# Patient Record
Sex: MALE | Employment: FULL TIME | URBAN - METROPOLITAN AREA
[De-identification: names, ages, dates, MRNs, and addresses within clinical notes are randomized per-mention and may not be internally consistent; named-entity substitution may affect disease eponyms.]

---

## 2020-01-29 ENCOUNTER — OFFICE VISIT (OUTPATIENT)
Dept: FAMILY MEDICINE CLINIC | Facility: CLINIC | Age: 40
End: 2020-01-29

## 2020-01-29 VITALS
BODY MASS INDEX: 35.44 KG/M2 | HEART RATE: 80 BPM | DIASTOLIC BLOOD PRESSURE: 90 MMHG | TEMPERATURE: 97.6 F | WEIGHT: 225.8 LBS | SYSTOLIC BLOOD PRESSURE: 138 MMHG | RESPIRATION RATE: 16 BRPM | HEIGHT: 67 IN

## 2020-01-29 DIAGNOSIS — R10.30 INGUINAL PAIN, UNSPECIFIED LATERALITY: ICD-10-CM

## 2020-01-29 DIAGNOSIS — M54.50 BILATERAL LOW BACK PAIN WITHOUT SCIATICA, UNSPECIFIED CHRONICITY: Primary | ICD-10-CM

## 2020-01-29 PROCEDURE — 3008F BODY MASS INDEX DOCD: CPT | Performed by: FAMILY MEDICINE

## 2020-01-29 PROCEDURE — 99203 OFFICE O/P NEW LOW 30 MIN: CPT | Performed by: FAMILY MEDICINE

## 2020-01-29 NOTE — PATIENT INSTRUCTIONS
Groin Pain   WHAT YOU NEED TO KNOW:   Groin pain may be felt only in your groin, or it may spread to your buttocks, thigh, or knee  An injury to your hip joint, pelvic area, lower back, or thighs can cause groin pain  DISCHARGE INSTRUCTIONS:   Medicines: You may need any of the following:  · NSAIDs , such as ibuprofen, help decrease swelling, pain, and fever  This medicine is available with or without a doctor's order  NSAIDs can cause stomach bleeding or kidney problems in certain people  If you take blood thinner medicine, always ask if NSAIDs are safe for you  Always read the medicine label and follow directions  Do not give these medicines to children under 10months of age without direction from your child's healthcare provider  · Acetaminophen  decreases pain  It is available without a doctor's order  Ask how much to take and how often to take it  Follow directions  Acetaminophen can cause liver damage if not taken correctly  · Take your medicine as directed  Contact your healthcare provider if you think your medicine is not helping or if you have side effects  Tell him of her if you are allergic to any medicine  Keep a list of the medicines, vitamins, and herbs you take  Include the amounts, and when and why you take them  Bring the list or the pill bottles to follow-up visits  Carry your medicine list with you in case of an emergency  Follow up with your healthcare provider as directed: You may need to return for more tests  Write down your questions so you remember to ask them during your visits  Self-care:   · Rest  as much as possible  Avoid activities that cause or increase your pain  · Apply ice  on your groin for 15 to 20 minutes every hour or as directed  Use an ice pack, or put crushed ice in a plastic bag  Cover it with a towel  Ice helps prevent tissue damage and decreases swelling and pain       · Apply heat  on your groin for 20 to 30 minutes every 2 hours for as many days as directed  Heat helps decrease pain and muscle spasms  Contact your healthcare provider if:   · You have a fever  · You have questions or concerns about your condition or care  Return to the emergency department if:   · You have severe pain even after you take medicine  · You have pain or burning when you urinate  · You have pain on your side that spreads to your groin  © 2017 [employer] Information is for End User's use only and may not be sold, redistributed or otherwise used for commercial purposes  All illustrations and images included in CareNotes® are the copyrighted property of A D A M , Inc  or Ch[employer]an Blanc  The above information is an  only  It is not intended as medical advice for individual conditions or treatments  Talk to your doctor, nurse or pharmacist before following any medical regimen to see if it is safe and effective for you  Lower Back Exercises   AMBULATORY CARE:   Lower back exercises  help heal and strengthen your back muscles to prevent another injury  Ask your healthcare provider if you need to see a physical therapist for more advanced exercises  Seek care immediately if:   · You have severe pain that prevents you from moving  Contact your healthcare provider if:   · Your pain becomes worse  · You have new pain  · You have questions or concerns about your condition or care  Do lower back exercises safely:   · Do the exercises on a mat or firm surface  (not on a bed) to support your spine and prevent low back pain  · Move slowly and smoothly  Avoid fast or jerky motions  · Breathe normally  Do not hold your breath  · Stop if you feel pain  It is normal to feel some discomfort at first  Regular exercise will help decrease your discomfort over time  Lower back exercises: Your healthcare provider may recommend that you do back exercises 10 to 30 minutes each day   He may also recommend that you do exercises 1 to 3 times each day  Ask your healthcare provider which exercises are best for you and how often to do them  · Ankle pumps:  Lie on your back  Move your foot up (with your toes pointing toward your head)  Then, move your foot down (with your toes pointing away from you)  Repeat this exercise 10 times on each side  · Heel slides:  Lie on your back  Slowly bend one leg and then straighten it  Next, bend the other leg and then straighten it  Repeat 10 times on each side  · Pelvic tilt:  Lie on your back with your knees bent and feet flat on the floor  Place your arms in a relaxed position beside your body  Tighten the muscles of your abdomen and flatten your back against the floor  Hold for 5 seconds  Repeat 5 times  · Back stretch:  Lie on your back with your hands behind your head  Bend your knees and turn the lower half of your body to one side  Hold this position for 10 seconds  Repeat 3 times on each side  · Straight leg raises:  Lie on your back with one leg straight  Bend the other knee  Tighten your abdomen and then slowly lift the straight leg up about 6 to 12 inches off the floor  Hold for 1 to 5 seconds  Lower your leg slowly  Repeat 10 times on each leg  · Knee-to-chest:  Lie on your back with your knees bent and feet flat on the floor  Pull one of your knees toward your chest and hold it there for 5 seconds  Return your leg to the starting position  Lift the other knee toward your chest and hold for 5 seconds  Do this 5 times on each side  · Cat and camel:  Place your hands and knees on the floor  Arch your back upward toward the ceiling and lower your head  Round out your spine as much as you can  Hold for 5 seconds  Lift your head upward and push your chest downward toward the floor  Hold for 5 seconds  Do 3 sets or as directed  · Wall squats:  Stand with your back against a wall  Tighten the muscles of your abdomen   Slowly lower your body until your knees are bent at a 45 degree angle  Hold this position for 5 seconds  Slowly move back up to a standing position  Repeat 10 times  · Curl up:  Lie on your back with your knees bent and feet flat on the floor  Place your hands, palms down, underneath the curve in your lower back  Next, with your elbows on the floor, lift your shoulders and chest 2 to 3 inches  Keep your head in line with your shoulders  Hold this position for 5 seconds  When you can do this exercise without pain for 10 to 15 seconds, you may add a rotation  While your shoulders and chest are lifted off the ground, turn slightly to the left and hold  Repeat on the other side  · Bird dog:  Place your hands and knees on the floor  Keep your wrists directly below your shoulders and your knees directly below your hips  Pull your belly button in toward your spine  Do not flatten or arch your back  Tighten your abdominal muscles  Raise one arm straight out so that it is aligned with your head  Next, raise the leg opposite your arm  Hold this position for 15 seconds  Lower your arm and leg slowly and change sides  Do 5 sets  © 2017 [employer] [employer] [facility] [employer] Information is for End User's use only and may not be sold, redistributed or otherwise used for commercial purposes  All illustrations and images included in CareNotes® are the copyrighted property of A D A M , Inc  or Chintan Blanc  The above information is an  only  It is not intended as medical advice for individual conditions or treatments  Talk to your doctor, nurse or pharmacist before following any medical regimen to see if it is safe and effective for you

## 2020-01-29 NOTE — ASSESSMENT & PLAN NOTE
Over one month of bilateral lower back pain that waxes and wanes, dull sensation with periodic groin pain worse on the left versus right  Triggered after sitting for long periods  Resolves with exercise  No numbness, tingling, bowel or bladder incontinence  Reports no testicular lumps or swelling  Denies urinary symptoms   Reports weight gain of over 35 pounds since May due to changing from vegetarian to regular diet  Notes increase in abdominal circumference  No neuro deficits, hypertonicity of lumbar and lower thoracic paraspinal muscles  Likely MSK in etiology   Advised patient to take NSAIDs every 8 hours for 1 week and to perform daily stretches and apply heating pad daily  Patient refusing OMT at this time  Follow up in 2 weeks

## 2020-01-29 NOTE — PROGRESS NOTES
Assessment/Plan:           Problem List Items Addressed This Visit        Other    Bilateral low back pain without sciatica - Primary     Over one month of bilateral lower back pain that waxes and wanes, dull sensation with periodic groin pain worse on the left versus right  Triggered after sitting for long periods  Resolves with exercise  No numbness, tingling, bowel or bladder incontinence  Reports no testicular lumps or swelling  Denies urinary symptoms   Reports weight gain of over 35 pounds since May due to changing from vegetarian to regular diet  Notes increase in abdominal circumference  No neuro deficits, hypertonicity of lumbar and lower thoracic paraspinal muscles  Likely MSK in etiology   Advised patient to take NSAIDs every 8 hours for 1 week and to perform daily stretches and apply heating pad daily  Patient refusing OMT at this time  Follow up in 2 weeks           Other Visit Diagnoses     Inguinal pain, unspecified laterality                Subjective:      Patient ID: Virgin Payer is a 46 y o  male  Patient is a 46year old male with no significant medical history is presenting to establish care and to discuss lower back pain with associated bilateral groin pain  States that around [place] he has noticed gradual lower back pain  Describes the pain as a constant dull pain  Denies any recent injury/trauma/falls  He states that the pain waxed and waned, improves after running or exercising  He states that after a few weeks he noted radiation of the pain to his groin bilaterally, Right worse than left  Worse after sitting for long periods but no pain when active such as running or at work  He denies urinary symptoms such as dysuria, hematuria, discharge, incontinence  Also denies lower extremity numbness, weakness, tingling, incontinence  He currently has no pain in his lower back or groin  He is concerned due to family history of testicular cancer; Uncle diagnosed at age of 27   He states he does regular checks and has not noticed any testicular lumps  He does not want a testicular exam today  He also states that within the last year he has gained over 40lbs as he transitioned from [address] back to eating meat  Patient also states he has not been following a healthy diet but exercises every other day at the gym or running  He has tried Tylenol and Advil sporadically but has not tried any long term medication for his back pain  The following portions of the patient's history were reviewed and updated as appropriate:   He  has no past medical history on file  He   Patient Active Problem List    Diagnosis Date Noted    Bilateral low back pain without sciatica 01/29/2020     He  has no past surgical history on file  His family history is not on file  He  has no tobacco, alcohol, and drug history on file  Current Outpatient Medications   Medication Sig Dispense Refill    MELATONIN PO Take by mouth      Protein POWD Take by mouth       No current facility-administered medications for this visit  Current Outpatient Medications on File Prior to Visit   Medication Sig    MELATONIN PO Take by mouth    Protein POWD Take by mouth     No current facility-administered medications on file prior to visit  He has No Known Allergies       Review of Systems   Constitutional: Negative for activity change, appetite change, chills, diaphoresis, fatigue and fever  HENT: Negative for congestion and rhinorrhea  Eyes: Negative for visual disturbance  Respiratory: Negative for cough and shortness of breath  Cardiovascular: Negative for chest pain and palpitations  Gastrointestinal: Negative for abdominal pain, blood in stool, constipation, diarrhea, nausea, rectal pain and vomiting  Genitourinary: Negative for difficulty urinating, discharge, dysuria, flank pain, frequency, genital sores, hematuria, penile pain, penile swelling, scrotal swelling, testicular pain and urgency  Musculoskeletal: Positive for back pain (lower back pain)  Negative for gait problem and myalgias  Skin: Negative for rash and wound  Allergic/Immunologic: Negative for environmental allergies and food allergies  Neurological: Negative for dizziness, weakness, light-headedness and headaches  Hematological: Negative for adenopathy  Objective:      /90   Pulse 80   Temp 97 6 °F (36 4 °C)   Resp 16   Ht 5' 6 8" (1 697 m)   Wt 102 kg (225 lb 12 8 oz)   BMI 35 58 kg/m²          Physical Exam   Constitutional: He appears well-developed and well-nourished  No distress  HENT:   Head: Normocephalic and atraumatic  Mouth/Throat: Oropharynx is clear and moist    Eyes: Pupils are equal, round, and reactive to light  Neck: Neck supple  Cardiovascular: Normal rate and regular rhythm  Pulmonary/Chest: Effort normal and breath sounds normal  He has no wheezes  Abdominal: Soft  Bowel sounds are normal  He exhibits no distension  There is no tenderness  There is no guarding  Genitourinary:   Genitourinary Comments: Patient refused exam today   Musculoskeletal: He exhibits no edema  Lumbar back: He exhibits tenderness (mild tenderness to palpation)  He exhibits no swelling and no laceration  Hypertonicity of paraspinal muscles along lower thoracic and lumbar; negative Straight leg test   Lymphadenopathy:     He has no cervical adenopathy  Neurological: He is alert  He has normal strength  No sensory deficit  Lower extremity strength: 5/5 bilaterally  No deficits in LE sensory    Skin: Skin is warm and dry  He is not diaphoretic  No erythema  Psychiatric: He has a normal mood and affect  His behavior is normal    Vitals reviewed              Edyth Lesch, DO PGY-1  [place]

## 2022-11-10 ENCOUNTER — OFFICE VISIT (OUTPATIENT)
Dept: FAMILY MEDICINE CLINIC | Facility: CLINIC | Age: 42
End: 2022-11-10

## 2022-11-10 VITALS
HEIGHT: 66 IN | HEART RATE: 63 BPM | OXYGEN SATURATION: 99 % | SYSTOLIC BLOOD PRESSURE: 149 MMHG | TEMPERATURE: 97.7 F | WEIGHT: 166 LBS | BODY MASS INDEX: 26.68 KG/M2 | DIASTOLIC BLOOD PRESSURE: 84 MMHG | RESPIRATION RATE: 20 BRPM

## 2022-11-10 DIAGNOSIS — B34.9 VIRAL INFECTION, UNSPECIFIED: Primary | ICD-10-CM

## 2022-11-10 NOTE — LETTER
November 10, 2022     Patient: Blanch Cabot  YOB: 1980  Date of Visit: 11/10/2022      To Whom it May Concern:    Sarina Chun is under my professional care  Deny Olivo was seen in my office on 11/10/2022  Deny Olivo is recovering from respiratory illness  Deny Olivo may return to work on 11/14/2022  If you have any questions or concerns, please don't hesitate to call           Sincerely,          Dora Hamm MD        CC: No Recipients

## 2022-11-10 NOTE — PROGRESS NOTES
Name: Essence Calvin      : 1980      MRN: [id number]  Encounter Provider: Payton Ferrari MD  Encounter Date: 11/10/2022   Encounter department: [address] [place] [address]  Viral infection, unspecified  Assessment & Plan:  Improving but requesting work note today  Still has residual sx of congestion and mild cough, home covid test negative  Counseled on adequate hydration and nutrition as well as rest  Recommend hand hygiene and masking guidelines  Work note drafted and printed  Subjective      Miller Moore is a 37year old male presenting today to request letter permitting him to return to work  He has been away from work since the beginning of this week due to cold sx which are now resolving  He tested himself at home for covid "several times" but it came back negative  He reports no other acute concerns today  Review of Systems   Constitutional: Negative for chills and fever  HENT: Positive for congestion  Negative for ear pain and sore throat  Eyes: Negative for visual disturbance  Respiratory: Positive for cough  Negative for shortness of breath  Cardiovascular: Negative for chest pain and palpitations  Gastrointestinal: Negative for abdominal pain, diarrhea, nausea and vomiting  Neurological: Negative for dizziness and headaches  All other systems reviewed and are negative  Current Outpatient Medications on File Prior to Visit   Medication Sig   • MELATONIN PO Take by mouth   • Protein POWD Take by mouth       Objective     /84 (BP Location: Left arm, Patient Position: Sitting, Cuff Size: Standard)   Pulse 63   Temp 97 7 °F (36 5 °C) (Temporal)   Resp 20   Ht 5' 6" (1 676 m)   Wt 75 3 kg (166 lb)   SpO2 99%   BMI 26 79 kg/m²     Physical Exam  Constitutional:       General: He is not in acute distress  Appearance: He is normal weight  He is not ill-appearing or diaphoretic     HENT:      Right Ear: External ear normal       Left Ear: External ear normal       Nose: No congestion or rhinorrhea  Mouth/Throat:      Mouth: Mucous membranes are moist    Eyes:      Conjunctiva/sclera: Conjunctivae normal    Cardiovascular:      Rate and Rhythm: Normal rate and regular rhythm  Pulmonary:      Effort: Pulmonary effort is normal  No respiratory distress  Breath sounds: Normal breath sounds  Neurological:      Mental Status: He is alert         Shefali aWllace MD

## 2023-02-10 ENCOUNTER — OFFICE VISIT (OUTPATIENT)
Dept: FAMILY MEDICINE CLINIC | Facility: CLINIC | Age: 43
End: 2023-02-10

## 2023-02-10 VITALS
OXYGEN SATURATION: 98 % | RESPIRATION RATE: 16 BRPM | BODY MASS INDEX: 24.35 KG/M2 | HEART RATE: 98 BPM | SYSTOLIC BLOOD PRESSURE: 124 MMHG | WEIGHT: 164.4 LBS | DIASTOLIC BLOOD PRESSURE: 79 MMHG | HEIGHT: 69 IN | TEMPERATURE: 98.2 F

## 2023-02-10 DIAGNOSIS — U07.1 POSITIVE SELF-ADMINISTERED ANTIGEN TEST FOR COVID-19: Primary | ICD-10-CM

## 2023-02-10 NOTE — ASSESSMENT & PLAN NOTE
- Sx onset on 02/03 with fever, myalgia, fatigue, loss of appetite   - Denied sick contact, self tested and found positive for Covid on home test  - Vaccinated with Covid x1 (Jason Garcia)  - Sx progressively improved this week, majority of sx resolved beside mild chest congestion   - Used Ibuprofen for sx relief  - No significant co-morbidities   - Recommend continue with supportive care and continue masking till at least Monday 2/13  - Recommend booster after this episode of illness

## 2023-02-10 NOTE — PROGRESS NOTES
Name: Tashi Overton      : 1980      MRN: [id number]  Encounter Provider: Alejandrina Andrew DO  Encounter Date: 2/10/2023   Encounter department: [facility]     1  Positive self-administered antigen test for COVID-19  Assessment & Plan:  - Sx onset on  with fever, myalgia, fatigue, loss of appetite   - Denied sick contact, self tested and found positive for Covid on home test  - Vaccinated with Covid x1 (JJ)  - Sx progressively improved this week, majority of sx resolved beside mild chest congestion   - Used Ibuprofen for sx relief  - No significant co-morbidities   - Recommend continue with supportive care and continue masking till at least   - Recommend booster after this episode of illness              Subjective      HPI   40years old male with no medical history, presented with positive home test for COVID including symptoms  Patient informed that he started to have symptoms of COVID including myalgia, insomnia, loss of appetite, and fever over the weekend, he endorses onset started on February 3  Patient to start supportive treatment with ibuprofen  His symptom progressively getting better over the week  He is currently only endorses chest congestion, other symptoms resolved  Denies dyspnea, recurrent fever  Patient had no sick contact  Vaccinated with J&J  Review of Systems   Constitutional: Negative for chills and fever  HENT: Positive for congestion  Negative for ear pain and sore throat  Eyes: Negative for pain and visual disturbance  Respiratory: Negative for cough and shortness of breath  Cardiovascular: Negative for chest pain and palpitations  Gastrointestinal: Negative for abdominal pain and vomiting  Genitourinary: Negative for dysuria and hematuria  Musculoskeletal: Negative for arthralgias and back pain  Skin: Negative for color change and rash     Neurological: Negative for seizures and syncope  All other systems reviewed and are negative  Current Outpatient Medications on File Prior to Visit   Medication Sig   • MELATONIN PO Take by mouth   • Protein POWD Take by mouth       Objective     /79   Pulse 98   Temp 98 2 °F (36 8 °C)   Resp 16   Ht 5' 9" (1 753 m)   Wt 74 6 kg (164 lb 6 4 oz)   SpO2 98%   BMI 24 28 kg/m²     Physical Exam  Constitutional:       General: He is not in acute distress  Appearance: Normal appearance  He is not toxic-appearing  HENT:      Head: Normocephalic and atraumatic  Right Ear: External ear normal       Left Ear: External ear normal       Nose: Congestion present  Mouth/Throat:      Pharynx: No oropharyngeal exudate or posterior oropharyngeal erythema  Eyes:      General: No scleral icterus  Cardiovascular:      Rate and Rhythm: Normal rate and regular rhythm  Pulses: Normal pulses  Pulmonary:      Effort: No respiratory distress  Abdominal:      General: Abdomen is flat  There is no distension  Palpations: Abdomen is soft  Musculoskeletal:         General: No swelling or deformity  Cervical back: No rigidity  Skin:     General: Skin is warm  Capillary Refill: Capillary refill takes less than 2 seconds  Coloration: Skin is not jaundiced or pale  Neurological:      General: No focal deficit present  Mental Status: He is alert and oriented to person, place, and time  Cranial Nerves: No cranial nerve deficit     Psychiatric:         Mood and Affect: Mood normal          Behavior: Behavior normal        Frantz Vallejo DO

## 2023-02-10 NOTE — LETTER
February 10, 2023     Patient: Jorgito Holland  YOB: 1980  Date of Visit: 2/10/2023      To Whom it May Concern:    Viridianamarilu Bhatti is under my professional care  Cosme Pina was seen in my office on 2/10/2023  Cosme Pina is recovering from Covid with sx started on earlier on this week, recommend pt to quarantine and return to work on 2/13/2023  If you have any questions or concerns, please don't hesitate to call           Sincerely,          Frantz Vallejo DO        CC: No Recipients

## 2023-05-15 ENCOUNTER — APPOINTMENT (EMERGENCY)
Dept: RADIOLOGY | Facility: HOSPITAL | Age: 43
End: 2023-05-15

## 2023-05-15 ENCOUNTER — HOSPITAL ENCOUNTER (INPATIENT)
Facility: HOSPITAL | Age: 43
LOS: 12 days | End: 2023-05-27
Attending: EMERGENCY MEDICINE | Admitting: EMERGENCY MEDICINE

## 2023-05-15 ENCOUNTER — APPOINTMENT (EMERGENCY)
Dept: RADIOLOGY | Facility: HOSPITAL | Age: 43
End: 2023-05-15
Attending: NEUROLOGICAL SURGERY

## 2023-05-15 ENCOUNTER — ANESTHESIA EVENT (EMERGENCY)
Dept: RADIOLOGY | Facility: HOSPITAL | Age: 43
End: 2023-05-15

## 2023-05-15 ENCOUNTER — ANESTHESIA (EMERGENCY)
Dept: RADIOLOGY | Facility: HOSPITAL | Age: 43
End: 2023-05-15

## 2023-05-15 DIAGNOSIS — I63.9 STROKE (HCC): Primary | ICD-10-CM

## 2023-05-15 DIAGNOSIS — R52 MILD PAIN: ICD-10-CM

## 2023-05-15 DIAGNOSIS — I63.512 ACUTE ISCHEMIC LEFT MCA STROKE (HCC): ICD-10-CM

## 2023-05-15 DIAGNOSIS — R00.1 BRADYCARDIA: ICD-10-CM

## 2023-05-15 DIAGNOSIS — Q21.12 PFO (PATENT FORAMEN OVALE): ICD-10-CM

## 2023-05-15 DIAGNOSIS — E46 PROTEIN DEFICIENCY (HCC): ICD-10-CM

## 2023-05-15 DIAGNOSIS — Z87.891 SMOKING HX: ICD-10-CM

## 2023-05-15 LAB
ANION GAP SERPL CALCULATED.3IONS-SCNC: 1 MMOL/L (ref 4–13)
APTT PPP: 22 SECONDS (ref 23–37)
BUN SERPL-MCNC: 10 MG/DL (ref 5–25)
CALCIUM SERPL-MCNC: 9.1 MG/DL (ref 8.3–10.1)
CARDIAC TROPONIN I PNL SERPL HS: 37 NG/L
CHLORIDE SERPL-SCNC: 107 MMOL/L (ref 96–108)
CO2 SERPL-SCNC: 28 MMOL/L (ref 21–32)
CREAT SERPL-MCNC: 0.73 MG/DL (ref 0.6–1.3)
ERYTHROCYTE [DISTWIDTH] IN BLOOD BY AUTOMATED COUNT: 12.3 % (ref 11.6–15.1)
GFR SERPL CREATININE-BSD FRML MDRD: 115 ML/MIN/1.73SQ M
GLUCOSE SERPL-MCNC: 99 MG/DL (ref 65–140)
HCT VFR BLD AUTO: 41 % (ref 36.5–49.3)
HGB BLD-MCNC: 14.6 G/DL (ref 12–17)
INR PPP: 0.89 (ref 0.84–1.19)
MCH RBC QN AUTO: 34.1 PG (ref 26.8–34.3)
MCHC RBC AUTO-ENTMCNC: 35.6 G/DL (ref 31.4–37.4)
MCV RBC AUTO: 96 FL (ref 82–98)
PLATELET # BLD AUTO: 244 THOUSANDS/UL (ref 149–390)
PMV BLD AUTO: 9.3 FL (ref 8.9–12.7)
POTASSIUM SERPL-SCNC: 4 MMOL/L (ref 3.5–5.3)
PROTHROMBIN TIME: 12.3 SECONDS (ref 11.6–14.5)
RBC # BLD AUTO: 4.28 MILLION/UL (ref 3.88–5.62)
SODIUM SERPL-SCNC: 136 MMOL/L (ref 135–147)
WBC # BLD AUTO: 7.08 THOUSAND/UL (ref 4.31–10.16)

## 2023-05-15 PROCEDURE — B31RZZZ FLUOROSCOPY OF INTRACRANIAL ARTERIES: ICD-10-PCS | Performed by: NEUROLOGICAL SURGERY

## 2023-05-15 PROCEDURE — B304ZZZ PLAIN RADIOGRAPHY OF LEFT COMMON CAROTID ARTERY: ICD-10-PCS | Performed by: NEUROLOGICAL SURGERY

## 2023-05-15 PROCEDURE — 03CG3ZZ EXTIRPATION OF MATTER FROM INTRACRANIAL ARTERY, PERCUTANEOUS APPROACH: ICD-10-PCS | Performed by: NEUROLOGICAL SURGERY

## 2023-05-15 PROCEDURE — B307ZZZ PLAIN RADIOGRAPHY OF LEFT INTERNAL CAROTID ARTERY: ICD-10-PCS | Performed by: NEUROLOGICAL SURGERY

## 2023-05-15 RX ORDER — 3% SODIUM CHLORIDE 3 G/100ML
90 INJECTION, SOLUTION INTRAVENOUS CONTINUOUS
Status: DISCONTINUED | OUTPATIENT
Start: 2023-05-16 | End: 2023-05-21

## 2023-05-15 RX ORDER — SODIUM CHLORIDE, SODIUM LACTATE, POTASSIUM CHLORIDE, CALCIUM CHLORIDE 600; 310; 30; 20 MG/100ML; MG/100ML; MG/100ML; MG/100ML
75 INJECTION, SOLUTION INTRAVENOUS CONTINUOUS
Status: DISCONTINUED | OUTPATIENT
Start: 2023-05-16 | End: 2023-05-15

## 2023-05-15 RX ORDER — VERAPAMIL HYDROCHLORIDE 2.5 MG/ML
INJECTION, SOLUTION INTRAVENOUS AS NEEDED
Status: COMPLETED | OUTPATIENT
Start: 2023-05-15 | End: 2023-05-15

## 2023-05-15 RX ORDER — LIDOCAINE HYDROCHLORIDE 10 MG/ML
INJECTION, SOLUTION EPIDURAL; INFILTRATION; INTRACAUDAL; PERINEURAL
Status: DISCONTINUED | OUTPATIENT
Start: 2023-05-15 | End: 2023-05-15

## 2023-05-15 RX ORDER — EPTIFIBATIDE 2 MG/ML
INJECTION, SOLUTION INTRAVENOUS
Status: COMPLETED | OUTPATIENT
Start: 2023-05-15 | End: 2023-05-15

## 2023-05-15 RX ORDER — SODIUM CHLORIDE, SODIUM LACTATE, POTASSIUM CHLORIDE, CALCIUM CHLORIDE 600; 310; 30; 20 MG/100ML; MG/100ML; MG/100ML; MG/100ML
INJECTION, SOLUTION INTRAVENOUS CONTINUOUS PRN
Status: DISCONTINUED | OUTPATIENT
Start: 2023-05-15 | End: 2023-05-15

## 2023-05-15 RX ORDER — LABETALOL HYDROCHLORIDE 5 MG/ML
10 INJECTION, SOLUTION INTRAVENOUS EVERY 4 HOURS PRN
Status: DISCONTINUED | OUTPATIENT
Start: 2023-05-15 | End: 2023-05-27

## 2023-05-15 RX ORDER — SODIUM CHLORIDE, SODIUM GLUCONATE, SODIUM ACETATE, POTASSIUM CHLORIDE, MAGNESIUM CHLORIDE, SODIUM PHOSPHATE, DIBASIC, AND POTASSIUM PHOSPHATE .53; .5; .37; .037; .03; .012; .00082 G/100ML; G/100ML; G/100ML; G/100ML; G/100ML; G/100ML; G/100ML
1000 INJECTION, SOLUTION INTRAVENOUS ONCE
Status: COMPLETED | OUTPATIENT
Start: 2023-05-15 | End: 2023-05-15

## 2023-05-15 RX ORDER — SODIUM CHLORIDE, SODIUM GLUCONATE, SODIUM ACETATE, POTASSIUM CHLORIDE, MAGNESIUM CHLORIDE, SODIUM PHOSPHATE, DIBASIC, AND POTASSIUM PHOSPHATE .53; .5; .37; .037; .03; .012; .00082 G/100ML; G/100ML; G/100ML; G/100ML; G/100ML; G/100ML; G/100ML
INJECTION, SOLUTION INTRAVENOUS AS NEEDED
Status: DISCONTINUED | OUTPATIENT
Start: 2023-05-15 | End: 2023-05-15

## 2023-05-15 RX ORDER — SODIUM CHLORIDE 3 G/100ML
250 INJECTION, SOLUTION INTRAVENOUS ONCE
Status: COMPLETED | OUTPATIENT
Start: 2023-05-15 | End: 2023-05-16

## 2023-05-15 RX ORDER — NICOTINE 21 MG/24HR
1 PATCH, TRANSDERMAL 24 HOURS TRANSDERMAL DAILY
Status: DISCONTINUED | OUTPATIENT
Start: 2023-05-16 | End: 2023-05-27 | Stop reason: HOSPADM

## 2023-05-15 RX ORDER — EPTIFIBATIDE 0.75 MG/ML
INJECTION, SOLUTION INTRAVENOUS
Status: COMPLETED | OUTPATIENT
Start: 2023-05-15 | End: 2023-05-15

## 2023-05-15 RX ORDER — IODIXANOL 320 MG/ML
300 INJECTION, SOLUTION INTRAVASCULAR
Status: COMPLETED | OUTPATIENT
Start: 2023-05-15 | End: 2023-05-15

## 2023-05-15 RX ORDER — LIDOCAINE HYDROCHLORIDE 10 MG/ML
INJECTION, SOLUTION EPIDURAL; INFILTRATION; INTRACAUDAL; PERINEURAL AS NEEDED
Status: COMPLETED | OUTPATIENT
Start: 2023-05-15 | End: 2023-05-15

## 2023-05-15 RX ORDER — ONDANSETRON 2 MG/ML
4 INJECTION INTRAMUSCULAR; INTRAVENOUS ONCE AS NEEDED
Status: DISCONTINUED | OUTPATIENT
Start: 2023-05-15 | End: 2023-05-22

## 2023-05-15 RX ORDER — SODIUM CHLORIDE 9 MG/ML
125 INJECTION, SOLUTION INTRAVENOUS CONTINUOUS
Status: DISCONTINUED | OUTPATIENT
Start: 2023-05-16 | End: 2023-05-15

## 2023-05-15 RX ORDER — CHLORHEXIDINE GLUCONATE 0.12 MG/ML
15 RINSE ORAL EVERY 12 HOURS SCHEDULED
Status: DISCONTINUED | OUTPATIENT
Start: 2023-05-15 | End: 2023-05-27 | Stop reason: HOSPADM

## 2023-05-15 RX ORDER — ATORVASTATIN CALCIUM 40 MG/1
40 TABLET, FILM COATED ORAL EVERY EVENING
Status: DISCONTINUED | OUTPATIENT
Start: 2023-05-15 | End: 2023-05-16

## 2023-05-15 RX ORDER — FENTANYL CITRATE 50 UG/ML
INJECTION, SOLUTION INTRAMUSCULAR; INTRAVENOUS AS NEEDED
Status: DISCONTINUED | OUTPATIENT
Start: 2023-05-15 | End: 2023-05-15

## 2023-05-15 RX ADMIN — FENTANYL CITRATE 25 MCG: 50 INJECTION, SOLUTION INTRAMUSCULAR; INTRAVENOUS at 19:25

## 2023-05-15 RX ADMIN — IOHEXOL 80 ML: 350 INJECTION, SOLUTION INTRAVENOUS at 18:51

## 2023-05-15 RX ADMIN — PHENYLEPHRINE HYDROCHLORIDE 30 MCG/MIN: 10 INJECTION INTRAVENOUS at 19:28

## 2023-05-15 RX ADMIN — PHENYLEPHRINE HYDROCHLORIDE 20 MCG/MIN: 10 INJECTION INTRAVENOUS at 19:24

## 2023-05-15 RX ADMIN — FENTANYL CITRATE 25 MCG: 50 INJECTION, SOLUTION INTRAMUSCULAR; INTRAVENOUS at 19:55

## 2023-05-15 RX ADMIN — IOHEXOL 80 ML: 350 INJECTION, SOLUTION INTRAVENOUS at 19:03

## 2023-05-15 RX ADMIN — IODIXANOL 180 ML: 320 INJECTION, SOLUTION INTRAVASCULAR at 20:54

## 2023-05-15 RX ADMIN — EPTIFIBATIDE 6633 MCG: 2 INJECTION INTRAVENOUS at 19:58

## 2023-05-15 RX ADMIN — EPTIFIBATIDE 0.5 MCG/KG/MIN: 0.75 INJECTION INTRAVENOUS at 20:03

## 2023-05-15 RX ADMIN — SODIUM CHLORIDE, SODIUM GLUCONATE, SODIUM ACETATE, POTASSIUM CHLORIDE, MAGNESIUM CHLORIDE, SODIUM PHOSPHATE, DIBASIC, AND POTASSIUM PHOSPHATE 1000 ML: .53; .5; .37; .037; .03; .012; .00082 INJECTION, SOLUTION INTRAVENOUS at 19:17

## 2023-05-15 RX ADMIN — PHENYLEPHRINE HYDROCHLORIDE 20 MCG/MIN: 10 INJECTION INTRAVENOUS at 19:25

## 2023-05-15 RX ADMIN — SODIUM CHLORIDE, SODIUM LACTATE, POTASSIUM CHLORIDE, AND CALCIUM CHLORIDE: .6; .31; .03; .02 INJECTION, SOLUTION INTRAVENOUS at 19:26

## 2023-05-15 RX ADMIN — VERAPAMIL HYDROCHLORIDE 10 MG: 2.5 INJECTION INTRAVENOUS at 19:47

## 2023-05-15 RX ADMIN — VERAPAMIL HYDROCHLORIDE 10 MG: 2.5 INJECTION INTRAVENOUS at 20:22

## 2023-05-15 RX ADMIN — LIDOCAINE HYDROCHLORIDE 10 ML: 10 INJECTION, SOLUTION EPIDURAL; INFILTRATION; INTRACAUDAL; PERINEURAL at 19:32

## 2023-05-15 RX ADMIN — SODIUM CHLORIDE, SODIUM GLUCONATE, SODIUM ACETATE, POTASSIUM CHLORIDE, MAGNESIUM CHLORIDE, SODIUM PHOSPHATE, DIBASIC, AND POTASSIUM PHOSPHATE 500 ML: .53; .5; .37; .037; .03; .012; .00082 INJECTION, SOLUTION INTRAVENOUS at 19:27

## 2023-05-15 NOTE — ASSESSMENT & PLAN NOTE
· 40 y o  right handed male w/ no pertinent hx who presented as a stroke alert on 05/15/23 at 6:40 PM   LKW 8:30PM last night when he spoke with someone over the phone  Initial presenting deficits R facial droop, RUE > RLE weakness  No thinners use  · Per EMS/ED, initial BP: 149/88  Pulse 65  ECG NSR  Glucose 120  NIHSS 16 (see below)  · CTH showed L MCA stroke (ASPECT 8), CTA h/n showed partial LM1 occlusion  Not found to be a TNK candidate due to being ouf time window, large stroke seen on CTH  Given clinical/radiological findings, pt was then returned to the scanner for CTP which demonstrated that pt was a candidate for IR thrombectomy and endovascular alert was called  Work-up:  • A1c: 4 8  • Lipid panel: cholesterol 159, triglycerides 34, , LDL 18    Impression: 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 in a 41 yo active patient w/ no reported vascular risk factors to favor cardioembolic etiology  Patient s/p thrombectomy with TICI 3 revascularization  Will obtain thrombosis panel for underlying hypercoagulable states  Recommend repeat CTH in pm 5/16 and ALYSSA  Jodell Splinter with Neurosurgery for possible intervention  Cannot rule out hypercoagulable state secondary to COVID infection  Plan:   • Stroke pathway, discussed w/ Dr Gabriel Crawford  • If pt mental status declines by GCS > 2 in 1 hour, obtain STAT CTH  • Recommend repeat CTH once again in the pm of 5/16  o Reach out to Neurosurgery for possible intervention due to worsening CTH and risk of herniation  • Post thrombectomy, SBP < 160, MAP> 65     • MRI brain scan w/out contrast when able  • Goal euglycemia, normothermia  • Recommend ALYSSA rather than TTE to investigate for etiology  • Neuro checks- Every 1 hour x 4 hours, then every 2 hours x 4, then every 4 hours x 72 hours   • Neurovascular checks - every 15m x 2 hours then 30m x 6 hours then every 1 hr x 16 hours    • Monitor on telemetry  • DVT pharmacologic ppx - pending recs, SCDs ok   • Medical management as per critical care appreciated  • PT/OT/Speech/PMR consults appreciated when able  • Young patient with limited risk factors and stroke, will obtain additional labs with thrombosis panel , will check   o Homocysteine level, MTHFR mutation, as well as rheumatic disease markers (BELEM, ANCA, dsDNA, RF)     • Rest of care as per primary team

## 2023-05-15 NOTE — PROGRESS NOTES
05/15/23 1900   Clinical Encounter Type   Visited With Family   Routine Visit Introduction   Crisis Visit Trauma   Voodoo Encounters   Voodoo Needs Prayer      Pastoral Care Progress Note    5/15/2023  Patient: Nick Lundberg : 1980  Admission Date & Time: 5/15/2023 Soniya Soni  MRN: [id number] CSN: [id number]           helped sister and family navigate procedures and hospital while brother is in IR  Prayers offered and accepted  If any needs arise, please reach out to spiritual care

## 2023-05-15 NOTE — ANESTHESIA PROCEDURE NOTES
"Arterial Line Insertion  Performed by: Landon Bartlett CRNA  Authorized by: Charo Asher MD   Consent: The procedure was performed in an emergent situation  Patient understanding: patient states understanding of the procedure being performed  Patient consent: the patient's understanding of the procedure matches consent given  Procedure consent: procedure consent matches procedure scheduled  Relevant documents: relevant documents present and verified  Test results: test results available and properly labeled  Site marked: the operative site was marked  Patient identity confirmed: hospital-assigned identification number  Time out: Immediately prior to procedure a \"time out\" was called to verify the correct patient, procedure, equipment, support staff and site/side marked as required    Indications: hemodynamic monitoring  Orientation:  Left  Location: radial artery  Procedure Details:  Needle gauge: 20  Seldinger technique: Seldinger technique used  Number of attempts: 1    Post-procedure:  Post-procedure: dressing applied  Waveform: good waveform  Post-procedure CNS: normal  Patient tolerance: patient tolerated the procedure well with no immediate complications        "

## 2023-05-15 NOTE — CONSULTS
Stroke Alert- Neurology Consult     Name: Rani Cormier   Age & Sex: 40 y o  male   MRN: [id number]  Unit/Bed#: NEEL   Encounter: [id number]  Length of Stay: 0    Assessment plan:    L MCA stroke w/ LM1 partial occlusion  Assessment & Plan  · 40 y o  right handed male w/ no pertinent hx who presented as a stroke alert on 05/15/23 at 6:40 PM   LKW 8:30PM last night when he spoke with someone over the phone  Initial presenting deficits R facial droop, RUE > RLE weakness  No thinners use  · Per EMS/ED, initial BP: 149/88  Pulse 65  ECG NSR  Glucose 120  NIHSS 16 (see below)  · CTH showed L MCA stroke (ASPECT 8), CTA h/n showed partial LM1 occlusion  Not found to be a TNK candidate due to being ouf time window, large stroke seen on CTH  Given clinical/radiological findings, pt was then returned to the scanner for CTP which demonstrated that pt was a candidate for IR thrombectomy and endovascular alert was called  Impression: 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 in a 41 yo active patient w/ no reported vascular risk factors -favor cardioembolic etiology  Plan:   • Stroke pathway, discussed w/ Dr Brian Rosas  • If pt mental status declines by GCS > 2 in 1 hour, obtain STAT CTH  • Patient currently in NeuroIR for thrombectomy  • Post thrombectomy, SBP < 160, MAP> 65  • Will likely load w/ ASA [id number] repeat CTH if no hemorrhage present  Defer to neurosurgery for final recs  • Atorvastatin 40 mg   • A1c/lipid panel   • MR brain scan w/out contrast when able  • Goal euglycemia, normothermia  • TTE   • Neuro checks- Every 1 hour x 4 hours, then every 2 hours x 4, then every 4 hours x 72 hours     • Neurovascular checks- every 15m x 2 hours then 30m x 6 hours then every 1 hr x 16 hours    • Monitor on telemetry  • DVT pharmacologic ppx- pending recs, SCDs ok    • Medical management as per critical care appreciated  • PT/OT/Speech/PMR consults appreciated when able      Recommendations for outpatient neurological follow up have yet to be determined  Pending for discharge: Stroke work-up    Subjective:   Reason for Consult / Principal Problem:  stroke alert  Stroke alert called: 05/15/23 at 6:40PM   Neurology stroke alert response: Immediate  Hx and PE limited by:  aphasia  Time last known well: 830PM night prior (5/14/23)   Review of previous medical records was completed as available  HPI:   Mr Armando Stubbs is a 40 y o  right handed male w/ no pertinent hx who presented as a stroke alert on 05/15/23 at 6:40 PM   LKW 8:30PM last night  Initial presenting deficits R facial droop, RUE > RLE weakness  No thinners use  Patient reported being in normal state of health until 8:30PM yesterday evening when he spoke w/ a friend over the phone  He was supposed to be in a meeting with a friend this evening, but never showed up and did not answer his phone, prompting the friend to call EMS  Found by EMS 35 min prior to arrival to the ED  Per EMS/ED, initial BP: 149/88  Pulse 65  ECG NSR  Glucose 120  NIHSS 16 (see below)  CTH wo contrast L MCA stroke  CTA h/n showed LM1 partial occlusion  Not a TNK candidate due to: >4 5 hours from time of sx onset  CTP was obtained and an endovascular alert was activated  Given clinical/radiological findings, pt was then demonstrated to be a candidate for IR thrombectomy  Neurology consulted for workup, management of stroke  Inpatient consult to Neurology  Consult performed by: Charles Goldstein MD  Consult ordered by: Mariya Reynoso DO          Historical Information   History reviewed  No pertinent past medical history  History reviewed  No pertinent surgical history    Social History   Social History     Substance and Sexual Activity   Alcohol Use Yes    Comment: Social     Social History     Substance and Sexual Activity   Drug Use Never     E-Cigarette/Vaping     E-Cigarette/Vaping Substances     Social History     Tobacco Use Smoking Status Every Day   Smokeless Tobacco Never     Family History: History reviewed  No pertinent family history  Meds/Allergies   all current active meds have been reviewed  No Known Allergies      Review of Systems    Objective:     Patient ID: Eboni Gamez is a 40 y o  male  Vitals:   Vitals:    05/15/23 1850 05/15/23 1905   BP: 126/92 150/92   Pulse: 62 65   Resp: 18 20   SpO2: 100% 100%   Weight: 73 7 kg (162 lb 7 7 oz)       Body mass index is 23 99 kg/m²  No intake or output data in the 24 hours ending 05/15/23 1956    Temperature:   No data recorded  Invasive Devices: Invasive Devices     Peripheral Intravenous Line  Duration           Peripheral IV 05/15/23 Left Antecubital <1 day          Arterial Line  Duration           Arterial Line 05/15/23 Left Radial <1 day          Line  Duration           Arterial Sheath 5 Fr  Right Femoral <1 day              Physical Exam:   Vitals reviewed  General Examination: ill-appearing  CVS: S1, S2 noted  Regular rate, rhythm  Neurologic Exam (limited due to acuity of stroke alert and IR intervention):   Mental Status:  alert, follows basic commands consistently  Language: global aphasia  CN: mild R nasolabial fold flattening, tongue deviated to the Right  Hearing intact grossly to voice  Motor: RUE plegic, RLE able to maintain minimal anti-gravity effort  LUE, LLE -able to hold up antigravity for 10, 5seconds  Sensory: Decreased sensation on RUE, RLE as compared to Left  No significant neglect  NIHSS:    1a Level of Consciousness: 0 = Alert   1b  LOC Questions: 2 = Answers neither correctly   1c  LOC Commands: 0 = Obeys both correctly   2  Best Gaze: 0 = Normal   3  Visual: 1 = Partial hemianopia (decreased blink to threat on R)   4  Facial Palsy: 1=Minor paralysis (flattened nasolabial fold, asymmetric on smiling)- R lower face   5a  Motor Right Arm: 4=No movement   5b   Motor Left Arm: 0=No drift, limb holds 90 (or 45) degrees for full 10 seconds   6a  Motor Right Le=Some effort against gravity, limb cannot get to or maintain (if cured) 90 (or 45) degrees, drifts down to bed, but has some effort against gravity   6b  Motor Left Le=No drift, limb holds 90 (or 45) degrees for full 10 seconds   7  Limb Ataxia:  UN = Untestable (amputation, fused joint)   8  Sensory: 1=Mild to moderate sensory loss; patient feels pinprick is less sharp or is dull on the affected side; there is a loss of superficial pain with pinprick but patient is aware He is being touched   9  Best Language:  3=Mute, global aphasia; no usable speech or auditory comprehension   10  Dysarthria: 2=Severe; patient speech is so slurred as to be unintelligible in the absence of or our of proportion to any dysphagia, or is mute/anarthric   11  Extinction and Inattention (formerly Neglect): 0=No abnormality   Total Score: 16     Time NIHSS was completed: 6:50PM    Modified Guin Score:  0 (No baseline symptoms/disability)    Labs: I have personally reviewed pertinent reports  Results from last 7 days   Lab Units 05/15/23  1853   WBC Thousand/uL 7 08   HEMOGLOBIN g/dL 14 6   HEMATOCRIT % 41 0   PLATELETS Thousands/uL 244      Results from last 7 days   Lab Units 05/15/23  1853   POTASSIUM mmol/L 4 0   CHLORIDE mmol/L 107   CO2 mmol/L 28   BUN mg/dL 10   CREATININE mg/dL 0 73   CALCIUM mg/dL 9 1              Results from last 7 days   Lab Units 05/15/23  1853   INR  0 89   PTT seconds 22*             Imaging and diagnostic studies:    Radiology Results: I have personally reviewed pertinent reports  and I have personally reviewed pertinent films in PACS  CT cerebral perfusion   Final Result by Kevin Fulton MD (05/15 1920)      CT perfusion performed  Data available on PACS  Workstation performed: [id number]         CTA stroke alert (head/neck)   Final Result by Kevin Fulton MD (1915)         1   Acute occlusion of the proximal left M1 segment with partial opacification of distal M2 and M3 branches  2  No evidence of thrombus, stenosis or occlusion of the carotid or vertebral arteries  Findings were directly discussed with Belle Vasquez at  7:14 PM                              Workstation performed: [id number]         CT stroke alert brain   Final Result by Vero Bourne MD (05/15 1915)      Late acute to early subacute left MCA territory infarct  No hemorrhagic conversion  Findings were directly discussed with Belle Vasquez at  7:10 PM         Workstation performed: [id number]         IR stroke alert    (Results Pending)       Other Diagnostic Testing: I have personally reviewed pertinent reports  Active medications:  Current Facility-Administered Medications   Medication Dose Route Frequency   • lidocaine (PF) (XYLOCAINE-MPF) 1 % injection    PRN   • multi-electrolyte (ISOLYTE-S PH 7 4) bolus 1,000 mL  1,000 mL Intravenous Once   • verapamil (ISOPTIN) injection   Intra-arterial PRN     Facility-Administered Medications Ordered in Other Encounters   Medication Dose Route Frequency   • fentanyl citrate (PF) 100 MCG/2ML   Intravenous PRN   • lactated ringers infusion   Intravenous Continuous PRN   • multi-electrolyte (ISOLYTE-S PH 7 4 equivalent) IV solution   Intravenous PRN   • phenylephrine (ANAMARIA-SYNEPHRINE) 50 mg (STANDARD CONCENTRATION) in sodium chloride 0 9% 250 mL   Intravenous Continuous PRN       Prior to Admission medications    Medication Sig Start Date End Date Taking?  Authorizing Provider   MELATONIN PO Take by mouth    Historical Provider, MD   Protein POWD Take by mouth    Historical Provider, MD         Code status and advanced directives:  Code Status: No Order      VTE Pharmacologic Prophylaxis: will defer for now  VTE Mechanical Prophylaxis: sequential compression device    ==  MD [facility] Residency, PGY-II

## 2023-05-15 NOTE — ANESTHESIA PROCEDURE NOTES
Arterial Line Insertion  Performed by: Swetha Burgos MD  Authorized by: Swetha Burgos MD   Consent: Verbal consent obtained  Written consent obtained  Risks and benefits: risks, benefits and alternatives were discussed  Consent given by: patient (patient sister)  Patient understanding: patient states understanding of the procedure being performed  Patient consent: the patient's understanding of the procedure matches consent given  Procedure consent: procedure consent matches procedure scheduled  Relevant documents: relevant documents present and verified  Test results: test results available and properly labeled  Site marked: the operative site was marked  Radiology Images: Radiology Images displayed and confirmed  If images not available, report reviewed  Required items: required blood products, implants, devices, and special equipment available  Patient identity confirmed: arm band and hospital-assigned identification number  Preparation: Patient was prepped and draped in the usual sterile fashion    Indications: hemodynamic monitoring  Orientation:  Left  Location: radial arterylidocaine (PF) (XYLOCAINE-MPF) 1 % - Infiltration   1 mL - 5/15/2023 7:50:00 AM  Sedation:  Patient sedated: no    Procedure Details:  Needle gauge: 20  Seldinger technique: Seldinger technique used  Number of attempts: 1    Post-procedure:  Post-procedure: dressing applied  Waveform: good waveform  Post-procedure CNS: unchanged  Patient tolerance: Patient tolerated the procedure well with no immediate complications and patient tolerated the procedure well with no immediate complications

## 2023-05-15 NOTE — ANESTHESIA PREPROCEDURE EVALUATION
Procedure:  IR STROKE ALERT     - previously healthy, no medications at home   - frequent runner and very active   - hx of smoking tobacco    Relevant Problems   ANESTHESIA (within normal limits)      CARDIO (within normal limits)      ENDO (within normal limits)      GI/HEPATIC (within normal limits)      /RENAL (within normal limits)      GYN (within normal limits)      HEMATOLOGY (within normal limits)      MUSCULOSKELETAL   (+) Bilateral low back pain without sciatica      NEURO/PSYCH (within normal limits)      PULMONARY (within normal limits)      Lab Results   Component Value Date    WBC 7 08 05/15/2023    HGB 14 6 05/15/2023    HCT 41 0 05/15/2023    MCV 96 05/15/2023     05/15/2023     Lab Results   Component Value Date    SODIUM 136 05/15/2023    K 4 0 05/15/2023     05/15/2023    CO2 28 05/15/2023    AGAP 1 (L) 05/15/2023    BUN 10 05/15/2023    CREATININE 0 73 05/15/2023    GLUC 99 05/15/2023    CALCIUM 9 1 05/15/2023    EGFR 115 05/15/2023     No results found for: PTT  No results found for: INR, PROTIME    Physical Exam    Airway    Mallampati score: II  TM Distance: >3 FB  Neck ROM: full     Dental   No notable dental hx     Cardiovascular  Rhythm: regular, Rate: normal, Cardiovascular exam normal    Pulmonary  Pulmonary exam normal Breath sounds clear to auscultation,     Other Findings        Anesthesia Plan  ASA Score- 4 Emergent    Anesthesia Type- IV sedation with anesthesia with ASA Monitors  Additional Monitors: arterial line  Airway Plan:     Comment: GA as backup  Plan Factors-Exercise tolerance (METS): >4 METS  Chart reviewed  EKG reviewed  Imaging results reviewed  Existing labs reviewed  Patient summary reviewed  Patient is not a current smoker  Patient did not smoke on day of surgery  Obstructive sleep apnea risk education given perioperatively  Induction- intravenous      Postoperative Plan-     Informed Consent- Anesthetic plan and risks discussed with patient and sibling (sister at bedside)  I personally reviewed this patient with the CRNA  Discussed and agreed on the Anesthesia Plan with the CRNA  Zoya Silva

## 2023-05-15 NOTE — ED ATTENDING ATTESTATION
5/15/2023  I, Ailyn Ramos DO, saw and evaluated the patient  I have discussed the patient with the resident/non-physician practitioner and agree with the resident's/non-physician practitioner's findings, Plan of Care, and MDM as documented in the resident's/non-physician practitioner's note, except where noted  All available labs and Radiology studies were reviewed  I was present for key portions of any procedure(s) performed by the resident/non-physician practitioner and I was immediately available to provide assistance  At this point I agree with the current assessment done in the Emergency Department  I have conducted an independent evaluation of this patient a history and physical is as follows:    41 yo male BIBA as prehospital stroke alert  He was last heard from last night around 2030h over the phone, and was reportedly sounding normal  He had plans today with a friend, but didn't show up and didn't answer his phone when his friend called  Welfare check was called in, EMS found pt covered in feces with RUE flaccid paralysis, R sided neglect, R facial droop, disorientation, and expressive aphasia  No meds, hx, allergies  Per review of records in epic, he had a +home COVID test 2/10/23  He is vaccinated with a single JJ vaccine remotely  Pt was seen immediately upon arrival in [place] along with neurology resident  Exam as above  I reviewed CT imaging in real time - CT non contrast showing a large area of edema in the L MCA distribution  No bleed  CTA showing a LVO mid M1  Endovascular alert was called by neurology  Dr Hermila Elliott aware, will be taking pt for endovascular intervention  Not a candidate for TNKase due to being outside of the treatment window      Pt will require admission for continued treatment, therapy due to L MCA LVO      ED Course         Critical Care Time  Procedures

## 2023-05-16 ENCOUNTER — APPOINTMENT (INPATIENT)
Dept: NON INVASIVE DIAGNOSTICS | Facility: HOSPITAL | Age: 43
End: 2023-05-16

## 2023-05-16 ENCOUNTER — APPOINTMENT (INPATIENT)
Dept: NEUROLOGY | Facility: CLINIC | Age: 43
End: 2023-05-16

## 2023-05-16 ENCOUNTER — APPOINTMENT (INPATIENT)
Dept: RADIOLOGY | Facility: HOSPITAL | Age: 43
End: 2023-05-16

## 2023-05-16 PROBLEM — R56.9 SEIZURE (HCC): Status: ACTIVE | Noted: 2023-05-16

## 2023-05-16 PROBLEM — U07.1 COVID: Status: ACTIVE | Noted: 2023-05-16

## 2023-05-16 LAB
2HR DELTA HS TROPONIN: -4 NG/L
4HR DELTA HS TROPONIN: -5 NG/L
ALBUMIN SERPL BCP-MCNC: 2.9 G/DL (ref 3.5–5)
ALP SERPL-CCNC: 47 U/L (ref 46–116)
ALT SERPL W P-5'-P-CCNC: 26 U/L (ref 12–78)
ANA SER QL IA: NEGATIVE
ANION GAP SERPL CALCULATED.3IONS-SCNC: 1 MMOL/L (ref 4–13)
ANION GAP SERPL CALCULATED.3IONS-SCNC: 1 MMOL/L (ref 4–13)
ANION GAP SERPL CALCULATED.3IONS-SCNC: 2 MMOL/L (ref 4–13)
ANION GAP SERPL CALCULATED.3IONS-SCNC: 3 MMOL/L (ref 4–13)
AST SERPL W P-5'-P-CCNC: 37 U/L (ref 5–45)
B2 GLYCOPROT1 IGA SERPL IA-ACNC: 2
B2 GLYCOPROT1 IGG SERPL IA-ACNC: <0.8
B2 GLYCOPROT1 IGM SERPL IA-ACNC: <2.4
BASOPHILS # BLD AUTO: 0.04 THOUSANDS/ÂΜL (ref 0–0.1)
BASOPHILS # BLD AUTO: 0.05 THOUSANDS/ÂΜL (ref 0–0.1)
BASOPHILS NFR BLD AUTO: 1 % (ref 0–1)
BASOPHILS NFR BLD AUTO: 1 % (ref 0–1)
BILIRUB SERPL-MCNC: 0.45 MG/DL (ref 0.2–1)
BUN SERPL-MCNC: 7 MG/DL (ref 5–25)
BUN SERPL-MCNC: 7 MG/DL (ref 5–25)
BUN SERPL-MCNC: 8 MG/DL (ref 5–25)
BUN SERPL-MCNC: 8 MG/DL (ref 5–25)
CA-I BLD-SCNC: 1.12 MMOL/L (ref 1.12–1.32)
CALCIUM ALBUM COR SERPL-MCNC: 9.2 MG/DL (ref 8.3–10.1)
CALCIUM SERPL-MCNC: 8.3 MG/DL (ref 8.3–10.1)
CALCIUM SERPL-MCNC: 8.5 MG/DL (ref 8.3–10.1)
CARDIAC TROPONIN I PNL SERPL HS: 32 NG/L
CARDIAC TROPONIN I PNL SERPL HS: 33 NG/L
CARDIOLIPIN IGA SER IA-ACNC: 2.5
CARDIOLIPIN IGG SER IA-ACNC: 0.6
CARDIOLIPIN IGM SER IA-ACNC: 1.1
CHLORIDE SERPL-SCNC: 109 MMOL/L (ref 96–108)
CHLORIDE SERPL-SCNC: 112 MMOL/L (ref 96–108)
CHLORIDE SERPL-SCNC: 113 MMOL/L (ref 96–108)
CHLORIDE SERPL-SCNC: 115 MMOL/L (ref 96–108)
CHOLEST SERPL-MCNC: 159 MG/DL
CK SERPL-CCNC: 431 U/L (ref 39–308)
CO2 SERPL-SCNC: 25 MMOL/L (ref 21–32)
CO2 SERPL-SCNC: 25 MMOL/L (ref 21–32)
CO2 SERPL-SCNC: 26 MMOL/L (ref 21–32)
CO2 SERPL-SCNC: 27 MMOL/L (ref 21–32)
CREAT SERPL-MCNC: 0.52 MG/DL (ref 0.6–1.3)
CREAT SERPL-MCNC: 0.54 MG/DL (ref 0.6–1.3)
CREAT SERPL-MCNC: 0.56 MG/DL (ref 0.6–1.3)
CREAT SERPL-MCNC: 0.58 MG/DL (ref 0.6–1.3)
DEPRECATED AT III PPP: 81 % OF NORMAL (ref 92–136)
EOSINOPHIL # BLD AUTO: 0.01 THOUSAND/ÂΜL (ref 0–0.61)
EOSINOPHIL # BLD AUTO: 0.01 THOUSAND/ÂΜL (ref 0–0.61)
EOSINOPHIL NFR BLD AUTO: 0 % (ref 0–6)
EOSINOPHIL NFR BLD AUTO: 0 % (ref 0–6)
ERYTHROCYTE [DISTWIDTH] IN BLOOD BY AUTOMATED COUNT: 12.2 % (ref 11.6–15.1)
ERYTHROCYTE [DISTWIDTH] IN BLOOD BY AUTOMATED COUNT: 12.3 % (ref 11.6–15.1)
EST. AVERAGE GLUCOSE BLD GHB EST-MCNC: 91 MG/DL
FLUAV RNA RESP QL NAA+PROBE: NEGATIVE
FLUBV RNA RESP QL NAA+PROBE: NEGATIVE
GFR SERPL CREATININE-BSD FRML MDRD: 126 ML/MIN/1.73SQ M
GFR SERPL CREATININE-BSD FRML MDRD: 128 ML/MIN/1.73SQ M
GFR SERPL CREATININE-BSD FRML MDRD: 130 ML/MIN/1.73SQ M
GFR SERPL CREATININE-BSD FRML MDRD: 132 ML/MIN/1.73SQ M
GLUCOSE SERPL-MCNC: 102 MG/DL (ref 65–140)
GLUCOSE SERPL-MCNC: 105 MG/DL (ref 65–140)
GLUCOSE SERPL-MCNC: 107 MG/DL (ref 65–140)
GLUCOSE SERPL-MCNC: 110 MG/DL (ref 65–140)
HBA1C MFR BLD: 4.8 %
HCT VFR BLD AUTO: 32 % (ref 36.5–49.3)
HCT VFR BLD AUTO: 32.8 % (ref 36.5–49.3)
HDLC SERPL-MCNC: 134 MG/DL
HGB BLD-MCNC: 11.5 G/DL (ref 12–17)
HGB BLD-MCNC: 11.9 G/DL (ref 12–17)
IMM GRANULOCYTES # BLD AUTO: 0.02 THOUSAND/UL (ref 0–0.2)
IMM GRANULOCYTES # BLD AUTO: 0.02 THOUSAND/UL (ref 0–0.2)
IMM GRANULOCYTES NFR BLD AUTO: 0 % (ref 0–2)
IMM GRANULOCYTES NFR BLD AUTO: 0 % (ref 0–2)
LDLC SERPL CALC-MCNC: 18 MG/DL (ref 0–100)
LYMPHOCYTES # BLD AUTO: 0.56 THOUSANDS/ÂΜL (ref 0.6–4.47)
LYMPHOCYTES # BLD AUTO: 0.84 THOUSANDS/ÂΜL (ref 0.6–4.47)
LYMPHOCYTES NFR BLD AUTO: 12 % (ref 14–44)
LYMPHOCYTES NFR BLD AUTO: 15 % (ref 14–44)
MAGNESIUM SERPL-MCNC: 1.8 MG/DL (ref 1.6–2.6)
MAGNESIUM SERPL-MCNC: 2 MG/DL (ref 1.6–2.6)
MCH RBC QN AUTO: 34.3 PG (ref 26.8–34.3)
MCH RBC QN AUTO: 34.6 PG (ref 26.8–34.3)
MCHC RBC AUTO-ENTMCNC: 35.9 G/DL (ref 31.4–37.4)
MCHC RBC AUTO-ENTMCNC: 36.3 G/DL (ref 31.4–37.4)
MCV RBC AUTO: 95 FL (ref 82–98)
MCV RBC AUTO: 96 FL (ref 82–98)
MONOCYTES # BLD AUTO: 0.65 THOUSAND/ÂΜL (ref 0.17–1.22)
MONOCYTES # BLD AUTO: 0.7 THOUSAND/ÂΜL (ref 0.17–1.22)
MONOCYTES NFR BLD AUTO: 13 % (ref 4–12)
MONOCYTES NFR BLD AUTO: 13 % (ref 4–12)
NEUTROPHILS # BLD AUTO: 3.61 THOUSANDS/ÂΜL (ref 1.85–7.62)
NEUTROPHILS # BLD AUTO: 3.88 THOUSANDS/ÂΜL (ref 1.85–7.62)
NEUTS SEG NFR BLD AUTO: 71 % (ref 43–75)
NEUTS SEG NFR BLD AUTO: 74 % (ref 43–75)
NRBC BLD AUTO-RTO: 0 /100 WBCS
NRBC BLD AUTO-RTO: 0 /100 WBCS
OSMOLALITY UR/SERPL-RTO: 296 MMOL/KG (ref 282–298)
OSMOLALITY UR/SERPL-RTO: 297 MMOL/KG (ref 282–298)
OSMOLALITY UR/SERPL-RTO: 301 MMOL/KG (ref 282–298)
PHOSPHATE SERPL-MCNC: 3 MG/DL (ref 2.7–4.5)
PLATELET # BLD AUTO: 165 THOUSANDS/UL (ref 149–390)
PLATELET # BLD AUTO: 178 THOUSANDS/UL (ref 149–390)
PMV BLD AUTO: 9.2 FL (ref 8.9–12.7)
PMV BLD AUTO: 9.4 FL (ref 8.9–12.7)
POTASSIUM SERPL-SCNC: 3.5 MMOL/L (ref 3.5–5.3)
POTASSIUM SERPL-SCNC: 3.7 MMOL/L (ref 3.5–5.3)
POTASSIUM SERPL-SCNC: 3.7 MMOL/L (ref 3.5–5.3)
POTASSIUM SERPL-SCNC: 3.9 MMOL/L (ref 3.5–5.3)
PROT SERPL-MCNC: 5.9 G/DL (ref 6.4–8.4)
RBC # BLD AUTO: 3.32 MILLION/UL (ref 3.88–5.62)
RBC # BLD AUTO: 3.47 MILLION/UL (ref 3.88–5.62)
RSV RNA RESP QL NAA+PROBE: NEGATIVE
SARS-COV-2 RNA RESP QL NAA+PROBE: POSITIVE
SODIUM SERPL-SCNC: 137 MMOL/L (ref 135–147)
SODIUM SERPL-SCNC: 140 MMOL/L (ref 135–147)
SODIUM SERPL-SCNC: 140 MMOL/L (ref 135–147)
SODIUM SERPL-SCNC: 142 MMOL/L (ref 135–147)
TRIGL SERPL-MCNC: 34 MG/DL
WBC # BLD AUTO: 4.89 THOUSAND/UL (ref 4.31–10.16)
WBC # BLD AUTO: 5.5 THOUSAND/UL (ref 4.31–10.16)

## 2023-05-16 PROCEDURE — 02HV33Z INSERTION OF INFUSION DEVICE INTO SUPERIOR VENA CAVA, PERCUTANEOUS APPROACH: ICD-10-PCS

## 2023-05-16 RX ORDER — LORAZEPAM 2 MG/ML
INJECTION INTRAMUSCULAR
Status: COMPLETED
Start: 2023-05-16 | End: 2023-05-16

## 2023-05-16 RX ORDER — LORAZEPAM 2 MG/ML
2 INJECTION INTRAMUSCULAR ONCE
Status: COMPLETED | OUTPATIENT
Start: 2023-05-16 | End: 2023-05-16

## 2023-05-16 RX ORDER — ASPIRIN 81 MG/1
81 TABLET, CHEWABLE ORAL DAILY
Status: DISCONTINUED | OUTPATIENT
Start: 2023-05-17 | End: 2023-05-27 | Stop reason: HOSPADM

## 2023-05-16 RX ORDER — SODIUM CHLORIDE 3 G/100ML
150 INJECTION, SOLUTION INTRAVENOUS ONCE
Status: COMPLETED | OUTPATIENT
Start: 2023-05-16 | End: 2023-05-16

## 2023-05-16 RX ORDER — POTASSIUM CHLORIDE 29.8 MG/ML
40 INJECTION INTRAVENOUS ONCE
Status: COMPLETED | OUTPATIENT
Start: 2023-05-16 | End: 2023-05-17

## 2023-05-16 RX ORDER — ASPIRIN 81 MG/1
81 TABLET, CHEWABLE ORAL DAILY
Status: DISCONTINUED | OUTPATIENT
Start: 2023-05-16 | End: 2023-05-16

## 2023-05-16 RX ORDER — SODIUM CHLORIDE 3 G/100ML
250 INJECTION, SOLUTION INTRAVENOUS ONCE
Status: COMPLETED | OUTPATIENT
Start: 2023-05-16 | End: 2023-05-16

## 2023-05-16 RX ORDER — ASPIRIN 300 MG/1
300 SUPPOSITORY RECTAL ONCE
Status: COMPLETED | OUTPATIENT
Start: 2023-05-16 | End: 2023-05-16

## 2023-05-16 RX ADMIN — SODIUM CHLORIDE 30 ML/HR: 3 INJECTION, SOLUTION INTRAVENOUS at 00:17

## 2023-05-16 RX ADMIN — SODIUM CHLORIDE 250 ML: 3 INJECTION, SOLUTION INTRAVENOUS at 19:26

## 2023-05-16 RX ADMIN — LORAZEPAM 2 MG: 2 INJECTION INTRAMUSCULAR at 10:09

## 2023-05-16 RX ADMIN — SODIUM CHLORIDE 250 ML: 3 INJECTION, SOLUTION INTRAVENOUS at 00:15

## 2023-05-16 RX ADMIN — LEVETIRACETAM 2000 MG: 100 INJECTION, SOLUTION INTRAVENOUS at 10:43

## 2023-05-16 RX ADMIN — SODIUM CHLORIDE 150 ML: 3 INJECTION, SOLUTION INTRAVENOUS at 15:50

## 2023-05-16 RX ADMIN — CHLORHEXIDINE GLUCONATE 0.12% ORAL RINSE 15 ML: 1.2 LIQUID ORAL at 20:54

## 2023-05-16 RX ADMIN — LEVETIRACETAM 750 MG: 100 INJECTION, SOLUTION INTRAVENOUS at 20:54

## 2023-05-16 RX ADMIN — CHLORHEXIDINE GLUCONATE 0.12% ORAL RINSE 15 ML: 1.2 LIQUID ORAL at 00:04

## 2023-05-16 RX ADMIN — LORAZEPAM 2 MG: 2 INJECTION INTRAMUSCULAR at 08:20

## 2023-05-16 RX ADMIN — LEVETIRACETAM 2000 MG: 100 INJECTION, SOLUTION INTRAVENOUS at 09:11

## 2023-05-16 RX ADMIN — NICOTINE 1 PATCH: 14 PATCH, EXTENDED RELEASE TRANSDERMAL at 09:10

## 2023-05-16 RX ADMIN — CHLORHEXIDINE GLUCONATE 0.12% ORAL RINSE 15 ML: 1.2 LIQUID ORAL at 09:10

## 2023-05-16 RX ADMIN — SODIUM CHLORIDE 50 ML/HR: 3 INJECTION, SOLUTION INTRAVENOUS at 16:07

## 2023-05-16 RX ADMIN — ASPIRIN 300 MG: 300 SUPPOSITORY RECTAL at 09:10

## 2023-05-16 RX ADMIN — LORAZEPAM 2 MG: 2 INJECTION INTRAMUSCULAR; INTRAVENOUS at 10:09

## 2023-05-16 RX ADMIN — POTASSIUM CHLORIDE 40 MEQ: 29.8 INJECTION, SOLUTION INTRAVENOUS at 20:05

## 2023-05-16 RX ADMIN — LORAZEPAM 2 MG: 2 INJECTION INTRAMUSCULAR; INTRAVENOUS at 08:20

## 2023-05-16 NOTE — PROGRESS NOTES
[address] [facility]  Progress Note: Critical Care  Name: Minor Coffey 40 y o  male I MRN: [id number]  Unit/Bed#: ICU 09 I Date of Admission: 5/15/2023   Date of Service: 5/16/2023 I Hospital Day: 1    Assessment/Plan      Neuro:  • Diagnosis: Acute ischemic left MCA stroke, status post thrombectomy 5/15 with TICI 3 revascularization, CT head noted cerebral edema with evidence of brain compression and mass effect in left MCA territory  o -150  o MAP greater than 85  o A1c 4 8 LDL 18  Statin stopped due to being extremely low, LDL 18   o MRI pending  o PT/OT/speech therapy  o Hypercoagulable work-up, patient is noted to be COVID-positive  o Patient received J&J COVID-vaccine Will discuss with family when he had his vaccine due to its association with a hypercoagulable state  o Echocardiogram  o Neurology consulted appreciate recommendations  o Patient maintained on 3% hypertonic saline for prophylactic treatment of brain edema  o Integrilin drip, per NeuroIR  o on HFNC per neurosurg for post procedure  o ASA when able  • Diagnosis: Seizure  o Patient noted to have seizure associated with eye deviation to the right morning of 5/16, received 2 mg of Ativan  o Loaded with 2 g of Keppra followed by 750 mg twice daily  o Ativan as needed for seizures  o Spot EEG, will hold off on video EEG at this time unless patient does not return to baseline within a reasonable amount of time following his seizure  o Stat CT head  • Repeat CTH if > 2 pt drop in GCS in one hour  • Sleep/wake cycle regulation  • CAM-ICU BID  • Neuro checks  • Sedation/analgesia     CV:   • No acute issues  o Maintain -150 and MAP greater than 85 given recent thrombectomy  o Labetalol 10 mg every 4 hours as needed    Pulm:  • No acute issues  o on HFNC for thrombectomy  o Maintain SpO2 >88%  •  Continue pulmonary hygiene  Incentive spirometer q1h while awake, encourage coughing and deep breathing   Upright positioning  • Suction as needed and closely monitor secretions  Maintain HOB >30 degrees  Q4h oral care with chlorhexidine BID  • Monitor peak and plateau airway pressures  Maintain Ppeak < 45cm H2O, Pplat < 30cm H2O      GI:   • No acute issues  • Stress ulcer prophylaxis: NA  • Bowel regimen:  NA    :   • No acute issues, corbin in place 2/2 procedure, will attempt to d/c  • I/O monitoring  • Continue to follow renal function tests    F/E/N:   • Maintenance fluids: None  • Diuresis plan: None  • Replete electrolytes with as needed to maintain K >4 0, Mag >2 0, Phos >3 0  • Nutrition: Regular diet    Heme/Onc:   • Hypercoagulable state work up  • Consider transfusion for hemoglobin <7 0  • VTE prophylaxis: SCD's to BLE    Endo/Rheum:   • No acute issues  • Last hemoglobin A1c 4 8% on 5/16/2023    ID:   • Diagnosis: Covid Positive 5/15  o Unclear if current infection vs continued positive test in the setting of covid infection reported in 2/2023, however suspect new infection given hypoxia and stroke as covid would cause a hypercoagulable state  o Will consider treatment  • Continue to monitor fever and WBC curve    MSK/Skin:   • Reposition q2h, eliminate pressure points while in bed  • Close skin surveillance     Disposition: Critical care    ICU Core Measures     A: Assess, Prevent, and Manage Pain · Has pain been assessed? Yes  · Need for changes to pain regimen? No   B: Both SAT/SAT  · N/A   C: Choice of Sedation · RASS Goal: 0 Alert and Calm or N/A patient not on sedation  · Need for changes to sedation or analgesia regimen? NA   D: Delirium · CAM-ICU: Negative   E: Early Mobility  · Plan for early mobility? Yes   F: Family Engagement · Plan for family engagement today? Yes       Review of Invasive Devices: Corbin Plan: Corbin to be removed   Order has been placed and Voiding trial after improvement in ambulation   Central access plan: Medications requiring central line Hemodynamic monitoring  [place] Plan: Keep arterial line for hemodynamic monitoring    Prophylaxis:  VTE Contraindicated secondary to: recent stroke   Stress Ulcer  not ordered        Subjective   HPI/24hr events: 45-year-old right-handed male without any known prior medical history who presented as a stroke alert 5/15/2023 at 6:40 PM with a last known well of 8:30 PM 5/14 when he had spoken with someone over the phone  Per documentation of a full check was called when the patient did not show up for a scheduled meeting with a friend and did not answer his phone  Upon arrival by EMS for the welfare check the patient was found to be covered in feces and was noted to have right upper extremity flaccid paralysis as well as right-sided neglect, right facial droop, and expressive aphasia  Initial NIH on presentation 16  CT head noted a left MCA infarct, CTA head and neck showed a partial helen left M1 occlusion  Patient was not a TNK candidate given that he was outside of the window  Patient subsequently underwent a CTP which demonstrated that the patient was a candidate for thrombectomy and the patient was a endovascular alert was called, patient is currently underwent mechanical thrombectomy TICI 3 revascularization  Pathology is suspected to be a proximal embolism given findings  Patient subsequently noted to be COVID-positive  Repeat CT head morning of 5/16 continued edema and sulcal effacement on the left similar to previous study, improvement of contrast staining versus possible hemorrhage  Subsequently after initial exam, the AM of 5/16 pt note to have a seizure that consisted on eye deviation to the R followed by postictal period   STAT CTH obtained no acute change, evolving known L MCA infarct      ROS unable to be completed 2/2 aphasia        Objective                            Vitals I/O      Most Recent Min/Max in 24hrs   Temp 98 5 °F (36 9 °C) Temp  Min: 98 3 °F (36 8 °C)  Max: 98 5 °F (36 9 °C)   Pulse 60 Pulse  Min: 54  Max: 92   Resp (!) 11 Resp  Min: 11  Max: 35   /75 BP  Min: 126/92  Max: 150/92   O2 Sat 100 % SpO2  Min: 100 %  Max: 100 %      Intake/Output Summary (Last 24 hours) at 5/16/2023 0713  Last data filed at 5/16/2023 0601  Gross per 24 hour   Intake 1422 ml   Output 1100 ml   Net 322 ml         Diet Regular; Regular House     Invasive Monitoring Physical exam   Arterial Line  Dayanna /68  Arterial Line BP  Min: 122/62  Max: 166/80    mmHg  Arterial Line MAP (mmHg)  Min: 86 mmHg  Max: 114 mmHg    Physical Exam  Vitals and nursing note reviewed  Constitutional:       General: He is not in acute distress  Appearance: He is well-developed  He is not diaphoretic  HENT:      Head: Normocephalic and atraumatic  Nose: Nose normal    Eyes:      General: No scleral icterus  Right eye: No discharge  Left eye: No discharge  Conjunctiva/sclera: Conjunctivae normal    Cardiovascular:      Rate and Rhythm: Normal rate and regular rhythm  Heart sounds: Normal heart sounds  No murmur heard  No friction rub  No gallop  Pulmonary:      Effort: Pulmonary effort is normal  No respiratory distress  Breath sounds: Normal breath sounds  No wheezing  Comments: On HFNC  Chest:      Chest wall: No tenderness  Abdominal:      General: There is no distension  Palpations: Abdomen is soft  Tenderness: There is no abdominal tenderness  Skin:     General: Skin is warm and dry  Neurological:      Mental Status: He is alert  Motor: Weakness (Trace R sided weakness) present  Comments: Global aphasia   Psychiatric:         Mood and Affect: Mood normal          Behavior: Behavior normal               Diagnostic Studies    EKG: Reviewed  Imaging: I have personally reviewed pertinent reports     and I have personally reviewed pertinent films in PACS     Medications:  Scheduled PRN   [START ON 5/17/2023] aspirin, 81 mg, Daily  aspirin, 300 mg, Once  atorvastatin, 40 mg, QPM  chlorhexidine, 15 mL, Q12H [address]  nicotine, 1 patch, Daily      labetalol, 10 mg, Q4H PRN  ondansetron, 4 mg, Once PRN       Continuous    sodium chloride, 30 mL/hr, Last Rate: 30 mL/hr (05/16/23 0017)         Labs:    CBC    Recent Labs     05/15/23  2352 05/16/23  0557   WBC 4 89 5 50   HGB 11 9* 11 5*   HCT 32 8* 32 0*    165     BMP    Recent Labs     05/15/23  2352 05/16/23  0557   SODIUM 137 140   K 3 7 3 9   * 113*   CO2 27 25   AGAP 1* 2*   BUN 8 8   CREATININE 0 56* 0 52*   CALCIUM 8 3 8 3       Coags    Recent Labs     05/15/23  1853   INR 0 89   PTT 22*        Additional Electrolytes  Recent Labs     05/15/23  2352 05/16/23  0557   MG 1 8 2 0   PHOS  --  3 0   CAIONIZED 1 12  --           Blood Gas    No recent results  No recent results LFTs  Recent Labs     05/16/23  0557   ALT 26   AST 37   ALKPHOS 47   ALB 2 9*   TBILI 0 45       Infectious  No recent results  Glucose  Recent Labs     05/15/23  1853 05/15/23  2352 05/16/23  0557   GLUC 99 [id number] [facility],

## 2023-05-16 NOTE — WOUND OSTOMY CARE
Consult Note - Wound   Olivia Castaneda 40 y o  male MRN: [id number]  Unit/Bed#: ICU 09 Encounter: [id number]        History and Present Illness:  Patient is a 39 yo male that was admitted to [facility] for treatment of Left MCA Stroke with LM1 partial occlusion  Per H&P, patient was down for unknown period of time - Patient was found down for unknown period of time- there is a likely godinez of later development of DTIs status 5 days post fall  Patient is dependent for care needs at this time, moderate assist for turning and repositioning on ICU specialty mattress  On continuous EMU, incontinent of stool, urine managed via internal urinary catheter  Wound Care was consulted for DTIs  Assessment Findings:   B/L heels and ankles are dry intact and fang with no skin loss or wounds present  Recommend preventative allevyn foam dressings and proper offloading/ repositioning  B/L sacro-buttocks is dry, intact, pink in color, hyperpigmented and blanches  No skin loss or wounds present  Recommend preventative allevyn foam dressing to area  Patient has irregular and diffuse areas of bruising noted on the below areas  All areas are intact with no skin loss and fang  See pictures of areas below  - right elbow     - right thigh    - right anterior ankle/ foot     - right knee     - right buttocks                No induration, fluctuance, odor, warmth/temperature differences, redness, or purulence noted to the above noted wounds and skin areas assessed  New dressings applied per orders listed below  Patient tolerated well- no s/s of non-verbal pain or discomfort observed during the encounter  Bedside nurse aware of plan of care  See flow sheets for more detailed assessment findings  Orders listed below and wound care will sign off, call or tiger text with questions  Re-consult if new wounds present       Skin Care Plan:  1-Preventative allevyn foam dressing to B/L Sacro-buttocks and B/L heels  Ismael with P for Prevention and change Q3Days or PRN  Peel back and inspect skin Q-shift  2-Turn/reposition q2h or when medically stable for pressure re-distribution on skin   3-Elevate heels to offload pressure  4-Moisturize skin daily with skin nourishing cream  5-Ehob cushion in chair when out of bed            Raf Gay RN, BSN

## 2023-05-16 NOTE — QUICK NOTE
Spoke with patient's father for consent for central line  Called Lia Pabon at number listed in chart  Risks and benefits explained including risk of bleeding, infection, arterial puncture, pneumothorax, pain  Father consented via telephone at [phone] for central line placement

## 2023-05-16 NOTE — OCCUPATIONAL THERAPY NOTE
Occupational Therapy         Patient Name: Lisseth Blair  INTIF'Z Date: 5/16/2023 05/16/23 1400   OT Last Visit   OT Visit Date 05/16/23   Note Type   Note type Cancelled Session   Cancel Reasons Medical status   Additional Comments per RN pt having +seizures and going for stat CT head - will defer       [facility]

## 2023-05-16 NOTE — PROGRESS NOTES
05/16/23 1500   Clinical Encounter Type   Visited With Patient not available;Health care provider   Continue Visiting Yes   Crisis Visit Trauma     Pt's family has stepped away so  spoke w pt's nurse  Pt found down at home by friend after no showing to plans  Pt is unresponsive   offered silent supportive presence and prayer  Spiritual Care remains available

## 2023-05-16 NOTE — PROCEDURES
Central Line Insertion    Date/Time: 5/16/2023 2:18 AM  Performed by: Jeyson Antonio MD  Authorized by: Jeyson Antonio MD     Patient location:  ICU  Procedure performed by consultant: Dr Ernst June  Consent:     Consent obtained:  Emergent situation  Universal protocol:     Test results available and properly labeled: yes      Radiology Images displayed and confirmed  If images not available, report reviewed: yes      Required blood products, implants, devices, and special equipment available: yes      Site/side marked: yes      Immediately prior to procedure, a time out was called: yes      Patient identity confirmed:  Arm band  Pre-procedure details:     Hand hygiene: Hand hygiene performed prior to insertion      Sterile barrier technique: All elements of maximal sterile technique followed      Skin preparation:  2% chlorhexidine    Skin preparation agent: Skin preparation agent completely dried prior to procedure    Indications:     Central line indications: medications requiring central line and hemodynamic monitoring    Anesthesia (see MAR for exact dosages): Anesthesia method:  Local infiltration    Local anesthetic:  Lidocaine 1% w/o epi  Procedure details:     Location:  Right internal jugular    Vessel type: vein      Laterality:  Right    Approach: percutaneous technique used      Patient position:  Flat    Catheter type:  Triple lumen 16cm    Landmarks identified: yes      Ultrasound guidance: yes      Ultrasound image availability:  Images available in PACS    Sterile ultrasound techniques: Sterile gel and sterile probe covers were used      Manometry confirmation: no      Number of attempts:  1    Successful placement: yes      Vessel of catheter tip end:  SVC/Right atria  Post-procedure details:     Post-procedure:  Dressing applied and line sutured    Assessment:  Blood return through all ports and free fluid flow    Post-procedure complications: none      Patient tolerance of procedure:   Tolerated well, no immediate complications

## 2023-05-16 NOTE — ED PROVIDER NOTES
History  Chief Complaint   Patient presents with   • STROKE Alert     HPI     81-year-old male with no significant past medical history who presents for evaluation of weakness  Patient arrives as prehospital stroke alert  Patient was last heard from at around 8 to 8:30 PM last night  He was supposed to be going out with a friend but did not show up so his friend called for a welfare check  Upon EMS arrival, patient was found disheveled and the house was in disarray  Patient was having expressive aphasia  He had right-sided facial droop and right-sided weakness worse in his upper extremity than his lower extremity  Patient does not take any medications  Patient is unable to provide history secondary to expressive aphasia  Prior to Admission Medications   Prescriptions Last Dose Informant Patient Reported? Taking? MELATONIN PO   Yes No   Sig: Take by mouth   Protein POWD   Yes No   Sig: Take by mouth      Facility-Administered Medications: None       History reviewed  No pertinent past medical history  Past Surgical History:   Procedure Laterality Date   • IR STROKE ALERT  5/15/2023       History reviewed  No pertinent family history  I have reviewed and agree with the history as documented      E-Cigarette/Vaping   • E-Cigarette Use Never User      E-Cigarette/Vaping Substances     Social History     Tobacco Use   • Smoking status: Every Day   • Smokeless tobacco: Never   Vaping Use   • Vaping Use: Never used   Substance Use Topics   • Alcohol use: Yes     Comment: Social   • Drug use: Never        Review of Systems   Unable to perform ROS: Other (aphasia)       Physical Exam  ED Triage Vitals   Temperature Pulse Respirations Blood Pressure SpO2   05/15/23 2115 05/15/23 1850 05/15/23 1850 05/15/23 1850 05/15/23 1850   98 3 °F (36 8 °C) 62 18 126/92 100 %      Temp Source Heart Rate Source Patient Position - Orthostatic VS BP Location FiO2 (%)   05/15/23 2330 05/15/23 1850 05/15/23 1850 05/16/23 1200 05/15/23 2200   Oral Monitor Lying Right arm 100      Pain Score       05/15/23 2115       No Pain             Orthostatic Vital Signs  Vitals:    05/18/23 0200 05/18/23 0300 05/18/23 0400 05/18/23 0500   BP: 141/86 140/81 132/85 147/86   Pulse: 58 60 60 56   Patient Position - Orthostatic VS: Lying          Physical Exam  Vitals and nursing note reviewed  Constitutional:       General: He is in acute distress  Appearance: Normal appearance  He is well-developed and normal weight  He is ill-appearing  He is not toxic-appearing or diaphoretic  HENT:      Head: Normocephalic and atraumatic  Right Ear: External ear normal       Left Ear: External ear normal       Nose: Nose normal       Mouth/Throat:      Mouth: Mucous membranes are moist       Pharynx: Oropharynx is clear  Eyes:      Extraocular Movements: Extraocular movements intact  Conjunctiva/sclera: Conjunctivae normal    Cardiovascular:      Rate and Rhythm: Normal rate and regular rhythm  Pulses: Normal pulses  Heart sounds: Normal heart sounds  No murmur heard  No friction rub  No gallop  Pulmonary:      Effort: Pulmonary effort is normal  No respiratory distress  Breath sounds: Normal breath sounds  No wheezing or rales  Abdominal:      General: There is no distension  Palpations: Abdomen is soft  Tenderness: There is no abdominal tenderness  There is no guarding or rebound  Musculoskeletal:         General: No tenderness  Cervical back: Neck supple  Skin:     General: Skin is warm and dry  Coloration: Skin is not pale  Findings: No rash  Neurological:      Mental Status: He is alert  Cranial Nerves: Cranial nerve deficit present  Sensory: Sensory deficit present  Motor: Weakness present  Comments: Unable to raise right arm off the bed  Able to hold right leg off the bed antigravity but drifts and hits bed after 1 second    5 out of 5 strength in left upper and lower extremity  Decreased sensation in right upper and lower extremity  Right-sided facial droop  Patient has expressive aphasia     Psychiatric:         Mood and Affect: Mood normal          Behavior: Behavior normal          ED Medications  Medications   ondansetron (ZOFRAN) injection 4 mg (has no administration in time range)   chlorhexidine (PERIDEX) 0 12 % oral rinse 15 mL (15 mL Mouth/Throat Given 5/17/23 2001)   nicotine (NICODERM CQ) 14 mg/24hr TD 24 hr patch 1 patch (1 patch Transdermal Medication Applied 5/17/23 0812)   labetalol (NORMODYNE) injection 10 mg (has no administration in time range)   sodium chloride (HYPERTONIC) 3 % infusion (90 mL/hr Intravenous New Bag 5/18/23 0415)   aspirin chewable tablet 81 mg (81 mg Oral Given 5/17/23 0957)   levETIRAcetam (KEPPRA) 750 mg in sodium chloride 0 9 % 100 mL IVPB (750 mg Intravenous New Bag 5/17/23 2001)   fentanyl citrate (PF) 100 MCG/2ML **ADS Override Pull** (  Canceled Entry 5/17/23 0147)   iohexol (OMNIPAQUE) 350 MG/ML injection (SINGLE-DOSE) 100 mL (80 mL Intravenous Given 5/15/23 1851)   iohexol (OMNIPAQUE) 350 MG/ML injection (SINGLE-DOSE) 100 mL (80 mL Intravenous Given 5/15/23 1903)   multi-electrolyte (ISOLYTE-S PH 7 4) bolus 1,000 mL (1,000 mL Intravenous New Bag 5/15/23 1917)   lidocaine (PF) (XYLOCAINE-MPF) 1 % injection (10 mL Infiltration Given 5/15/23 1932)   verapamil (ISOPTIN) injection (10 mg Intra-arterial Given 5/15/23 2022)   eptifibatide (INTEGRILIN) 20 MG/10ML (premix) (0 mg Intravenous Stopped 5/16/23 2030)   eptifibatide (INTEGRILIN) 75 mg in 100 mL infusion (premix) (0 mcg/kg/min × 73 7 kg Intravenous Stopped 5/15/23 2115)   iodixanol (VISIPAQUE) 320 MG/ML injection 300 mL (180 mL Intra-arterial Given 5/15/23 2054)   sodium chloride (HYPERTONIC) 3 % bolus 250 mL (250 mL Intravenous New Bag 5/16/23 0015)   aspirin rectal suppository 300 mg (300 mg Rectal Given 5/16/23 0937)   LORazepam (ATIVAN) injection 2 mg (2 mg Intravenous Given 5/16/23 0820)   levETIRAcetam (KEPPRA) 2,000 mg in sodium chloride 0 9 % 250 mL IVPB (0 mg Intravenous Stopped 5/16/23 0930)   levETIRAcetam (KEPPRA) 2,000 mg in sodium chloride 0 9 % 250 mL IVPB (0 mg Intravenous Stopped 5/16/23 1100)   LORazepam (ATIVAN) injection 2 mg (2 mg Intravenous Given 5/16/23 1009)   sodium chloride (HYPERTONIC) 3 % bolus 150 mL (150 mL Intravenous New Bag 5/16/23 1550)   sodium chloride (HYPERTONIC) 3 % bolus 250 mL (250 mL Intravenous New Bag 5/16/23 1926)   potassium chloride 40 mEq IVPB (premix) (40 mEq Intravenous New Bag 5/16/23 2005)   sodium chloride (HYPERTONIC) 3 % bolus 250 mL (250 mL Intravenous New Bag 5/17/23 0114)   fentanyl citrate (PF) 100 MCG/2ML 50 mcg (25 mcg Intravenous Given 5/17/23 0148)   Gadobutrol injection (SINGLE-DOSE) SOLN 7 mL (7 mL Intravenous Given 5/17/23 0153)   potassium chloride 40 mEq IVPB (premix) (40 mEq Intravenous New Bag 5/17/23 1953)   sodium chloride (HYPERTONIC) 3 % bolus 250 mL (250 mL Intravenous New Bag 5/17/23 1923)   potassium chloride 40 mEq IVPB (premix) (40 mEq Intravenous New Bag 5/18/23 0123)   potassium chloride (K-DUR,KLOR-CON) CR tablet 20 mEq (20 mEq Oral Given 5/18/23 0122)   sodium chloride (HYPERTONIC) 3 % bolus 250 mL (250 mL Intravenous New Bag 5/18/23 0128)       Diagnostic Studies  Results Reviewed     Procedure Component Value Units Date/Time    HS Troponin I 2hr [[id number]]  (Normal) Collected: 05/15/23 2352    Lab Status: Final result Specimen: Blood from Line, Arterial Updated: 05/16/23 0113     hs TnI 2hr 33 ng/L      Delta 2hr hsTnI -4 ng/L     HS Troponin I 4hr [[id number]]  (Normal) Collected: 05/15/23 2352    Lab Status: Final result Specimen: Blood from Line, Arterial Updated: 05/16/23 0059     hs TnI 4hr 32 ng/L      Delta 4hr hsTnI -5 ng/L     FLU/RSV/COVID - if FLU/RSV clinically relevant [[id number]]  (Abnormal) Collected: 05/15/23 9369    Lab Status: Final result Specimen: Nares from Nose Updated: 05/16/23 0051     SARS-CoV-2 Positive     INFLUENZA A PCR Negative     INFLUENZA B PCR Negative     RSV PCR Negative    Narrative:      FOR PEDIATRIC PATIENTS - copy/paste COVID Guidelines URL to browser: [email or web address]    SARS-CoV-2 assay is a Nucleic Acid Amplification assay intended for the  qualitative detection of nucleic acid from SARS-CoV-2 in nasopharyngeal  swabs  Results are for the presumptive identification of SARS-CoV-2 RNA  Positive results are indicative of infection with SARS-CoV-2, the virus  causing COVID-19, but do not rule out bacterial infection or co-infection  with other viruses  Laboratories within the [place] and its  territories are required to report all positive results to the appropriate  public health authorities  Negative results do not preclude SARS-CoV-2  infection and should not be used as the sole basis for treatment or other  patient management decisions  Negative results must be combined with  clinical observations, patient history, and epidemiological information  This test has not been FDA cleared or approved  This test has been authorized by FDA under an Emergency Use Authorization  (EUA)  This test is only authorized for the duration of time the  declaration that circumstances exist justifying the authorization of the  emergency use of an in vitro diagnostic tests for detection of SARS-CoV-2  virus and/or diagnosis of COVID-19 infection under section 564(b)(1) of  the Act, 21 U  S C  268CSV-9(N)(1), unless the authorization is terminated  or revoked sooner  The test has been validated but independent review by FDA  and CLIA is pending  Test performed using [employer] GeneXpert: This RT-PCR assay targets N2,  a region unique to SARS-CoV-2  A conserved region in the E-gene was chosen  for pan-Sarbecovirus detection which includes SARS-CoV-2      According to CMS-2020-01-R, this platform meets the definition of high-throughput technology      Protime-INR [[id number]]  (Normal) Collected: 05/15/23 1853    Lab Status: Final result Specimen: Blood from Arm, Left Updated: 05/15/23 1956     Protime 12 3 seconds      INR 0 89    APTT [[id number]]  (Abnormal) Collected: 05/15/23 1853    Lab Status: Final result Specimen: Blood from Arm, Left Updated: 05/15/23 1956     PTT 22 seconds     HS Troponin 0hr (reflex protocol) [[id number]]  (Normal) Collected: 05/15/23 1853    Lab Status: Final result Specimen: Blood from Arm, Left Updated: 05/15/23 1928     hs TnI 0hr 37 ng/L     Basic metabolic panel [[id number]]  (Abnormal) Collected: 05/15/23 1853    Lab Status: Final result Specimen: Blood from Arm, Left Updated: 05/15/23 1919     Sodium 136 mmol/L      Potassium 4 0 mmol/L      Chloride 107 mmol/L      CO2 28 mmol/L      ANION GAP 1 mmol/L      BUN 10 mg/dL      Creatinine 0 73 mg/dL      Glucose 99 mg/dL      Calcium 9 1 mg/dL      eGFR 115 ml/min/1 73sq m     Narrative:      [place] guidelines for Chronic Kidney Disease (CKD):   •  Stage 1 with normal or high GFR (GFR > 90 mL/min/1 73 square meters)  •  Stage 2 Mild CKD (GFR = 60-89 mL/min/1 73 square meters)  •  Stage 3A Moderate CKD (GFR = 45-59 mL/min/1 73 square meters)  •  Stage 3B Moderate CKD (GFR = 30-44 mL/min/1 73 square meters)  •  Stage 4 Severe CKD (GFR = 15-29 mL/min/1 73 square meters)  •  Stage 5 End Stage CKD (GFR <15 mL/min/1 73 square meters)  Note: GFR calculation is accurate only with a steady state creatinine    CBC and Platelet [[id number]]  (Normal) Collected: 05/15/23 1853    Lab Status: Final result Specimen: Blood from Arm, Left Updated: 05/15/23 1905     WBC 7 08 Thousand/uL      RBC 4 28 Million/uL      Hemoglobin 14 6 g/dL      Hematocrit 41 0 %      MCV 96 fL      MCH 34 1 pg      MCHC 35 6 g/dL      RDW 12 3 %      Platelets 165 Thousands/uL      MPV 9 3 fL                  MRI brain seizure wo and w contrast   Final Result by Bharath Weeks MD (05/17 9683)   Addendum (preliminary) 1 of 1 by Bharath Weeks MD (58/01 2372)   ADDENDUM:      Correcting comparison study: Head CT 5/16/2023  Final      1  Recent large left MCA territory infarct with associated edema and a few foci of petechial hemorrhage  There is regional mass effect without midline shift  2   Cannot reliably evaluate the infarcted brain for structural abnormality  No structural abnormality is identified in the remainder brain  Normal hippocampal formations  Workstation performed: [id number]         XR ankle 3+ vw right   Final Result by Oli Carmona DO (05/17 2113)      No acute osseous abnormality  Workstation performed: [id number]         CT head wo contrast   Final Result by Bharath Weeks MD (05/17 8210)      Grossly stable recent large left MCA territory infarct with regional mass effect without midline shift  No acute hemorrhage  Workstation performed: [id number]         CT head wo contrast   Final Result by Mel Guerrero MD (62/72 2195)      Evolving left MCA infarct with increased cortical hypodensity and stable sulcal effacement/mass effect throughout the left lobe  No acute intracranial hemorrhage or midline shift  Workstation performed: [id number]         CT head wo contrast   Final Result by Court Ernst MD (15/29 2154)      Compared to the most immediate prior study, significant interval decrease/near total resolution of the previously noted extensive left hemispheric hyperdensity involving the cortex and subcortical white matter and left basal ganglia  Minimal residual    hyperdensity in the left basal ganglia region  Overall these findings suggest resolving contrast staining post endovascular therapy although small amount of superimposed hemorrhage is difficult to entirely exclude  Recommend short-term interval follow-up    scan and/or further evaluation with MRI for definitive evaluation        Stable left hemispheric edema and sulcal effacement without significant midline shift  Above findings discussed with Dr Marco Tomlinson at 4:30 a m  on 5/16/2023  Workstation performed: [id number]         XR chest portable ICU   Final Result by Socorro Walters MD (05/16 1625)      No acute cardiopulmonary disease  Right jugular catheter in lower SVC with no pneumothorax  Workstation performed: [id number]         CT head wo contrast   Final Result by Florida Bowman MD (05/15 2233)      New extensive left hemispheric intraparenchymal and subarachnoid hemorrhage with mass effect upon the adjacent brain parenchyma and associated cerebral edema predominantly in the left MCA vascular distribution  No significant midline shift or evidence of    transtentorial herniation noted  Findings discussed with Dr Ricardo Ramírez at 10:30 p m  Workstation performed: [id number]         IR stroke alert   Final Result by Julio César Siu (05/17 1019)      CT cerebral perfusion   Final Result by Jenna Emanuel MD (05/15 1920)      CT perfusion performed  Data available on PACS  Workstation performed: [id number]         CTA stroke alert (head/neck)   Final Result by Jenna Emanuel MD (87/87 1915)         1  Acute occlusion of the proximal left M1 segment with partial opacification of distal M2 and M3 branches  2  No evidence of thrombus, stenosis or occlusion of the carotid or vertebral arteries  Findings were directly discussed with Princess Pittman at  7:14 PM                              Workstation performed: [id number]         CT stroke alert brain   Final Result by Jenna Emanuel MD (05/15 1915)      Late acute to early subacute left MCA territory infarct  No hemorrhagic conversion        Findings were directly discussed with Princess Pittman at  7:10 PM         Workstation performed: [id number]         CT head wo contrast    (Results Pending)         Procedures  Procedures      ED Course                  Stroke Assessment     Row Name 05/15/23 2027             NIH Stroke Scale    Interval --      Level of Consciousness (1a ) 0      LOC Questions (1b ) 2      LOC Commands (1c ) 0      Best Gaze (2 ) 0      Visual (3 ) 1      Facial Palsy (4 ) 1      Motor Arm, Left (5a ) 0      Motor Arm, Right (5b ) 4      Motor Leg, Left (6a ) 0      Motor Leg, Right (6b ) 2      Limb Ataxia (7 ) 0      Sensory (8 ) 1      Best Language (9 ) 2      Dysarthria (10 ) 2      Extinction and Inattention (11 ) (Formerly Neglect) 1      Total 16              Flowsheet Row Most Recent Value   Thrombolytic Decision Options    Thrombolytic Decision Patient not a candidate  Patient is not a candidate options Unclear time of onset outside appropriate time window  MDM     50-year-old male with no significant past medical history who presents for evaluation of weakness  Patient arrives as a prehospital stroke alert  Last known well was approximately 20 hours ago  Patient has right-sided facial droop, right upper> right lower extremity, also with expressive aphasia  Concern for large vessel occlusion  CT head without contrast shows signs of left MCA territory stroke  CTA shows acute left M1 occlusion  Given patient's age and unknown time of onset of stroke, discussed with neurology will discuss with neuro interventional   Neuro interventionalist will take patient for thrombectomy  Patient transferred to IR suite  He will be admitted to neuro ICU post-operatively       Disposition  Final diagnoses:   Stroke [facility])     Time reflects when diagnosis was documented in both MDM as applicable and the Disposition within this note     Time User Action Codes Description Comment    5/15/2023  6:42 PM Arlin Branch [I63 [id number]] Stroke [facility])     5/15/2023  8:50 PM Carlos Skelton [[id number]] L MCA stroke w/ LM1 partial occlusion       ED Disposition     ED Disposition   Admit    Condition   Stable Date/Time   Mon May 15, 2023 10:36 PM    Comment   Case was discussed with neuro critical care and the patient's admission status was agreed to be Admission Status: inpatient status to the service of Dr Patricia Lees  Follow-up Information    None         Current Discharge Medication List      CONTINUE these medications which have NOT CHANGED    Details   MELATONIN PO Take by mouth      Protein POWD Take by mouth           No discharge procedures on file  PDMP Review     None           ED Provider  Attending physically available and evaluated [id number] Ashley Menard I managed the patient along with the ED Attending      Electronically Signed by         Sharman Closs, MD  05/18/23 5692

## 2023-05-16 NOTE — PLAN OF CARE
Problem: MOBILITY - ADULT  Goal: Maintain or return to baseline ADL function  Description: INTERVENTIONS:  -  Assess patient's ability to carry out ADLs; assess patient's baseline for ADL function and identify physical deficits which impact ability to perform ADLs (bathing, care of mouth/teeth, toileting, grooming, dressing, etc )  - Assess/evaluate cause of self-care deficits   - Assess range of motion  - Assess patient's mobility; develop plan if impaired  - Assess patient's need for assistive devices and provide as appropriate  - Encourage maximum independence but intervene and supervise when necessary  - Involve family in performance of ADLs  - Assess for home care needs following discharge   - Consider OT consult to assist with ADL evaluation and planning for discharge  - Provide patient education as appropriate  Outcome: Not Progressing  Goal: Maintains/Returns to pre admission functional level  Description: INTERVENTIONS:  - Perform BMAT or MOVE assessment daily    - Set and communicate daily mobility goal to care team and patient/family/caregiver  - Collaborate with rehabilitation services on mobility goals if consulted  - Perform Range of Motion  times a day  - Reposition patient every  hours    - Dangle patient  times a day  - Stand patient  times a day  - Ambulate patient  times a day  - Out of bed to chair  times a day   - Out of bed for meals  times a day  - Out of bed for toileting  - Record patient progress and toleration of activity level   Outcome: Not Progressing     Problem: Prexisting or High Potential for Compromised Skin Integrity  Goal: Skin integrity is maintained or improved  Description: INTERVENTIONS:  - Identify patients at risk for skin breakdown  - Assess and monitor skin integrity  - Assess and monitor nutrition and hydration status  - Monitor labs   - Assess for incontinence   - Turn and reposition patient  - Assist with mobility/ambulation  - Relieve pressure over bony prominences  - Avoid friction and shearing  - Provide appropriate hygiene as needed including keeping skin clean and dry  - Evaluate need for skin moisturizer/barrier cream  - Collaborate with interdisciplinary team   - Patient/family teaching  - Consider wound care consult   Outcome: Not Progressing     Problem: Neurological Deficit  Goal: Neurological status is stable or improving  Description: Interventions:  - Monitor and assess patient's level of consciousness, motor function, sensory function, and level of assistance needed for ADLs  - Monitor and report changes from baseline  Collaborate with interdisciplinary team to initiate plan and implement interventions as ordered  - Provide and maintain a safe environment  - Consider seizure precautions  - Consider fall precautions  - Consider aspiration precautions  - Consider bleeding precautions  Outcome: Not Progressing     Problem: Activity Intolerance/Impaired Mobility  Goal: Mobility/activity is maintained at optimum level for patient  Description: Interventions:  - Assess and monitor patient  barriers to mobility and need for assistive/adaptive devices  - Assess patient's emotional response to limitations  - Collaborate with interdisciplinary team and initiate plans and interventions as ordered  - Encourage independent activity per ability   - Maintain proper body alignment  - Perform active/passive rom as tolerated/ordered  - Plan activities to conserve energy   - Turn patient as appropriate  Outcome: Not Progressing     Problem: Communication Impairment  Goal: Ability to express needs and understand communication  Description: Assess patient's communication skills and ability to understand information  Patient will demonstrate use of effective communication techniques, alternative methods of communication and understanding even if not able to speak  - Encourage communication and provide alternate methods of communication as needed    - Collaborate with case management/ for discharge needs  - Include patient/family/caregiver in decisions related to communication  Outcome: Not Progressing     Problem: Potential for Aspiration  Goal: Non-ventilated patient's risk of aspiration is minimized  Description: Assess and monitor vital signs, respiratory status, and labs (WBC)  Monitor for signs of aspiration (tachypnea, cough, rales, wheezing, cyanosis, fever)  - Assess and monitor patient's ability to swallow  - Place patient up in chair to eat if possible  - HOB up at 90 degrees to eat if unable to get patient up into chair   - Supervise patient during oral intake  - Instruct patient/ family to take small bites  - Instruct patient/ family to take small single sips when taking liquids  - Follow patient-specific strategies generated by speech pathologist   Outcome: Not Progressing  Goal: Ventilated patient's risk of aspiration is minimized  Description: Assess and monitor vital signs, respiratory status, airway cuff pressure, and labs (WBC)  Monitor for signs of aspiration (tachypnea, cough, rales, wheezing, cyanosis, fever)  - Elevate head of bed 30 degrees if patient has tube feeding   - Monitor tube feeding  Outcome: Not Progressing     Problem: Nutrition  Goal: Nutrition/Hydration status is improving  Description: Monitor and assess patient's nutrition/hydration status for malnutrition (ex- brittle hair, bruises, dry skin, pale skin and conjunctiva, muscle wasting, smooth red tongue, and disorientation)  Collaborate with interdisciplinary team and initiate plan and interventions as ordered  Monitor patient's weight and dietary intake as ordered or per policy  Utilize nutrition screening tool and intervene per policy  Determine patient's food preferences and provide high-protein, high-caloric foods as appropriate       - Assist patient with eating   - Allow adequate time for meals   - Encourage patient to take dietary supplement as ordered  - Collaborate with clinical nutritionist   - Include patient/family/caregiver in decisions related to nutrition    Outcome: Not Progressing     Problem: Potential for Falls  Goal: Patient will remain free of falls  Description: INTERVENTIONS:  - Educate patient/family on patient safety including physical limitations  - Instruct patient to call for assistance with activity   - Consult OT/PT to assist with strengthening/mobility   - Keep Call bell within reach  - Keep bed low and locked with side rails adjusted as appropriate  - Keep care items and personal belongings within reach  - Initiate and maintain comfort rounds  - Make Fall Risk Sign visible to staff  - Offer Toileting every  Hours, in advance of need  - Initiate/Maintain alarm  - Obtain necessary fall risk management equipment:  - Apply yellow socks and bracelet for high fall risk patients  - Consider moving patient to room near nurses station  Outcome: Not Progressing

## 2023-05-16 NOTE — PROGRESS NOTES
H&P exam - Critical Care   Fran Coe 40 y o  male MRN: [id number]  Unit/Bed#: ICU 09 Encounter: [id number]      -------------------------------------------------------------------------------------------------------------  Chief Complaint: Found down with stroke symptoms    History of Present Illness   HX and PE limited by: Beto Pink is a 40 y o  male who presents with stroke symptoms  Patient arrived to the emergency department as a stroke alert  His last known well was approximately 8:30 PM for which she was reported to have been sounding normal over the telephone  He did not show up for a scheduled meeting with a friend today and did not answer his phone  A welfare check was called, EMS found patient covered in feces with right upper extremity flaccid paralysis as well as right-sided neglect, right facial droop and expressive aphasia  Initial NIH on presentation reported to be 16  Patient was not a candidate for thrombolysis secondary to last known well time  He was noted to have a large vessel occlusion of M1  Endovascular alert initiated  History obtained from chart review  -------------------------------------------------------------------------------------------------------------  Assessment and Plan:    Acute ischemic stroke left MCA status post endovascular intervention 5/15/2023 with TICI 3 revascularization initial NIH 16  Cerebral edema with evidence of brain compression and mass effect left MCA territory  Multiple skin pressure changes right ue,le and trunk  Hypertension  Hyperlipidemia  COVID infection in February 7384    Goal systolic blood pressure [id number]  Mean arterial blood pressure greater than 85    Continue with neurologic checks Q 1hour  Repeat imaging at 3 AM status post endovascular intervention  CT head status post endovascular intervention with a significant amount of contrast staining cannot exclude underlying bleeding    Integrilin infusion will not be continued  If repeat CT scan at 3 AM looks somewhat improved plan to initiate aspirin 81 mg  MRI pending plan to initiate anti-platelet therapy with aspirin  Statin medication ordered  Continue with stroke protocol with PT/OT and speech evaluation  HbA1C, lipid panel and echo ordered  Neurology consult  Plan to to reimage if patient has decline in neurologic exam or < GCS by 2 points or more  Hypercoagulable work-up  COVID test pending  Patient did have J&J vaccine for COVID  We will discuss with family when he received the J&J vaccine due to association with hypercoagulable state  Check CPK, offloading and position changes       Disposition: Admit to Critical Care   Code Status: Level 1 - Full Code  --------------------------------------------------------------------------------------------------------------  Review of Systems   Unable to perform ROS: Acuity of condition       Review of systems was unable to be performed secondary to Aphasia and stroke    Physical Exam  Constitutional:       Appearance: He is well-developed  HENT:      Head: Normocephalic and atraumatic  Nose: Nose normal       Mouth/Throat:      Mouth: Mucous membranes are moist    Eyes:      Pupils: Pupils are equal, round, and reactive to light  Cardiovascular:      Rate and Rhythm: Normal rate and regular rhythm  Pulmonary:      Effort: Pulmonary effort is normal       Breath sounds: Normal breath sounds  Abdominal:      General: Abdomen is flat  Musculoskeletal:         General: Normal range of motion  Cervical back: Normal range of motion and neck supple  Skin:     General: Skin is warm and dry  Capillary Refill: Capillary refill takes less than 2 seconds  Comments: Right elbow, right hip, right lateral malleolus, right dorsal foot skin pressure ulcers non blanchable    Neurological:      Mental Status: He is alert  Motor: No abnormal muscle tone        Comments: Patient has a degree of expressive and receptive aphasia, he does not follow commands consistently but appears to have learned some of the exam   He moves all extremities appears to be weaker on the right compared to the left, he does gaze in all directions, face appears to be symmetric, occasionally will say words but they are not appropriate responses to the questions asked         --------------------------------------------------------------------------------------------------------------  Vitals:   Vitals:    05/16/23 0030 05/16/23 0045 05/16/23 0100 05/16/23 0115   BP:       Pulse: 58 70 60 56   Resp: 19 22 20 16   Temp:       TempSrc:       SpO2:   100%    Weight:         Temp  Min: 98 3 °F (36 8 °C)  Max: 98 5 °F (36 9 °C)        Body mass index is 23 99 kg/m²  N/A    Laboratory and Diagnostics:  Results from last 7 days   Lab Units 05/15/23  2352 05/15/23  1853   WBC Thousand/uL 4 89 7 08   HEMOGLOBIN g/dL 11 9* 14 6   HEMATOCRIT % 32 8* 41 0   PLATELETS Thousands/uL 178 244   NEUTROS PCT % 74  --    MONOS PCT % 13*  --      Results from last 7 days   Lab Units 05/15/23  2352 05/15/23  1853   SODIUM mmol/L 137 136   POTASSIUM mmol/L 3 7 4 0   CHLORIDE mmol/L 109* 107   CO2 mmol/L 27 28   ANION GAP mmol/L 1* 1*   BUN mg/dL 8 10   CREATININE mg/dL 0 56* 0 73   CALCIUM mg/dL 8 3 9 1   GLUCOSE RANDOM mg/dL 107 99     Results from last 7 days   Lab Units 05/15/23  2352   MAGNESIUM mg/dL 1 8      Results from last 7 days   Lab Units 05/15/23  1853   INR  0 89   PTT seconds 22*              ABG:    VBG:          Micro:        EKG: Normal sinus rhythm  Imaging: I have personally reviewed pertinent reports  and I have personally reviewed pertinent films in PACS    CT head stroke alert 5/15/2023 with acute stroke findings left MCA area loss of gray-white differentiation as well as loss of sulci and gyri architecture, no ICH      CTA stroke alert 5/15/2023 with left MCA LVO    CTP 5/15/2023 mismatch volume 88 mL with ratio 3 4    Historical Information   History reviewed  No pertinent past medical history  History reviewed  No pertinent surgical history  Social History   Social History     Substance and Sexual Activity   Alcohol Use Yes    Comment: Social     Social History     Substance and Sexual Activity   Drug Use Never     Social History     Tobacco Use   Smoking Status Every Day   Smokeless Tobacco Never     Exercise History: Independent at home  Family History:   History reviewed  No pertinent family history  Family history unknown      Medications:  Current Facility-Administered Medications   Medication Dose Route Frequency   • atorvastatin (LIPITOR) tablet 40 mg  40 mg Oral QPM   • chlorhexidine (PERIDEX) 0 12 % oral rinse 15 mL  15 mL Mouth/Throat Q12H [address]   • labetalol (NORMODYNE) injection 10 mg  10 mg Intravenous Q4H PRN   • nicotine (NICODERM CQ) 14 mg/24hr TD 24 hr patch 1 patch  1 patch Transdermal Daily   • ondansetron (ZOFRAN) injection 4 mg  4 mg Intravenous Once PRN   • sodium chloride (HYPERTONIC) 3 % infusion  30 mL/hr Intravenous Continuous     Home medications:  Prior to Admission Medications   Prescriptions Last Dose Informant Patient Reported? Taking? MELATONIN PO   Yes No   Sig: Take by mouth   Protein POWD   Yes No   Sig: Take by mouth      Facility-Administered Medications: None     Allergies:  No Known Allergies  ------------------------------------------------------------------------------------------------------------  Advance Directive and Living Will:      Power of :    POLST:    ------------------------------------------------------------------------------------------------------------  Anticipated Length of Stay is > 2 midnights    Care Time Delivered:   Critical care time 46 minutes, critical care time does not include procedures or family update      Rolo Rhody, DO        Portions of the record may have been created with voice recognition software    Occasional wrong word or "\"sound a like\" substitutions may have occurred due to the inherent limitations of voice recognition software    Read the chart carefully and recognize, using context, where substitutions have occurred        "

## 2023-05-16 NOTE — PHYSICAL THERAPY NOTE
Physical Therapy Cancellation Note    PT orders received chart review completed  Pt is currently going down for CT 2* to acute change in status and not appropriate to participate in skilled PT at this time  PT will follow and eval as medically appropriate  05/16/23 0830   Note Type   Note type Evaluation; Cancelled Session   Cancel Reasons Medical status       Jorge Harp, PT

## 2023-05-16 NOTE — ANESTHESIA POSTPROCEDURE EVALUATION
Post-Op Assessment Note    CV Status:  Stable    Pain management: adequate     Mental Status:  Alert and awake   Hydration Status:  Euvolemic   PONV Controlled:  Controlled   Airway Patency:  Patent      Post Op Vitals Reviewed: Yes      Staff: Anesthesiologist, CRNA   Comments: pt awake, VSS  to maintain SBP less than 160 and MAP greater than 85 (RN  aware)  at this time          No notable events documented      BP   131/82   Temp   97 8   Pulse  71   Resp   14   SpO2   100

## 2023-05-16 NOTE — SPEECH THERAPY NOTE
Dysphagia consult received  Patient currently NPO due to change in medical status  S/W RN and MD and will hold evaluation for today and check and assess tomorrow as able

## 2023-05-16 NOTE — RESPIRATORY THERAPY NOTE
RT Protocol Note  Olivia Castaneda 40 y o  male MRN: [id number]  Unit/Bed#: [facility] Encounter: [id number]    Assessment    Active Problems:    L MCA stroke w/ LM1 partial occlusion      Home Pulmonary Medications:  N/a       History reviewed  No pertinent past medical history  Social History     Socioeconomic History    Marital status: Single     Spouse name: None    Number of children: None    Years of education: None    Highest education level: None   Occupational History    None   Tobacco Use    Smoking status: Every Day    Smokeless tobacco: Never   Substance and Sexual Activity    Alcohol use: Yes     Comment: Social    Drug use: Never    Sexual activity: Not Currently   Other Topics Concern    None   Social History Narrative    None     Social Determinants of Health     Financial Resource Strain: High Risk    Difficulty of Paying Living Expenses: Hard   Food Insecurity: Unknown    Worried About Running Out of Food in the Last Year: Patient refused    [address] in the Last Year: Patient refused   Transportation Needs: No Transportation Needs    Lack of Transportation (Medical): No    Lack of Transportation (Non-Medical):  No   Physical Activity: Sufficiently Active    Days of Exercise per Week: 7 days    Minutes of Exercise per Session: 60 min   Stress: No Stress Concern Present    Feeling of Stress : Not at all   Social Connections: Not on file   Intimate Partner Violence: Not At Risk    Fear of Current or Ex-Partner: No    Emotionally Abused: No    Physically Abused: No    Sexually Abused: No   Housing Stability: Low Risk     Unable to Pay for Housing in the Last Year: No    Number of Places Lived in the Last Year: 1    Unstable Housing in the Last Year: No       Subjective         Objective    Physical Exam:   Assessment Type: Assess only  General Appearance: Drowsy  Respiratory Pattern: Normal  Chest Assessment: Chest expansion symmetrical  Bilateral Breath Sounds: Clear  Cough: None  O2 Device: HFNC    Vitals:  Blood pressure 128/75, pulse 60, temperature 98 3 °F (36 8 °C), resp  rate 20, weight 73 7 kg (162 lb 7 7 oz), SpO2 100 %  Imaging and other studies: I have personally reviewed pertinent reports  O2 Device: HFNC     Plan    Respiratory Plan: Vent/NIV/HFNC        Resp Comments: (P) Pt  evlauated at bedside per Respiratory protocol  Pt  placed on HFNC 100%/50lpm per physician orders at this time  Pt  admitted with Large lt  LCA stroke at this time  Pt's breath sounds are clear bilaterally and he does not have a Pulmonary Hx  according to chart  No bronchodilators are indicated at this time  Will continue to montior pt per Respiratory protocol

## 2023-05-16 NOTE — ASSESSMENT & PLAN NOTE
Neurology following  · Noted to have seizure like activity 5/16  · Exam slightly worse after stroke, CT head without new findings / hemorrhage  · Plan for VEEG  · Loaded with keppra, now on 750mg BID

## 2023-05-16 NOTE — ASSESSMENT & PLAN NOTE
PPD#1 TICI 3 revascularization of a left M1 occlusion (Dr Zunilda Diaz, 5/15/2023)  · Found down at home on 5/15 with NIHSS 16, noted to have acute left M1 occlusion with early ischemic changes  · COVID+ 5/15  · Now concern for seizures morning of 5/16    Imaging reviewed personally and with attending, results are as follows:  • CT head wo contrast 5/16/2023: Evolving left MCA infarct with increased cortical hypodensity and stable sulcal effacement/mass effect throughout the left lobe  No acute intracranial hemorrhage or midline shift  • CTA stroke alert 5/15/2023: Acute occlusion of the proximal left M1 segment with partial opacification of distal M2 and M3 branches  No evidence of thrombus, stenosis or occlusion of the carotid or vertebral arteries  Plan:  • Continue to monitor neuro exam, q1-2 hour neuro checks, currently GCS 10  • CT head reviewed, no acute hemorrhage, evolving left MCA stroke, stable mass effect  • Repeat CT head stat if GCS declines more than 2 points in 1 hour  • Neurology following for stroke management  o MRI brain pending per work-up  o Was placed on Integrilin postprocedure, now transitioned to daily aspirin  o Ongoing work-up for stroke etiology as patient is otherwise healthy individual, concern for hypercoagulable state given positive for COVID  o Blood pressure parameters per neurology  • No longer needs supplemental oxygen  • Medical management and pain control per primary team  • DVT ppx:  SCDs  • Mobilize as tolerated with assistance, PT / OT /speech evaluation    Neurosurgery will continue to follow  Please call with questions or concerns

## 2023-05-16 NOTE — H&P
H&P exam - Critical Care   Nick Lundberg 40 y o  male MRN: [id number]  Unit/Bed#: ICU 09 Encounter: [id number]      -------------------------------------------------------------------------------------------------------------  Chief Complaint: Found down with stroke symptoms    History of Present Illness   HX and PE limited by: Priscila Renteria is a 40 y o  male who presents with stroke symptoms  Patient arrived to the emergency department as a stroke alert  His last known well was approximately 8:30 PM for which she was reported to have been sounding normal over the telephone  He did not show up for a scheduled meeting with a friend today and did not answer his phone  A welfare check was called, EMS found patient covered in feces with right upper extremity flaccid paralysis as well as right-sided neglect, right facial droop and expressive aphasia  Initial NIH on presentation reported to be 16  Patient was not a candidate for thrombolysis secondary to last known well time  He was noted to have a large vessel occlusion of M1  Endovascular alert initiated  History obtained from chart review  -------------------------------------------------------------------------------------------------------------  Assessment and Plan:    Acute ischemic stroke left MCA status post endovascular intervention 5/15/2023 with TICI 3 revascularization initial NIH 16  Cerebral edema with evidence of brain compression and mass effect left MCA territory  Multiple skin pressure changes right ue,le and trunk  Hypertension  Hyperlipidemia  COVID infection in February 3493    Goal systolic blood pressure 900-214  Mean arterial blood pressure greater than 85    Continue with neurologic checks Q 1hour  Repeat imaging at 3 AM status post endovascular intervention  CT head status post endovascular intervention with a significant amount of contrast staining cannot exclude underlying bleeding    Integrilin infusion will not be continued  If repeat CT scan at 3 AM looks somewhat improved plan to initiate aspirin 81 mg  MRI pending plan to initiate anti-platelet therapy with aspirin  Statin medication ordered  Continue with stroke protocol with PT/OT and speech evaluation  HbA1C, lipid panel and echo ordered  Neurology consult  Plan to to reimage if patient has decline in neurologic exam or < GCS by 2 points or more  Hypercoagulable work-up  COVID test pending  Patient did have J&J vaccine for COVID  We will discuss with family when he received the J&J vaccine due to association with hypercoagulable state  Check CPK, offloading and position changes       Disposition: Admit to Critical Care   Code Status: Level 1 - Full Code  --------------------------------------------------------------------------------------------------------------  Review of Systems   Unable to perform ROS: Acuity of condition       Review of systems was unable to be performed secondary to Aphasia and stroke    Physical Exam  Constitutional:       Appearance: He is well-developed  HENT:      Head: Normocephalic and atraumatic  Nose: Nose normal       Mouth/Throat:      Mouth: Mucous membranes are moist    Eyes:      Pupils: Pupils are equal, round, and reactive to light  Cardiovascular:      Rate and Rhythm: Normal rate and regular rhythm  Pulmonary:      Effort: Pulmonary effort is normal       Breath sounds: Normal breath sounds  Abdominal:      General: Abdomen is flat  Musculoskeletal:         General: Normal range of motion  Cervical back: Normal range of motion and neck supple  Skin:     General: Skin is warm and dry  Capillary Refill: Capillary refill takes less than 2 seconds  Comments: Right elbow, right hip, right lateral malleolus, right dorsal foot skin pressure ulcers non blanchable    Neurological:      Mental Status: He is alert  Motor: No abnormal muscle tone        Comments: Patient has a degree of expressive and receptive aphasia, he does not follow commands consistently but appears to have learned some of the exam   He moves all extremities appears to be weaker on the right compared to the left, he does gaze in all directions, face appears to be symmetric, occasionally will say words but they are not appropriate responses to the questions asked         --------------------------------------------------------------------------------------------------------------  Vitals:   Vitals:    05/16/23 0030 05/16/23 0045 05/16/23 0100 05/16/23 0115   BP:       Pulse: 58 70 60 56   Resp: 19 22 20 16   Temp:       TempSrc:       SpO2:   100%    Weight:         Temp  Min: 98 3 °F (36 8 °C)  Max: 98 5 °F (36 9 °C)        Body mass index is 23 99 kg/m²  N/A    Laboratory and Diagnostics:  Results from last 7 days   Lab Units 05/15/23  2352 05/15/23  1853   WBC Thousand/uL 4 89 7 08   HEMOGLOBIN g/dL 11 9* 14 6   HEMATOCRIT % 32 8* 41 0   PLATELETS Thousands/uL 178 244   NEUTROS PCT % 74  --    MONOS PCT % 13*  --      Results from last 7 days   Lab Units 05/15/23  2352 05/15/23  1853   SODIUM mmol/L 137 136   POTASSIUM mmol/L 3 7 4 0   CHLORIDE mmol/L 109* 107   CO2 mmol/L 27 28   ANION GAP mmol/L 1* 1*   BUN mg/dL 8 10   CREATININE mg/dL 0 56* 0 73   CALCIUM mg/dL 8 3 9 1   GLUCOSE RANDOM mg/dL 107 99     Results from last 7 days   Lab Units 05/15/23  2352   MAGNESIUM mg/dL 1 8      Results from last 7 days   Lab Units 05/15/23  1853   INR  0 89   PTT seconds 22*              ABG:    VBG:          Micro:        EKG: Normal sinus rhythm  Imaging: I have personally reviewed pertinent reports  and I have personally reviewed pertinent films in PACS    CT head stroke alert 5/15/2023 with acute stroke findings left MCA area loss of gray-white differentiation as well as loss of sulci and gyri architecture, no ICH      CTA stroke alert 5/15/2023 with left MCA LVO    CTP 5/15/2023 mismatch volume 88 mL with ratio 3 4    Historical Information   History reviewed  No pertinent past medical history  History reviewed  No pertinent surgical history  Social History   Social History     Substance and Sexual Activity   Alcohol Use Yes    Comment: Social     Social History     Substance and Sexual Activity   Drug Use Never     Social History     Tobacco Use   Smoking Status Every Day   Smokeless Tobacco Never     Exercise History: Independent at home  Family History:   History reviewed  No pertinent family history  Family history unknown      Medications:  Current Facility-Administered Medications   Medication Dose Route Frequency   • atorvastatin (LIPITOR) tablet 40 mg  40 mg Oral QPM   • chlorhexidine (PERIDEX) 0 12 % oral rinse 15 mL  15 mL Mouth/Throat Q12H [address]   • labetalol (NORMODYNE) injection 10 mg  10 mg Intravenous Q4H PRN   • nicotine (NICODERM CQ) 14 mg/24hr TD 24 hr patch 1 patch  1 patch Transdermal Daily   • ondansetron (ZOFRAN) injection 4 mg  4 mg Intravenous Once PRN   • sodium chloride (HYPERTONIC) 3 % infusion  30 mL/hr Intravenous Continuous     Home medications:  Prior to Admission Medications   Prescriptions Last Dose Informant Patient Reported? Taking? MELATONIN PO   Yes No   Sig: Take by mouth   Protein POWD   Yes No   Sig: Take by mouth      Facility-Administered Medications: None     Allergies:  No Known Allergies  ------------------------------------------------------------------------------------------------------------  Advance Directive and Living Will:      Power of :    POLST:    ------------------------------------------------------------------------------------------------------------  Anticipated Length of Stay is > 2 midnights    Care Time Delivered:   Critical care time 46 minutes, critical care time does not include procedures or family update      Ermalene Andersen, DO        Portions of the record may have been created with voice recognition software    Occasional wrong word or "\"sound a like\" substitutions may have occurred due to the inherent limitations of voice recognition software    Read the chart carefully and recognize, using context, where substitutions have occurred        "

## 2023-05-16 NOTE — DISCHARGE INSTR - OTHER ORDERS
Skin Care Plan:  1-Preventative allevyn foam dressing to B/L Sacro-buttocks and B/L heels  Ismael with P for Prevention and change Q3Days or PRN  Peel back and inspect skin Q-shift  2-Turn/reposition q2h or when medically stable for pressure re-distribution on skin   3-Elevate heels to offload pressure  4-Moisturize skin daily with skin nourishing cream  5-Ehob cushion in chair when out of bed

## 2023-05-16 NOTE — PROGRESS NOTES
[facility]  Progress Note  Name: Minerva Pruitt  MRN: [id number]  Unit/Bed#: ICU 09 I Date of Admission: 5/15/2023   Date of Service: 5/16/2023 I Hospital Day: 1          NEUROLOGY RESIDENCY PROGRESS NOTE     Name: Maya Camargo   Age & Sex: 40 y o  male   MRN: [id number]  Unit/Bed#: ICU 09   Encounter: [id number]    Recommendations for outpatient neurological follow up have yet to be determined  Pending for discharge: Based on clinical improvement    ASSESSMENT & PLAN     * L MCA stroke w/ LM1 partial occlusion  Assessment & Plan  · 40 y o  right handed male w/ no pertinent hx who presented as a stroke alert on 05/15/23 at 6:40 PM   LKW 8:30PM last night when he spoke with someone over the phone  Initial presenting deficits R facial droop, RUE > RLE weakness  No thinners use  · Per EMS/ED, initial BP: 149/88  Pulse 65  ECG NSR  Glucose 120  NIHSS 16 (see below)  · CTH showed L MCA stroke (ASPECT 8), CTA h/n showed partial LM1 occlusion  Not found to be a TNK candidate due to being ouf time window, large stroke seen on CTH  Given clinical/radiological findings, pt was then returned to the scanner for CTP which demonstrated that pt was a candidate for IR thrombectomy and endovascular alert was called  Work-up:  • A1c: 4 8  • Lipid panel: cholesterol 159, triglycerides 34, , LDL 18    Impression: 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 in a 41 yo active patient w/ no reported vascular risk factors to favor cardioembolic etiology  Patient s/p thrombectomy with TICI 3 revascularization  Will obtain thrombosis panel for underlying hypercoagulable states  Recommend repeat CTH in pm 5/16 and ALYSSA Souza with Neurosurgery for possible intervention  Cannot rule out hypercoagulable state secondary to COVID infection  Plan:   • Stroke pathway, discussed w/ Dr Valencia Jeans    • If pt mental status declines by GCS > 2 in 1 hour, obtain STAT CTH  • Recommend repeat CTH once again in the pm of 5/16  o Reach out to Neurosurgery for possible intervention due to worsening CTH and risk of herniation  • Post thrombectomy, SBP < 160, MAP> 65  • MRI brain scan w/out contrast when able  • Goal euglycemia, normothermia  • Recommend ALYSSA rather than TTE to investigate for etiology  • Neuro checks- Every 1 hour x 4 hours, then every 2 hours x 4, then every 4 hours x 72 hours   • Neurovascular checks - every 15m x 2 hours then 30m x 6 hours then every 1 hr x 16 hours    • Monitor on telemetry  • DVT pharmacologic ppx - pending recs, SCDs ok  • Medical management as per critical care appreciated  • PT/OT/Speech/PMR consults appreciated when able  • Young patient with limited risk factors and stroke, will obtain additional labs with thrombosis panel , will check   o Homocysteine level, MTHFR mutation, as well as rheumatic disease markers (BELEM, ANCA, dsDNA, RF)  • Rest of care as per primary team      Seizure [facility])  Assessment & Plan  Episode of unresponsiveness noted around 2:24 AM lasting uncertain duration, given 2 mg Ativan and taken emergently to CT scan revealing no hemorrhage, stable mass effect likely secondary to edema  Subsequently loaded with 4000 mg Keppra, initiated on 750 mg BID  Additional episode requiring 2 mg Ativan at approximately 10:00 AM      Plan:  · Continue on Keppra 750 mg BID  · vEEG initiated, monitor for further seizure activity      SUBJECTIVE     Patient was seen and examined  Patient reportedly became unresponsive with seizure activity this morning around 8:05 AM  Treated with 2 mg Ativan and taken to CT  History and ROS following CT scan limited by mental status  Review of Systems   Unable to perform ROS: Mental status change       OBJECTIVE     Patient ID: Nick Lundberg is a 40 y o  male      Vitals:    05/16/23 1000 05/16/23 1100 05/16/23 1200 05/16/23 1300   BP: 140/76 129/75 129/77 138/84   BP Location:   Right arm    Pulse: 72 66 70 62 Resp: 17 15 17 19   Temp:   98 5 °F (36 9 °C)    TempSrc:   Oral    SpO2: 100% 100% 100%    Weight:       Height:          Temperature:   Temp (24hrs), Av 5 °F (36 9 °C), Min:98 3 °F (36 8 °C), Max:99 °F (37 2 °C)    Temperature: 98 5 °F (36 9 °C)      Physical Exam  Constitutional:       General: He is in acute distress  Comments: Opens eyes to voice and sternal rub minimally  HENT:      Mouth/Throat:      Mouth: Mucous membranes are moist    Eyes:      Pupils: Pupils are equal, round, and reactive to light  Cardiovascular:      Rate and Rhythm: Normal rate  Musculoskeletal:      Right lower leg: No edema  Left lower leg: No edema  Skin:     General: Skin is warm  Neurological:      Deep Tendon Reflexes:      Reflex Scores:       Tricep reflexes are 2+ on the right side and 2+ on the left side  Bicep reflexes are 2+ on the right side and 2+ on the left side  Brachioradialis reflexes are 2+ on the right side and 2+ on the left side  Patellar reflexes are 2+ on the right side and 2+ on the left side  Achilles reflexes are 2+ on the right side and 2+ on the left side  Neurologic Exam     Mental Status   Level of consciousness: responsive to painful stimuli, arousable by tactile stimuli ,  arousable by verbal stimuli  Not able to follow commands  Cranial Nerves     CN III, IV, VI   Pupils are equal, round, and reactive to light  Vestibulo-ocular reflex: present  No gaze preference appreciated  Motor Exam   Muscle bulk: normal  Overall muscle tone: normal  Right arm tone: normal  Left arm tone: normal  Right leg tone: normal  Left leg tone: normal  Withdraws all extremities to painful stimuli, moving all extremities spontaneously, left more briskly than right  Sensory Exam   Unable to test for sensation       Gait, Coordination, and Reflexes     Reflexes   Right brachioradialis: 2+  Left brachioradialis: 2+  Right biceps: 2+  Left biceps: 2+  Right triceps: 2+  Left triceps: 2+  Right patellar: 2+  Left patellar: 2+  Right achilles: 2+  Left achilles: 2+  Right plantar: normal  Left plantar: normal  Right Dow: absent  Left Dow: absent  Right ankle clonus: absent  Left pendular knee jerk: absent  Unable to test coordination and gait  LABORATORY DATA     Labs: I have personally reviewed pertinent reports  Results from last 7 days   Lab Units 05/16/23  0557 05/15/23  2352 05/15/23  1853   WBC Thousand/uL 5 50 4 89 7 08   HEMOGLOBIN g/dL 11 5* 11 9* 14 6   HEMATOCRIT % 32 0* 32 8* 41 0   PLATELETS Thousands/uL 165 178 244   NEUTROS PCT % 71 74  --    MONOS PCT % 13* 13*  --       Results from last 7 days   Lab Units 05/16/23  1240 05/16/23  0557 05/15/23  2352   SODIUM mmol/L 140 140 137   POTASSIUM mmol/L 3 5 3 9 3 7   CHLORIDE mmol/L 112* 113* 109*   CO2 mmol/L 25 25 27   BUN mg/dL 7 8 8   CREATININE mg/dL 0 58* 0 52* 0 56*   CALCIUM mg/dL 8 3 8 3 8 3   ALK PHOS U/L  --  47  --    ALT U/L  --  26  --    AST U/L  --  37  --      Results from last 7 days   Lab Units 05/16/23  0557 05/15/23  2352   MAGNESIUM mg/dL 2 0 1 8     Results from last 7 days   Lab Units 05/16/23  0557   PHOSPHORUS mg/dL 3 0      Results from last 7 days   Lab Units 05/15/23  1853   INR  0 89   PTT seconds 22*               IMAGING & DIAGNOSTIC TESTING     Radiology Results: I have personally reviewed pertinent reports  CT head wo contrast   Final Result by Sam Souza MD (40/88 5640)      Evolving left MCA infarct with increased cortical hypodensity and stable sulcal effacement/mass effect throughout the left lobe  No acute intracranial hemorrhage or midline shift                    Workstation performed: [id number]         CT head wo contrast   Final Result by Michael Dietz MD (05/16 2114)      Compared to the most immediate prior study, significant interval decrease/near total resolution of the previously noted extensive left hemispheric hyperdensity involving the cortex and subcortical white matter and left basal ganglia  Minimal residual    hyperdensity in the left basal ganglia region  Overall these findings suggest resolving contrast staining post endovascular therapy although small amount of superimposed hemorrhage is difficult to entirely exclude  Recommend short-term interval follow-up    scan and/or further evaluation with MRI for definitive evaluation  Stable left hemispheric edema and sulcal effacement without significant midline shift  Above findings discussed with Dr Jose Rafael Moon at 4:30 a m  on 5/16/2023  Workstation performed: [id number]         CT head wo contrast   Final Result by Nakita Rios MD (05/15 2233)      New extensive left hemispheric intraparenchymal and subarachnoid hemorrhage with mass effect upon the adjacent brain parenchyma and associated cerebral edema predominantly in the left MCA vascular distribution  No significant midline shift or evidence of    transtentorial herniation noted  Findings discussed with Dr Anibal Sanchez at 10:30 p m  Workstation performed: [id number]         CT cerebral perfusion   Final Result by Fior Gonzalez MD (30/47 1920)      CT perfusion performed  Data available on PACS  Workstation performed: [id number]         CTA stroke alert (head/neck)   Final Result by Fior Gonzalez MD (87/07 1915)         1  Acute occlusion of the proximal left M1 segment with partial opacification of distal M2 and M3 branches  2  No evidence of thrombus, stenosis or occlusion of the carotid or vertebral arteries  Findings were directly discussed with Juma Bowser at  7:14 PM                              Workstation performed: [id number]         CT stroke alert brain   Final Result by Fior Gonazlez MD (05/15 1915)      Late acute to early subacute left MCA territory infarct  No hemorrhagic conversion        Findings were directly discussed with Juma Bowser at  7:10 PM        Workstation performed: [id number]         IR stroke alert    (Results Pending)   MRI Inpatient Order    (Results Pending)   XR chest portable ICU    (Results Pending)       Other Diagnostic Testing: I have personally reviewed pertinent reports  ACTIVE MEDICATIONS     Current Facility-Administered Medications   Medication Dose Route Frequency   • [START ON 5/17/2023] aspirin chewable tablet 81 mg  81 mg Oral Daily   • chlorhexidine (PERIDEX) 0 12 % oral rinse 15 mL  15 mL Mouth/Throat Q12H [place]   • labetalol (NORMODYNE) injection 10 mg  10 mg Intravenous Q4H PRN   • levETIRAcetam (KEPPRA) 750 mg in sodium chloride 0 9 % 100 mL IVPB  750 mg Intravenous Q12H [address]   • nicotine (NICODERM CQ) 14 mg/24hr TD 24 hr patch 1 patch  1 patch Transdermal Daily   • ondansetron (ZOFRAN) injection 4 mg  4 mg Intravenous Once PRN   • sodium chloride (HYPERTONIC) 3 % infusion  30 mL/hr Intravenous Continuous       Prior to Admission medications    Medication Sig Start Date End Date Taking?  Authorizing Provider   MELATONIN PO Take by mouth    Historical Provider, MD   Protein POWD Take by mouth    Historical Provider, MD         VTE Pharmacologic Prophylaxis: None added - Defer to ICU team  VTE Mechanical Prophylaxis: sequential compression device and foot pump applied    ==  DO Kellie Arre[facility]'s Neurology Residency, PGY-1    Bella Brenner MS4

## 2023-05-16 NOTE — UTILIZATION REVIEW
Initial Clinical Review    Admission: Date/Time/Statement:   Admission Orders (From admission, onward)     Ordered        05/15/23 2312  INPATIENT ADMISSION  Once                      Orders Placed This Encounter   Procedures   • INPATIENT ADMISSION     Standing Status:   Standing     Number of Occurrences:   1     Order Specific Question:   Level of Care     Answer:   Critical Care [15]     Order Specific Question:   Estimated length of stay     Answer:   More than 2 Midnights     Order Specific Question:   Certification     Answer:   I certify that inpatient services are medically necessary for this patient for a duration of greater than two midnights  See H&P and MD Progress Notes for additional information about the patient's course of treatment  ED Arrival Information     Expected   -    Arrival   5/15/2023 18:48    Acuity   Emergent            Means of arrival   Ambulance    Escorted by   [address] [facility]    Admission type   Emergency            Arrival complaint   STROKE ALERT           Chief Complaint   Patient presents with   • STROKE Alert       Initial Presentation: 40 y o  male who presented by EMS to [address] [place] [address] ED  Inpatient admission for evaluation and treatment of stroke  Presented w/ RUE flaccid paralysis, R-sided neglect, R facial droop, expressive aphasia  Was supposed to meet w/ someone but did not show up and was unresponsive to phone calls, welfare check called, EMS found pt dishevled and w/ aphasia  On arrival, exam: Expressive aphasia, R sided facial droop, R sided weakness worse in UE than LE  On exam post endo-vascular intervention, R elbow/hip/lateral malleolus/dorsal foot w/ skin pressure ulcers that are nonblanchable; expressive and receptive aphasia, does not follow commands, moves all extremities but weaker on R  NIHSS 16  LKW 5/14 @ 2030  Large vessel occlusion of M1 on imaging  COVID-19 positive   Plan: endovascalar intervention planned, q1h neuro checks, statin, ASA, PT/OT evals, cardio-pulm monitoring, Trend labs, replete electrolytes as needed  Neurology, Neurosurgery consulted  Neurology: Pt w/ L MCA syndrome w/ partially occlusive LM1 clot  Plan: q1h neuro checks, ASA/statin, SBP < 160, MAP > 65, telemetry  Neurosurgery: Pt W/ L MCA syndrome  NIHSS 16  Proceed w/ thrombectomy  5/15 Intervention  Procedure:   1  left Internal Carotid Arteriogram  2  left MCA thrombectomy with zoom aspiration catheters  3  left Common carotid angiogram  4  Limited Right Femoral Arteriogram  Indication: Stroke, left M1 Large Vessel Occlusion  Anesthesia: Monitored Anesthesia Care  ASA Score: 4 - Emergent  Findings: TICI 3 revascularization of a left M1 occlusion  Date: 05/16/23   Day 2: Repeat CTH similar to prior  In the morning  Pt had a seizure w/ eye deviation to R followed by postictal period  No acute change on repeat CTH  On exam, HFNC, R-sided weakness, global aphasia  Plan: -150, MAP >85, echo, hypertonic saline gtt, continue HFNC wean as tolerated, Keppra, continuous EEG, Ativan prn for seizure-like activity, q1h neuro checks, pulmonary toileting, I&O, Trend labs, replete electrolytes as needed; supportive care         ED Triage Vitals   Temperature Pulse Respirations Blood Pressure SpO2   05/15/23 2115 05/15/23 1850 05/15/23 1850 05/15/23 1850 05/15/23 1850   98 3 °F (36 8 °C) 62 18 126/92 100 %      Temp Source Heart Rate Source Patient Position - Orthostatic VS BP Location FiO2 (%)   05/15/23 2330 05/15/23 1850 05/15/23 1850 -- 05/15/23 2200   Oral Monitor Lying  100      Pain Score       05/15/23 2115       No Pain          Wt Readings from Last 1 Encounters:   05/16/23 74 6 kg (164 lb 7 4 oz)     Additional Vital Signs:   Date/Time Temp Pulse Resp BP MAP (mmHg) Arterial Line BP MAP SpO2 FiO2 (%) O2 Flow Rate (L/min) O2 Device   05/16/23 1100 -- 66 15 129/75 93 98/74 84 mmHg 100 % -- -- --   05/16/23 1000 -- 72 17 140/76 93 110/82 96 mmHg 100 % -- -- --   05/16/23 0900 -- 78 22 128/65 98 92/62 76 mmHg 100 % -- -- --   05/16/23 0827 -- -- -- -- -- -- -- 100 % -- -- --   05/16/23 0800 99 °F (37 2 °C) 76 21 -- -- 146/104 120 mmHg 100 % -- -- High flow nasal cannula   05/16/23 0600 -- 60 11 Abnormal  -- -- 138/68 108 mmHg 100 % 100 -- High flow nasal cannula   05/16/23 0530 -- 62 13 -- -- 134/88 108 mmHg 100 % -- -- --   05/16/23 0500 -- 64 14 -- -- 136/82 106 mmHg 100 % -- -- --   05/16/23 0430 -- 60 13 -- -- 136/84 108 mmHg 100 % -- -- --   05/16/23 0400 -- 62 15 -- -- 144/80 106 mmHg 100 % 100 -- High flow nasal cannula   05/16/23 0330 -- 62 16 -- -- 130/80 106 mmHg 100 % -- -- --   05/16/23 0300 -- 66 19 -- -- 142/68 96 mmHg 100 % -- -- --   05/16/23 0245 -- 92 20 -- -- 140/88 114 mmHg 100 % 100 50 L/min High flow nasal cannula   05/16/23 0230 -- 64 13 -- -- 138/66 94 mmHg 100 % -- -- --   05/16/23 0200 -- 66 20 -- -- 148/70 98 mmHg 100 % -- -- --   05/16/23 0130 -- 58 17 -- -- 140/68 96 mmHg 100 % -- -- --   05/16/23 0115 -- 56 16 -- -- 136/66 94 mmHg -- -- -- --   05/16/23 0100 -- 60 20 -- -- 146/70 98 mmHg 100 % -- -- --   05/16/23 0045 -- 70 22 -- -- 166/80 110 mmHg -- -- -- --   05/16/23 0030 -- 58 19 -- -- 152/66 94 mmHg -- -- -- --   05/16/23 0015 -- 56 35 Abnormal  -- -- 154/68 98 mmHg -- -- -- --   05/16/23 0000 98 5 °F (36 9 °C) 62 23 Abnormal  -- -- 162/76 106 mmHg 100 % 100 -- High flow nasal cannula   05/15/23 2345 -- 54 Abnormal  17 -- -- 134/62 88 mmHg -- -- -- --   05/15/23 2330 98 4 °F (36 9 °C) 54 Abnormal  18 -- -- 122/62 86 mmHg 100 % 100 -- High flow nasal cannula   05/15/23 2228 -- -- -- -- -- -- -- 100 % 100 50 L/min High flow nasal cannula   05/15/23 2210 -- 60 20 -- -- 150/74 102 mmHg 100 % -- -- --   05/15/23 2200 -- 58 20 128/75 91 146/70 96 mmHg 100 % 100 -- High flow nasal cannula   05/15/23 2145 -- 58 20 127/83 96 148/70 96 mmHg 100 % -- -- Non-rebreather mask   05/15/23 2130 -- 78 20 134/85 100 152/80 108 mmHg 100 % -- -- Non-rebreather mask   05/15/23 2115 98 3 °F (36 8 °C) -- 20 -- -- -- -- -- -- -- Non-rebreather mask   05/15/23 1905 -- 65 20 150/92 -- -- -- 100 % -- -- None (Room air)       [place] Coma Scale  Date and Time Eye Opening Best Verbal Response Best Motor Response [place] Coma Scale Score   05/16/23 1100 2 1 4 7   05/16/23 1000 2 1 4 7   05/16/23 0900 2 1 4 7   05/16/23 0800 2 1 4 7   05/16/23 0600 3 3 5 11   05/16/23 0530 3 3 5 11   05/16/23 0500 3 3 5 11   05/16/23 0430 3 3 5 11   05/16/23 0400 3 3 5 11   05/16/23 0330 3 3 5 11   05/16/23 0300 3 3 5 11   05/16/23 0200 3 3 5 11   05/16/23 0130 3 3 5 11   05/16/23 0100 3 3 5 11   05/16/23 0030 3 3 5 11   05/16/23 0000 3 3 5 11   05/15/23 2330 3 3 5 11   05/15/23 2300 3 3 5 11   05/15/23 2245 3 3 5 11   05/15/23 2200 4 4 6 14   05/15/23 2145 4 4 6 14   05/15/23 2130 4 4 6 14   05/15/23 2115 4 4 6 14   05/15/23 1905 4 4 6 14   05/15/23 1850 4 4 5 13       Pertinent Labs/Diagnostic Test Results:   CT head wo contrast   Final Result by Clark Osullivan MD (05/16 3575)      Evolving left MCA infarct with increased cortical hypodensity and stable sulcal effacement/mass effect throughout the left lobe  No acute intracranial hemorrhage or midline shift  Workstation performed: [id number]         CT head wo contrast   Final Result by Brenita Galeazzi, MD (47/43 1282)      Compared to the most immediate prior study, significant interval decrease/near total resolution of the previously noted extensive left hemispheric hyperdensity involving the cortex and subcortical white matter and left basal ganglia  Minimal residual    hyperdensity in the left basal ganglia region  Overall these findings suggest resolving contrast staining post endovascular therapy although small amount of superimposed hemorrhage is difficult to entirely exclude   Recommend short-term interval follow-up    scan and/or further evaluation with MRI for definitive evaluation  Stable left hemispheric edema and sulcal effacement without significant midline shift  Above findings discussed with Dr Megan Boggs at 4:30 a m  on 5/16/2023  Workstation performed: [id number]         CT head wo contrast   Final Result by Giles Gloria MD (05/15 2233)      New extensive left hemispheric intraparenchymal and subarachnoid hemorrhage with mass effect upon the adjacent brain parenchyma and associated cerebral edema predominantly in the left MCA vascular distribution  No significant midline shift or evidence of    transtentorial herniation noted  Findings discussed with Dr Althea Mccann at 10:30 p m  Workstation performed: [id number]         CT cerebral perfusion   Final Result by Giovanna Felix MD (12/73 1920)      CT perfusion performed  Data available on PACS  Workstation performed: [id number]         CTA stroke alert (head/neck)   Final Result by Giovanna Felix MD (94/59 1915)         1  Acute occlusion of the proximal left M1 segment with partial opacification of distal M2 and M3 branches  2  No evidence of thrombus, stenosis or occlusion of the carotid or vertebral arteries  CT stroke alert brain   Final Result by Giovanna Felix MD (05/15 4970)      Late acute to early subacute left MCA territory infarct  No hemorrhagic conversion          Results from last 7 days   Lab Units 05/15/23  2355   SARS-COV-2  Positive*     Results from last 7 days   Lab Units 05/16/23  0557 05/15/23  2352 05/15/23  1853   WBC Thousand/uL 5 50 4 89 7 08   HEMOGLOBIN g/dL 11 5* 11 9* 14 6   HEMATOCRIT % 32 0* 32 8* 41 0   PLATELETS Thousands/uL 165 178 244   NEUTROS ABS Thousands/µL 3 88 3 61  --          Results from last 7 days   Lab Units 05/16/23  0557 05/15/23  2352 05/15/23  1853   SODIUM mmol/L 140 137 136   POTASSIUM mmol/L 3 9 3 7 4 0   CHLORIDE mmol/L 113* 109* 107   CO2 mmol/L 25 27 28   ANION GAP mmol/L 2* 1* 1*   BUN mg/dL 8 8 10   CREATININE mg/dL 0 52* 0 56* 0 73   EGFR ml/min/1 73sq m 132 128 115   CALCIUM mg/dL 8 3 8 3 9 1   CALCIUM, IONIZED mmol/L  --  1 12  --    MAGNESIUM mg/dL 2 0 1 8  --    PHOSPHORUS mg/dL 3 0  --   --      Results from last 7 days   Lab Units 05/16/23  0557   AST U/L 37   ALT U/L 26   ALK PHOS U/L 47   TOTAL PROTEIN g/dL 5 9*   ALBUMIN g/dL 2 9*   TOTAL BILIRUBIN mg/dL 0 45         Results from last 7 days   Lab Units 05/16/23  0557 05/15/23  2352 05/15/23  1853   GLUCOSE RANDOM mg/dL 102 107 99     Results from last 7 days   Lab Units 05/16/23  0559   OSMOLALITY, SERUM mmol/     Results from last 7 days   Lab Units 05/16/23  0557   HEMOGLOBIN A1C % 4 8   EAG mg/dl 91     Results from last 7 days   Lab Units 05/15/23  2352   CK TOTAL U/L 431*     Results from last 7 days   Lab Units 05/15/23  2352 05/15/23  1853   HS TNI 0HR ng/L  --  37   HS TNI 2HR ng/L 33  --    HSTNI D2 ng/L -4  --    HS TNI 4HR ng/L 32  --    HSTNI D4 ng/L -5  --          Results from last 7 days   Lab Units 05/15/23  1853   PROTIME seconds 12 3   INR  0 89   PTT seconds 22*       Results from last 7 days   Lab Units 05/16/23  0559   OSMOLALITY, SERUM mmol/         Results from last 7 days   Lab Units 05/15/23  2355   INFLUENZA A PCR  Negative   INFLUENZA B PCR  Negative   RSV PCR  Negative           ED Treatment:   Medication Administration from 05/15/2023 1837 to 05/15/2023 2319       Date/Time Order Dose Route Action     05/15/2023 1851 EDT iohexol (OMNIPAQUE) 350 MG/ML injection (SINGLE-DOSE) 100 mL 80 mL Intravenous Given     05/15/2023 1903 EDT iohexol (OMNIPAQUE) 350 MG/ML injection (SINGLE-DOSE) 100 mL 80 mL Intravenous Given     05/15/2023 1917 EDT multi-electrolyte (ISOLYTE-S PH 7 4) bolus 1,000 mL 1,000 mL Intravenous New Bag     05/15/2023 1932 EDT lidocaine (PF) (XYLOCAINE-MPF) 1 % injection 10 mL Infiltration Given     05/15/2023 2022 EDT verapamil (ISOPTIN) injection 10 mg Intra-arterial Given     05/15/2023 1947 EDT verapamil (ISOPTIN) injection 10 mg Intra-arterial Given     05/15/2023 1958 EDT eptifibatide (INTEGRILIN) 20 MG/10ML (premix) 6,633 mcg Intravenous New Bag     05/15/2023 2003 EDT eptifibatide (INTEGRILIN) 75 mg in 100 mL infusion (premix) 0 5 mcg/kg/min Intravenous New Bag     05/15/2023 2054 EDT iodixanol (VISIPAQUE) 320 MG/ML injection 300 mL 180 mL Intra-arterial Given            Admitting Diagnosis: Stroke [facility]) [I63 9]  Acute ischemic left MCA stroke (HCC) [I63 512]  Age/Sex: 40 y o  male  Admission Orders:  NPO  Continuous Cardio-Pulm monitoring  DW  I&O  SCDs   q1h neuro checks  Continuous vEEG  Scheduled Medications:  [START ON 5/17/2023] aspirin, 81 mg, Oral, Daily  chlorhexidine, 15 mL, Mouth/Throat, Q12H BRANDON  levETIRAcetam, 750 mg, Intravenous, Q12H [address]  nicotine, 1 patch, Transdermal, Daily    Continuous IV Infusions:  sodium chloride 3 % (HYPERTONIC), 30 mL/hr, Intravenous, Continuous    PRN Meds:  aspirin, 300 mg, Rectal; 5/16 x1  labetalol, 10 mg, Intravenous, Q4H PRN  levETIRAcetam, 2,000 mg, Intravenous; 5/16 x2  LORazepam, 2 mg, Intravenous; 5/16 x2  ondansetron, 4 mg, Intravenous, Once PRN  sodium chloride  3 % (HYPERTONIC), 250 mL bolus, Intravenous, 5/16 x1      IP CONSULT TO NEUROLOGY  IP CONSULT TO NEUROSURGERY  IP CONSULT TO CASE MANAGEMENT  IP CONSULT TO NEUROLOGY  IP CONSULT TO PHYSICAL MEDICINE REHAB    Network Utilization Review Department  ATTENTION: Please call with any questions or concerns to [phone] and carefully listen to the prompts so that you are directed to the right person  All voicemails are confidential   Yanetda Dies all requests for admission clinical reviews, approved or denied determinations and any other requests to dedicated fax number below belonging to the campus where the patient is receiving treatment   List of dedicated fax numbers for the Facilities:  FACILITY NAME [facility]GO [place] 25 DENIALS (Administrative/Medical Necessity) [phone] [address] (Maternity/NICU/Pediatrics) [address] [place] [address] [phone]   [address] [place] [address] [facility] 310 [facility] 134 815 [facility] [phone]

## 2023-05-16 NOTE — PLAN OF CARE
Problem: MOBILITY - ADULT  Goal: Maintain or return to baseline ADL function  Description: INTERVENTIONS:  -  Assess patient's ability to carry out ADLs; assess patient's baseline for ADL function and identify physical deficits which impact ability to perform ADLs (bathing, care of mouth/teeth, toileting, grooming, dressing, etc )  - Assess/evaluate cause of self-care deficits   - Assess range of motion  - Assess patient's mobility; develop plan if impaired  - Assess patient's need for assistive devices and provide as appropriate  - Encourage maximum independence but intervene and supervise when necessary  - Involve family in performance of ADLs  - Assess for home care needs following discharge   - Consider OT consult to assist with ADL evaluation and planning for discharge  - Provide patient education as appropriate  Outcome: Progressing  Goal: Maintains/Returns to pre admission functional level  Description: INTERVENTIONS:  - Perform BMAT or MOVE assessment daily    - Set and communicate daily mobility goal to care team and patient/family/caregiver  - Collaborate with rehabilitation services on mobility goals if consulted  - Perform Range of Motion  times a day  - Reposition patient every  hours    - Dangle patient  times a day  - Stand patient times a day  - Ambulate patient times a day  - Out of bed to chair  times a day   - Out of bed for meals  times a day  - Out of bed for toileting  - Record patient progress and toleration of activity level   Outcome: Progressing     Problem: Prexisting or High Potential for Compromised Skin Integrity  Goal: Skin integrity is maintained or improved  Description: INTERVENTIONS:  - Identify patients at risk for skin breakdown  - Assess and monitor skin integrity  - Assess and monitor nutrition and hydration status  - Monitor labs   - Assess for incontinence   - Turn and reposition patient  - Assist with mobility/ambulation  - Relieve pressure over bony prominences  - Avoid friction and shearing  - Provide appropriate hygiene as needed including keeping skin clean and dry  - Evaluate need for skin moisturizer/barrier cream  - Collaborate with interdisciplinary team   - Patient/family teaching  - Consider wound care consult   Outcome: Progressing     Problem: Neurological Deficit  Goal: Neurological status is stable or improving  Description: Interventions:  - Monitor and assess patient's level of consciousness, motor function, sensory function, and level of assistance needed for ADLs  - Monitor and report changes from baseline  Collaborate with interdisciplinary team to initiate plan and implement interventions as ordered  - Provide and maintain a safe environment  - Consider seizure precautions  - Consider fall precautions  - Consider aspiration precautions  - Consider bleeding precautions  Outcome: Progressing     Problem: Activity Intolerance/Impaired Mobility  Goal: Mobility/activity is maintained at optimum level for patient  Description: Interventions:  - Assess and monitor patient  barriers to mobility and need for assistive/adaptive devices  - Assess patient's emotional response to limitations  - Collaborate with interdisciplinary team and initiate plans and interventions as ordered  - Encourage independent activity per ability   - Maintain proper body alignment  - Perform active/passive rom as tolerated/ordered  - Plan activities to conserve energy   - Turn patient as appropriate  Outcome: Progressing     Problem: Communication Impairment  Goal: Ability to express needs and understand communication  Description: Assess patient's communication skills and ability to understand information  Patient will demonstrate use of effective communication techniques, alternative methods of communication and understanding even if not able to speak  - Encourage communication and provide alternate methods of communication as needed    - Collaborate with case management/ for discharge needs  - Include patient/family/caregiver in decisions related to communication  Outcome: Progressing     Problem: Potential for Aspiration  Goal: Non-ventilated patient's risk of aspiration is minimized  Description: Assess and monitor vital signs, respiratory status, and labs (WBC)  Monitor for signs of aspiration (tachypnea, cough, rales, wheezing, cyanosis, fever)  - Assess and monitor patient's ability to swallow  - Place patient up in chair to eat if possible  - HOB up at 90 degrees to eat if unable to get patient up into chair   - Supervise patient during oral intake  - Instruct patient/ family to take small bites  - Instruct patient/ family to take small single sips when taking liquids  - Follow patient-specific strategies generated by speech pathologist   Outcome: Progressing  Goal: Ventilated patient's risk of aspiration is minimized  Description: Assess and monitor vital signs, respiratory status, airway cuff pressure, and labs (WBC)  Monitor for signs of aspiration (tachypnea, cough, rales, wheezing, cyanosis, fever)  - Elevate head of bed 30 degrees if patient has tube feeding   - Monitor tube feeding  Outcome: Progressing     Problem: Nutrition  Goal: Nutrition/Hydration status is improving  Description: Monitor and assess patient's nutrition/hydration status for malnutrition (ex- brittle hair, bruises, dry skin, pale skin and conjunctiva, muscle wasting, smooth red tongue, and disorientation)  Collaborate with interdisciplinary team and initiate plan and interventions as ordered  Monitor patient's weight and dietary intake as ordered or per policy  Utilize nutrition screening tool and intervene per policy  Determine patient's food preferences and provide high-protein, high-caloric foods as appropriate  - Assist patient with eating   - Allow adequate time for meals   - Encourage patient to take dietary supplement as ordered    - Collaborate with clinical nutritionist   - Include patient/family/caregiver in decisions related to nutrition  Outcome: Progressing     Problem: Potential for Falls  Goal: Patient will remain free of falls  Description: INTERVENTIONS:  - Educate patient/family on patient safety including physical limitations  - Instruct patient to call for assistance with activity   - Consult OT/PT to assist with strengthening/mobility   - Keep Call bell within reach  - Keep bed low and locked with side rails adjusted as appropriate  - Keep care items and personal belongings within reach  - Initiate and maintain comfort rounds  - Make Fall Risk Sign visible to staff  - Offer Toileting every  Hours, in advance of need  - Initiate/Maintainalarm  - Obtain necessary fall risk management equipment:   - Apply yellow socks and bracelet for high fall risk patients  - Consider moving patient to room near nurses station  Outcome: Progressing     Problem: Nutrition/Hydration-ADULT  Goal: Nutrient/Hydration intake appropriate for improving, restoring or maintaining nutritional needs  Description: Monitor and assess patient's nutrition/hydration status for malnutrition  Collaborate with interdisciplinary team and initiate plan and interventions as ordered  Monitor patient's weight and dietary intake as ordered or per policy  Utilize nutrition screening tool and intervene as necessary  Determine patient's food preferences and provide high-protein, high-caloric foods as appropriate       INTERVENTIONS:  - Monitor oral intake, urinary output, labs, and treatment plans  - Assess nutrition and hydration status and recommend course of action  - Evaluate amount of meals eaten  - Assist patient with eating if necessary   - Allow adequate time for meals  - Recommend/ encourage appropriate diets, oral nutritional supplements, and vitamin/mineral supplements  - Order, calculate, and assess calorie counts as needed  - Recommend, monitor, and adjust tube feedings and TPN/PPN based on assessed needs  - Assess need for intravenous fluids  - Provide specific nutrition/hydration education as appropriate  - Include patient/family/caregiver in decisions related to nutrition  Outcome: Progressing     Problem: SAFETY,RESTRAINT: NV/NON-SELF DESTRUCTIVE BEHAVIOR  Goal: Remains free of harm/injury (restraint for non violent/non self-detsructive behavior)  Description: INTERVENTIONS:  - Instruct patient/family regarding restraint use   - Assess and monitor physiologic and psychological status   - Provide interventions and comfort measures to meet assessed patient needs   - Identify and implement measures to help patient regain control  - Assess readiness for release of restraint   Outcome: Progressing  Goal: Returns to optimal restraint-free functioning  Description: INTERVENTIONS:  - Assess the patient's behavior and symptoms that indicate continued need for restraint  - Identify and implement measures to help patient regain control  - Assess readiness for release of restraint   Outcome: Progressing

## 2023-05-16 NOTE — SEDATION DOCUMENTATION
Cerebral angiogram for stroke completed by Dr Laurent Crawford with Anesthesia support  IR bedrest start time 2045 for 2 hrs with HOB flat and RLE straight  Pt transported to CT then PACU with all belongings and bedside report given to Kael Banks, [place]  Right groin dressing clean, dry and intact on transfer  Integrilin infusing per order       In department/room: 1925  Puncture 1934  Past lesion/First Pass 1942, TICI 2B  Second past 2001  Third pass 2014, TICI 3

## 2023-05-16 NOTE — OP NOTE
OPERATIVE REPORT  PATIENT NAME: Kalyan Viera    :  1980  MRN:  [id number]   Pt Location: Interventional radiology     SURGERY DATE: 05/15/23      Preop Diagnosis:  1  Stroke, left M1 Large Vessel Occlusion        Postop Diagnosis  1  Stroke, left M1 Large Vessel Occlusion     Procedure:  1  left Internal Carotid Arteriogram  2  left MCA thrombectomy with zoom aspiration catheters  3  left Common carotid angiogram  4  Limited Right Femoral Arteriogram     Surgeon:   Kimmie May MD     Specimen(s):  None     Estimated Blood Loss:   None     Drains:  None     Anesthesia Type:   Monitored Anesthesia Care     Complications:  None     Operative Indications:  Kalyan Viera is a very pleasant 40 y o  male who presented with a left MCA syndrome  NIHSS was 16  CTA was significant for left M1 LVO  The last known well was unknown hours prior  After speaking to his POA the patient was brought emergently for thrombectomy  They understood the risks bleeding, stroke, groin hematoma, and death  Procedure Details:  After obtaining written informed consent, the patient was brought into the operating room and moved to the OR table in supine fashion  The groin was prepped and draped in the usual sterile fashion  Monitored anesthesia care was induced  5cc of intradermal lidocaine was infused into the right femoral area and percutaneous access with a 5-Kenyan micropuncture kit was obtained into the right femoral artery  A 6-Kenyan introducer sheath was placed  This was then exchanged for a Neuron 088  A Berenstein Selector catheter was then advanced over the aortic arch over an [employer]  The Neuron 088 was advanced into the left ICA  AP and lateral images of the left intracranial carotid circulation were obtained  Next a Zoom 71 was advanced through the [place], over a Marksman microcatheter over a Synchro2 support microwire  The clot was passed,  A direct aspiration pass was performed    AP and lateral arteriogram was performed  This demonstrated revascularization of the dominant inferior M2 branch  Also noted was some cervical vasospasm secondary to catheter manipulation  10 mg of verapamil was slowly infused into the left ICA  Next, a repeat thrombectomy was performed with a Zoom 035 through the Zoom 71  A distal inferior division left M2 branch was thrombectomized using aspiration and fluoroscopy  A second pass arteriogram was performed  At this juncture I elected to proceed with initiation of Integrilin  A 90 mcg/kg bolus was given and a drip of 0 5 mcg/kg a minute were then started  Next I turned my attention to the superior division  This was more difficult to catheterize however I was once again able to pass the thrombus with a 035 zoom and eventually bring up the 071 over this  A direct aspiration pass was performed  A postprocedural arteriogram was performed  Significant vasospasm of the cervical carotid as well as MCA was visible  An additional 10 mg of verapamil was slowly infused into the left ICA  A left common carotid angiogram was preformed  The Neuron sheath was then withdrawn and a limited right femoral arteriogram was run  Puncture site was found to be compatible with a Mynx Closure device and this was done successfully  There was appropriate hemostasis  The patient was then awoken from his monitored anesthesia care and found to be in his neurologic baseline  All sponge and needle counts were correct  INTERPRETATION OF ANGIOGRAPHIC FINDINGS:   1  The left internal carotid has antegrade flow into the anterior cerebral artery with cutoff in the mid M1     2   First pass post arteriogram demonstrates TICI 2B revascularization with continued thrombus in the inferior division M2 branch as well as continued occlusion of the superior division M2  There is vasospasm noted in the cervical ICA      3   Post second pass arteriogram demonstrates continued TICI 2B revascularization with improvement of residual clot along the inferior M2/M3 division  There is improvement in the previously noted vasospasm  4   Post third pass arteriogram demonstrates recanalization of the superior and inferior divisions with what appears to be TICI 3 revascularization  Multiple oblique images were obtained to confirm this  Cervical and MCA vasospasm is present  5   Delayed arteriography after a second dose of verapamil demonstrates improved caliber of his cervical and intracranial vessels  TICI 3 revascularization is present  6   Cervical common carotid arteriogram demonstrates antegrade flow into the external and internal carotids without any stenosis as defined by NASCET criteria  There is improved vasospasm  7  Limited Right femoral arteriogram reveals normal puncture site anatomy  Impression:  TICI 3 revascularization of a left M1 occlusion  Important times:   In department/room: 1926  Puncture 1934  Past lesion 1942, TICI 2B  Second past 2001  Third pass 2014, TICI 3     Patient Disposition:  Critical Care Unit     SIGNATURE: Sveta Chaudhary MD  DATE: 05/15/23    TIME 9:05 PM

## 2023-05-16 NOTE — CASE MANAGEMENT
Case Management Assessment & Discharge Planning Note    Patient name Britton Frederick  Location ICU 09/ICU 09 MRN [id number]  : 1980 Date 2023       Current Admission Date: 5/15/2023  Current Admission Diagnosis:L MCA stroke w/ LM1 partial occlusion   Patient Active Problem List    Diagnosis Date Noted   • Seizure ([address] ) 2023   • COVID 2023   • L MCA stroke w/ LM1 partial occlusion 05/15/2023   • Positive self-administered antigen test for COVID-19 02/10/2023   • Viral infection, unspecified 11/10/2022   • Bilateral low back pain without sciatica 2020      LOS (days): 1  Geometric Mean LOS (GMLOS) (days):   Days to GMLOS:     OBJECTIVE:    Risk of Unplanned Readmission Score: 8 29         Current admission status: Inpatient       Preferred Pharmacy:   [address]  [address]  [address] [facility] [address]  Phone: [phone] Fax: [address] [facility] [address] [facility], [address] [address] [address]  [address] [facility] [address]  Phone: [phone] Fax: [phone]    Primary Care Provider: Damian Maddox DO    Primary Insurance: [facility]  Secondary Insurance:     ASSESSMENT:  Active Health Care Proxies    There are no active Health Care Proxies on file         Advance Directives  Does patient have a [address] [facility] [address]?: No  Was patient offered paperwork?: Yes (declined)  Does patient have Advance Directives?: No  Was patient offered paperwork?: Yes (declined)  Primary Contact: TateRob (Father)         Readmission Root Cause  30 Day Readmission: No    Patient Information  Admitted from[de-identified] Home  Mental Status: Alert  During Assessment patient was accompanied by: Not accompanied during assessment  Assessment information provided by[de-identified] Parent  Primary Caregiver: Self  Support Systems: Self, Family members, Parent, [address] [place] [address] Avenue of Residence: [address] [place] [address] do you live in?: [place] entry access options   Select all that apply : No steps to enter home  Type of Current Residence: Apartment  Floor Level: 1  Upon entering residence, is there a bedroom on the main floor (no further steps)?: Yes  Upon entering residence, is there a bathroom on the main floor (no further steps)?: Yes  In the last 12 months, was there a time when you were not able to pay the mortgage or rent on time?: No  In the last 12 months, how many places have you lived?: 1  In the last 12 months, was there a time when you did not have a steady place to sleep or slept in a shelter (including now)?: No  Homeless/housing insecurity resource given?: N/A  Living Arrangements: Lives Alone  Is patient a ?: No    Activities of Daily Living Prior to Admission  Functional Status: Independent  Completes ADLs independently?: Yes  Ambulates independently?: Yes  Does patient use assisted devices?: No  Does patient currently own DME?: No  Does patient have a history of Outpatient Therapy (PT/OT)?: No  Does the patient have a history of Short-Term Rehab?: No  [facility]         Patient Information Continued  Income Source: Employed  Does patient have prescription coverage?: Yes  Within the past 12 months, you worried that your food would run out before you got the money to buy more : Never true  Within the past 12 months, the food you bought just didn't last and you didn't have money to get more : Never true  Food insecurity resource given?: N/A  Does patient receive dialysis treatments?: No  Does patient have a history of substance abuse?: No  Does patient have a history of Mental Health Diagnosis?: No         Means of Transportation  Means of Transport to Appts[de-identified] Drives Self  In the past 12 months, has lack of transportation kept you from medical appointments or from getting medications?: No  In the past 12 months, has lack of transportation kept you from meetings, work, or from getting things needed for daily living?: No  Was application for public transport provided?: N/A        DISCHARGE DETAILS:       Additional Comments: CM spoke with Pt father via phone for open assessment information

## 2023-05-16 NOTE — QUICK NOTE
May 15th 2023 Updates:    After discussion with neurology and critical care attending; the following studies treatments will be added in addition to plan as presented in progress note:     - Noncontrast CT head in afternoon of May 16; to assess for increased edema, especially in the setting status post seizure  - Transesophageal echocardiogram ordered  - Rheumatological serum studies as per neurology  --- MTHFR, BELEM, ANCA, dsDNA, RF  - Provide 150 mL bolus of 3% hypertonic saline and increase 3% hypertonic saline drip to 50 mL/h   -- Follow-up every 6 hours on serum sodium levels maintaining 145-155  -- Follow-up every 6 hours on serum osmolarity levels maintaining 305-310    vEEG Update:  14:13: Left hemispheric slowing, no epileptiform discharges, so far no seizures

## 2023-05-16 NOTE — PROGRESS NOTES
[address] [facility]  Progress Note  Name: Najma Coronel  MRN: [id number]  Unit/Bed#: ICU 09 I Date of Admission: 5/15/2023   Date of Service: 5/16/2023 I Hospital Day: 1    Assessment/Plan   * L MCA stroke w/ LM1 partial occlusion  Assessment & Plan  PPD#1 TICI 3 revascularization of a left M1 occlusion (Dr Radha Fregoso, 5/15/2023)  · Found down at home on 5/15 with NIHSS 16, noted to have acute left M1 occlusion with early ischemic changes  · COVID+ 5/15  · Now concern for seizures morning of 5/16    Imaging reviewed personally and with attending, results are as follows:  • CT head wo contrast 5/16/2023: Evolving left MCA infarct with increased cortical hypodensity and stable sulcal effacement/mass effect throughout the left lobe  No acute intracranial hemorrhage or midline shift  • CTA stroke alert 5/15/2023: Acute occlusion of the proximal left M1 segment with partial opacification of distal M2 and M3 branches  No evidence of thrombus, stenosis or occlusion of the carotid or vertebral arteries  Plan:  • Continue to monitor neuro exam, q1-2 hour neuro checks, currently GCS 10  • CT head reviewed, no acute hemorrhage, evolving left MCA stroke, stable mass effect  • Repeat CT head stat if GCS declines more than 2 points in 1 hour  • Neurology following for stroke management  o MRI brain pending per work-up  o Was placed on Integrilin postprocedure, now transitioned to daily aspirin  o Ongoing work-up for stroke etiology as patient is otherwise healthy individual, concern for hypercoagulable state given positive for COVID  o Blood pressure parameters per neurology  • No longer needs supplemental oxygen  • Medical management and pain control per primary team  • DVT ppx:  SCDs  • Mobilize as tolerated with assistance, PT / OT /speech evaluation    Neurosurgery will continue to follow  Please call with questions or concerns        COVID  Assessment & Plan  · COVID+ 5/15  · On "precautions  · Query hypercoagulable state as the cause of stroke    Seizure [facility])  Assessment & Plan  Neurology following  · Noted to have seizure like activity 5/16  · Exam slightly worse after stroke, CT head without new findings / hemorrhage  · Plan for VEEG  · Loaded with keppra, now on 750mg BID         Subjective/Objective   Chief Complaint: follow up thrombectomy    Subjective:  Patient is lethargic on exam and unable to participate in ROS  Per documentation he had seizure-like activity this morning at 8 AM, loaded with Keppra now 1 750 mg twice daily  Plan for video EEG this morning  Was previously following commands although weaker on the right however post seizure patient's exam is slightly worse, lethargic, not following commands  CT head completed without acute etiology  He was placed on Integrilin postprocedure, now this has stopped and he is transition to aspirin  Neurology is following for both stroke and seizure-like activity  Objective: Laying in bed, lethargic    I/O       05/14 0701  05/15 0700 05/15 0701  05/16 0700 05/16 0701  05/17 0700    I V  (mL/kg)  1422 (19 1)     Total Intake(mL/kg)  1422 (19 1)     Urine (mL/kg/hr)  1100     Total Output  1100     Net  +322                  Invasive Devices     Central Venous Catheter Line  Duration           CVC Central Lines 05/16/23 Triple 16cm <1 day          Peripheral Intravenous Line  Duration           Peripheral IV 05/15/23 Left Antecubital <1 day          Arterial Line  Duration           Arterial Line 05/15/23 Left Radial <1 day          Drain  Duration           Urethral Catheter Coude <1 day                Physical Exam:  Vitals: Blood pressure 128/75, pulse 60, temperature 98 5 °F (36 9 °C), temperature source Oral, resp  rate (!) 11, height 5' 9\" (1 753 m), weight 74 6 kg (164 lb 7 4 oz), SpO2 100 %  ,Body mass index is 24 29 kg/m²        General appearance: Lethargic, not cooperative with the examination  Head: Normocephalic, " without obvious abnormality, atraumatic  Eyes: Gaze conjugate, pupils reactive bilaterally, opens eyes very briefly intermittently throughout the examination but keeps closed for the most part  Neck: supple, symmetrical, trachea midline  Lungs: non labored breathing, on supplemental oxygen  Heart: regular heart rate  Vascular: Pedal pulses intact bilaterally  Skin: Right groin dressing removed, no hematoma or drainage appreciated  Neurologic:   Mental status: GCS 10 (E3, V2, M5), lethargic, opens eyes very intermittently during examination but keeps eyes closed for the most part, mumbles 1 response at 1 point but unable to understand, localizing on the left, at least withdrawing on the right  Cranial nerves: grossly intact (Cranial nerves II-XII)  Sensory: Intact to crude touch  Motor: Patient not following commands, unable to conduct accurate strength examination      Lab Results:  Results from last 7 days   Lab Units 05/16/23  0557 05/15/23  2352 05/15/23  1853   WBC Thousand/uL 5 50 4 89 7 08   HEMOGLOBIN g/dL 11 5* 11 9* 14 6   HEMATOCRIT % 32 0* 32 8* 41 0   PLATELETS Thousands/uL 165 178 244   NEUTROS PCT % 71 74  --    MONOS PCT % 13* 13*  --      Results from last 7 days   Lab Units 05/16/23  0557 05/15/23  2352 05/15/23  1853   POTASSIUM mmol/L 3 9 3 7 4 0   CHLORIDE mmol/L 113* 109* 107   CO2 mmol/L 25 27 28   BUN mg/dL 8 8 10   CREATININE mg/dL 0 52* 0 56* 0 73   CALCIUM mg/dL 8 3 8 3 9 1   ALK PHOS U/L 47  --   --    ALT U/L 26  --   --    AST U/L 37  --   --      Results from last 7 days   Lab Units 05/16/23  0557 05/15/23  2352   MAGNESIUM mg/dL 2 0 1 8     Results from last 7 days   Lab Units 05/16/23  0557   PHOSPHORUS mg/dL 3 0     Results from last 7 days   Lab Units 05/15/23  1853   INR  0 89   PTT seconds 22*       Imaging Studies: I have personally reviewed pertinent reports     and I have personally reviewed pertinent films in PACS    CT head wo contrast    Result Date: 5/16/2023  Impression: Evolving left MCA infarct with increased cortical hypodensity and stable sulcal effacement/mass effect throughout the left lobe  No acute intracranial hemorrhage or midline shift  Workstation performed: [id number]     CT head wo contrast    Result Date: 5/16/2023  Impression: Compared to the most immediate prior study, significant interval decrease/near total resolution of the previously noted extensive left hemispheric hyperdensity involving the cortex and subcortical white matter and left basal ganglia  Minimal residual hyperdensity in the left basal ganglia region  Overall these findings suggest resolving contrast staining post endovascular therapy although small amount of superimposed hemorrhage is difficult to entirely exclude  Recommend short-term interval follow-up  scan and/or further evaluation with MRI for definitive evaluation  Stable left hemispheric edema and sulcal effacement without significant midline shift  Above findings discussed with Dr Lexy Brewer at 4:30 a m  on 5/16/2023  Workstation performed: [id number]     CT head wo contrast    Result Date: 5/15/2023  Impression: New extensive left hemispheric intraparenchymal and subarachnoid hemorrhage with mass effect upon the adjacent brain parenchyma and associated cerebral edema predominantly in the left MCA vascular distribution  No significant midline shift or evidence of  transtentorial herniation noted  Findings discussed with Dr Tien Horton at 10:30 p m  Workstation performed: [id number]     CT stroke alert brain    Result Date: 5/15/2023  Impression: Late acute to early subacute left MCA territory infarct  No hemorrhagic conversion  Findings were directly discussed with Ayden Fairbanks at  7:10 PM    Workstation performed: [id number]     CT cerebral perfusion    Result Date: 5/15/2023  Impression: CT perfusion performed  Data available on PACS  Workstation performed: [id number]     CTA stroke alert (head/neck)    Result Date: 5/15/2023  Impression: 1  Acute occlusion of the proximal left M1 segment with partial opacification of distal M2 and M3 branches  2  No evidence of thrombus, stenosis or occlusion of the carotid or vertebral arteries  Findings were directly discussed with Ashanti Rizvi at  7:14 PM    Workstation performed: [id number]       EKG, Pathology, and Other Studies: I have personally reviewed pertinent reports        VTE Pharmacologic Prophylaxis: none ordered    VTE Mechanical Prophylaxis: sequential compression device

## 2023-05-17 ENCOUNTER — APPOINTMENT (INPATIENT)
Dept: RADIOLOGY | Facility: HOSPITAL | Age: 43
End: 2023-05-17

## 2023-05-17 ENCOUNTER — APPOINTMENT (INPATIENT)
Dept: NON INVASIVE DIAGNOSTICS | Facility: HOSPITAL | Age: 43
End: 2023-05-17

## 2023-05-17 LAB
ALBUMIN SERPL BCP-MCNC: 2.7 G/DL (ref 3.5–5)
ALP SERPL-CCNC: 44 U/L (ref 46–116)
ALT SERPL W P-5'-P-CCNC: 26 U/L (ref 12–78)
ANION GAP SERPL CALCULATED.3IONS-SCNC: 0 MMOL/L (ref 4–13)
ANION GAP SERPL CALCULATED.3IONS-SCNC: 1 MMOL/L (ref 4–13)
ANION GAP SERPL CALCULATED.3IONS-SCNC: 2 MMOL/L (ref 4–13)
ANION GAP SERPL CALCULATED.3IONS-SCNC: 2 MMOL/L (ref 4–13)
AST SERPL W P-5'-P-CCNC: 38 U/L (ref 5–45)
BASOPHILS # BLD AUTO: 0.03 THOUSANDS/ÂΜL (ref 0–0.1)
BASOPHILS NFR BLD AUTO: 1 % (ref 0–1)
BILIRUB SERPL-MCNC: 0.3 MG/DL (ref 0.2–1)
BUN SERPL-MCNC: 6 MG/DL (ref 5–25)
BUN SERPL-MCNC: 7 MG/DL (ref 5–25)
CALCIUM ALBUM COR SERPL-MCNC: 9.4 MG/DL (ref 8.3–10.1)
CALCIUM SERPL-MCNC: 8.2 MG/DL (ref 8.3–10.1)
CALCIUM SERPL-MCNC: 8.3 MG/DL (ref 8.3–10.1)
CALCIUM SERPL-MCNC: 8.3 MG/DL (ref 8.3–10.1)
CALCIUM SERPL-MCNC: 8.4 MG/DL (ref 8.3–10.1)
CHLORIDE SERPL-SCNC: 115 MMOL/L (ref 96–108)
CHLORIDE SERPL-SCNC: 117 MMOL/L (ref 96–108)
CHLORIDE SERPL-SCNC: 120 MMOL/L (ref 96–108)
CHLORIDE SERPL-SCNC: 121 MMOL/L (ref 96–108)
CO2 SERPL-SCNC: 24 MMOL/L (ref 21–32)
CO2 SERPL-SCNC: 25 MMOL/L (ref 21–32)
CREAT SERPL-MCNC: 0.54 MG/DL (ref 0.6–1.3)
CREAT SERPL-MCNC: 0.54 MG/DL (ref 0.6–1.3)
CREAT SERPL-MCNC: 0.56 MG/DL (ref 0.6–1.3)
CREAT SERPL-MCNC: 0.82 MG/DL (ref 0.6–1.3)
EOSINOPHIL # BLD AUTO: 0.01 THOUSAND/ÂΜL (ref 0–0.61)
EOSINOPHIL NFR BLD AUTO: 0 % (ref 0–6)
ERYTHROCYTE [DISTWIDTH] IN BLOOD BY AUTOMATED COUNT: 12.5 % (ref 11.6–15.1)
GFR SERPL CREATININE-BSD FRML MDRD: 109 ML/MIN/1.73SQ M
GFR SERPL CREATININE-BSD FRML MDRD: 128 ML/MIN/1.73SQ M
GFR SERPL CREATININE-BSD FRML MDRD: 130 ML/MIN/1.73SQ M
GFR SERPL CREATININE-BSD FRML MDRD: 130 ML/MIN/1.73SQ M
GLUCOSE SERPL-MCNC: 108 MG/DL (ref 65–140)
GLUCOSE SERPL-MCNC: 153 MG/DL (ref 65–140)
GLUCOSE SERPL-MCNC: 243 MG/DL (ref 65–140)
GLUCOSE SERPL-MCNC: 98 MG/DL (ref 65–140)
HCT VFR BLD AUTO: 31.3 % (ref 36.5–49.3)
HCYS SERPL-SCNC: 6.6 UMOL/L (ref 5.3–14.2)
HGB BLD-MCNC: 10.7 G/DL (ref 12–17)
IMM GRANULOCYTES # BLD AUTO: 0.02 THOUSAND/UL (ref 0–0.2)
IMM GRANULOCYTES NFR BLD AUTO: 0 % (ref 0–2)
LYMPHOCYTES # BLD AUTO: 0.94 THOUSANDS/ÂΜL (ref 0.6–4.47)
LYMPHOCYTES NFR BLD AUTO: 17 % (ref 14–44)
MAGNESIUM SERPL-MCNC: 2.2 MG/DL (ref 1.6–2.6)
MCH RBC QN AUTO: 34.2 PG (ref 26.8–34.3)
MCHC RBC AUTO-ENTMCNC: 34.2 G/DL (ref 31.4–37.4)
MCV RBC AUTO: 100 FL (ref 82–98)
MONOCYTES # BLD AUTO: 0.58 THOUSAND/ÂΜL (ref 0.17–1.22)
MONOCYTES NFR BLD AUTO: 11 % (ref 4–12)
NEUTROPHILS # BLD AUTO: 3.89 THOUSANDS/ÂΜL (ref 1.85–7.62)
NEUTS SEG NFR BLD AUTO: 71 % (ref 43–75)
NRBC BLD AUTO-RTO: 0 /100 WBCS
OSMOLALITY UR/SERPL-RTO: 304 MMOL/KG (ref 282–298)
OSMOLALITY UR/SERPL-RTO: 305 MMOL/KG (ref 282–298)
OSMOLALITY UR/SERPL-RTO: 305 MMOL/KG (ref 282–298)
OSMOLALITY UR/SERPL-RTO: 306 MMOL/KG (ref 282–298)
PHOSPHATE SERPL-MCNC: 2.4 MG/DL (ref 2.7–4.5)
PLATELET # BLD AUTO: 149 THOUSANDS/UL (ref 149–390)
PMV BLD AUTO: 9.6 FL (ref 8.9–12.7)
POTASSIUM SERPL-SCNC: 3.3 MMOL/L (ref 3.5–5.3)
POTASSIUM SERPL-SCNC: 3.5 MMOL/L (ref 3.5–5.3)
POTASSIUM SERPL-SCNC: 3.6 MMOL/L (ref 3.5–5.3)
POTASSIUM SERPL-SCNC: 3.9 MMOL/L (ref 3.5–5.3)
PROT SERPL-MCNC: 5.9 G/DL (ref 6.4–8.4)
RBC # BLD AUTO: 3.13 MILLION/UL (ref 3.88–5.62)
SODIUM SERPL-SCNC: 141 MMOL/L (ref 135–147)
SODIUM SERPL-SCNC: 144 MMOL/L (ref 135–147)
SODIUM SERPL-SCNC: 146 MMOL/L (ref 135–147)
SODIUM SERPL-SCNC: 146 MMOL/L (ref 135–147)
WBC # BLD AUTO: 5.47 THOUSAND/UL (ref 4.31–10.16)

## 2023-05-17 RX ORDER — SODIUM CHLORIDE 3 G/100ML
250 INJECTION, SOLUTION INTRAVENOUS ONCE
Status: COMPLETED | OUTPATIENT
Start: 2023-05-17 | End: 2023-05-17

## 2023-05-17 RX ORDER — FENTANYL CITRATE 50 UG/ML
INJECTION, SOLUTION INTRAMUSCULAR; INTRAVENOUS
Status: DISPENSED
Start: 2023-05-17 | End: 2023-05-17

## 2023-05-17 RX ORDER — SODIUM CHLORIDE 3 G/100ML
250 INJECTION, SOLUTION INTRAVENOUS ONCE
Status: COMPLETED | OUTPATIENT
Start: 2023-05-17 | End: 2023-05-18

## 2023-05-17 RX ORDER — FENTANYL CITRATE 50 UG/ML
50 INJECTION, SOLUTION INTRAMUSCULAR; INTRAVENOUS ONCE
Status: COMPLETED | OUTPATIENT
Start: 2023-05-17 | End: 2023-05-17

## 2023-05-17 RX ORDER — POTASSIUM CHLORIDE 29.8 MG/ML
40 INJECTION INTRAVENOUS ONCE
Status: COMPLETED | OUTPATIENT
Start: 2023-05-17 | End: 2023-05-18

## 2023-05-17 RX ADMIN — CHLORHEXIDINE GLUCONATE 0.12% ORAL RINSE 15 ML: 1.2 LIQUID ORAL at 08:11

## 2023-05-17 RX ADMIN — SODIUM CHLORIDE 70 ML/HR: 3 INJECTION, SOLUTION INTRAVENOUS at 15:31

## 2023-05-17 RX ADMIN — GADOBUTROL 7 ML: 604.72 INJECTION INTRAVENOUS at 01:53

## 2023-05-17 RX ADMIN — LEVETIRACETAM 750 MG: 100 INJECTION, SOLUTION INTRAVENOUS at 20:01

## 2023-05-17 RX ADMIN — SODIUM CHLORIDE 250 ML: 3 INJECTION, SOLUTION INTRAVENOUS at 19:23

## 2023-05-17 RX ADMIN — LEVETIRACETAM 750 MG: 100 INJECTION, SOLUTION INTRAVENOUS at 08:10

## 2023-05-17 RX ADMIN — POTASSIUM CHLORIDE 40 MEQ: 29.8 INJECTION, SOLUTION INTRAVENOUS at 19:53

## 2023-05-17 RX ADMIN — SODIUM CHLORIDE 70 ML/HR: 3 INJECTION, SOLUTION INTRAVENOUS at 08:06

## 2023-05-17 RX ADMIN — SODIUM CHLORIDE 80 ML/HR: 3 INJECTION, SOLUTION INTRAVENOUS at 22:09

## 2023-05-17 RX ADMIN — NICOTINE 1 PATCH: 14 PATCH, EXTENDED RELEASE TRANSDERMAL at 08:12

## 2023-05-17 RX ADMIN — SODIUM CHLORIDE 250 ML: 3 INJECTION, SOLUTION INTRAVENOUS at 01:14

## 2023-05-17 RX ADMIN — CHLORHEXIDINE GLUCONATE 0.12% ORAL RINSE 15 ML: 1.2 LIQUID ORAL at 20:01

## 2023-05-17 RX ADMIN — FENTANYL CITRATE 25 MCG: 50 INJECTION, SOLUTION INTRAMUSCULAR; INTRAVENOUS at 01:48

## 2023-05-17 RX ADMIN — ASPIRIN 81 MG CHEWABLE TABLET 81 MG: 81 TABLET CHEWABLE at 09:57

## 2023-05-17 NOTE — PROGRESS NOTES
[address] [facility]  Progress Note: Critical Care  Name: Olivia Castaneda 40 y o  male I MRN: [id number]  Unit/Bed#: ICU 09 I Date of Admission: 5/15/2023   Date of Service: 5/17/2023 I Hospital Day: 2    Assessment/Plan     Neuro:  • Diagnosis: Acute ischemic left MCA stroke, status post thrombectomy 5/15 with TICI 3 revascularization, CT head noted cerebral edema with evidence of brain compression and mass effect in left MCA territory  ? -150  ? MAP greater than 85  ? A1c 4 8 LDL 18  Statin stopped due to being extremely low, LDL 18   ? MRI 5/17 1  Recent large left MCA territory infarct with associated edema and a few foci of petechial hemorrhage  There is regional mass effect without midline shift  2   Cannot reliably evaluate the infarcted brain for structural abnormality  No structural abnormality is identified in the remainder brain  Normal hippocampal formations  ? PT/OT/speech therapy  ? Hypercoagulable work-up  ? MTHFR, BELEM, ANCA, dsDNA, RF  ? Thrombosis panel  ? Patient received J&J COVID-vaccine, however patient received vaccine at least 1 year ago  ? Transesophageal echocardiogram  ? Neurology consulted appreciate recommendations  ? Patient maintained on 3% hypertonic saline for treatment of brain edema  ? Currently 3% HTS  ? Follow-up every 6 hours on serum sodium levels maintaining 145-155  ? Follow-up every 6 hours on serum osmolarity levels maintaining 305-310  ? ASA 81mg daily  • Diagnosis: Seizure  ? Patient noted to have seizure associated with eye deviation to the right morning of 5/16, received 2 mg of Ativan  ? Loaded with 4 g of Keppra 5/16, patient is now maintained on Keppra 750 mg twice daily  ? Ativan as needed for seizures  ? Continue video EEG monitoring  • Repeat CTH if > 2 pt drop in GCS in one hour  • Sleep/wake cycle regulation  • CAM-ICU BID  • Neuro checks  • Sedation/analgesia      CV:   • No acute issues  ?  Maintain -150 and MAP greater than 85 given recent thrombectomy  ? Labetalol 10 mg every 4 hours as needed     Pulm:  • No acute issues  • Continue pulmonary hygiene  Incentive spirometer q1h while awake, encourage coughing and deep breathing  Upright positioning  • Suction as needed and closely monitor secretions  Maintain HOB >30 degrees  Q4h oral care with chlorhexidine BID     GI:   • No acute issues  • Stress ulcer prophylaxis: NA  • Bowel regimen:  NA     :   • No acute issues, corbin in place 2/2 procedure, will attempt to d/c  • I/O monitoring  • Continue to follow renal function tests     F/E/N:   • Maintenance fluids: None  • Diuresis plan: None  • Replete electrolytes with as needed to maintain K >4 0, Mag >2 0, Phos >3 0  • Nutrition: Diet discontinued 5/16 due to ongoing seizure activity, patient n p o  at this time will consider initiating tube feeds      Heme/Onc:   • Hypercoagulable state work up  • Consider transfusion for hemoglobin <7 0  • VTE prophylaxis: SCD's to BLE     Endo/Rheum:   • No acute issues  • Last hemoglobin A1c 4 8% on 5/16/2023     ID:   • Diagnosis: Covid Positive 5/15  ? Based on additional information received from the patient's sister on 5/16 this is unlikely to be a current infection and is merrily a continued positive test in the setting of covid infection reported in 2/2023, however suspect new infection given hypoxia and stroke as covid would cause a hypercoagulable state  • Continue to monitor fever and WBC curve      Disposition: Critical care    ICU Core Measures     A: Assess, Prevent, and Manage Pain · Has pain been assessed? NA  · Need for changes to pain regimen? NA   B: Both SAT/SAT  · N/A   C: Choice of Sedation · RASS Goal: 0 Alert and Calm or N/A patient not on sedation  · Need for changes to sedation or analgesia regimen? No   D: Delirium · CAM-ICU: Unable to perform secondary to aphasia   E: Early Mobility  · Plan for early mobility?  Yes   F: Family Engagement · Plan for family engagement today? Yes       Review of Invasive Devices: Pablo Plan: Voiding trial after improvement in ambulation   Central access plan: Medications requiring central line      Prophylaxis:  VTE Contraindicated secondary to: to be initiated   Stress Ulcer  not ordered        Subjective   HPI/24hr events:   HPI/24hr events: 45-year-old right-handed male without any known prior medical history who presented as a stroke alert 5/15/2023 at 6:40 PM with a last known well of 8:30 PM 5/14 when he had spoken with someone over the phone  Per documentation of a full check was called when the patient did not show up for a scheduled meeting with a friend and did not answer his phone  Upon arrival by EMS for the welfare check the patient was found to be covered in feces and was noted to have right upper extremity flaccid paralysis as well as right-sided neglect, right facial droop, and expressive aphasia  Initial NIH on presentation 16  CT head noted a left MCA infarct, CTA head and neck showed a partial helen left M1 occlusion  Patient was not a TNK candidate given that he was outside of the window  Patient subsequently underwent a CTP which demonstrated that the patient was a candidate for thrombectomy and the patient was a endovascular alert was called, patient is currently underwent mechanical thrombectomy TICI 3 revascularization  Pathology is suspected to be a proximal embolism given findings  Patient subsequently noted to be COVID-positive       Repeat CT head morning of 5/16 continued edema and sulcal effacement on the left similar to previous study, improvement of contrast staining versus possible hemorrhage  Subsequently after initial exam, the AM of 5/16 pt note to have a seizure that consisted on eye deviation to the R followed by postictal period  STAT CTH obtained no acute change, evolving known L MCA infarct  EEG started no seizures captured      Review of Systems   Unable to perform ROS: Aphasia Objective                            Vitals I/O      Most Recent Min/Max in 24hrs   Temp 98 1 °F (36 7 °C) Temp  Min: 98 1 °F (36 7 °C)  Max: 99 °F (37 2 °C)   Pulse 58 Pulse  Min: 58  Max: 90   Resp 16 Resp  Min: 15  Max: 22   /86 BP  Min: 128/65  Max: 164/82   O2 Sat 98 % SpO2  Min: 96 %  Max: 100 %      Intake/Output Summary (Last 24 hours) at 5/17/2023 0738  Last data filed at 5/17/2023 0533  Gross per 24 hour   Intake 2637 ml   Output 2375 ml   Net 262 ml         Diet NPO     Invasive Monitoring Physical exam   Arterial Line  [place] BP 98/68  Arterial Line BP  Min: 98/68  Max: 126/84   MAP 80 mmHg  Arterial Line MAP (mmHg)  Min: 80 mmHg  Max: 102 mmHg    Physical Exam  Vitals and nursing note reviewed  Constitutional:       General: He is not in acute distress  Appearance: He is well-developed  He is not diaphoretic  HENT:      Head: Normocephalic and atraumatic  Nose: Nose normal    Eyes:      General: No scleral icterus  Right eye: No discharge  Left eye: No discharge  Conjunctiva/sclera: Conjunctivae normal    Cardiovascular:      Rate and Rhythm: Normal rate and regular rhythm  Heart sounds: Normal heart sounds  No murmur heard  No friction rub  No gallop  Pulmonary:      Effort: Pulmonary effort is normal  No respiratory distress  Breath sounds: Normal breath sounds  No wheezing  Chest:      Chest wall: No tenderness  Abdominal:      General: Bowel sounds are normal  There is no distension  Palpations: Abdomen is soft  Tenderness: There is no abdominal tenderness  Skin:     General: Skin is warm and dry  Neurological:      Mental Status: He is alert  Comments: Aphasic  spontaneously moving all extremities  Diagnostic Studies    EKG: No new ekg  Imaging: I have personally reviewed pertinent reports     and I have personally reviewed pertinent films in PACS     Medications:  Scheduled PRN   aspirin, 81 mg, Daily  chlorhexidine, 15 mL, Q12H BRANDON  fentanyl citrate (PF), ,   levETIRAcetam, 750 mg, Q12H [address]  nicotine, 1 patch, Daily      labetalol, 10 mg, Q4H PRN  ondansetron, 4 mg, Once PRN       Continuous    sodium chloride, 70 mL/hr, Last Rate: 70 mL/hr (05/17/23 0113)         Labs:    CBC    Recent Labs     05/16/23  0557 05/17/23  0523   WBC 5 50 5 47   HGB 11 5* 10 7*   HCT 32 0* 31 3*    149     BMP    Recent Labs     05/16/23  2351 05/17/23  0523   SODIUM 146 146   K 3 9 3 6   * 121*   CO2 24 25   AGAP 2* 0*   BUN 6 6   CREATININE 0 54* 0 54*   CALCIUM 8 3 8 4       Coags    Recent Labs     05/15/23  1853   INR 0 89   PTT 22*        Additional Electrolytes  Recent Labs     05/15/23  2352 05/16/23  0557 05/17/23  0523   MG 1 8 2 0 2 2   PHOS  --  3 0 2 4*   CAIONIZED 1 12  --   --           Blood Gas    No recent results  No recent results LFTs  Recent Labs     05/16/23  0557 05/17/23  0523   ALT 26 26   AST 37 38   ALKPHOS 47 44*   ALB 2 9* 2 7*   TBILI 0 45 0 30       Infectious  No recent results  Glucose  Recent Labs     05/16/23  1240 05/16/23  1823 05/16/23  2351 05/17/23  0523   GLUC 105 110 108 98                   Leif Ferrer,

## 2023-05-17 NOTE — ASSESSMENT & PLAN NOTE
Episode of unresponsiveness noted around 5/16 0:82 AM lasting uncertain duration, given 2 mg Ativan and taken emergently to CT scan revealing no hemorrhage, stable mass effect likely secondary to edema  Subsequently loaded with 4000 mg Keppra, initiated on 750 mg BID   Additional episode requiring 2 mg Ativan at approximately 10:00 AM        Plan:  · Continue on Keppra 750 mg BID  · Monitor clinically  · Seizure precautions  · Rest of care per primary team

## 2023-05-17 NOTE — PHYSICAL THERAPY NOTE
Physical Therapy Evaluation     Patient's Name: Jeff Linares    Admitting Diagnosis  Stroke [facility]) [I63 9]  Acute ischemic left MCA stroke ([facility] [address] ) [I63 512]    Problem List  Patient Active Problem List   Diagnosis    Bilateral low back pain without sciatica    Viral infection, unspecified    Positive self-administered antigen test for COVID-19    L MCA stroke w/ LM1 partial occlusion    Seizure ([facility] [address] )    COVID       Past Medical History  History reviewed  No pertinent past medical history  Past Surgical History  Past Surgical History:   Procedure Laterality Date    IR STROKE ALERT  5/15/2023        05/17/23 5631   PT Last Visit   PT Visit Date 05/17/23   Note Type   Note type Evaluation   Pain Assessment   Pain Assessment Tool FLACC   Pain Rating: FLACC (Rest) - Face 0   Pain Rating: FLACC (Rest) - Legs 0   Pain Rating: FLACC (Rest) - Activity 0   Pain Rating: FLACC (Rest) - Cry 0   Pain Rating: FLACC (Rest) - Consolability 0   Score: FLACC (Rest) 0   Pain Rating: FLACC (Activity) - Face 0   Pain Rating: FLACC (Activity) - Legs 0   Pain Rating: FLACC (Activity) - Activity 0   Pain Rating: FLACC (Activity) - Cry 0   Pain Rating: FLACC (Activity) - Consolability 0   Score: FLACC (Activity) 0   Restrictions/Precautions   Weight Bearing Precautions Per Order No   Braces or Orthoses   (none)   Other Precautions Fall Risk;Telemetry;Multiple lines; Seizure; Bed Alarm; Chair Alarm;Cognitive; Restraints  (EEG monitoring, posey, b/l wrist restraints, COVID + however not on precautions)   Home Living   Type of [address] [place] [address] level  (0 honey)   Home Equipment   (denies)   Additional Comments Patient is globally aphasic and not able to report accurate information  Per sister present during evaluation patient resided alone in an apartment ([address]) with his 2 dogs  At his baseline he was I with mobility (no use of AD), ADLs, and iADLS   +  and 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  Prior Function   Level of [place] Independent with ADLs; Independent with IADLS; Independent with functional mobility   Lives With Alone   Receives Help From Family  (sister reports she and father live nearby)   IADLs Independent with driving; Independent with meal prep; Independent with medication management   Vocational Full time employment   General   Additional Pertinent History 40year old male admitted to -B on 5/15/2023 after being found down at home (last known well the night prior when spoken to on the phone, patient did not come to meeting or answer phone, welfare check performed and patient found with right sided neglect and covered in feces)  Diagnosis includes L MCA CVA  Patient is s/p thrombectomy  Family/Caregiver Present No   Cognition   Overall Cognitive Status Impaired   Attention Difficulty attending to directions   Orientation Level Oriented to person   Memory Decreased recall of precautions;Decreased short term memory;Decreased long term memory;Decreased recall of biographical information;Decreased recall of recent events; Unable to assess   Following Commands Unable to follow one step commands   Comments patient with global aphasia, cooperative, unable to state his name but did say \"yes\" to correct name with choices, did correctly state \"2\" when 2 fingers were held, did not initiate thumbs up when asked, unable to idenify color of blue glove   Subjective   Subjective \"yes\"   RUE Assessment   RUE Assessment   (2/5 grossly; demontrates purposeful movement- bringing hand to face to scratch nose)   LUE Assessment   LUE Assessment WFL   RLE Assessment   RLE Assessment X  (3+/5 grossly; limited assessment secondary to impaired command following)   LLE Assessment   LLE Assessment WFL   Bed Mobility   Supine to Sit 4  Minimal assistance   Additional items Assist x 1; Increased time required;LE management;HOB elevated   Sit to Supine Unable to assess   Additional Comments fair- static " sitting balance EOB, post eval patient in chair with alarm active and restraints donned   Transfers   Sit to Stand 4  Minimal assistance   Additional items Assist x 2; Increased time required;Verbal cues   Stand to Sit 4  Minimal assistance   Additional items Assist x 2; Increased time required;Verbal cues   Additional Comments HHA for transfer   Ambulation/Elevation   Gait pattern Excessively slow; Short stride;Decreased foot clearance;R Hemiparesis   Gait Assistance 4  Minimal assist   Additional items Assist x 2   Assistive Device Other (Comment)  (b/l HHA)   Distance 2 feet (bed to chair)   Balance   Static Sitting Fair   Static Standing Poor +   Ambulatory Poor   Endurance Deficit   Endurance Deficit Yes   Endurance Deficit Description R sided hemiparesis   Activity Tolerance   Activity Tolerance Patient limited by fatigue; Other (Comment)  (aphasia)   Medical Staff Made Aware This high complexity evaluation was performed with an occupational therapist due to the patient's co-morbidities, clinically unstable presentation, and present impairments which are a regression from the patient's baseline  Nurse Made Aware judie to see per RN Anastasia   Assessment   Prognosis Fair   Problem List Decreased strength;Decreased endurance; Impaired balance;Decreased mobility; Impaired hearing;Decreased safety awareness; Impaired judgement;Decreased cognition   Assessment PT completed evaluation of 40year old male admitted to \[facility]\"" on 5/15/2023 after being found down at home (last known well the night prior when spoken to on the phone, patient did not come to meeting or answer phone, welfare check performed and patient found with right sided neglect and covered in feces)  Diagnosis includes L MCA CVA  Patient is s/p thrombectomy  Patient also with seizure this admission and did test + for COVID (however patient is without symptoms, did have COIVD 02/2023, ? 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  Current impairments include R sided hemiparesis, R sided in attention, global aphasia, decreased balance and activity tolerance  During PT evaluation patient's participation was limited by reduced ability to follow verbal commands  He required min-AX1 for supine-->sit transfer and demonstrated fair- static sitting balance  He required min-AX2 for sit<-->stand transfers and short distance ambulation  With hand held assist patient ambulated 2 feet (bed to chair) and was able to initiate steps-exaggerated step with  R LE  Post evaluation patient seated OOB in chair  PT d/c recommendation is for rehab (PMR consult)  Patient will continue to benefit from continued skilled PT this admission to achieve maximal function and safety  Goals   Patient Goals not able to report secondary to aphasia   LTG Expiration Date 05/31/23   Long Term Goal #1 1) Perform bed mobility S level to participate in frequent repositioning and improve skin integrity; 2) Perform functional transfers S level to promote I with toileting and OOB mobility; 3) Ambulate 100 feet min-AX1 w/ least restrictive device to participate in household mobility; 4) Improve R LE strength by 1/2 grade in order to improve efficiency of tranfers; 5) Improve balance by 1 grade to reduce risk for falls   PT Treatment Day 0   Plan   Treatment/Interventions Functional transfer training;LE strengthening/ROM; Therapeutic exercise; Endurance training;Patient/family training;Equipment eval/education; Bed mobility;Gait training;OT;Spoke to nursing   PT Frequency 3-5x/wk   Recommendation   PT Discharge Recommendation Post acute rehabilitation services  (PMR consult)   AM-PAC Basic Mobility Inpatient   Turning in Flat Bed Without Bedrails 3   Lying on Back to Sitting on Edge of Flat Bed Without Bedrails 3   Moving Bed to Chair 1   Standing Up From Chair Using Arms 1   Walk in Room 1   Climb 3-5 Stairs With Railing 1   Basic Mobility Inpatient Raw Score 10   Turning Head Towards Sound 2   Follow Simple Instructions 2   Low Function Basic Mobility Raw Score  14   Low Function Basic Mobility Standardized Score  22 01   Highest Level Of Mobility   JH-HLM Goal 4: Move to chair/commode   JH-HLM Achieved 4: Move to chair/commode     The patient's AM-PAC Basic Mobility Inpatient Standardized Score is less than 42 9, suggesting this patient may benefit from discharge to post-acute rehabilitation services  Please also refer to the recommendation of the Physical Therapist for safe discharge planning      DELMIS ROSSI PT, DPT

## 2023-05-17 NOTE — ASSESSMENT & PLAN NOTE
Neurology following  · Noted to have seizure like activity 5/16  · Exam slightly worse after seizure, CT head without new findings / hemorrhage  · VEEG ongoing  · Loaded with keppra, now on 750mg BID

## 2023-05-17 NOTE — OCCUPATIONAL THERAPY NOTE
Occupational Therapy Evaluation     Patient Name: Fran Coe  [id number] Date: 5/17/2023  Problem List  Principal Problem:    L MCA stroke w/ LM1 partial occlusion  Active Problems:    Seizure ([address] )    COVID    Past Medical History  History reviewed  No pertinent past medical history  Past Surgical History  Past Surgical History:   Procedure Laterality Date    IR STROKE ALERT  5/15/2023             05/17/23 0921   OT Last Visit   OT Visit Date 05/17/23   Note Type   Note type Evaluation   Pain Assessment   Pain Assessment Tool FLACC   Pain Rating: FLACC (Rest) - Face 0   Pain Rating: FLACC (Rest) - Legs 0   Pain Rating: FLACC (Rest) - Activity 0   Pain Rating: FLACC (Rest) - Cry 0   Pain Rating: FLACC (Rest) - Consolability 0   Score: FLACC (Rest) 0   Pain Rating: FLACC (Activity) - Face 0   Pain Rating: FLACC (Activity) - Legs 0   Pain Rating: FLACC (Activity) - Activity 0   Pain Rating: FLACC (Activity) - Cry 0   Pain Rating: FLACC (Activity) - Consolability 0   Score: FLACC (Activity) 0   Restrictions/Precautions   Weight Bearing Precautions Per Order No   Other Precautions Cognitive; Impulsive; Chair Alarm; Bed Alarm; Restraints; Seizure;Multiple lines;Telemetry; Fall Risk;Pain;Visual impairment  (vEEG; B/L wrist restraints;posey belt; COVID (+) but not on precautions)   Home Living   Type of [facility] One level;Performs ADLs on one level; Able to live on main level with bedroom/bathroom  (0 HONEY)   Home Equipment Other (Comment)  (denies any)   Additional Comments Pt is globally aphasic; hx obtained from chart review and pt's sister who is present in room; reports living in an apt w/ 0 HONEY; alone  Prior Function   Level of [place] Independent with ADLs; Independent with functional mobility; Independent with IADLS   Lives With Alone   Receives Help From Family; Other (Comment)  (sister report her and her father live nearby)   IADLs Independent with driving; Independent with meal prep;Independent with medication management   Vocational Full time employment   Comments (+)    Lifestyle   Autonomy I w/ ADLS/IADLS, transfers and functional mobility pTA   Reciprocal Relationships Pt lives alone; supportive sister and father   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; coordinates food services   Intrinsic Gratification Unable to report; will continue to assess   General   Family/Caregiver Present Yes   Additional General Comments pt's sister present   ADL   Eating Assistance Unable to assess   Grooming Assistance 3  Moderate Assistance   UB Bathing Assistance 3  Moderate Assistance   LB [address] 3  Moderate Assistance   UB Dressing Assistance 3  Moderate Assistance   LB Dressing Assistance 2  Maximal [address]  3  Moderate Assistance   Functional Assistance 3  Moderate Assistance   Functional Deficit Steadying;Verbal cueing; Increased time to complete;Supervision/safety   Additional Comments Pt requires increased assist partially due to global aphasia and decreased 1 step command following   Bed Mobility   Supine to Sit 4  Minimal assistance   Additional items Assist x 1; Increased time required;Verbal cues;LE management;HOB elevated   Sit to Supine Unable to assess   Additional Comments Pt sat EOB w/ CGA for sitting balance/trunk control   Transfers   Sit to Stand 4  Minimal assistance   Additional items Increased time required;Assist x 2;Verbal cues   Stand to Sit 4  Minimal assistance   Additional items Assist x 2; Increased time required;Verbal cues   Additional Comments HHA used   Functional Mobility   Functional Mobility 4  Minimal assistance   Additional Comments Pt took few small steps from EOB to chair w/ Min A x2   Additional items Hand hold assistance   Balance   Static Sitting Fair   Dynamic Sitting Fair -   Static Standing Poor +   Dynamic Standing Poor   Ambulatory Poor   Activity Tolerance   Activity Tolerance Patient limited by fatigue; Other (Comment)  (global aphasia)   Medical Staff Made Aware PTKiley   Nurse Made Aware yes, Anastasia   RUE Assessment   RUE Assessment X  (grossly 2/5; demonstrates purposeful mvmt to use R UE to bring to face to scratch nose)   LUE Assessment   LUE Assessment X  (grossly 4-/5)   Hand Function   Gross Motor Coordination Impaired   Fine Motor Coordination Impaired   Sensation   Additional Comments pt reports no deficits but is an inconsistent historian 2/2 global aphasia   Vision-Basic Assessment   Current Vision Wears contacts  (& has glasses)   Vision - Complex Assessment   Additional Comments Unable to formally assess 2/2 poor command following and global aphasia; vision appears intact; able to correct identify # of fingers therapist was holding up   Psychosocial   Psychosocial (WDL) WDL   Cognition   Overall Cognitive Status Impaired   Arousal/Participation Responsive; Cooperative   Attention Difficulty attending to directions   Orientation Level Oriented to person;Disoriented to place; Disoriented to time;Disoriented to situation   Memory Decreased recall of precautions;Decreased recall of recent events;Decreased short term memory;Decreased recall of biographical information; Unable to assess   Following Commands Follows one step commands inconsistently   Comments Pt is cooperative; lethargic  Presents w/ global aphasia; follows close to 25% of 1 step commands w/ increased time  Assessment   Limitation Decreased ADL status; Decreased UE strength;Decreased UE ROM; Decreased Safe judgement during ADL;Decreased cognition;Decreased endurance;Visual deficit; Decreased sensation;Decreased fine motor control;Decreased self-care trans;Decreased high-level ADLs; Non-func R UE   Prognosis Fair   Assessment Pt is a 39 y/o male seen for OT eval s/p adm to B 5/15 w/ R facial droop and R sided weakness  CTH revealed L MCA CVA and CTA revealed L M1 occlusion   PT is s/p thrombectomy on 5/15; then hospital course was complicated by seizures  Pt is dx'd w/ L MCA CVA stroke w/ LM1 partial occlusion  Pt  has no past medical history on file  Pt with active OT orders and up with assistance  orders  Pt lives with alone in an apt w/ 0 HONEY  Pt was I w/  ADLS and IADLS, drove, & required no use of DME PTA  Pt is currently demonstrating the following occupational deficits: Mod A UB ADLS, Mod A LB ADLS, Min A bed mobility, Min A x2 transfers and functional mobility w/ HHA  These deficits that are impacting pt's baseline areas of occupation are a result of the following impairments: endurance, activity tolerance, functional mobility, forward functional reach, balance, trunk control, functional standing tolerance, unsupportive home environment, decreased I w/ ADLS/IADLS, strength, ROM, aphasia, visual deficits, R sided hemiplegia, sensation deficits, cognitive impairments, decreased safety awareness, decreased insight into deficits, slurred speech and impaired fine motor skills  The following Occupational Performance Areas to address include: eating, grooming, bathing/shower, toilet hygiene, dressing, medication management, socialization, health maintenance, functional mobility, community mobility, clothing management, cleaning, meal prep, money management, household maintenance and job performance/volunteering  Recommend inpatient rehab  upon D/C  Pt to continue to benefit from acute immediate OT services to address the following goals 3-5x/week to  w/in 10-14 days:   Goals   Patient Goals unable to report 2/2 global aphasia   LTG Time Frame 10-14   Long Term Goal #1 see below listed goals   Plan   Treatment Interventions ADL retraining;Visual perceptual retraining;Functional transfer training;UE strengthening/ROM; Endurance training;Cognitive reorientation;Patient/family training;Equipment evaluation/education; Neuromuscular reeducation; Fine motor coordination activities; Compensatory technique education;Continued evaluation; Energy conservation; Activityengagement   Goal Expiration Date 05/31/23   OT Frequency 3-5x/wk   Recommendation   Recommendation (S)  Physiatry Consult   OT Discharge Recommendation Post acute rehabilitation services   Additional Comments  The patient's raw score on the AM-PAC Daily Activity Inpatient Short Form is 12  A raw score of less than 19 suggests the patient may benefit from discharge to post-acute rehabilitation services  Please refer to the recommendation of the Occupational Therapist for safe discharge planning  Additional Comments 2 Pt seen as a co-session due to the patient's co-morbidities, clinically unstable presentation, and present impairments which are a regression from the patient's baseline  AM-[facility] Daily Activity Inpatient   Lower Body Dressing 2   Bathing 2   Toileting 2   Upper Body Dressing 2   Grooming 2   Eating 2   Daily Activity Raw Score 12   Daily Activity Standardized Score (Calc for Raw Score >=11) 30 6   AM-[facility] Applied Cognition Inpatient   Following a Speech/Presentation 1   Understanding Ordinary Conversation 2   Taking Medications 1   Remembering Where Things Are Placed or Put Away 1   Remembering List of 4-5 Errands 1   Taking Care of Complicated Tasks 1   Applied Cognition Raw Score 7   Applied Cognition Standardized Score 15 17   Barthel Index   Feeding 5   Bathing 0   Grooming Score 0   Dressing Score 0   Bladder Score 5   Bowels Score 5   Toilet Use Score 5   Transfers (Bed/Chair) Score 5   Mobility (Level Surface) Score 0   Stairs Score 0   Barthel Index Score 25   End of Consult   Education Provided Yes;Family or social support of family present for education by provider   Patient Position at End of Consult Bedside chair;Bed/Chair alarm activated; All needs within reach   Nurse Communication Nurse aware of consult     GOALS  1) Pt will improve activity tolerance to G for 30 min txment sessions  2) Pt will complete ADLs/self care w/ mod I w/ use of AD/DME as needed to increase independence in functional tasks  3) Pt will complete toileting w/ mod I w/ G hygiene/thoroughness using DME PRN  4) Pt will improve fx'l tfers on/off all surfaces using DME PRN w/ G balance/safety including toileting w/ mod I  5) Pt will improve fx'l mobility during I/ADl/leisure tasks using DME PRN w/ g balance/safety w/ mod I  6) Pt will be attentive 100% of the time during ongoing cognitive assessment w/ G participation to A w/ safe d/c planning/recommendations  7) Pt will demonstrate G carryover of pt/caregiver education and training as appropriate w/o cues w/ G tolerance to increase safety during functional tasks  8) Pt will improve UB fx'l use/ROM to [facility] and strength 1/2 MMT via AROM/AAROM/PROM in all planes as tolerated s/p skilled education of HEP w/o cues for carryover  9) Pt will engage in ongoing  assessments, screens, and activities t/o fx'l I/ADL/leisure tasks w/ G participation to A w/ adaptation and accomodations or rule out visual perceptual impairments  10) Pt will follow 100% simple one step verbal commands and be A/Ox4 consistently t/o use of external environmental cues to increase awareness during functional tasks    Angelo Hernandez MS, OTR/L

## 2023-05-17 NOTE — PLAN OF CARE
Problem: MOBILITY - ADULT  Goal: Maintain or return to baseline ADL function  Description: INTERVENTIONS:  -  Assess patient's ability to carry out ADLs; assess patient's baseline for ADL function and identify physical deficits which impact ability to perform ADLs (bathing, care of mouth/teeth, toileting, grooming, dressing, etc )  - Assess/evaluate cause of self-care deficits   - Assess range of motion  - Assess patient's mobility; develop plan if impaired  - Assess patient's need for assistive devices and provide as appropriate  - Encourage maximum independence but intervene and supervise when necessary  - Involve family in performance of ADLs  - Assess for home care needs following discharge   - Consider OT consult to assist with ADL evaluation and planning for discharge  - Provide patient education as appropriate  Outcome: Progressing  Goal: Maintains/Returns to pre admission functional level  Description: INTERVENTIONS:  - Perform BMAT or MOVE assessment daily    - Set and communicate daily mobility goal to care team and patient/family/caregiver     - Collaborate with rehabilitation services on mobility goals if consulted  - Out of bed for toileting  - Record patient progress and toleration of activity level   Outcome: Progressing     Problem: Prexisting or High Potential for Compromised Skin Integrity  Goal: Skin integrity is maintained or improved  Description: INTERVENTIONS:  - Identify patients at risk for skin breakdown  - Assess and monitor skin integrity  - Assess and monitor nutrition and hydration status  - Monitor labs   - Assess for incontinence   - Turn and reposition patient  - Assist with mobility/ambulation  - Relieve pressure over bony prominences  - Avoid friction and shearing  - Provide appropriate hygiene as needed including keeping skin clean and dry  - Evaluate need for skin moisturizer/barrier cream  - Collaborate with interdisciplinary team   - Patient/family teaching  - Consider wound care consult   Outcome: Progressing     Problem: Neurological Deficit  Goal: Neurological status is stable or improving  Description: Interventions:  - Monitor and assess patient's level of consciousness, motor function, sensory function, and level of assistance needed for ADLs  - Monitor and report changes from baseline  Collaborate with interdisciplinary team to initiate plan and implement interventions as ordered  - Provide and maintain a safe environment  - Consider seizure precautions  - Consider fall precautions  - Consider aspiration precautions  - Consider bleeding precautions  Outcome: Progressing     Problem: Activity Intolerance/Impaired Mobility  Goal: Mobility/activity is maintained at optimum level for patient  Description: Interventions:  - Assess and monitor patient  barriers to mobility and need for assistive/adaptive devices  - Assess patient's emotional response to limitations  - Collaborate with interdisciplinary team and initiate plans and interventions as ordered  - Encourage independent activity per ability   - Maintain proper body alignment  - Perform active/passive rom as tolerated/ordered  - Plan activities to conserve energy   - Turn patient as appropriate  Outcome: Progressing     Problem: Communication Impairment  Goal: Ability to express needs and understand communication  Description: Assess patient's communication skills and ability to understand information  Patient will demonstrate use of effective communication techniques, alternative methods of communication and understanding even if not able to speak  - Encourage communication and provide alternate methods of communication as needed  - Collaborate with case management/ for discharge needs  - Include patient/family/caregiver in decisions related to communication    Outcome: Progressing     Problem: Potential for Aspiration  Goal: Non-ventilated patient's risk of aspiration is minimized  Description: Assess and monitor vital signs, respiratory status, and labs (WBC)  Monitor for signs of aspiration (tachypnea, cough, rales, wheezing, cyanosis, fever)  - Assess and monitor patient's ability to swallow  - Place patient up in chair to eat if possible  - HOB up at 90 degrees to eat if unable to get patient up into chair   - Supervise patient during oral intake  - Instruct patient/ family to take small bites  - Instruct patient/ family to take small single sips when taking liquids  - Follow patient-specific strategies generated by speech pathologist   Outcome: Progressing  Goal: Ventilated patient's risk of aspiration is minimized  Description: Assess and monitor vital signs, respiratory status, airway cuff pressure, and labs (WBC)  Monitor for signs of aspiration (tachypnea, cough, rales, wheezing, cyanosis, fever)  - Elevate head of bed 30 degrees if patient has tube feeding   - Monitor tube feeding  Outcome: Progressing     Problem: Nutrition  Goal: Nutrition/Hydration status is improving  Description: Monitor and assess patient's nutrition/hydration status for malnutrition (ex- brittle hair, bruises, dry skin, pale skin and conjunctiva, muscle wasting, smooth red tongue, and disorientation)  Collaborate with interdisciplinary team and initiate plan and interventions as ordered  Monitor patient's weight and dietary intake as ordered or per policy  Utilize nutrition screening tool and intervene per policy  Determine patient's food preferences and provide high-protein, high-caloric foods as appropriate  - Assist patient with eating   - Allow adequate time for meals   - Encourage patient to take dietary supplement as ordered  - Collaborate with clinical nutritionist   - Include patient/family/caregiver in decisions related to nutrition    Outcome: Progressing     Problem: SAFETY,RESTRAINT: NV/NON-SELF DESTRUCTIVE BEHAVIOR  Goal: Remains free of harm/injury (restraint for non violent/non self-detsructive behavior)  Description: INTERVENTIONS:  - Instruct patient/family regarding restraint use   - Assess and monitor physiologic and psychological status   - Provide interventions and comfort measures to meet assessed patient needs   - Identify and implement measures to help patient regain control  - Assess readiness for release of restraint   Outcome: Progressing  Goal: Returns to optimal restraint-free functioning  Description: INTERVENTIONS:  - Assess the patient's behavior and symptoms that indicate continued need for restraint  - Identify and implement measures to help patient regain control  - Assess readiness for release of restraint   Outcome: Progressing

## 2023-05-17 NOTE — ASSESSMENT & PLAN NOTE
PPD#2 - s/p TICI 3 revascularization of a left M1 occlusion (Dr Pool Quintana, 5/15/2023)  · Found down at home on 5/15 with NIHSS 16, noted to have acute left M1 occlusion with early ischemic changes  · COVID+ 5/15  · Question if prior infection as patient had Covid 2/2023 per his sister  Imaging:  · MRI brain seizure w/wo contrast 5/17: Recent large left MCA territory infarct with associated edema and a few foci of petechial hemorrhage  There is regional mass effect without midline shift  Cannot reliably evaluate the infarcted brain for structural abnormality  No structural abnormality is identified in the remainder brain  Normal hippocampal formations    Plan:  • Continue to closely monitor neuro exam   o Frequent neuro checks per primary team   o Repeat STAT CTH with any acute decline in GCS > 2pts or more in 1hr   • Maintain normotensive BP goals, SBP < 160, MAP > 65   • No acute neurosurgical intervention indicated at this time   o Case and imaging reviewed this am on rounds     o Large stroke burden noted on MRI but without significant brain compression   • Continue aggressive medical management   o Continue on [facility] watch, stroke day ~3/5   o 3% HTS 70cc/hr  - Maintain Na > 145, osm 310-315   o Plan for repeat [address] on Friday   • Continue management on stroke pathway   o Neurology following, appreciate neuro management of stroke and workup   o Continue on daily ASA   o Follow-up hypercoagulable workup   • Continue seizure management per primary team and neurology   o vEEG in place   - Pending final read, appears negative for seizures   o Continue AED regimen   • DVT ppx: SCDs, okay for dvt ppx from a nsgy standpoint   • pain control per primary team   • Medical management per primary team   • PT/OT   • Cleared for a modified diet from speech   • Cleared for regular diet with thin liquids by speech therapy  • Social work following for assistance with dispo once medically cleared     Neurosurgery will continue to follow  Please call with questions or concerns

## 2023-05-17 NOTE — ASSESSMENT & PLAN NOTE
· 40 y o  right handed male w/ no pertinent hx who presented as a stroke alert on 05/15/23 at 6:40 PM  LKW 8:30PM last night when he spoke with someone over the phone  Initial presenting deficits R facial droop, RUE > RLE weakness  No thinners use  · Per EMS/ED, initial BP: 149/88  Pulse 65  ECG NSR  Glucose 120  NIHSS 16 (see below)  · CTH showed L MCA stroke (ASPECT 8), CTA h/n showed partial LM1 occlusion  Not found to be a TNK candidate due to being out of time window, large stroke seen on CTH  Given clinical/radiological findings, pt was then returned to the scanner for CTP which demonstrated that pt was a candidate for IR thrombectomy and endovascular alert was called  Work-up:  • A1c: 4 8  • Lipid panel: cholesterol 159, triglycerides 34, , LDL 18  • Thrombus panel: Antithrombin III low, Beta-2 glycoprotein antibodies normal, Cardiolipin antibody normal  • Homocysteine: Normal  • BELEM: Negative    [address] [place] [address] : Grossly stable recent large left MCA territory infarct with regional mass effect without midline shift  No acute hemorrhage  MRI Brain w/ contrast 23: Recent large left MCA territory infarct with associated edema and a few foci of petechial hemorrhage  There is regional mass effect without midline shift  Cannot reliably evaluate the infarcted brain for structural abnormality  No structural abnormality is identified in the remainder brain  Normal hippocampal formations  ALYSSA- EF 55%, large patent PFO with bidirectional flow with saline contrast injection  Left atrial appendate with wind sock appearance with no thrombus  Impression: 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 in a 41 yo active patient w/ no reported vascular risk factors to favor central etiology  Patient s/p thrombectomy with TICI 3 revascularization  Cannot rule out hypercoagulable state secondary to COVID infection   Found to have large PFO so will check Duplex b/l lower extremities     Plan: • Continue Aspirin 81 mg daily   • Continue DVT prof   • Continue Keppra 750 mg BID   • If pt mental status declines by GCS > 2 in 1 hour, obtain STAT [facility]  • Medical management as per critical care appreciated

## 2023-05-17 NOTE — PLAN OF CARE
Problem: PHYSICAL THERAPY ADULT  Goal: Performs mobility at highest level of function for planned discharge setting  See evaluation for individualized goals  Description: Treatment/Interventions: Functional transfer training, LE strengthening/ROM, Therapeutic exercise, Endurance training, Patient/family training, Equipment eval/education, Bed mobility, Gait training, OT, Spoke to nursing          See flowsheet documentation for full assessment, interventions and recommendations  Note: Prognosis: Fair  Problem List: Decreased strength, Decreased endurance, Impaired balance, Decreased mobility, Impaired hearing, Decreased safety awareness, Impaired judgement, Decreased cognition  Assessment: PT completed evaluation of 40year old male admitted to [facility] on 5/15/2023 after being found down at home (last known well the night prior when spoken to on the phone, patient did not come to meeting or answer phone, welfare check performed and patient found with right sided neglect and covered in feces)  Diagnosis includes L MCA CVA  Patient is s/p thrombectomy  Patient also with seizure this admission and did test + for COVID (however patient is without symptoms, did have COIVD 02/2023, ? 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  Current impairments include R sided hemiparesis, R sided in attention, global aphasia, decreased balance and activity tolerance   During PT evaluation patient's participation was limited by reduced ability to follow verbal commands  He required min-AX1 for supine-->sit transfer and demonstrated fair- static sitting balance  He required min-AX2 for sit<-->stand transfers and short distance ambulation  With hand held assist patient ambulated 2 feet (bed to chair) and was able to initiate steps-exaggerated step with  R LE  Post evaluation patient seated OOB in chair  PT d/c recommendation is for rehab (PMR consult)  Patient will continue to benefit from continued skilled PT this admission to achieve maximal function and safety  PT Discharge Recommendation: Post acute rehabilitation services (PMR consult)    See flowsheet documentation for full assessment

## 2023-05-17 NOTE — CONSULTS
PHYSICAL MEDICINE AND REHABILITATION CONSULT NOTE  Eboni Gamez 40 y o  male MRN: [id number]  Unit/Bed#: ICU 09 Encounter: [id number]    Requested by (Physician/Service): Lolis Sadler DO  Reason for Consultation:  Assessment of rehabilitation needs    Assessment:  Rehabilitation Diagnosis:   • Left MCA stroke  • Seizures  • Impaired mobility and self care  • Impaired cognition     Recommendations:  Rehabilitation Plan:  • Continue PT/OT (SLP) while on acute care  • Spoke with the patient and his father at bedside  He is demonstrating some movement in the right hemibody but motor planning is more of the issue than pure strength itself  He is able to speak and answer short questions  Patient would be a candidate for an acute inpatient rehabilitation program when medically appropriate  • Covid-19 Testing: [facility] inpatient rehabilitation units require testing within 48 hours of all potential admissions at this time  *Re-testing is NOT required for patients recovering from COVID-19 infection if isolation has been discontinued per CDC criteria  Medical Co-morbidities Plan:  Anemia  Hypoalbuminemia  Hypophosphatemia  COVID-19 positive however did have known infection in February 2023  Had J&J vaccine in 2020  Seizure-like activity loaded with Keppra plan for video EEG  Bowel plan: No documented BMs however short LOS  Bladder plan: Urinary catheter placed on 5/16  DVT ppx: No chemo PPx at this time      Thank you for this consultation  Do not hesitate to contact service with further questions  Dock Pay, DO  PM&R      I have spent a total time of 85 minutes on 05/17/23 in caring for this patient including Patient and family education, Counseling / Coordination of care, Documenting in the medical record, Reviewing / ordering tests, medicine, procedures  , and Communicating with other healthcare professionals   Spent time talking with patient as well as his father        History of "Present Illness:  Giullermo Ayala is a 40 y o  male with no significant past medical history patient presented to the [address] on 5/15 with right facial droop and right upper extremity greater than right lower extremity weakness  He was on the phone with a friend and was supposed to meet up with them however did not show up and would not answer his phone prompting the friend to call EMS  On arrival CTh was completed without contrast showing a left MCA stroke and a CTA was completed of the head and neck showing and left M1 partial occlusion however was not a TNK candidate due to timing  He underwent a left MCA thrombectomy with Dr Ranulfo Bagley on 5/15  Course further complicated by seizures  MRI was completed showing a large left MCA territory infarct involving the left frontoparietal, temporal lobes left insula left lateral putamen posterior left insular capsule and corona radiata with associated edema and regional mass effect without midline shift  Started on hypertonic saline infusion    Review of Systems: 10 point ROS negative except for what is noted in HPI    Function:  Prior level of function and living situation:  Patient was previously living alone however post discharge would likely have the ability to move in with his father  His father is  and he is single and there actually close friends  Previously independent with all activities and was running 7 miles per day walking 30,000 steps per day  Previously was a  and overall in good shape physically      Current level of function:  Physical Therapy: Pending  Occupational Therapy: Pending  Speech Therapy: Cleared by speech for all consistencies      Physical Exam:  /86 (BP Location: Right arm)   Pulse 58   Temp 98 1 °F (36 7 °C) (Oral)   Resp 16   Ht 5' 9\" (1 753 m)   Wt 74 6 kg (164 lb 7 4 oz)   SpO2 98%   BMI 24 29 kg/m²        Intake/Output Summary (Last 24 hours) at 5/17/2023 " [address] [facility] filed at 5/17/2023 0800  Gross per 24 hour   Intake 2439 ml   Output 2075 ml   Net 364 ml       Body mass index is 24 29 kg/m²  Physical Exam  Vitals reviewed  Constitutional:       General: He is not in acute distress  HENT:      Right Ear: External ear normal       Left Ear: External ear normal       Nose: Nose normal  No rhinorrhea  Mouth/Throat:      Mouth: Mucous membranes are moist       Pharynx: Oropharynx is clear  Eyes:      General: No scleral icterus  Cardiovascular:      Rate and Rhythm: Normal rate  Pulses: Normal pulses  Pulmonary:      Effort: Pulmonary effort is normal  No respiratory distress  Abdominal:      General: There is no distension  Palpations: Abdomen is soft  Musculoskeletal:      Right lower leg: No edema  Left lower leg: No edema  Skin:     General: Skin is warm and dry  Neurological:      Mental Status: He is alert and oriented to person, place, and time  Comments: Right hemiparesis of at least 3/5 throughout the distal upper and lower extremity however closer to 2/5 in the elbow flexor extender as well as the hip flexion and knee extension  Slow but accurate short responses    Motor planning difficulty   Psychiatric:         Mood and Affect: Mood normal          Behavior: Behavior normal               Social History:    Social History     Socioeconomic History   • Marital status: Single     Spouse name: None   • Number of children: None   • Years of education: None   • Highest education level: None   Occupational History   • None   Tobacco Use   • Smoking status: Every Day   • Smokeless tobacco: Never   Vaping Use   • Vaping Use: Never used   Substance and Sexual Activity   • Alcohol use: Yes     Comment: Social   • Drug use: Never   • Sexual activity: Not Currently   Other Topics Concern   • None   Social History Narrative   • None     Social Determinants of Health     Financial Resource Strain: High Risk   • Difficulty of Paying Living Expenses: Hard   Food Insecurity: No Food Insecurity   • Worried About Running Out of Food in the Last Year: Never true   • Ran Out of Food in the Last Year: Never true   Transportation Needs: No Transportation Needs   • Lack of Transportation (Medical): No   • Lack of Transportation (Non-Medical): No   Physical Activity: Sufficiently Active   • Days of Exercise per Week: 7 days   • Minutes of Exercise per Session: 60 min   Stress: No Stress Concern Present   • Feeling of Stress : Not at all   Social Connections: Not on file   Intimate Partner Violence: Not At Risk   • Fear of Current or Ex-Partner: No   • Emotionally Abused: No   • Physically Abused: No   • Sexually Abused: No   Housing Stability: Low Risk    • Unable to Pay for Housing in the Last Year: No   • Number of Places Lived in the Last Year: 1   • Unstable Housing in the Last Year: No        Family History:    History reviewed  No pertinent family history        Medications:     Current Facility-Administered Medications:   •  aspirin chewable tablet 81 mg, 81 mg, Oral, Daily, Vilma Vergara DO, 81 mg at 05/17/23 0957  •  chlorhexidine (PERIDEX) 0 12 % oral rinse 15 mL, 15 mL, Mouth/Throat, Q12H BRANDON, SALVATORE Bradley, 15 mL at 05/17/23 0811  •  fentanyl citrate (PF) 100 MCG/2ML **ADS Override Pull**, , , ,   •  labetalol (NORMODYNE) injection 10 mg, 10 mg, Intravenous, Q4H PRN, SALVATORE Bradley  •  levETIRAcetam (KEPPRA) 750 mg in sodium chloride 0 9 % 100 mL IVPB, 750 mg, Intravenous, Q12H BRANDON, Leidy Jackson DO, Last Rate: 400 mL/hr at 05/17/23 0810, 750 mg at 05/17/23 0810  •  nicotine (NICODERM CQ) 14 mg/24hr TD 24 hr patch 1 patch, 1 patch, Transdermal, Daily, SALVATORE Bradley, 1 patch at 05/17/23 2283  •  ondansetron (ZOFRAN) injection 4 mg, 4 mg, Intravenous, Once PRN, Ally Jacobs CRNA  •  sodium chloride (HYPERTONIC) 3 % infusion, 70 mL/hr, Intravenous, Continuous, SALVATORE Lennon, Last Rate: 70 mL/hr at 05/17/23 0806, 70 mL/hr at 05/17/23 2314    Past Medical History:     History reviewed  No pertinent past medical history  Past Surgical History:     Past Surgical History:   Procedure Laterality Date   • IR STROKE ALERT  5/15/2023         Allergies:     No Known Allergies        LABORATORY RESULTS:      Lab Results   Component Value Date    HGB 10 7 (L) 05/17/2023    HCT 31 3 (L) 05/17/2023    WBC 5 47 05/17/2023     Lab Results   Component Value Date    BUN 6 05/17/2023    K 3 6 05/17/2023     (H) 05/17/2023    CREATININE 0 54 (L) 05/17/2023     Lab Results   Component Value Date    PROTIME 12 3 05/15/2023    INR 0 89 05/15/2023        DIAGNOSTIC STUDIES: Reviewed  CT head wo contrast    Result Date: 5/17/2023  Impression: Grossly stable recent large left MCA territory infarct with regional mass effect without midline shift  No acute hemorrhage  Workstation performed: [id number]     CT head wo contrast    Result Date: 5/16/2023  Impression: Evolving left MCA infarct with increased cortical hypodensity and stable sulcal effacement/mass effect throughout the left lobe  No acute intracranial hemorrhage or midline shift  Workstation performed: [id number]     CT head wo contrast    Result Date: 5/16/2023  Impression: Compared to the most immediate prior study, significant interval decrease/near total resolution of the previously noted extensive left hemispheric hyperdensity involving the cortex and subcortical white matter and left basal ganglia  Minimal residual hyperdensity in the left basal ganglia region  Overall these findings suggest resolving contrast staining post endovascular therapy although small amount of superimposed hemorrhage is difficult to entirely exclude  Recommend short-term interval follow-up  scan and/or further evaluation with MRI for definitive evaluation  Stable left hemispheric edema and sulcal effacement without significant midline shift   Above findings discussed with Dr Tuan Mullins at 4:30 a m  on 5/16/2023  Workstation performed: [id number]     CT head wo contrast    Result Date: 5/15/2023  Impression: New extensive left hemispheric intraparenchymal and subarachnoid hemorrhage with mass effect upon the adjacent brain parenchyma and associated cerebral edema predominantly in the left MCA vascular distribution  No significant midline shift or evidence of  transtentorial herniation noted  Findings discussed with Dr Philipp Mejia at 10:30 p m  Workstation performed: [id number]     CT stroke alert brain    Result Date: 5/15/2023  Impression: Late acute to early subacute left MCA territory infarct  No hemorrhagic conversion  Findings were directly discussed with [facility] at  7:10 PM    Workstation performed: [id number]     CT cerebral perfusion    Result Date: 5/15/2023  Impression: CT perfusion performed  Data available on PACS  Workstation performed: [id number]     XR chest portable ICU    Result Date: 5/16/2023  Impression: No acute cardiopulmonary disease  Right jugular catheter in lower SVC with no pneumothorax  Workstation performed: [id number]     MRI brain seizure wo and w contrast    Addendum Date: 5/17/2023    ADDENDUM: Correcting comparison study: Head CT 5/16/2023  Result Date: 5/17/2023  Impression: 1  Recent large left MCA territory infarct with associated edema and a few foci of petechial hemorrhage  There is regional mass effect without midline shift  2   Cannot reliably evaluate the infarcted brain for structural abnormality  No structural abnormality is identified in the remainder brain  Normal hippocampal formations  Workstation performed: [id number]     CTA stroke alert (head/neck)    Result Date: 5/15/2023  Impression: 1  Acute occlusion of the proximal left M1 segment with partial opacification of distal M2 and M3 branches  2  No evidence of thrombus, stenosis or occlusion of the carotid or vertebral arteries   Findings were directly discussed with Radha Byers at  7:14 PM    Workstation performed: [id number]     Val Pride DO  Physical Medicine and [facility]

## 2023-05-17 NOTE — PROGRESS NOTES
During restraint check blood was noted by left arterial line site, it appears patient was able to loop his fingers around arterial line tubing and dislodged catheter  Pressure applied and bleeding controlled  Patient bathed and restraints reapplied  Jarrod Cruz NP aware and at bedside  No new orders at this time

## 2023-05-17 NOTE — PROGRESS NOTES
[facility]  Progress Note  Name: Rich Ordonez  MRN: [id number]  Unit/Bed#: ICU 09 I Date of Admission: 5/15/2023   Date of Service: 5/17/2023 I Hospital Day: 2    Assessment/Plan   * L MCA stroke w/ LM1 partial occlusion  Assessment & Plan  PPD#2 - s/p TICI 3 revascularization of a left M1 occlusion (Dr Quan Jaimes, 5/15/2023)  · Found down at home on 5/15 with NIHSS 16, noted to have acute left M1 occlusion with early ischemic changes  · COVID+ 5/15  · Question if prior infection as patient had Covid 2/2023 per his sister  Imaging:  · MRI brain seizure w/wo contrast 5/17: Recent large left MCA territory infarct with associated edema and a few foci of petechial hemorrhage  There is regional mass effect without midline shift  Cannot reliably evaluate the infarcted brain for structural abnormality  No structural abnormality is identified in the remainder brain  Normal hippocampal formations    Plan:  • Continue to closely monitor neuro exam   o Frequent neuro checks per primary team   o Repeat STAT CTH with any acute decline in GCS > 2pts or more in 1hr   • Maintain normotensive BP goals, SBP < 160, MAP > 65   • No acute neurosurgical intervention indicated at this time   o Case and imaging reviewed this am on rounds     o Large stroke burden noted on MRI but without significant brain compression   • Continue aggressive medical management   o Continue on [facility] watch, stroke day ~3/5   o 3% HTS 70cc/hr  - Maintain Na > 145, osm 310-315   o Plan for repeat [facility] on Friday   • Continue management on stroke pathway   o Neurology following, appreciate neuro management of stroke and workup   o Continue on daily ASA   o Follow-up hypercoagulable workup   • Continue seizure management per primary team and neurology   o vEEG in place   - Pending final read, appears negative for seizures   o Continue AED regimen   • DVT ppx: SCDs, okay for dvt ppx from a nsgy standpoint   • pain control per "primary team   • Medical management per primary team   • PT/OT   • Cleared for a modified diet from speech   • Cleared for regular diet with thin liquids by speech therapy  • Social work following for assistance with dispo once medically cleared     Neurosurgery will continue to follow  Please call with questions or concerns  Seizure [facility])  Assessment & Plan  Neurology following  · Noted to have seizure like activity 5/16  · Exam slightly worse after seizure, CT head without new findings / hemorrhage  · VEEG ongoing  · Loaded with keppra, now on 750mg BID    COVID  Assessment & Plan  · COVID+ 5/15  · No longer on precautions  · Query hypercoagulable state as the cause of stroke       Subjective/Objective   Chief Complaint: Procedure follow-up/stroke follow-up     Subjective: Patient's sister present in room who feels he has improved from yesterday stating he was more alert in the early morning and is now fatigued  Patient able to state he is not currently in any pain  Per report, no acute events overnight  Objective: Patient is drowsy, awake, seated in recliner chair in restraints in no acute distress  I/O       05/15 0701  05/16 0700 05/16 0701  05/17 0700 05/17 0701  05/18 0700    I V  (mL/kg) 1422 (19 1) 1640 2 (22) 140 (1 9)    IV Piggyback  1028  3     Total Intake(mL/kg) 1422 (19 1) 2668 5 (35 8) 140 (1 9)    Urine (mL/kg/hr) 1100 2375 (1 3) 200 (1 9)    Total Output 1100 2375 200    Net +322 +293 5 -60               Invasive Devices     Central Venous Catheter Line  Duration           CVC Central Lines 05/16/23 Triple 16cm 1 day          Drain  Duration           Urethral Catheter Coude 1 day              Physical Exam:  Vitals: Blood pressure 158/86, pulse 76, temperature 98 1 °F (36 7 °C), temperature source Oral, resp  rate 17, height 5' 9\" (1 753 m), weight 74 6 kg (164 lb 7 4 oz), SpO2 99 %  ,Body mass index is 24 29 kg/m²      General appearance: alert, appears stated age, and no " distress  Head: vEEG monitor in place, without obvious abnormality, atraumatic  Eyes: PERRL; patient with difficulty following commands at time of evaluation and difficulty with EOM  Tracks throughout room without issue, conjugate gaze  Neck: supple, symmetrical, trachea midline  Lungs: non labored breathing  Heart: regular heart rate  Vascular: Pedal pulses intact  Neurologic:   - GCS 13T   - opens eyes spontaneously, tracking   - significant expressive aphasia   - able to mimic/copy gestures    - can follow very similar commands, unable to follow complex commands   - limited comprehensible speech, answers yes and no clearly, intermittent appropriate responses   - mostly nonsensical speech   - subtle R sided facial droop, mild dysarthria   - sensation appears equal and intact kell   - motor strength limited given comprehension/ability to participate with exam    - R sided weakness with drift, upper > lower, 3-4-/5 in RUE and 4/5 in RLE    - L side intact, no drift or weakness appreciated   Reflexes: 2+ and symmetric    Lab Results:  Results from last 7 days   Lab Units 05/17/23 0523 05/16/23  0557 05/15/23  2352   WBC Thousand/uL 5 47 5 50 4 89   HEMOGLOBIN g/dL 10 7* 11 5* 11 9*   HEMATOCRIT % 31 3* 32 0* 32 8*   PLATELETS Thousands/uL 149 165 178   NEUTROS PCT % 71 71 74   MONOS PCT % 11 13* 13*     Results from last 7 days   Lab Units 05/17/23 0523 05/16/23  2351 05/16/23  1823 05/16/23  1240 05/16/23  0557   POTASSIUM mmol/L 3 6 3 9 3 7   < > 3 9   CHLORIDE mmol/L 121* 120* 115*   < > 113*   CO2 mmol/L 25 24 26   < > 25   BUN mg/dL 6 6 7   < > 8   CREATININE mg/dL 0 54* 0 54* 0 54*   < > 0 52*   CALCIUM mg/dL 8 4 8 3 8 5   < > 8 3   ALK PHOS U/L 44*  --   --   --  47   ALT U/L 26  --   --   --  26   AST U/L 38  --   --   --  37    < > = values in this interval not displayed       Results from last 7 days   Lab Units 05/17/23 0523 05/16/23  0557 05/15/23  2352   MAGNESIUM mg/dL 2 2 2 0 1 8     Results from last 7 days   Lab Units 05/17/23  0523 05/16/23  0557   PHOSPHORUS mg/dL 2 4* 3 0     Results from last 7 days   Lab Units 05/15/23  1853   INR  0 89   PTT seconds 22*     Imaging Studies: I have personally reviewed pertinent reports  and I have personally reviewed pertinent films in PACS    CT head wo contrast    Result Date: 5/16/2023  Impression: Evolving left MCA infarct with increased cortical hypodensity and stable sulcal effacement/mass effect throughout the left lobe  No acute intracranial hemorrhage or midline shift  Workstation performed: [id number]     CT head wo contrast    Result Date: 5/16/2023  Impression: Compared to the most immediate prior study, significant interval decrease/near total resolution of the previously noted extensive left hemispheric hyperdensity involving the cortex and subcortical white matter and left basal ganglia  Minimal residual hyperdensity in the left basal ganglia region  Overall these findings suggest resolving contrast staining post endovascular therapy although small amount of superimposed hemorrhage is difficult to entirely exclude  Recommend short-term interval follow-up  scan and/or further evaluation with MRI for definitive evaluation  Stable left hemispheric edema and sulcal effacement without significant midline shift  Above findings discussed with Dr Sidney Caldwell at 4:30 a m  on 5/16/2023  Workstation performed: [id number]     CT head wo contrast    Result Date: 5/15/2023  Impression: New extensive left hemispheric intraparenchymal and subarachnoid hemorrhage with mass effect upon the adjacent brain parenchyma and associated cerebral edema predominantly in the left MCA vascular distribution  No significant midline shift or evidence of  transtentorial herniation noted  Findings discussed with Dr Magdalena Mena at 10:30 p m   Workstation performed: [id number]     CT stroke alert brain    Result Date: 5/15/2023  Impression: Late acute to early subacute left MCA territory infarct  No hemorrhagic conversion  Findings were directly discussed with Rohini Esposito at  7:10 PM    Workstation performed: [id number]     CT cerebral perfusion    Result Date: 5/15/2023  Impression: CT perfusion performed  Data available on PACS  Workstation performed: [id number]     XR chest portable ICU    Result Date: 5/16/2023  Impression: No acute cardiopulmonary disease  Right jugular catheter in lower SVC with no pneumothorax  Workstation performed: [id number]     CTA stroke alert (head/neck)    Result Date: 5/15/2023  Impression: 1  Acute occlusion of the proximal left M1 segment with partial opacification of distal M2 and M3 branches  2  No evidence of thrombus, stenosis or occlusion of the carotid or vertebral arteries  Findings were directly discussed with Rohini Esposito at  7:14 PM    Workstation performed: [id number]       EKG, Pathology, and Other Studies: I have personally reviewed pertinent reports        VTE Mechanical Prophylaxis: sequential compression device, okay for chem dvt ppx from a nsgy standpoint

## 2023-05-17 NOTE — PLAN OF CARE
Problem: OCCUPATIONAL THERAPY ADULT  Goal: Performs self-care activities at highest level of function for planned discharge setting  See evaluation for individualized goals  Description: Treatment Interventions: ADL retraining, Visual perceptual retraining, Functional transfer training, UE strengthening/ROM, Endurance training, Cognitive reorientation, Patient/family training, Equipment evaluation/education, Neuromuscular reeducation, Fine motor coordination activities, Compensatory technique education, Continued evaluation, Energy conservation, Activityengagement          See flowsheet documentation for full assessment, interventions and recommendations  Note: Limitation: Decreased ADL status, Decreased UE strength, Decreased UE ROM, Decreased Safe judgement during ADL, Decreased cognition, Decreased endurance, Visual deficit, Decreased sensation, Decreased fine motor control, Decreased self-care trans, Decreased high-level ADLs, Non-func R UE  Prognosis: Fair  Assessment: Pt is a 39 y/o male seen for OT eval s/p adm to [facility] 5/15 w/ R facial droop and R sided weakness  CTH revealed L MCA CVA and CTA revealed L M1 occlusion  PT is s/p thrombectomy on 5/15; then hospital course was complicated by seizures  Pt is dx'd w/ L MCA CVA stroke w/ LM1 partial occlusion  Pt  has no past medical history on file  Pt with active OT orders and up with assistance  orders  Pt lives with alone in an apt w/ 0 HONEY  Pt was I w/  ADLS and IADLS, drove, & required no use of DME PTA  Pt is currently demonstrating the following occupational deficits: Mod A UB ADLS, Mod A LB ADLS, Min A bed mobility, Min A x2 transfers and functional mobility w/ HHA   These deficits that are impacting pt's baseline areas of occupation are a result of the following impairments: endurance, activity tolerance, functional mobility, forward functional reach, balance, trunk control, functional standing tolerance, unsupportive home environment, decreased I w/ ADLS/IADLS, strength, ROM, aphasia, visual deficits, R sided hemiplegia, sensation deficits, cognitive impairments, decreased safety awareness, decreased insight into deficits, slurred speech and impaired fine motor skills  The following Occupational Performance Areas to address include: eating, grooming, bathing/shower, toilet hygiene, dressing, medication management, socialization, health maintenance, functional mobility, community mobility, clothing management, cleaning, meal prep, money management, household maintenance and job performance/volunteering  Recommend inpatient rehab  upon D/C   Pt to continue to benefit from acute immediate OT services to address the following goals 3-5x/week to  w/in 10-14 days:  Recommendation: (S) Physiatry Consult  OT Discharge Recommendation: Post acute rehabilitation services        Alab Means MS, OTR/L

## 2023-05-17 NOTE — SPEECH THERAPY NOTE
Speech-Language Pathology Bedside Swallow Evaluation      Patient Name: Jose Muñoz    FTNEO'K Date: 5/17/2023     Problem List  Principal Problem:    L MCA stroke w/ LM1 partial occlusion  Active Problems:    Seizure ([address] )    COVID      Past Medical History  History reviewed  No pertinent past medical history  Past Surgical History  History reviewed  No pertinent surgical history  Summary   Pt presented with functional appearing oral and pharyngeal stage swallowing skills with materials administered today  Pt assessed with toast, applesauce and water  Bite strength appears adequate  Mastication and transfer time appear adequate  Swallow initiation appears adequate  Laryngeal rise is palpated and appears adequate  The patient does not present with any overt s/s aspiration  Patient admitted on stroke pathway with L MCA stroke  Pt also presents symptoms suggestive of expressive aphasia and will need formal language evaluation  Risk/s for Aspiration: low    Recommended Diet: regular diet and thin liquids, sister of pt reports that he follows a vegan diet  Recommended Form of Meds: whole with liquid   Aspiration precautions and swallowing strategies: upright posture and small bites/sips  Other Recommendations: Continue frequent oral care  Current Medical Status  Chief Complaint: Found down with stroke symptoms  History of Present Illness   HX and PE limited by: Noe Yos is a 40 y o  male who presents with stroke symptoms  Patient arrived to the emergency department as a stroke alert  His last known well was approximately 8:30 PM for which she was reported to have been sounding normal over the telephone  He did not show up for a scheduled meeting with a friend today and did not answer his phone  A welfare check was called, EMS found patient covered in feces with right upper extremity flaccid paralysis as well as right-sided neglect, right facial droop and expressive aphasia  Initial NIH on presentation reported to be 16  Patient was not a candidate for thrombolysis secondary to last known well time  He was noted to have a large vessel occlusion of M1  Endovascular alert initiated  Current Precautions:  Fall  Aspiration     Allergies:  No known food allergies    Past medical history:  Please see H&P for details    Special Studies:  Brain MRI 5/17/23:  1  Recent large left MCA territory infarct with associated edema and a few foci of petechial hemorrhage  There is regional mass effect without midline shift  2   Cannot reliably evaluate the infarcted brain for structural abnormality  No structural abnormality is identified in the remainder brain  Normal hippocampal formations  Chest Xray 5/16/23:  No acute cardiopulmonary disease  Right jugular catheter in lower SVC with no pneumothorax  Social/Education/Vocational Hx:  Pt lives alone    Swallow Information   Current Risks for Dysphagia & Aspiration: CVA  Current Symptoms/Concerns: none  Current Diet: NPO   Baseline Diet: regular diet and thin liquids      Baseline Assessment   Behavior/Cognition: alert  Speech/Language Status: patient has difficulty participating in basic conversation and following commands consistently  Patient Positioning: upright in chair  Pain Status/Interventions/Response to Interventions: No report of or nonverbal indications of pain  Swallow Mechanism Exam  Facial: symmetrical  Labial: WFL  Lingual: unable to test 2/2 limited command following  Velum: symmetrical  Mandible: adequate ROM  Dentition: adequate  Vocal quality:clear/adequate   Respiratory Status: on RA     Consistencies Assessed and Performance   Consistencies Administered: thin liquids, puree and hard solids  Materials administered included water, applesauce, and toast    Oral Stage: WFL  Mastication was adequate with the materials administered today    Bolus formation and transfer were functional with no significant oral residue noted  No overt s/s reduced oral control  Pharyngeal Stage: WFL  Swallow Mechanics:  Swallowing initiation appeared prompt  Laryngeal rise was palpated and judged to be within functional limits  No coughing, throat clearing, change in vocal quality or respiratory status noted today       Esophageal Concerns: none reported    Summary and Recommendations (see above)    Results Reviewed with: patient, RN and family     Treatment Recommended: no dysphagia therapy warranted, will follow up for speech and language evaluation     Frequency of treatment: evaluation only

## 2023-05-17 NOTE — PROGRESS NOTES
NEUROLOGY RESIDENCY PROGRESS NOTE     Name: Aristeo Farmer   Age & Sex: 40 y o  male   MRN: [id number]  Unit/Bed#: ICU 09   Encounter: [id number]    Recommendations for outpatient neurological follow up have yet to be determined  Pending for discharge: Based on clinical improvement as per primary team    ASSESSMENT & PLAN     * L MCA stroke w/ LM1 partial occlusion  Assessment & Plan  · 40 y o  right handed male w/ no pertinent hx who presented as a stroke alert on 05/15/23 at 6:40 PM  LKW 8:30PM last night when he spoke with someone over the phone  Initial presenting deficits R facial droop, RUE > RLE weakness  No thinners use  · Per EMS/ED, initial BP: 149/88  Pulse 65  ECG NSR  Glucose 120  NIHSS 16 (see below)  · CTH showed L MCA stroke (ASPECT 8), CTA h/n showed partial LM1 occlusion  Not found to be a TNK candidate due to being out of time window, large stroke seen on CTH  Given clinical/radiological findings, pt was then returned to the scanner for CTP which demonstrated that pt was a candidate for IR thrombectomy and endovascular alert was called  Work-up:  • A1c: 4 8  • Lipid panel: cholesterol 159, triglycerides 34, , LDL 18  • Thrombus panel: Antithrombin III low  • Beta-2 glycoprotein antibodies: Normal  • Cardiolipin antibody: Normal  • BELEM: Negative    Imaging:  [facility] 5/16: Grossly stable recent large left MCA territory infarct with regional mass effect without midline shift  No acute hemorrhage  MRI Brain w/ contrast 5/17/23: Recent large left MCA territory infarct with associated edema and a few foci of petechial hemorrhage  There is regional mass effect without midline shift  Cannot reliably evaluate the infarcted brain for structural abnormality  No structural abnormality is identified in the remainder brain  Normal hippocampal formations      Neuro exam: Eyes equal/reactive, R deficits greater than L, following commands with redirection, please refer to the progress note for detailed exam     Impression: 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 in a 41 yo active patient w/ no reported vascular risk factors to favor cardioembolic etiology  Patient s/p thrombectomy with TICI 3 revascularization  Will obtain thrombosis panel and additional labs for underlying hypercoagulable states  Regine Fabry with Neurosurgery for possible intervention  Cannot rule out hypercoagulable state secondary to COVID infection  Plan:   • If pt mental status declines by GCS > 2 in 1 hour, obtain STAT CTH  • Post thrombectomy, SBP < 160, MAP> 65  • Goal euglycemia, normothermia  • ALYSSA scheduled for today  • Neuro checks   • Monitor on telemetry  • Recommend DVT pharmacologic ppx  • PT/OT/Speech/PMR consults appreciated when able  • Young patient with limited risk factors and stroke, will obtain additional labs with thrombosis panel, will check:  o Homocysteine level, MTHFR mutation, as well as rheumatic disease markers (ANCA, dsDNA, RF)  • Medical management as per critical care appreciated      Seizure [facility])  Assessment & Plan  Episode of unresponsiveness noted around 5/16 9:87 AM lasting uncertain duration, given 2 mg Ativan and taken emergently to CT scan revealing no hemorrhage, stable mass effect likely secondary to edema  Subsequently loaded with 4000 mg Keppra, initiated on 750 mg BID  Additional episode requiring 2 mg Ativan at approximately 10:00 AM      vEEG - No seizures overnight as of 5/17    Plan:  · Continue on Keppra 750 mg BID  · vEEG - d/c after 24 hours  · Then, monitor clinically  · Seizure precautions      SUBJECTIVE     Patient is a 40year old male who presented to the ED after being down for an unknown period of time  He was found to have a thrombus in the LM1  Patient was seen and examined today  No acute events overnight  Per sister at bedside, patient more alert and conversant earlier in the morning with apparent fatigue afterwards   Able to report he is not in any pain at this time, more alert and able to comply with exam than previous  Review of Systems   Constitutional: Negative for fever  Respiratory: Negative for shortness of breath  Cardiovascular: Negative for chest pain  Gastrointestinal: Negative for abdominal pain  OBJECTIVE     Patient ID: Dex Guajardo is a 40 y o  male  Vitals:    23 0400 23 0500 23 0600 23 0700   BP: 142/83 150/76 150/81 147/86   BP Location: Right arm Right arm Right arm Right arm   Pulse: 60 64 62 58   Resp: 16 16 15 16   Temp: 98 5 °F (36 9 °C)   98 1 °F (36 7 °C)   TempSrc: Oral   Oral   SpO2: 99% 99% 99% 98%   Weight:       Height:          Temperature:   Temp (24hrs), Av 5 °F (36 9 °C), Min:98 1 °F (36 7 °C), Max:98 9 °F (37 2 °C)    Temperature: 98 1 °F (36 7 °C)      Physical Exam  Constitutional:       General: He is not in acute distress  HENT:      Mouth/Throat:      Mouth: Mucous membranes are dry  Pharynx: Oropharynx is clear  Eyes:      Extraocular Movements: EOM normal       Pupils: Pupils are equal, round, and reactive to light  Pulmonary:      Effort: Pulmonary effort is normal  No respiratory distress  Musculoskeletal:      Right lower leg: No edema  Left lower leg: No edema  Skin:     General: Skin is warm  Neurological:      Mental Status: He is alert  Coordination: Finger-nose-finger test: Attempted on L, able to perform one time  Deep Tendon Reflexes:      Reflex Scores:       Tricep reflexes are 2+ on the right side and 2+ on the left side  Bicep reflexes are 2+ on the right side and 2+ on the left side  Brachioradialis reflexes are 2+ on the right side and 2+ on the left side  Patellar reflexes are 2+ on the right side and 2+ on the left side  Achilles reflexes are 2+ on the right side and 2+ on the left side  Psychiatric:         Speech: Speech is slurred           Behavior: Behavior normal           Neurologic Exam     Mental Status   Oriented to person  Disoriented to place  Disoriented to time  Follows 1 step commands  Attention: decreased  Concentration: decreased  Speech: slurred   Level of consciousness: arousable by tactile stimuli, arousable by verbal stimuli, alert ,  drowsy  Unable to name object  Able to repeat  Mild dysarthria with expressive aphasia, component of receptive aphasia  Cranial Nerves     CN II   Visual fields full to confrontation  CN III, IV, VI   Pupils are equal, round, and reactive to light  Extraocular motions are normal      CN V   Facial sensation intact  CN VII   Facial expression full, symmetric  Right facial weakness: none  Left facial weakness: none    CN VIII   CN VIII normal      CN IX, X   CN IX normal    CN X normal      CN XI   CN XI normal      CN XII   CN XII normal      Motor Exam   Muscle bulk: normal  Overall muscle tone: normal  Right arm tone: normal  Left arm tone: normal  Left arm pronator drift: absent  Right leg tone: normal  Left leg tone: normal    Strength   Right strength: 4 on R lower extremities  Left strength: 4+ on L lower extremities  Right biceps: 4/5  Left biceps: 5/5  Right triceps: 4/5  Left triceps: 5/5  Right iliopsoas: 4/5  Left iliopsoas: 4/5  Right quadriceps: 4/5  Left quadriceps: 4/5  Right hamstrin/5  Left hamstrin/5  Right anterior tibial: 4/5  Left anterior tibial: 4/5  Right gastroc: 4/5  Left gastroc: 4/5    Sensory Exam   Light touch normal      Gait, Coordination, and Reflexes     Coordination   Finger-nose-finger test: Attempted on L, able to perform one time      Tremor   Resting tremor: absent    Reflexes   Right brachioradialis: 2+  Left brachioradialis: 2+  Right biceps: 2+  Left biceps: 2+  Right triceps: 2+  Left triceps: 2+  Right patellar: 2+  Left patellar: 2+  Right achilles: 2+  Left achilles: 2+  Right Dow: absent  Left Dow: absent  Right ankle clonus: absent  Left ankle clonus: absent       LABORATORY DATA     Labs: I have personally reviewed pertinent reports  Results from last 7 days   Lab Units 05/17/23  0523 05/16/23  0557 05/15/23  2352   WBC Thousand/uL 5 47 5 50 4 89   HEMOGLOBIN g/dL 10 7* 11 5* 11 9*   HEMATOCRIT % 31 3* 32 0* 32 8*   PLATELETS Thousands/uL 149 165 178   NEUTROS PCT % 71 71 74   MONOS PCT % 11 13* 13*      Results from last 7 days   Lab Units 05/17/23  0523 05/16/23  2351 05/16/23  1823 05/16/23  1240 05/16/23  0557   SODIUM mmol/L 146 146 142   < > 140   POTASSIUM mmol/L 3 6 3 9 3 7   < > 3 9   CHLORIDE mmol/L 121* 120* 115*   < > 113*   CO2 mmol/L 25 24 26   < > 25   BUN mg/dL 6 6 7   < > 8   CREATININE mg/dL 0 54* 0 54* 0 54*   < > 0 52*   CALCIUM mg/dL 8 4 8 3 8 5   < > 8 3   ALK PHOS U/L 44*  --   --   --  47   ALT U/L 26  --   --   --  26   AST U/L 38  --   --   --  37    < > = values in this interval not displayed  Results from last 7 days   Lab Units 05/17/23  0523 05/16/23  0557 05/15/23  2352   MAGNESIUM mg/dL 2 2 2 0 1 8     Results from last 7 days   Lab Units 05/17/23  0523 05/16/23  0557   PHOSPHORUS mg/dL 2 4* 3 0      Results from last 7 days   Lab Units 05/15/23  1853   INR  0 89   PTT seconds 22*               IMAGING & DIAGNOSTIC TESTING     Radiology Results: I have personally reviewed pertinent reports  MRI brain seizure wo and w contrast   Final Result by Anoop Ivan MD (05/17 5086)   Addendum (preliminary) 1 of 1 by Anoop Ivan MD (05/17 7024)   ADDENDUM:      Correcting comparison study: Head CT 5/16/2023  Final      1  Recent large left MCA territory infarct with associated edema and a few foci of petechial hemorrhage  There is regional mass effect without midline shift  2   Cannot reliably evaluate the infarcted brain for structural abnormality  No structural abnormality is identified in the remainder brain  Normal hippocampal formations           Workstation performed: [id number]         CT head wo contrast   Final Result by Anoop Ivan MD (05/17 0849)      Grossly stable recent large left MCA territory infarct with regional mass effect without midline shift  No acute hemorrhage  Workstation performed: [id number]         CT head wo contrast   Final Result by Aureliano Kumar MD (60/72 2033)      Evolving left MCA infarct with increased cortical hypodensity and stable sulcal effacement/mass effect throughout the left lobe  No acute intracranial hemorrhage or midline shift  Workstation performed: [id number]         CT head wo contrast   Final Result by Natalia Tipton MD (81/10 6211)      Compared to the most immediate prior study, significant interval decrease/near total resolution of the previously noted extensive left hemispheric hyperdensity involving the cortex and subcortical white matter and left basal ganglia  Minimal residual    hyperdensity in the left basal ganglia region  Overall these findings suggest resolving contrast staining post endovascular therapy although small amount of superimposed hemorrhage is difficult to entirely exclude  Recommend short-term interval follow-up    scan and/or further evaluation with MRI for definitive evaluation  Stable left hemispheric edema and sulcal effacement without significant midline shift  Above findings discussed with Dr Almendarez Slider at 4:30 a m  on 5/16/2023  Workstation performed: [id number]         XR chest portable ICU   Final Result by Willow Hammer MD (05/16 1625)      No acute cardiopulmonary disease  Right jugular catheter in lower SVC with no pneumothorax  Workstation performed: [id number]         CT head wo contrast   Final Result by Jarrett Christian MD (05/15 2233)      New extensive left hemispheric intraparenchymal and subarachnoid hemorrhage with mass effect upon the adjacent brain parenchyma and associated cerebral edema predominantly in the left MCA vascular distribution   No significant midline shift or evidence of transtentorial herniation noted  Findings discussed with Dr Renu Swann at 10:30 p m  Workstation performed: [id number]         IR stroke alert   Final Result by Samra Guevara (05/17 1019)      CT cerebral perfusion   Final Result by Danyelle Barrett MD (05/15 1920)      CT perfusion performed  Data available on PACS  Workstation performed: [id number]         CTA stroke alert (head/neck)   Final Result by Danyelle Barrett MD (53/10 1915)         1  Acute occlusion of the proximal left M1 segment with partial opacification of distal M2 and M3 branches  2  No evidence of thrombus, stenosis or occlusion of the carotid or vertebral arteries  Findings were directly discussed with Anna Marie Hess at  7:14 PM                              Workstation performed: [id number]         CT stroke alert brain   Final Result by Danyelle Barrett MD (05/15 1915)      Late acute to early subacute left MCA territory infarct  No hemorrhagic conversion  Findings were directly discussed with Anna Marie Hess at  7:10 PM         Workstation performed: [id number]         XR ankle 3+ vw right    (Results Pending)       Other Diagnostic Testing: I have personally reviewed pertinent reports        ACTIVE MEDICATIONS     Current Facility-Administered Medications   Medication Dose Route Frequency   • aspirin chewable tablet 81 mg  81 mg Oral Daily   • chlorhexidine (PERIDEX) 0 12 % oral rinse 15 mL  15 mL Mouth/Throat Q12H BRANDON   • fentanyl citrate (PF) 100 MCG/2ML **ADS Override Pull**       • labetalol (NORMODYNE) injection 10 mg  10 mg Intravenous Q4H PRN   • levETIRAcetam (KEPPRA) 750 mg in sodium chloride 0 9 % 100 mL IVPB  750 mg Intravenous Q12H [facility]   • nicotine (NICODERM CQ) 14 mg/24hr TD 24 hr patch 1 patch  1 patch Transdermal Daily   • ondansetron (ZOFRAN) injection 4 mg  4 mg Intravenous Once PRN   • sodium chloride (HYPERTONIC) 3 % infusion  70 mL/hr Intravenous Continuous       Prior to Admission medications    Medication Sig Start Date End Date Taking?  Authorizing Provider   MELATONIN PO Take by mouth    Historical Provider, MD   Protein POWD Take by mouth    Historical Provider, MD         VTE Pharmacologic Prophylaxis: As per primary team  VTE Mechanical Prophylaxis: sequential compression device and foot pump applied    ==  DO Anthony Pinedo Neurology Residency, PGY-1    Luciano Crespo, MS4

## 2023-05-18 ENCOUNTER — ANESTHESIA EVENT (INPATIENT)
Dept: NON INVASIVE DIAGNOSTICS | Facility: HOSPITAL | Age: 43
End: 2023-05-18

## 2023-05-18 ENCOUNTER — APPOINTMENT (INPATIENT)
Dept: NON INVASIVE DIAGNOSTICS | Facility: HOSPITAL | Age: 43
End: 2023-05-18

## 2023-05-18 ENCOUNTER — ANESTHESIA (INPATIENT)
Dept: NON INVASIVE DIAGNOSTICS | Facility: HOSPITAL | Age: 43
End: 2023-05-18

## 2023-05-18 LAB
ANION GAP SERPL CALCULATED.3IONS-SCNC: -1 MMOL/L (ref 4–13)
ANION GAP SERPL CALCULATED.3IONS-SCNC: 3 MMOL/L (ref 4–13)
BASOPHILS # BLD AUTO: 0.03 THOUSANDS/ÂΜL (ref 0–0.1)
BASOPHILS NFR BLD AUTO: 1 % (ref 0–1)
BUN SERPL-MCNC: 4 MG/DL (ref 5–25)
CALCIUM SERPL-MCNC: 7.7 MG/DL (ref 8.3–10.1)
CALCIUM SERPL-MCNC: 7.8 MG/DL (ref 8.3–10.1)
CALCIUM SERPL-MCNC: 7.9 MG/DL (ref 8.3–10.1)
CALCIUM SERPL-MCNC: 8.1 MG/DL (ref 8.3–10.1)
CHLORIDE SERPL-SCNC: 118 MMOL/L (ref 96–108)
CHLORIDE SERPL-SCNC: 121 MMOL/L (ref 96–108)
CHLORIDE SERPL-SCNC: 123 MMOL/L (ref 96–108)
CHLORIDE SERPL-SCNC: 123 MMOL/L (ref 96–108)
CO2 SERPL-SCNC: 22 MMOL/L (ref 21–32)
CO2 SERPL-SCNC: 23 MMOL/L (ref 21–32)
CO2 SERPL-SCNC: 24 MMOL/L (ref 21–32)
CO2 SERPL-SCNC: 25 MMOL/L (ref 21–32)
CREAT SERPL-MCNC: 0.47 MG/DL (ref 0.6–1.3)
CREAT SERPL-MCNC: 0.49 MG/DL (ref 0.6–1.3)
CREAT SERPL-MCNC: 0.5 MG/DL (ref 0.6–1.3)
CREAT SERPL-MCNC: 0.5 MG/DL (ref 0.6–1.3)
EOSINOPHIL # BLD AUTO: 0.09 THOUSAND/ÂΜL (ref 0–0.61)
EOSINOPHIL NFR BLD AUTO: 2 % (ref 0–6)
ERYTHROCYTE [DISTWIDTH] IN BLOOD BY AUTOMATED COUNT: 12.6 % (ref 11.6–15.1)
GFR SERPL CREATININE-BSD FRML MDRD: 134 ML/MIN/1.73SQ M
GFR SERPL CREATININE-BSD FRML MDRD: 134 ML/MIN/1.73SQ M
GFR SERPL CREATININE-BSD FRML MDRD: 135 ML/MIN/1.73SQ M
GFR SERPL CREATININE-BSD FRML MDRD: 138 ML/MIN/1.73SQ M
GLUCOSE SERPL-MCNC: 102 MG/DL (ref 65–140)
GLUCOSE SERPL-MCNC: 119 MG/DL (ref 65–140)
GLUCOSE SERPL-MCNC: 134 MG/DL (ref 65–140)
GLUCOSE SERPL-MCNC: 91 MG/DL (ref 65–140)
GLUCOSE SERPL-MCNC: 94 MG/DL (ref 65–140)
HCT VFR BLD AUTO: 30.1 % (ref 36.5–49.3)
HGB BLD-MCNC: 10.2 G/DL (ref 12–17)
IMM GRANULOCYTES # BLD AUTO: 0.01 THOUSAND/UL (ref 0–0.2)
IMM GRANULOCYTES NFR BLD AUTO: 0 % (ref 0–2)
LYMPHOCYTES # BLD AUTO: 1.25 THOUSANDS/ÂΜL (ref 0.6–4.47)
LYMPHOCYTES NFR BLD AUTO: 23 % (ref 14–44)
MAGNESIUM SERPL-MCNC: 2 MG/DL (ref 1.6–2.6)
MCH RBC QN AUTO: 34.5 PG (ref 26.8–34.3)
MCHC RBC AUTO-ENTMCNC: 33.9 G/DL (ref 31.4–37.4)
MCV RBC AUTO: 102 FL (ref 82–98)
MONOCYTES # BLD AUTO: 0.48 THOUSAND/ÂΜL (ref 0.17–1.22)
MONOCYTES NFR BLD AUTO: 9 % (ref 4–12)
NEUTROPHILS # BLD AUTO: 3.7 THOUSANDS/ÂΜL (ref 1.85–7.62)
NEUTS SEG NFR BLD AUTO: 65 % (ref 43–75)
NRBC BLD AUTO-RTO: 0 /100 WBCS
OSMOLALITY UR/SERPL-RTO: 295 MMOL/KG (ref 282–298)
OSMOLALITY UR/SERPL-RTO: 301 MMOL/KG (ref 282–298)
OSMOLALITY UR/SERPL-RTO: 302 MMOL/KG (ref 282–298)
OSMOLALITY UR/SERPL-RTO: 303 MMOL/KG (ref 282–298)
PHOSPHATE SERPL-MCNC: 1.5 MG/DL (ref 2.7–4.5)
PLATELET # BLD AUTO: 141 THOUSANDS/UL (ref 149–390)
PMV BLD AUTO: 9.8 FL (ref 8.9–12.7)
POTASSIUM SERPL-SCNC: 3.5 MMOL/L (ref 3.5–5.3)
POTASSIUM SERPL-SCNC: 4.1 MMOL/L (ref 3.5–5.3)
PROT C AG ACT/NOR PPP IA: 78 % OF NORMAL (ref 60–150)
PROT S ACT/NOR PPP: 65 % (ref 71–117)
RBC # BLD AUTO: 2.96 MILLION/UL (ref 3.88–5.62)
RHEUMATOID FACT SER QL LA: NEGATIVE
SINUS: 3.2 CM
SL CV LV EF: 55
SODIUM SERPL-SCNC: 142 MMOL/L (ref 135–147)
SODIUM SERPL-SCNC: 144 MMOL/L (ref 135–147)
SODIUM SERPL-SCNC: 145 MMOL/L (ref 135–147)
SODIUM SERPL-SCNC: 148 MMOL/L (ref 135–147)
WBC # BLD AUTO: 5.56 THOUSAND/UL (ref 4.31–10.16)

## 2023-05-18 PROCEDURE — B245ZZ4 ULTRASONOGRAPHY OF LEFT HEART, TRANSESOPHAGEAL: ICD-10-PCS | Performed by: INTERNAL MEDICINE

## 2023-05-18 RX ORDER — ENOXAPARIN SODIUM 100 MG/ML
40 INJECTION SUBCUTANEOUS
Status: DISCONTINUED | OUTPATIENT
Start: 2023-05-18 | End: 2023-05-27 | Stop reason: HOSPADM

## 2023-05-18 RX ORDER — FENTANYL CITRATE 50 UG/ML
INJECTION, SOLUTION INTRAMUSCULAR; INTRAVENOUS AS NEEDED
Status: DISCONTINUED | OUTPATIENT
Start: 2023-05-18 | End: 2023-05-18

## 2023-05-18 RX ORDER — SODIUM CHLORIDE 3 G/100ML
250 INJECTION, SOLUTION INTRAVENOUS ONCE
Status: COMPLETED | OUTPATIENT
Start: 2023-05-18 | End: 2023-05-19

## 2023-05-18 RX ORDER — GLYCOPYRROLATE 0.2 MG/ML
INJECTION INTRAMUSCULAR; INTRAVENOUS AS NEEDED
Status: DISCONTINUED | OUTPATIENT
Start: 2023-05-18 | End: 2023-05-18

## 2023-05-18 RX ORDER — POTASSIUM CHLORIDE 29.8 MG/ML
40 INJECTION INTRAVENOUS ONCE
Status: COMPLETED | OUTPATIENT
Start: 2023-05-18 | End: 2023-05-18

## 2023-05-18 RX ORDER — PROPOFOL 10 MG/ML
INJECTION, EMULSION INTRAVENOUS AS NEEDED
Status: DISCONTINUED | OUTPATIENT
Start: 2023-05-18 | End: 2023-05-18

## 2023-05-18 RX ORDER — PROPOFOL 10 MG/ML
INJECTION, EMULSION INTRAVENOUS CONTINUOUS PRN
Status: DISCONTINUED | OUTPATIENT
Start: 2023-05-18 | End: 2023-05-18

## 2023-05-18 RX ORDER — SODIUM CHLORIDE 3 G/100ML
250 INJECTION, SOLUTION INTRAVENOUS ONCE
Status: COMPLETED | OUTPATIENT
Start: 2023-05-18 | End: 2023-05-18

## 2023-05-18 RX ORDER — POTASSIUM CHLORIDE 20 MEQ/1
20 TABLET, EXTENDED RELEASE ORAL ONCE
Status: COMPLETED | OUTPATIENT
Start: 2023-05-18 | End: 2023-05-18

## 2023-05-18 RX ORDER — SODIUM CHLORIDE 9 MG/ML
INJECTION, SOLUTION INTRAVENOUS CONTINUOUS PRN
Status: DISCONTINUED | OUTPATIENT
Start: 2023-05-18 | End: 2023-05-18

## 2023-05-18 RX ADMIN — LEVETIRACETAM 750 MG: 100 INJECTION, SOLUTION INTRAVENOUS at 21:26

## 2023-05-18 RX ADMIN — CHLORHEXIDINE GLUCONATE 0.12% ORAL RINSE 15 ML: 1.2 LIQUID ORAL at 21:26

## 2023-05-18 RX ADMIN — PROPOFOL 100 MCG/KG/MIN: 10 INJECTION, EMULSION INTRAVENOUS at 14:42

## 2023-05-18 RX ADMIN — PROPOFOL 50 MG: 10 INJECTION, EMULSION INTRAVENOUS at 14:42

## 2023-05-18 RX ADMIN — FENTANYL CITRATE 50 MCG: 50 INJECTION, SOLUTION INTRAMUSCULAR; INTRAVENOUS at 14:42

## 2023-05-18 RX ADMIN — SODIUM CHLORIDE 250 ML: 3 INJECTION, SOLUTION INTRAVENOUS at 01:28

## 2023-05-18 RX ADMIN — SODIUM CHLORIDE 90 ML/HR: 3 INJECTION, SOLUTION INTRAVENOUS at 16:01

## 2023-05-18 RX ADMIN — NICOTINE 1 PATCH: 14 PATCH, EXTENDED RELEASE TRANSDERMAL at 08:07

## 2023-05-18 RX ADMIN — POTASSIUM CHLORIDE 20 MEQ: 1500 TABLET, EXTENDED RELEASE ORAL at 01:22

## 2023-05-18 RX ADMIN — LEVETIRACETAM 750 MG: 100 INJECTION, SOLUTION INTRAVENOUS at 08:07

## 2023-05-18 RX ADMIN — SODIUM CHLORIDE 90 ML/HR: 3 INJECTION, SOLUTION INTRAVENOUS at 20:35

## 2023-05-18 RX ADMIN — SODIUM CHLORIDE 90 ML/HR: 3 INJECTION, SOLUTION INTRAVENOUS at 09:25

## 2023-05-18 RX ADMIN — ASPIRIN 81 MG CHEWABLE TABLET 81 MG: 81 TABLET CHEWABLE at 08:07

## 2023-05-18 RX ADMIN — SODIUM CHLORIDE 90 ML/HR: 3 INJECTION, SOLUTION INTRAVENOUS at 04:15

## 2023-05-18 RX ADMIN — CHLORHEXIDINE GLUCONATE 0.12% ORAL RINSE 15 ML: 1.2 LIQUID ORAL at 08:07

## 2023-05-18 RX ADMIN — SODIUM CHLORIDE: 0.9 INJECTION, SOLUTION INTRAVENOUS at 14:42

## 2023-05-18 RX ADMIN — GLYCOPYRROLATE 0.2 MG: 0.2 INJECTION INTRAMUSCULAR; INTRAVENOUS at 14:35

## 2023-05-18 RX ADMIN — POTASSIUM CHLORIDE 40 MEQ: 29.8 INJECTION, SOLUTION INTRAVENOUS at 01:23

## 2023-05-18 RX ADMIN — ENOXAPARIN SODIUM 40 MG: 40 INJECTION SUBCUTANEOUS at 08:07

## 2023-05-18 NOTE — ANESTHESIA POSTPROCEDURE EVALUATION
Post-Op Assessment Note    CV Status:  Stable  Pain Score: 0    Pain management: adequate     Mental Status:  Alert and awake   Hydration Status:  Euvolemic   PONV Controlled:  Controlled   Airway Patency:  Patent      Post Op Vitals Reviewed: Yes      Staff: CRNA         No notable events documented  /87 (Simultaneous filing  User may not have seen previous data ) (05/18/23 1505)    Temp      Pulse (!) 54 (Simultaneous filing   User may not have seen previous data ) (05/18/23 1505)   Resp 18 (05/18/23 1505)    SpO2 100 % (05/18/23 1505)

## 2023-05-18 NOTE — PROGRESS NOTES
[facility]  Progress Note: Critical Care  Name: Jorge Arteaga 40 y o  male I MRN: [id number]  Unit/Bed#: ICU 09 I Date of Admission: 5/15/2023   Date of Service: 5/18/2023 I Hospital Day: 3    Assessment/Plan   Neuro:  • Diagnosis: Acute ischemic left MCA stroke, status post thrombectomy 5/15 with TICI 3 revascularization, CT head noted cerebral edema with evidence of brain compression and mass effect in left MCA territory  ? -150  ? MAP greater than 85  ? A1c 4 8 LDL 18   Statin stopped due to being extremely low, LDL 18   ? MRI 5/17 1   Recent large left MCA territory infarct with associated edema and a few foci of petechial hemorrhage  There is regional mass effect without midline shift  2   Cannot reliably evaluate the infarcted brain for structural abnormality  No structural abnormality is identified in the remainder brain  Normal hippocampal formations  ? PT/OT/speech therapy  ? Hypercoagulable work-up  - Completed: Homocystine (normal),   - Pending: MTHFR, BELEM, ANCA, dsDNA, RF  - Thrombosis panel  - Antithrombin III 81, likely this is not clinically relevant as it is higher than 80%  ? Patient received J[employer]J COVID-vaccine, however patient received vaccine at least 1 year ago  ? Transesophageal echocardiogram planned for 5/18  ? Neurology consulted appreciate recommendations  ? Patient maintained on 3% hypertonic saline for treatment of brain edema  - Currently on 3% HTS  - Follow-up every 6 hours on serum sodium levels maintaining 145-155  - Follow-up every 6 hours on serum osmolarity levels maintaining 305-310  ? ASA 81mg daily  • Diagnosis: Seizure  ? Patient noted to have seizure associated with eye deviation to the right morning of 5/16, received 2 mg of Ativan  ? Loaded with 4 g of Keppra 5/16, patient is now maintained on Keppra 750 mg twice daily  ? Ativan as needed for seizures  ?  vEEG monitoring discontinued, no seizures noted on EEG  • Repeat CTH if > 2 pt drop in GCS in one hour  • Sleep/wake cycle regulation  • CAM-ICU BID  • Neuro checks  • Sedation/analgesia      CV:   • No acute issues  ? Maintain -150 and MAP greater than 85 given recent thrombectomy  ? Labetalol 10 mg every 4 hours as needed     Pulm:  • No acute issues  • Continue pulmonary hygiene  Incentive spirometer q1h while awake, encourage coughing and deep breathing  Upright positioning  • Suction as needed and closely monitor secretions  Maintain HOB >30 degrees  Q4h oral care with chlorhexidine BID     GI:   • No acute issues  • Stress ulcer prophylaxis: NA  • Bowel regimen:  NA     :   • No acute issues, corbin in place, will attempt to d/c  • I/O monitoring  • Continue to follow renal function tests     F/E/N:   • Maintenance fluids: None  • Diuresis plan: None  • Replete electrolytes with as needed to maintain K >4 0, Mag >2 0, Phos >3 0  • Nutrition: N p o  due to planned ALYSSA     Heme/Onc:   • Hypercoagulable state work up  • Consider transfusion for hemoglobin <7 0  • VTE prophylaxis: SCD's to BLE     Endo/Rheum:   • No acute issues  • Last hemoglobin A1c 4 8% on 5/16/2023     ID:   • Diagnosis: Covid Positive 5/15  ? Based on additional information received from the patient's sister on 5/16 this is unlikely to be a current infection and is merrily a continued positive test in the setting of covid infection reported in 2/2023, however suspect new infection given hypoxia and stroke as covid would cause a hypercoagulable state  • Continue to monitor fever and WBC curve     Disposition: Critical care    ICU Core Measures     A: Assess, Prevent, and Manage Pain · Has pain been assessed? Yes  · Need for changes to pain regimen? No   B: Both SAT/SAT  · N/A   C: Choice of Sedation · RASS Goal: 0 Alert and Calm or N/A patient not on sedation  · Need for changes to sedation or analgesia regimen? NA   D: Delirium · CAM-ICU: Negative   E: Early Mobility  · Plan for early mobility?  Yes   F: Family Engagement · Plan for family engagement today? Yes       Review of Invasive Devices: Pablo Plan: Voiding trial after improvement in ambulation   Central access plan: Medications requiring central line      Prophylaxis:  VTE Lovenox   Stress Ulcer  not ordered          Subjective   HPI/24hr events:   HPI/24hr events: 40year-old right-handed male without any known prior medical history who presented as a stroke alert 5/15/2023 at 6:40 PM with a last known well of 8:30 PM 5/14 when he had spoken with someone over the phone   Per documentation of a full check was called when the patient did not show up for a scheduled meeting with a friend and did not answer his phone  [place] arrival by EMS for the welfare check the patient was found to be covered in feces and was noted to have right upper extremity flaccid paralysis as well as right-sided neglect, right facial droop, and expressive aphasia   Initial NIH on presentation 16   CT head noted a left MCA infarct, CTA head and neck showed a partial helen left M1 occlusion  Patient was not a TNK candidate given that he was outside of the window   Patient subsequently underwent a CTP which demonstrated that the patient was a candidate for thrombectomy and the patient was a endovascular alert was called, patient is currently underwent mechanical thrombectomy TICI 3 revascularization   Pathology is suspected to be a proximal embolism given findings   Patient subsequently noted to be COVID-positive       Repeat CT head morning of 5/16 continued edema and sulcal effacement on the left similar to previous study, improvement of contrast staining versus possible hemorrhage  Subsequently after initial exam, the AM of 5/16 pt note to have a seizure that consisted on eye deviation to the R followed by postictal period  STAT CTH obtained no acute change, evolving known L MCA infarct  EEG started no seizures captured, subsequently discontinued       Review of Systems   Unable to perform ROS: Aphasia          Objective                            Vitals I/O      Most Recent Min/Max in 24hrs   Temp 98 4 °F (36 9 °C) Temp  Min: 98 2 °F (36 8 °C)  Max: 98 5 °F (36 9 °C)   Pulse 60 Pulse  Min: 50  Max: 80   Resp 20 Resp  Min: 14  Max: 27   /88 BP  Min: 132/85  Max: 161/90   O2 Sat 99 % SpO2  Min: 97 %  Max: 100 %      Intake/Output Summary (Last 24 hours) at 5/18/2023 0738  Last data filed at 5/18/2023 0600  Gross per 24 hour   Intake 4919 08 ml   Output 1975 ml   Net 2944 08 ml         Diet NPO     Invasive Monitoring Physical exam   n/a Physical Exam  Vitals and nursing note reviewed  Constitutional:       General: He is not in acute distress  Appearance: Normal appearance  HENT:      Head: Normocephalic and atraumatic  Nose: Nose normal    Eyes:      General: No scleral icterus  Right eye: No discharge  Left eye: No discharge  Extraocular Movements: Extraocular movements intact  Conjunctiva/sclera: Conjunctivae normal       Pupils: Pupils are equal, round, and reactive to light  Cardiovascular:      Rate and Rhythm: Normal rate  Pulmonary:      Effort: Pulmonary effort is normal    Abdominal:      General: Abdomen is flat  There is no distension  Palpations: There is no mass  Musculoskeletal:         General: Normal range of motion  Cervical back: Normal range of motion  Skin:     General: Skin is warm and dry  Neurological:      Mental Status: He is alert  Motor: Weakness (r weakness) present  Comments: Global aphasia    Psychiatric:         Mood and Affect: Mood normal          Behavior: Behavior normal           Diagnostic Studies      EKG: None  Imaging: I have personally reviewed pertinent reports     and I have personally reviewed pertinent films in PACS     Medications:  Scheduled PRN   aspirin, 81 mg, Daily  chlorhexidine, 15 mL, Q12H BRANDON  levETIRAcetam, 750 mg, Q12H [address]  nicotine, 1 patch, Daily      labetalol, 10 mg, Q4H PRN  ondansetron, 4 mg, Once PRN       Continuous    sodium chloride, 90 mL/hr, Last Rate: 90 mL/hr (05/18/23 0415)         Labs:    CBC    Recent Labs     05/17/23  0523 05/18/23  0536   WBC 5 47 5 56   HGB 10 7* 10 2*   HCT 31 3* 30 1*    141*     BMP    Recent Labs     05/18/23  0013 05/18/23  0535   SODIUM 142 145   K 3 5 4 1   * 123*   CO2 25 23   AGAP -1* -1*   BUN 4* 4*   CREATININE 0 49* 0 50*   CALCIUM 7 7* 7 8*       Coags    No recent results     Additional Electrolytes  Recent Labs     05/17/23  0523 05/18/23  0535   MG 2 2 2 0   PHOS 2 4* 1 5*          Blood Gas    No recent results  No recent results LFTs  Recent Labs     05/17/23  0523   ALT 26   AST 38   ALKPHOS 44*   ALB 2 7*   TBILI 0 30       Infectious  No recent results  Glucose  Recent Labs     05/17/23  1201 05/17/23  1802 05/18/23  0013 05/18/23  0535   GLUC 153* 243* DO Torie

## 2023-05-18 NOTE — PLAN OF CARE
Problem: MOBILITY - ADULT  Goal: Maintain or return to baseline ADL function  Description: INTERVENTIONS:  -  Assess patient's ability to carry out ADLs; assess patient's baseline for ADL function and identify physical deficits which impact ability to perform ADLs (bathing, care of mouth/teeth, toileting, grooming, dressing, etc )  - Assess/evaluate cause of self-care deficits   - Assess range of motion  - Assess patient's mobility; develop plan if impaired  - Assess patient's need for assistive devices and provide as appropriate  - Encourage maximum independence but intervene and supervise when necessary  - Involve family in performance of ADLs  - Assess for home care needs following discharge   - Consider OT consult to assist with ADL evaluation and planning for discharge  - Provide patient education as appropriate  Outcome: Progressing  Goal: Maintains/Returns to pre admission functional level  Description: INTERVENTIONS:  - Perform BMAT or MOVE assessment daily    - Set and communicate daily mobility goal to care team and patient/family/caregiver     - Collaborate with rehabilitation services on mobility goals if consulted  - Out of bed for toileting  - Record patient progress and toleration of activity level   Outcome: Progressing     Problem: Prexisting or High Potential for Compromised Skin Integrity  Goal: Skin integrity is maintained or improved  Description: INTERVENTIONS:  - Identify patients at risk for skin breakdown  - Assess and monitor skin integrity  - Assess and monitor nutrition and hydration status  - Monitor labs   - Assess for incontinence   - Turn and reposition patient  - Assist with mobility/ambulation  - Relieve pressure over bony prominences  - Avoid friction and shearing  - Provide appropriate hygiene as needed including keeping skin clean and dry  - Evaluate need for skin moisturizer/barrier cream  - Collaborate with interdisciplinary team   - Patient/family teaching  - Consider wound care consult   Outcome: Progressing     Problem: Neurological Deficit  Goal: Neurological status is stable or improving  Description: Interventions:  - Monitor and assess patient's level of consciousness, motor function, sensory function, and level of assistance needed for ADLs  - Monitor and report changes from baseline  Collaborate with interdisciplinary team to initiate plan and implement interventions as ordered  - Provide and maintain a safe environment  - Consider seizure precautions  - Consider fall precautions  - Consider aspiration precautions  - Consider bleeding precautions  Outcome: Progressing     Problem: Activity Intolerance/Impaired Mobility  Goal: Mobility/activity is maintained at optimum level for patient  Description: Interventions:  - Assess and monitor patient  barriers to mobility and need for assistive/adaptive devices  - Assess patient's emotional response to limitations  - Collaborate with interdisciplinary team and initiate plans and interventions as ordered  - Encourage independent activity per ability   - Maintain proper body alignment  - Perform active/passive rom as tolerated/ordered  - Plan activities to conserve energy   - Turn patient as appropriate  Outcome: Progressing     Problem: Communication Impairment  Goal: Ability to express needs and understand communication  Description: Assess patient's communication skills and ability to understand information  Patient will demonstrate use of effective communication techniques, alternative methods of communication and understanding even if not able to speak  - Encourage communication and provide alternate methods of communication as needed  - Collaborate with case management/ for discharge needs  - Include patient/family/caregiver in decisions related to communication    Outcome: Progressing     Problem: Potential for Aspiration  Goal: Non-ventilated patient's risk of aspiration is minimized  Description: Assess and monitor vital signs, respiratory status, and labs (WBC)  Monitor for signs of aspiration (tachypnea, cough, rales, wheezing, cyanosis, fever)  - Assess and monitor patient's ability to swallow  - Place patient up in chair to eat if possible  - HOB up at 90 degrees to eat if unable to get patient up into chair   - Supervise patient during oral intake  - Instruct patient/ family to take small bites  - Instruct patient/ family to take small single sips when taking liquids  - Follow patient-specific strategies generated by speech pathologist   Outcome: Progressing  Goal: Ventilated patient's risk of aspiration is minimized  Description: Assess and monitor vital signs, respiratory status, airway cuff pressure, and labs (WBC)  Monitor for signs of aspiration (tachypnea, cough, rales, wheezing, cyanosis, fever)  - Elevate head of bed 30 degrees if patient has tube feeding   - Monitor tube feeding  Outcome: Progressing     Problem: Nutrition  Goal: Nutrition/Hydration status is improving  Description: Monitor and assess patient's nutrition/hydration status for malnutrition (ex- brittle hair, bruises, dry skin, pale skin and conjunctiva, muscle wasting, smooth red tongue, and disorientation)  Collaborate with interdisciplinary team and initiate plan and interventions as ordered  Monitor patient's weight and dietary intake as ordered or per policy  Utilize nutrition screening tool and intervene per policy  Determine patient's food preferences and provide high-protein, high-caloric foods as appropriate  - Assist patient with eating   - Allow adequate time for meals   - Encourage patient to take dietary supplement as ordered  - Collaborate with clinical nutritionist   - Include patient/family/caregiver in decisions related to nutrition    Outcome: Progressing     Problem: Potential for Falls  Goal: Patient will remain free of falls  Description: INTERVENTIONS:  - Educate patient/family on patient safety including physical limitations  - Instruct patient to call for assistance with activity   - Consult OT/PT to assist with strengthening/mobility   - Keep Call bell within reach  - Keep bed low and locked with side rails adjusted as appropriate  - Keep care items and personal belongings within reach  - Initiate and maintain comfort rounds  - Make Fall Risk Sign visible to staff  - Apply yellow socks and bracelet for high fall risk patients  - Consider moving patient to room near nurses station  Outcome: Progressing     Problem: Nutrition/Hydration-ADULT  Goal: Nutrient/Hydration intake appropriate for improving, restoring or maintaining nutritional needs  Description: Monitor and assess patient's nutrition/hydration status for malnutrition  Collaborate with interdisciplinary team and initiate plan and interventions as ordered  Monitor patient's weight and dietary intake as ordered or per policy  Utilize nutrition screening tool and intervene as necessary  Determine patient's food preferences and provide high-protein, high-caloric foods as appropriate       INTERVENTIONS:  - Monitor oral intake, urinary output, labs, and treatment plans  - Assess nutrition and hydration status and recommend course of action  - Evaluate amount of meals eaten  - Assist patient with eating if necessary   - Allow adequate time for meals  - Recommend/ encourage appropriate diets, oral nutritional supplements, and vitamin/mineral supplements  - Order, calculate, and assess calorie counts as needed  - Recommend, monitor, and adjust tube feedings and TPN/PPN based on assessed needs  - Assess need for intravenous fluids  - Provide specific nutrition/hydration education as appropriate  - Include patient/family/caregiver in decisions related to nutrition  Outcome: Progressing     Problem: SAFETY,RESTRAINT: NV/NON-SELF DESTRUCTIVE BEHAVIOR  Goal: Remains free of harm/injury (restraint for non violent/non self-detsructive behavior)  Description: INTERVENTIONS:  - Instruct patient/family regarding restraint use   - Assess and monitor physiologic and psychological status   - Provide interventions and comfort measures to meet assessed patient needs   - Identify and implement measures to help patient regain control  - Assess readiness for release of restraint   Outcome: Progressing  Goal: Returns to optimal restraint-free functioning  Description: INTERVENTIONS:  - Assess the patient's behavior and symptoms that indicate continued need for restraint  - Identify and implement measures to help patient regain control  - Assess readiness for release of restraint   Outcome: Progressing

## 2023-05-18 NOTE — PROGRESS NOTES
NEUROLOGY RESIDENCY PROGRESS NOTE     Name: Nick Lundberg   Age & Sex: 40 y o  male   MRN: [id number]  Unit/Bed#: ICU 09   Encounter: [id number]    Recommendations for outpatient neurological follow up have yet to be determined  Pending for discharge: As per primary team based on clinical improvement    ASSESSMENT & PLAN     * L MCA stroke w/ LM1 partial occlusion  Assessment & Plan  · 40 y o  right handed male w/ no pertinent hx who presented as a stroke alert on 05/15/23 at 6:40 PM  LKW 8:30PM last night when he spoke with someone over the phone  Initial presenting deficits R facial droop, RUE > RLE weakness  No thinners use  · Per EMS/ED, initial BP: 149/88  Pulse 65  ECG NSR  Glucose 120  NIHSS 16 (see below)  · CTH showed L MCA stroke (ASPECT 8), CTA h/n showed partial LM1 occlusion  Not found to be a TNK candidate due to being out of time window, large stroke seen on CTH  Given clinical/radiological findings, pt was then returned to the scanner for CTP which demonstrated that pt was a candidate for IR thrombectomy and endovascular alert was called  Work-up:  • A1c: 4 8  • Lipid panel: cholesterol 159, triglycerides 34, , LDL 18  • Thrombus panel: Antithrombin III low, Beta-2 glycoprotein antibodies normal, Cardiolipin antibody normal  • Homocysteine: Normal  • BELEM: Negative    Imaging:  [facility] 5/16: Grossly stable recent large left MCA territory infarct with regional mass effect without midline shift  No acute hemorrhage  MRI Brain w/ contrast 5/17/23: Recent large left MCA territory infarct with associated edema and a few foci of petechial hemorrhage  There is regional mass effect without midline shift  Cannot reliably evaluate the infarcted brain for structural abnormality  No structural abnormality is identified in the remainder brain  Normal hippocampal formations      Neuro exam: Eyes equal/reactive, R deficits greater than L, following commands with redirection, please refer to the progress note for detailed exam     Impression: 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 in a 39 yo active patient w/ no reported vascular risk factors to favor cardioembolic etiology  Patient s/p thrombectomy with TICI 3 revascularization  Will obtain thrombosis panel and additional labs for underlying hypercoagulable states  Mitcheal [place] with Neurosurgery for possible intervention  Cannot rule out hypercoagulable state secondary to COVID infection  Plan:   • If pt mental status declines by GCS > 2 in 1 hour, obtain STAT CTH  • Post thrombectomy, SBP < 160, MAP> 65  • Goal euglycemia, normothermia  • ALYSSA scheduled for 5/18  • CTH scheduled for 5/19  • Neuro checks   • Monitor on telemetry  • PT/OT/Speech/PMR consults appreciated when able  • Young patient with limited risk factors and stroke, will obtain additional labs with thrombosis panel, will check:  o Homocysteine level, MTHFR mutation, as well as rheumatic disease markers (ANCA, dsDNA, RF)  • If all work-up negative, then possible consider Vas Duplex Study b/l lower extremities, d-dimer, and CT-pan for possible malignancy  • Medical management as per critical care appreciated      Seizure [facility])  Assessment & Plan  Episode of unresponsiveness noted around 5/16 2:37 AM lasting uncertain duration, given 2 mg Ativan and taken emergently to CT scan revealing no hemorrhage, stable mass effect likely secondary to edema  Subsequently loaded with 4000 mg Keppra, initiated on 750 mg BID  Additional episode requiring 2 mg Ativan at approximately 10:00 AM      vEEG - No seizures overnight as of 5/17    Plan:  · Continue on Keppra 750 mg BID  · Monitor clinically  · Seizure precautions  · Rest of care per primary team        SUBJECTIVE     Patient is a 40year old male who presented to the ED after being found down  He was discovered to have had a stroke and is s/p thrombectomy Day 3  Patient was seen and examined   He is sitting upright in bed today, alert and much more interactive  Able to communicate desire for water as well as states he is feeling well  He is acknowledging the right sided weakness  No acute events overnight  Pertinent Negatives include: headaches     Review of Systems   Reason unable to perform ROS: Aphasia  Constitutional: Negative for chills and fever  Respiratory: Negative for shortness of breath  Cardiovascular: Negative for chest pain  Gastrointestinal: Negative for abdominal pain  Neurological: Negative for dizziness and headaches  OBJECTIVE     Patient ID: Minor Coffey is a 40 y o  male  Vitals:    23 0900 23 1000 23 1100 23 1200   BP:  157/91 150/90 159/87   BP Location:       Pulse: (!) 50 (!) 54 (!) 48 (!) 54   Resp: 16 19 15 15   Temp:       TempSrc:       SpO2: 100% 95% 100% 99%   Weight:       Height:          Temperature:   Temp (24hrs), Av 3 °F (36 8 °C), Min:98 1 °F (36 7 °C), Max:98 5 °F (36 9 °C)    Temperature: 98 1 °F (36 7 °C)      Physical Exam  Vitals reviewed  Constitutional:       General: He is not in acute distress  HENT:      Head: Normocephalic and atraumatic  Mouth/Throat:      Mouth: Mucous membranes are moist    Eyes:      Extraocular Movements: EOM normal       Pupils: Pupils are equal, round, and reactive to light  Cardiovascular:      Rate and Rhythm: Bradycardia present  Pulmonary:      Effort: Pulmonary effort is normal  No respiratory distress  Musculoskeletal:      Right lower leg: No edema  Left lower leg: No edema  Skin:     General: Skin is warm and dry  Neurological:      Mental Status: He is alert  Cranial Nerves: Cranial nerves 2-12 are intact  Coordination: Finger-Nose-Finger Test abnormal (Normal in L but unable to perform on R)  Deep Tendon Reflexes:      Reflex Scores:       Tricep reflexes are 2+ on the right side and 2+ on the left side  Bicep reflexes are 2+ on the right side and 2+ on the left side  Brachioradialis reflexes are 2+ on the right side and 2+ on the left side  Patellar reflexes are 2+ on the right side and 2+ on the left side  Achilles reflexes are 2+ on the right side and 2+ on the left side  Psychiatric:         Mood and Affect: Mood normal          Behavior: Behavior normal           Neurologic Exam     Mental Status   Follows 1 step commands  Attention: normal    Speech: (Expressive aphasia)  Level of consciousness: alert  Unable to name object (Trying to say pencil when pen was showed  )  Able to read  Able to repeat  Normal comprehension  Cranial Nerves   Cranial nerves II through XII intact  CN II   Visual fields full to confrontation  CN III, IV, VI   Pupils are equal, round, and reactive to light  Extraocular motions are normal      CN V   Facial sensation intact  CN VII   Facial expression full, symmetric  CN VIII   CN VIII normal      CN IX, X   CN IX normal    CN X normal      CN XI   CN XI normal      CN XII   CN XII normal      Motor Exam   Muscle bulk: normal  Overall muscle tone: normal  Right arm tone: normal  Left arm tone: normal  Right arm pronator drift: Attempting on R but unable to hold due to weakness    Left arm pronator drift: absent  Right leg tone: normal  Left leg tone: normal    Strength   Right strength: 4/5 in R lower extremities  Left strength: 4+ in L lower extremities  Right biceps: 5/5  Left biceps: 5/5  Right triceps: 5/5  Left triceps: 5/5  Right interossei: 3/5  Left interossei: 5/5  Right quadriceps: 4/5  Left quadriceps: 4/5  Right hamstrin/5  Left hamstrin/5  Right anterior tibial: 4/5  Left anterior tibial: 4/5  Right peroneal: 4/5  Left peroneal: 4/5    Sensory Exam   Light touch normal      Gait, Coordination, and Reflexes     Coordination   Finger to nose coordination: abnormal (Normal in L but unable to perform on R)    Reflexes   Right brachioradialis: 2+  Left brachioradialis: 2+  Right biceps: 2+  Left biceps: 2+  Right triceps: 2+  Left triceps: 2+  Right patellar: 2+  Left patellar: 2+  Right achilles: 2+  Left achilles: 2+       LABORATORY DATA     Labs: I have personally reviewed pertinent reports  Results from last 7 days   Lab Units 05/18/23  0536 05/17/23  0523 05/16/23  0557   WBC Thousand/uL 5 56 5 47 5 50   HEMOGLOBIN g/dL 10 2* 10 7* 11 5*   HEMATOCRIT % 30 1* 31 3* 32 0*   PLATELETS Thousands/uL 141* 149 165   NEUTROS PCT % 65 71 71   MONOS PCT % 9 11 13*      Results from last 7 days   Lab Units 05/18/23  0535 05/18/23  0013 05/17/23  1802 05/17/23  1201 05/17/23  0523 05/16/23  1240 05/16/23  0557   SODIUM mmol/L 145 142 141   < > 146   < > 140   POTASSIUM mmol/L 4 1 3 5 3 3*   < > 3 6   < > 3 9   CHLORIDE mmol/L 123* 118* 115*   < > 121*   < > 113*   CO2 mmol/L 23 25 25   < > 25   < > 25   BUN mg/dL 4* 4* 6   < > 6   < > 8   CREATININE mg/dL 0 50* 0 49* 0 82   < > 0 54*   < > 0 52*   CALCIUM mg/dL 7 8* 7 7* 8 2*   < > 8 4   < > 8 3   ALK PHOS U/L  --   --   --   --  44*  --  47   ALT U/L  --   --   --   --  26  --  26   AST U/L  --   --   --   --  38  --  37    < > = values in this interval not displayed  Results from last 7 days   Lab Units 05/18/23  0535 05/17/23  0523 05/16/23  0557   MAGNESIUM mg/dL 2 0 2 2 2 0     Results from last 7 days   Lab Units 05/18/23  0535 05/17/23  0523 05/16/23  0557   PHOSPHORUS mg/dL 1 5* 2 4* 3 0      Results from last 7 days   Lab Units 05/15/23  1853   INR  0 89   PTT seconds 22*               IMAGING & DIAGNOSTIC TESTING     Radiology Results: I have personally reviewed pertinent reports  MRI brain seizure wo and w contrast   Final Result by Ramos Borrero MD (05/17 1505)   Addendum (preliminary) 1 of 1 by Ramos Borrero MD (05/17 6541)   ADDENDUM:      Correcting comparison study: Head CT 5/16/2023  Final      1  Recent large left MCA territory infarct with associated edema and a few foci of petechial hemorrhage   There is regional mass effect without midline shift  2   Cannot reliably evaluate the infarcted brain for structural abnormality  No structural abnormality is identified in the remainder brain  Normal hippocampal formations  Workstation performed: [id number]         XR ankle 3+ vw right   Final Result by Yuliet Gonzalez DO (05/17 8943)      No acute osseous abnormality  Workstation performed: [id number]         CT head wo contrast   Final Result by Ai Cota MD (05/17 6812)      Grossly stable recent large left MCA territory infarct with regional mass effect without midline shift  No acute hemorrhage  Workstation performed: [id number]         CT head wo contrast   Final Result by Fior Benson MD (63/95 7076)      Evolving left MCA infarct with increased cortical hypodensity and stable sulcal effacement/mass effect throughout the left lobe  No acute intracranial hemorrhage or midline shift  Workstation performed: [id number]         CT head wo contrast   Final Result by Dorina Lux MD (48/87 4682)      Compared to the most immediate prior study, significant interval decrease/near total resolution of the previously noted extensive left hemispheric hyperdensity involving the cortex and subcortical white matter and left basal ganglia  Minimal residual    hyperdensity in the left basal ganglia region  Overall these findings suggest resolving contrast staining post endovascular therapy although small amount of superimposed hemorrhage is difficult to entirely exclude  Recommend short-term interval follow-up    scan and/or further evaluation with MRI for definitive evaluation  Stable left hemispheric edema and sulcal effacement without significant midline shift  Above findings discussed with Dr Ben Riddle at 4:30 a m  on 5/16/2023        Workstation performed: [id number]         XR chest portable ICU   Final Result by Anali Haines MD (05/16 6655)      No acute cardiopulmonary disease  Right jugular catheter in lower SVC with no pneumothorax  Workstation performed: [id number]         CT head wo contrast   Final Result by Giles Gloria MD (05/15 2233)      New extensive left hemispheric intraparenchymal and subarachnoid hemorrhage with mass effect upon the adjacent brain parenchyma and associated cerebral edema predominantly in the left MCA vascular distribution  No significant midline shift or evidence of    transtentorial herniation noted  Findings discussed with Dr Althea Mccann at 10:30 p m  Workstation performed: [id number]         IR stroke alert   Final Result by Alphonse Garsia (05/17 1019)      CT cerebral perfusion   Final Result by Giovanna Felix MD (05/15 1920)      CT perfusion performed  Data available on PACS  Workstation performed: [id number]         CTA stroke alert (head/neck)   Final Result by Giovanna Felix MD (56/08 1915)         1  Acute occlusion of the proximal left M1 segment with partial opacification of distal M2 and M3 branches  2  No evidence of thrombus, stenosis or occlusion of the carotid or vertebral arteries  Findings were directly discussed with Liseth Reid at  7:14 PM                              Workstation performed: [id number]         CT stroke alert brain   Final Result by Giovanna Felix MD (05/15 1915)      Late acute to early subacute left MCA territory infarct  No hemorrhagic conversion  Findings were directly discussed with Liseth Reid at  7:10 PM         Workstation performed: [id number]         CT head wo contrast    (Results Pending)       Other Diagnostic Testing: I have personally reviewed pertinent reports        ACTIVE MEDICATIONS     Current Facility-Administered Medications   Medication Dose Route Frequency   • aspirin chewable tablet 81 mg  81 mg Oral Daily   • chlorhexidine (PERIDEX) 0 12 % oral rinse 15 mL  15 mL Mouth/Throat Q12H [facility]   • enoxaparin (LOVENOX) subcutaneous injection 40 mg  40 mg Subcutaneous Q24H [facility]   • labetalol (NORMODYNE) injection 10 mg  10 mg Intravenous Q4H PRN   • levETIRAcetam (KEPPRA) 750 mg in sodium chloride 0 9 % 100 mL IVPB  750 mg Intravenous Q12H [facility]   • nicotine (NICODERM CQ) 14 mg/24hr TD 24 hr patch 1 patch  1 patch Transdermal Daily   • ondansetron (ZOFRAN) injection 4 mg  4 mg Intravenous Once PRN   • sodium chloride (HYPERTONIC) 3 % infusion  90 mL/hr Intravenous Continuous       Prior to Admission medications    Medication Sig Start Date End Date Taking?  Authorizing Provider   MELATONIN PO Take by mouth    Historical Provider, MD   Protein POWD Take by mouth    Historical Provider, MD         VTE Pharmacologic Prophylaxis: Enoxaparin (Lovenox)  VTE Mechanical Prophylaxis: sequential compression device and foot pump applied    ==  Illa Maxcy, DO [facility], PGY-1    Kaylee Martinez, MS4

## 2023-05-18 NOTE — ASSESSMENT & PLAN NOTE
PPD#3 - s/p TICI 3 revascularization of a left M1 occlusion (Dr Jayme Loomis, 5/15/2023)  · Found down at home on 5/15 with NIHSS 16, noted to have acute left M1 occlusion with early ischemic changes  · COVID+ 5/15  · Question if prior infection as patient had Covid 2/2023 per his sister  · Pt remains receptively and expressively aphasic but improving today  More alert, following 1 step commands     Imaging:  · MRI brain seizure w/wo contrast 5/17: Recent large left MCA territory infarct with associated edema and a few foci of petechial hemorrhage  There is regional mass effect without midline shift  Cannot reliably evaluate the infarcted brain for structural abnormality  No structural abnormality is identified in the remainder brain  Normal hippocampal formations    Plan:  • Continue to closely monitor neuro exam   o Frequent neuro checks per primary team   o Repeat STAT CTH with any acute decline in GCS > 2pts or more in 1hr   • Maintain normotensive BP goals, SBP < 160, MAP > 65   • No acute neurosurgical intervention indicated at this time   o Case and imaging reviewed this am on rounds     o Large stroke burden noted on MRI but without significant brain compression   o Will continue to closely monitor through peak swelling window, repeat CTH in the am tomorrow   • Continue aggressive medical management   o Continue on [facility] watch, stroke day ~4/5   o 3% HTS 90cc/hr  - Maintain Na > 145, osm 310-315   o Continue aggressive supportive care   • Continue management on stroke pathway   o Neurology following, appreciate neuro management of stroke and workup   o Continue on daily ASA   o Follow-up hypercoagulable workup   • Continue seizure management per primary team and neurology   o vEEG since removed   - Pending final read reports evidence of the known structural L sided injury, no seizures   o Continue current AED regimen, keppra 750mg BID   • DVT ppx: SCDs, SQ Lovenox   • pain control per primary team   • Medical management per primary team   • PT/OT   • Cleared for a modified diet from speech   • Cleared for regular diet with thin liquids by speech therapy  • Social work following for assistance with dispo once medically cleared     Neurosurgery will continue to follow and review repeat CTH in the am   Please call with questions or concerns

## 2023-05-18 NOTE — CASE MANAGEMENT
Case Management Discharge Planning Note    Patient name Soraida Comfort  Location ICU 09/ICU 09 MRN [id number]  : 1980 Date 2023       Current Admission Date: 5/15/2023  Current Admission Diagnosis:L MCA stroke w/ LM1 partial occlusion   Patient Active Problem List    Diagnosis Date Noted   • Seizure ([address] ) 2023   • COVID 2023   • L MCA stroke w/ LM1 partial occlusion 05/15/2023   • Positive self-administered antigen test for COVID-19 02/10/2023   • Viral infection, unspecified 11/10/2022   • Bilateral low back pain without sciatica 2020      LOS (days): 3  Geometric Mean LOS (GMLOS) (days): 7 30  Days to GMLOS:4 5     OBJECTIVE:  Risk of Unplanned Readmission Score: 12 21         Current admission status: Inpatient   Preferred Pharmacy:   [address]  [address]  [facility]  Phone: [phone] Fax: 1917 [facility] 406 [facility], [address] [place] [address] [address] [facility] [address]  Phone: [phone] Fax: [phone]    Primary Care Provider: Hussain Bello DO    Primary Insurance: [facility]  Secondary Insurance:     DISCHARGE DETAILS:    Discharge planning discussed with[de-identified] TC to Temi fernandez of Choice: Yes  Comments - Freedom of Choice: therapy is recommending STR  PMR is recommending Acute rehab  Discussed same with father  He would prefer referral to [facility]   Referral sent via Aidin  CM contacted family/caregiver?: Yes  Were Treatment Team discharge recommendations reviewed with patient/caregiver?: Yes  Did patient/caregiver verbalize understanding of patient care needs?: Yes       Contacts  Reason/Outcome: Referral      Other Referral/Resources/Interventions Provided:  Interventions: Acute Rehab         Treatment Team Recommendation: Acute Rehab  Discharge Destination Plan[de-identified] Acute Rehab        Additional Comments: provided emotional support for father  Discussed potential d/c dispo: pt may not be able to live independently post d/c  It is difficult to determine how much he will improve in rehab  Father reports he feels pt has made some improvements w/ movement and speech since the prior day

## 2023-05-18 NOTE — PLAN OF CARE
Problem: MOBILITY - ADULT  Goal: Maintain or return to baseline ADL function  Description: INTERVENTIONS:  -  Assess patient's ability to carry out ADLs; assess patient's baseline for ADL function and identify physical deficits which impact ability to perform ADLs (bathing, care of mouth/teeth, toileting, grooming, dressing, etc )  - Assess/evaluate cause of self-care deficits   - Assess range of motion  - Assess patient's mobility; develop plan if impaired  - Assess patient's need for assistive devices and provide as appropriate  - Encourage maximum independence but intervene and supervise when necessary  - Involve family in performance of ADLs  - Assess for home care needs following discharge   - Consider OT consult to assist with ADL evaluation and planning for discharge  - Provide patient education as appropriate  Outcome: Progressing  Goal: Maintains/Returns to pre admission functional level  Description: INTERVENTIONS:  - Perform BMAT or MOVE assessment daily    - Set and communicate daily mobility goal to care team and patient/family/caregiver  - Collaborate with rehabilitation services on mobility goals if consulted  - Perform Range of Motion 4 times a day  - Reposition patient every 2 hours    - Dangle patient 3 times a day  - Stand patient 2 times a day  - tivity level   Outcome: Progressing

## 2023-05-18 NOTE — ANESTHESIA PREPROCEDURE EVALUATION
Procedure:  ALYSSA     Stroke alert 5/15 - found to have an acute left MCA stroke s/p thrombectomy 5/15  +brain edema with mass effect, but no midline shift  Currently on HTS Na 145 Serum osm 741  Course complicated by seizures noted on 5/16 - on keppra      COVID positive on admission  RUE weakness 1/5 strength  Aphasia present    Relevant Problems   MUSCULOSKELETAL   (+) Bilateral low back pain without sciatica      NEURO/PSYCH   (+) L MCA stroke w/ LM1 partial occlusion   (+) Seizure (HCC)      Lab Results   Component Value Date    WBC 5 56 05/18/2023    HGB 10 2 (L) 05/18/2023    HCT 30 1 (L) 05/18/2023     (H) 05/18/2023     (L) 05/18/2023     Lab Results   Component Value Date    SODIUM 145 05/18/2023    K 4 1 05/18/2023     (H) 05/18/2023    CO2 23 05/18/2023    BUN 4 (L) 05/18/2023    CREATININE 0 50 (L) 05/18/2023    GLUC 134 05/18/2023    CALCIUM 7 8 (L) 05/18/2023       Physical Exam    Airway    Mallampati score: II  TM Distance: >3 FB  Neck ROM: full     Dental       Cardiovascular  Rhythm: regular, Rate: normal,     Pulmonary  Breath sounds clear to auscultation,     Other Findings        Anesthesia Plan  ASA Score- 3     Anesthesia Type- IV sedation with anesthesia with ASA Monitors  Additional Monitors:   Airway Plan:           Plan Factors-Exercise tolerance (METS): >4 METS  Chart reviewed  Existing labs reviewed  Patient summary reviewed  Patient is a current smoker  Induction- intravenous  Postoperative Plan-     Informed Consent- Anesthetic plan and risks discussed with patient  I personally reviewed this patient with the CRNA  Discussed and agreed on the Anesthesia Plan with the CRNA  Stephanie Roland

## 2023-05-18 NOTE — PROGRESS NOTES
[facility]  Progress Note  Name: Vanessa Gutiérrez  MRN: [id number]  Unit/Bed#: ICU 09 I Date of Admission: 5/15/2023   Date of Service: 5/18/2023 I Hospital Day: 3    Assessment/Plan   * L MCA stroke w/ LM1 partial occlusion  Assessment & Plan  PPD#3 - s/p TICI 3 revascularization of a left M1 occlusion (Dr Ranulfo Bagley, 5/15/2023)  · Found down at home on 5/15 with NIHSS 16, noted to have acute left M1 occlusion with early ischemic changes  · COVID+ 5/15  · Question if prior infection as patient had Covid 2/2023 per his sister  · Pt remains receptively and expressively aphasic but improving today  More alert, following 1 step commands     Imaging:  · MRI brain seizure w/wo contrast 5/17: Recent large left MCA territory infarct with associated edema and a few foci of petechial hemorrhage  There is regional mass effect without midline shift  Cannot reliably evaluate the infarcted brain for structural abnormality  No structural abnormality is identified in the remainder brain  Normal hippocampal formations    Plan:  • Continue to closely monitor neuro exam   o Frequent neuro checks per primary team   o Repeat STAT CTH with any acute decline in GCS > 2pts or more in 1hr   • Maintain normotensive BP goals, SBP < 160, MAP > 65   • No acute neurosurgical intervention indicated at this time   o Case and imaging reviewed this am on rounds     o Large stroke burden noted on MRI but without significant brain compression   o Will continue to closely monitor through peak swelling window, repeat CTH in the am tomorrow   • Continue aggressive medical management   o Continue on [facility] watch, stroke day ~4/5   o 3% HTS 90cc/hr  - Maintain Na > 145, osm 310-315   o Continue aggressive supportive care   • Continue management on stroke pathway   o Neurology following, appreciate neuro management of stroke and workup   o Continue on daily ASA   o Follow-up hypercoagulable workup   • Continue seizure "management per primary team and neurology   o vEEG since removed   - Pending final read reports evidence of the known structural L sided injury, no seizures   o Continue current AED regimen, keppra 750mg BID   • DVT ppx: SCDs, SQ Lovenox   • pain control per primary team   • Medical management per primary team   • PT/OT   • Cleared for a modified diet from speech   • Cleared for regular diet with thin liquids by speech therapy  • Social work following for assistance with dispo once medically cleared     Neurosurgery will continue to follow and review repeat [address] in the am   Please call with questions or concerns  Subjective/Objective   Chief Complaint: \"hi\"    Subjective: Patient seen and examined this a m  on rounds  No acute events overnight  Patient remains on hypertonic saline at 90 this morning  Patient's exam slowly improving now following one-step commands  Remains both receptive and expressively aphasic  Objective: Young male sitting up comfortably in bed  No acute distress    I/O       05/16 0701  05/17 0700 05/17 0701  05/18 0700 05/18 0701  05/19 0700    P  O   2880     I V  (mL/kg) 1640 2 (22) 1834 5 (24 6)     IV Piggyback 1028  3 204 6     Total Intake(mL/kg) 2668 5 (35 8) 4919 1 (65 9)     Urine (mL/kg/hr) 2375 (1 3) 1975 (1 1)     Stool  0     Total Output 2375 1975     Net +293 5 +2944  1            Unmeasured Stool Occurrence  1 x         Invasive Devices     Central Venous Catheter Line  Duration           CVC Central Lines 05/16/23 Triple 16cm 2 days          Drain  Duration           Urethral Catheter Coude 2 days              Physical Exam:  Vitals: Blood pressure 140/89, pulse (!) 54, temperature 98 1 °F (36 7 °C), temperature source Oral, resp  rate 16, height 5' 9\" (1 753 m), weight 74 6 kg (164 lb 7 4 oz), SpO2 100 %  ,Body mass index is 24 29 kg/m²      General appearance: alert, appears stated age, cooperative and no distress  Head: Normocephalic, without obvious abnormality, " atraumatic  Eyes: EOMI, PERRL  Neck: supple, symmetrical, trachea midline and NT  Back: no kyphosis present, no tenderness to percussion or palpation  Lungs: non labored breathing, no resp distress on room air   Heart: regular heart rate  Neurologic:   - GCS 13  - opens eyes spontaneously, tracking   - significant expressive and receptive aphasia, however improving                - able to follow 1 step command in kell UE    - unable to follow 2 step commands at this time   - limited but improving comprehensible speech, answers yes and no clearly, intermittent appropriate responses  Able to say 1-2 words clear and appropriately   - subtle R sided facial droop  - sensation appears equal and intact kell   - motor strength limited given comprehension/ability to participate with exam                - R sided weakness with drift, distal > proximal  Rated 3/5 in the RUE and RLE, IO in RUE and RLE 2/5                - L side intact, no drift or weakness appreciated   Reflexes: 2+ and symmetric    Lab Results:  Results from last 7 days   Lab Units 05/18/23  0536 05/17/23  0523 05/16/23  0557   WBC Thousand/uL 5 56 5 47 5 50   HEMOGLOBIN g/dL 10 2* 10 7* 11 5*   HEMATOCRIT % 30 1* 31 3* 32 0*   PLATELETS Thousands/uL 141* 149 165   NEUTROS PCT % 65 71 71   MONOS PCT % 9 11 13*     Results from last 7 days   Lab Units 05/18/23  0535 05/18/23  0013 05/17/23  1802 05/17/23  1201 05/17/23  0523 05/16/23  1240 05/16/23  0557   POTASSIUM mmol/L 4 1 3 5 3 3*   < > 3 6   < > 3 9   CHLORIDE mmol/L 123* 118* 115*   < > 121*   < > 113*   CO2 mmol/L 23 25 25   < > 25   < > 25   BUN mg/dL 4* 4* 6   < > 6   < > 8   CREATININE mg/dL 0 50* 0 49* 0 82   < > 0 54*   < > 0 52*   CALCIUM mg/dL 7 8* 7 7* 8 2*   < > 8 4   < > 8 3   ALK PHOS U/L  --   --   --   --  44*  --  47   ALT U/L  --   --   --   --  26  --  26   AST U/L  --   --   --   --  38  --  37    < > = values in this interval not displayed       Results from last 7 days   Lab Units 05/18/23  0535 05/17/23  0523 05/16/23  0557   MAGNESIUM mg/dL 2 0 2 2 2 0     Results from last 7 days   Lab Units 05/18/23  0535 05/17/23  0523 05/16/23  0557   PHOSPHORUS mg/dL 1 5* 2 4* 3 0     Results from last 7 days   Lab Units 05/15/23  1853   INR  0 89   PTT seconds 22*     No results found for: TROPONINT  ABG:No results found for: PHART, DOP1FGO, PO2ART, DOX5TSZ, B0VRWBHW, BEART, SOURCE    Imaging Studies: I have personally reviewed pertinent reports  and I have personally reviewed pertinent films in PACS    XR ankle 3+ vw right    Result Date: 5/17/2023  Impression: No acute osseous abnormality  Workstation performed: [id number]     CT head wo contrast    Result Date: 5/17/2023  Impression: Grossly stable recent large left MCA territory infarct with regional mass effect without midline shift  No acute hemorrhage  Workstation performed: [id number]     CT head wo contrast    Result Date: 5/16/2023  Impression: Evolving left MCA infarct with increased cortical hypodensity and stable sulcal effacement/mass effect throughout the left lobe  No acute intracranial hemorrhage or midline shift  Workstation performed: [id number]     CT head wo contrast    Result Date: 5/16/2023  Impression: Compared to the most immediate prior study, significant interval decrease/near total resolution of the previously noted extensive left hemispheric hyperdensity involving the cortex and subcortical white matter and left basal ganglia  Minimal residual hyperdensity in the left basal ganglia region  Overall these findings suggest resolving contrast staining post endovascular therapy although small amount of superimposed hemorrhage is difficult to entirely exclude  Recommend short-term interval follow-up  scan and/or further evaluation with MRI for definitive evaluation  Stable left hemispheric edema and sulcal effacement without significant midline shift  Above findings discussed with Dr Babak Aleman at 4:30 a m  on 5/16/2023  Workstation performed: [id number]     CT head wo contrast    Result Date: 5/15/2023  Impression: New extensive left hemispheric intraparenchymal and subarachnoid hemorrhage with mass effect upon the adjacent brain parenchyma and associated cerebral edema predominantly in the left MCA vascular distribution  No significant midline shift or evidence of  transtentorial herniation noted  Findings discussed with Dr Nilsa Sheth at 10:30 p m  Workstation performed: [id number]     CT stroke alert brain    Result Date: 5/15/2023  Impression: Late acute to early subacute left MCA territory infarct  No hemorrhagic conversion  Findings were directly discussed with Gerald Lopez at  7:10 PM    Workstation performed: [id number]     CT cerebral perfusion    Result Date: 5/15/2023  Impression: CT perfusion performed  Data available on PACS  Workstation performed: [id number]     XR chest portable ICU    Result Date: 5/16/2023  Impression: No acute cardiopulmonary disease  Right jugular catheter in lower SVC with no pneumothorax  Workstation performed: [id number]     MRI brain seizure wo and w contrast    Addendum Date: 5/17/2023    ADDENDUM: Correcting comparison study: Head CT 5/16/2023  Result Date: 5/17/2023  Impression: 1  Recent large left MCA territory infarct with associated edema and a few foci of petechial hemorrhage  There is regional mass effect without midline shift  2   Cannot reliably evaluate the infarcted brain for structural abnormality  No structural abnormality is identified in the remainder brain  Normal hippocampal formations  Workstation performed: [id number]     CTA stroke alert (head/neck)    Result Date: 5/15/2023  Impression: 1  Acute occlusion of the proximal left M1 segment with partial opacification of distal M2 and M3 branches  2  No evidence of thrombus, stenosis or occlusion of the carotid or vertebral arteries   Findings were directly discussed with Gerald Lopez at  7:14 PM    Workstation performed: [id number]     EKG, Pathology, and Other Studies: I have personally reviewed pertinent reports        VTE Pharmacologic Prophylaxis: Sequential compression device (Venodyne)  and Enoxaparin (Lovenox)    VTE Mechanical Prophylaxis: sequential compression device

## 2023-05-19 ENCOUNTER — APPOINTMENT (INPATIENT)
Dept: RADIOLOGY | Facility: HOSPITAL | Age: 43
End: 2023-05-19

## 2023-05-19 ENCOUNTER — APPOINTMENT (INPATIENT)
Dept: NON INVASIVE DIAGNOSTICS | Facility: HOSPITAL | Age: 43
End: 2023-05-19

## 2023-05-19 LAB
ANION GAP SERPL CALCULATED.3IONS-SCNC: -1 MMOL/L (ref 4–13)
ANION GAP SERPL CALCULATED.3IONS-SCNC: 0 MMOL/L (ref 4–13)
ANION GAP SERPL CALCULATED.3IONS-SCNC: 0 MMOL/L (ref 4–13)
ANION GAP SERPL CALCULATED.3IONS-SCNC: 3 MMOL/L (ref 4–13)
APTT SCREEN TO CONFIRM RATIO: 0.98 RATIO (ref 0–1.34)
BASOPHILS # BLD AUTO: 0.02 THOUSANDS/ÂΜL (ref 0–0.1)
BASOPHILS NFR BLD AUTO: 0 % (ref 0–1)
BUN SERPL-MCNC: 4 MG/DL (ref 5–25)
BUN SERPL-MCNC: 4 MG/DL (ref 5–25)
BUN SERPL-MCNC: 5 MG/DL (ref 5–25)
BUN SERPL-MCNC: 5 MG/DL (ref 5–25)
C-ANCA TITR SER IF: NORMAL TITER
CALCIUM SERPL-MCNC: 7.5 MG/DL (ref 8.3–10.1)
CALCIUM SERPL-MCNC: 7.5 MG/DL (ref 8.3–10.1)
CALCIUM SERPL-MCNC: 7.9 MG/DL (ref 8.3–10.1)
CALCIUM SERPL-MCNC: 8 MG/DL (ref 8.3–10.1)
CHLORIDE SERPL-SCNC: 118 MMOL/L (ref 96–108)
CHLORIDE SERPL-SCNC: 120 MMOL/L (ref 96–108)
CHLORIDE SERPL-SCNC: 121 MMOL/L (ref 96–108)
CHLORIDE SERPL-SCNC: 122 MMOL/L (ref 96–108)
CO2 SERPL-SCNC: 24 MMOL/L (ref 21–32)
CO2 SERPL-SCNC: 25 MMOL/L (ref 21–32)
CONFIRM APTT/NORMAL: 38.1 SEC (ref 0–47.6)
CREAT SERPL-MCNC: 0.52 MG/DL (ref 0.6–1.3)
CREAT SERPL-MCNC: 0.53 MG/DL (ref 0.6–1.3)
CREAT SERPL-MCNC: 0.55 MG/DL (ref 0.6–1.3)
CREAT SERPL-MCNC: 0.56 MG/DL (ref 0.6–1.3)
DSDNA AB SER QL CLIF: NEGATIVE
EOSINOPHIL # BLD AUTO: 0.18 THOUSAND/ÂΜL (ref 0–0.61)
EOSINOPHIL NFR BLD AUTO: 3 % (ref 0–6)
ERYTHROCYTE [DISTWIDTH] IN BLOOD BY AUTOMATED COUNT: 12.5 % (ref 11.6–15.1)
F5 GENE MUT ANL BLD/T: NORMAL
GFR SERPL CREATININE-BSD FRML MDRD: 128 ML/MIN/1.73SQ M
GFR SERPL CREATININE-BSD FRML MDRD: 129 ML/MIN/1.73SQ M
GFR SERPL CREATININE-BSD FRML MDRD: 131 ML/MIN/1.73SQ M
GFR SERPL CREATININE-BSD FRML MDRD: 132 ML/MIN/1.73SQ M
GLUCOSE SERPL-MCNC: 102 MG/DL (ref 65–140)
GLUCOSE SERPL-MCNC: 106 MG/DL (ref 65–140)
GLUCOSE SERPL-MCNC: 110 MG/DL (ref 65–140)
GLUCOSE SERPL-MCNC: 129 MG/DL (ref 65–140)
HCT VFR BLD AUTO: 29.9 % (ref 36.5–49.3)
HGB BLD-MCNC: 10.2 G/DL (ref 12–17)
IMM GRANULOCYTES # BLD AUTO: 0.01 THOUSAND/UL (ref 0–0.2)
IMM GRANULOCYTES NFR BLD AUTO: 0 % (ref 0–2)
LA PPP-IMP: NORMAL
LYMPHOCYTES # BLD AUTO: 1.41 THOUSANDS/ÂΜL (ref 0.6–4.47)
LYMPHOCYTES NFR BLD AUTO: 26 % (ref 14–44)
Lab: NORMAL
MAGNESIUM SERPL-MCNC: 1.8 MG/DL (ref 1.6–2.6)
MCH RBC QN AUTO: 33.8 PG (ref 26.8–34.3)
MCHC RBC AUTO-ENTMCNC: 34.1 G/DL (ref 31.4–37.4)
MCV RBC AUTO: 99 FL (ref 82–98)
MONOCYTES # BLD AUTO: 0.45 THOUSAND/ÂΜL (ref 0.17–1.22)
MONOCYTES NFR BLD AUTO: 8 % (ref 4–12)
MYELOPEROXIDASE AB SER IA-ACNC: <0.2 UNITS (ref 0–0.9)
NEUTROPHILS # BLD AUTO: 3.47 THOUSANDS/ÂΜL (ref 1.85–7.62)
NEUTS SEG NFR BLD AUTO: 63 % (ref 43–75)
NRBC BLD AUTO-RTO: 0 /100 WBCS
OSMOLALITY UR/SERPL-RTO: 296 MMOL/KG (ref 282–298)
OSMOLALITY UR/SERPL-RTO: 296 MMOL/KG (ref 282–298)
OSMOLALITY UR/SERPL-RTO: 297 MMOL/KG (ref 282–298)
OSMOLALITY UR/SERPL-RTO: 298 MMOL/KG (ref 282–298)
P-ANCA ATYPICAL TITR SER IF: NORMAL TITER
P-ANCA TITR SER IF: NORMAL TITER
PHOSPHATE SERPL-MCNC: 2.4 MG/DL (ref 2.7–4.5)
PLATELET # BLD AUTO: 148 THOUSANDS/UL (ref 149–390)
PMV BLD AUTO: 9.8 FL (ref 8.9–12.7)
POTASSIUM SERPL-SCNC: 3.4 MMOL/L (ref 3.5–5.3)
PROT S ACT/NOR PPP: 81 % (ref 61–136)
PROT S PPP-ACNC: 54 % (ref 60–150)
PROTEINASE3 AB SER IA-ACNC: <0.2 UNITS (ref 0–0.9)
RBC # BLD AUTO: 3.02 MILLION/UL (ref 3.88–5.62)
SCREEN APTT: 30 SEC (ref 0–43.5)
SCREEN DRVVT: 35.9 SEC (ref 0–47)
SODIUM SERPL-SCNC: 144 MMOL/L (ref 135–147)
SODIUM SERPL-SCNC: 145 MMOL/L (ref 135–147)
SODIUM SERPL-SCNC: 145 MMOL/L (ref 135–147)
SODIUM SERPL-SCNC: 146 MMOL/L (ref 135–147)
THROMBIN TIME: 18.7 SEC (ref 0–23)
WBC # BLD AUTO: 5.54 THOUSAND/UL (ref 4.31–10.16)

## 2023-05-19 RX ORDER — MAGNESIUM SULFATE HEPTAHYDRATE 40 MG/ML
2 INJECTION, SOLUTION INTRAVENOUS ONCE
Status: COMPLETED | OUTPATIENT
Start: 2023-05-19 | End: 2023-05-19

## 2023-05-19 RX ORDER — POTASSIUM CHLORIDE 20 MEQ/1
40 TABLET, EXTENDED RELEASE ORAL 2 TIMES DAILY
Status: COMPLETED | OUTPATIENT
Start: 2023-05-19 | End: 2023-05-19

## 2023-05-19 RX ORDER — HYDRALAZINE HYDROCHLORIDE 20 MG/ML
10 INJECTION INTRAMUSCULAR; INTRAVENOUS EVERY 6 HOURS PRN
Status: DISCONTINUED | OUTPATIENT
Start: 2023-05-19 | End: 2023-05-27 | Stop reason: HOSPADM

## 2023-05-19 RX ORDER — LANOLIN ALCOHOL/MO/W.PET/CERES
400 CREAM (GRAM) TOPICAL ONCE
Status: COMPLETED | OUTPATIENT
Start: 2023-05-19 | End: 2023-05-19

## 2023-05-19 RX ADMIN — ASPIRIN 81 MG CHEWABLE TABLET 81 MG: 81 TABLET CHEWABLE at 09:45

## 2023-05-19 RX ADMIN — MAGNESIUM SULFATE HEPTAHYDRATE 2 G: 40 INJECTION, SOLUTION INTRAVENOUS at 10:00

## 2023-05-19 RX ADMIN — SODIUM CHLORIDE 90 ML/HR: 3 INJECTION, SOLUTION INTRAVENOUS at 07:44

## 2023-05-19 RX ADMIN — NICOTINE 1 PATCH: 14 PATCH, EXTENDED RELEASE TRANSDERMAL at 09:00

## 2023-05-19 RX ADMIN — LEVETIRACETAM 750 MG: 100 INJECTION, SOLUTION INTRAVENOUS at 20:33

## 2023-05-19 RX ADMIN — HYDRALAZINE HYDROCHLORIDE 10 MG: 20 INJECTION, SOLUTION INTRAMUSCULAR; INTRAVENOUS at 17:35

## 2023-05-19 RX ADMIN — CHLORHEXIDINE GLUCONATE 0.12% ORAL RINSE 15 ML: 1.2 LIQUID ORAL at 20:33

## 2023-05-19 RX ADMIN — SODIUM CHLORIDE 90 ML/HR: 3 INJECTION, SOLUTION INTRAVENOUS at 02:12

## 2023-05-19 RX ADMIN — SODIUM CHLORIDE 90 ML/HR: 3 INJECTION, SOLUTION INTRAVENOUS at 13:47

## 2023-05-19 RX ADMIN — POTASSIUM CHLORIDE 40 MEQ: 1500 TABLET, EXTENDED RELEASE ORAL at 09:45

## 2023-05-19 RX ADMIN — ENOXAPARIN SODIUM 40 MG: 40 INJECTION SUBCUTANEOUS at 09:45

## 2023-05-19 RX ADMIN — POTASSIUM CHLORIDE 40 MEQ: 1500 TABLET, EXTENDED RELEASE ORAL at 17:35

## 2023-05-19 RX ADMIN — IOHEXOL 100 ML: 350 INJECTION, SOLUTION INTRAVENOUS at 16:46

## 2023-05-19 RX ADMIN — LEVETIRACETAM 750 MG: 100 INJECTION, SOLUTION INTRAVENOUS at 09:00

## 2023-05-19 RX ADMIN — SODIUM CHLORIDE 90 ML/HR: 3 INJECTION, SOLUTION INTRAVENOUS at 19:34

## 2023-05-19 RX ADMIN — CYANOCOBALAMIN TAB 500 MCG 500 MCG: 500 TAB at 10:00

## 2023-05-19 RX ADMIN — MAGNESIUM OXIDE TAB 400 MG (241.3 MG ELEMENTAL MG) 400 MG: 400 (241.3 MG) TAB at 09:45

## 2023-05-19 RX ADMIN — SODIUM CHLORIDE 250 ML: 3 INJECTION, SOLUTION INTRAVENOUS at 00:05

## 2023-05-19 RX ADMIN — CHLORHEXIDINE GLUCONATE 0.12% ORAL RINSE 15 ML: 1.2 LIQUID ORAL at 09:45

## 2023-05-19 NOTE — CASE MANAGEMENT
Case Management Discharge Planning Note    Patient name Christiana Montes  Location ICU 09/ICU 09 MRN [id number]  : 1980 Date 2023       Current Admission Date: 5/15/2023  Current Admission Diagnosis:L MCA stroke w/ LM1 partial occlusion   Patient Active Problem List    Diagnosis Date Noted   • Seizure ([address] ) 2023   • COVID 2023   • L MCA stroke w/ LM1 partial occlusion 05/15/2023   • Positive self-administered antigen test for COVID-19 02/10/2023   • Viral infection, unspecified 11/10/2022   • Bilateral low back pain without sciatica 2020      LOS (days): 4  Geometric Mean LOS (GMLOS) (days): 7 30  Days to GMLOS:3 6     OBJECTIVE:  Risk of Unplanned Readmission Score: 12 26         Current admission status: Inpatient   Preferred Pharmacy:   [address]  [address] [facility] [address]  [address] [facility]  Phone: [phone] Fax: [address] [facility] [address] [facility], [address] [place] [address] [address] [facility] [address]  Phone: [phone] Fax: [phone]    Primary Care Provider: Anita Freeman DO    Primary Insurance: [facility]  Secondary Insurance:     DISCHARGE DETAILS:    Discharge planning discussed with[de-identified] s/w patient and father at bedside     Comments - Freedom of Choice: Pt has been accepted by \[facility]\"" [facility] for STR when he is medically cleared  Discussed same with pt and father  Patient able to answer CM questions today  Father gave CM paperwork from employer that needs to be completed by the doctor  Dr Glenna De Jesus and nursing staff made aware   Paperwork placed in patient's chart  CM contacted family/caregiver?: Yes          Other Referral/Resources/Interventions Provided:  Interventions: Acute Rehab

## 2023-05-19 NOTE — PLAN OF CARE
Problem: Neurological Deficit  Goal: Neurological status is stable or improving  Description: Interventions:  - Monitor and assess patient's level of consciousness, motor function, sensory function, and level of assistance needed for ADLs  - Monitor and report changes from baseline  Collaborate with interdisciplinary team to initiate plan and implement interventions as ordered  - Provide and maintain a safe environment  - Consider seizure precautions  - Consider fall precautions  - Consider aspiration precautions  - Consider bleeding precautions  Outcome: Progressing     Problem: MOBILITY - ADULT  Goal: Maintains/Returns to pre admission functional level  Description: INTERVENTIONS:  - Perform BMAT or MOVE assessment daily    - Set and communicate daily mobility goal to care team and patient/family/caregiver  - Collaborate with rehabilitation services on mobility goals if consulted  - Perform Range of Motion 6 times a day  - Reposition patient every 2 hours    - Dangle patient 4 times a day  - Stand patient 6 times a day  - Ambulate patient 4 times a day  - Out of bed to chair 4 times a day   - Out of bed for meals 4 times a day  - Out of bed for toileting  - Record patient progress and toleration of activity level   Outcome: Progressing

## 2023-05-19 NOTE — PROGRESS NOTES
[address] [facility]  Progress Note  Name: Vanessa Gutiérrez  MRN: [id number]  Unit/Bed#: ICU 09 I Date of Admission: 5/15/2023   Date of Service: 5/19/2023 I Hospital Day: 4    Assessment/Plan   * L MCA stroke w/ LM1 partial occlusion  Assessment & Plan  PPD#4 - s/p TICI 3 revascularization of a left M1 occlusion (Dr Ranulfo Bagley, 5/15/2023)  · Found down at home on 5/15 with NIHSS 16, noted to have acute left M1 occlusion with early ischemic changes  · COVID+ 5/15  · Question if prior infection as patient had Covid 2/2023 per his sister  · Pt remains receptively and expressively aphasic but continues to improve daily  More interactive with increased appropriate responses  Pt able to state his name and that he is in the hospital  Following 1 step commands  Imaging:  · MRI brain seizure w/wo contrast 5/17: Recent large left MCA territory infarct with associated edema and a few foci of petechial hemorrhage  There is regional mass effect without midline shift  Cannot reliably evaluate the infarcted brain for structural abnormality  No structural abnormality is identified in the remainder brain  Normal hippocampal formations    Plan:  • Continue to closely monitor neuro exam   o Frequent neuro checks per primary team   o Repeat STAT CTH with any acute decline in GCS > 2pts or more in 1hr   • Maintain normotensive BP goals, SBP < 160, MAP > 65   • No acute neurosurgical intervention indicated at this time   o Case and imaging reviewed this am on rounds     o Large stroke burden noted on MRI but without significant brain compression, appears stable on repeat CTH this am with continually improving exam   • Continue aggressive medical management   o stroke day ~5/5, now moving to outside the peak swelling window   o 3% HTS 90cc/hr  - Maintain Na > 145, osm 310-315   o Continue aggressive supportive care   • Continue management on stroke pathway   o Neurology following, appreciate neuro management "of stroke and workup   o Continue on daily ASA   o Continue hypercoagulable workup per neuro   • Continue seizure management per primary team and neurology   o vEEG since removed   - final read reported evidence of the known structural L sided injury, no seizures   o Continue current AED regimen: keppra 750mg BID   • DVT ppx: SCDs, SQ Lovenox   • pain control per primary team   • Medical management per primary team   • PT/OT   • Social work following for assistance with dispo once medically cleared     Neurosurgery will sign off at this time and defer further stroke management to the neurology and primary teams  Please call with questions or concerns  Subjective/Objective   Chief Complaint: \" Hi, how are you\"    Subjective: Patient seen and examined this a m  on rounds  No acute events overnight  Patient remains on 3% HTS @90cc/hr in order to maintain sodium goals  Patient with continually improving exam   Patient now more interactive and able to clearly state his name and that he is in the hospital   Patient seems to be understanding more questions and commands  However, he still remains aphasic, both expressive and receptive  Objective: Pleasant, ill-appearing, young male sitting up comfortably in bed  I/O       05/17 0701 05/18 0700 05/18 0701  05/19 0700 05/19 0701  05/20 0700    P  O  2880 4820     I V  (mL/kg) 1834 5 (24 6) 2285 5 (30 7)     IV Piggyback 204 6      Total Intake(mL/kg) 4919 1 (65 9) 7105 5 (95 5)     Urine (mL/kg/hr) 1975 (1 1) 1566 (0 9) 275 (1 8)    Stool 0 0     Total Output 1975 1566 275    Net +2944 1 +5539 5 -275           Unmeasured Urine Occurrence  1 x     Unmeasured Stool Occurrence 1 x 1 x         Invasive Devices     Central Venous Catheter Line  Duration           CVC Central Lines 05/16/23 Triple 16cm 3 days              Physical Exam:  Vitals: Blood pressure 144/86, pulse (!) 50, temperature 97 9 °F (36 6 °C), temperature source Oral, resp   rate 18, height 5' 9\" " (1 753 m), weight 74 4 kg (164 lb), SpO2 97 %  ,Body mass index is 24 22 kg/m²  General appearance: alert, appears stated age, cooperative and no distress  Head: Normocephalic, without obvious abnormality, atraumatic  Eyes: EOMI, PERRL  Neck: supple, symmetrical, trachea midline and NT  Right-sided IJ CVC in place  Back: no kyphosis present, no tenderness to percussion or palpation  Lungs: non labored breathing, no respiratory distress on room air  Heart: regular heart rate  Neurologic:   - GCS 14  - opens eyes spontaneously, tracking   -Demonstrates expressive and receptive aphasia, however continually improving                - able to follow 1 step command in kell UE                -Able to clearly state his name and that he is in the hospital    -Patient with difficulty stating longer sentences and difficulty with explaining why he is in the hospital/this situation   - limited but improving comprehensible speech, answers yes and no clearly, intermittent appropriate responses  Able to say 1-2 words clear and appropriately   - subtle R sided facial droop  -Questionable right-sided sensory changes, but patient unable to clearly verbalize  Patient and light touch appear to be intact  - motor strength limited given comprehension/ability to participate with exam                - R sided weakness with drift, distal > proximal  Rated 3/5 in the RUE and RLE, IO in RUE and RLE 2-3/5                - L side intact, no drift or weakness appreciated   Reflexes: 2+ and symmetric  Coordination: finger to nose normal bilaterally, right upper extremity drift and weakness      Lab Results:  Results from last 7 days   Lab Units 05/19/23  0629 05/18/23  0536 05/17/23  0523   WBC Thousand/uL 5 54 5 56 5 47   HEMOGLOBIN g/dL 10 2* 10 2* 10 7*   HEMATOCRIT % 29 9* 30 1* 31 3*   PLATELETS Thousands/uL 148* 141* 149   NEUTROS PCT % 63 65 71   MONOS PCT % 8 9 11     Results from last 7 days   Lab Units 05/19/23  0629 05/19/23  0005 05/18/23  1713 05/17/23  1201 05/17/23  0523 05/16/23  1240 05/16/23  0557   POTASSIUM mmol/L 3 4* 3 4* 3 5   < > 3 6   < > 3 9   CHLORIDE mmol/L 122* 121* 121*   < > 121*   < > 113*   CO2 mmol/L 24 24 24   < > 25   < > 25   BUN mg/dL 5 5 4*   < > 6   < > 8   CREATININE mg/dL 0 55* 0 52* 0 50*   < > 0 54*   < > 0 52*   CALCIUM mg/dL 7 5* 7 9* 7 9*   < > 8 4   < > 8 3   ALK PHOS U/L  --   --   --   --  44*  --  47   ALT U/L  --   --   --   --  26  --  26   AST U/L  --   --   --   --  38  --  37    < > = values in this interval not displayed  Results from last 7 days   Lab Units 05/19/23  0629 05/18/23  0535 05/17/23  0523   MAGNESIUM mg/dL 1 8 2 0 2 2     Results from last 7 days   Lab Units 05/19/23  0629 05/18/23  0535 05/17/23  0523   PHOSPHORUS mg/dL 2 4* 1 5* 2 4*     Results from last 7 days   Lab Units 05/15/23  1853   INR  0 89   PTT seconds 22*     No results found for: TROPONINT  ABG:No results found for: PHART, SKT3IJN, PO2ART, UMY3ZLX, Y3LVNDLN, BEART, SOURCE    Imaging Studies: I have personally reviewed pertinent reports  and I have personally reviewed pertinent films in PACS    XR ankle 3+ vw right    Result Date: 5/17/2023  Impression: No acute osseous abnormality  Workstation performed: [id number]     CT head wo contrast    Result Date: 5/19/2023  Impression: Continued evolution of left MCA territory infarct as above with stable mass effect  No significant midline shift Question small volume petechial hemorrhage left inferior parietal and temporal region  No billie parenchymal hemorrhage  Continued short-term interval follow-up advised  Study was marked in [facility] for immediate notification  Workstation performed: [id number]     CT head wo contrast    Result Date: 5/17/2023  Impression: Grossly stable recent large left MCA territory infarct with regional mass effect without midline shift  No acute hemorrhage   Workstation performed: [id number]     CT head wo contrast    Result Date: 5/16/2023  Impression: Evolving left MCA infarct with increased cortical hypodensity and stable sulcal effacement/mass effect throughout the left lobe  No acute intracranial hemorrhage or midline shift  Workstation performed: [id number]     CT head wo contrast    Result Date: 5/16/2023  Impression: Compared to the most immediate prior study, significant interval decrease/near total resolution of the previously noted extensive left hemispheric hyperdensity involving the cortex and subcortical white matter and left basal ganglia  Minimal residual hyperdensity in the left basal ganglia region  Overall these findings suggest resolving contrast staining post endovascular therapy although small amount of superimposed hemorrhage is difficult to entirely exclude  Recommend short-term interval follow-up  scan and/or further evaluation with MRI for definitive evaluation  Stable left hemispheric edema and sulcal effacement without significant midline shift  Above findings discussed with Dr Jay Gomez at 4:30 a m  on 5/16/2023  Workstation performed: [id number]     CT head wo contrast    Result Date: 5/15/2023  Impression: New extensive left hemispheric intraparenchymal and subarachnoid hemorrhage with mass effect upon the adjacent brain parenchyma and associated cerebral edema predominantly in the left MCA vascular distribution  No significant midline shift or evidence of  transtentorial herniation noted  Findings discussed with Dr Raj Garcia at 10:30 p m  Workstation performed: [id number]     CT stroke alert brain    Result Date: 5/15/2023  Impression: Late acute to early subacute left MCA territory infarct  No hemorrhagic conversion  Findings were directly discussed with Brad Guillen at  7:10 PM    Workstation performed: [id number]     CT cerebral perfusion    Result Date: 5/15/2023  Impression: CT perfusion performed  Data available on PACS   Workstation performed: [id number]     XR chest portable ICU    Result Date: 5/16/2023  Impression: No acute cardiopulmonary disease  Right jugular catheter in lower SVC with no pneumothorax  Workstation performed: [id number]     MRI brain seizure wo and w contrast    Addendum Date: 5/17/2023    ADDENDUM: Correcting comparison study: Head CT 5/16/2023  Result Date: 5/17/2023  Impression: 1  Recent large left MCA territory infarct with associated edema and a few foci of petechial hemorrhage  There is regional mass effect without midline shift  2   Cannot reliably evaluate the infarcted brain for structural abnormality  No structural abnormality is identified in the remainder brain  Normal hippocampal formations  Workstation performed: [id number]     CTA stroke alert (head/neck)    Result Date: 5/15/2023  Impression: 1  Acute occlusion of the proximal left M1 segment with partial opacification of distal M2 and M3 branches  2  No evidence of thrombus, stenosis or occlusion of the carotid or vertebral arteries  Findings were directly discussed with Jonathan Jeter at  7:14 PM    Workstation performed: [id number]       EKG, Pathology, and Other Studies: I have personally reviewed pertinent reports        VTE Pharmacologic Prophylaxis: Sequential compression device (Venodyne)  and Enoxaparin (Lovenox)    VTE Mechanical Prophylaxis: sequential compression device

## 2023-05-19 NOTE — PROGRESS NOTES
[address] [facility]  Progress Note: Critical Care  Name: Jen Bills 40 y o  male I MRN: [id number]  Unit/Bed#: ICU 09 I Date of Admission: 5/15/2023   Date of Service: 5/19/2023 I Hospital Day: 4    Assessment/Plan     Neuro:  • Diagnosis: Acute ischemic left MCA stroke, status post thrombectomy 5/15 with TICI 3 revascularization, CT head noted cerebral edema with evidence of brain compression and mass effect in left MCA territory  ? -150  ? MAP greater than 65  ? A1c 4 8 LDL 18   Statin stopped due to extremely low LDL, 18   ? MRI 5/17 1   Recent large left MCA territory infarct with associated edema and a few foci of petechial hemorrhage  There is regional mass effect without midline shift  2   Cannot reliably evaluate the infarcted brain for structural abnormality  No structural abnormality is identified in the remainder brain  Normal hippocampal formations  ? PT/OT/speech therapy  ? Hypercoagulable work-up  - Completed: normal: Homocystine, BELEM, RF   - Pending: MTHFR ANCA, dsDNA  - Thrombosis panel  - Antithrombin III 81, likely this is not clinically relevant as it is higher than 80%  - Protein S decreased 65%  - Protein C normal  ? Will need a repeat thrombosis panel in 3 months  ? Cannot rule out his 2/2023 covid infection causing a hypercoagulable state  ? Will obtain a CT C/A/P to evaluate for any possible underlying malignancy  ? Lower limb duplex study ordered given finding of PFO  ? Patient received [employer] COVID-vaccine, however patient received vaccine at least 1 year ago  ? Transesophageal echocardiogram 5/18  - A large and patent PFO noted  ? Neurology consulted appreciate recommendations  ? Patient maintained on 3% hypertonic saline for treatment of brain edema  - Currently on 3% HTS,   - Follow-up every 6 hours on serum sodium levels maintaining 145-155  - Follow-up every 6 hours on serum osmolarity levels maintaining 305-310  ?  ASA 81mg daily  • Diagnosis: Seizure  ? Patient noted to have seizure associated with eye deviation to the right morning of 5/16, received 2 mg of Ativan  ? Loaded with 4 g of Keppra 5/16, patient is now maintained on Keppra 750 mg twice daily  ? Ativan as needed for seizures  ? vEEG monitoring discontinued, no seizures noted on EEG  ? We will continue to monitor clinically  • Diagnosis: Tobacco Abuse  ? Nicotine patch ordered  ? Advised tobacco cessation  • Repeat CTH if > 2 pt drop in GCS in one hour  • Sleep/wake cycle regulation  • CAM-ICU BID  • Neuro checks     CV:   · Patent foramen ovale  ? Noted on ALYSSA 5/18  ? Interventional cardiology consulted for closure  · Normotension, SBP less than 140 MAP greater than 65  ? Labetalol 10 mg every 4 hours as needed     Pulm:  • No acute issues  • Continue pulmonary hygiene  Incentive spirometer q1h while awake, encourage coughing and deep breathing  Upright positioning  • Suction as needed and closely monitor secretions  Maintain HOB >30 degrees  Q4h oral care with chlorhexidine BID     GI:   • No acute issues  • Stress ulcer prophylaxis: NA  • Bowel regimen:  NA     :   • No acute issues, corbni in place, will attempt to d/c  • I/O monitoring  • Continue to follow renal function tests     F/E/N:   • Maintenance fluids: None  • Diuresis plan: None  • Replete electrolytes with as needed to maintain K >4 0, Mag >2 0, Phos >3 0  • Nutrition:  Regular diet, patient is vegan and we will start him on B12 supplementation     Heme/Onc:   • Hypercoagulable state work up  • Consider transfusion for hemoglobin <7 0  • VTE prophylaxis: SCD's to BLE     Endo/Rheum:   • No acute issues  • Last hemoglobin A1c 4 8% on 5/16/2023     ID:   • Diagnosis: Covid Positive 5/15  ?  Based on additional information received from the patient's sister on 5/16 this is unlikely to be a current infection and is merrily a continued positive test in the setting of a prior covid infection in 2/2023  • Continue to monitor fever and WBC curve     Disposition: Critical care     ICU Core Measures     A: Assess, Prevent, and Manage Pain · Has pain been assessed? Yes  · Need for changes to pain regimen? No   B: Both SAT/SAT  · N/A   C: Choice of Sedation · RASS Goal: N/A patient not on sedation  · Need for changes to sedation or analgesia regimen? No   D: Delirium · CAM-ICU: Negative   E: Early Mobility  · Plan for early mobility? Yes   F: Family Engagement · Plan for family engagement today? Yes       Review of Invasive Devices:  Central access plan: Medications requiring central line      Prophylaxis:  VTE VTE covered by:  enoxaparin, Subcutaneous, 40 mg at 05/18/23 0807       Stress Ulcer  not ordered        Subjective   HPI/24hr events:   HPI/24hr events: 40year-old right-handed male without any known prior medical history who presented as a stroke alert 5/15/2023 at 6:40 PM with a last known well of 8:30 PM 5/14 when he had spoken with someone over the phone   Per documentation of a full check was called when the patient did not show up for a scheduled meeting with a friend and did not answer his phone  Ramona Ochoa arrival by EMS for the welfare check the patient was found to be covered in feces and was noted to have right upper extremity flaccid paralysis as well as right-sided neglect, right facial droop, and expressive aphasia   Initial NIH on presentation 16   CT head noted a left MCA infarct, CTA head and neck showed a partial helen left M1 occlusion   Patient was not a TNK candidate given that he was outside of the window   Patient subsequently underwent a CTP which demonstrated that the patient was a candidate for thrombectomy and the patient was a endovascular alert was called, patient is currently underwent mechanical thrombectomy TICI 3 revascularization   Pathology is suspected to be a proximal embolism given findings   Patient subsequently noted to be COVID-positive  Repeat CT head morning of 5/16 continued edema and sulcal effacement on the left similar to previous study, improvement of contrast staining versus possible hemorrhage  Subsequently after initial exam, the AM of 5/16 pt note to have a seizure that consisted on eye deviation to the R followed by postictal period  STAT CTH obtained no acute change, evolving known L MCA infarct   EEG started no seizures captured, subsequently discontinued  Patient underwent ALYSSA 5/18 and found to have a large PFO  Review of Systems   Unable to perform ROS: Aphasia     Objective                            Vitals I/O      Most Recent Min/Max in 24hrs   Temp 97 9 °F (36 6 °C) Temp  Min: 97 5 °F (36 4 °C)  Max: 98 1 °F (36 7 °C)   Pulse (!) 54 Pulse  Min: 44  Max: 66   Resp 16 Resp  Min: 11  Max: 19   /87 BP  Min: 92/63  Max: 169/120   O2 Sat 98 % SpO2  Min: 95 %  Max: 100 %      Intake/Output Summary (Last 24 hours) at 5/19/2023 0802  Last data filed at 5/19/2023 0700  Gross per 24 hour   Intake 6925 5 ml   Output 1391 ml   Net 5534 5 ml         Diet Regular; Vegetarian     Invasive Monitoring Physical exam   N/A Physical Exam  Vitals and nursing note reviewed  Constitutional:       General: He is not in acute distress  Appearance: He is well-developed  He is not diaphoretic  HENT:      Head: Normocephalic and atraumatic  Nose: Nose normal    Eyes:      General: No scleral icterus  Right eye: No discharge  Left eye: No discharge  Conjunctiva/sclera: Conjunctivae normal    Cardiovascular:      Rate and Rhythm: Normal rate and regular rhythm  Heart sounds: Normal heart sounds  No murmur heard  No friction rub  No gallop  Pulmonary:      Effort: Pulmonary effort is normal  No respiratory distress  Breath sounds: Normal breath sounds  No wheezing  Chest:      Chest wall: No tenderness  Abdominal:      General: Bowel sounds are normal  There is no distension  Palpations: Abdomen is soft  Tenderness: There is no abdominal tenderness     Skin: "  General: Skin is warm and dry  Neurological:      Mental Status: He is alert  Cranial Nerves: No cranial nerve deficit  Comments: Patient is globally aphasic, however improving  Patient is able to state his name, he is able to identify that he is in a hospital when given a list of places, he also did state what sounded like hospital but was severely jumbled, he said that it was \"made\" when asked what month it is (when it is May)  Patient has mild weakness in upper right and lower right extremity  Diagnostic Studies    EKG: None  Imaging: I have personally reviewed pertinent reports     and I have personally reviewed pertinent films in PACS     Medications:  Scheduled PRN   aspirin, 81 mg, Daily  chlorhexidine, 15 mL, Q12H [address]  vitamin B-12, 500 mcg, Daily  enoxaparin, 40 mg, Q24H [place]  levETIRAcetam, 750 mg, Q12H [address]  magnesium Oxide, 400 mg, Once  nicotine, 1 patch, Daily  potassium chloride, 40 mEq, BID      labetalol, 10 mg, Q4H PRN  ondansetron, 4 mg, Once PRN       Continuous    sodium chloride, 90 mL/hr, Last Rate: 90 mL/hr (05/19/23 0744)         Labs:    CBC    Recent Labs     05/18/23  0536 05/19/23  0629   WBC 5 56 5 54   HGB 10 2* 10 2*   HCT 30 1* 29 9*   * 148*     BMP    Recent Labs     05/19/23  0005 05/19/23  0629   SODIUM 145 146   K 3 4* 3 4*   * 122*   CO2 24 24   AGAP 0* 0*   BUN 5 5   CREATININE 0 52* 0 55*   CALCIUM 7 9* 7 5*       Coags    No recent results     Additional Electrolytes  Recent Labs     05/18/23  0535 05/19/23  0629   MG 2 0 1 8   PHOS 1 5* 2 4*          Blood Gas    No recent results  No recent results LFTs  No recent results    Infectious  No recent results  Glucose  Recent Labs     05/18/23  1215 05/18/23  1713 05/19/23  0005 05/19/23  0629   GLUC 102 94 106 110             Leif Ferrer, DO  "

## 2023-05-19 NOTE — PLAN OF CARE
Problem: MOBILITY - ADULT  Goal: Maintain or return to baseline ADL function  Description: INTERVENTIONS:  -  Assess patient's ability to carry out ADLs; assess patient's baseline for ADL function and identify physical deficits which impact ability to perform ADLs (bathing, care of mouth/teeth, toileting, grooming, dressing, etc )  - Assess/evaluate cause of self-care deficits   - Assess range of motion  - Assess patient's mobility; develop plan if impaired  - Assess patient's need for assistive devices and provide as appropriate  - Encourage maximum independence but intervene and supervise when necessary  - Involve family in performance of ADLs  - Assess for home care needs following discharge   - Consider OT consult to assist with ADL evaluation and planning for discharge  - Provide patient education as appropriate  Outcome: Progressing  Goal: Maintains/Returns to pre admission functional level  Description: INTERVENTIONS:  - Perform BMAT or MOVE assessment daily    - Set and communicate daily mobility goal to care team and patient/family/caregiver  - Collaborate with rehabilitation services on mobility goals if consulted  - Perform Range of Motion  times a day  - Reposition patient every  hours    - Dangle patient  times a day  - Stand patient  times a day  - Ambulate patient  times a day  - Out of bed to chair  times a day   - Out of bed for meals  times a day  - Out of bed for toileting  - Record patient progress and toleration of activity level   Outcome: Progressing     Problem: Prexisting or High Potential for Compromised Skin Integrity  Goal: Skin integrity is maintained or improved  Description: INTERVENTIONS:  - Identify patients at risk for skin breakdown  - Assess and monitor skin integrity  - Assess and monitor nutrition and hydration status  - Monitor labs   - Assess for incontinence   - Turn and reposition patient  - Assist with mobility/ambulation  - Relieve pressure over bony prominences  - Avoid friction and shearing  - Provide appropriate hygiene as needed including keeping skin clean and dry  - Evaluate need for skin moisturizer/barrier cream  - Collaborate with interdisciplinary team   - Patient/family teaching  - Consider wound care consult   Outcome: Progressing     Problem: Neurological Deficit  Goal: Neurological status is stable or improving  Description: Interventions:  - Monitor and assess patient's level of consciousness, motor function, sensory function, and level of assistance needed for ADLs  - Monitor and report changes from baseline  Collaborate with interdisciplinary team to initiate plan and implement interventions as ordered  - Provide and maintain a safe environment  - Consider seizure precautions  - Consider fall precautions  - Consider aspiration precautions  - Consider bleeding precautions  Outcome: Progressing     Problem: Activity Intolerance/Impaired Mobility  Goal: Mobility/activity is maintained at optimum level for patient  Description: Interventions:  - Assess and monitor patient  barriers to mobility and need for assistive/adaptive devices  - Assess patient's emotional response to limitations  - Collaborate with interdisciplinary team and initiate plans and interventions as ordered  - Encourage independent activity per ability   - Maintain proper body alignment  - Perform active/passive rom as tolerated/ordered  - Plan activities to conserve energy   - Turn patient as appropriate  Outcome: Progressing     Problem: Communication Impairment  Goal: Ability to express needs and understand communication  Description: Assess patient's communication skills and ability to understand information  Patient will demonstrate use of effective communication techniques, alternative methods of communication and understanding even if not able to speak  - Encourage communication and provide alternate methods of communication as needed    - Collaborate with case management/ for discharge needs  - Include patient/family/caregiver in decisions related to communication  Outcome: Progressing     Problem: Potential for Aspiration  Goal: Non-ventilated patient's risk of aspiration is minimized  Description: Assess and monitor vital signs, respiratory status, and labs (WBC)  Monitor for signs of aspiration (tachypnea, cough, rales, wheezing, cyanosis, fever)  - Assess and monitor patient's ability to swallow  - Place patient up in chair to eat if possible  - HOB up at 90 degrees to eat if unable to get patient up into chair   - Supervise patient during oral intake  - Instruct patient/ family to take small bites  - Instruct patient/ family to take small single sips when taking liquids  - Follow patient-specific strategies generated by speech pathologist   Outcome: Progressing  Goal: Ventilated patient's risk of aspiration is minimized  Description: Assess and monitor vital signs, respiratory status, airway cuff pressure, and labs (WBC)  Monitor for signs of aspiration (tachypnea, cough, rales, wheezing, cyanosis, fever)  - Elevate head of bed 30 degrees if patient has tube feeding   - Monitor tube feeding  Outcome: Progressing     Problem: Nutrition  Goal: Nutrition/Hydration status is improving  Description: Monitor and assess patient's nutrition/hydration status for malnutrition (ex- brittle hair, bruises, dry skin, pale skin and conjunctiva, muscle wasting, smooth red tongue, and disorientation)  Collaborate with interdisciplinary team and initiate plan and interventions as ordered  Monitor patient's weight and dietary intake as ordered or per policy  Utilize nutrition screening tool and intervene per policy  Determine patient's food preferences and provide high-protein, high-caloric foods as appropriate  - Assist patient with eating   - Allow adequate time for meals   - Encourage patient to take dietary supplement as ordered    - Collaborate with interdisciplinary team    - Include patient/family/caregiver in decisions related to nutrition  Outcome: Progressing     Problem: Potential for Falls  Goal: Patient will remain free of falls  Description: INTERVENTIONS:  - Educate patient/family on patient safety including physical limitations  - Instruct patient to call for assistance with activity   - Consult OT/PT to assist with strengthening/mobility   - Keep Call bell within reach  - Keep bed low and locked with side rails adjusted as appropriate  - Keep care items and personal belongings within reach  - Initiate and maintain comfort rounds  - Make Fall Risk Sign visible to staff  - Offer Toileting every  Hours, in advance of need  - Initiate/Maintain alarm  - Obtain necessary fall risk management equipment  - Apply yellow socks and bracelet for high fall risk patients  - Consider moving patient to room near nurses station  Outcome: Progressing     Problem: Nutrition/Hydration-ADULT  Goal: Nutrient/Hydration intake appropriate for improving, restoring or maintaining nutritional needs  Description: Monitor and assess patient's nutrition/hydration status for malnutrition  Collaborate with interdisciplinary team and initiate plan and interventions as ordered  Monitor patient's weight and dietary intake as ordered or per policy  Utilize nutrition screening tool and intervene as necessary  Determine patient's food preferences and provide high-protein, high-caloric foods as appropriate       INTERVENTIONS:  - Monitor oral intake, urinary output, labs, and treatment plans  - Assess nutrition and hydration status and recommend course of action  - Evaluate amount of meals eaten  - Assist patient with eating if necessary   - Allow adequate time for meals  - Recommend/ encourage appropriate diets, oral nutritional supplements, and vitamin/mineral supplements  - Order, calculate, and assess calorie counts as needed  - Recommend, monitor, and adjust tube feedings and TPN/PPN based on assessed needs  - Assess need for intravenous fluids  - Provide specific nutrition/hydration education as appropriate  - Include patient/family/caregiver in decisions related to nutrition  Outcome: Progressing

## 2023-05-19 NOTE — PHYSICAL THERAPY NOTE
Physical Therapy Cancellation Note         05/19/23 1445   PT Last Visit   PT Visit Date 05/19/23   Note Type   Note Type Cancelled Session   Cancel Reasons Other  (arrived to see patient for treatment session  patient currently in bed undergoing bedside imaging   PT will re-attempt if time and continue to follow on caseload)     Margaret Lopez PT, DPT WAIS-IV    Raw              Age Scaled   Vocabulary   49   14    Block Design       24     9   Coding   39     10   Digit Span   22     9   Visual Puzzles   9     10     WIDE RANGE ACHIEVEMENT TEST - 4    Standard  %tile               Grade      Score  Rank                Equiv  Reading    118      88          >12.9     WECHSLER MEMORY SCALE - REVISED    Raw Score        MAS       %ile  Information & Orientation  11   Logical Memory  Immed.  15   7   16   Logical Memory  30 min.  2   4   2   Visual Reprod Imm.  18   5   5   Visual Reprod 30 min.  2   3   1   30 Minute Recognition  2     IGOR AUDITORY VERBAL LEARNING TEST  (30 item recognition)    I   II III  IV  V VI  VII   3   4   5   5   5   3   2   30 Minute Recall  0  MAS  4   30 Minute Recognition  2  MAS  2           Intrusions  0       Learning Efficiency   67  % Ret.  0                                                                     MAS  4     IGOR-OSTERRIETH COMPLEX FIGURE   Raw Score   %tile  Copy  26     2-5     [place] NAMING TEST  Score     46      MAS   4   2 %ile  [  46 w/o cues  10 w/phonemic cues]    CONTROLLED ORAL WORD ASSOC TEST  Score     48              MAS 11   63 %ile    SEMANTIC FLUENCY  Score     33              MAS 8   25 %ile    CLOCK DRAWING  Command  1 /3    Copy   3   /3       TRAIL MAKING TEST         Seconds         Errors           MAS                %ile  A    59   0   5  . 5   B    231   3   5  . 5     STROOP                    Raw   +  Karla =     Total       MAS      %ile  Word  79  +  - =   79   5   5   Color  56  +  - =   56   8   25   C-W  22  +  - =   22   6   9     [place] CARD SORTING TEST - 64 items (age)  # Categories  2              11-16  %ile  # persev err.       14           T       45         32  %ile    DEMENTIA RATING SCALE - 2    Raw MAS Raw     MAS   Attention  34   8   Concept  34   8    Init/Psv  33   7  Memory  14    2   Construct  6   10  Total    121/144   4     GERIATRIC DEPRESSION SCALE: 4    MAS = [place]  Older Adult Normative Study Age & Ed. Adj. Scaled Score

## 2023-05-19 NOTE — PLAN OF CARE
Problem: MOBILITY - ADULT  Goal: Maintain or return to baseline ADL function  Description: INTERVENTIONS:  -  Assess patient's ability to carry out ADLs; assess patient's baseline for ADL function and identify physical deficits which impact ability to perform ADLs (bathing, care of mouth/teeth, toileting, grooming, dressing, etc )  - Assess/evaluate cause of self-care deficits   - Assess range of motion  - Assess patient's mobility; develop plan if impaired  - Assess patient's need for assistive devices and provide as appropriate  - Encourage maximum independence but intervene and supervise when necessary  - Involve family in performance of ADLs  - Assess for home care needs following discharge   - Consider OT consult to assist with ADL evaluation and planning for discharge  - Provide patient education as appropriate  Outcome: Progressing  Goal: Maintains/Returns to pre admission functional level  Description: INTERVENTIONS:  - Perform BMAT or MOVE assessment daily    - Set and communicate daily mobility goal to care team and patient/family/caregiver  - Collaborate with rehabilitation services on mobility goals if consulted  - Perform Range of Motion 3 times a day  - Reposition patient every 2 hours    - Dangle patient 2 times a day  - Stand patient 2 times a day  - Ambulate patient 2 times a day  - Out of bed to chair 2 times a day   - Out of bed for meals 3 times a day  - Out of bed for toileting  - Record patient progress and toleration of activity level   Outcome: Progressing     Problem: Prexisting or High Potential for Compromised Skin Integrity  Goal: Skin integrity is maintained or improved  Description: INTERVENTIONS:  - Identify patients at risk for skin breakdown  - Assess and monitor skin integrity  - Assess and monitor nutrition and hydration status  - Monitor labs   - Assess for incontinence   - Turn and reposition patient  - Assist with mobility/ambulation  - Relieve pressure over bony prominences  - Avoid friction and shearing  - Provide appropriate hygiene as needed including keeping skin clean and dry  - Evaluate need for skin moisturizer/barrier cream  - Collaborate with interdisciplinary team   - Patient/family teaching  - Consider wound care consult   Outcome: Progressing     Problem: Neurological Deficit  Goal: Neurological status is stable or improving  Description: Interventions:  - Monitor and assess patient's level of consciousness, motor function, sensory function, and level of assistance needed for ADLs  - Monitor and report changes from baseline  Collaborate with interdisciplinary team to initiate plan and implement interventions as ordered  - Provide and maintain a safe environment  - Consider seizure precautions  - Consider fall precautions  - Consider aspiration precautions  - Consider bleeding precautions  Outcome: Progressing     Problem: Activity Intolerance/Impaired Mobility  Goal: Mobility/activity is maintained at optimum level for patient  Description: Interventions:  - Assess and monitor patient  barriers to mobility and need for assistive/adaptive devices  - Assess patient's emotional response to limitations  - Collaborate with interdisciplinary team and initiate plans and interventions as ordered  - Encourage independent activity per ability   - Maintain proper body alignment  - Perform active/passive rom as tolerated/ordered  - Plan activities to conserve energy   - Turn patient as appropriate  Outcome: Progressing     Problem: Communication Impairment  Goal: Ability to express needs and understand communication  Description: Assess patient's communication skills and ability to understand information  Patient will demonstrate use of effective communication techniques, alternative methods of communication and understanding even if not able to speak  - Encourage communication and provide alternate methods of communication as needed    - Collaborate with case management/ for discharge needs  - Include patient/family/caregiver in decisions related to communication  Outcome: Progressing     Problem: Potential for Aspiration  Goal: Non-ventilated patient's risk of aspiration is minimized  Description: Assess and monitor vital signs, respiratory status, and labs (WBC)  Monitor for signs of aspiration (tachypnea, cough, rales, wheezing, cyanosis, fever)  - Assess and monitor patient's ability to swallow  - Place patient up in chair to eat if possible  - HOB up at 90 degrees to eat if unable to get patient up into chair   - Supervise patient during oral intake  - Instruct patient/ family to take small bites  - Instruct patient/ family to take small single sips when taking liquids  - Follow patient-specific strategies generated by speech pathologist   Outcome: Progressing  Goal: Ventilated patient's risk of aspiration is minimized  Description: Assess and monitor vital signs, respiratory status, airway cuff pressure, and labs (WBC)  Monitor for signs of aspiration (tachypnea, cough, rales, wheezing, cyanosis, fever)  - Elevate head of bed 30 degrees if patient has tube feeding   - Monitor tube feeding  Outcome: Progressing     Problem: Nutrition  Goal: Nutrition/Hydration status is improving  Description: Monitor and assess patient's nutrition/hydration status for malnutrition (ex- brittle hair, bruises, dry skin, pale skin and conjunctiva, muscle wasting, smooth red tongue, and disorientation)  Collaborate with interdisciplinary team and initiate plan and interventions as ordered  Monitor patient's weight and dietary intake as ordered or per policy  Utilize nutrition screening tool and intervene per policy  Determine patient's food preferences and provide high-protein, high-caloric foods as appropriate  - Assist patient with eating   - Allow adequate time for meals   - Encourage patient to take dietary supplement as ordered    - Collaborate with interdisciplinary team    - Include patient/family/caregiver in decisions related to nutrition  Outcome: Progressing     Problem: Potential for Falls  Goal: Patient will remain free of falls  Description: INTERVENTIONS:  - Educate patient/family on patient safety including physical limitations  - Instruct patient to call for assistance with activity   - Consult OT/PT to assist with strengthening/mobility   - Keep Call bell within reach  - Keep bed low and locked with side rails adjusted as appropriate  - Keep care items and personal belongings within reach  - Initiate and maintain comfort rounds  - Make Fall Risk Sign visible to staff  - Offer Toileting every 2 Hours, in advance of need  - Initiate/Maintain bed alarm  - Obtain necessary fall risk management equipment: bed alarm  - Apply yellow socks and bracelet for high fall risk patients  - Consider moving patient to room near nurses station  Outcome: Progressing     Problem: Nutrition/Hydration-ADULT  Goal: Nutrient/Hydration intake appropriate for improving, restoring or maintaining nutritional needs  Description: Monitor and assess patient's nutrition/hydration status for malnutrition  Collaborate with interdisciplinary team and initiate plan and interventions as ordered  Monitor patient's weight and dietary intake as ordered or per policy  Utilize nutrition screening tool and intervene as necessary  Determine patient's food preferences and provide high-protein, high-caloric foods as appropriate       INTERVENTIONS:  - Monitor oral intake, urinary output, labs, and treatment plans  - Assess nutrition and hydration status and recommend course of action  - Evaluate amount of meals eaten  - Assist patient with eating if necessary   - Allow adequate time for meals  - Recommend/ encourage appropriate diets, oral nutritional supplements, and vitamin/mineral supplements  - Order, calculate, and assess calorie counts as needed  - Recommend, monitor, and adjust tube feedings and TPN/PPN based on assessed needs  - Assess need for intravenous fluids  - Provide specific nutrition/hydration education as appropriate  - Include patient/family/caregiver in decisions related to nutrition  Outcome: Progressing

## 2023-05-19 NOTE — PROGRESS NOTES
NEUROLOGY RESIDENCY PROGRESS NOTE     Name: Jose Muñoz   Age & Sex: 40 y o  male   MRN: [id number]  Unit/Bed#: ICU 09   Encounter: [id number]    Recommendations for outpatient neurological follow up have yet to be determined  Pending for discharge: per primary team     ASSESSMENT & PLAN     * L MCA stroke w/ LM1 partial occlusion  Assessment & Plan  · 40 y o  right handed male w/ no pertinent hx who presented as a stroke alert on 05/15/23 at 6:40 PM  LKW 8:30PM last night when he spoke with someone over the phone  Initial presenting deficits R facial droop, RUE > RLE weakness  No thinners use  · Per EMS/ED, initial BP: 149/88  Pulse 65  ECG NSR  Glucose 120  NIHSS 16 (see below)  · CTH showed L MCA stroke (ASPECT 8), CTA h/n showed partial LM1 occlusion  Not found to be a TNK candidate due to being out of time window, large stroke seen on CTH  Given clinical/radiological findings, pt was then returned to the scanner for CTP which demonstrated that pt was a candidate for IR thrombectomy and endovascular alert was called  Work-up:  • A1c: 4 8  • Lipid panel: cholesterol 159, triglycerides 34, , LDL 18  • Thrombus panel: Antithrombin III low, Beta-2 glycoprotein antibodies normal, Cardiolipin antibody normal  • Homocysteine: Normal  • BELEM: Negative    Imaging:  [facility] 5/16: Grossly stable recent large left MCA territory infarct with regional mass effect without midline shift  No acute hemorrhage  MRI Brain w/ contrast 5/17/23: Recent large left MCA territory infarct with associated edema and a few foci of petechial hemorrhage  There is regional mass effect without midline shift  Cannot reliably evaluate the infarcted brain for structural abnormality  No structural abnormality is identified in the remainder brain  Normal hippocampal formations  ALYSSA- EF 55%, large patent PFO with bidirectional flow with saline contrast injection  Left atrial appendate with wind sock appearance with no thrombus  Impression: 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 in a 41 yo active patient w/ no reported vascular risk factors to favor central etiology  Patient s/p thrombectomy with TICI 3 revascularization  Cannot rule out hypercoagulable state secondary to COVID infection  Found to have large PFO so will check Duplex b/l lower extremities     Plan:   • Continue Aspirin 81 mg daily   • Continue DVT prof   • Continue Keppra 750 mg BID   • If pt mental status declines by GCS > 2 in 1 hour, obtain STAT 14 [facility]  • Medical management as per critical care appreciated      Seizure [facility])  Assessment & Plan  Episode of unresponsiveness noted around 5/16 3:00 AM lasting uncertain duration, given 2 mg Ativan and taken emergently to CT scan revealing no hemorrhage, stable mass effect likely secondary to edema  Subsequently loaded with 4000 mg Keppra, initiated on 750 mg BID  Additional episode requiring 2 mg Ativan at approximately 10:00 AM        Plan:  · Continue on Keppra 750 mg BID  · Monitor clinically  · Seizure precautions  · Rest of care per primary team            SUBJECTIVE     Patient was seen and examined  No acute events overnight  Patient reports feeling well today, no acute complaints at this time  Increasingly conversant in the morning  Pertinent Negatives include: seizures, amaurosis, diplopia, other visual changes, numbness or tingling     Review of Systems   Constitutional: Negative for chills and fever  HENT: Negative for hearing loss  Respiratory: Negative for cough and shortness of breath  Cardiovascular: Negative for chest pain  Gastrointestinal: Negative for abdominal pain  Neurological: Negative for seizures and headaches  OBJECTIVE     Patient ID: Olivia Castaneda is a 40 y o  male      Vitals:    05/19/23 0600 05/19/23 0700 05/19/23 0800 05/19/23 1200   BP: (!) 147/109 158/87 144/86 151/92   BP Location: Left arm Left arm     Pulse: (!) 48 (!) 54 (!) 50 (!) 52   Resp: 15 16 18 18 Temp:       TempSrc:       SpO2: 99% 98% 97% 98%   Weight:       Height:          Temperature:   Temp (24hrs), Av 8 °F (36 6 °C), Min:97 5 °F (36 4 °C), Max:98 1 °F (36 7 °C)    Temperature: 97 9 °F (36 6 °C)      Physical Exam  Constitutional:       General: He is not in acute distress  HENT:      Mouth/Throat:      Mouth: Mucous membranes are moist       Pharynx: Oropharynx is clear  Pulmonary:      Effort: Pulmonary effort is normal  No respiratory distress  Musculoskeletal:      Right lower leg: No edema  Left lower leg: No edema  Skin:     General: Skin is warm and dry  Coloration: Skin is not jaundiced  Neurological:      Mental Status: He is alert  Cranial Nerves: Cranial nerves 2-12 are intact  Psychiatric:         Speech: Speech normal           Neurologic Exam     Mental Status   Oriented to person  Oriented to place  Follows 1 step commands  Attention: normal  Concentration: normal    Speech: speech is normal   Level of consciousness: alert  Expressive aphasia with possibly some receptive component  Able to repeat some phrases with occasional difficulty  Cranial Nerves   Cranial nerves II through XII intact  Motor Exam   Muscle bulk: normal  Overall muscle tone: normal    Strength   Strength 5/5 except as noted  Right deltoid: 4/5  Right biceps: 4/5  Right triceps: 4/5  Right interossei: 5/5  Right iliopsoas: 4/5  Right quadriceps: 4/5  Right hamstrin/5  Right anterior tibial: 5/5  Right gastroc: 5/5    Sensory Exam   Right arm light touch: decreased from elbow  Left arm light touch: normal  Right leg light touch: decreased from knee  Left leg light touch: normal    Gait, Coordination, and Reflexes     Tremor   Resting tremor: absent    Reflexes   Reflexes 2+ except as noted     Right plantar: equivocal  Left plantar: equivocal  Right Dow: absent  Left Dow: absent  Right ankle clonus: absent  Left pendular knee jerk: absent         LABORATORY DATA Labs: I have personally reviewed pertinent reports  Results from last 7 days   Lab Units 05/19/23  0629 05/18/23  0536 05/17/23  0523   WBC Thousand/uL 5 54 5 56 5 47   HEMOGLOBIN g/dL 10 2* 10 2* 10 7*   HEMATOCRIT % 29 9* 30 1* 31 3*   PLATELETS Thousands/uL 148* 141* 149   NEUTROS PCT % 63 65 71   MONOS PCT % 8 9 11      Results from last 7 days   Lab Units 05/19/23  1233 05/19/23  0629 05/19/23  0005 05/17/23  1201 05/17/23  0523 05/16/23  1240 05/16/23  0557   SODIUM mmol/L 145 146 145   < > 146   < > 140   POTASSIUM mmol/L 3 4* 3 4* 3 4*   < > 3 6   < > 3 9   CHLORIDE mmol/L 118* 122* 121*   < > 121*   < > 113*   CO2 mmol/L 24 24 24   < > 25   < > 25   BUN mg/dL 4* 5 5   < > 6   < > 8   CREATININE mg/dL 0 53* 0 55* 0 52*   < > 0 54*   < > 0 52*   CALCIUM mg/dL 8 0* 7 5* 7 9*   < > 8 4   < > 8 3   ALK PHOS U/L  --   --   --   --  44*  --  47   ALT U/L  --   --   --   --  26  --  26   AST U/L  --   --   --   --  38  --  37    < > = values in this interval not displayed  Results from last 7 days   Lab Units 05/19/23  0629 05/18/23  0535 05/17/23  0523   MAGNESIUM mg/dL 1 8 2 0 2 2     Results from last 7 days   Lab Units 05/19/23  0629 05/18/23  0535 05/17/23  0523   PHOSPHORUS mg/dL 2 4* 1 5* 2 4*      Results from last 7 days   Lab Units 05/15/23  1853   INR  0 89   PTT seconds 22*               IMAGING & DIAGNOSTIC TESTING     Radiology Results: I have personally reviewed pertinent reports  CT head wo contrast   Final Result by Mayur Mendez MD (05/19 4832)      Continued evolution of left MCA territory infarct as above with stable mass effect  No significant midline shift   Question small volume petechial hemorrhage left inferior parietal and temporal region  No billie parenchymal hemorrhage  Continued short-term interval follow-up advised  Study was marked in [facility] for immediate notification                 Workstation performed: [id number]         MRI brain seizure wo and w contrast Final Result by Rosa Mendez MD (42/01 1036)   Addendum (preliminary) 1 of 1 by Rosa Mendez MD (05/17 7324)   ADDENDUM:      Correcting comparison study: Head CT 5/16/2023  Final      1  Recent large left MCA territory infarct with associated edema and a few foci of petechial hemorrhage  There is regional mass effect without midline shift  2   Cannot reliably evaluate the infarcted brain for structural abnormality  No structural abnormality is identified in the remainder brain  Normal hippocampal formations  Workstation performed: [id number]         XR ankle 3+ vw right   Final Result by Maureen Villa DO (05/17 6573)      No acute osseous abnormality  Workstation performed: [id number]         CT head wo contrast   Final Result by Rosa Mendez MD (05/17 4397)      Grossly stable recent large left MCA territory infarct with regional mass effect without midline shift  No acute hemorrhage  Workstation performed: [id number]         CT head wo contrast   Final Result by Casandra Pimentel MD (21/93 8546)      Evolving left MCA infarct with increased cortical hypodensity and stable sulcal effacement/mass effect throughout the left lobe  No acute intracranial hemorrhage or midline shift  Workstation performed: [id number]         CT head wo contrast   Final Result by Yamil Melo MD (14/90 9833)      Compared to the most immediate prior study, significant interval decrease/near total resolution of the previously noted extensive left hemispheric hyperdensity involving the cortex and subcortical white matter and left basal ganglia  Minimal residual    hyperdensity in the left basal ganglia region  Overall these findings suggest resolving contrast staining post endovascular therapy although small amount of superimposed hemorrhage is difficult to entirely exclude   Recommend short-term interval follow-up    scan and/or further evaluation with MRI for definitive evaluation  Stable left hemispheric edema and sulcal effacement without significant midline shift  Above findings discussed with Dr Elian Mayer at 4:30 a m  on 5/16/2023  Workstation performed: [id number]         XR chest portable ICU   Final Result by Irwin Sinclair MD (05/16 1625)      No acute cardiopulmonary disease  Right jugular catheter in lower SVC with no pneumothorax  Workstation performed: [id number]         CT head wo contrast   Final Result by Pino Jarrett MD (05/15 2233)      New extensive left hemispheric intraparenchymal and subarachnoid hemorrhage with mass effect upon the adjacent brain parenchyma and associated cerebral edema predominantly in the left MCA vascular distribution  No significant midline shift or evidence of    transtentorial herniation noted  Findings discussed with Dr Nina Sandoval at 10:30 p m  Workstation performed: [id number]         IR stroke alert   Final Result by Meche Smiley (05/17 1019)      CT cerebral perfusion   Final Result by Miguel Malcolm MD (05/15 1920)      CT perfusion performed  Data available on PACS  Workstation performed: [id number]         CTA stroke alert (head/neck)   Final Result by Miguel Malcolm MD (34/03 1915)         1  Acute occlusion of the proximal left M1 segment with partial opacification of distal M2 and M3 branches  2  No evidence of thrombus, stenosis or occlusion of the carotid or vertebral arteries  Findings were directly discussed with Chelsy Golden at  7:14 PM                              Workstation performed: [id number]         CT stroke alert brain   Final Result by Miguel Malcolm MD (05/15 1915)      Late acute to early subacute left MCA territory infarct  No hemorrhagic conversion        Findings were directly discussed with Chelsy Golden at  7:10 PM         Workstation performed: [id number]         CT chest abdomen pelvis w contrast    (Results Pending) VAS lower limb venous duplex study, complete bilateral    (Results Pending)       Other Diagnostic Testing: I have personally reviewed pertinent reports  ACTIVE MEDICATIONS     Current Facility-Administered Medications   Medication Dose Route Frequency   • aspirin chewable tablet 81 mg  81 mg Oral Daily   • chlorhexidine (PERIDEX) 0 12 % oral rinse 15 mL  15 mL Mouth/Throat Q12H BRANDON   • cyanocobalamin (VITAMIN B-12) tablet 500 mcg  500 mcg Oral Daily   • enoxaparin (LOVENOX) subcutaneous injection 40 mg  40 mg Subcutaneous Q24H BRANDON   • labetalol (NORMODYNE) injection 10 mg  10 mg Intravenous Q4H PRN   • levETIRAcetam (KEPPRA) 750 mg in sodium chloride 0 9 % 100 mL IVPB  750 mg Intravenous Q12H [address]   • nicotine (NICODERM CQ) 14 mg/24hr TD 24 hr patch 1 patch  1 patch Transdermal Daily   • ondansetron (ZOFRAN) injection 4 mg  4 mg Intravenous Once PRN   • potassium chloride (K-DUR,KLOR-CON) CR tablet 40 mEq  40 mEq Oral BID   • sodium chloride (HYPERTONIC) 3 % infusion  90 mL/hr Intravenous Continuous       Prior to Admission medications    Medication Sig Start Date End Date Taking?  Authorizing Provider   MELATONIN PO Take by mouth    Historical Provider, MD   Protein POWD Take by mouth    Historical Provider, MD         VTE Pharmacologic Prophylaxis: Enoxaparin (Lovenox)  VTE Mechanical Prophylaxis: sequential compression device    ==  [facility], PGY-4    [facility] MS4

## 2023-05-19 NOTE — SPEECH THERAPY NOTE
Speech Language/Pathology  Speech/Language Pathology  Assessment    Patient Name: Dex Guajardo  AECLX'F Date: 5/19/2023     Problem List  Principal Problem:    L MCA stroke w/ LM1 partial occlusion  Active Problems:    Seizure ([address] )    COVID    Past Medical History  History reviewed  No pertinent past medical history  Past Surgical History  Past Surgical History:   Procedure Laterality Date   • IR STROKE ALERT  5/15/2023     Current Medical:  5/15/23:  Chief Complaint: Found down with stroke symptoms  History of Present Illness   HX and PE limited by: Morena Calix is a 40 y o  male who presents with stroke symptoms  Patient arrived to the emergency department as a stroke alert  His last known well was approximately 8:30 PM for which she was reported to have been sounding normal over the telephone  He did not show up for a scheduled meeting with a friend today and did not answer his phone  A welfare check was called, EMS found patient covered in feces with right upper extremity flaccid paralysis as well as right-sided neglect, right facial droop and expressive aphasia  Initial NIH on presentation reported to be 16  Patient was not a candidate for thrombolysis secondary to last known well time  He was noted to have a large vessel occlusion of M1  Endovascular alert initiated  Pt was assessed with the Western Aphasia Battery Revised Bedside with the following findings/results:    Spontaneous Speech: Content Score: 5/10  How are you today? 1/1  What is your full address? 1/2  Why are you here? 1/2  Tell me what is happening in this picture?  2/5    Spontaneous Speech: Fluency Score: 4/10    10 = Normal Speech 5: Halting, paraphasic, but more complete sentences; significant word-finding difficulty    9 = Some hesitations and word-finding difficulty 4: Agrammatic, effortful; verb-noun phrases, but only one or two propositional sentences    8 = Circumlocutory, fluent speech w/ semantic paraphasia and word-finding difficulty 3: Mostly unintelligible, low-volume mumbling; some single words    7 = Fluent phonemic jargon, semblance to [place] syntax and phonology  2: Single words, often paraphasias, effortful and hesitant    6 = Logopenic but normal syntax; few, if any, paraphasias; significant word-finding difficulty 1: Recurrent, stereotypic utterances w/ meaningful intonation     0: No words or short, meaningless utterances       Auditory Verbal Comprehension: Yes/No Questions: 8/10    Sequential Commands: 210    Repetition: 3/10    Object Namin/10    Reading: Not assessed at this time  Will be completed next week  Writing:  Not assessed at this time  Will be completed next week  Bedside Aphasia Score: 46 7/100  (Sum the Content, Fluency, Auditory Verbal Comprehension, Sequential Commands, Repetition, and Object Naming scores  Divide the sum by 6; then multiply by 10 to obtain )     Bedside Language Score: Will be completed next week  (Sum the Content, Fluency, Auditory Verbal Comprehension, Sequential Commands, Repetition, Object Naming, Reading and Writing scores   Divide the sum by 8; then multiply by 10 to obtain )    Bedside Aphasia Classification Criteria: Broca's Aphasia  (Compare the patient's Fluency, Auditory Verbal Comprehension, and Repetition scores to the the three scores associated with each aphasia type)      Aphasia Type Scores    Fluency Auditory Verbal Comprehension Repetition    Global <5 <4 <5   Broca's <5 >3 <8   Isolation <5 <4 >4   Transcortical Motor <5 >3 >7   Wernicke's >4 <7 <8   Transcortical Sensory >4 <7 >7   Conduction >4 >6 <7   Anomic  >4 >6 >6       MOTOR SPEECH:  Oral Mechanism Exam: Not formally assessed    Dysarthria:              Imprecise artic: WFL              Rate: slowed due to word finding difficulty               Nasality: WFL              Breath support: WFL              Volume: WFL  Apraxia: Not suspected     Cognitive-linguistic skills:   Cognition not formally assessed  More of a linguistic impairment  Pt seems fully aware of deficits  Symptoms noted:  Phonemic paraphasias  Perseveration  Aware of deficits    Summary/Impression:  Pt presents with expressive>receptive, non-fluent aphasia with s/s including word finding difficulties, phonemic paraphasia, and perseveration on certain words and parts of words  Was helpful to give phonemic cues for beginning sounds as well as fill in the blank or starting of phrases that need to be completed  Based on scoring of bedside WAB, most consistent with Broca's Aphasia  Treatment Recommended and Frequency: Speech and Language Therapy 1-3x per week as able    Therapy Prognosis: fair  Prognosis considerations: CVA    Goals:   Long-term goal:  Patient will communicate wants, needs and opinions effectively with different conversational partners in a variety of ADLs      Short Term Goals:  Patient will use gestures/pointing to communicate basic wants and needs  verbal/tactile/visual cues with increasing frequency and accuracy    Patient will complete unison counting (1-10, 1-20) with minimal assist    Patient will read automatic speech sequences with 90% accuracy and fluency    Patient will generate automatic speech sequences with increasing frequency    Continue assessment and goal development in subsequent HONEY sessions       Education Initiated with:   Pt

## 2023-05-19 NOTE — ASSESSMENT & PLAN NOTE
PPD#4 - s/p TICI 3 revascularization of a left M1 occlusion (Dr Abby Gunn, 5/15/2023)  · Found down at home on 5/15 with NIHSS 16, noted to have acute left M1 occlusion with early ischemic changes  · COVID+ 5/15  · Question if prior infection as patient had Covid 2/2023 per his sister  · Pt remains receptively and expressively aphasic but continues to improve daily  More interactive with increased appropriate responses  Pt able to state his name and that he is in the hospital  Following 1 step commands  Imaging:  · MRI brain seizure w/wo contrast 5/17: Recent large left MCA territory infarct with associated edema and a few foci of petechial hemorrhage  There is regional mass effect without midline shift  Cannot reliably evaluate the infarcted brain for structural abnormality  No structural abnormality is identified in the remainder brain  Normal hippocampal formations    Plan:  • Continue to closely monitor neuro exam   o Frequent neuro checks per primary team   o Repeat STAT CTH with any acute decline in GCS > 2pts or more in 1hr   • Maintain normotensive BP goals, SBP < 160, MAP > 65   • No acute neurosurgical intervention indicated at this time   o Case and imaging reviewed this am on rounds     o Large stroke burden noted on MRI but without significant brain compression, appears stable on repeat CTH this am with continually improving exam   • Continue aggressive medical management   o stroke day ~5/5, now moving to outside the peak swelling window   o 3% HTS 90cc/hr  - Maintain Na > 145, osm 310-315   o Continue aggressive supportive care   • Continue management on stroke pathway   o Neurology following, appreciate neuro management of stroke and workup   o Continue on daily ASA   o Continue hypercoagulable workup per neuro   • Continue seizure management per primary team and neurology   o vEEG since removed   - final read reported evidence of the known structural L sided injury, no seizures   o Continue current AED regimen: keppra 750mg BID   • DVT ppx: SCDs, SQ Lovenox   • pain control per primary team   • Medical management per primary team   • PT/OT   • Social work following for assistance with dispo once medically cleared     Neurosurgery will sign off at this time and defer further stroke management to the neurology and primary teams  Please call with questions or concerns

## 2023-05-20 LAB
ANION GAP SERPL CALCULATED.3IONS-SCNC: 1 MMOL/L (ref 4–13)
ANION GAP SERPL CALCULATED.3IONS-SCNC: 1 MMOL/L (ref 4–13)
ANION GAP SERPL CALCULATED.3IONS-SCNC: 2 MMOL/L (ref 4–13)
ANION GAP SERPL CALCULATED.3IONS-SCNC: 2 MMOL/L (ref 4–13)
BASOPHILS # BLD AUTO: 0.03 THOUSANDS/ÂΜL (ref 0–0.1)
BASOPHILS NFR BLD AUTO: 1 % (ref 0–1)
BUN SERPL-MCNC: 3 MG/DL (ref 5–25)
CALCIUM SERPL-MCNC: 7.7 MG/DL (ref 8.3–10.1)
CALCIUM SERPL-MCNC: 7.8 MG/DL (ref 8.3–10.1)
CALCIUM SERPL-MCNC: 7.9 MG/DL (ref 8.3–10.1)
CALCIUM SERPL-MCNC: 8.1 MG/DL (ref 8.3–10.1)
CHLORIDE SERPL-SCNC: 120 MMOL/L (ref 96–108)
CO2 SERPL-SCNC: 22 MMOL/L (ref 21–32)
CO2 SERPL-SCNC: 24 MMOL/L (ref 21–32)
CREAT SERPL-MCNC: 0.42 MG/DL (ref 0.6–1.3)
CREAT SERPL-MCNC: 0.48 MG/DL (ref 0.6–1.3)
CREAT SERPL-MCNC: 0.54 MG/DL (ref 0.6–1.3)
CREAT SERPL-MCNC: 0.55 MG/DL (ref 0.6–1.3)
EOSINOPHIL # BLD AUTO: 0.19 THOUSAND/ÂΜL (ref 0–0.61)
EOSINOPHIL NFR BLD AUTO: 4 % (ref 0–6)
ERYTHROCYTE [DISTWIDTH] IN BLOOD BY AUTOMATED COUNT: 12.3 % (ref 11.6–15.1)
GFR SERPL CREATININE-BSD FRML MDRD: 129 ML/MIN/1.73SQ M
GFR SERPL CREATININE-BSD FRML MDRD: 130 ML/MIN/1.73SQ M
GFR SERPL CREATININE-BSD FRML MDRD: 136 ML/MIN/1.73SQ M
GFR SERPL CREATININE-BSD FRML MDRD: 144 ML/MIN/1.73SQ M
GLUCOSE SERPL-MCNC: 113 MG/DL (ref 65–140)
GLUCOSE SERPL-MCNC: 130 MG/DL (ref 65–140)
GLUCOSE SERPL-MCNC: 137 MG/DL (ref 65–140)
GLUCOSE SERPL-MCNC: 137 MG/DL (ref 65–140)
HCT VFR BLD AUTO: 30.1 % (ref 36.5–49.3)
HGB BLD-MCNC: 10.1 G/DL (ref 12–17)
IMM GRANULOCYTES # BLD AUTO: 0.04 THOUSAND/UL (ref 0–0.2)
IMM GRANULOCYTES NFR BLD AUTO: 1 % (ref 0–2)
LYMPHOCYTES # BLD AUTO: 0.79 THOUSANDS/ÂΜL (ref 0.6–4.47)
LYMPHOCYTES NFR BLD AUTO: 15 % (ref 14–44)
MAGNESIUM SERPL-MCNC: 2 MG/DL (ref 1.6–2.6)
MCH RBC QN AUTO: 33.2 PG (ref 26.8–34.3)
MCHC RBC AUTO-ENTMCNC: 33.6 G/DL (ref 31.4–37.4)
MCV RBC AUTO: 99 FL (ref 82–98)
MONOCYTES # BLD AUTO: 0.42 THOUSAND/ÂΜL (ref 0.17–1.22)
MONOCYTES NFR BLD AUTO: 8 % (ref 4–12)
NEUTROPHILS # BLD AUTO: 3.9 THOUSANDS/ÂΜL (ref 1.85–7.62)
NEUTS SEG NFR BLD AUTO: 71 % (ref 43–75)
NRBC BLD AUTO-RTO: 0 /100 WBCS
OSMOLALITY UR/SERPL-RTO: 297 MMOL/KG (ref 282–298)
OSMOLALITY UR/SERPL-RTO: 299.5 MMOL/KG (ref 282–298)
OSMOLALITY UR/SERPL-RTO: 300 MMOL/KG (ref 282–298)
OSMOLALITY UR/SERPL-RTO: 306 MMOL/KG (ref 282–298)
PHOSPHATE SERPL-MCNC: 2.4 MG/DL (ref 2.7–4.5)
PLATELET # BLD AUTO: 156 THOUSANDS/UL (ref 149–390)
PMV BLD AUTO: 9.9 FL (ref 8.9–12.7)
POTASSIUM SERPL-SCNC: 3.3 MMOL/L (ref 3.5–5.3)
POTASSIUM SERPL-SCNC: 3.4 MMOL/L (ref 3.5–5.3)
POTASSIUM SERPL-SCNC: 3.5 MMOL/L (ref 3.5–5.3)
POTASSIUM SERPL-SCNC: 3.6 MMOL/L (ref 3.5–5.3)
RBC # BLD AUTO: 3.04 MILLION/UL (ref 3.88–5.62)
SODIUM SERPL-SCNC: 144 MMOL/L (ref 135–147)
SODIUM SERPL-SCNC: 145 MMOL/L (ref 135–147)
SODIUM SERPL-SCNC: 145 MMOL/L (ref 135–147)
SODIUM SERPL-SCNC: 146 MMOL/L (ref 135–147)
WBC # BLD AUTO: 5.37 THOUSAND/UL (ref 4.31–10.16)

## 2023-05-20 RX ORDER — SODIUM CHLORIDE 3 G/100ML
250 INJECTION, SOLUTION INTRAVENOUS ONCE
Status: COMPLETED | OUTPATIENT
Start: 2023-05-20 | End: 2023-05-20

## 2023-05-20 RX ORDER — MAGNESIUM SULFATE 1 G/100ML
1 INJECTION INTRAVENOUS ONCE
Status: COMPLETED | OUTPATIENT
Start: 2023-05-20 | End: 2023-05-20

## 2023-05-20 RX ORDER — ACETAMINOPHEN 325 MG/1
975 TABLET ORAL EVERY 8 HOURS PRN
Status: DISCONTINUED | OUTPATIENT
Start: 2023-05-20 | End: 2023-05-27 | Stop reason: HOSPADM

## 2023-05-20 RX ADMIN — SODIUM CHLORIDE 250 ML: 3 INJECTION, SOLUTION INTRAVENOUS at 12:23

## 2023-05-20 RX ADMIN — POTASSIUM PHOSPHATE, MONOBASIC AND POTASSIUM PHOSPHATE, DIBASIC 21 MMOL: 224; 236 INJECTION, SOLUTION, CONCENTRATE INTRAVENOUS at 08:21

## 2023-05-20 RX ADMIN — MAGNESIUM SULFATE HEPTAHYDRATE 1 G: 1 INJECTION, SOLUTION INTRAVENOUS at 08:52

## 2023-05-20 RX ADMIN — SODIUM CHLORIDE 90 ML/HR: 3 INJECTION, SOLUTION INTRAVENOUS at 00:24

## 2023-05-20 RX ADMIN — SODIUM CHLORIDE 90 ML/HR: 3 INJECTION, SOLUTION INTRAVENOUS at 10:42

## 2023-05-20 RX ADMIN — SODIUM CHLORIDE 90 ML/HR: 3 INJECTION, SOLUTION INTRAVENOUS at 17:26

## 2023-05-20 RX ADMIN — ENOXAPARIN SODIUM 40 MG: 40 INJECTION SUBCUTANEOUS at 08:31

## 2023-05-20 RX ADMIN — CHLORHEXIDINE GLUCONATE 0.12% ORAL RINSE 15 ML: 1.2 LIQUID ORAL at 21:07

## 2023-05-20 RX ADMIN — NICOTINE 1 PATCH: 14 PATCH, EXTENDED RELEASE TRANSDERMAL at 08:31

## 2023-05-20 RX ADMIN — SODIUM CHLORIDE 250 ML: 3 INJECTION, SOLUTION INTRAVENOUS at 18:02

## 2023-05-20 RX ADMIN — CHLORHEXIDINE GLUCONATE 0.12% ORAL RINSE 15 ML: 1.2 LIQUID ORAL at 08:31

## 2023-05-20 RX ADMIN — CYANOCOBALAMIN TAB 500 MCG 500 MCG: 500 TAB at 08:31

## 2023-05-20 RX ADMIN — ASPIRIN 81 MG CHEWABLE TABLET 81 MG: 81 TABLET CHEWABLE at 08:31

## 2023-05-20 RX ADMIN — HYDRALAZINE HYDROCHLORIDE 10 MG: 20 INJECTION, SOLUTION INTRAMUSCULAR; INTRAVENOUS at 02:20

## 2023-05-20 RX ADMIN — LEVETIRACETAM 750 MG: 100 INJECTION, SOLUTION INTRAVENOUS at 21:06

## 2023-05-20 RX ADMIN — LEVETIRACETAM 750 MG: 100 INJECTION, SOLUTION INTRAVENOUS at 08:25

## 2023-05-20 RX ADMIN — SODIUM CHLORIDE 90 ML/HR: 3 INJECTION, SOLUTION INTRAVENOUS at 05:40

## 2023-05-20 RX ADMIN — HYDRALAZINE HYDROCHLORIDE 10 MG: 20 INJECTION, SOLUTION INTRAMUSCULAR; INTRAVENOUS at 16:42

## 2023-05-20 RX ADMIN — ACETAMINOPHEN 975 MG: 325 TABLET ORAL at 03:09

## 2023-05-20 RX ADMIN — ACETAMINOPHEN 975 MG: 325 TABLET ORAL at 16:57

## 2023-05-20 NOTE — PLAN OF CARE
Problem: MOBILITY - ADULT  Goal: Maintain or return to baseline ADL function  Description: INTERVENTIONS:  -  Assess patient's ability to carry out ADLs; assess patient's baseline for ADL function and identify physical deficits which impact ability to perform ADLs (bathing, care of mouth/teeth, toileting, grooming, dressing, etc )  - Assess/evaluate cause of self-care deficits   - Assess range of motion  - Assess patient's mobility; develop plan if impaired  - Assess patient's need for assistive devices and provide as appropriate  - Encourage maximum independence but intervene and supervise when necessary  - Involve family in performance of ADLs  - Assess for home care needs following discharge   - Consider OT consult to assist with ADL evaluation and planning for discharge  - Provide patient education as appropriate  Outcome: Progressing     Problem: Neurological Deficit  Goal: Neurological status is stable or improving  Description: Interventions:  - Monitor and assess patient's level of consciousness, motor function, sensory function, and level of assistance needed for ADLs  - Monitor and report changes from baseline  Collaborate with interdisciplinary team to initiate plan and implement interventions as ordered  - Provide and maintain a safe environment  - Consider seizure precautions  - Consider fall precautions  - Consider aspiration precautions  - Consider bleeding precautions  Outcome: Progressing     Problem: Potential for Aspiration  Goal: Non-ventilated patient's risk of aspiration is minimized  Description: Assess and monitor vital signs, respiratory status, and labs (WBC)  Monitor for signs of aspiration (tachypnea, cough, rales, wheezing, cyanosis, fever)  - Assess and monitor patient's ability to swallow  - Place patient up in chair to eat if possible  - HOB up at 90 degrees to eat if unable to get patient up into chair   - Supervise patient during oral intake     - Instruct patient/ family to take small bites  - Instruct patient/ family to take small single sips when taking liquids    - Follow patient-specific strategies generated by speech pathologist   Outcome: Progressing

## 2023-05-20 NOTE — PLAN OF CARE
Problem: OCCUPATIONAL THERAPY ADULT  Goal: Performs self-care activities at highest level of function for planned discharge setting  See evaluation for individualized goals  Description: Treatment Interventions: ADL retraining, Visual perceptual retraining, Functional transfer training, UE strengthening/ROM, Endurance training, Cognitive reorientation, Patient/family training, Equipment evaluation/education, Neuromuscular reeducation, Fine motor coordination activities, Compensatory technique education, Continued evaluation, Energy conservation, Activityengagement          See flowsheet documentation for full assessment, interventions and recommendations  Outcome: Progressing  Note: Limitation: Decreased ADL status, Decreased UE strength, Decreased UE ROM, Decreased Safe judgement during ADL, Decreased cognition, Decreased endurance, Visual deficit, Decreased sensation, Decreased fine motor control, Decreased self-care trans, Decreased high-level ADLs, Non-func R UE  Prognosis: Fair  Assessment: Pt greeted bedside for OT treatment on 5/20/2023 focusing on maximizing independence with ADLs  Pt min A with bed mobility, min Ax2 with functional transfers, and min Ax2 with functional mobility with RW  Pt engages in grooming task at min A level standing sinkside with standing tolerance of x2 min (unilateral support) and Pt completes eating at a S level for beverage management  Pt participates in HEP seated in recliner chair focusing on maximizing RUE AROM independence with ADLs  Limitations that impact functional performance include decreased ADL status, decreased UE ROM, decreased UE strength, decreased safe judgement during ADLs, decreased cognition, decreased endurance, decreased self care transfers, decreased high level ADLs and pain   Occupational performance areas to address ADL retraining, functional transfer training, UE strengthening/ROM, endurance training, cognitive reorientation, Pt/caregiver education, equipment evaluation/education, compensatory technique education, energy conservation and activity engagement   Pt would benefit from continued skilled OT services while in hospital to maximize independence with ADLs  Will continue to follow Pt's goals and progress  Pt would benefit from post acute rehabilitation services upon DC to maximize safety and independence with ADLs and functional tasks of choice    Recommendation: Physiatry Consult  OT Discharge Recommendation: Post acute rehabilitation services

## 2023-05-20 NOTE — PHYSICAL THERAPY NOTE
PHYSICAL THERAPY NOTE          Patient Name: Radha Cortez  BDFUO'V Date: 5/20/2023 05/20/23 1108   PT Last Visit   PT Visit Date 05/20/23   Note Type   Note Type Treatment   Pain Assessment   Pain Assessment Tool 0-10   Pain Score No Pain   Restrictions/Precautions   Weight Bearing Precautions Per Order No   Other Precautions Cognitive; Chair Alarm; Bed Alarm;Telemetry;Multiple lines; Fall Risk  (R hemiparesis, expressive aphasia, ?ideomotor apraxia, CVC, corbin)   General   Chart Reviewed Yes   Response to Previous Treatment Patient with no complaints from previous session  Family/Caregiver Present No   Cognition   Overall Cognitive Status WFL   Arousal/Participation Cooperative   Attention Attends with cues to redirect   Orientation Level Oriented X4  (with increased time/trials due to aphasia)   Following Commands Follows one step commands inconsistently  (?apaxia)   Comments very pleasant to work with   Subjective   Subjective agreeable to participate   Bed Mobility   Supine to Sit 4  Minimal assistance   Additional items Assist x 1;HOB elevated; Increased time required;Verbal cues;LE management   Sit to Supine Unable to assess   Additional Comments remained seated in chair with alarm upon conclusion   Transfers   Sit to Stand 4  Minimal assistance   Additional items Assist x 2; Increased time required;Verbal cues   Stand to Sit 4  Minimal assistance   Additional items Assist x 2; Increased time required;Verbal cues   Additional Comments HHA  RUE supported +R knee block  c RW x4 trials   Ambulation/Elevation   Gait pattern R Hemiparesis;R Foot drag;Improper Weight shift;R Knee Jasmin;Decreased foot clearance;Decreased R stance; Short stride; Excessively slow   Gait Assistance 4  Minimal assist   Additional items Assist x 2;Verbal cues  (+chair follow of 3rd)   Assistive Device Rolling walker   Distance 2' to chair HHA + 10'x2 c RW  (seated rest b/w)   Ambulation/Elevation Additional Comments with 10' ambulation attempts with RW, requires A for knee block in R stance  VC for R foot clearance and increased R stance time   Balance   Static Sitting Fair   Dynamic Sitting Fair -   Static Standing Poor +   Dynamic Standing Poor   Ambulatory Poor   Endurance Deficit   Endurance Deficit Yes   Endurance Deficit Description R hemipariesis   Activity Tolerance   Activity Tolerance Patient tolerated treatment well   Medical Staff Made Aware co-tx with loco VARGAS due to medical complexity   Nurse Made Aware yes-cleared to mobilize  present for session   Exercises   Hip Abduction Sitting;5 reps;AAROM; Right   Knee AROM Long Arc Quad Sitting;5 reps;AROM; Right  (3/5 MMT)   Ankle Pumps Sitting;AAROM;5 reps;Right  (2/5 DF MMT)   Marching Sitting;AROM; Right;5 reps   Balance training  static standing at sink with 1 UE support and minAx1 (R knee block) x1 min  Assessment   Prognosis Good   Problem List Decreased strength;Decreased endurance; Impaired balance;Decreased mobility; Decreased coordination;Decreased cognition; Impaired judgement;Decreased safety awareness   Assessment Pt agreeable to participate in PT session  Pt performed functional mobility and therex as outlined above  Initiated gait training with RW as pt able to  RW  A required for steering RW as well as R knee stability  VC for R foot clearance and step length as pt attempts to rush R step resulting in R knee buckling, foot catching, and poor L foot clearance  Currently, step to pattern however will continue to work on R knee stability and increased R stance time to improve L foot clearance to step through pattern  ?ideomotor apraxia more so than receptive aphasia as pt able to follow most commands throughout session except for kicking legs out when moving recliner and scooting back in chair  Will continue to assess  Responded very well to therapy   Expressive aphasia remains limiting but appears much improved since IE  Time spent educating pt on attempting to use R side as much as possible to promote neuroplasticity  Pt left seated in chair with chair alarm, call bell, phone, and all personal needs within reach  Pt will continue to benefit from skilled acute care PT to further address their functional mobility limitations  D/C recommendations remain rehab  Barriers to Discharge Decreased caregiver support; Inaccessible home environment   Goals   Patient Goals to improve   LTG Expiration Date 05/31/23   PT Treatment Day 1   Plan   Treatment/Interventions Functional transfer training;LE strengthening/ROM; Therapeutic exercise;Cognitive reorientation; Endurance training;Patient/family training;Equipment eval/education; Bed mobility;Gait training;Spoke to nursing;Spoke to case management;OT   Progress Progressing toward goals   PT Frequency 3-5x/wk   Recommendation   PT Discharge Recommendation Post acute rehabilitation services  (PMR)   Equipment Recommended   (TBD)   AM-PAC Basic Mobility Inpatient   Turning in Flat Bed Without Bedrails 3   Lying on Back to Sitting on Edge of Flat Bed Without Bedrails 2   Moving Bed to Chair 2   Standing Up From Chair Using Arms 2   Walk in Room 1   Climb 3-5 Stairs With Railing 1   Basic Mobility Inpatient Raw Score 11   Basic Mobility Standardized Score 30 25   Highest Level Of Mobility   JH-HLM Goal 4: Move to chair/commode   JH-HLM Achieved 6: Walk 10 steps or more   Tara Kennedy, PT, DPT

## 2023-05-20 NOTE — OCCUPATIONAL THERAPY NOTE
Occupational Therapy Progress Note     Patient Name: Jose Muñoz  RVTAP'S Date: 5/20/2023  Problem List  Principal Problem:    L MCA stroke w/ LM1 partial occlusion  Active Problems:    Seizure ([address] )    COVID            05/20/23 1109   OT Last Visit   OT Visit Date 05/20/23   Note Type   Note Type Treatment   Pain Assessment   Pain Assessment Tool 0-10   Pain Score No Pain   Restrictions/Precautions   Weight Bearing Precautions Per Order No   Other Precautions Pain; Fall Risk;Multiple lines; Bed Alarm; Chair Alarm;Cognitive;Telemetry  (COVID+, however, not on precautions)   Lifestyle   Autonomy I w/ ADLS/IADLS, transfers and functional mobility pTA   Reciprocal Relationships Pt lives alone; supportive sister and father   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; coordinates food services   Intrinsic Gratification Unable to report; will continue to assess   ADL   Where Assessed Standing at [address]  Supervision/Setup   Eating Deficit Setup; Beverage management   Grooming Assistance 4  Minimal Assistance   Grooming Deficit Setup;Supervision/safety; Teeth care   Grooming Comments Standing sinkside with min A and SBAx1- standing tolerance of x2 min with UE support  Bed Mobility   Supine to Sit 4  Minimal assistance   Additional items Assist x 1; Increased time required   Additional Comments Pt greeted supine in bed  Transfers   Sit to Stand 4  Minimal assistance   Additional items Assist x 2; Increased time required;Verbal cues   Stand to Sit 4  Minimal assistance   Additional items Assist x 2; Increased time required;Verbal cues   Stand pivot 4  Minimal assistance   Additional items Assist x 2; Increased time required;Verbal cues   Additional Comments Pt completed functional SPT with min Ax2 with B/L HHA from EOB to recliner chair  Pt requires assist to move RLE  Pt completes functional STS transfers (x3) with RW at min AX2   Pt requires VCs/TCs in order to increase carryover with "one-step commands  Functional Mobility   Functional Mobility 4  Minimal assistance   Additional Comments Min Ax2 with RW and SBAx1 for chair follow  Pt completes household distances and requires min A to adv RLE  Additional items Rolling walker   Therapeutic Exercise - ROM   UE-ROM Yes   ROM- Right Upper Extremities   R Shoulder AAROM; Flexion; Horizontal ABduction   R Elbow AAROM;Elbow flexion   R Wrist Wrist extension;Wrist flexion;AAROM   R Weight/Reps/Sets 1x10   RUE ROM Comment Pt engaged in HEP seated in recliner chair focusing on increasing ROM in order to maximize independence  Pt utilizes B/L hands clasped to perform elbow flexion, hand over hand to participate in wrist flex/ext, AAROM to complete shoulder flexion, and lastly \"rock the baby\" exercise  Pt requires occasional cues to attend to RUE and R side of body  ROM - Left Upper Extremities    L Elbow AROM;Elbow flexion   Neuromuscular Education   Weight Bearing Technique Yes   Cognition   Overall Cognitive Status WFL   Arousal/Participation Responsive; Cooperative   Attention Difficulty attending to directions   Orientation Level Oriented to person;Oriented to place   Memory Decreased recall of precautions   Following Commands Follows one step commands with increased time or repetition   Comments Pt presents with global aphasia, however, is now able to conversate with few words and indentify names of places/items via choices  Pt frustrates occasionally with word finding/pronunciation, however, agreeable to continue practicing  Pt eager to get better/back to baseline  Pt able to follow one-step commands, however, requires demonstation and TCs to maximize understanding  Activity Tolerance   Activity Tolerance Patient tolerated treatment well   Medical Staff Made Aware Portions of tx completed with Katty Godinez, PT 2* to Pt's medical complexity and decreased endurance     Assessment   Assessment Pt greeted bedside for OT treatment on 5/20/2023 focusing on " maximizing independence with ADLs  Pt is making great functional progress towards meeting goals  Pt min A with bed mobility, min Ax2 with functional transfers, and min Ax2 with functional mobility with RW  Pt engages in grooming task at min A level standing sinkside with standing tolerance of x2 min (unilateral support) and Pt completes eating at a S level for beverage management  Pt participates in HEP seated in recliner chair focusing on maximizing RUE AROM independence with ADLs  Limitations that impact functional performance include decreased ADL status, decreased UE ROM, decreased UE strength, decreased safe judgement during ADLs, decreased cognition, decreased endurance, decreased self care transfers, decreased high level ADLs and pain  Occupational performance areas to address ADL retraining, functional transfer training, UE strengthening/ROM, endurance training, cognitive reorientation, Pt/caregiver education, equipment evaluation/education, compensatory technique education, energy conservation and activity engagement   Pt would benefit from continued skilled OT services while in hospital to maximize independence with ADLs  Will continue to follow Pt's goals and progress  Pt would benefit from post acute rehabilitation services upon DC to maximize safety and independence with ADLs and functional tasks of choice  Plan   Treatment Interventions ADL retraining;Functional transfer training;UE strengthening/ROM; Endurance training;Cognitive reorientation;Patient/family training;Equipment evaluation/education; Compensatory technique education; Activityengagement; Energy conservation   Goal Expiration Date 05/31/23   OT Treatment Day 1   OT Frequency 3-5x/wk   Recommendation   Recommendation Physiatry Consult   OT Discharge Recommendation Post acute rehabilitation services   Additional Comments  The patient's raw score on the AM-PAC Daily Activity Inpatient Short Form is 14   A raw score of less than 19 suggests the patient may benefit from discharge to post-acute rehabilitation services  Please refer to the recommendation of the Occupational Therapist for safe discharge planning  AM-PAC Daily Activity Inpatient   Lower Body Dressing 2   Bathing 2   Toileting 2   Upper Body Dressing 2   Grooming 3   Eating 3   Daily Activity Raw Score 14   Daily Activity Standardized Score (Calc for Raw Score >=11) 33 39   AM-PAC Applied Cognition Inpatient   Following a Speech/Presentation 2   Understanding Ordinary Conversation 3   Taking Medications 2   Remembering Where Things Are Placed or Put Away 3   Remembering List of 4-5 Errands 2   Taking Care of Complicated Tasks 1   Applied Cognition Raw Score 13   Applied Cognition Standardized Score 30 46   End of Consult   Education Provided Yes   Patient Position at End of Consult Bedside chair;Bed/Chair alarm activated; All needs within reach   Nurse Communication Nurse aware of consult     Chip MS Isaias, OTR/L

## 2023-05-20 NOTE — PROGRESS NOTES
Daily Progress Note - Critical Care   Britton Frederick 40 y o  male MRN: [id number]  Unit/Bed#: ICU 09 Encounter: [id number]    ----------------------------------------------------------------------------------------  HPI/24hr events: To Review:  Patient is a 52-year-old right-handed male without any known prior medical history who presented as a stroke alert on 5/15/2023 at [address] with a last known well of 2030 on 5/14/2023 when he has spoken with someone over the phone  Per documentation, a check-in was called as the patient did not show up for a scheduled meeting with a friend that did not answer his phone  Upon arrival by EMS for the welfare check the patient was found to be covered in feces and was noted to have right upper extremity flaccid paralysis as well as right hand sided neglect, right facial droop, and expressive aphasia  Initial NIHSS on presentation was 16  CT head noted a left MCA infarct, CTA head and neck demonstrated a partial left M1 occlusion  Patient was not a tenecteplase candidate given that he was outside the time window  Patient subsequently underwent a CTP which demonstrated that the patient was a thrombectomy candidate, and subsequently an endovascular alert was called  Patient underwent a mechanical thrombectomy TICI 3 revascularization  Pathology suspected to be a proximal embolism given findings  Patient subsequently noted to be COVID-positive, repeat CT head in the morning of 5/16 demonstrated continued edema and sulcal effacement on the left similar to previous study, improvement of contrast staining versus possible hemorrhage  Subsequently, after initial examination in the morning of 5/16 patient noted to have seizure that consisted of eye deviation to the right followed by postictal period  STAT CTH demonstrated no acute change, evolving known left MCA infarct  Video EEG started with no seizures captured, and subsequently discontinued    Patient underwent ALYSSA on 5/18 and found to have a large PFO  Overnight events:  None    ---------------------------------------------------------------------------------------  SUBJECTIVE  Patient was seen and examined this AM   Patient was alert, was able to follow commands, demonstrated difficulty with reading words, and difficulty with identifying objects, however is improved from examination on May 19  The patient demonstrated antigravity movement in bilateral upper extremities, and antigravity movement in bilateral lower extremities  The patient was able to complete finger-to-nose and heel-to-shin bilaterally  The patient demonstrated 4/5 plantarflexion and dorsiflexion  Clonus not demonstrated bilaterally  The patient did not report any new complaints or concerns at this time  Review of Systems   Constitutional: Positive for fatigue  Negative for chills and fever  HENT: Negative for nosebleeds, sneezing and trouble swallowing  Eyes: Negative for photophobia  Respiratory: Negative for cough, chest tightness and shortness of breath  Cardiovascular: Negative for chest pain  Gastrointestinal: Negative for blood in stool, diarrhea, nausea and vomiting  Genitourinary: Negative for frequency and urgency  Skin: Negative for pallor and rash  Neurological: Positive for weakness  Negative for dizziness, tremors, seizures and headaches  Psychiatric/Behavioral: Negative for agitation, behavioral problems and hallucinations  The patient is not nervous/anxious and is not hyperactive  Review of systems was reviewed and negative unless stated above in HPI/24-hour events   ---------------------------------------------------------------------------------------  Assessment and Plan:    Neuro:   • Diagnosis: Acute ischemic left MCA stroke, status post thrombectomy 5/15 with TICI 3 revascularization    o Plan:  - CT head noted cerebral edema with evidence of brain compression and mass effect in left MCA territory  - Maintain SBP 110-150  - MAP greater than 65  - A1c 4 8, LDL 18, statin discontinued due to low LDL, 18  - MRI 5/17 demonstrated large recent left MCA territory infarct with associated edema and new foci of petechial hemorrhage  There is regional mass effect without midline shift  Cannot reliably evaluate the infarcted brain with structural abnormality, no structural abnormalities identified in the remainder of the brain  Normal hippocampal formations  - Plan continued PT/OT/ST  - Obtain CT chest abdomen pelvis to evaluate for any possible underlying malignancy  o No evidence of malignancy to account for hypercoagulable state  Moderate bilateral pleural effusions, small amount of ascites, and diffuse body wall subcutaneous edema  - Lower limb duplex study ordered given PFO  o No evidence of acute or chronic deep vein thrombosis noted bilaterally  No evidence of superficial thrombophlebitis noted bilaterally  Doppler evaluation demonstrated a normal response to augmentation maneuvers bilaterally  Popliteal, posterior tibial, and anterior tibial arterial Doppler waveforms are triphasic bilaterally    - Patient received J&J COVID-vaccine, however patient received vaccine at least 1 year ago  - ALYSSA echocardiogram 5/18 demonstrated large and patent PFO  • Interventional cardiology consulted appreciate recommendations;   - Neurology consulted and appreciate recommendations  - Patient maintained on 3% hypertonic saline for treatment of brain edema  • Currently on 3% hypertonic saline  • Every 6 hours serum sodium levels maintaining 145-155  • Every 6 hours serum osmolarity levels maintaining 305-310  • Repeat CT head if more than two-point drop in GCS within 1 hour  • Diagnosis: Delirium prevention  o Plan:  - Maintain sleep-wake cycle   - CAM-ICU twice daily  - Continue neurochecks  • Diagnosis: Seizures  o Plan:   - Patient noted to have seizures associated with deviation of eyes to the right during morning of 5/16, received 2 mg of Ativan  - Loaded with 4 g of levetiracetam 5/16, patient now maintained on levetiracetam 750 mg twice daily  - Ativan as needed for seizures  - Video EEG monitoring discontinued, no seizures noted on EEG  - We will continue to monitor clinically  • Diagnosis tobacco abuse  o Plan:  - Nicotine patch ordered  - Advised tobacco cessation, provide tobacco cessation education on discharge    CV:   • Diagnosis: Patent foramen ovale  o Plan:   - Noted on ALYSSA on 5/18  - Interventional cardiology consulted for closure; appreciate recommendations  • Diagnosis: Maintain normotension  o Plan:  - -140, MAP be more than 65  - Labetalol 10 mg every 4 hours as needed    Pulm:  • Diagnosis: No acute issues  o Plan:   - Maintain pulmonary hygiene  - Incentive spirometer every hour whilst awake  - Encourage coughing and deep breathing  - Upright positioning  - Suctioning as needed and close monitoring of secretions  - Maintain head of bed more than 30 degrees  - Every 4 hours oral care with chlorhexidine twice daily    GI:   • Diagnosis: No acute issues  o No stress ulcer prophylaxis  o No bowel regimen    :   • Diagnosis: No active issues; continue to monitor renal function      F/E/N:   • Fluids: Hypertonic saline (3%) at 90mL an hour  • Diuresis plan: None  • Replete electrolytes: K>4 0, mag>2 0, Phos>3 5  • Nutrition: Regular diet, patient is vegan  o B12 supplementation; 500 mcg daily    Heme/Onc:   • Diagnosis: Hypercoagulable work-up in progress  o Plan:  - Hypercoagulable work-up  • Homocystine normal  • BELEM normal  • RF normal  • ANCA negative  • dsDNA negative  • MTHFR pending  • Factor V Leiden negative  • Lupus anticoagulant negative  - Thrombosis panel  • Antithrombin III 81, likely this is not clinically relevant as it is higher than 80%  • Protein S decreased at 65%  • Protein C normal  • Repeat thrombosis panel in 3 months recommended  • Cannot rule out that February 2023 COVID infection causing hypercoagulable state  • Diagnosis: VTE prophylaxis  o Plan:   - SCDs to bilateral lower extremities  - Lovenox 40 mg Q24H    Endo:   • Diagnosis: No active issues  o Last hemoglobin A1c 4 8  o Maintain blood glucose less than 180 in setting of critical illness    ID:   • Diagnosis: COVID diagnosis 5/15  o Plan:  - Based on additional information received from patient's sister on 5/16 this is unlikely to be current infection and is primarily a continued positive test in the setting of prior COVID infection in February 2023  - Continue to monitor fever and white blood cell curve    MSK/Skin:   • Diagnosis: Continue convalescence  o Plan:    - Continue PT OT ST  • Diagnosis: Pressure ulcer prevention  o Plan:  - Continue frequent turning every 2 hours    Disposition: Continue Critical Care   Code Status: Level 1 - Full Code  ---------------------------------------------------------------------------------------  ICU CORE MEASURES    Prophylaxis   VTE Pharmacologic Prophylaxis: Enoxaparin (Lovenox)  VTE Mechanical Prophylaxis: sequential compression device  Stress Ulcer Prophylaxis: Prophylaxis Not Indicated     ABCDE Protocol (if indicated)  Plan to perform spontaneous awakening trial today? Not applicable  Plan to perform spontaneous breathing trial today? Not applicable  Obvious barriers to extubation? Not applicable  CAM-ICU: Positive    Invasive Devices Review  Invasive Devices     Central Venous Catheter Line  Duration           CVC Central Lines 05/16/23 Triple 16cm 4 days          Drain  Duration           External Urinary Catheter 1 day              Can any invasive devices be discontinued today? No (provide explanation): Continued 3%HTS infusion to 45  ---------------------------------------------------------------------------------------  OBJECTIVE  Physical Exam  Vitals and nursing note reviewed  HENT:      Mouth/Throat:      Mouth: Mucous membranes are moist       Pharynx: Oropharynx is clear   No "oropharyngeal exudate  Eyes:      Extraocular Movements: Extraocular movements intact  Pupils: Pupils are equal, round, and reactive to light  Cardiovascular:      Rate and Rhythm: Normal rate  Pulses: Normal pulses  Pulmonary:      Effort: Pulmonary effort is normal    Abdominal:      General: There is no distension  Musculoskeletal:      Cervical back: Normal range of motion  Skin:     General: Skin is warm  Capillary Refill: Capillary refill takes less than 2 seconds  Neurological:      Mental Status: He is alert  Psychiatric:         Mood and Affect: Mood normal        Vitals   Vitals:    23 1430 23 1645 23 1700 23 1800   BP: 156/90 164/91 149/98 157/95   BP Location:       Pulse: 68 62 70 62   Resp:    Temp:       TempSrc:       SpO2: 99% 99% 99% 98%   Weight:       Height:         Temp (24hrs), Av 1 °F (36 7 °C), Min:97 8 °F (36 6 °C), Max:98 5 °F (36 9 °C)  Current: Temperature: 98 5 °F (36 9 °C)    Respiratory:  SpO2: SpO2: 98 %, SpO2 Activity: SpO2 Activity: At Rest, SpO2 Device: O2 Device: None (Room air), Capnography:    O2 Flow Rate (L/min): 50 L/min    Invasive/non-invasive ventilation settings   Respiratory    Lab Data (Last 4 hours)    None         O2/Vent Data (Last 4 hours)    None              Height and Weights   Height: 5' 9\" (175 3 cm)  IBW (Ideal Body Weight): 70 7 kg  Body mass index is 24 22 kg/m²  Weight (last 2 days)     Date/Time Weight    23 1505 74 4 (164)        Intake and Output  I/O        0701   0700  0701   0700  0701   0700    P  O  4820 1840     I V  (mL/kg) 2285 5 (30 7) 2160 (29)     IV Piggyback  50     Total Intake(mL/kg) 7105 5 (95 5) 4050 (54 4)     Urine (mL/kg/hr) 1566 (0 9) 3300 (1 8)     Emesis/NG output  0     Stool 0 0     Total Output 1566 3300     Net +5539 5 +750            Unmeasured Urine Occurrence 1 x 1 x     Unmeasured Stool Occurrence 1 x 1 x     Unmeasured " Emesis Occurrence  0 x         Nutrition       Diet Orders   (From admission, onward)             Start     Ordered    05/18/23 1558  Diet Regular; Vegetarian  Diet effective now        References:    Adult Nutrition Support Algorithm    RD Therapeutic Diet Order Protocol   Question Answer Comment   Diet Type Regular    Regular Vegetarian    RD to adjust diet per protocol?  No        05/18/23 1559              Laboratory and Diagnostics:  Results from last 7 days   Lab Units 05/20/23  0435 05/19/23  0629 05/18/23  0536 05/17/23  0523 05/16/23  0557 05/15/23  2352 05/15/23  1853   WBC Thousand/uL 5 37 5 54 5 56 5 47 5 50 4 89 7 08   HEMOGLOBIN g/dL 10 1* 10 2* 10 2* 10 7* 11 5* 11 9* 14 6   HEMATOCRIT % 30 1* 29 9* 30 1* 31 3* 32 0* 32 8* 41 0   PLATELETS Thousands/uL 156 148* 141* 149 165 178 244   NEUTROS PCT % 71 63 65 71 71 74  --    MONOS PCT % 8 8 9 11 13* 13*  --      Results from last 7 days   Lab Units 05/20/23  1320 05/20/23  0436 05/20/23  0020 05/19/23  1733 05/19/23  1233 05/19/23  0629 05/19/23  0005 05/17/23  1201 05/17/23  0523 05/16/23  1240 05/16/23  0557   SODIUM mmol/L 146 144 145 144 145 146 145   < > 146   < > 140   POTASSIUM mmol/L 3 6 3 5 3 4* 3 4* 3 4* 3 4* 3 4*   < > 3 6   < > 3 9   CHLORIDE mmol/L 120* 120* 120* 120* 118* 122* 121*   < > 121*   < > 113*   CO2 mmol/L 24 22 24 25 24 24 24   < > 25   < > 25   ANION GAP mmol/L 2* 2* 1* -1* 3* 0* 0*   < > 0*   < > 2*   BUN mg/dL 3* 3* 3* 4* 4* 5 5   < > 6   < > 8   CREATININE mg/dL 0 55* 0 54* 0 42* 0 56* 0 53* 0 55* 0 52*   < > 0 54*   < > 0 52*   CALCIUM mg/dL 7 8* 7 9* 7 7* 7 5* 8 0* 7 5* 7 9*   < > 8 4   < > 8 3   GLUCOSE RANDOM mg/dL 137 137 113 129 102 110 106   < > 98   < > 102   ALT U/L  --   --   --   --   --   --   --   --  26  --  26   AST U/L  --   --   --   --   --   --   --   --  38  --  37   ALK PHOS U/L  --   --   --   --   --   --   --   --  44*  --  47   ALBUMIN g/dL  --   --   --   --   --   --   --   --  2 7*  --  2 9*   TOTAL BILIRUBIN mg/dL  --   --   --   --   --   --   --   --  0 30  --  0 45    < > = values in this interval not displayed  Results from last 7 days   Lab Units 05/20/23  0435 05/19/23  0629 05/18/23  0535 05/17/23  0523 05/16/23  0557 05/15/23  2352   MAGNESIUM mg/dL 2 0 1 8 2 0 2 2 2 0 1 8   PHOSPHORUS mg/dL 2 4* 2 4* 1 5* 2 4* 3 0  --       Results from last 7 days   Lab Units 05/15/23  1853   INR  0 89   PTT seconds 22*              ABG:    VBG:          Micro        EKG: On Telemetry  Imaging: I have personally reviewed pertinent reports     and I have personally reviewed pertinent films in PACS    Active Medications  Scheduled Meds:  Current Facility-Administered Medications   Medication Dose Route Frequency Provider Last Rate   • acetaminophen  975 mg Oral Q8H PRN Rebecca Graham PA-C     • aspirin  81 mg Oral Daily Vilma Vergara DO     • chlorhexidine  15 mL Mouth/Throat Q12H [address] SALVATORE See     • vitamin B-12  500 mcg Oral Daily Rebecca Graham PA-C     • enoxaparin  40 mg Subcutaneous Q24H [address] Yanci Ward DO     • hydrALAZINE  10 mg Intravenous Q6H PRN [employer], DO     • labetalol  10 mg Intravenous Q4H PRN SALVATORE See     • levETIRAcetam  750 mg Intravenous Q12H [address] Bhupinder Vargas  mg (05/20/23 0825)   • nicotine  1 patch Transdermal Daily SALVATORE Aguayo     • ondansetron  4 mg Intravenous Once PRN Reg Frausto CRNA     • sodium chloride  90 mL/hr Intravenous Continuous SALVATORE See 90 mL/hr (05/20/23 1726)     Continuous Infusions:  sodium chloride, 90 mL/hr, Last Rate: 90 mL/hr (05/20/23 1726)      PRN Meds:   acetaminophen, 975 mg, Q8H PRN  hydrALAZINE, 10 mg, Q6H PRN  labetalol, 10 mg, Q4H PRN  ondansetron, 4 mg, Once PRN      Allergies   No Known Allergies    Advance Directive and Living Will:      Power of :    POLST:      Counseling / Coordination of Care  Please see attending's attestation    Bhupinder Vargas, "DO    Portions of the record may have been created with voice recognition software  Occasional wrong word or \"sound a like\" substitutions may have occurred due to the inherent limitations of voice recognition software    Read the chart carefully and recognize, using context, where substitutions have occurred  "

## 2023-05-20 NOTE — PLAN OF CARE
Problem: PHYSICAL THERAPY ADULT  Goal: Performs mobility at highest level of function for planned discharge setting  See evaluation for individualized goals  Description: Treatment/Interventions: Functional transfer training, LE strengthening/ROM, Therapeutic exercise, Endurance training, Patient/family training, Equipment eval/education, Bed mobility, Gait training, OT, Spoke to nursing          See flowsheet documentation for full assessment, interventions and recommendations  Outcome: Progressing  Note: Prognosis: Good  Problem List: Decreased strength, Decreased endurance, Impaired balance, Decreased mobility, Decreased coordination, Decreased cognition, Impaired judgement, Decreased safety awareness  Assessment: Pt agreeable to participate in PT session  Pt performed functional mobility and therex as outlined above  Initiated gait training with RW as pt able to  RW  A required for steering RW as well as R knee stability  VC for R foot clearance and step length as pt attempts to rush R step resulting in R knee buckling, foot catching, and poor L foot clearance  Currently, step to pattern however will continue to work on R knee stability and increased R stance time to improve L foot clearance to step through pattern  ?ideomotor apraxia more so than receptive aphasia as pt able to follow most commands throughout session except for kicking legs out when moving recliner and scooting back in chair  Will continue to assess  Responded very well to therapy  Expressive aphasia remains limiting but appears much improved since IE  Time spent educating pt on attempting to use R side as much as possible to promote neuroplasticity  Pt left seated in chair with chair alarm, call bell, phone, and all personal needs within reach  Pt will continue to benefit from skilled acute care PT to further address their functional mobility limitations  D/C recommendations remain rehab    Barriers to Discharge: Decreased caregiver support, Inaccessible home environment     PT Discharge Recommendation: Post acute rehabilitation services (PMR)    See flowsheet documentation for full assessment

## 2023-05-21 LAB
ANION GAP SERPL CALCULATED.3IONS-SCNC: -2 MMOL/L (ref 4–13)
ANION GAP SERPL CALCULATED.3IONS-SCNC: -2 MMOL/L (ref 4–13)
BASOPHILS # BLD AUTO: 0.05 THOUSANDS/ÂΜL (ref 0–0.1)
BASOPHILS NFR BLD AUTO: 1 % (ref 0–1)
BUN SERPL-MCNC: 3 MG/DL (ref 5–25)
BUN SERPL-MCNC: 3 MG/DL (ref 5–25)
CALCIUM SERPL-MCNC: 7.7 MG/DL (ref 8.3–10.1)
CALCIUM SERPL-MCNC: 7.8 MG/DL (ref 8.3–10.1)
CHLORIDE SERPL-SCNC: 119 MMOL/L (ref 96–108)
CHLORIDE SERPL-SCNC: 120 MMOL/L (ref 96–108)
CO2 SERPL-SCNC: 26 MMOL/L (ref 21–32)
CO2 SERPL-SCNC: 27 MMOL/L (ref 21–32)
CREAT SERPL-MCNC: 0.46 MG/DL (ref 0.6–1.3)
CREAT SERPL-MCNC: 0.5 MG/DL (ref 0.6–1.3)
EOSINOPHIL # BLD AUTO: 0.3 THOUSAND/ÂΜL (ref 0–0.61)
EOSINOPHIL NFR BLD AUTO: 8 % (ref 0–6)
ERYTHROCYTE [DISTWIDTH] IN BLOOD BY AUTOMATED COUNT: 12.2 % (ref 11.6–15.1)
GFR SERPL CREATININE-BSD FRML MDRD: 134 ML/MIN/1.73SQ M
GFR SERPL CREATININE-BSD FRML MDRD: 139 ML/MIN/1.73SQ M
GLUCOSE SERPL-MCNC: 87 MG/DL (ref 65–140)
GLUCOSE SERPL-MCNC: 88 MG/DL (ref 65–140)
HCT VFR BLD AUTO: 29.5 % (ref 36.5–49.3)
HGB BLD-MCNC: 10.1 G/DL (ref 12–17)
IMM GRANULOCYTES # BLD AUTO: 0.02 THOUSAND/UL (ref 0–0.2)
IMM GRANULOCYTES NFR BLD AUTO: 1 % (ref 0–2)
LYMPHOCYTES # BLD AUTO: 0.98 THOUSANDS/ÂΜL (ref 0.6–4.47)
LYMPHOCYTES NFR BLD AUTO: 24 % (ref 14–44)
MAGNESIUM SERPL-MCNC: 1.8 MG/DL (ref 1.6–2.6)
MCH RBC QN AUTO: 33.6 PG (ref 26.8–34.3)
MCHC RBC AUTO-ENTMCNC: 34.2 G/DL (ref 31.4–37.4)
MCV RBC AUTO: 98 FL (ref 82–98)
MONOCYTES # BLD AUTO: 0.32 THOUSAND/ÂΜL (ref 0.17–1.22)
MONOCYTES NFR BLD AUTO: 8 % (ref 4–12)
NEUTROPHILS # BLD AUTO: 2.34 THOUSANDS/ÂΜL (ref 1.85–7.62)
NEUTS SEG NFR BLD AUTO: 58 % (ref 43–75)
NRBC BLD AUTO-RTO: 0 /100 WBCS
OSMOLALITY UR/SERPL-RTO: 295 MMOL/KG (ref 282–298)
OSMOLALITY UR/SERPL-RTO: 297 MMOL/KG (ref 282–298)
PHOSPHATE SERPL-MCNC: 3.7 MG/DL (ref 2.7–4.5)
PLATELET # BLD AUTO: 172 THOUSANDS/UL (ref 149–390)
PMV BLD AUTO: 9.6 FL (ref 8.9–12.7)
POTASSIUM SERPL-SCNC: 3.6 MMOL/L (ref 3.5–5.3)
POTASSIUM SERPL-SCNC: 3.6 MMOL/L (ref 3.5–5.3)
RBC # BLD AUTO: 3.01 MILLION/UL (ref 3.88–5.62)
SODIUM SERPL-SCNC: 141 MMOL/L (ref 135–147)
SODIUM SERPL-SCNC: 142 MMOL/L (ref 135–147)
SODIUM SERPL-SCNC: 143 MMOL/L (ref 135–147)
SODIUM SERPL-SCNC: 145 MMOL/L (ref 135–147)
WBC # BLD AUTO: 4.01 THOUSAND/UL (ref 4.31–10.16)

## 2023-05-21 RX ORDER — MAGNESIUM SULFATE HEPTAHYDRATE 40 MG/ML
2 INJECTION, SOLUTION INTRAVENOUS ONCE
Status: COMPLETED | OUTPATIENT
Start: 2023-05-21 | End: 2023-05-21

## 2023-05-21 RX ORDER — SODIUM CHLORIDE 1 G/1
1 TABLET ORAL
Status: DISCONTINUED | OUTPATIENT
Start: 2023-05-22 | End: 2023-05-26

## 2023-05-21 RX ORDER — SODIUM CHLORIDE 1 G/1
1 TABLET ORAL
Status: DISCONTINUED | OUTPATIENT
Start: 2023-05-21 | End: 2023-05-21

## 2023-05-21 RX ADMIN — ACETAMINOPHEN 975 MG: 325 TABLET ORAL at 08:56

## 2023-05-21 RX ADMIN — ACETAMINOPHEN 975 MG: 325 TABLET ORAL at 17:34

## 2023-05-21 RX ADMIN — ENOXAPARIN SODIUM 40 MG: 40 INJECTION SUBCUTANEOUS at 08:55

## 2023-05-21 RX ADMIN — CYANOCOBALAMIN TAB 500 MCG 500 MCG: 500 TAB at 08:56

## 2023-05-21 RX ADMIN — CHLORHEXIDINE GLUCONATE 0.12% ORAL RINSE 15 ML: 1.2 LIQUID ORAL at 20:04

## 2023-05-21 RX ADMIN — CHLORHEXIDINE GLUCONATE 0.12% ORAL RINSE 15 ML: 1.2 LIQUID ORAL at 08:55

## 2023-05-21 RX ADMIN — SODIUM CHLORIDE 1 G: 1 TABLET ORAL at 17:28

## 2023-05-21 RX ADMIN — NICOTINE 1 PATCH: 14 PATCH, EXTENDED RELEASE TRANSDERMAL at 08:56

## 2023-05-21 RX ADMIN — LEVETIRACETAM 750 MG: 100 INJECTION, SOLUTION INTRAVENOUS at 08:56

## 2023-05-21 RX ADMIN — LABETALOL HYDROCHLORIDE 10 MG: 5 INJECTION, SOLUTION INTRAVENOUS at 11:02

## 2023-05-21 RX ADMIN — SODIUM CHLORIDE 90 ML/HR: 3 INJECTION, SOLUTION INTRAVENOUS at 05:50

## 2023-05-21 RX ADMIN — HYDRALAZINE HYDROCHLORIDE 10 MG: 20 INJECTION, SOLUTION INTRAMUSCULAR; INTRAVENOUS at 06:40

## 2023-05-21 RX ADMIN — SODIUM CHLORIDE 90 ML/HR: 3 INJECTION, SOLUTION INTRAVENOUS at 00:10

## 2023-05-21 RX ADMIN — MAGNESIUM SULFATE HEPTAHYDRATE 2 G: 40 INJECTION, SOLUTION INTRAVENOUS at 08:55

## 2023-05-21 RX ADMIN — LEVETIRACETAM 750 MG: 100 INJECTION, SOLUTION INTRAVENOUS at 20:04

## 2023-05-21 RX ADMIN — ASPIRIN 81 MG CHEWABLE TABLET 81 MG: 81 TABLET CHEWABLE at 08:55

## 2023-05-21 NOTE — PROGRESS NOTES
Daily Progress Note - Critical Care   Alma Fall 40 y o  male MRN: [id number]  Unit/Bed#: ICU 09 Encounter: [id number]    ----------------------------------------------------------------------------------------  HPI/24hr events: To Review:  Patient is a 16-year-old right-handed male without any known prior medical history who presented as a stroke alert on 5/15/2023 at [address] with a last known well of 2030 on 5/14/2023 when he has spoken with someone over the phone  Per documentation, a check-in was called as the patient did not show up for a scheduled meeting with a friend that did not answer his phone  Upon arrival by EMS for the welfare check the patient was found to be covered in feces and was noted to have right upper extremity flaccid paralysis as well as right hand sided neglect, right facial droop, and expressive aphasia  Initial NIHSS on presentation was 16  CT head noted a left MCA infarct, CTA head and neck demonstrated a partial left M1 occlusion  Patient was not a tenecteplase candidate given that he was outside the time window  Patient subsequently underwent a CTP which demonstrated that the patient was a thrombectomy candidate, and subsequently an endovascular alert was called  Patient underwent a mechanical thrombectomy TICI 3 revascularization  Pathology suspected to be a proximal embolism given findings  Patient subsequently noted to be COVID-positive, repeat CT head in the morning of 5/16 demonstrated continued edema and sulcal effacement on the left similar to previous study, improvement of contrast staining versus possible hemorrhage  Subsequently, after initial examination in the morning of 5/16 patient noted to have seizure that consisted of eye deviation to the right followed by postictal period  STAT CTH demonstrated no acute change, evolving known left MCA infarct  Video EEG started with no seizures captured, and subsequently discontinued    Patient underwent ALYSSA on 5/18 and found to have a large PFO      Overnight events:  None  ---------------------------------------------------------------------------------------  SUBJECTIVE  Patient is resting in bedside chair during rounds  The patient was oriented to person, place, was able to follow one-step commands, demonstrated normal attention and concentration  Patient's speech is still expressive aphasic  There is some receptive component also  Patient able to repeat phrases, demonstrates continued difficulty, however improving  Cranial nerves II through XII intact  Muscle bulk normal, muscle tone normal, strength 5/5 except as follows, right deltoid, biceps, triceps 4/5, right interossei 5/5, right iliopsoas 4/5, right quadriceps 4/5, right hamstring 4/5, right anterior tibial 5/5, right gastroc 5/5  Sensory examination revealed a right arm light touch decreased from elbow, left arm light touch normal, right leg light touch decreased from knee, left leg to light touch normal   Patient did not demonstrate a resting tremor  Reflexes were 2+ except as follows right and left plantar reflexes if COVID call, left and right Annia's absent, right ankle clonus absent, left peduncular knee jerk absent    Review of Systems   Constitutional: Positive for fatigue  Negative for diaphoresis and fever  HENT: Negative for ear discharge, nosebleeds, rhinorrhea, tinnitus and voice change  Eyes: Negative for visual disturbance  Respiratory: Negative for cough, chest tightness and shortness of breath  Cardiovascular: Negative for chest pain  Gastrointestinal: Negative for blood in stool, constipation, diarrhea and vomiting  Genitourinary: Negative for enuresis, hematuria and urgency  Musculoskeletal: Negative for neck stiffness  Skin: Negative for pallor  Neurological: Positive for speech difficulty, weakness, numbness and headaches  Negative for dizziness, tremors and facial asymmetry     Psychiatric/Behavioral: Negative for agitation, behavioral problems, confusion and suicidal ideas  The patient is not nervous/anxious  Review of systems was reviewed and negative unless stated above in HPI/24-hour events   ---------------------------------------------------------------------------------------  Assessment and Plan:    Neuro:   • Diagnosis: Acute ischemic left MCA stroke, status post thrombectomy 5/15 with TICI 3 revascularization  ? Plan:  - CT head noted cerebral edema with evidence of brain compression and mass effect in left MCA territory  - Maintain -150  - MAP greater than 65  - A1c 4 8, LDL 18, statin discontinued due to low LDL, 18  - MRI 5/17 demonstrated large recent left MCA territory infarct with associated edema and new foci of petechial hemorrhage  There is regional mass effect without midline shift  Cannot reliably evaluate the infarcted brain with structural abnormality, no structural abnormalities identified in the remainder of the brain  Normal hippocampal formations  - Plan continued PT/OT/ST  - Continue ASA 81mg QD  - Obtain CT chest abdomen pelvis to evaluate for any possible underlying malignancy  - No evidence of malignancy to account for hypercoagulable state  Moderate bilateral pleural effusions, small amount of ascites, and diffuse body wall subcutaneous edema  - Lower limb duplex study ordered given PFO  - No evidence of acute or chronic deep vein thrombosis noted bilaterally  No evidence of superficial thrombophlebitis noted bilaterally  Doppler evaluation demonstrated a normal response to augmentation maneuvers bilaterally  Popliteal, posterior tibial, and anterior tibial arterial Doppler waveforms are triphasic bilaterally    - Patient received J&J COVID-vaccine, however patient received vaccine at least 1 year ago  - ALYSSA echocardiogram 5/18 demonstrated large and patent PFO  - Interventional cardiology consulted appreciate recommendations;   - Neurology consulted and appreciate recommendations  - Patient off 3%HTS, continue to monitor Serum-NA, Goal >140  - BID serum sodium levels maintaining >140  - Repeat CT head if more than two-point drop in GCS within 1 hour  • Diagnosis: Delirium prevention  ? Plan:  - Maintain sleep-wake cycle   - CAM-ICU twice daily  - Continue neurochecks  • Diagnosis tobacco abuse  ? Plan:  - Nicotine patch ordered  - Advised tobacco cessation, provide tobacco cessation education on discharge     CV:   • Diagnosis: Patent foramen ovale  ? Plan:   - Noted on ALYSSA on 5/18  - Interventional cardiology consulted for closure; appreciate recommendations  • Diagnosis: Maintain normotension  ? Plan:  - -140, MAP be more than 65  - Labetalol 10 mg every 4 hours as needed     Pulm:  • Diagnosis: No acute issues  ? Plan:      - Maintain pulmonary hygiene  - Incentive spirometer every hour whilst awake  - Encourage coughing and deep breathing  - Upright positioning  - Suctioning as needed and close monitoring of secretions  - Maintain head of bed more than 30 degrees  - Every 4 hours oral care with chlorhexidine twice daily     GI:   • Diagnosis: No acute issues  ? No stress ulcer prophylaxis  ? No bowel regimen     :   • Diagnosis:  • Continue to monitor BUN/Cr  • 05/21 3-0 50  • 05/20 3-0 48     F/E/N:   • Fluids: Non, maintain PO intake  • Diuresis plan: None  • Replete electrolytes: K>4 0, mag>2 0, Phos>3 5  • Nutrition: Regular diet, patient is vegan  ? B12 supplementation; 500 mcg daily     Heme/Onc:   • Diagnosis: Hypercoagulable work-up in progress  ?  Plan:  - Hypercoagulable work-up  - Homocystine normal  - BELEM normal  - RF normal  - ANCA negative  - dsDNA negative  - MTHFR pending  - Factor V Leiden negative  - Lupus anticoagulant negative  - Thrombosis panel  - Antithrombin III 81, likely this is not clinically relevant as it is higher than 80%  - Protein S decreased at 65%  - Protein C normal  - Repeat thrombosis panel in 3 months recommended  - Cannot rule out that February 2023 COVID infection causing hypercoagulable state  • Diagnosis: VTE prophylaxis  ? Plan:   - SCDs to bilateral lower extremities  - Lovenox 40 mg Q24H  • Diagnosis: Leukocytopaenia  • Plan:  • 05/21 4 01  • 05/20 5 37  • Continue to monitor  • Diagnosis: Anemia  • Plan:  • 05/21 Hgb/Hct: 10 1/29 5  • 05/20 Hgb/Hct: 10 1/30 1  • Continue to monitor     Endo:   • Diagnosis: No active issues  ? Last hemoglobin A1c 4 8  ? Maintain blood glucose less than 180 in setting of critical illness     ID:   • Diagnosis: COVID diagnosis 5/15  ? Plan:  - Based on additional information received from patient's sister on 5/16 this is unlikely to be current infection and is primarily a continued positive test in the setting of prior COVID infection in February 2023  - Continue to monitor fever and white blood cell curve     MSK/Skin:   • Diagnosis: Continue convalescence  ? Plan:      - Continue PT OT ST  • Diagnosis: Pressure ulcer prevention  ? Plan:  - Continue frequent turning every 2 hours    Disposition: Continue Critical Care   Code Status: Level 1 - Full Code  ---------------------------------------------------------------------------------------  ICU CORE MEASURES    Prophylaxis   VTE Pharmacologic Prophylaxis: Enoxaparin (Lovenox)  VTE Mechanical Prophylaxis: sequential compression device  Stress Ulcer Prophylaxis: Prophylaxis Not Indicated     ABCDE Protocol (if indicated)  Plan to perform spontaneous awakening trial today? Not applicable  Plan to perform spontaneous breathing trial today? Not applicable  Obvious barriers to extubation? Not applicable  CAM-ICU: Negative    Invasive Devices Review  Invasive Devices     Central Venous Catheter Line  Duration           CVC Central Lines 05/16/23 Triple 16cm 5 days          Drain  Duration           External Urinary Catheter <1 day              Can any invasive devices be discontinued today?  No (provide explanation): Continued HTS "Tx  ---------------------------------------------------------------------------------------  OBJECTIVE  Physical Exam  Vitals and nursing note reviewed  HENT:      Head: Normocephalic  Nose: Nose normal       Mouth/Throat:      Mouth: Mucous membranes are dry  Eyes:      Extraocular Movements: Extraocular movements intact  Pupils: Pupils are equal, round, and reactive to light  Cardiovascular:      Rate and Rhythm: Normal rate  Pulses: Normal pulses  Pulmonary:      Effort: Pulmonary effort is normal  No respiratory distress  Abdominal:      General: There is no distension  Musculoskeletal:      Cervical back: Normal range of motion  Skin:     General: Skin is warm and dry  Neurological:      Mental Status: He is alert  Psychiatric:         Mood and Affect: Mood normal        Vitals:  Vitals:    23 1000 23 1100 23 1200 23 1300   BP: 150/89 156/79 149/88 140/79   Pulse: 58  (!) 52 (!) 52   Resp: 17  16 16   Temp:       TempSrc:       SpO2: 97%  98% 98%   Weight:       Height:         Temp (24hrs), Av 5 °F (36 9 °C), Min:97 8 °F (36 6 °C), Max:99 °F (37 2 °C)  Current: Temperature: 98 9 °F (37 2 °C)    Respiratory:  SpO2: SpO2: 98 %, SpO2 Activity: SpO2 Activity: At Rest, SpO2 Device: O2 Device: None (Room air), Capnography:    O2 Flow Rate (L/min): 50 L/min    Invasive/non-invasive ventilation settings   Respiratory    Lab Data (Last 4 hours)    None         O2/Vent Data (Last 4 hours)    None              Height and Weights   Height: 5' 9\" (175 3 cm)  IBW (Ideal Body Weight): 70 7 kg  Body mass index is 24 36 kg/m²  Weight (last 2 days)     Date/Time Weight    23 0600 74 8 (164 93)        Intake and Output  I/O        07 07 07 07 07 0700    P  O  1840 800     I V  (mL/kg) 2160 (29) 2290 (30 6)     IV Piggyback 50 350     Total Intake(mL/kg) 4050 (54 4) 3440 (46)     Urine (mL/kg/hr) 3300 (1 8) 3675 (2)  " Emesis/NG output 0      Stool 0 0     Total Output 3300 3675     Net +750 -235            Unmeasured Urine Occurrence 1 x      Unmeasured Stool Occurrence 1 x 1 x     Unmeasured Emesis Occurrence 0 x          Nutrition       Diet Orders   (From admission, onward)             Start     Ordered    05/18/23 1558  Diet Regular; Vegetarian  Diet effective now        References:    Adult Nutrition Support Algorithm    RD Therapeutic Diet Order Protocol   Question Answer Comment   Diet Type Regular    Regular Vegetarian    RD to adjust diet per protocol?  No        05/18/23 1559              Laboratory and Diagnostics:  Results from last 7 days   Lab Units 05/21/23  0542 05/20/23  0435 05/19/23  0629 05/18/23  0536 05/17/23  0523 05/16/23  0557 05/15/23  2352   WBC Thousand/uL 4 01* 5 37 5 54 5 56 5 47 5 50 4 89   HEMOGLOBIN g/dL 10 1* 10 1* 10 2* 10 2* 10 7* 11 5* 11 9*   HEMATOCRIT % 29 5* 30 1* 29 9* 30 1* 31 3* 32 0* 32 8*   PLATELETS Thousands/uL 172 156 148* 141* 149 165 178   NEUTROS PCT % 58 71 63 65 71 71 74   MONOS PCT % 8 8 8 9 11 13* 13*     Results from last 7 days   Lab Units 05/21/23  1146 05/21/23  0543 05/21/23  0245 05/20/23  1917 05/20/23  1320 05/20/23  0436 05/20/23  0020 05/19/23  1733 05/17/23  1201 05/17/23  0523 05/16/23  1240 05/16/23  0557   SODIUM mmol/L 142 143 145 145 146 144 145 144   < > 146   < > 140   POTASSIUM mmol/L  --  3 6 3 6 3 3* 3 6 3 5 3 4* 3 4*   < > 3 6   < > 3 9   CHLORIDE mmol/L  --  119* 120* 120* 120* 120* 120* 120*   < > 121*   < > 113*   CO2 mmol/L  --  26 27 24 24 22 24 25   < > 25   < > 25   ANION GAP mmol/L  --  -2* -2* 1* 2* 2* 1* -1*   < > 0*   < > 2*   BUN mg/dL  --  3* 3* 3* 3* 3* 3* 4*   < > 6   < > 8   CREATININE mg/dL  --  0 50* 0 46* 0 48* 0 55* 0 54* 0 42* 0 56*   < > 0 54*   < > 0 52*   CALCIUM mg/dL  --  7 8* 7 7* 8 1* 7 8* 7 9* 7 7* 7 5*   < > 8 4   < > 8 3   GLUCOSE RANDOM mg/dL  --  88 87 130 137 137 113 129   < > 98   < > 102   ALT U/L  --   --   --   -- --   --   --   --   --  26  --  26   AST U/L  --   --   --   --   --   --   --   --   --  38  --  37   ALK PHOS U/L  --   --   --   --   --   --   --   --   --  44*  --  47   ALBUMIN g/dL  --   --   --   --   --   --   --   --   --  2 7*  --  2 9*   TOTAL BILIRUBIN mg/dL  --   --   --   --   --   --   --   --   --  0 30  --  0 45    < > = values in this interval not displayed  Results from last 7 days   Lab Units 05/21/23  0542 05/20/23  0435 05/19/23  0629 05/18/23  0535 05/17/23  0523 05/16/23  0557 05/15/23  2352   MAGNESIUM mg/dL 1 8 2 0 1 8 2 0 2 2 2 0 1 8   PHOSPHORUS mg/dL 3 7 2 4* 2 4* 1 5* 2 4* 3 0  --       Results from last 7 days   Lab Units 05/15/23  1853   INR  0 89   PTT seconds 22*              ABG:    VBG:          Micro        EKG: On Telemetry  Imaging: I have personally reviewed pertinent reports     and I have personally reviewed pertinent films in PACS    Active Medications  Scheduled Meds:  Current Facility-Administered Medications   Medication Dose Route Frequency Provider Last Rate   • acetaminophen  975 mg Oral Q8H PRN Arlys Kussmaul, PA-C     • aspirin  81 mg Oral Daily Vilma Vergara DO     • chlorhexidine  15 mL Mouth/Throat Q12H [address] SALVATORE Junior     • vitamin B-12  500 mcg Oral Daily Arlys Kussmaul, PA-C     • enoxaparin  40 mg Subcutaneous Q24H [address] Yanci Ward DO     • hydrALAZINE  10 mg Intravenous Q6H PRN Leif Ferrer DO     • labetalol  10 mg Intravenous Q4H PRN SALVATORE Junior     • levETIRAcetam  750 mg Intravenous Q12H [address] Tolu Pena DO Stopped (05/21/23 0901)   • nicotine  1 patch Transdermal Daily SALVATORE Macdonald     • ondansetron  4 mg Intravenous Once PRN Otilio Coleman CRNA       Continuous Infusions:     PRN Meds:   acetaminophen, 975 mg, Q8H PRN  hydrALAZINE, 10 mg, Q6H PRN  labetalol, 10 mg, Q4H PRN  ondansetron, 4 mg, Once PRN      Allergies   No Known Allergies    Advance Directive and Living Will:      Power "of :    POLST:      Counseling / Coordination of Care  Please see attending's attestation  Lloyd Isaacs DO    Portions of the record may have been created with voice recognition software  Occasional wrong word or \"sound a like\" substitutions may have occurred due to the inherent limitations of voice recognition software    Read the chart carefully and recognize, using context, where substitutions have occurred  "

## 2023-05-22 PROBLEM — Q21.12 PFO (PATENT FORAMEN OVALE): Status: ACTIVE | Noted: 2023-05-22

## 2023-05-22 LAB
ANION GAP SERPL CALCULATED.3IONS-SCNC: 1 MMOL/L (ref 4–13)
BASOPHILS # BLD AUTO: 0.04 THOUSANDS/ÂΜL (ref 0–0.1)
BASOPHILS NFR BLD AUTO: 1 % (ref 0–1)
BUN SERPL-MCNC: 4 MG/DL (ref 5–25)
CALCIUM SERPL-MCNC: 8.1 MG/DL (ref 8.3–10.1)
CHLORIDE SERPL-SCNC: 111 MMOL/L (ref 96–108)
CO2 SERPL-SCNC: 29 MMOL/L (ref 21–32)
CREAT SERPL-MCNC: 0.57 MG/DL (ref 0.6–1.3)
EOSINOPHIL # BLD AUTO: 0.36 THOUSAND/ÂΜL (ref 0–0.61)
EOSINOPHIL NFR BLD AUTO: 8 % (ref 0–6)
ERYTHROCYTE [DISTWIDTH] IN BLOOD BY AUTOMATED COUNT: 12.1 % (ref 11.6–15.1)
F2 GENE MUT ANL BLD/T: NORMAL
GFR SERPL CREATININE-BSD FRML MDRD: 127 ML/MIN/1.73SQ M
GLUCOSE SERPL-MCNC: 86 MG/DL (ref 65–140)
HCT VFR BLD AUTO: 29.9 % (ref 36.5–49.3)
HGB BLD-MCNC: 10.7 G/DL (ref 12–17)
IMM GRANULOCYTES # BLD AUTO: 0.03 THOUSAND/UL (ref 0–0.2)
IMM GRANULOCYTES NFR BLD AUTO: 1 % (ref 0–2)
LYMPHOCYTES # BLD AUTO: 1.25 THOUSANDS/ÂΜL (ref 0.6–4.47)
LYMPHOCYTES NFR BLD AUTO: 27 % (ref 14–44)
Lab: NORMAL
MAGNESIUM SERPL-MCNC: 2 MG/DL (ref 1.6–2.6)
MCH RBC QN AUTO: 34.3 PG (ref 26.8–34.3)
MCHC RBC AUTO-ENTMCNC: 35.8 G/DL (ref 31.4–37.4)
MCV RBC AUTO: 96 FL (ref 82–98)
MONOCYTES # BLD AUTO: 0.43 THOUSAND/ÂΜL (ref 0.17–1.22)
MONOCYTES NFR BLD AUTO: 9 % (ref 4–12)
NEUTROPHILS # BLD AUTO: 2.56 THOUSANDS/ÂΜL (ref 1.85–7.62)
NEUTS SEG NFR BLD AUTO: 54 % (ref 43–75)
NRBC BLD AUTO-RTO: 0 /100 WBCS
PHOSPHATE SERPL-MCNC: 4.4 MG/DL (ref 2.7–4.5)
PLATELET # BLD AUTO: 199 THOUSANDS/UL (ref 149–390)
PMV BLD AUTO: 9.4 FL (ref 8.9–12.7)
POTASSIUM SERPL-SCNC: 3.6 MMOL/L (ref 3.5–5.3)
RBC # BLD AUTO: 3.12 MILLION/UL (ref 3.88–5.62)
SODIUM SERPL-SCNC: 139 MMOL/L (ref 135–147)
SODIUM SERPL-SCNC: 141 MMOL/L (ref 135–147)
WBC # BLD AUTO: 4.67 THOUSAND/UL (ref 4.31–10.16)

## 2023-05-22 RX ORDER — LEVETIRACETAM 750 MG/1
750 TABLET ORAL EVERY 12 HOURS SCHEDULED
Status: DISCONTINUED | OUTPATIENT
Start: 2023-05-22 | End: 2023-05-26

## 2023-05-22 RX ADMIN — ASPIRIN 81 MG CHEWABLE TABLET 81 MG: 81 TABLET CHEWABLE at 08:29

## 2023-05-22 RX ADMIN — NICOTINE 1 PATCH: 14 PATCH, EXTENDED RELEASE TRANSDERMAL at 08:35

## 2023-05-22 RX ADMIN — ENOXAPARIN SODIUM 40 MG: 40 INJECTION SUBCUTANEOUS at 08:29

## 2023-05-22 RX ADMIN — SODIUM CHLORIDE 1 G: 1 TABLET ORAL at 11:57

## 2023-05-22 RX ADMIN — CHLORHEXIDINE GLUCONATE 0.12% ORAL RINSE 15 ML: 1.2 LIQUID ORAL at 21:06

## 2023-05-22 RX ADMIN — CYANOCOBALAMIN TAB 500 MCG 500 MCG: 500 TAB at 08:30

## 2023-05-22 RX ADMIN — LEVETIRACETAM 750 MG: 750 TABLET, FILM COATED ORAL at 21:06

## 2023-05-22 RX ADMIN — SODIUM CHLORIDE 1 G: 1 TABLET ORAL at 17:13

## 2023-05-22 RX ADMIN — SODIUM CHLORIDE 1 G: 1 TABLET ORAL at 08:29

## 2023-05-22 RX ADMIN — ACETAMINOPHEN 975 MG: 325 TABLET ORAL at 11:57

## 2023-05-22 RX ADMIN — LEVETIRACETAM 750 MG: 100 INJECTION, SOLUTION INTRAVENOUS at 08:29

## 2023-05-22 RX ADMIN — CHLORHEXIDINE GLUCONATE 0.12% ORAL RINSE 15 ML: 1.2 LIQUID ORAL at 08:29

## 2023-05-22 NOTE — PROGRESS NOTES
-----------------------------------------------------------------------------------------------------------------------------------------------------------    Code Status: Level 1 - Full Code    Reason for ICU/Stepdown Admission:   Stroke s/p thrombectomy    Consultants:  Neurology, cardiology    HPI:   45-year-old right-handed male without any known prior medical history who presented as a stroke alert on 5/15/2023 at [address] [facility] with a last known well of 2030 on 5/14/2023 when he had spoken with someone over the phone  Per documentation, a check-in was called as the patient did not show up for a scheduled meeting with a friend that he did not answer his phone  Upon arrival by EMS for the welfare check the patient was found to be covered in feces and was noted to have right upper extremity flaccid paralysis as well as right hand sided neglect, right facial droop, and expressive aphasia  Initial NIHSS on presentation was 16  CT head noted a left MCA infarct without hemorrhagic conversion  CTA head and neck demonstrated a proximal left M1 occlusion with partial opacification of distal M2 and M3  He was not a tenecteplase candidate given that he was outside the time window  He subsequently underwent a CTP which demonstrated that the patient was a thrombectomy candidate, and subsequently an endovascular alert was called  He underwent a mechanical thrombectomy TICI 3 revascularization  Summary of clinical course:  He subsequently noted to be COVID-positive, repeat CT head in the morning of 5/16 demonstrated continued edema and sulcal effacement on the left similar to previous study, improvement of contrast staining versus possible hemorrhage  Subsequently, after initial examination in the morning of 5/16 patient noted to have seizure that consisted of eye deviation to the right followed by postictal period  STAT CTH demonstrated no acute change, evolving known left MCA infarct    Video EEG started with no seizures captured, and subsequently discontinued  He has been started on Keppra 750 mg q 12 hrs  Patient underwent ALYSSA on 5/18 and found to have a large PFO  Cardiology consulted, pending recommendation  Hypercoagulable work up was ordered and so far has been normal, pending MTHFR  He is hemodynamically stable requiring no pressors and he is medically stable to be downgraded to med surg with telemetry  Please refer to today's progress note for further clinical details    Recent procedures:  ALYSSA, CT head, CTA head and neck, MRI brain, VAS lower limb, Video EEG    Outstanding or pending diagnostics/cultures/procedures:  MTHFR    Mobilization Plan:  Ambulate as tolerated with assistance  PT/OT  LDA's and reason for remaining devices: Invasive Devices     Peripheral Intravenous Line  Duration           Peripheral IV 05/22/23 Dorsal (posterior); Left Forearm <1 day                Discharge Plan:  As per primary team    Family aware of transfer out of critical care? Yes    Home medications not resumed and why:  None       Spoke with Dr Machelle Ryan regarding transfer to the [facility] Medicine service at 1230 am

## 2023-05-22 NOTE — CONSULTS
New nutrition consult received, recommend adding supplement to help increase protein intake  Pended order for chocolate Ensure Plant-Based Protein once daily with dinner - will follow-up with pt to assess tolerance/preference

## 2023-05-22 NOTE — PLAN OF CARE
Problem: MOBILITY - ADULT  Goal: Maintain or return to baseline ADL function  Description: INTERVENTIONS:  -  Assess patient's ability to carry out ADLs; assess patient's baseline for ADL function and identify physical deficits which impact ability to perform ADLs (bathing, care of mouth/teeth, toileting, grooming, dressing, etc )  - Assess/evaluate cause of self-care deficits   - Assess range of motion  - Assess patient's mobility; develop plan if impaired  - Assess patient's need for assistive devices and provide as appropriate  - Encourage maximum independence but intervene and supervise when necessary  - Involve family in performance of ADLs  - Assess for home care needs following discharge   - Consider OT consult to assist with ADL evaluation and planning for discharge  - Provide patient education as appropriate  Outcome: Progressing  Goal: Maintains/Returns to pre admission functional level  Description: INTERVENTIONS:  - Perform BMAT or MOVE assessment daily    - Set and communicate daily mobility goal to care team and patient/family/caregiver     - Collaborate with rehabilitation services on mobility goals if consulted  - Out of bed for toileting  - Record patient progress and toleration of activity level   Outcome: Progressing     Problem: Prexisting or High Potential for Compromised Skin Integrity  Goal: Skin integrity is maintained or improved  Description: INTERVENTIONS:  - Identify patients at risk for skin breakdown  - Assess and monitor skin integrity  - Assess and monitor nutrition and hydration status  - Monitor labs   - Assess for incontinence   - Turn and reposition patient  - Assist with mobility/ambulation  - Relieve pressure over bony prominences  - Avoid friction and shearing  - Provide appropriate hygiene as needed including keeping skin clean and dry  - Evaluate need for skin moisturizer/barrier cream  - Collaborate with interdisciplinary team   - Patient/family teaching  - Consider wound care consult   Outcome: Progressing     Problem: Neurological Deficit  Goal: Neurological status is stable or improving  Description: Interventions:  - Monitor and assess patient's level of consciousness, motor function, sensory function, and level of assistance needed for ADLs  - Monitor and report changes from baseline  Collaborate with interdisciplinary team to initiate plan and implement interventions as ordered  - Provide and maintain a safe environment  - Consider seizure precautions  - Consider fall precautions  - Consider aspiration precautions  - Consider bleeding precautions  Outcome: Progressing     Problem: Activity Intolerance/Impaired Mobility  Goal: Mobility/activity is maintained at optimum level for patient  Description: Interventions:  - Assess and monitor patient  barriers to mobility and need for assistive/adaptive devices  - Assess patient's emotional response to limitations  - Collaborate with interdisciplinary team and initiate plans and interventions as ordered  - Encourage independent activity per ability   - Maintain proper body alignment  - Perform active/passive rom as tolerated/ordered  - Plan activities to conserve energy   - Turn patient as appropriate  Outcome: Progressing     Problem: Communication Impairment  Goal: Ability to express needs and understand communication  Description: Assess patient's communication skills and ability to understand information  Patient will demonstrate use of effective communication techniques, alternative methods of communication and understanding even if not able to speak  - Encourage communication and provide alternate methods of communication as needed  - Collaborate with case management/ for discharge needs  - Include patient/family/caregiver in decisions related to communication    Outcome: Progressing     Problem: Potential for Aspiration  Goal: Non-ventilated patient's risk of aspiration is minimized  Description: Assess and monitor vital signs, respiratory status, and labs (WBC)  Monitor for signs of aspiration (tachypnea, cough, rales, wheezing, cyanosis, fever)  - Assess and monitor patient's ability to swallow  - Place patient up in chair to eat if possible  - HOB up at 90 degrees to eat if unable to get patient up into chair   - Supervise patient during oral intake  - Instruct patient/ family to take small bites  - Instruct patient/ family to take small single sips when taking liquids  - Follow patient-specific strategies generated by speech pathologist   Outcome: Progressing  Goal: Ventilated patient's risk of aspiration is minimized  Description: Assess and monitor vital signs, respiratory status, airway cuff pressure, and labs (WBC)  Monitor for signs of aspiration (tachypnea, cough, rales, wheezing, cyanosis, fever)  - Elevate head of bed 30 degrees if patient has tube feeding   - Monitor tube feeding  Outcome: Progressing     Problem: Nutrition  Goal: Nutrition/Hydration status is improving  Description: Monitor and assess patient's nutrition/hydration status for malnutrition (ex- brittle hair, bruises, dry skin, pale skin and conjunctiva, muscle wasting, smooth red tongue, and disorientation)  Collaborate with interdisciplinary team and initiate plan and interventions as ordered  Monitor patient's weight and dietary intake as ordered or per policy  Utilize nutrition screening tool and intervene per policy  Determine patient's food preferences and provide high-protein, high-caloric foods as appropriate  - Assist patient with eating   - Allow adequate time for meals   - Encourage patient to take dietary supplement as ordered  - Collaborate with interdisciplinary team    - Include patient/family/caregiver in decisions related to nutrition    Outcome: Progressing     Problem: Potential for Falls  Goal: Patient will remain free of falls  Description: INTERVENTIONS:  - Educate patient/family on patient safety including physical limitations  - Instruct patient to call for assistance with activity   - Consult OT/PT to assist with strengthening/mobility   - Keep Call bell within reach  - Keep bed low and locked with side rails adjusted as appropriate  - Keep care items and personal belongings within reach  - Initiate and maintain comfort rounds  - Make Fall Risk Sign visible to staff  - Apply yellow socks and bracelet for high fall risk patients  - Consider moving patient to room near nurses station  Outcome: Progressing     Problem: Nutrition/Hydration-ADULT  Goal: Nutrient/Hydration intake appropriate for improving, restoring or maintaining nutritional needs  Description: Monitor and assess patient's nutrition/hydration status for malnutrition  Collaborate with interdisciplinary team and initiate plan and interventions as ordered  Monitor patient's weight and dietary intake as ordered or per policy  Utilize nutrition screening tool and intervene as necessary  Determine patient's food preferences and provide high-protein, high-caloric foods as appropriate       INTERVENTIONS:  - Monitor oral intake, urinary output, labs, and treatment plans  - Assess nutrition and hydration status and recommend course of action  - Evaluate amount of meals eaten  - Assist patient with eating if necessary   - Allow adequate time for meals  - Recommend/ encourage appropriate diets, oral nutritional supplements, and vitamin/mineral supplements  - Order, calculate, and assess calorie counts as needed  - Recommend, monitor, and adjust tube feedings and TPN/PPN based on assessed needs  - Assess need for intravenous fluids  - Provide specific nutrition/hydration education as appropriate  - Include patient/family/caregiver in decisions related to nutrition  Outcome: Progressing

## 2023-05-22 NOTE — ASSESSMENT & PLAN NOTE
Seizure noted on 5/16 - eye deviation to right followed by postictal state  · Stat CTH no acute change, evolving known left MCA infarct  · cVEEG with no further seizure activities       Plan:  · Keppra 750 mg through 5/25 - now d/c'ed

## 2023-05-22 NOTE — PROGRESS NOTES
Daily Progress Note - Critical Care   Kalyan Viera 40 y o  male MRN: [id number]  Unit/Bed#: ICU 09 Encounter: [id number]        ----------------------------------------------------------------------------------------  HPI/24hr events:   52-year-old right-handed male without any known prior medical history who presented as a stroke alert on 5/15/2023 at [address] with a last known well of 2030 on 5/14/2023 when he had spoken with someone over the phone  Per documentation, a check-in was called as the patient did not show up for a scheduled meeting with a friend that he did not answer his phone  Upon arrival by EMS for the welfare check the patient was found to be covered in feces and was noted to have right upper extremity flaccid paralysis as well as right hand sided neglect, right facial droop, and expressive aphasia  Initial NIHSS on presentation was 16  CT head noted a left MCA infarct without hemorrhagic conversion  CTA head and neck demonstrated a proximal left M1 occlusion with partial opacification of distal M2 and M3  He was not a tenecteplase candidate given that he was outside the time window  He subsequently underwent a CTP which demonstrated that the patient was a thrombectomy candidate, and subsequently an endovascular alert was called  He underwent a mechanical thrombectomy TICI 3 revascularization  He subsequently noted to be COVID-positive, repeat CT head in the morning of 5/16 demonstrated continued edema and sulcal effacement on the left similar to previous study, improvement of contrast staining versus possible hemorrhage  Subsequently, after initial examination in the morning of 5/16 patient noted to have seizure that consisted of eye deviation to the right followed by postictal period  STAT CTH demonstrated no acute change, evolving known left MCA infarct  Video EEG started with no seizures captured, and subsequently discontinued  Patient underwent ALYSSA on 5/18 and found to have a large PFO  ---------------------------------------------------------------------------------------  SUBJECTIVE  Patient seen and examined at bedside  He states he feels good overall with no further complaints  Denies any chest pain, shortness of breath, fever, chills, abdominal pain  On my physical exam, expressive aphasia  RUE and RLE 4/5  Sensation preserved  Otherwise normal neurologic and physical exam     Review of Systems   Constitutional: Negative for chills and fever  HENT: Negative for congestion, sinus pain and sore throat  Respiratory: Negative for cough and shortness of breath  Cardiovascular: Negative for chest pain and palpitations  Gastrointestinal: Negative for abdominal pain, nausea and vomiting  Genitourinary: Negative for dysuria and frequency  Musculoskeletal: Negative for back pain and myalgias  Neurological: Negative for dizziness and light-headedness  All other systems reviewed and are negative  Review of systems was reviewed and negative unless stated above in HPI/24-hour events   ---------------------------------------------------------------------------------------  Assessment and Plan:  Neuro:   • Diagnosis: Acute ischemic left MCA stroke, status post thrombectomy 5/15 with TICI 3 revascularization  ? Plan:  - CT head noted cerebral edema with evidence of brain compression and mass effect in left MCA territory  - Maintain -150  - MAP greater than 65  - A1c 4 8, LDL 18, statin discontinued due to low LDL, 18  - MRI 5/17 demonstrated large recent left MCA territory infarct with associated edema and new foci of petechial hemorrhage  There is regional mass effect without midline shift  Cannot reliably evaluate the infarcted brain with structural abnormality, no structural abnormalities identified in the remainder of the brain  Normal hippocampal formations    - Plan continued PT/OT/ST  - Continue ASA 81mg QD  - Obtain CT chest abdomen pelvis to evaluate for any possible underlying malignancy  - No evidence of malignancy to account for hypercoagulable state  Moderate bilateral pleural effusions, small amount of ascites, and diffuse body wall subcutaneous edema  - Lower limb duplex study ordered given PFO  - No evidence of acute or chronic deep vein thrombosis noted bilaterally  No evidence of superficial thrombophlebitis noted bilaterally  Doppler evaluation demonstrated a normal response to augmentation maneuvers bilaterally  Popliteal, posterior tibial, and anterior tibial arterial Doppler waveforms are triphasic bilaterally  - Patient received J&J COVID-vaccine, however patient received vaccine at least 1 year ago  - ALYSSA echocardiogram 5/18 demonstrated large and patent PFO  - Interventional cardiology consulted appreciate recommendations;   - Neurology consulted and appreciate recommendations  - Patient off 3%HTS, continue to monitor Serum-NA, Goal >140  - Repeat CT head if more than two-point drop in GCS within 1 hour  • Diagnosis: Seizure noted on 5/16 - eye deviation to right followed by postictal state  • Stat CTH no acute change, evolving known left MCA infarct  • cVEEG with no further seizure activities  • Continue Keppra 750 mg through 5/25  • Diagnosis: Delirium prevention  ? Plan:  - Maintain sleep-wake cycle   - CAM-ICU twice daily  - Continue neuro checks q 2hrs  • Diagnosis tobacco abuse  ? Plan:  - Nicotine patch ordered  - Advised tobacco cessation, provide tobacco cessation education on discharge     CV:   • Diagnosis: Patent foramen ovale  ? Plan:   - Noted on ALYSSA on 5/18  - Interventional cardiology consulted for closure; appreciate recommendations  • Diagnosis: Maintain normotension  ? Plan:  - -140, MAP be more than 65  - Labetalol 10 mg every 4 hours as needed     Pulm:  • Diagnosis: No acute issues  ? Plan:      - Pulmonary hygiene  - Incentive spirometry     GI:   • Diagnosis: No acute issues  ? No stress ulcer prophylaxis  ?  No bowel regimen     :    - Diagnosis: Patient off 3%HTS, continue to monitor Serum-NA, Goal >140  - Repeat CT head if more than two-point drop in GCS within 1 hour     F/E/N:   • Fluids: None  • Replete electrolytes: K>4 0, mag>2 0, Phos>3 5  • Nutrition: Regular vegeterian diet     Heme/Onc:   • Diagnosis: Hypercoagulable work-up in progress  ? Plan:  - Hypercoagulable work-up  - Homocystine normal  - BELEM normal  - RF normal  - ANCA negative  - dsDNA negative  - MTHFR pending  - Factor V Leiden negative  - Lupus anticoagulant negative  - Thrombosis panel  - Antithrombin III 81, likely this is not clinically relevant as it is higher than 80%  - Protein S decreased at 65%  - Protein C normal  - Repeat thrombosis panel in 3 months recommended  - Cannot rule out that February 2023 COVID infection causing hypercoagulable state  • Diagnosis: VTE prophylaxis  ? Plan:   - SCDs to bilateral lower extremities  - Lovenox 40 mg Q24H  • Diagnosis: Leukocytopaenia  ? Plan:  - Continue to monitor  • Diagnosis: Anemia  ? Plan:  - Continue to monitor     Endo:   • Diagnosis: No active issues  ? Last hemoglobin A1c 4 8  ? Maintain blood glucose less than 180 in setting of critical illness     ID:   • Diagnosis: COVID diagnosis 5/15  ? Plan:  - Based on additional information received from patient's sister on 5/16 this is unlikely to be current infection and is primarily a continued positive test in the setting of prior COVID infection in February 2023  - Continue to monitor fever and white blood cell curve     MSK/Skin:   • Diagnosis: Continue convalescence  ? Plan:      - Continue PT OT ST  • Diagnosis: Pressure ulcer prevention  ? Plan:  - Continue frequent turning every 2 hours      Patient appropriate for transfer out of the ICU today?: Patient does not meet criteria for referral to the ICU Follow-Up Clinic; referral has not been made     Disposition: Transfer to Med Surg with Telemetry   Code Status: Level 1 - Full Code  ---------------------------------------------------------------------------------------  ICU CORE MEASURES    Prophylaxis   VTE Pharmacologic Prophylaxis: Enoxaparin (Lovenox)  VTE Mechanical Prophylaxis: sequential compression device  Stress Ulcer Prophylaxis: Prophylaxis Not Indicated     ABCDE Protocol (if indicated)  Plan to perform spontaneous awakening trial today? Not applicable  Plan to perform spontaneous breathing trial today? Not applicable  Obvious barriers to extubation? Not applicable  CAM-ICU: Negative    Invasive Devices Review  Invasive Devices     Central Venous Catheter Line  Duration           CVC Central Lines 23 Triple 16cm 6 days          Drain  Duration           External Urinary Catheter <1 day              Can any invasive devices be discontinued today? Not applicable  ---------------------------------------------------------------------------------------  OBJECTIVE    Vitals   Vitals:    23 0100 23 0400 23 0500 23 0700   BP: 127/74 131/84 137/78 143/87   Pulse: (!) 50 (!) 50 (!) 50 58   Resp: 15 14 15 13   Temp:  97 9 °F (36 6 °C)     TempSrc:  Oral     SpO2: 96% 96% 96% 97%   Weight:       Height:         Temp (24hrs), Av 4 °F (36 9 °C), Min:97 9 °F (36 6 °C), Max:98 9 °F (37 2 °C)  Current: Temperature: 97 9 °F (36 6 °C)  HR: 50-74  BP: 121-144/67-83  RR: 14-22  SpO2: 96-98%    Respiratory:  SpO2: SpO2: 98 %       Invasive/non-invasive ventilation settings   Respiratory    Lab Data (Last 4 hours)    None         O2/Vent Data (Last 4 hours)    None                Physical Exam  Vitals and nursing note reviewed  Constitutional:       Appearance: Normal appearance  He is normal weight  HENT:      Head: Normocephalic and atraumatic  Cardiovascular:      Rate and Rhythm: Normal rate and regular rhythm  Pulses: Normal pulses  Heart sounds: Normal heart sounds     Pulmonary:      Effort: Pulmonary effort is normal       Breath sounds: Normal breath sounds  Abdominal:      General: Abdomen is flat  Bowel sounds are normal       Palpations: Abdomen is soft  Neurological:      Mental Status: He is alert and oriented to person, place, and time        Comments: RUE/RLE 4/5; expressive aphasia             Laboratory and Diagnostics:  Results from last 7 days   Lab Units 05/22/23  0452 05/21/23  0542 05/20/23  0435 05/19/23  0629 05/18/23  0536 05/17/23  0523 05/16/23  0557   WBC Thousand/uL 4 67 4 01* 5 37 5 54 5 56 5 47 5 50   HEMOGLOBIN g/dL 10 7* 10 1* 10 1* 10 2* 10 2* 10 7* 11 5*   HEMATOCRIT % 29 9* 29 5* 30 1* 29 9* 30 1* 31 3* 32 0*   PLATELETS Thousands/uL 199 172 156 148* 141* 149 165   NEUTROS PCT % 54 58 71 63 65 71 71   MONOS PCT % 9 8 8 8 9 11 13*     Results from last 7 days   Lab Units 05/22/23  0452 05/21/23  1506 05/21/23  1146 05/21/23  0543 05/21/23  0245 05/20/23  1917 05/20/23  1320 05/20/23  0436 05/20/23  0020 05/17/23  1201 05/17/23  0523 05/16/23  1240 05/16/23  0557   SODIUM mmol/L 141 141 142 143 145 145 146 144 145   < > 146   < > 140   POTASSIUM mmol/L 3 6  --   --  3 6 3 6 3 3* 3 6 3 5 3 4*   < > 3 6   < > 3 9   CHLORIDE mmol/L 111*  --   --  119* 120* 120* 120* 120* 120*   < > 121*   < > 113*   CO2 mmol/L 29  --   --  26 27 24 24 22 24   < > 25   < > 25   ANION GAP mmol/L 1*  --   --  -2* -2* 1* 2* 2* 1*   < > 0*   < > 2*   BUN mg/dL 4*  --   --  3* 3* 3* 3* 3* 3*   < > 6   < > 8   CREATININE mg/dL 0 57*  --   --  0 50* 0 46* 0 48* 0 55* 0 54* 0 42*   < > 0 54*   < > 0 52*   CALCIUM mg/dL 8 1*  --   --  7 8* 7 7* 8 1* 7 8* 7 9* 7 7*   < > 8 4   < > 8 3   GLUCOSE RANDOM mg/dL 86  --   --  88 87 130 137 137 113   < > 98   < > 102   ALT U/L  --   --   --   --   --   --   --   --   --   --  26  --  26   AST U/L  --   --   --   --   --   --   --   --   --   --  38  --  37   ALK PHOS U/L  --   --   --   --   --   --   --   --   --   --  44*  --  47   ALBUMIN g/dL  --   --   --   --   --   --   --   --   --   --  2 7*  -- "2 9*   TOTAL BILIRUBIN mg/dL  --   --   --   --   --   --   --   --   --   --  0 30  --  0 45    < > = values in this interval not displayed  Results from last 7 days   Lab Units 05/22/23  0452 05/21/23  0542 05/20/23  0435 05/19/23  0629 05/18/23  0535 05/17/23  0523 05/16/23  0557   MAGNESIUM mg/dL 2 0 1 8 2 0 1 8 2 0 2 2 2 0   PHOSPHORUS mg/dL 4 4 3 7 2 4* 2 4* 1 5* 2 4* 3 0      Results from last 7 days   Lab Units 05/15/23  1853   INR  0 89   PTT seconds 22*              ABG:    VBG:          Micro        Imaging:  I have personally reviewed pertinent reports  Intake and Output  I/O       05/20 0701 05/21 0700 05/21 0701  05/22 0700 05/22 0701 05/23 0700    P  O  800 960     I V  (mL/kg) 2290 (30 6) 330 (4 4)     IV Piggyback 350 250     Total Intake(mL/kg) 3440 (46) 1540 (20 6)     Urine (mL/kg/hr) 3675 (2) 3843 (2 1)     Emesis/NG output       Stool 0      Total Output 3675 3843     Net -235 -2303            Unmeasured Stool Occurrence 1 x          UOP: 2 1 ml/hr     Height and Weights   Height: 5' 9\" (175 3 cm)  IBW (Ideal Body Weight): 70 7 kg  Body mass index is 24 36 kg/m²  Weight (last 2 days)     Date/Time Weight    05/21/23 0600 74 8 (164 93)            Nutrition       Diet Orders   (From admission, onward)             Start     Ordered    05/21/23 1831  Diet Regular; Vegetarian  Diet effective now        Comments: Sugar free Gatorade with meals and snacks   References:    Adult Nutrition Support Algorithm    RD Therapeutic Diet Order Protocol   Question Answer Comment   Diet Type Regular    Regular Vegetarian    RD to adjust diet per protocol?  No        05/21/23 1830                    Active Medications  Scheduled Meds:  Current Facility-Administered Medications   Medication Dose Route Frequency Provider Last Rate   • acetaminophen  975 mg Oral Q8H PRN Timothy Maxwell PA-C     • aspirin  81 mg Oral Daily Vilma Vergara DO     • chlorhexidine  15 mL Mouth/Throat Q12H [address] Velia " "SALVATORE Sands     • vitamin B-12  500 mcg Oral Daily Shayy Owens PA-C     • enoxaparin  40 mg Subcutaneous Q24H [address] Shelpierce Dunn, DO     • hydrALAZINE  10 mg Intravenous Q6H PRN Estephanie Erazo DO     • labetalol  10 mg Intravenous Q4H PRN SALVATORE Ruiz     • levETIRAcetam  750 mg Intravenous Q12H [address] Nasir Trujillo,  Stopped (05/21/23 2019)   • nicotine  1 patch Transdermal Daily SALVATORE Ware     • ondansetron  4 mg Intravenous Once PRN Herber Troy CRNA     • sodium chloride  1 g Oral TID With Meals Avril De MD       Continuous Infusions:     PRN Meds:   acetaminophen, 975 mg, Q8H PRN  hydrALAZINE, 10 mg, Q6H PRN  labetalol, 10 mg, Q4H PRN  ondansetron, 4 mg, Once PRN        Allergies   No Known Allergies  ---------------------------------------------------------------------------------------  Advance Directive and Living Will:      Power of :    POLST:    ---------------------------------------------------------------------------------------  Care Time Delivered:   [facility], DO      Portions of the record may have been created with voice recognition software  Occasional wrong word or \"sound a like\" substitutions may have occurred due to the inherent limitations of voice recognition software    Read the chart carefully and recognize, using context, where substitutions have occurred    "

## 2023-05-22 NOTE — PROGRESS NOTES
[facility] Acceptance Note  Jorge Arteaga 40 y o  male MRN: [id number]  Unit/Bed#: PPHP 727-01 Encounter: [id number]    Date of Admission: 5/15/2023  6:48 PM  Date of Transfer: 5/22/2023    Summary:   Jorge Arteaga is a 40 y o  male who presented to the ED on 5/15 as a stroke alert with last well known of 2030 on 5/14  EMS arrived for a welfare check as the patient did not show up for a scheduled meeting with a friend and on arrival, the patient was found covered in fevers with stroke symptoms including RUE flaccid paralysis, R-hand sideed neglect, R-facial droop and expressive aphasia  Initial NIHSS 16  CTH noted a left MCA infarct and CTA head and neck noted a proximal left M1 occlusion with partial opacification of distal M2, M3  Patient was not a TNK candidate being outside the time window, but was a thrombectomy candidate based on CTP  Endovascular alert was called and he underwent Mechanical thrombectomy TICI 3 revascularization (5/15)  He was found to be incidentally COVID-positive but not requiring O2, case was discussed with infection control and no need for contact precautions given related to past COVID infection in February  Repeat CTH POD1 showed continued edema and sulcal effacement on the left similar to previous study  On 5/16, patient was noted to have seizure activity with rightward eye deviation followed by postictal state  STAT CTH was negative  Video EEG did not capture any seizures and was subsequently discontinued, but patient was continued on Keppra  Patient was initially started on hypertonic saline with Na goal 145-155, now off drip and normal Na goals  Patient underwent additional studies including CT CAP which was negative for possible underlying malignancy  ALYSSA demonstrated large and patent PFO, which interventional cardiology will follow-up outpatient  Lower duplex studies were negative  Hypercoagulable workup is in progress       Assessment and Plan:   Jorge Arteaga is a 40y o  year old male who is being transferred from Neuro-ICU with:    * L MCA stroke w/ LM1 partial occlusion  Assessment & Plan  Patient presented as a stroke alert 5/15 with right-sided hand neglect, facial droop, RUE flaccid paralysis and expressive aphasia  NIHSS score 16  CTH showed Acute ischemic left MCA stroke, status post thrombectomy 5/15 with TICI 3 revascularization  Imaging:   · [facility] 5/15: Late acute to early subacute left MCA territory infarct  No hemorrhagic conversion  · CTA head/neck 5/15: Acute occlusion of the proximal left M1 segment with partial opacification of distal M2 and M3 branches  · CTH 5/15: New extensive left hemispheric intraparenchymal and subarachnoid hemorrhage with mass effect upon the adjacent brain parenchyma and associated cerebral edema predominantly in the left MCA vascular distribution  No significant midline shift or evidence of transtentorial herniation noted  · [facility] 5/16: Grossly stable recent large left MCA territory infarct with regional mass effect without midline shift  No acute hemorrhage  · MRI Brain w/ contrast 5/17/23: Recent large left MCA territory infarct with associated edema and a few foci of petechial hemorrhage  There is regional mass effect without midline shift  Cannot reliably evaluate the infarcted brain for structural abnormality  No structural abnormality is identified in the remainder brain  Normal hippocampal formations  · [facility] 5/19: Continued evolution of left MCA territory infarct as above with stable mass effect  No significant midline shift  Question small volume petechial hemorrhage left inferior parietal and temporal region  No billie parenchymal hemorrhage  · CT CAP: No evidence of malignancy to account for hypercoagulable state  · ALYSSA- EF 55%, large patent PFO with bidirectional flow with saline contrast injection  Left atrial appendate with wind sock appearance with no thrombus  Assessment:  Young 40 YOM with no significant PMH presented for stroke  Possibly due to post-COVID hypercoagulable state from infection back in February  Ruled out occult malignancy, does have large and patent PFO but negative for DVT  Hypercoagulable workup in progress    Plan:  · Neurosurgery signed off  · Neurology, appreciate recs  · Maintain -150, normotension  · Maintain normal Na  · Intervention cardiology consulted for PFO - outpatient follow-up   · Continue ASA 81mg daily, DVT ppx  · FU hypercoagulable workup  - Homocystine normal  - BELEM normal  - RF normal  - ANCA negative  - dsDNA negative  - MTHFR pending  - Factor V Leiden negative  - Lupus anticoagulant negative  - Thrombosis panel  - Antithrombin III 81, likely this is not clinically relevant as it is higher than 80%  - Protein S decreased at 65%  - Protein C normal  - Repeat thrombosis panel in 3 months recommended  - Cannot rule out that February 2023 COVID infection causing hypercoagulable state      PFO (patent foramen ovale)  Assessment & Plan  5/18 ALYSSA- EF 55%, large patent PFO with bidirectional flow with saline contrast injection  Left atrial appendate with wind sock appearance with no thrombus  · Interventional cardiology consulted - outpatient follow-up in 4-6 weeks     [facility] 5/15, asymptomatic   Recent COVID infection in February  Discussed with infection control, no need for contact precautions   Monitor respiratory status     Seizure [facility])  Assessment & Plan  Seizure noted on 5/16 - eye deviation to right followed by postictal state  · Stat CTH no acute change, evolving known left MCA infarct  · cVEEG with no further seizure activities       Plan:  · Continue Keppra 750 mg through 5/25      Patient Active Problem List   Diagnosis   • Bilateral low back pain without sciatica   • Viral infection, unspecified   • Positive self-administered antigen test for COVID-19   • L MCA stroke w/ LM1 partial occlusion   • Seizure (HCC)   • COVID   • PFO (patent foramen ovale) Diet:       Diet Orders   (From admission, onward)             Start     Ordered    23  Diet Regular; Vegetarian  Diet effective now        Comments: Sugar free Gatorade with meals and snacks   References:    Adult Nutrition Support Algorithm    RD Therapeutic Diet Order Protocol   Question Answer Comment   Diet Type Regular    Regular Vegetarian    RD to adjust diet per protocol? No        23               VTE PPX: SCDs, Lovenox  Dispo: Patient continues to require inpatient care  Subjective:  Review of Systems   Constitutional: Negative for chills and fever  HENT: Negative for ear pain and sore throat  Eyes: Negative for pain and visual disturbance  Respiratory: Negative for cough, shortness of breath, wheezing and stridor  Cardiovascular: Negative for chest pain, palpitations and leg swelling  Gastrointestinal: Negative for abdominal pain, constipation, diarrhea, nausea and vomiting  Genitourinary: Negative for dysuria and hematuria  Musculoskeletal: Positive for gait problem  Negative for arthralgias and back pain  Skin: Negative for color change and rash  Neurological: Positive for speech difficulty and weakness  Negative for dizziness, seizures, syncope and headaches  All other systems reviewed and are negative  Objective: Body mass index is 24 36 kg/m²    Vitals:    23 1200 23 1300 23 1400 23 1500   BP: 143/83 136/70 126/75 121/67   BP Location: Left arm      Pulse: 62 72 74 62   Resp:    Temp: 97 9 °F (36 6 °C)      TempSrc: Oral      SpO2: 98% 98% 97% 98%   Weight:       Height:         Temp:  [97 8 °F (36 6 °C)-98 4 °F (36 9 °C)] 97 9 °F (36 6 °C)  HR:  [50-74] 62  Resp:  [13-22] 22  BP: (121-152)/(67-96) 121/67  SpO2:  [96 %-100 %] 98 %  Temp (48hrs), Av 2 °F (36 8 °C), Min:97 8 °F (36 6 °C), Max:99 °F (37 2 °C)  Current: Temperature: 97 9 °F (36 6 °C)    Intake/Output Summary (Last 24 hours) at 2023 [address] [place] [address] filed at 5/22/2023 1523  Gross per 24 hour   Intake 446 67 ml   Output 4875 ml   Net -4428 33 ml     Invasive Devices     Peripheral Intravenous Line  Duration           Peripheral IV 05/22/23 Dorsal (posterior); Left Forearm <1 day                Physical Exam  Vitals reviewed  Constitutional:       General: He is not in acute distress  Appearance: Normal appearance  HENT:      Head: Normocephalic and atraumatic  Nose: Nose normal       Mouth/Throat:      Mouth: Mucous membranes are moist       Pharynx: Oropharynx is clear  Eyes:      Extraocular Movements: Extraocular movements intact  Conjunctiva/sclera: Conjunctivae normal    Cardiovascular:      Rate and Rhythm: Normal rate and regular rhythm  Heart sounds: Normal heart sounds  No murmur heard  Pulmonary:      Effort: Pulmonary effort is normal  No respiratory distress  Breath sounds: Normal breath sounds  No wheezing or rales  Abdominal:      General: Abdomen is flat  Bowel sounds are normal  There is no distension  Palpations: Abdomen is soft  Tenderness: There is no abdominal tenderness  Musculoskeletal:         General: No tenderness  Cervical back: Normal range of motion and neck supple  Right lower leg: No edema  Left lower leg: No edema  Neurological:      Mental Status: He is alert and oriented to person, place, and time  Cranial Nerves: No cranial nerve deficit  Motor: Weakness present        Gait: Gait abnormal       Comments: 5/5 strength in LUE, LLE  4/5 strength in RUE, RLE  Expressive aphasia    Psychiatric:         Mood and Affect: Mood normal          Behavior: Behavior normal            Results from last 7 days   Lab Units 05/22/23  0452 05/21/23  0542 05/20/23  0435   WBC Thousand/uL 4 67 4 01* 5 37   HEMOGLOBIN g/dL 10 7* 10 1* 10 1*   HEMATOCRIT % 29 9* 29 5* 30 1*   PLATELETS Thousands/uL 199 172 156   NEUTROS PCT % 54 58 71   NEUTROS ABS Thousands/µL 2 56 2  34 3 90   LYMPHS PCT % 27 24 15   MONOS PCT % 9 8 8     Results from last 7 days   Lab Units 05/22/23  0452 05/21/23  0543 05/21/23  0245 05/17/23  1201 05/17/23  0523 05/16/23  1240 05/16/23  0557   POTASSIUM mmol/L 3 6 3 6 3 6   < > 3 6   < > 3 9   CHLORIDE mmol/L 111* 119* 120*   < > 121*   < > 113*   CO2 mmol/L 29 26 27   < > 25   < > 25   BUN mg/dL 4* 3* 3*   < > 6   < > 8   CREATININE mg/dL 0 57* 0 50* 0 46*   < > 0 54*   < > 0 52*   EGFR ml/min/1 73sq m 127 134 139   < > 130   < > 132   CALCIUM mg/dL 8 1* 7 8* 7 7*   < > 8 4   < > 8 3   ALK PHOS U/L  --   --   --   --  44*  --  47   ALT U/L  --   --   --   --  26  --  26   AST U/L  --   --   --   --  38  --  37    < > = values in this interval not displayed  Results from last 7 days   Lab Units 05/22/23  0452 05/21/23  0542 05/20/23  0435   MAGNESIUM mg/dL 2 0 1 8 2 0   PHOSPHORUS mg/dL 4 4 3 7 2 4*         Results from last 7 days   Lab Units 05/18/23  1622   POC GLUCOSE mg/dl 91     Results from last 7 days   Lab Units 05/16/23  0557   HEMOGLOBIN A1C % 4 8              Results from last 7 days   Lab Units 05/15/23  1853   INR  0 89   PTT seconds 22*         No results for input(s): COLORU, CLARITYU, SPECGRAV, PHUR, LEUKOCYTESUR, NITRITE, PROTEINUA, GLUCOSEU, KETONESU, Annika Hair in the last 72 hours  Invalid input(s): Arlys Dose  ABG:No results found for: PHART, LCV5PBX, PO2ART, MYF9WVK, A0UJXOWX, BEART, SOURCE    =================================================    Imagining Results:  XR ankle 3+ vw right    Result Date: 5/17/2023  No acute osseous abnormality  Workstation performed: [id number]     CT head wo contrast    Result Date: 5/19/2023  Continued evolution of left MCA territory infarct as above with stable mass effect  No significant midline shift Question small volume petechial hemorrhage left inferior parietal and temporal region  No billie parenchymal hemorrhage  Continued short-term interval follow-up advised   Study was marked in EPIC for immediate notification  Workstation performed: [id number]     CT head wo contrast    Result Date: 5/17/2023  Grossly stable recent large left MCA territory infarct with regional mass effect without midline shift  No acute hemorrhage  Workstation performed: [id number]     CT head wo contrast    Result Date: 5/16/2023  Evolving left MCA infarct with increased cortical hypodensity and stable sulcal effacement/mass effect throughout the left lobe  No acute intracranial hemorrhage or midline shift  Workstation performed: [id number]     CT head wo contrast    Result Date: 5/16/2023  Compared to the most immediate prior study, significant interval decrease/near total resolution of the previously noted extensive left hemispheric hyperdensity involving the cortex and subcortical white matter and left basal ganglia  Minimal residual hyperdensity in the left basal ganglia region  Overall these findings suggest resolving contrast staining post endovascular therapy although small amount of superimposed hemorrhage is difficult to entirely exclude  Recommend short-term interval follow-up  scan and/or further evaluation with MRI for definitive evaluation  Stable left hemispheric edema and sulcal effacement without significant midline shift  Above findings discussed with Dr Jay Gomez at 4:30 a m  on 5/16/2023  Workstation performed: [id number]     CT head wo contrast    Result Date: 5/15/2023  New extensive left hemispheric intraparenchymal and subarachnoid hemorrhage with mass effect upon the adjacent brain parenchyma and associated cerebral edema predominantly in the left MCA vascular distribution  No significant midline shift or evidence of  transtentorial herniation noted  Findings discussed with Dr Raj Garcia at 10:30 p m  Workstation performed: [id number]     CT chest abdomen pelvis w contrast    Result Date: 5/20/2023  No evidence of malignancy to account for hypercoagulable state   Moderate bilateral pleural "effusions, small amount of ascites, and diffuse body wall subcutaneous edema  Workstation performed: [id number]     CT stroke alert brain    Result Date: 5/15/2023  Late acute to early subacute left MCA territory infarct  No hemorrhagic conversion  Findings were directly discussed with Krystle Moss at  7:10 PM    Workstation performed: [id number]     CT cerebral perfusion    Result Date: 5/15/2023  CT perfusion performed  Data available on PACS  Workstation performed: [id number]     XR chest portable ICU    Result Date: 5/16/2023  No acute cardiopulmonary disease  Right jugular catheter in lower SVC with no pneumothorax  Workstation performed: [id number]     MRI brain seizure wo and w contrast    Addendum Date: 5/17/2023    ADDENDUM: Correcting comparison study: Head CT 5/16/2023  Result Date: 5/17/2023  1  Recent large left MCA territory infarct with associated edema and a few foci of petechial hemorrhage  There is regional mass effect without midline shift  2   Cannot reliably evaluate the infarcted brain for structural abnormality  No structural abnormality is identified in the remainder brain  Normal hippocampal formations  Workstation performed: [id number]     CTA stroke alert (head/neck)    Result Date: 5/15/2023  1  Acute occlusion of the proximal left M1 segment with partial opacification of distal M2 and M3 branches  2  No evidence of thrombus, stenosis or occlusion of the carotid or vertebral arteries  Findings were directly discussed with Krystle Moss at  7:14 PM    Workstation performed: [id number]         This case was discussed with Family Medicine Attending Physician Dr José Miguel Rubi and [facility] team           ** Please Note: Portions of this note have been constructed using voice recognition software  Occasional wrong word or \"sound a like\" substitutions may have occurred due to the inherent limitations of voice recognition software   Please read the chart carefully and recognize, using context, " where substitutions have occurred  ** negative

## 2023-05-22 NOTE — ASSESSMENT & PLAN NOTE
5/18 ALYSSA- EF 55%, large patent PFO with bidirectional flow with saline contrast injection  Left atrial appendate with wind sock appearance with no thrombus     · Interventional cardiology consulted - outpatient follow-up in 4-6 weeks

## 2023-05-22 NOTE — ASSESSMENT & PLAN NOTE
Positive COVID 5/15, asymptomatic   Recent COVID infection in February  Discussed with infection control, no need for contact precautions   Monitor respiratory status

## 2023-05-22 NOTE — ASSESSMENT & PLAN NOTE
Patient presented as a stroke alert 5/15 with right-sided hand neglect, facial droop, RUE flaccid paralysis and expressive aphasia  NIHSS score 16  CTH showed Acute ischemic left MCA stroke, status post thrombectomy 5/15 with TICI 3 revascularization  Imaging:   · [facility] 5/15: Late acute to early subacute left MCA territory infarct  No hemorrhagic conversion  · CTA head/neck 5/15: Acute occlusion of the proximal left M1 segment with partial opacification of distal M2 and M3 branches  · CTH 5/15: New extensive left hemispheric intraparenchymal and subarachnoid hemorrhage with mass effect upon the adjacent brain parenchyma and associated cerebral edema predominantly in the left MCA vascular distribution  No significant midline shift or evidence of transtentorial herniation noted  · [facility] 5/16: Grossly stable recent large left MCA territory infarct with regional mass effect without midline shift  No acute hemorrhage  · MRI Brain w/ contrast 5/17/23: Recent large left MCA territory infarct with associated edema and a few foci of petechial hemorrhage  There is regional mass effect without midline shift  Cannot reliably evaluate the infarcted brain for structural abnormality  No structural abnormality is identified in the remainder brain  Normal hippocampal formations  · [facility] 5/19: Continued evolution of left MCA territory infarct as above with stable mass effect  No significant midline shift  Question small volume petechial hemorrhage left inferior parietal and temporal region  No billie parenchymal hemorrhage  · CT CAP: No evidence of malignancy to account for hypercoagulable state  · ALYSSA- EF 55%, large patent PFO with bidirectional flow with saline contrast injection  Left atrial appendate with wind sock appearance with no thrombus  Assessment:  Young 40 YOM with no significant PMH presented for stroke  Possibly due to post-COVID hypercoagulable state from infection back in February   Ruled out occult malignancy, does have large and patent PFO but negative for DVT   Hypercoagulable workup in progress    Medically stable, discharge to [address]:  · Neurosurgery signed off  · Neurology, appreciate recs - can be discharged on ASA, will need outpatient neurology follow-up and cardiology follow-up for PFO  · Maintain -150, normotension  · Maintain normal Na - will discontinue salt tabs given stable results   · [facility] neuro checks   · Intervention cardiology consulted for PFO - outpatient follow-up   · Continue ASA 81mg daily, DVT ppx  · Keppra 750mg BID through 5/25 - will discontinue afterwards  · FU hypercoagulable workup  - Homocystine normal  - BELEM normal  - RF normal  - ANCA negative  - dsDNA negative  - MTHFR pending  - Factor V Leiden negative  - Lupus anticoagulant negative  - Thrombosis panel  - Antithrombin III 81, likely this is not clinically relevant as it is higher than 80%  - Protein S decreased at 65%  - Protein C normal  - Repeat thrombosis panel in 3 months recommended  - Cannot rule out that February 2023 COVID infection causing hypercoagulable state

## 2023-05-22 NOTE — CONSULTS
"This is a consult acknowledgment  An outpatient follow-up will be arranged in our structural cardiology office in the next 4 to 6 weeks  Completed paradoxical embolism work-up  - ALYSSA with agitated saline \"bubbles\" study  - Thromboembolism panel  - MRI brain  - Lower extremity venous duplex  - Lipid panel, A1c    Thank you for involving us in the care of your patient    "

## 2023-05-22 NOTE — SPEECH THERAPY NOTE
"Speech Language/Pathology    Speech/Language Pathology Progress Note    Patient Name: Jorge YOUNG Date: 5/22/2023     Problem List  Principal Problem:    L MCA stroke w/ LM1 partial occlusion  Active Problems:    Seizure ([address] )    COVID       Past Medical History  History reviewed  No pertinent past medical history  Past Surgical History  Past Surgical History:   Procedure Laterality Date   • IR STROKE ALERT  5/15/2023         Subjective:  \"I watched it this weekend\"  Pt awake and alert  Objective:  Pt seen for f/u speech and language therapy  Pt is much improved since Friday  Pt presents with improved fluency, but still presents with expressive > receptive aphasia  The following areas were targeted during tx:   -orientation (3/3)  -general information (3/3  -immediate recall (0/3) - would often ask for repetition, would perseverate on specific sounds  -categorization - convergent (3/3)  and divergent (3/3)  -automatic speech (2/2) - days of the week, count to 20  -sentence completion (3/3)  -reading - impaired  -writing - attempted with R hand but was unable to grasp pen with strength, used L hand and was able to write birthday in cursive with min trouble   Pt would often initiate a response, but then repeat himself and self-correct if necessary  Pt would perseverate on certain parts of words  Pt seemed to be aware of his difficulties and incorrect responses but appears motivated and excited to continue ST to make progress towards goals  Assessment:  Pt presents with improvement in automatic speech, sentence completion and categorization  Plan/Recommendations:  Continue ST to make progress towards speech and language goals          "

## 2023-05-23 DIAGNOSIS — Q21.12 PATENT FORAMEN OVALE: Primary | ICD-10-CM

## 2023-05-23 LAB
ANION GAP SERPL CALCULATED.3IONS-SCNC: -1 MMOL/L (ref 4–13)
BASOPHILS # BLD AUTO: 0.04 THOUSANDS/ÂΜL (ref 0–0.1)
BASOPHILS NFR BLD AUTO: 1 % (ref 0–1)
BUN SERPL-MCNC: 7 MG/DL (ref 5–25)
CALCIUM SERPL-MCNC: 8.6 MG/DL (ref 8.3–10.1)
CHLORIDE SERPL-SCNC: 109 MMOL/L (ref 96–108)
CO2 SERPL-SCNC: 30 MMOL/L (ref 21–32)
CREAT SERPL-MCNC: 0.62 MG/DL (ref 0.6–1.3)
EOSINOPHIL # BLD AUTO: 0.39 THOUSAND/ÂΜL (ref 0–0.61)
EOSINOPHIL NFR BLD AUTO: 7 % (ref 0–6)
ERYTHROCYTE [DISTWIDTH] IN BLOOD BY AUTOMATED COUNT: 12 % (ref 11.6–15.1)
GFR SERPL CREATININE-BSD FRML MDRD: 123 ML/MIN/1.73SQ M
GLUCOSE SERPL-MCNC: 78 MG/DL (ref 65–140)
GLUCOSE SERPL-MCNC: 82 MG/DL (ref 65–140)
HCT VFR BLD AUTO: 33.8 % (ref 36.5–49.3)
HGB BLD-MCNC: 12.2 G/DL (ref 12–17)
IMM GRANULOCYTES # BLD AUTO: 0.04 THOUSAND/UL (ref 0–0.2)
IMM GRANULOCYTES NFR BLD AUTO: 1 % (ref 0–2)
LYMPHOCYTES # BLD AUTO: 1.43 THOUSANDS/ÂΜL (ref 0.6–4.47)
LYMPHOCYTES NFR BLD AUTO: 27 % (ref 14–44)
MCH RBC QN AUTO: 34.2 PG (ref 26.8–34.3)
MCHC RBC AUTO-ENTMCNC: 36.1 G/DL (ref 31.4–37.4)
MCV RBC AUTO: 95 FL (ref 82–98)
MONOCYTES # BLD AUTO: 0.58 THOUSAND/ÂΜL (ref 0.17–1.22)
MONOCYTES NFR BLD AUTO: 11 % (ref 4–12)
NEUTROPHILS # BLD AUTO: 2.82 THOUSANDS/ÂΜL (ref 1.85–7.62)
NEUTS SEG NFR BLD AUTO: 53 % (ref 43–75)
NRBC BLD AUTO-RTO: 0 /100 WBCS
PLATELET # BLD AUTO: 269 THOUSANDS/UL (ref 149–390)
PMV BLD AUTO: 9.7 FL (ref 8.9–12.7)
POTASSIUM SERPL-SCNC: 3.5 MMOL/L (ref 3.5–5.3)
RBC # BLD AUTO: 3.57 MILLION/UL (ref 3.88–5.62)
SODIUM SERPL-SCNC: 138 MMOL/L (ref 135–147)
WBC # BLD AUTO: 5.3 THOUSAND/UL (ref 4.31–10.16)

## 2023-05-23 RX ADMIN — SODIUM CHLORIDE 1 G: 1 TABLET ORAL at 12:28

## 2023-05-23 RX ADMIN — CHLORHEXIDINE GLUCONATE 0.12% ORAL RINSE 15 ML: 1.2 LIQUID ORAL at 20:28

## 2023-05-23 RX ADMIN — CHLORHEXIDINE GLUCONATE 0.12% ORAL RINSE 15 ML: 1.2 LIQUID ORAL at 09:45

## 2023-05-23 RX ADMIN — ASPIRIN 81 MG CHEWABLE TABLET 81 MG: 81 TABLET CHEWABLE at 09:45

## 2023-05-23 RX ADMIN — SODIUM CHLORIDE 1 G: 1 TABLET ORAL at 15:41

## 2023-05-23 RX ADMIN — ENOXAPARIN SODIUM 40 MG: 40 INJECTION SUBCUTANEOUS at 09:45

## 2023-05-23 RX ADMIN — LEVETIRACETAM 750 MG: 750 TABLET, FILM COATED ORAL at 20:28

## 2023-05-23 RX ADMIN — LEVETIRACETAM 750 MG: 750 TABLET, FILM COATED ORAL at 09:45

## 2023-05-23 NOTE — PHYSICAL THERAPY NOTE
Physical Therapy Progress Note     05/23/23 1240   PT Last Visit   PT Visit Date 05/23/23   Note Type   Note Type Treatment   Pain Assessment   Pain Assessment Tool 0-10   Pain Score No Pain   Restrictions/Precautions   Other Precautions Pain; Fall Risk;Bed Alarm; Chair Alarm;Cognitive; Impulsive   Subjective   Subjective The patient continues to have aphasia  He is amenable to work with therapy  Bed Mobility   Supine to Sit 4  Minimal assistance   Additional items Assist x 1; Impulsive   Sit to Supine 4  Minimal assistance   Additional items Assist x 1; Impulsive   Transfers   Sit to Stand 4  Minimal assistance   Additional items Assist x 1; Increased time required;Verbal cues; Impulsive   Stand to Sit 4  Minimal assistance   Additional items Assist x 1; Increased time required;Verbal cues   Ambulation/Elevation   Gait pattern Excessively slow; Step to;Short stride; Inconsistent benitez;Decreased foot clearance; Improper Weight shift;R Hemiparesis   Gait Assistance 3  Moderate assist   Additional items Assist x 1;Verbal cues; Tactile cues   Assistive Device Rolling walker   Distance 10 feet, 5 feet x 2, 15 feet x2  Balance   Static Sitting Fair   Dynamic Sitting Fair -   Static Standing Poor +   Dynamic Standing Poor   Ambulatory Poor   Activity Tolerance   Activity Tolerance Patient tolerated treatment well;Patient limited by fatigue   Nurse Made Aware Yes  Assessment   Prognosis Good   Problem List Decreased strength;Decreased endurance; Impaired balance;Decreased mobility; Decreased coordination;Decreased cognition; Impaired judgement;Decreased safety awareness   Assessment The patient continues to demonstrate some impulsivity, but he is redirectable  His aphasia is an obvious source of frustration for him, but with repetition he does follow commands  Today he was able to ambulate short distances with frequent standing rests  His RLE does fatigue with stance and ambulation, and occasional blocking is required   The patient will benefit from continued therapy in order to maximize his functional mobility and independence  Barriers to Discharge Inaccessible home environment;Decreased caregiver support   Goals   Patient Goals Pt  aphasic  LTG Expiration Date 05/31/23   PT Treatment Day 2   Plan   Treatment/Interventions Functional transfer training; Therapeutic exercise; Endurance training;Cognitive reorientation;Patient/family training;LE strengthening/ROM; Bed mobility;Gait training   Progress Progressing toward goals   PT Frequency 3-5x/wk   Recommendation   PT Discharge Recommendation Post acute rehabilitation services   AM-PAC Basic Mobility Inpatient   Turning in Flat Bed Without Bedrails 3   Lying on Back to Sitting on Edge of Flat Bed Without Bedrails 3   Moving Bed to Chair 3   Standing Up From Chair Using Arms 2   Walk in Room 2   Climb 3-5 Stairs With Railing 1   Basic Mobility Inpatient Raw Score 14   Basic Mobility Standardized Score 35 55   Highest Level Of Mobility   -HLM Goal 4: Move to chair/commode   JH-HLM Achieved 7: Walk 25 feet or more         An AM-PAC Basic Mobility raw score less than 16 suggests the patient may benefit from discharge to post-acute rehab services      Olivia Martinez, PTA

## 2023-05-23 NOTE — CASE MANAGEMENT
Case Management Discharge Planning Note    Patient name Kelly Has  Location [facility]-91 MRN [id number]  : 1980 Date 2023       Current Admission Date: 5/15/2023  Current Admission Diagnosis:L MCA stroke w/ LM1 partial occlusion   Patient Active Problem List    Diagnosis Date Noted   • PFO (patent foramen ovale) 2023   • Seizure ([address] ) 2023   • COVID 2023   • L MCA stroke w/ LM1 partial occlusion 05/15/2023   • Positive self-administered antigen test for COVID-19 02/10/2023   • Viral infection, unspecified 11/10/2022   • Bilateral low back pain without sciatica 2020      LOS (days): 8  Geometric Mean LOS (GMLOS) (days): 7 30  Days to GMLOS:-0 4     OBJECTIVE:  Risk of Unplanned Readmission Score: 10 45         Current admission status: Inpatient   Preferred Pharmacy:   [address]  [address]  [facility]  Phone: [phone] Fax: [facility], [address] [place] [address] [address]  [address] [place] [id number]  Phone: [phone] Fax: [phone]    Primary Care Provider: Bran Siddiqui DO    Primary Insurance: [facility]  Secondary Insurance:     DISCHARGE DETAILS:              Additional Comments: CM is aware the pt transferred from ICU to  on   LOS upon service transfer is seven days  Previous CM documentation was reviewed and is appreciated  PMR evaluated the pt on  and IRF admission was recommended  Disposition planning was initiated during the pt's ICU stay and IRF referrals forwarded via [facility]  FOC was explored during ICU stay and [facility] is the preferred provider for acute rehab  [facility] was reserved in [facility] on this date  CM will confer with family medicine re: A/P and OLIVE  Insurance authorization from Mike Carpenter will be needed to facilitate IRF admission  CM will continue to follow

## 2023-05-23 NOTE — PLAN OF CARE
Problem: MOBILITY - ADULT  Goal: Maintain or return to baseline ADL function  Description: INTERVENTIONS:  -  Assess patient's ability to carry out ADLs; assess patient's baseline for ADL function and identify physical deficits which impact ability to perform ADLs (bathing, care of mouth/teeth, toileting, grooming, dressing, etc )  - Assess/evaluate cause of self-care deficits   - Assess range of motion  - Assess patient's mobility; develop plan if impaired  - Assess patient's need for assistive devices and provide as appropriate  - Encourage maximum independence but intervene and supervise when necessary  - Involve family in performance of ADLs  - Assess for home care needs following discharge   - Consider OT consult to assist with ADL evaluation and planning for discharge  - Provide patient education as appropriate  Outcome: Progressing  Goal: Maintains/Returns to pre admission functional level  Description: INTERVENTIONS:  - Perform BMAT or MOVE assessment daily    - Set and communicate daily mobility goal to care team and patient/family/caregiver  - Collaborate with rehabilitation services on mobility goals if consulted  - Perform Range of Motion 2 times a day  - Reposition patient every 2 hours    - Dangle patient 2 times a day  - Stand patient 2 times a day  - Ambulate patient 2 times a day  - Out of bed to chair 2 times a day   - Out of bed for meals 2 times a day  - Out of bed for toileting  - Record patient progress and toleration of activity level   Outcome: Progressing     Problem: Prexisting or High Potential for Compromised Skin Integrity  Goal: Skin integrity is maintained or improved  Description: INTERVENTIONS:  - Identify patients at risk for skin breakdown  - Assess and monitor skin integrity  - Assess and monitor nutrition and hydration status  - Monitor labs   - Assess for incontinence   - Turn and reposition patient  - Assist with mobility/ambulation  - Relieve pressure over bony prominences  - Avoid friction and shearing  - Provide appropriate hygiene as needed including keeping skin clean and dry  - Evaluate need for skin moisturizer/barrier cream  - Collaborate with interdisciplinary team   - Patient/family teaching  - Consider wound care consult   Outcome: Progressing     Problem: Neurological Deficit  Goal: Neurological status is stable or improving  Description: Interventions:  - Monitor and assess patient's level of consciousness, motor function, sensory function, and level of assistance needed for ADLs  - Monitor and report changes from baseline  Collaborate with interdisciplinary team to initiate plan and implement interventions as ordered  - Provide and maintain a safe environment  - Consider seizure precautions  - Consider fall precautions  - Consider aspiration precautions  - Consider bleeding precautions  Outcome: Progressing     Problem: Activity Intolerance/Impaired Mobility  Goal: Mobility/activity is maintained at optimum level for patient  Description: Interventions:  - Assess and monitor patient  barriers to mobility and need for assistive/adaptive devices  - Assess patient's emotional response to limitations  - Collaborate with interdisciplinary team and initiate plans and interventions as ordered  - Encourage independent activity per ability   - Maintain proper body alignment  - Perform active/passive rom as tolerated/ordered  - Plan activities to conserve energy   - Turn patient as appropriate  Outcome: Progressing     Problem: Communication Impairment  Goal: Ability to express needs and understand communication  Description: Assess patient's communication skills and ability to understand information  Patient will demonstrate use of effective communication techniques, alternative methods of communication and understanding even if not able to speak  - Encourage communication and provide alternate methods of communication as needed    - Collaborate with case management/ for discharge needs  - Include patient/family/caregiver in decisions related to communication  Outcome: Progressing     Problem: Potential for Aspiration  Goal: Non-ventilated patient's risk of aspiration is minimized  Description: Assess and monitor vital signs, respiratory status, and labs (WBC)  Monitor for signs of aspiration (tachypnea, cough, rales, wheezing, cyanosis, fever)  - Assess and monitor patient's ability to swallow  - Place patient up in chair to eat if possible  - HOB up at 90 degrees to eat if unable to get patient up into chair   - Supervise patient during oral intake  - Instruct patient/ family to take small bites  - Instruct patient/ family to take small single sips when taking liquids  - Follow patient-specific strategies generated by speech pathologist   Outcome: Progressing  Goal: Ventilated patient's risk of aspiration is minimized  Description: Assess and monitor vital signs, respiratory status, airway cuff pressure, and labs (WBC)  Monitor for signs of aspiration (tachypnea, cough, rales, wheezing, cyanosis, fever)  - Elevate head of bed 30 degrees if patient has tube feeding   - Monitor tube feeding  Outcome: Progressing     Problem: Nutrition  Goal: Nutrition/Hydration status is improving  Description: Monitor and assess patient's nutrition/hydration status for malnutrition (ex- brittle hair, bruises, dry skin, pale skin and conjunctiva, muscle wasting, smooth red tongue, and disorientation)  Collaborate with interdisciplinary team and initiate plan and interventions as ordered  Monitor patient's weight and dietary intake as ordered or per policy  Utilize nutrition screening tool and intervene per policy  Determine patient's food preferences and provide high-protein, high-caloric foods as appropriate  - Assist patient with eating   - Allow adequate time for meals   - Encourage patient to take dietary supplement as ordered    - Collaborate with interdisciplinary team    - Include patient/family/caregiver in decisions related to nutrition  Outcome: Progressing     Problem: Potential for Falls  Goal: Patient will remain free of falls  Description: INTERVENTIONS:  - Educate patient/family on patient safety including physical limitations  - Instruct patient to call for assistance with activity   - Consult OT/PT to assist with strengthening/mobility   - Keep Call bell within reach  - Keep bed low and locked with side rails adjusted as appropriate  - Keep care items and personal belongings within reach  - Initiate and maintain comfort rounds  - Make Fall Risk Sign visible to staff  - Offer Toileting every 2 Hours, in advance of need  - Initiate/Maintain bed alarm  - Apply yellow socks and bracelet for high fall risk patients  - Consider moving patient to room near nurses station  Outcome: Progressing     Problem: Nutrition/Hydration-ADULT  Goal: Nutrient/Hydration intake appropriate for improving, restoring or maintaining nutritional needs  Description: Monitor and assess patient's nutrition/hydration status for malnutrition  Collaborate with interdisciplinary team and initiate plan and interventions as ordered  Monitor patient's weight and dietary intake as ordered or per policy  Utilize nutrition screening tool and intervene as necessary  Determine patient's food preferences and provide high-protein, high-caloric foods as appropriate       INTERVENTIONS:  - Monitor oral intake, urinary output, labs, and treatment plans  - Assess nutrition and hydration status and recommend course of action  - Evaluate amount of meals eaten  - Assist patient with eating if necessary   - Allow adequate time for meals  - Recommend/ encourage appropriate diets, oral nutritional supplements, and vitamin/mineral supplements  - Order, calculate, and assess calorie counts as needed  - Recommend, monitor, and adjust tube feedings and TPN/PPN based on assessed needs  - Assess need for intravenous fluids  - Provide specific nutrition/hydration education as appropriate  - Include patient/family/caregiver in decisions related to nutrition  Outcome: Progressing

## 2023-05-23 NOTE — PROGRESS NOTES
PROGRESS NOTE - [address] 1980, 40 y o  male  MRN: [id number]    Unit/Bed#: [facility] 727-01 Encounter: [id number]  Primary Care Provider: Bran Siddiqui DO      Admission Date: 5/15/2023  Length of Stay: 8 days  Code Status:  Level 1 - Full Code  Diet: Diet Regular; Vegetarian  Consult:   IP CONSULT TO NEUROLOGY  IP CONSULT TO NEUROSURGERY  IP CONSULT TO CASE MANAGEMENT  IP CONSULT TO NEUROLOGY  IP CONSULT TO PHYSICAL MEDICINE REHAB  IP CONSULT TO NUTRITION SERVICES  IP CONSULT TO CASE MANAGEMENT  IP CONSULT TO INTREVENTIONAL CARDIOLOGY  IP CONSULT TO NUTRITION SERVICES      Assessment/Plan :     Discussed with [facility] team and finalization is pending attending physician attestation  * L MCA stroke w/ LM1 partial occlusion  Assessment & Plan  Patient presented as a stroke alert 5/15 with right-sided hand neglect, facial droop, RUE flaccid paralysis and expressive aphasia  NIHSS score 16  CTH showed Acute ischemic left MCA stroke, status post thrombectomy 5/15 with TICI 3 revascularization  Imaging:   [facility] 5/15: Late acute to early subacute left MCA territory infarct  No hemorrhagic conversion  CTA head/neck 5/15: Acute occlusion of the proximal left M1 segment with partial opacification of distal M2 and M3 branches  CTH 5/15: New extensive left hemispheric intraparenchymal and subarachnoid hemorrhage with mass effect upon the adjacent brain parenchyma and associated cerebral edema predominantly in the left MCA vascular distribution  No significant midline shift or evidence of transtentorial herniation noted  [facility] 5/16: Grossly stable recent large left MCA territory infarct with regional mass effect without midline shift  No acute hemorrhage  MRI Brain w/ contrast 5/17/23: Recent large left MCA territory infarct with associated edema and a few foci of petechial hemorrhage  There is regional mass effect without midline shift   Cannot reliably evaluate the infarcted brain for structural abnormality  No structural abnormality is identified in the remainder brain  Normal hippocampal formations  [address] 5/19: Continued evolution of left MCA territory infarct as above with stable mass effect  No significant midline shift  Question small volume petechial hemorrhage left inferior parietal and temporal region  No billie parenchymal hemorrhage  CT CAP: No evidence of malignancy to account for hypercoagulable state  ALYSSA- EF 55%, large patent PFO with bidirectional flow with saline contrast injection  Left atrial appendate with wind sock appearance with no thrombus  Assessment:  Young 40 YOM with no significant PMH presented for stroke  Possibly due to post-COVID hypercoagulable state from infection back in February  Ruled out occult malignancy, does have large and patent PFO but negative for DVT  Hypercoagulable workup in progress    Plan:  Neurosurgery signed off  Neurology, appreciate recs  Maintain -150, normotension  Maintain normal Na  Qshift neuro checks   Intervention cardiology consulted for PFO - outpatient follow-up   Continue ASA 81mg daily, DVT ppx  FU hypercoagulable workup  Homocystine normal  BELEM normal  RF normal  ANCA negative  dsDNA negative  MTHFR pending  Factor V Leiden negative  Lupus anticoagulant negative  Thrombosis panel  Antithrombin III 81, likely this is not clinically relevant as it is higher than 80%  Protein S decreased at 65%  Protein C normal  Repeat thrombosis panel in 3 months recommended  Cannot rule out that February 2023 COVID infection causing hypercoagulable state      PFO (patent foramen ovale)  Assessment & Plan  5/18 ALYSSA- EF 55%, large patent PFO with bidirectional flow with saline contrast injection  Left atrial appendate with wind sock appearance with no thrombus     Interventional cardiology consulted - outpatient follow-up in 4-6 weeks     [facility] 5/15, asymptomatic   Recent COVID infection in February  Discussed with infection control, no need for contact precautions   Monitor respiratory status     Seizure [facility])  Assessment & Plan  Seizure noted on 5/16 - eye deviation to right followed by postictal state  Stat CTH no acute change, evolving known left MCA infarct  cVEEG with no further seizure activities  Plan:  Continue Keppra 750 mg through 5/25      Principal Problem:    L MCA stroke w/ LM1 partial occlusion  Active Problems:    Seizure (HCC)    COVID    PFO (patent foramen ovale)      VTE Pharm PPX: Enoxaparin (Lovenox)  VTE Mech PPX: sequential compression device and/or foot pump applied unless otherwise contraindicated       Hospital Course & 24hr events:       Overnight/24hr events:  Patient without acute events overnight per sign-out  No concern or report per nursing staff  Patient seen and examined at bedside and without questions or concerns  Subjective  & Review of Systems:     Review of Systems   Constitutional: Negative for chills and fever  HENT: Negative for ear pain and sore throat  Eyes: Negative for pain and visual disturbance  Respiratory: Negative for cough and shortness of breath  Cardiovascular: Negative for chest pain and palpitations  Gastrointestinal: Negative for abdominal pain and vomiting  Genitourinary: Negative for dysuria and hematuria  Musculoskeletal: Negative for arthralgias and back pain  Skin: Negative for color change and rash  Neurological: Positive for speech difficulty and weakness  Negative for seizures and syncope  All other systems reviewed and are negative          Objective :     Vitals:    Vitals:    05/22/23 2153 05/23/23 0553 05/23/23 0719 05/23/23 1106   BP: 133/84  135/84 122/75   BP Location:       Pulse: (!) 54  (!) 54 67   Resp:   16 16   Temp: 98 3 °F (36 8 °C)  97 9 °F (36 6 °C) 97 7 °F (36 5 °C)   TempSrc:       SpO2: 96%  97% 98%   Weight:  76 kg (167 lb 8 8 oz)     Height:         Temp:  [97 7 °F (36 5 °C)-98 6 °F (37 °C)] 97 7 °F (36 5 °C)  HR:  [54-74] 67  Resp:  [16-22] 16  BP: (121-135)/(67-84) 122/75  Weight (last 2 days)     Date/Time Weight    05/23/23 0553 76 (167 55)    05/21/23 0600 74 8 (164 93)          Intake/Output Summary (Last 24 hours) at 5/23/2023 1332  Last data filed at 5/23/2023 0900  Gross per 24 hour   Intake 360 ml   Output 4250 ml   Net -3890 ml     Invasive Devices     Peripheral Intravenous Line  Duration           Peripheral IV 05/23/23 Left;Ventral (anterior) Forearm <1 day                Labs: I have personally reviewed pertinent reports        Results from last 7 days   Lab Units 05/23/23  0602 05/22/23  0452 05/21/23  0542 05/20/23  0435 05/19/23  0629 05/18/23  0536 05/17/23  0523   WBC Thousand/uL 5 30 4 67 4 01* 5 37 5 54 5 56 5 47   HEMOGLOBIN g/dL 12 2 10 7* 10 1* 10 1* 10 2* 10 2* 10 7*   HEMATOCRIT % 33 8* 29 9* 29 5* 30 1* 29 9* 30 1* 31 3*   PLATELETS Thousands/uL 269 199 172 156 148* 141* 149   NEUTROS ABS Thousands/µL 2 82 2 56 2 34 3 90 3 47 3 70 3 89       Results from last 7 days   Lab Units 05/23/23  0602 05/22/23  0452 05/21/23  0543 05/21/23  0542 05/21/23  0245 05/20/23  1917 05/20/23  1320 05/20/23  0436 05/20/23  0435 05/19/23  1233 05/19/23  0629 05/18/23  1215 05/18/23  0535 05/17/23  1201 05/17/23  0523   POTASSIUM mmol/L 3 5 3 6 3 6  --  3 6 3 3* 3 6 3 5  --    < > 3 4*   < > 4 1   < > 3 6   CHLORIDE mmol/L 109* 111* 119*  --  120* 120* 120* 120*  --    < > 122*   < > 123*   < > 121*   CO2 mmol/L 30 29 26  --  27 24 24 22  --    < > 24   < > 23   < > 25   BUN mg/dL 7 4* 3*  --  3* 3* 3* 3*  --    < > 5   < > 4*   < > 6   CREATININE mg/dL 0 62 0 57* 0 50*  --  0 46* 0 48* 0 55* 0 54*  --    < > 0 55*   < > 0 50*   < > 0 54*   CALCIUM mg/dL 8 6 8 1* 7 8*  --  7 7* 8 1* 7 8* 7 9*  --    < > 7 5*   < > 7 8*   < > 8 4   AST U/L  --   --   --   --   --   --   --   --   --   --   --   --   --   --  38   ALT U/L  --   --   --   --   --   --   --   --   --   --   -- --   --   --  26   ALK PHOS U/L  --   --   --   --   --   --   --   --   --   --   --   --   --   --  44*   EGFR ml/min/1 73sq m 123 127 134  --  139 136 129 130  --    < > 129   < > 134   < > 130   MAGNESIUM mg/dL  --  2 0  --  1 8  --   --   --   --  2 0  --  1 8  --  2 0  --  2 2   PHOSPHORUS mg/dL  --  4 4  --  3 7  --   --   --   --  2 4*  --  2 4*  --  1 5*  --  2 4*    < > = values in this interval not displayed  EKG, Pathology, Imaging, and Other Studies:          XR ankle 3+ vw right    Result Date: 5/17/2023  Impression: No acute osseous abnormality  Workstation performed: [id number]     CT head wo contrast    Result Date: 5/19/2023  Impression: Continued evolution of left MCA territory infarct as above with stable mass effect  No significant midline shift Question small volume petechial hemorrhage left inferior parietal and temporal region  No billie parenchymal hemorrhage  Continued short-term interval follow-up advised  Study was marked in [facility] for immediate notification  Workstation performed: [id number]     CT head wo contrast    Result Date: 5/17/2023  Impression: Grossly stable recent large left MCA territory infarct with regional mass effect without midline shift  No acute hemorrhage  Workstation performed: [id number]     CT head wo contrast    Result Date: 5/16/2023  Impression: Evolving left MCA infarct with increased cortical hypodensity and stable sulcal effacement/mass effect throughout the left lobe  No acute intracranial hemorrhage or midline shift  Workstation performed: [id number]     CT head wo contrast    Result Date: 5/16/2023  Impression: Compared to the most immediate prior study, significant interval decrease/near total resolution of the previously noted extensive left hemispheric hyperdensity involving the cortex and subcortical white matter and left basal ganglia  Minimal residual hyperdensity in the left basal ganglia region   Overall these findings suggest resolving contrast staining post endovascular therapy although small amount of superimposed hemorrhage is difficult to entirely exclude  Recommend short-term interval follow-up  scan and/or further evaluation with MRI for definitive evaluation  Stable left hemispheric edema and sulcal effacement without significant midline shift  Above findings discussed with Dr Sidney Caldwell at 4:30 a m  on 5/16/2023  Workstation performed: [id number]     CT head wo contrast    Result Date: 5/15/2023  Impression: New extensive left hemispheric intraparenchymal and subarachnoid hemorrhage with mass effect upon the adjacent brain parenchyma and associated cerebral edema predominantly in the left MCA vascular distribution  No significant midline shift or evidence of  transtentorial herniation noted  Findings discussed with Dr Magdalena Mena at 10:30 p m  Workstation performed: [id number]     CT chest abdomen pelvis w contrast    Result Date: 5/20/2023  Impression: No evidence of malignancy to account for hypercoagulable state  Moderate bilateral pleural effusions, small amount of ascites, and diffuse body wall subcutaneous edema  Workstation performed: [id number]     CT stroke alert brain    Result Date: 5/15/2023  Impression: Late acute to early subacute left MCA territory infarct  No hemorrhagic conversion  Findings were directly discussed with French Goodman at  7:10 PM    Workstation performed: [id number]     CT cerebral perfusion    Result Date: 5/15/2023  Impression: CT perfusion performed  Data available on PACS  Workstation performed: [id number]     XR chest portable ICU    Result Date: 5/16/2023  Impression: No acute cardiopulmonary disease  Right jugular catheter in lower SVC with no pneumothorax  Workstation performed: [id number]     MRI brain seizure wo and w contrast    Addendum Date: 5/17/2023    ADDENDUM: Correcting comparison study: Head CT 5/16/2023  Result Date: 5/17/2023  Impression: 1    Recent large left MCA territory infarct with associated edema and a few foci of petechial hemorrhage  There is regional mass effect without midline shift  2   Cannot reliably evaluate the infarcted brain for structural abnormality  No structural abnormality is identified in the remainder brain  Normal hippocampal formations  Workstation performed: [id number]     CTA stroke alert (head/neck)    Result Date: 5/15/2023  Impression: 1  Acute occlusion of the proximal left M1 segment with partial opacification of distal M2 and M3 branches  2  No evidence of thrombus, stenosis or occlusion of the carotid or vertebral arteries  Findings were directly discussed with Zacarias Merritt at  7:14 PM    Workstation performed: [id number]         Inpatient medications:     Current Facility-Administered Medications   Medication Dose Route Frequency   • acetaminophen (TYLENOL) tablet 975 mg  975 mg Oral Q8H PRN   • aspirin chewable tablet 81 mg  81 mg Oral Daily   • chlorhexidine (PERIDEX) 0 12 % oral rinse 15 mL  15 mL Mouth/Throat Q12H [address]   • enoxaparin (LOVENOX) subcutaneous injection 40 mg  40 mg Subcutaneous Q24H [place]   • hydrALAZINE (APRESOLINE) injection 10 mg  10 mg Intravenous Q6H PRN   • labetalol (NORMODYNE) injection 10 mg  10 mg Intravenous Q4H PRN   • levETIRAcetam (KEPPRA) tablet 750 mg  750 mg Oral Q12H [address]   • nicotine (NICODERM CQ) 14 mg/24hr TD 24 hr patch 1 patch  1 patch Transdermal Daily   • sodium chloride tablet 1 g  1 g Oral TID With Meals         Physical Exam :     Physical Exam  Vitals reviewed  Constitutional:       General: He is not in acute distress  Appearance: Normal appearance  HENT:      Head: Normocephalic and atraumatic  Nose: Nose normal    Eyes:      Extraocular Movements: Extraocular movements intact  Conjunctiva/sclera: Conjunctivae normal    Cardiovascular:      Rate and Rhythm: Normal rate and regular rhythm  Heart sounds: No murmur heard    Pulmonary:      Effort: Pulmonary effort is normal  No respiratory distress  Breath sounds: Normal breath sounds  Abdominal:      General: Bowel sounds are normal       Palpations: Abdomen is soft  Tenderness: There is no abdominal tenderness  Musculoskeletal:         General: No tenderness  Cervical back: Normal range of motion and neck supple  Right lower leg: No edema  Left lower leg: No edema  Skin:     General: Skin is warm  Neurological:      Mental Status: He is alert and oriented to person, place, and time  Mental status is at baseline  Cranial Nerves: No cranial nerve deficit  Sensory: No sensory deficit  Motor: Weakness present        Gait: Gait abnormal       Comments: 4/5 strength in RUE, RLE  5/5 strength in LUE, LLE  Expressive aphasia   Psychiatric:         Mood and Affect: Mood normal          Behavior: Behavior normal                Pineda Feldman MD  PGY-2, Family Medicine  05/23/23  1:32 PM

## 2023-05-23 NOTE — RESTORATIVE TECHNICIAN NOTE
Restorative Technician Note      Patient Name: Reola Patches     Note Type: Mobility  Patient Position Upon Consult: Supine  Activity Performed: Ambulated; [id number]; Stood  Assistive Device: Other (Comment) (Assist x1)  Education Provided: Yes  Patient Position at End of Consult: Bedside chair;  All needs within reach; Bed/Chair alarm activated    Lesa JOHNS, Restorative Technician, [employer]

## 2023-05-23 NOTE — PLAN OF CARE
Problem: PHYSICAL THERAPY ADULT  Goal: Performs mobility at highest level of function for planned discharge setting  See evaluation for individualized goals  Description: Treatment/Interventions: Functional transfer training, LE strengthening/ROM, Therapeutic exercise, Endurance training, Patient/family training, Equipment eval/education, Bed mobility, Gait training, OT, Spoke to nursing          See flowsheet documentation for full assessment, interventions and recommendations  5/23/2023 1702 by Enoc Moss PTA  Outcome: Progressing  Note: Prognosis: Good  Problem List: Decreased strength, Decreased endurance, Impaired balance, Decreased mobility, Decreased coordination, Decreased cognition, Impaired judgement, Decreased safety awareness  Assessment: The patient continues to demonstrate some impulsivity, but he is redirectable  His aphasia is an obvious source of frustration for him, but with repetition he does follow commands  Today he was able to ambulate short distances with frequent standing rests  His RLE does fatigue with stance and ambulation, and occasional blocking is required  The patient will benefit from continued therapy in order to maximize his functional mobility and independence  Barriers to Discharge: Inaccessible home environment, Decreased caregiver support     PT Discharge Recommendation: Post acute rehabilitation services    See flowsheet documentation for full assessment  5/23/2023 1702 by Enoc Moss PTA  Outcome: Progressing  Note: Prognosis: Good  Problem List: Decreased strength, Decreased endurance, Impaired balance, Decreased mobility, Decreased coordination, Decreased cognition, Impaired judgement, Decreased safety awareness  Assessment: The patient continues to demonstrate some impulsivity, but he is redirectable  His aphasia is an obvious source of frustration for him, but with repetition he does follow commands   Today he was able to ambulate short distances with frequent standing rests  His RLE does fatigue with stance and ambulation, and occasional blocking is required  The patient will benefit from continued therapy in order to maximize his functional mobility and independence  Barriers to Discharge: Inaccessible home environment, Decreased caregiver support     PT Discharge Recommendation: Post acute rehabilitation services    See flowsheet documentation for full assessment

## 2023-05-24 LAB
ANION GAP SERPL CALCULATED.3IONS-SCNC: 0 MMOL/L (ref 4–13)
BASOPHILS # BLD AUTO: 0.06 THOUSANDS/ÂΜL (ref 0–0.1)
BASOPHILS NFR BLD AUTO: 1 % (ref 0–1)
BUN SERPL-MCNC: 10 MG/DL (ref 5–25)
CALCIUM SERPL-MCNC: 8.7 MG/DL (ref 8.3–10.1)
CHLORIDE SERPL-SCNC: 108 MMOL/L (ref 96–108)
CO2 SERPL-SCNC: 30 MMOL/L (ref 21–32)
CREAT SERPL-MCNC: 0.77 MG/DL (ref 0.6–1.3)
EOSINOPHIL # BLD AUTO: 0.4 THOUSAND/ÂΜL (ref 0–0.61)
EOSINOPHIL NFR BLD AUTO: 8 % (ref 0–6)
ERYTHROCYTE [DISTWIDTH] IN BLOOD BY AUTOMATED COUNT: 12.1 % (ref 11.6–15.1)
GFR SERPL CREATININE-BSD FRML MDRD: 112 ML/MIN/1.73SQ M
GLUCOSE SERPL-MCNC: 83 MG/DL (ref 65–140)
HCT VFR BLD AUTO: 36.7 % (ref 36.5–49.3)
HGB BLD-MCNC: 13 G/DL (ref 12–17)
IMM GRANULOCYTES # BLD AUTO: 0.05 THOUSAND/UL (ref 0–0.2)
IMM GRANULOCYTES NFR BLD AUTO: 1 % (ref 0–2)
LYMPHOCYTES # BLD AUTO: 1.88 THOUSANDS/ÂΜL (ref 0.6–4.47)
LYMPHOCYTES NFR BLD AUTO: 37 % (ref 14–44)
MCH RBC QN AUTO: 33.6 PG (ref 26.8–34.3)
MCHC RBC AUTO-ENTMCNC: 35.4 G/DL (ref 31.4–37.4)
MCV RBC AUTO: 95 FL (ref 82–98)
MONOCYTES # BLD AUTO: 0.61 THOUSAND/ÂΜL (ref 0.17–1.22)
MONOCYTES NFR BLD AUTO: 12 % (ref 4–12)
NEUTROPHILS # BLD AUTO: 2.09 THOUSANDS/ÂΜL (ref 1.85–7.62)
NEUTS SEG NFR BLD AUTO: 41 % (ref 43–75)
NRBC BLD AUTO-RTO: 0 /100 WBCS
PLATELET # BLD AUTO: 282 THOUSANDS/UL (ref 149–390)
PMV BLD AUTO: 9.4 FL (ref 8.9–12.7)
POTASSIUM SERPL-SCNC: 3.9 MMOL/L (ref 3.5–5.3)
RBC # BLD AUTO: 3.87 MILLION/UL (ref 3.88–5.62)
SARS-COV-2 RNA RESP QL NAA+PROBE: NEGATIVE
SODIUM SERPL-SCNC: 138 MMOL/L (ref 135–147)
WBC # BLD AUTO: 5.09 THOUSAND/UL (ref 4.31–10.16)

## 2023-05-24 RX ADMIN — ENOXAPARIN SODIUM 40 MG: 40 INJECTION SUBCUTANEOUS at 08:21

## 2023-05-24 RX ADMIN — SODIUM CHLORIDE 1 G: 1 TABLET ORAL at 16:08

## 2023-05-24 RX ADMIN — CHLORHEXIDINE GLUCONATE 0.12% ORAL RINSE 15 ML: 1.2 LIQUID ORAL at 08:21

## 2023-05-24 RX ADMIN — ASPIRIN 81 MG CHEWABLE TABLET 81 MG: 81 TABLET CHEWABLE at 08:21

## 2023-05-24 RX ADMIN — SODIUM CHLORIDE 1 G: 1 TABLET ORAL at 12:00

## 2023-05-24 RX ADMIN — SODIUM CHLORIDE 1 G: 1 TABLET ORAL at 08:21

## 2023-05-24 RX ADMIN — LEVETIRACETAM 750 MG: 750 TABLET, FILM COATED ORAL at 20:29

## 2023-05-24 RX ADMIN — LEVETIRACETAM 750 MG: 750 TABLET, FILM COATED ORAL at 08:21

## 2023-05-24 RX ADMIN — CHLORHEXIDINE GLUCONATE 0.12% ORAL RINSE 15 ML: 1.2 LIQUID ORAL at 20:29

## 2023-05-24 NOTE — PROGRESS NOTES
PROGRESS NOTE - [address] XIHA 1980, 40 y o  male  MRN: [id number]    Unit/Bed#: [facility] 727-01 Encounter: [id number]  Primary Care Provider: Parvez Barr DO      Admission Date: 5/15/2023  Length of Stay: 9 days  Code Status:  Level 1 - Full Code  Diet: Diet Regular; Vegetarian  Consult:   IP CONSULT TO NEUROLOGY  IP CONSULT TO NEUROSURGERY  IP CONSULT TO CASE MANAGEMENT  IP CONSULT TO NEUROLOGY  IP CONSULT TO PHYSICAL MEDICINE REHAB  IP CONSULT TO NUTRITION SERVICES  IP CONSULT TO CASE MANAGEMENT  IP CONSULT TO INTREVENTIONAL CARDIOLOGY  IP CONSULT TO NUTRITION SERVICES      Assessment/Plan :     Discussed with [facility] team and finalization is pending attending physician attestation  * L MCA stroke w/ LM1 partial occlusion  Assessment & Plan  Patient presented as a stroke alert 5/15 with right-sided hand neglect, facial droop, RUE flaccid paralysis and expressive aphasia  NIHSS score 16  CTH showed Acute ischemic left MCA stroke, status post thrombectomy 5/15 with TICI 3 revascularization  Imaging:   · [facility] 5/15: Late acute to early subacute left MCA territory infarct  No hemorrhagic conversion  · CTA head/neck 5/15: Acute occlusion of the proximal left M1 segment with partial opacification of distal M2 and M3 branches  · [facility] 5/15: New extensive left hemispheric intraparenchymal and subarachnoid hemorrhage with mass effect upon the adjacent brain parenchyma and associated cerebral edema predominantly in the left MCA vascular distribution  No significant midline shift or evidence of transtentorial herniation noted  · [facility] 5/16: Grossly stable recent large left MCA territory infarct with regional mass effect without midline shift  No acute hemorrhage  · MRI Brain w/ contrast 5/17/23: Recent large left MCA territory infarct with associated edema and a few foci of petechial hemorrhage  There is regional mass effect without midline shift   Cannot reliably evaluate the infarcted brain for structural abnormality  No structural abnormality is identified in the remainder brain  Normal hippocampal formations  · [address] 5/19: Continued evolution of left MCA territory infarct as above with stable mass effect  No significant midline shift  Question small volume petechial hemorrhage left inferior parietal and temporal region  No billie parenchymal hemorrhage  · CT CAP: No evidence of malignancy to account for hypercoagulable state  · ALYSSA- EF 55%, large patent PFO with bidirectional flow with saline contrast injection  Left atrial appendate with wind sock appearance with no thrombus  Assessment:  Young 40 YOM with no significant PMH presented for stroke  Possibly due to post-COVID hypercoagulable state from infection back in February  Ruled out occult malignancy, does have large and patent PFO but negative for DVT  Hypercoagulable workup in progress    Plan:  · Neurosurgery signed off  · Neurology, appreciate recs  · Maintain -150, normotension  · Maintain normal Na  · Qshift neuro checks   · Intervention cardiology consulted for PFO - outpatient follow-up   · Continue ASA 81mg daily, DVT ppx  · FU hypercoagulable workup  - Homocystine normal  - BELEM normal  - RF normal  - ANCA negative  - dsDNA negative  - MTHFR pending  - Factor V Leiden negative  - Lupus anticoagulant negative  - Thrombosis panel  - Antithrombin III 81, likely this is not clinically relevant as it is higher than 80%  - Protein S decreased at 65%  - Protein C normal  - Repeat thrombosis panel in 3 months recommended  - Cannot rule out that February 2023 COVID infection causing hypercoagulable state      PFO (patent foramen ovale)  Assessment & Plan  5/18 ALYSSA- EF 55%, large patent PFO with bidirectional flow with saline contrast injection  Left atrial appendate with wind sock appearance with no thrombus     · Interventional cardiology consulted - outpatient follow-up in 4-6 weeks     COVID  Assessment & Plan  Positive COVID 5/15, asymptomatic   Recent COVID infection in February  Discussed with infection control, no need for contact precautions   Monitor respiratory status     Seizure [facility])  Assessment & Plan  Seizure noted on 5/16 - eye deviation to right followed by postictal state  · Stat CTH no acute change, evolving known left MCA infarct  · cVEEG with no further seizure activities  Plan:  · Continue Keppra 750 mg through 5/25      Principal Problem:    L MCA stroke w/ LM1 partial occlusion  Active Problems:    Seizure (HCC)    COVID    PFO (patent foramen ovale)      VTE Pharm PPX: Enoxaparin (Lovenox)  VTE Mech PPX: sequential compression device and/or foot pump applied unless otherwise contraindicated       Hospital Course & 24hr events:     Overnight/24hr events:  Patient without acute events overnight per sign-out  No concern or report per nursing staff  Patient seen and examined at bedside and without questions or concerns  Subjective  & Review of Systems:     Review of Systems   Constitutional: Negative for chills and fever  HENT: Negative for ear pain and sore throat  Eyes: Negative for pain and visual disturbance  Respiratory: Negative for cough and shortness of breath  Cardiovascular: Negative for chest pain and palpitations  Gastrointestinal: Negative for abdominal pain and vomiting  Genitourinary: Negative for dysuria and hematuria  Musculoskeletal: Negative for arthralgias and back pain  Skin: Negative for color change and rash  Neurological: Positive for weakness  Negative for seizures and syncope  All other systems reviewed and are negative            Objective :     Vitals:    Vitals:    05/23/23 1939 05/23/23 2219 05/24/23 0600 05/24/23 0736   BP: 115/69 114/74  116/74   Pulse: 57 (!) 54  (!) 53   Resp:    16   Temp:  98 2 °F (36 8 °C)  97 7 °F (36 5 °C)   TempSrc:       SpO2: 96% 95%  98%   Weight:   76 9 kg (169 lb 8 5 oz)    Height:         Temp:  [97 7 °F (36 5 °C)-98 5 °F (36 9 °C)] 97 7 °F (36 5 °C)  HR:  [53-57] 53  Resp:  [16] 16  BP: (114-116)/(69-74) 116/74  Weight (last 2 days)     Date/Time Weight    05/24/23 0600 76 9 (169 53)    05/23/23 0553 76 (167 55)          Intake/Output Summary (Last 24 hours) at 5/24/2023 1211  Last data filed at 5/24/2023 0736  Gross per 24 hour   Intake 238 ml   Output 800 ml   Net -562 ml     Invasive Devices     Peripheral Intravenous Line  Duration           Peripheral IV 05/23/23 Left;Ventral (anterior) Forearm 1 day                Labs: I have personally reviewed pertinent reports        Results from last 7 days   Lab Units 05/24/23  0544 05/23/23  0602 05/22/23  0452 05/21/23  0542 05/20/23  0435 05/19/23  0629 05/18/23  0536   HEMATOCRIT % 36 7 33 8* 29 9* 29 5* 30 1* 29 9* 30 1*   HEMOGLOBIN g/dL 13 0 12 2 10 7* 10 1* 10 1* 10 2* 10 2*   NEUTROS ABS Thousands/µL 2 09 2 82 2 56 2 34 3 90 3 47 3 70   PLATELETS Thousands/uL 282 269 199 172 156 148* 141*   WBC Thousand/uL 5 09 5 30 4 67 4 01* 5 37 5 54 5 56       Results from last 7 days   Lab Units 05/24/23  0544 05/23/23  0602 05/22/23  0452 05/21/23  0543 05/21/23  0542 05/21/23  0245 05/20/23  1917 05/20/23  1320 05/20/23  0436 05/20/23  0435 05/19/23  1233 05/19/23  0629 05/18/23  1215 05/18/23  0535   BUN mg/dL 10 7 4* 3*  --  3* 3* 3*   < >  --    < > 5   < > 4*   CALCIUM mg/dL 8 7 8 6 8 1* 7 8*  --  7 7* 8 1* 7 8*   < >  --    < > 7 5*   < > 7 8*   CHLORIDE mmol/L 108 109* 111* 119*  --  120* 120* 120*   < >  --    < > 122*   < > 123*   CO2 mmol/L 30 30 29 26  --  27 24 24   < >  --    < > 24   < > 23   CREATININE mg/dL 0 77 0 62 0 57* 0 50*  --  0 46* 0 48* 0 55*   < >  --    < > 0 55*   < > 0 50*   EGFR ml/min/1 73sq m 112 123 127 134  --  139 136 129   < >  --    < > 129   < > 134   POTASSIUM mmol/L 3 9 3 5 3 6 3 6  --  3 6 3 3* 3 6   < >  --    < > 3 4*   < > 4 1   MAGNESIUM mg/dL  --   --  2 0  --  1 8  --   --   --   --  2 0  --  1 8  --  2 0   PHOSPHORUS mg/dL --   --  4 4  --  3 7  --   --   --   --  2 4*  --  2 4*  --  1 5*    < > = values in this interval not displayed  EKG, Pathology, Imaging, and Other Studies:          CT chest abdomen pelvis w contrast    Result Date: 5/20/2023  Impression: No evidence of malignancy to account for hypercoagulable state  Moderate bilateral pleural effusions, small amount of ascites, and diffuse body wall subcutaneous edema  Workstation performed: [id number]     CT head wo contrast    Result Date: 5/19/2023  Impression: Continued evolution of left MCA territory infarct as above with stable mass effect  No significant midline shift Question small volume petechial hemorrhage left inferior parietal and temporal region  No billie parenchymal hemorrhage  Continued short-term interval follow-up advised  Study was marked in [facility] for immediate notification  Workstation performed: [id number]     XR ankle 3+ vw right    Result Date: 5/17/2023  Impression: No acute osseous abnormality  Workstation performed: [id number]     MRI brain seizure wo and w contrast    Addendum Date: 5/17/2023    ADDENDUM: Correcting comparison study: Head CT 5/16/2023  Result Date: 5/17/2023  Impression: 1  Recent large left MCA territory infarct with associated edema and a few foci of petechial hemorrhage  There is regional mass effect without midline shift  2   Cannot reliably evaluate the infarcted brain for structural abnormality  No structural abnormality is identified in the remainder brain  Normal hippocampal formations  Workstation performed: [id number]     CT head wo contrast    Result Date: 5/17/2023  Impression: Grossly stable recent large left MCA territory infarct with regional mass effect without midline shift  No acute hemorrhage  Workstation performed: [id number]     XR chest portable ICU    Result Date: 5/16/2023  Impression: No acute cardiopulmonary disease  Right jugular catheter in lower SVC with no pneumothorax   Workstation performed: [id number]     CT head wo contrast    Result Date: 5/16/2023  Impression: Evolving left MCA infarct with increased cortical hypodensity and stable sulcal effacement/mass effect throughout the left lobe  No acute intracranial hemorrhage or midline shift  Workstation performed: [id number]     CT head wo contrast    Result Date: 5/16/2023  Impression: Compared to the most immediate prior study, significant interval decrease/near total resolution of the previously noted extensive left hemispheric hyperdensity involving the cortex and subcortical white matter and left basal ganglia  Minimal residual hyperdensity in the left basal ganglia region  Overall these findings suggest resolving contrast staining post endovascular therapy although small amount of superimposed hemorrhage is difficult to entirely exclude  Recommend short-term interval follow-up  scan and/or further evaluation with MRI for definitive evaluation  Stable left hemispheric edema and sulcal effacement without significant midline shift  Above findings discussed with Dr Misha Acuña at 4:30 a m  on 5/16/2023  Workstation performed: [id number]     CT head wo contrast    Result Date: 5/15/2023  Impression: New extensive left hemispheric intraparenchymal and subarachnoid hemorrhage with mass effect upon the adjacent brain parenchyma and associated cerebral edema predominantly in the left MCA vascular distribution  No significant midline shift or evidence of  transtentorial herniation noted  Findings discussed with Dr Anahy Bush at 10:30 p m  Workstation performed: [id number]     CT cerebral perfusion    Result Date: 5/15/2023  Impression: CT perfusion performed  Data available on PACS  Workstation performed: [id number]     CT stroke alert brain    Result Date: 5/15/2023  Impression: Late acute to early subacute left MCA territory infarct  No hemorrhagic conversion   Findings were directly discussed with Sade Herrera at  7:10 PM    Workstation performed: [id number]     CTA stroke alert (head/neck)    Result Date: 5/15/2023  Impression: 1  Acute occlusion of the proximal left M1 segment with partial opacification of distal M2 and M3 branches  2  No evidence of thrombus, stenosis or occlusion of the carotid or vertebral arteries  Findings were directly discussed with Jesus Edwards at  7:14 PM    Workstation performed: [id number]         Inpatient medications:     Current Facility-Administered Medications   Medication Dose Route Frequency   • acetaminophen (TYLENOL) tablet 975 mg  975 mg Oral Q8H PRN   • aspirin chewable tablet 81 mg  81 mg Oral Daily   • chlorhexidine (PERIDEX) 0 12 % oral rinse 15 mL  15 mL Mouth/Throat Q12H [address]   • enoxaparin (LOVENOX) subcutaneous injection 40 mg  40 mg Subcutaneous Q24H BRANDON   • hydrALAZINE (APRESOLINE) injection 10 mg  10 mg Intravenous Q6H PRN   • labetalol (NORMODYNE) injection 10 mg  10 mg Intravenous Q4H PRN   • levETIRAcetam (KEPPRA) tablet 750 mg  750 mg Oral Q12H [address]   • nicotine (NICODERM CQ) 14 mg/24hr TD 24 hr patch 1 patch  1 patch Transdermal Daily   • sodium chloride tablet 1 g  1 g Oral TID With Meals         Physical Exam :     Physical Exam  Vitals reviewed  Constitutional:       General: He is not in acute distress  Appearance: Normal appearance  HENT:      Head: Normocephalic and atraumatic  Nose: Nose normal    Eyes:      Extraocular Movements: Extraocular movements intact  Conjunctiva/sclera: Conjunctivae normal    Cardiovascular:      Rate and Rhythm: Normal rate and regular rhythm  Heart sounds: No murmur heard  Pulmonary:      Effort: Pulmonary effort is normal  No respiratory distress  Breath sounds: Normal breath sounds  Abdominal:      General: Abdomen is flat  Bowel sounds are normal       Palpations: Abdomen is soft  Tenderness: There is no abdominal tenderness  Musculoskeletal:         General: No tenderness  Normal range of motion        Cervical back: Normal range of motion and neck supple  Neurological:      Mental Status: He is alert and oriented to person, place, and time  Mental status is at baseline  Cranial Nerves: No cranial nerve deficit  Sensory: No sensory deficit  Motor: Weakness present        Gait: Gait normal       Comments: 4/5 strength in RUE/RLE  5/5 strength in LUE/LLE  Expressive aphasia, improving    Psychiatric:         Mood and Affect: Mood normal          Behavior: Behavior normal                Salome Vo MD  PGY-2, Family Medicine  05/24/23  12:11 PM

## 2023-05-24 NOTE — CASE MANAGEMENT
Liset Moncada 51 received request for authorization from Care Manager  Authorization request for: [address] Name: [address] [place] [address] [place]  NPI: [id number]  Facility MD: Supa Quezada  NPI: [id number]  Authorization initiated by contacting insurance: [address] Via: Fax  Pending auth#: [id number]  Clinicals submitted via:  Fax

## 2023-05-24 NOTE — ARC ADMISSION
Per CM patient is now medically stable  Auth can be submitted  Our NPI is  and Dr Dolly Haile is   Benefits checked: deductible $750 per year of which $371 10 has been met then covered 100%

## 2023-05-24 NOTE — RESTORATIVE TECHNICIAN NOTE
Restorative Technician Note      Patient Name: Dex Guajardo     Note Type: Mobility  Patient Position Upon Consult: Bedside chair  Activity Performed: Ambulated; Dangled; Stood  Assistive Device: Other (Comment) (Assist x1)  Education Provided: Yes  Patient Position at End of Consult: Supine;  All needs within reach; Bed/Chair alarm activated    Lesley JOHNS, Restorative Technician, [employer]

## 2023-05-24 NOTE — CONSULTS
Patient seen and examined independently prior to intervention  Assessment and plan  43-year-old male with no significant past medical history who was found down at home with an unknown last known well  NIHSS was 16  CT with early ischemic changes, however, CTP with clear mismatch (though likely overestimated)  After discussing the risks and benefits of thrombectomy with the father including bleeding, vascular injury and stroke they elected to proceed  Chief complaint: Left MCA syndrome  History of present illness: 43-year-old male with no significant past medical history who was found down at home with an unknown last known well  NIHSS was 16  CTA left M1 occlusion  CT with early ischemic changes, however, CTP with clear mismatch (though likely overestimated)  Review of systems, past medical history, past surgical history allergies unobtainable  HR:  [62-65] 65  Resp:  [18-20] 20  BP: (126-150)/(92) 150/92   He is awake  He has a left gaze preference  He has right facial   He is aphasic  He is plegic with his right upper and lower  Spontaneously moves left    NIHSS 16     Lab Results   Component Value Date/Time    SODIUM 136 05/15/2023 06:53 PM    CREATININE 0 73 05/15/2023 06:53 PM    WBC 7 08 05/15/2023 06:53 PM    HGB 14 6 05/15/2023 06:53 PM     05/15/2023 06:53 PM    PTT 22 (L) 05/15/2023 06:53 PM    INR 0 89 05/15/2023 06:53 PM Home

## 2023-05-24 NOTE — RESTORATIVE TECHNICIAN NOTE
Restorative Technician Note      Patient Name: Tamar Chase     Note Type: Mobility  Patient Position Upon Consult: Supine  Activity Performed: Ambulated; BSTNYOB; Stood  Assistive Device: Other (Comment) (Assist x1)  Education Provided: Yes  Patient Position at End of Consult: Bedside chair;  All needs within reach; Bed/Chair alarm activated  Zulema JOHNS, Restorative Technician, [employer]

## 2023-05-24 NOTE — CASE MANAGEMENT
Case Management Discharge Planning Note    Patient name Soraida Comfort  Location [facility] 727/[facility] 776-49 MRN [id number]  : 1980 Date 2023       Current Admission Date: 5/15/2023  Current Admission Diagnosis:L MCA stroke w/ LM1 partial occlusion   Patient Active Problem List    Diagnosis Date Noted   • PFO (patent foramen ovale) 2023   • Seizure ([address] ) 2023   • COVID 2023   • L MCA stroke w/ LM1 partial occlusion 05/15/2023   • Positive self-administered antigen test for COVID-19 02/10/2023   • Viral infection, unspecified 11/10/2022   • Bilateral low back pain without sciatica 2020      LOS (days): 9  Geometric Mean LOS (GMLOS) (days): 7 30  Days to GMLOS:-1 5     OBJECTIVE:  Risk of Unplanned Readmission Score: 10 17         Current admission status: Inpatient   Preferred Pharmacy:   [address]  [address]  [address] [facility] [address]  Phone: [phone] Fax: [address] [facility], [address] [place] [address] [address]  [address] [facility] [address]  Phone: [phone] Fax: [phone]    Primary Care Provider: Hussain Bello DO    Primary Insurance: [address]  Secondary Insurance:     DISCHARGE DETAILS:           Additional Comments: CM conferred with Family Medicine provider and is aware the pt is medically stable for discharge  [facility] admission dept was alerted to medical clearance and NPI was forwarded to initiate authorization  CM d/c support unit initiated insurance approval on this date and response from [facility] is pending  It is anticipated authorization will be furnished on  vs  and thereafter admission to [facility] can occur  CM will continue to follow

## 2023-05-24 NOTE — PLAN OF CARE
Problem: MOBILITY - ADULT  Goal: Maintain or return to baseline ADL function  Description: INTERVENTIONS:  -  Assess patient's ability to carry out ADLs; assess patient's baseline for ADL function and identify physical deficits which impact ability to perform ADLs (bathing, care of mouth/teeth, toileting, grooming, dressing, etc )  - Assess/evaluate cause of self-care deficits   - Assess range of motion  - Assess patient's mobility; develop plan if impaired  - Assess patient's need for assistive devices and provide as appropriate  - Encourage maximum independence but intervene and supervise when necessary  - Involve family in performance of ADLs  - Assess for home care needs following discharge   - Consider OT consult to assist with ADL evaluation and planning for discharge  - Provide patient education as appropriate  Outcome: Progressing     Problem: Prexisting or High Potential for Compromised Skin Integrity  Goal: Skin integrity is maintained or improved  Description: INTERVENTIONS:  - Identify patients at risk for skin breakdown  - Assess and monitor skin integrity  - Assess and monitor nutrition and hydration status  - Monitor labs   - Assess for incontinence   - Turn and reposition patient  - Assist with mobility/ambulation  - Relieve pressure over bony prominences  - Avoid friction and shearing  - Provide appropriate hygiene as needed including keeping skin clean and dry  - Evaluate need for skin moisturizer/barrier cream  - Collaborate with interdisciplinary team   - Patient/family teaching  - Consider wound care consult   Outcome: Progressing     Problem: Neurological Deficit  Goal: Neurological status is stable or improving  Description: Interventions:  - Monitor and assess patient's level of consciousness, motor function, sensory function, and level of assistance needed for ADLs  - Monitor and report changes from baseline   Collaborate with interdisciplinary team to initiate plan and implement interventions as ordered  - Provide and maintain a safe environment  - Consider seizure precautions  - Consider fall precautions  - Consider aspiration precautions  - Consider bleeding precautions  Outcome: Progressing     Problem: Activity Intolerance/Impaired Mobility  Goal: Mobility/activity is maintained at optimum level for patient  Description: Interventions:  - Assess and monitor patient  barriers to mobility and need for assistive/adaptive devices  - Assess patient's emotional response to limitations  - Collaborate with interdisciplinary team and initiate plans and interventions as ordered  - Encourage independent activity per ability   - Maintain proper body alignment  - Perform active/passive rom as tolerated/ordered  - Plan activities to conserve energy   - Turn patient as appropriate  Outcome: Progressing     Problem: Communication Impairment  Goal: Ability to express needs and understand communication  Description: Assess patient's communication skills and ability to understand information  Patient will demonstrate use of effective communication techniques, alternative methods of communication and understanding even if not able to speak  - Encourage communication and provide alternate methods of communication as needed  - Collaborate with case management/ for discharge needs  - Include patient/family/caregiver in decisions related to communication    Outcome: Progressing

## 2023-05-25 PROBLEM — U07.1 COVID: Status: RESOLVED | Noted: 2023-05-16 | Resolved: 2023-05-25

## 2023-05-25 RX ADMIN — CHLORHEXIDINE GLUCONATE 0.12% ORAL RINSE 15 ML: 1.2 LIQUID ORAL at 09:04

## 2023-05-25 RX ADMIN — ASPIRIN 81 MG CHEWABLE TABLET 81 MG: 81 TABLET CHEWABLE at 09:04

## 2023-05-25 RX ADMIN — SODIUM CHLORIDE 1 G: 1 TABLET ORAL at 09:04

## 2023-05-25 RX ADMIN — NICOTINE 1 PATCH: 14 PATCH, EXTENDED RELEASE TRANSDERMAL at 09:05

## 2023-05-25 RX ADMIN — SODIUM CHLORIDE 1 G: 1 TABLET ORAL at 12:12

## 2023-05-25 RX ADMIN — SODIUM CHLORIDE 1 G: 1 TABLET ORAL at 15:33

## 2023-05-25 RX ADMIN — ENOXAPARIN SODIUM 40 MG: 40 INJECTION SUBCUTANEOUS at 09:04

## 2023-05-25 RX ADMIN — LEVETIRACETAM 750 MG: 750 TABLET, FILM COATED ORAL at 21:05

## 2023-05-25 RX ADMIN — CHLORHEXIDINE GLUCONATE 0.12% ORAL RINSE 15 ML: 1.2 LIQUID ORAL at 21:05

## 2023-05-25 RX ADMIN — LEVETIRACETAM 750 MG: 750 TABLET, FILM COATED ORAL at 09:04

## 2023-05-25 NOTE — CASE MANAGEMENT
Lead Care  received notification that Acute Rehab authorization is pending >24 hours  Authorization was initially submitted to insurance: [employer]   Via:   fax  Date: 5/25  Time: 10:46 AM     Pending ref# if applicable:   [id number]  For facility:    [facility]     Action Taken: Email sent to multiple Abhi Perez contacts requesting escalation due to [place] pending >24 hrs and pt remaining in hospital awaiting determination     Notified DCS Staff and Care Manager: Selina Flores

## 2023-05-25 NOTE — OCCUPATIONAL THERAPY NOTE
"  Occupational Therapy Treatment Note     05/25/23 0854   OT Last Visit   OT Visit Date 05/25/23   Note Type   Note Type Treatment for insurance authorization   Pain Assessment   Pain Assessment Tool 0-10   Pain Score No Pain   Restrictions/Precautions   Weight Bearing Precautions Per Order No   Other Precautions Pain;Cognitive   ADL   Where Assessed Standing at sink   Grooming Assistance 5  Supervision/Setup   Grooming Deficit Verbal cueing;Setup   UB Bathing Assistance 5  Supervision/Setup   UB Bathing Deficit Verbal cueing   LB Bathing Assistance 5  Supervision/Setup   LB Bathing Deficit Verbal cueing   UB Dressing Assistance 5  Supervision/Setup   UB Dressing Deficit Verbal cueing   LB Dressing Assistance 5  Supervision/Setup   LB Dressing Deficit Verbal cueing   Toileting Assistance  5  Supervision/Setup   Transfers   Sit to Stand 5  Supervision   Additional items Assist x 1   Stand to Sit 5  Supervision   Additional items Assist x 1   Functional Mobility   Functional Mobility 5  Supervision   Subjective   Subjective pt stated \" I feel so completely  Elsy willy Cabrera\"   Cognition   Overall Cognitive Status WFL   Arousal/Participation Alert; Responsive   Attention Attends with cues to redirect   Orientation Level Oriented X4   Memory Unable to assess   Following Commands Follows one step commands without difficulty   Activity Tolerance   Activity Tolerance Patient tolerated treatment well   Assessment   Assessment Pt seen for participation in Occupational Therapy session with focus on activity tolerance, functional transfers/mob, sitting balance and tolerance and standing tolerance and balance for pt engagement in UB/LB self-care tasks and energy conservation techniques  Pt cleared by JEREMY/ [place] for pt participated in OT session  Pt presented sitting out of bed to bathroom upon initiation of OT session and agreeable to participate in therapy following pt identifiers confirmed   Pt was unable to report his therapy goal " 2* pt language impairments  Pt required assist for AM self-care tasks 2* cognitive impairment  Pt will require post acute rehab service to continue to address these above noted pt deficit which currently impair pt ADL and functional mob  Pt return to sitting out of bed to bedside chair post session, and all needs within reach     Plan   Treatment Interventions ADL retraining   Goal Expiration Date 05/31/23   OT Treatment Day 2   OT Frequency 3-5x/wk   Recommendation   OT Discharge Recommendation Post acute rehabilitation services         Maggie FRIAS/FAREED

## 2023-05-25 NOTE — PLAN OF CARE
Problem: MOBILITY - ADULT  Goal: Maintain or return to baseline ADL function  Description: INTERVENTIONS:  -  Assess patient's ability to carry out ADLs; assess patient's baseline for ADL function and identify physical deficits which impact ability to perform ADLs (bathing, care of mouth/teeth, toileting, grooming, dressing, etc )  - Assess/evaluate cause of self-care deficits   - Assess range of motion  - Assess patient's mobility; develop plan if impaired  - Assess patient's need for assistive devices and provide as appropriate  - Encourage maximum independence but intervene and supervise when necessary  - Involve family in performance of ADLs  - Assess for home care needs following discharge   - Consider OT consult to assist with ADL evaluation and planning for discharge  - Provide patient education as appropriate  Outcome: Progressing     Problem: Prexisting or High Potential for Compromised Skin Integrity  Goal: Skin integrity is maintained or improved  Description: INTERVENTIONS:  - Identify patients at risk for skin breakdown  - Assess and monitor skin integrity  - Assess and monitor nutrition and hydration status  - Monitor labs   - Assess for incontinence   - Turn and reposition patient  - Assist with mobility/ambulation  - Relieve pressure over bony prominences  - Avoid friction and shearing  - Provide appropriate hygiene as needed including keeping skin clean and dry  - Evaluate need for skin moisturizer/barrier cream  - Collaborate with interdisciplinary team   - Patient/family teaching  - Consider wound care consult   Outcome: Progressing     Problem: Neurological Deficit  Goal: Neurological status is stable or improving  Description: Interventions:  - Monitor and assess patient's level of consciousness, motor function, sensory function, and level of assistance needed for ADLs  - Monitor and report changes from baseline   Collaborate with interdisciplinary team to initiate plan and implement interventions as ordered  - Provide and maintain a safe environment  - Consider seizure precautions  - Consider fall precautions  - Consider aspiration precautions  - Consider bleeding precautions  Outcome: Progressing     Problem: Activity Intolerance/Impaired Mobility  Goal: Mobility/activity is maintained at optimum level for patient  Description: Interventions:  - Assess and monitor patient  barriers to mobility and need for assistive/adaptive devices  - Assess patient's emotional response to limitations  - Collaborate with interdisciplinary team and initiate plans and interventions as ordered  - Encourage independent activity per ability   - Maintain proper body alignment  - Perform active/passive rom as tolerated/ordered  - Plan activities to conserve energy   - Turn patient as appropriate  Outcome: Progressing     Problem: Communication Impairment  Goal: Ability to express needs and understand communication  Description: Assess patient's communication skills and ability to understand information  Patient will demonstrate use of effective communication techniques, alternative methods of communication and understanding even if not able to speak  - Encourage communication and provide alternate methods of communication as needed  - Collaborate with case management/ for discharge needs  - Include patient/family/caregiver in decisions related to communication  Outcome: Progressing     Problem: Potential for Aspiration  Goal: Non-ventilated patient's risk of aspiration is minimized  Description: Assess and monitor vital signs, respiratory status, and labs (WBC)  Monitor for signs of aspiration (tachypnea, cough, rales, wheezing, cyanosis, fever)  - Assess and monitor patient's ability to swallow  - Place patient up in chair to eat if possible  - HOB up at 90 degrees to eat if unable to get patient up into chair   - Supervise patient during oral intake     - Instruct patient/ family to take small bites  - Instruct patient/ family to take small single sips when taking liquids  - Follow patient-specific strategies generated by speech pathologist   Outcome: Progressing     Problem: Nutrition  Goal: Nutrition/Hydration status is improving  Description: Monitor and assess patient's nutrition/hydration status for malnutrition (ex- brittle hair, bruises, dry skin, pale skin and conjunctiva, muscle wasting, smooth red tongue, and disorientation)  Collaborate with interdisciplinary team and initiate plan and interventions as ordered  Monitor patient's weight and dietary intake as ordered or per policy  Utilize nutrition screening tool and intervene per policy  Determine patient's food preferences and provide high-protein, high-caloric foods as appropriate  - Assist patient with eating   - Allow adequate time for meals   - Encourage patient to take dietary supplement as ordered  - Collaborate with interdisciplinary team    - Include patient/family/caregiver in decisions related to nutrition    Outcome: Progressing     Problem: Potential for Falls  Goal: Patient will remain free of falls  Description: INTERVENTIONS:  - Educate patient/family on patient safety including physical limitations  - Instruct patient to call for assistance with activity   - Consult OT/PT to assist with strengthening/mobility   - Keep Call bell within reach  - Keep bed low and locked with side rails adjusted as appropriate  - Keep care items and personal belongings within reach  - Initiate and maintain comfort rounds  - Make Fall Risk Sign visible to staff  - Offer Toileting every 2 Hours, in advance of need  - Initiate/Maintain 24/7 alarm  - Apply yellow socks and bracelet for high fall risk patients  - Consider moving patient to room near nurses station  Outcome: Progressing     Problem: Nutrition/Hydration-ADULT  Goal: Nutrient/Hydration intake appropriate for improving, restoring or maintaining nutritional needs  Description: Monitor and assess patient's nutrition/hydration status for malnutrition  Collaborate with interdisciplinary team and initiate plan and interventions as ordered  Monitor patient's weight and dietary intake as ordered or per policy  Utilize nutrition screening tool and intervene as necessary  Determine patient's food preferences and provide high-protein, high-caloric foods as appropriate       INTERVENTIONS:  - Monitor oral intake, urinary output, labs, and treatment plans  - Assess nutrition and hydration status and recommend course of action  - Evaluate amount of meals eaten  - Assist patient with eating if necessary   - Allow adequate time for meals  - Recommend/ encourage appropriate diets, oral nutritional supplements, and vitamin/mineral supplements  - Order, calculate, and assess calorie counts as needed  - Recommend, monitor, and adjust tube feedings and TPN/PPN based on assessed needs  - Assess need for intravenous fluids  - Provide specific nutrition/hydration education as appropriate  - Include patient/family/caregiver in decisions related to nutrition  Outcome: Progressing

## 2023-05-25 NOTE — PROGRESS NOTES
PROGRESS NOTE - [address] 1980, 40 y o  male  MRN: [id number]    Unit/Bed#: [facility] 727-01 Encounter: [id number]  Primary Care Provider: Franny Estes DO      Admission Date: 5/15/2023  Length of Stay: 10 days  Code Status:  Level 1 - Full Code  Diet: Diet Regular; Vegetarian  Consult:   IP CONSULT TO NEUROLOGY  IP CONSULT TO NEUROSURGERY  IP CONSULT TO CASE MANAGEMENT  IP CONSULT TO NEUROLOGY  IP CONSULT TO PHYSICAL MEDICINE REHAB  IP CONSULT TO NUTRITION SERVICES  IP CONSULT TO CASE MANAGEMENT  IP CONSULT TO INTREVENTIONAL CARDIOLOGY  IP CONSULT TO NUTRITION SERVICES      Assessment/Plan :     Discussed with [facility] team and finalization is pending attending physician attestation  * L MCA stroke w/ LM1 partial occlusion  Assessment & Plan  Patient presented as a stroke alert 5/15 with right-sided hand neglect, facial droop, RUE flaccid paralysis and expressive aphasia  NIHSS score 16  CTH showed Acute ischemic left MCA stroke, status post thrombectomy 5/15 with TICI 3 revascularization  Imaging:   · [facility] 5/15: Late acute to early subacute left MCA territory infarct  No hemorrhagic conversion  · CTA head/neck 5/15: Acute occlusion of the proximal left M1 segment with partial opacification of distal M2 and M3 branches  · CTH 5/15: New extensive left hemispheric intraparenchymal and subarachnoid hemorrhage with mass effect upon the adjacent brain parenchyma and associated cerebral edema predominantly in the left MCA vascular distribution  No significant midline shift or evidence of transtentorial herniation noted  · [facility] 5/16: Grossly stable recent large left MCA territory infarct with regional mass effect without midline shift  No acute hemorrhage  · MRI Brain w/ contrast 5/17/23: Recent large left MCA territory infarct with associated edema and a few foci of petechial hemorrhage  There is regional mass effect without midline shift   Cannot reliably evaluate the infarcted brain for structural abnormality  No structural abnormality is identified in the remainder brain  Normal hippocampal formations  · [facility] 5/19: Continued evolution of left MCA territory infarct as above with stable mass effect  No significant midline shift  Question small volume petechial hemorrhage left inferior parietal and temporal region  No billie parenchymal hemorrhage  · CT CAP: No evidence of malignancy to account for hypercoagulable state  · ALYSSA- EF 55%, large patent PFO with bidirectional flow with saline contrast injection  Left atrial appendate with wind sock appearance with no thrombus  Assessment:  Young 40 YOM with no significant PMH presented for stroke  Possibly due to post-COVID hypercoagulable state from infection back in February  Ruled out occult malignancy, does have large and patent PFO but negative for DVT   Hypercoagulable workup in progress    Medically stable, discharge to [address] [place] [address] E:  · Neurosurgery signed off  · Neurology, appreciate recs - can be discharged on ASA, will need outpatient neurology follow-up and cardiology follow-up for PFO  · Maintain -150, normotension  · Maintain normal Na - will discontinue salt tabs given stable results   · Qshift neuro checks   · Intervention cardiology consulted for PFO - outpatient follow-up   · Continue ASA 81mg daily, DVT ppx  · Keppra 750mg BID through 5/25 - will discontinue afterwards  · FU hypercoagulable workup  - Homocystine normal  - BELEM normal  - RF normal  - ANCA negative  - dsDNA negative  - MTHFR pending  - Factor V Leiden negative  - Lupus anticoagulant negative  - Thrombosis panel  - Antithrombin III 81, likely this is not clinically relevant as it is higher than 80%  - Protein S decreased at 65%  - Protein C normal  - Repeat thrombosis panel in 3 months recommended  - Cannot rule out that February 2023 COVID infection causing hypercoagulable state      PFO (patent foramen ovale)  Assessment & Plan  5/18 ALYSSA- EF 55%, large patent PFO with bidirectional flow with saline contrast injection  Left atrial appendate with wind sock appearance with no thrombus  · Interventional cardiology consulted - outpatient follow-up in 4-6 weeks     Seizure [facility])  Assessment & Plan  Seizure noted on 5/16 - eye deviation to right followed by postictal state  · Stat CTH no acute change, evolving known left MCA infarct  · cVEEG with no further seizure activities  Plan:  · Continue Keppra 750 mg through 5/25 - will discontinue after today     COVID-resolved as of 5/25/2023  Assessment & Plan  Positive COVID 5/15, asymptomatic   Recent COVID infection in February  Discussed with infection control, no need for contact precautions   Monitor respiratory status         Principal Problem:    L MCA stroke w/ LM1 partial occlusion  Active Problems:    Seizure (HCC)    PFO (patent foramen ovale)      VTE Pharm PPX: Enoxaparin (Lovenox)  VTE Mech PPX: sequential compression device and/or foot pump applied unless otherwise contraindicated       Hospital Course & 24hr events:     Overnight/24hr events:  Patient without acute events overnight per sign-out  No concern or report per nursing staff  Patient seen and examined at bedside and without questions or concerns  Subjective  & Review of Systems:     Review of Systems   Constitutional: Negative for chills and fever  HENT: Negative for ear pain and sore throat  Eyes: Negative for pain and visual disturbance  Respiratory: Negative for cough and shortness of breath  Cardiovascular: Negative for chest pain and palpitations  Gastrointestinal: Negative for abdominal pain and vomiting  Genitourinary: Negative for dysuria and hematuria  Musculoskeletal: Negative for arthralgias, back pain and gait problem  Skin: Negative for color change and rash  Neurological: Positive for weakness  Negative for seizures and syncope     All other systems reviewed and are negative  Objective :     Vitals:    Vitals:    05/24/23 2220 05/25/23 0337 05/25/23 0559 05/25/23 0717   BP: 115/66 115/66  115/66   Pulse: 56 (!) 49  (!) 50   Resp: 20 20  16   Temp: 98 3 °F (36 8 °C) 98 1 °F (36 7 °C)  97 6 °F (36 4 °C)   TempSrc:    Axillary   SpO2: 95% 96%  99%   Weight:   62 4 kg (137 lb 9 1 oz)    Height:         Temp:  [97 6 °F (36 4 °C)-98 8 °F (37 1 °C)] 97 6 °F (36 4 °C)  HR:  [49-60] 50  Resp:  [16-20] 16  BP: (115-121)/(56-67) 115/66  Weight (last 2 days)     Date/Time Weight    05/25/23 0559 62 4 (137 57)    05/24/23 0600 76 9 (169 53)    05/23/23 0553 76 (167 55)          Intake/Output Summary (Last 24 hours) at 5/25/2023 1046  Last data filed at 5/25/2023 0600  Gross per 24 hour   Intake 740 ml   Output 1450 ml   Net -710 ml     Invasive Devices     Peripheral Intravenous Line  Duration           Peripheral IV 05/23/23 Left;Ventral (anterior) Forearm 2 days                Labs: I have personally reviewed pertinent reports        Results from last 7 days   Lab Units 05/24/23  0544 05/23/23  0602 05/22/23  0452 05/21/23  0542 05/20/23  0435 05/19/23  0629   HEMATOCRIT % 36 7 33 8* 29 9* 29 5* 30 1* 29 9*   HEMOGLOBIN g/dL 13 0 12 2 10 7* 10 1* 10 1* 10 2*   NEUTROS ABS Thousands/µL 2 09 2 82 2 56 2 34 3 90 3 47   PLATELETS Thousands/uL 282 269 199 172 156 148*   WBC Thousand/uL 5 09 5 30 4 67 4 01* 5 37 5 54       Results from last 7 days   Lab Units 05/24/23  0544 05/23/23  0602 05/22/23  0452 05/21/23  0543 05/21/23  0542 05/21/23  0245 05/20/23  1917 05/20/23  1320 05/20/23  0436 05/20/23  0435 05/19/23  1233 05/19/23  0629   BUN mg/dL 10 7 4* 3*  --  3* 3* 3*   < >  --    < > 5   CALCIUM mg/dL 8 7 8 6 8 1* 7 8*  --  7 7* 8 1* 7 8*   < >  --    < > 7 5*   CHLORIDE mmol/L 108 109* 111* 119*  --  120* 120* 120*   < >  --    < > 122*   CO2 mmol/L 30 30 29 26  --  27 24 24   < >  --    < > 24   CREATININE mg/dL 0 77 0 62 0 57* 0 50*  --  0 46* 0 48* 0 55*   < >  --    < > 0 55* EGFR ml/min/1 73sq m 112 123 127 134  --  139 136 129   < >  --    < > 129   POTASSIUM mmol/L 3 9 3 5 3 6 3 6  --  3 6 3 3* 3 6   < >  --    < > 3 4*   MAGNESIUM mg/dL  --   --  2 0  --  1 8  --   --   --   --  2 0  --  1 8   PHOSPHORUS mg/dL  --   --  4 4  --  3 7  --   --   --   --  2 4*  --  2 4*    < > = values in this interval not displayed  EKG, Pathology, Imaging, and Other Studies:          CT chest abdomen pelvis w contrast    Result Date: 5/20/2023  Impression: No evidence of malignancy to account for hypercoagulable state  Moderate bilateral pleural effusions, small amount of ascites, and diffuse body wall subcutaneous edema  Workstation performed: [id number]     CT head wo contrast    Result Date: 5/19/2023  Impression: Continued evolution of left MCA territory infarct as above with stable mass effect  No significant midline shift Question small volume petechial hemorrhage left inferior parietal and temporal region  No billie parenchymal hemorrhage  Continued short-term interval follow-up advised  Study was marked in [facility] for immediate notification  Workstation performed: [id number]     XR ankle 3+ vw right    Result Date: 5/17/2023  Impression: No acute osseous abnormality  Workstation performed: [id number]     MRI brain seizure wo and w contrast    Addendum Date: 5/17/2023    ADDENDUM: Correcting comparison study: Head CT 5/16/2023  Result Date: 5/17/2023  Impression: 1  Recent large left MCA territory infarct with associated edema and a few foci of petechial hemorrhage  There is regional mass effect without midline shift  2   Cannot reliably evaluate the infarcted brain for structural abnormality  No structural abnormality is identified in the remainder brain  Normal hippocampal formations   Workstation performed: [id number]     CT head wo contrast    Result Date: 5/17/2023  Impression: Grossly stable recent large left MCA territory infarct with regional mass effect without midline shift  No acute hemorrhage  Workstation performed: [id number]     XR chest portable ICU    Result Date: 5/16/2023  Impression: No acute cardiopulmonary disease  Right jugular catheter in lower SVC with no pneumothorax  Workstation performed: [id number]     CT head wo contrast    Result Date: 5/16/2023  Impression: Evolving left MCA infarct with increased cortical hypodensity and stable sulcal effacement/mass effect throughout the left lobe  No acute intracranial hemorrhage or midline shift  Workstation performed: [id number]     CT head wo contrast    Result Date: 5/16/2023  Impression: Compared to the most immediate prior study, significant interval decrease/near total resolution of the previously noted extensive left hemispheric hyperdensity involving the cortex and subcortical white matter and left basal ganglia  Minimal residual hyperdensity in the left basal ganglia region  Overall these findings suggest resolving contrast staining post endovascular therapy although small amount of superimposed hemorrhage is difficult to entirely exclude  Recommend short-term interval follow-up  scan and/or further evaluation with MRI for definitive evaluation  Stable left hemispheric edema and sulcal effacement without significant midline shift  Above findings discussed with Dr Ladene Seip at 4:30 a m  on 5/16/2023  Workstation performed: [id number]     CT head wo contrast    Result Date: 5/15/2023  Impression: New extensive left hemispheric intraparenchymal and subarachnoid hemorrhage with mass effect upon the adjacent brain parenchyma and associated cerebral edema predominantly in the left MCA vascular distribution  No significant midline shift or evidence of  transtentorial herniation noted  Findings discussed with Dr Valentina Hess at 10:30 p m  Workstation performed: [id number]     CT cerebral perfusion    Result Date: 5/15/2023  Impression: CT perfusion performed  Data available on PACS   Workstation performed: [id number] CT stroke alert brain    Result Date: 5/15/2023  Impression: Late acute to early subacute left MCA territory infarct  No hemorrhagic conversion  Findings were directly discussed with Balbina Spear at  7:10 PM    Workstation performed: [id number]     CTA stroke alert (head/neck)    Result Date: 5/15/2023  Impression: 1  Acute occlusion of the proximal left M1 segment with partial opacification of distal M2 and M3 branches  2  No evidence of thrombus, stenosis or occlusion of the carotid or vertebral arteries  Findings were directly discussed with Balbina Spear at  7:14 PM    Workstation performed: [id number]         Inpatient medications:     Current Facility-Administered Medications   Medication Dose Route Frequency   • acetaminophen (TYLENOL) tablet 975 mg  975 mg Oral Q8H PRN   • aspirin chewable tablet 81 mg  81 mg Oral Daily   • chlorhexidine (PERIDEX) 0 12 % oral rinse 15 mL  15 mL Mouth/Throat Q12H [facility]   • enoxaparin (LOVENOX) subcutaneous injection 40 mg  40 mg Subcutaneous Q24H BRANDON   • hydrALAZINE (APRESOLINE) injection 10 mg  10 mg Intravenous Q6H PRN   • labetalol (NORMODYNE) injection 10 mg  10 mg Intravenous Q4H PRN   • levETIRAcetam (KEPPRA) tablet 750 mg  750 mg Oral Q12H [facility]   • nicotine (NICODERM CQ) 14 mg/24hr TD 24 hr patch 1 patch  1 patch Transdermal Daily   • sodium chloride tablet 1 g  1 g Oral TID With Meals         Physical Exam :     Physical Exam  Vitals reviewed  Constitutional:       General: He is not in acute distress  Appearance: Normal appearance  HENT:      Head: Normocephalic and atraumatic  Nose: Nose normal    Eyes:      Extraocular Movements: Extraocular movements intact  Conjunctiva/sclera: Conjunctivae normal    Cardiovascular:      Rate and Rhythm: Normal rate and regular rhythm  Heart sounds: No murmur heard  Pulmonary:      Effort: Pulmonary effort is normal  No respiratory distress  Breath sounds: Normal breath sounds     Abdominal:      General: Abdomen is flat  Bowel sounds are normal       Palpations: Abdomen is soft  Tenderness: There is no abdominal tenderness  Musculoskeletal:         General: No tenderness  Normal range of motion  Cervical back: Normal range of motion and neck supple  Neurological:      Mental Status: He is alert and oriented to person, place, and time  Mental status is at baseline  Cranial Nerves: No cranial nerve deficit  Motor: Weakness (4/5 weakness of RUE/RLE) present        Comments: Expressive aphasia   Psychiatric:         Mood and Affect: Mood normal          Behavior: Behavior normal                Simon Horton MD  PGY-2, Family Medicine  05/25/23  10:46 AM

## 2023-05-25 NOTE — UTILIZATION REVIEW
Continued Stay Review    Date: 5-24-23                        Current Patient Class:  Inpatient  Current Level of Care: med surg     HPI:42 y o  male initially admitted on 5-15-23     Stroke alert 5/15 with right-sided hand neglect, facial droop, RUE flaccid paralysis and expressive aphasia  NIHSS score 16  CTH showed Acute ischemic left MCA stroke, status post thrombectomy 5/15 with TICI 3 revascularization  Assessment/Plan:     Ruled out occult malignancy  Patient have large and patent PFO but negative DVT  Continue hypercoagulable workup in progress  Interventional cardiology consulted for PFO with recommendation for  outpatient follow up  Discontinue keppra after today  Patient with weakness 4 / 5 RUE / RLE, expressive aphasia  PT/OT treating functional deficits recommending inpatient rehab            Vital Signs:     Date/Time Temp Pulse Resp BP MAP (mmHg) SpO2   05/25/23 07:17:57 97 6 °F (36 4 °C) 50 Abnormal  16 115/66 82 99 %   05/25/23 03:37:20 98 1 °F (36 7 °C) 49 Abnormal  20 115/66 82 96 %     Pertinent Labs/Diagnostic Results:     Results from last 7 days   Lab Units 05/24/23  1053   SARS-COV-2  Negative     Results from last 7 days   Lab Units 05/24/23  0544 05/23/23  0602 05/22/23  0452 05/21/23  0542 05/20/23  0435   HEMATOCRIT % 36 7 33 8* 29 9* 29 5* 30 1*   HEMOGLOBIN g/dL 13 0 12 2 10 7* 10 1* 10 1*   NEUTROS ABS Thousands/µL 2 09 2 82 2 56 2 34 3 90   PLATELETS Thousands/uL 282 269 199 172 156   WBC Thousand/uL 5 09 5 30 4 67 4 01* 5 37         Results from last 7 days   Lab Units 05/24/23  0544 05/23/23  0602 05/22/23  0843 05/22/23  0452 05/21/23  1506 05/21/23  1146 05/21/23  0543 05/21/23  0542 05/21/23  0245 05/20/23  0436 05/20/23  0435 05/19/23  1233 05/19/23  0629   ANION GAP mmol/L 0* -1*  --  1*  --   --  -2*  --  -2*   < >  --    < > 0*   BUN mg/dL 10 7  --  4*  --   --  3*  --  3*   < >  --    < > 5   CALCIUM mg/dL 8 7 8 6  --  8 1*  --   --  7 8*  --  7 7*   < >  --    < > 7 5*   CHLORIDE mmol/L 108 109*  --  111*  --   --  119*  --  120*   < >  --    < > 122*   CO2 mmol/L 30 30  --  29  --   --  26  --  27   < >  --    < > 24   CREATININE mg/dL 0 77 0 62  --  0 57*  --   --  0 50*  --  0 46*   < >  --    < > 0 55*   EGFR ml/min/1 73sq m 112 123  --  127  --   --  134  --  139   < >  --    < > 129   POTASSIUM mmol/L 3 9 3 5  --  3 6  --   --  3 6  --  3 6   < >  --    < > 3 4*   MAGNESIUM mg/dL  --   --   --  2 0  --   --   --  1 8  --   --  2 0  --  1 8   PHOSPHORUS mg/dL  --   --   --  4 4  --   --   --  3 7  --   --  2 4*  --  2 4*   SODIUM mmol/L 138 138 139 141 141   < > 143  --  145   < >  --    < > 146    < > = values in this interval not displayed           Results from last 7 days   Lab Units 05/23/23  0611 05/18/23  1622   POC GLUCOSE mg/dl 82 91     Results from last 7 days   Lab Units 05/24/23  0544 05/23/23  0602 05/22/23  0452 05/21/23  0543 05/21/23  0245 05/20/23 1917 05/20/23  1320 05/20/23  0436 05/20/23  0020 05/19/23 1733 05/19/23  1233 05/19/23  0629   GLUCOSE RANDOM mg/dL 83 78 86 88 87 130 137 137 113 129 102 110     Results from last 7 days   Lab Units 05/21/23  0543 05/21/23  0245 05/20/23 1917 05/20/23  1320 05/20/23  0436 05/20/23  0020 05/19/23  1733 05/19/23  1233 05/19/23  0629 05/19/23  0005   OSMOLALITY, SERUM mmol/ 295 300* 306* 297 299 5* 296 298 296 297         Results from last 7 days   Lab Units 05/21/23  0543 05/21/23  0245 05/20/23 1917 05/20/23  1320 05/20/23  0436 05/20/23  0020 05/19/23  1733   OSMOLALITY, SERUM mmol/ 295 300* 306* 297 299 5* 296       Scheduled Medications:    aspirin, 81 mg, Oral, Daily  chlorhexidine, 15 mL, Mouth/Throat, Q12H BRANDON  enoxaparin, 40 mg, Subcutaneous, Q24H BRANDON  levETIRAcetam, 750 mg, Oral, Q12H BRANDON  nicotine, 1 patch, Transdermal, Daily  sodium chloride, 1 g, Oral, TID With Meals      Continuous IV Infusions:     PRN Meds:  acetaminophen, 975 mg, Oral, Q8H PRN  hydrALAZINE, 10 mg, Intravenous, Q6H PRN  labetalol, 10 mg, Intravenous, Q4H PRN        Discharge Plan: to be determined    Network Utilization Review Department  ATTENTION: Please call with any questions or concerns to [phone] and carefully listen to the prompts so that you are directed to the right person  All voicemails are confidential   Joie Julian all requests for admission clinical reviews, approved or denied determinations and any other requests to dedicated fax number below belonging to the campus where the patient is receiving treatment   List of dedicated fax numbers for the Facilities:  [address] DENIALS (Administrative/Medical Necessity) [phone]   [address] (Maternity/NICU/Pediatrics) [phone]   [address] [place] [address] [phone]   [facility] [phone]   [address] [place] [address] Tamera Richter 28 [phone]   [address] [place] [address] 134 815 [facility] [phone]

## 2023-05-25 NOTE — DISCHARGE SUMMARY
DISCHARGE SUMMARY - Family Medicine Residency, Scott Calixto 1980, 40 y o  male  MRN: [id number]    Unit/Bed#: [facility] 727-01 Encounter: [id number]  Primary Care Provider: Kang Palomo DO      Admission Date: 5/15/2023 1848  Discharge Date: 05/27/23  Length of Stay: 10 days  Diagnosis:   Principal Problem:    L MCA stroke w/ LM1 partial occlusion  Active Problems:    Seizure ([address] )    PFO (patent foramen ovale)      Plans finalization pending attending physician attestation  ASSESSMENTS & PLANS:     * L MCA stroke w/ LM1 partial occlusion  Assessment & Plan  Patient presented as a stroke alert 5/15 with right-sided hand neglect, facial droop, RUE flaccid paralysis and expressive aphasia  NIHSS score 16  CTH showed Acute ischemic left MCA stroke, status post thrombectomy 5/15 with TICI 3 revascularization  Imaging:   · [facility] 5/15: Late acute to early subacute left MCA territory infarct  No hemorrhagic conversion  · CTA head/neck 5/15: Acute occlusion of the proximal left M1 segment with partial opacification of distal M2 and M3 branches  · CTH 5/15: New extensive left hemispheric intraparenchymal and subarachnoid hemorrhage with mass effect upon the adjacent brain parenchyma and associated cerebral edema predominantly in the left MCA vascular distribution  No significant midline shift or evidence of transtentorial herniation noted  · [facility] 5/16: Grossly stable recent large left MCA territory infarct with regional mass effect without midline shift  No acute hemorrhage  · MRI Brain w/ contrast 5/17/23: Recent large left MCA territory infarct with associated edema and a few foci of petechial hemorrhage  There is regional mass effect without midline shift  Cannot reliably evaluate the infarcted brain for structural abnormality  No structural abnormality is identified in the remainder brain  Normal hippocampal formations    · [facility] 5/19: Continued evolution of left MCA territory infarct as above with stable mass effect  No significant midline shift  Question small volume petechial hemorrhage left inferior parietal and temporal region  No billie parenchymal hemorrhage  · CT CAP: No evidence of malignancy to account for hypercoagulable state  · ALYSSA- EF 55%, large patent PFO with bidirectional flow with saline contrast injection  Left atrial appendate with wind sock appearance with no thrombus  Assessment:  Young 40 YOM with no significant PMH presented for stroke  Possibly due to post-COVID hypercoagulable state from infection back in February  Ruled out occult malignancy, does have large and patent PFO but negative for DVT  Hypercoagulable workup in progress    Medically stable, discharge to [address] [place] [address] E:  · Neurosurgery signed off  · Neurology, appreciate recs - can be discharged on ASA, will need outpatient neurology follow-up and cardiology follow-up for PFO  · Maintain -150, normotension  · Maintain normal Na - will discontinue salt tabs given stable results   · Qshift neuro checks   · Intervention cardiology consulted for PFO - outpatient follow-up   · Continue ASA 81mg daily, DVT ppx  · Keppra 750mg BID through 5/25 - will discontinue afterwards  · FU hypercoagulable workup  - Homocystine normal  - BELEM normal  - RF normal  - ANCA negative  - dsDNA negative  - MTHFR pending  - Factor V Leiden negative  - Lupus anticoagulant negative  - Thrombosis panel  - Antithrombin III 81, likely this is not clinically relevant as it is higher than 80%  - Protein S decreased at 65%  - Protein C normal  - Repeat thrombosis panel in 3 months recommended  - Cannot rule out that February 2023 COVID infection causing hypercoagulable state      PFO (patent foramen ovale)  Assessment & Plan  5/18 ALYSSA- EF 55%, large patent PFO with bidirectional flow with saline contrast injection  Left atrial appendate with wind sock appearance with no thrombus     · Interventional cardiology consulted - outpatient follow-up in 4-6 "weeks     Seizure [facility])  Assessment & Plan  Seizure noted on 5/16 - eye deviation to right followed by postictal state  · Stat CTH no acute change, evolving known left MCA infarct  · cVEEG with no further seizure activities  Plan:  · Continue Keppra 750 mg through 5/25 - will discontinue after today     COVID-resolved as of 5/25/2023  Assessment & Plan  Positive COVID 5/15, asymptomatic   Recent COVID infection in February  Discussed with infection control, no need for contact precautions   Monitor respiratory status         Patient Active Problem List   Diagnosis   • Bilateral low back pain without sciatica   • Viral infection, unspecified   • Positive self-administered antigen test for COVID-19   • L MCA stroke w/ LM1 partial occlusion   • Seizure (HCC)   • PFO (patent foramen ovale)         HPI (per admission H&P note on 5/15/23)     HPI:   \"History of Present Illness   HX and PE limited by: Aly Morales is a 40 y o  male who presents with stroke symptoms      Patient arrived to the emergency department as a stroke alert  His last known well was approximately 8:30 PM for which she was reported to have been sounding normal over the telephone  He did not show up for a scheduled meeting with a friend today and did not answer his phone  A welfare check was called, EMS found patient covered in feces with right upper extremity flaccid paralysis as well as right-sided neglect, right facial droop and expressive aphasia  Initial NIH on presentation reported to be 16  Patient was not a candidate for thrombolysis secondary to last known well time  He was noted to have a large vessel occlusion of M1  Endovascular alert initiated  \"      HOSPITAL COURSE:     Hospital Course:   Minor Coffey is a 40 y o  male who presented to the ED on 5/15 as a stroke alert with last well known of 2030 on 5/14   EMS arrived for a welfare check as the patient did not show up for a scheduled meeting with a friend and on " arrival, the patient was found covered in fevers with stroke symptoms including RUE flaccid paralysis, R-hand sideed neglect, R-facial droop and expressive aphasia  Initial NIHSS 16  CTH noted a left MCA infarct and CTA head and neck noted a proximal left M1 occlusion with partial opacification of distal M2, M3  Patient was not a TNK candidate being outside the time window, but was a thrombectomy candidate based on CTP  Endovascular alert was called and he underwent Mechanical thrombectomy TICI 3 revascularization (5/15)  He was found to be incidentally COVID-positive but not requiring O2, case was discussed with infection control and no need for contact precautions given related to past COVID infection in February  Repeat CTH POD1 showed continued edema and sulcal effacement on the left similar to previous study  On 5/16, patient was noted to have seizure activity with rightward eye deviation followed by postictal state  STAT CTH was negative  Video EEG did not capture any seizures and was subsequently discontinued, but patient was continued on Keppra  Patient was initially started on hypertonic saline with Na goal 145-155, now off drip and normal Na goals  Patient underwent additional studies including CT CAP which was negative for possible underlying malignancy  ALYSSA demonstrated large and patent PFO, which interventional cardiology will follow-up outpatient  Lower duplex studies were negative  Hypercoagulable workup is in progress  On discharge, patient completed his Keppra and salt tabs and is stable for discharge to [facility]  Patient will be discharged on ASA  He had a lipid panel completed showing LDL of 18 and HDL of 134 on 5/16/23  Patient will need repeat Lipid panel ordered for 5/28/23, pending results may consider starting patient on statin given hx of stroke  Additionally, Tsh was ordered for 5/28/23 due to episodes of bradycardia   Patient states he is a runner so has a lower HR at baseline but will r/o thyroid disorder  Patient will also need to follow up with PCP, cardiology for PFO, and neurology outpatient  On day of discharge patient states he is feeling well  He is tolerating PO diet without difficulties, breathing comfortably on RA, and ambulating in room  COMPLICATIONS:     Complications: NONE       PROCEDURES:     Procedures Performed:   Orders Placed This Encounter   Procedures   • Central Line         SIGNIFICANT FINDINGS / ABNORMAL RESULTS:     Significant Findings/Abnormal Results with this admission:  · CTH - L MCA infarct  · ALYSSA - Large and patent PFO    CT chest abdomen pelvis w contrast    Result Date: 5/20/2023  Narrative: CT CHEST, ABDOMEN AND PELVIS WITH IV CONTRAST INDICATION:   Hypercoaculable workup  Young patient with CVA  Family history of testicular cancer    COMPARISON:  None  TECHNIQUE: CT examination of the chest, abdomen and pelvis was performed  Multiplanar 2D reformatted images were created from the source data  This examination, like all CT scans performed in the [facility], was performed utilizing techniques to minimize radiation dose exposure, including the use of iterative reconstruction and automated exposure control  Radiation dose length product (DLP) for this visit:  1293 77 mGy-cm IV Contrast:  100 mL of iohexol (OMNIPAQUE) Enteric Contrast: Enteric contrast was administered  FINDINGS: CHEST LUNGS: Mild compressive atelectasis posteriorly bilaterally, otherwise unremarkable lung parenchyma  There is no tracheal or endobronchial lesion  PLEURA: Moderate bilateral water density pleural effusions  HEART/GREAT VESSELS: Heart is unremarkable for patient's age  No thoracic aortic aneurysm  Right IJ central line with tip in the SVC  MEDIASTINUM AND WEN:  Unremarkable  CHEST WALL AND LOWER NECK:  Unremarkable  ABDOMEN LIVER/BILIARY TREE:  Unremarkable  GALLBLADDER: Contracted gallbladder with gallbladder wall thickening  SPLEEN:  Unremarkable   PANCREAS: Unremarkable  ADRENAL GLANDS:  Unremarkable  KIDNEYS/URETERS:  Unremarkable  No hydronephrosis  STOMACH AND BOWEL:  Unremarkable  APPENDIX:  No findings to suggest appendicitis  ABDOMINOPELVIC CAVITY: There is small amount of water density ascites throughout the abdomen and pelvis  No pneumoperitoneum  No lymphadenopathy  VESSELS:  Unremarkable for patient's age  PELVIS REPRODUCTIVE ORGANS:  Unremarkable for patient's age  URINARY BLADDER: Air in the urinary bladder, probably due to recent instrumentation  ABDOMINAL WALL/INGUINAL REGIONS: Diffuse body wall subcutaneous edema  OSSEOUS STRUCTURES:  No acute fracture or destructive osseous lesion  Impression: No evidence of malignancy to account for hypercoagulable state  Moderate bilateral pleural effusions, small amount of ascites, and diffuse body wall subcutaneous edema  Workstation performed: [id number]     VAS lower limb venous duplex study, complete bilateral    Result Date: 5/19/2023  Narrative:  [facility] REPORT CLINICAL: Indications: Patient admitted to hospital with left MCA stroke  He presents PFO  He is currently receiving subcutaneous Lovenox  Physician wants to rule out B/L LE DVT  Operative History: No prior heart or vascular surgery Risk Factors: Current smoking and COVID-19 virus  Height:  69 inches  Weight:  164 lbs  CONCLUSION:  Impression:  RIGHT LOWER LIMB: No evidence of acute or chronic deep vein thrombosis  No evidence of superficial thrombophlebitis noted  Doppler evaluation shows a normal response to augmentation maneuvers    Popliteal, posterior tibial, and anterior tibial arterial Doppler waveform's are triphasic  LEFT LOWER LIMB: No evidence of acute or chronic deep vein thrombosis  No evidence of superficial thrombophlebitis noted  Doppler evaluation shows a normal response to augmentation maneuvers  Popliteal, posterior tibial, and anterior tibial arterial Doppler waveform's are triphasic    Technical findings were given to ICU staff and posted on EPIC  SIGNATURE: Electronically Signed by: Andra Riley DO on 2023-05-19 07:11:19 PM    CT head wo contrast    Result Date: 5/19/2023  Narrative: CT BRAIN - WITHOUT CONTRAST INDICATION:   Stroke, follow up follow-up large L MCA CVA  COMPARISON: Multiple prior recent examinations, most recent dated 5/17/2023  TECHNIQUE:  CT examination of the brain was performed  Multiplanar 2D reformatted images were created from the source data  Radiation dose length product (DLP) for this visit:  897 63 mGy-cm   This examination, like all CT scans performed in the [facility], was performed utilizing techniques to minimize radiation dose exposure, including the use of iterative  reconstruction and automated exposure control  IMAGE QUALITY:  Diagnostic  FINDINGS: PARENCHYMA: Evolving subacute infarct left frontal parietal and temporal lobes extending to the left basal ganglia and posterior insula, now appears more hypodense  There is persistent associated mass effect including sulcal effacement and partial effacement of the left lateral ventricle not significantly changed  There is some subtle hyperattenuation in the region of the left inferior parietal and temporal lobe nonspecific possibly reflecting small amount of petechial hemorrhage  No billie parenchymal hemorrhage is seen  Daniel Mixer VENTRICLES AND EXTRA-AXIAL SPACES: Stable in size and configuration  VISUALIZED ORBITS: Normal visualized orbits  PARANASAL SINUSES: Normal visualized paranasal sinuses  CALVARIUM AND EXTRACRANIAL SOFT TISSUES:  Normal      Impression: Continued evolution of left MCA territory infarct as above with stable mass effect  No significant midline shift Question small volume petechial hemorrhage left inferior parietal and temporal region  No billie parenchymal hemorrhage  Continued short-term interval follow-up advised  Study was marked in [facility] for immediate notification   Workstation performed: [id number] ALYSSA    Result Date: 2023  Narrative: •  Left Ventricle: Left ventricular cavity size is normal  Wall thickness is normal  The left ventricular ejection fraction is 55%  Systolic function is normal  Wall motion is normal  Diastolic function is normal  •  Atrial Septum: There is no atrial septal defect  There is a large and patent foramen ovale confirmed at rest with bidirectional shunting using color Doppler and saline contrast injection  •  Left Atrial Appendage: There is a windsock appearance  There is normal function  There is no thrombus  •  Tricuspid Valve: There is mild regurgitation  3D was performed for better visualization of the atrial septum for any defects in the fossa ovalis membrane  Results from the utilization of 3D are listed in the report below  EEG Video Monitoring 24 Hour    Result Date: 2023  Narrative: Table formatting from the original result was not included  Continuous Video EEG Long Term Monitoring Patient Name:  Kaye Galvan  MRN: [id number] :  1980 File #: [id number]  Date performed: -2023  Referring Provider: Rosanna Chau DO       Report date: 2023       Study type: Continuous video EEG, up to 24 hours ICD 10 diagnosis: Episodes of unresponsiveness R41 [id number] Start time: 2023 13:21 End time: 2023 14:36 He was off EEG monitoring for radiology studies from 17:27 to 18:21 and from 01:22 to 02:32 Patient History: Patient is 40 y o  male on continuous video EEG monitoring for the assessment of seizures  Patient presents with left MCA stroke, s/p thrombectomy, who had an episode of unresponsiveness, concerning for a seizure  Current AEDs: Medications include:  Facility-Administered Medications Ordered in Other Visits Medication Dose Route Frequency Provider Last Rate • aspirin  81 mg Oral Daily Vilma Vergara DO   • chlorhexidine  15 mL Mouth/Throat Q12H [address] SALVATORE Rendon   • labetalol  10 mg Intravenous Q4H PRN Velia SALVATORE Brown   • levETIRAcetam  750 mg Intravenous Q12H [address] Juan Montoya  mg (05/17/23 0810) • nicotine  1 patch Transdermal Daily SALVATORE Santa   • ondansetron  4 mg Intravenous Once PRN Lester Bocanegra, CRNA   • sodium chloride  70 mL/hr Intravenous Continuous SALVATORE Barahona 70 mL/hr (05/17/23 1531) Description of Procedure: A 24 hours continuous video EEG was performed with electrodes applied using the International 10-20 System at least 16 channels are reviewed and formatted into longitudinal bipolar, transverse bipolar, and referential (to common reference or calculated common reference) montages  Additional electrodes used included T1, T2, and extraocular electrodes, and ECG, along with video recording  The EEG was recorded with the patient awake, drowsy, and asleep state  This study was intermittently monitored by a monitoring technologist   The physician interpreting the study had access to the data throughout the recording  The recording was technically satisfactory  Findings: Background Activity: The background is asymmetric due to continuous slower activity over the left hemisphere  During wakefulness, the background over the right hemisphere is better organized with anterior and diffuse very low amplitude beta activity and posterior low amplitude alpha-beta activity  There is an asymmetric 11-12 Hz posterior dominant rhythm formed over the right posterior region  The left hemisphere is more disorganized with diffuse very low voltage beta activity, low voltage theta-alpha activity, and intermittent low-moderate voltage polymorphic delta activity  Drowsiness is characterized by roving eye movements, attenuation of the posterior dominant rhythm, prominent anterior beta activity, central theta activity and vertex waves  Stage 2 sleep is characterized by asymmetric sleep spindles (better formed over the right hemisphere)  Other findings:  The single lead ECG shows a regular and sinus rhythm  Events: There are no patient push button events  Interpretation: This is an abnormal more than 23 hours continuous video EEG recording due to continuous left hemispheric disorganized alpha, theta, and delta activity  This finding indicates the presence of a structural lesion in the left hemisphere  There are no electrographic seizures during this monitored period  MD [facility]    XR ankle 3+ vw right    Result Date: 2023  Narrative: RIGHT ANKLE INDICATION:  Swollen right ankle, tender  COMPARISON:  None VIEWS:  XR ANKLE 3+ VW RIGHT FINDINGS: There is no acute fracture or dislocation  No significant degenerative changes  No lytic or blastic osseous lesion  Soft tissues are unremarkable  Impression: No acute osseous abnormality  Workstation performed: [id number]     IR stroke alert    Result Date: 2023  Narrative: Table formatting from the original result was not included  Images from the original result were not included  Op Note                                                                                       OPERATIVE REPORT PATIENT NAME: Britton Frederick   :  1980 MRN:  [id number] Pt Location: Interventional radiology   SURGERY DATE: 05/15/23   Preop Diagnosis: 1  Stroke, left M1 Large Vessel Occlusion     Postop Diagnosis 1  Stroke, left M1 Large Vessel Occlusion   Procedure: 1  left Internal Carotid Arteriogram 2  left MCA thrombectomy with zoom aspiration catheters 3  left Common carotid angiogram 4  Limited Right Femoral Arteriogram   Surgeon: Vernell Gallo MD   Specimen(s): None   Estimated Blood Loss:  None   Drains: None   Anesthesia Type:  Monitored Anesthesia Care   Complications: None   Operative Indications: Sudha Ambrocio is a very pleasant 40 y o  male who presented with a left MCA syndrome  NIHSS was 16  CTA was significant for left M1 LVO   The last known well was unknown hours prior  After speaking to his POA the patient was brought emergently for thrombectomy  They understood the risks bleeding, stroke, groin hematoma, and death    Procedure Details: After obtaining written informed consent, the patient was brought into the operating room and moved to the OR table in supine fashion  The groin was prepped and draped in the usual sterile fashion  Monitored anesthesia care was induced  5cc of intradermal lidocaine was infused into the right femoral area and percutaneous access with a 5-[place] micropuncture kit was obtained into the right femoral artery  A 6-[place] introducer sheath was placed  This was then exchanged for a Neuron 088  A [employer] Selector catheter was then advanced over the aortic arch over an [employer]  The Neuron 088 was advanced into the left ICA    AP and lateral images of the left intracranial carotid circulation were obtained    Next a Zoom 71 was advanced through the [place], over a Partneredman microcatheter over a Synchro2 support microwire  The clot was passed,  A direct aspiration pass was performed  AP and lateral arteriogram was performed  This demonstrated revascularization of the dominant inferior M2 branch  Also noted was some cervical vasospasm secondary to catheter manipulation  10 mg of verapamil was slowly infused into the left ICA    Next, a repeat thrombectomy was performed with a Zoom 035 through the Zoom 71  A distal inferior division left M2 branch was thrombectomized using aspiration and fluoroscopy  A second pass arteriogram was performed  At this juncture I elected to proceed with initiation of Integrilin  A 90 mcg/kg bolus was given and a drip of 0 5 mcg/kg a minute were then started    Next I turned my attention to the superior division  This was more difficult to catheterize however I was once again able to pass the thrombus with a 035 zoom and eventually bring up the 071 over this    A direct aspiration pass was performed    A postprocedural arteriogram was performed  Significant vasospasm of the cervical carotid as well as MCA was visible  An additional 10 mg of verapamil was slowly infused into the left ICA    A left common carotid angiogram was preformed    The Neuron sheath was then withdrawn and a limited right femoral arteriogram was run  Puncture site was found to be compatible with a Mynx Closure device and this was done successfully  There was appropriate hemostasis  The patient was then awoken from his monitored anesthesia care and found to be in his neurologic baseline  All sponge and needle counts were correct      INTERPRETATION OF ANGIOGRAPHIC FINDINGS: 1  The left internal carotid has antegrade flow into the anterior cerebral artery with cutoff in the mid M1    2   First pass post arteriogram demonstrates TICI 2B revascularization with continued thrombus in the inferior division M2 branch as well as continued occlusion of the superior division M2  There is vasospasm noted in the cervical ICA    3  Post second pass arteriogram demonstrates continued TICI 2B revascularization with improvement of residual clot along the inferior M2/M3 division  There is improvement in the previously noted vasospasm    4   Post third pass arteriogram demonstrates recanalization of the superior and inferior divisions with what appears to be TICI 3 revascularization  Multiple oblique images were obtained to confirm this  Cervical and MCA vasospasm is present    5   Delayed arteriography after a second dose of verapamil demonstrates improved caliber of his cervical and intracranial vessels  TICI 3 revascularization is present    6   Cervical common carotid arteriogram demonstrates antegrade flow into the external and internal carotids without any stenosis as defined by NASCET criteria    There is improved vasospasm    7  Limited Right femoral arteriogram reveals normal puncture site anatomy    Impression: TICI 3 revascularization of a left M1 occlusion    Important times: In department/room: 1926 Puncture 1934 Past lesion 1942, TICI 2B Second past 2001 Third pass 2014, TICI 3   Patient Disposition: Critical Care Unit   Sloan Myers MD DATE: 05/15/23  TIME 9:05 PM      Electronically signed by Jania Silva MD at 5/15/2023  9:05 PM      US bedside procedure    Result Date: 5/17/2023  Narrative: [phone] [id number]  2 [phone] [id number]  1 1    MRI brain seizure wo and w contrast    Addendum Date: 5/17/2023 Addendum:   ADDENDUM: Correcting comparison study: Head CT 5/16/2023  Result Date: 5/17/2023  Narrative: MRI  BRAIN  - WITH AND WITHOUT CONTRAST, SEIZURE PROTOCOL INDICATION: seizure fu  COMPARISON: Same day earlier head CT TECHNIQUE:  Multiplanar, multisequence imaging of the brain was performed before and after gadolinium administration  IV Contrast:  7 mL of Gadobutrol injection (SINGLE-DOSE) IMAGE QUALITY:   Diagnostic  FINDINGS: BRAIN PARENCHYMA: Recent large left MCA territory infarct involving left frontoparietal and temporal lobes, left insula, lateral putamen, posterior left internal capsule and corona radiata  There is associated edema with regional mass effect without midline shift  There are a few foci of petechial hemorrhage seen on gradient susceptibility sequence without T1 hyperintensity  Symmetric hippocampal formations with regard to size and signal  Postcontrast imaging is unremarkable  There is a of the brain demonstrates no abnormal enhancement  Left parietal lobe developmental venous anomaly (DVA) seen on series 10 images 18-19  VENTRICLES: Stable partial effacement of left lateral ventricle secondary to edema  SELLA AND PITUITARY GLAND:  Normal  ORBITS:  Normal  PARANASAL SINUSES: Small fluid and frothy secretions within the right maxillary sinus  Minimal mucosal thickening of ethmoidal air cells and left maxillary sinus   VASCULATURE:  Evaluation of the major intracranial vasculature demonstrates appropriate flow voids  CALVARIUM AND SKULL BASE:  Normal  EXTRACRANIAL SOFT TISSUES:  Normal      Impression: 1  Recent large left MCA territory infarct with associated edema and a few foci of petechial hemorrhage  There is regional mass effect without midline shift  2   Cannot reliably evaluate the infarcted brain for structural abnormality  No structural abnormality is identified in the remainder brain  Normal hippocampal formations  Workstation performed: [id number]     CT head wo contrast    Result Date: 5/17/2023  Narrative: CT BRAIN - WITHOUT CONTRAST INDICATION:   Seizure, new-onset, no history of trauma Stroke, follow up stroke follow up, new onset seizure  COMPARISON: Head CT 5/16/2023 TECHNIQUE:  CT examination of the brain was performed  Multiplanar 2D reformatted images were created from the source data  Radiation dose length product (DLP) for this visit:  901 18 mGy-cm   This examination, like all CT scans performed in the [facility], was performed utilizing techniques to minimize radiation dose exposure, including the use of iterative  reconstruction and automated exposure control  IMAGE QUALITY:  Diagnostic  FINDINGS: PARENCHYMA: Grossly stable recent large left MCA territory infarct involving left frontoparietal and temporal lobes, left insula, lateral putamen, posterior left internal capsule and corona radiata  There is associated edema with regional mass effect without midline  shift  No hyperdense hemorrhage is seen  Basal cisterns are patent  VENTRICLES AND EXTRA-AXIAL SPACES: Stable partial effacement of left lateral ventricle secondary to VISUALIZED ORBITS: Normal visualized orbits  PARANASAL SINUSES: Normal visualized paranasal sinuses  CALVARIUM AND EXTRACRANIAL SOFT TISSUES:  Normal      Impression: Grossly stable recent large left MCA territory infarct with regional mass effect without midline shift  No acute hemorrhage   Workstation performed: [id number]     XR chest portable ICU    Result Date: 5/16/2023  Narrative: CHEST INDICATION:   CVC placement  COVID-positive 5/15/2023  COMPARISON:  None  EXAM PERFORMED/VIEWS:  XR CHEST PORTABLE ICU  FINDINGS: Right jugular catheter in lower SVC  Cardiomediastinal silhouette normal  Lungs clear  No effusion or pneumothorax  Upper abdomen normal  Bones normal for age  Impression: No acute cardiopulmonary disease  Right jugular catheter in lower SVC with no pneumothorax  Workstation performed: [id number]     CT head wo contrast    Result Date: 5/16/2023  Narrative: CT BRAIN - WITHOUT CONTRAST INDICATION:   Mental status change, unknown cause Mental status change, persistent or worsening Change in mental status, s/p mechanical thrombectomy  COMPARISON:  None  TECHNIQUE:  CT examination of the brain was performed  Multiplanar 2D reformatted images were created from the source data  Radiation dose length product (DLP) for this visit:  1948 55 mGy-cm   This examination, like all CT scans performed in the [facility], was performed utilizing techniques to minimize radiation dose exposure, including the use of iterative reconstruction and automated exposure control  IMAGE QUALITY:  Diagnostic  FINDINGS: PARENCHYMA:  Post endovascular therapy contrast staining in the left hemisphere and basal ganglia has continues to improve  No acute parenchymal hemorrhage is identified  There is cortical hypodensity in the left parietal lobe which is more prominent than on the prior study consistent with evolving ischemia  VENTRICLES AND EXTRA-AXIAL SPACES: There is continued sulcal effacement throughout the left hemisphere with areas of cortical hypodensity consistent with evolving MCA infarct  There is stable mild compression of the left lateral ventricle without midline  shift   VISUALIZED ORBITS: Normal visualized orbits  PARANASAL SINUSES: Mild mucosal thickening of the visualized paranasal sinuses   CALVARIUM AND EXTRACRANIAL SOFT TISSUES: Normal      Impression: Evolving left MCA infarct with increased cortical hypodensity and stable sulcal effacement/mass effect throughout the left lobe  No acute intracranial hemorrhage or midline shift  Workstation performed: [id number]     CT head wo contrast    Result Date: 5/16/2023  Narrative: CT BRAIN - WITHOUT CONTRAST INDICATION:   Stroke, follow up fu ich/stroke  COMPARISON: Multiple CT examinations dated earlier same day  TECHNIQUE:  CT examination of the brain was performed  Multiplanar 2D reformatted images were created from the source data  Radiation dose length product (DLP) for this visit:  896 66 mGy-cm   This examination, like all CT scans performed in the [facility], was performed utilizing techniques to minimize radiation dose exposure, including the use of iterative  reconstruction and automated exposure control  IMAGE QUALITY:  Diagnostic  FINDINGS: PARENCHYMA: There is significant interval decrease/near total resolution of the previously seen extensive subarachnoid and intraparenchymal hyperdensity, involving the cortex and subcortical white matter and left basal ganglia, with a small amount of residual hyperdensity seen in the left basal ganglia region  There is persistent associated edema and sulcal effacement on the left similar to the prior  No significant midline shift  VENTRICLES AND EXTRA-AXIAL SPACES: Stable in size and configuration including slight effacement of the left lateral ventricle/frontal horn  No evidence of intraventricular extension of hemorrhage at present  VISUALIZED ORBITS: Normal visualized orbits  PARANASAL SINUSES: Normal visualized paranasal sinuses  CALVARIUM AND EXTRACRANIAL SOFT TISSUES:  Normal      Impression: Compared to the most immediate prior study, significant interval decrease/near total resolution of the previously noted extensive left hemispheric hyperdensity involving the cortex and subcortical white matter and left basal ganglia  Minimal residual hyperdensity in the left basal ganglia region  Overall these findings suggest resolving contrast staining post endovascular therapy although small amount of superimposed hemorrhage is difficult to entirely exclude  Recommend short-term interval follow-up  scan and/or further evaluation with MRI for definitive evaluation  Stable left hemispheric edema and sulcal effacement without significant midline shift  Above findings discussed with Dr Tony Quevedo at 4:30 a m  on 5/16/2023  Workstation performed: [id number]     CT head wo contrast    Result Date: 5/15/2023  Narrative: CT BRAIN - WITHOUT CONTRAST INDICATION:   post IR  COMPARISON:  None  TECHNIQUE:  CT examination of the brain was performed  Multiplanar 2D reformatted images were created from the source data  Radiation dose length product (DLP) for this visit:  974 mGy-cm   This examination, like all CT scans performed in the [facility], was performed utilizing techniques to minimize radiation dose exposure, including the use of iterative reconstruction and automated exposure control  IMAGE QUALITY:  Diagnostic  FINDINGS: PARENCHYMA: New extensive left cerebral hemispheric intraparenchymal and subarachnoid hemorrhage noted involving cortex, subcortical white matter, and left basal ganglia  There is mass effect upon the adjacent brain parenchyma with associated cerebral edema in the areas of the left MCA vascular distribution as well as the CORA/MCA and MCA/PCA watershed zones  No significant midline shift noted  No evidence of transtentorial herniation  Gray-white differentiation is preserved in the right cerebral hemisphere  VENTRICLES AND EXTRA-AXIAL SPACES:  Normal for the patient's age  VISUALIZED ORBITS: Normal visualized orbits  PARANASAL SINUSES: Normal visualized paranasal sinuses   CALVARIUM AND EXTRACRANIAL SOFT TISSUES:  Normal      Impression: New extensive left hemispheric intraparenchymal and subarachnoid hemorrhage with mass effect upon the adjacent brain parenchyma and associated cerebral edema predominantly in the left MCA vascular distribution  No significant midline shift or evidence of  transtentorial herniation noted  Findings discussed with Dr Karlee Karimi at 10:30 p m  Workstation performed: [id number]     CT cerebral perfusion    Result Date: 5/15/2023  Narrative: CT PERFUSION INDICATION:  stroke  COMPARISON:  None TECHNIQUE: CT perfusion study was performed  A total of 8 cm of brain tissue was evaluated in two different volumetric acquisitions  [employer] RAPID software was utilized to calculate cerebral blood flow, cerebral blood volume, mean transit time, and  Tmax  Radiation dose length product (DLP) for this visit:  1931 35 mGy-cm   This examination, like all CT scans performed in the [facility], was performed utilizing techniques to minimize radiation dose exposure, including the use of iterative reconstruction and automated exposure control  IV Contrast:  80 mL of iohexol (OMNIPAQUE) IMAGE QUALITY:  Diagnostic  FINDINGS: Hemisphere affected: Left  Total CBF<30% volume: 37 mL Total Tmax>6 0s volume: 125 mL Total Mismatch difference: 88 mL Total Mismatch ratio: 3 4 Hypoperfusion Index (Tmax>10s/Tmax  6s): 0 3 Additional comments:     Impression: CT perfusion performed  Data available on PACS  Workstation performed: [id number]     CT stroke alert brain    Result Date: 5/15/2023  Narrative: CT BRAIN - STROKE ALERT PROTOCOL INDICATION:   Stroke Alert  COMPARISON:  None  TECHNIQUE:  CT examination of the brain was performed  In addition to axial images, coronal reformatted images were created and submitted for interpretation  Radiation dose length product (DLP) for this visit:  924 88 mGy-cm     This examination, like all CT scans performed in the [facility], was performed utilizing techniques to minimize radiation dose exposure, including the use of iterative  reconstruction and automated exposure control  IMAGE QUALITY:  Diagnostic  FINDINGS: PARENCHYMA: Hypoattenuation and sulcal effacement in the left frontoparietal temporal and insular region No intracranial hemorrhage  No midline shift  VENTRICLES AND EXTRA-AXIAL SPACES: No hydrocephalus or extra-axial collection  VISUALIZED ORBITS: Intact globes and orbits PARANASAL SINUSES: Clear CALVARIUM AND EXTRACRANIAL SOFT TISSUES:   No lytic or blastic lesion     Impression: Late acute to early subacute left MCA territory infarct  No hemorrhagic conversion  Findings were directly discussed with Aba Baez at  7:10 PM    Workstation performed: [id number]     CTA stroke alert (head/neck)    Result Date: 5/15/2023  Narrative: CTA NECK AND BRAIN WITH CONTRAST INDICATION: Stroke Alert COMPARISON:   None  TECHNIQUE:   Post contrast imaging was performed after administration of iodinated contrast through the neck and brain  Post contrast axial 0 625 mm images timed to opacify the arterial system  3D rendering was performed on an independent workstation  MIP reconstructions performed  Coronal reconstructions were performed of the noncontrast portion of the brain  Radiation dose length product (DLP) for this visit:  552 55 mGy-cm   This examination, like all CT scans performed in the [facility], was performed utilizing techniques to minimize radiation dose exposure, including the use of iterative  reconstruction and automated exposure control  IV Contrast:  80 mL of iohexol (OMNIPAQUE) IMAGE QUALITY:   Diagnostic FINDINGS: CERVICAL VASCULATURE AORTIC ARCH AND GREAT VESSELS: No thrombus or plaque in the aortic arch  No stenosis in the subclavian arteries  RIGHT VERTEBRAL ARTERY CERVICAL SEGMENT:  Normal origin  The vessel is normal in caliber throughout the neck  LEFT VERTEBRAL ARTERY CERVICAL SEGMENT: Arises directly from the aortic arch  Physiologically small vessel  RIGHT EXTRACRANIAL CAROTID SEGMENT:  Normal caliber common carotid artery  "Normal bifurcation and cervical internal carotid artery  No stenosis or dissection  LEFT EXTRACRANIAL CAROTID SEGMENT:  Normal caliber common carotid artery  Normal bifurcation and cervical internal carotid artery  No stenosis or dissection  NASCET criteria was used to determine the degree of internal carotid artery diameter stenosis  INTRACRANIAL VASCULATURE INTERNAL CAROTID ARTERIES: No stenosis in the carotid siphons  ANTERIOR CIRCULATION:  Symmetric A1 segments and anterior cerebral arteries with normal enhancement  Normal anterior communicating artery  MIDDLE CEREBRAL ARTERY CIRCULATION: Occlusion of the proximal left M1 segment  Partial, atretic flow and some of the M2 and M3 branches  DISTAL VERTEBRAL ARTERIES:  Normal distal vertebral arteries  Posterior inferior cerebellar arteries are patent BASILAR ARTERY: Physiologically small basilar artery  Patent superior cerebellar arteries  POSTERIOR CEREBRAL ARTERIES: Fetal type bilateral posterior cerebral arteries without stenosis  VENOUS STRUCTURES: Patent dural venous sinuses  NON VASCULAR ANATOMY BONY STRUCTURES:  No lytic or blastic lesion SOFT TISSUES OF THE NECK: No mass or lymphadenopathy  THORACIC INLET: Clear lung apices     Impression: 1  Acute occlusion of the proximal left M1 segment with partial opacification of distal M2 and M3 branches  2  No evidence of thrombus, stenosis or occlusion of the carotid or vertebral arteries   Findings were directly discussed with Boni Sumner at  7:14 PM    Workstation performed: [id number]         VITALS ON DISCHARGE DATE:     Vitals  Blood Pressure: 115/66 (05/25/23 0717)  Temperature: 97 6 °F (36 4 °C) (05/25/23 0717)  Temp Source: Axillary (05/25/23 0717)  Pulse: (!) 50 (05/25/23 0717)  Respirations: 16 (05/25/23 0717)  SpO2: 99 % (05/25/23 0717)  Height: 5' 9\" (175 3 cm) (05/18/23 1505)  Weight - Scale: 62 4 kg (137 lb 9 1 oz) (05/25/23 0559)    Temp:  [97 6 °F (36 4 °C)-98 8 °F (37 1 °C)] 97 6 °F (36 4 " °C)  HR:  [49-60] 50  Resp:  [16-20] 16  BP: (115-121)/(56-67) 115/66    Weight (last 2 days)     Date/Time Weight    05/25/23 0559 62 4 (137 57)    05/24/23 0600 76 9 (169 53)    05/23/23 0553 76 (167 55)            Intake/Output Summary (Last 24 hours) at 5/25/2023 1201  Last data filed at 5/25/2023 0600  Gross per 24 hour   Intake 740 ml   Output 1450 ml   Net -710 ml       Invasive Devices     Peripheral Intravenous Line  Duration           Peripheral IV 05/23/23 Left;Ventral (anterior) Forearm 2 days                  PHYSICAL EXAM ON DAY OF DISCHARGE:       Physical Exam:     General: Pt observed lying comfortably in bed, NAD  Not toxic/ill-appearing  No cachectic or diaphoresis  No obvious sign of trauma or bleeding  Psych: AAOx4, able to converse appropriately  Denies SH/SI  Neuro: no gross neurological deficits, CN 2-12 grossly intact, 4/5 strength in RUE/RLE, expressive aphasia   Head: atraumatic, normocephalic  Eyes: open spontaneously, EOM intact, JAMIN, conjunctiva non-injected, no scleral icterus, no discharge  Ear: normal external ear, no visible drainage at external auditory orifice  Nose: clear, no epistaxis, no rhinorrhea  Throat: clear, no hoarse voice, no cough  Neck: supple, normal ROM  Heart: RRR, no murmur/distant heart sound appreciated  Lungs: LCTABL, nml respiratory effort, no agonal/labored breathing, no accessory muscle use  Abdomen: soft, nontender, nondistended, normal bowel sound  Extremities: Strong radial/pedal pulses  No weakness/paresthesia/edema  CONDITION AT DISCHARGE:   On day of discharge patient is seen and evaluated at bedside  Patient is stable and without concern  Patient denies any pain or SOB  Patient able to tolerate PO food without N/V/D and had bowel movement and baseline urine output  Patient able to ambulate independently without assistance  Patient is aware of current health status and understand plan of treatment and outpatient follow-up   The patient understood and agreed with the plan  All pertinent lab results, imaging studies, procedures, and/or any incidental findings have been disclosed to the patient  All pertinent questions are answered to patient's satisfaction  On day of discharge, the patient was hemodynamically stable and appropriate for discharge to [facility]  Condition at Discharge: good       DISCHARGE MEDICATIONS:     Discharge Medications:  See after visit summary (AVS) for detailed reconciled discharge medications, which was provided to patient and family  Summary of medication changes made with this admission:    · START:  1  ASA  2  Nicotine patch    · STOP:  1  None    · CHANGE:  1  None     · RESUME:  1  All other home medications       FOLLOW-UP APPOINTMENTS / INSTRUCTION :     Important Physician Related Follow Up:   · PCP  · Neurology  · Interventional cardiology     Appointment confirmed:  Future Appointments   Date Time Provider Mike Jenkins   9/6/2023  9:00 AM Kelsea Birch DO CV SURG [address]       Discharge instructions/Information to patient and family:   See after visit summary (AVS) for information provided to patient and family  Provisions for Follow-Up Care:  See after visit summary for information related to follow-up care and any pertinent home health orders  DISPOSITION:     Disposition: ARC     Discharge Statement   I spent 30 minutes discharging the patient  This time was spent on the day of discharge  I had direct contact with the patient on the day of discharge  Additional documentation is required if more than 30 minutes were spent on discharge       Planned Readmission: No      Poornima Bautista MD   PGY-1, Family Medicine  5/27/23  11:38 AM

## 2023-05-25 NOTE — PHYSICAL THERAPY NOTE
Physical Therapy Progress Note     05/25/23 0910   PT Last Visit   PT Visit Date 05/25/23   Note Type   Note Type Treatment for insurance authorization   Pain Assessment   Pain Assessment Tool 0-10   Pain Score No Pain   Restrictions/Precautions   Other Precautions Fall Risk   Subjective   Subjective The patient is eager to get better, and he notes that he is making progress  Transfers   Sit to Stand 6  Modified independent   Stand to Sit 6  Modified independent   Ambulation/Elevation   Gait pattern Excessively slow; Short stride; Inconsistent benitez;Decreased foot clearance;R Foot drag;Circumduction  (Circumduction and hip hiking RLE )   Gait Assistance 4  Minimal assist   Additional items Assist x 1;Verbal cues   Assistive Device None   Distance 90 feet, 160 feet, 90 feet, 40 feet x 2  Balance   Static Sitting Normal   Dynamic Sitting Good   Static Standing Fair +   Dynamic Standing Poor +   Ambulatory Poor +   Activity Tolerance   Activity Tolerance Patient tolerated treatment well   Nurse Made Aware Yes  Exercises   Marching Standing;Bilateral;AROM;20 reps   Neuro re-ed Dual-task gait, dynamic head and trunk movements  Assessment   Prognosis Excellent   Problem List Decreased strength;Decreased endurance; Impaired balance;Decreased mobility   Assessment The patient is demonstrating much improvement overall today, but he continues to have notable deficits  He utilizes circumduction, hip hiking, and lateral lean to clear his RLE during gait  He does have some dorsiflexion, but this remains weak  Standing marching also proved challenging for him, but he demonstrated some improvement with adaptation  The patient continues to have notable deficits of his RUE and speech, but these are also improving  The patient did have difficulty with dual-task activities as well as dynamic head and trunk movements with gait  He is eager to work with therapy in order to regain his independence   With his speech, occupational, and physical therapy needs he would make an exceptional intense rehab candidate  Barriers to Discharge Inaccessible home environment;Decreased caregiver support   Goals   Patient Goals To regain his independence  LTG Expiration Date 05/31/23   PT Treatment Day 3   Plan   Treatment/Interventions Functional transfer training;LE strengthening/ROM; Elevations; Therapeutic exercise; Endurance training;Patient/family training;Bed mobility;Gait training   Progress Progressing toward goals   PT Frequency 3-5x/wk   Recommendation   PT Discharge Recommendation Post acute rehabilitation services   AM-PAC Basic Mobility Inpatient   Turning in Flat Bed Without Bedrails 4   Lying on Back to Sitting on Edge of Flat Bed Without Bedrails 4   Moving Bed to Chair 4   Standing Up From Chair Using Arms 4   Walk in Room 3   Climb 3-5 Stairs With Railing 3   Basic Mobility Inpatient Raw Score 22   Basic Mobility Standardized Score 47 4   Highest Level Of Mobility   JH-HLM Goal 7: Walk 25 feet or more   JH-HLM Achieved 8: Walk 250 feet ot more         An AM-PAC Basic Mobility raw score less than 16 suggests the patient may benefit from discharge to post-acute rehab services      Jaylyn Alvarado, PTA

## 2023-05-25 NOTE — PLAN OF CARE
Problem: MOBILITY - ADULT  Goal: Maintain or return to baseline ADL function  Description: INTERVENTIONS:  -  Assess patient's ability to carry out ADLs; assess patient's baseline for ADL function and identify physical deficits which impact ability to perform ADLs (bathing, care of mouth/teeth, toileting, grooming, dressing, etc )  - Assess/evaluate cause of self-care deficits   - Assess range of motion  - Assess patient's mobility; develop plan if impaired  - Assess patient's need for assistive devices and provide as appropriate  - Encourage maximum independence but intervene and supervise when necessary  - Involve family in performance of ADLs  - Assess for home care needs following discharge   - Consider OT consult to assist with ADL evaluation and planning for discharge  - Provide patient education as appropriate  Outcome: Progressing  Goal: Maintains/Returns to pre admission functional level  Description: INTERVENTIONS:  - Perform BMAT or MOVE assessment daily    - Set and communicate daily mobility goal to care team and patient/family/caregiver  - Collaborate with rehabilitation services on mobility goals if consulted  - Perform Range of Motion 3 times a day  - Reposition patient every 2 hours    - Dangle patient 3 times a day  - Stand patient 3 times a day  - Ambulate patient 3 times a day  - Out of bed to chair 3 times a day   - Out of bed for meals 3 times a day  - Out of bed for toileting  - Record patient progress and toleration of activity level   Outcome: Progressing     Problem: Prexisting or High Potential for Compromised Skin Integrity  Goal: Skin integrity is maintained or improved  Description: INTERVENTIONS:  - Identify patients at risk for skin breakdown  - Assess and monitor skin integrity  - Assess and monitor nutrition and hydration status  - Monitor labs   - Assess for incontinence   - Turn and reposition patient  - Assist with mobility/ambulation  - Relieve pressure over bony prominences  - Avoid friction and shearing  - Provide appropriate hygiene as needed including keeping skin clean and dry  - Evaluate need for skin moisturizer/barrier cream  - Collaborate with interdisciplinary team   - Patient/family teaching  - Consider wound care consult   Outcome: Progressing     Problem: Neurological Deficit  Goal: Neurological status is stable or improving  Description: Interventions:  - Monitor and assess patient's level of consciousness, motor function, sensory function, and level of assistance needed for ADLs  - Monitor and report changes from baseline  Collaborate with interdisciplinary team to initiate plan and implement interventions as ordered  - Provide and maintain a safe environment  - Consider seizure precautions  - Consider fall precautions  - Consider aspiration precautions  - Consider bleeding precautions  Outcome: Progressing     Problem: Activity Intolerance/Impaired Mobility  Goal: Mobility/activity is maintained at optimum level for patient  Description: Interventions:  - Assess and monitor patient  barriers to mobility and need for assistive/adaptive devices  - Assess patient's emotional response to limitations  - Collaborate with interdisciplinary team and initiate plans and interventions as ordered  - Encourage independent activity per ability   - Maintain proper body alignment  - Perform active/passive rom as tolerated/ordered  - Plan activities to conserve energy   - Turn patient as appropriate  Outcome: Progressing     Problem: Communication Impairment  Goal: Ability to express needs and understand communication  Description: Assess patient's communication skills and ability to understand information  Patient will demonstrate use of effective communication techniques, alternative methods of communication and understanding even if not able to speak  - Encourage communication and provide alternate methods of communication as needed    - Collaborate with case management/ for discharge needs  - Include patient/family/caregiver in decisions related to communication  Outcome: Progressing     Problem: Potential for Aspiration  Goal: Non-ventilated patient's risk of aspiration is minimized  Description: Assess and monitor vital signs, respiratory status, and labs (WBC)  Monitor for signs of aspiration (tachypnea, cough, rales, wheezing, cyanosis, fever)  - Assess and monitor patient's ability to swallow  - Place patient up in chair to eat if possible  - HOB up at 90 degrees to eat if unable to get patient up into chair   - Supervise patient during oral intake  - Instruct patient/ family to take small bites  - Instruct patient/ family to take small single sips when taking liquids  - Follow patient-specific strategies generated by speech pathologist   Outcome: Progressing  Goal: Ventilated patient's risk of aspiration is minimized  Description: Assess and monitor vital signs, respiratory status, airway cuff pressure, and labs (WBC)  Monitor for signs of aspiration (tachypnea, cough, rales, wheezing, cyanosis, fever)  - Elevate head of bed 30 degrees if patient has tube feeding   - Monitor tube feeding  Outcome: Progressing     Problem: Nutrition  Goal: Nutrition/Hydration status is improving  Description: Monitor and assess patient's nutrition/hydration status for malnutrition (ex- brittle hair, bruises, dry skin, pale skin and conjunctiva, muscle wasting, smooth red tongue, and disorientation)  Collaborate with interdisciplinary team and initiate plan and interventions as ordered  Monitor patient's weight and dietary intake as ordered or per policy  Utilize nutrition screening tool and intervene per policy  Determine patient's food preferences and provide high-protein, high-caloric foods as appropriate  - Assist patient with eating   - Allow adequate time for meals   - Encourage patient to take dietary supplement as ordered    - Collaborate with interdisciplinary team    - Include patient/family/caregiver in decisions related to nutrition  Outcome: Progressing     Problem: Potential for Falls  Goal: Patient will remain free of falls  Description: INTERVENTIONS:  - Educate patient/family on patient safety including physical limitations  - Instruct patient to call for assistance with activity   - Consult OT/PT to assist with strengthening/mobility   - Keep Call bell within reach  - Keep bed low and locked with side rails adjusted as appropriate  - Keep care items and personal belongings within reach  - Initiate and maintain comfort rounds  - Make Fall Risk Sign visible to staff  - Offer Toileting every 2 Hours, in advance of need  - Initiate/Maintain alarm  - Obtain necessary fall risk management equipment:  - Apply yellow socks and bracelet for high fall risk patients  - Consider moving patient to room near nurses station  Outcome: Progressing     Problem: Nutrition/Hydration-ADULT  Goal: Nutrient/Hydration intake appropriate for improving, restoring or maintaining nutritional needs  Description: Monitor and assess patient's nutrition/hydration status for malnutrition  Collaborate with interdisciplinary team and initiate plan and interventions as ordered  Monitor patient's weight and dietary intake as ordered or per policy  Utilize nutrition screening tool and intervene as necessary  Determine patient's food preferences and provide high-protein, high-caloric foods as appropriate       INTERVENTIONS:  - Monitor oral intake, urinary output, labs, and treatment plans  - Assess nutrition and hydration status and recommend course of action  - Evaluate amount of meals eaten  - Assist patient with eating if necessary   - Allow adequate time for meals  - Recommend/ encourage appropriate diets, oral nutritional supplements, and vitamin/mineral supplements  - Order, calculate, and assess calorie counts as needed  - Recommend, monitor, and adjust tube feedings and TPN/PPN based on assessed needs  - Assess need for intravenous fluids  - Provide specific nutrition/hydration education as appropriate  - Include patient/family/caregiver in decisions related to nutrition  Outcome: Progressing

## 2023-05-25 NOTE — PLAN OF CARE
Problem: OCCUPATIONAL THERAPY ADULT  Goal: Performs self-care activities at highest level of function for planned discharge setting  See evaluation for individualized goals  Description: Treatment Interventions: ADL retraining, Visual perceptual retraining, Functional transfer training, UE strengthening/ROM, Endurance training, Cognitive reorientation, Patient/family training, Equipment evaluation/education, Neuromuscular reeducation, Fine motor coordination activities, Compensatory technique education, Continued evaluation, Energy conservation, Activityengagement          See flowsheet documentation for full assessment, interventions and recommendations  Outcome: Progressing  Note: Limitation: Decreased ADL status, Decreased UE strength, Decreased UE ROM, Decreased Safe judgement during ADL, Decreased cognition, Decreased endurance, Visual deficit, Decreased sensation, Decreased fine motor control, Decreased self-care trans, Decreased high-level ADLs, Non-func R UE  Prognosis: Fair  Assessment: Pt seen for participation in Occupational Therapy session with focus on activity tolerance, functional transfers/mob, sitting balance and tolerance and standing tolerance and balance for pt engagement in UB/LB self-care tasks and energy conservation techniques  Pt cleared by RN/ Carson for pt participated in OT session  Pt presented sitting out of bed to bathroom upon initiation of OT session and agreeable to participate in therapy following pt identifiers confirmed  Pt was unable to report his therapy goal 2* pt language impairments  Pt required assist for AM self-care tasks 2* cognitive impairment  Pt will require post acute rehab service to continue to address these above noted pt deficit which currently impair pt ADL and functional mob  Pt return to sitting out of bed to bedside chair post session, and all needs within reach    Recommendation: Physiatry Consult  OT Discharge Recommendation: Post acute rehabilitation services

## 2023-05-26 LAB — SARS-COV-2 RNA RESP QL NAA+PROBE: NEGATIVE

## 2023-05-26 RX ADMIN — ASPIRIN 81 MG CHEWABLE TABLET 81 MG: 81 TABLET CHEWABLE at 09:01

## 2023-05-26 RX ADMIN — CHLORHEXIDINE GLUCONATE 0.12% ORAL RINSE 15 ML: 1.2 LIQUID ORAL at 09:01

## 2023-05-26 RX ADMIN — ENOXAPARIN SODIUM 40 MG: 40 INJECTION SUBCUTANEOUS at 09:01

## 2023-05-26 RX ADMIN — NICOTINE 1 PATCH: 14 PATCH, EXTENDED RELEASE TRANSDERMAL at 09:01

## 2023-05-26 NOTE — PLAN OF CARE
Problem: PHYSICAL THERAPY ADULT  Goal: Performs mobility at highest level of function for planned discharge setting  See evaluation for individualized goals  Description: Treatment/Interventions: Functional transfer training, LE strengthening/ROM, Therapeutic exercise, Endurance training, Patient/family training, Equipment eval/education, Bed mobility, Gait training, OT, Spoke to nursing          See flowsheet documentation for full assessment, interventions and recommendations  Outcome: Progressing  Note: Prognosis: Excellent  Problem List: Decreased strength, Decreased endurance, Impaired balance, Decreased mobility  Assessment: The patient continues to demonstrate weakness in his RLE as well as deficits in motor planning with dynamic activities  These are more pronounced with activities looking or turning to the left side  He is challenged with reaching for objects off of the floor as well  The patient continues to have increasing foot drag as he fatigues, but he does attempt to compensate for this  He continues to have difficulty with speech, and continue to recommend an aggressive rehab regimen at discharge  Barriers to Discharge: Inaccessible home environment, Decreased caregiver support     PT Discharge Recommendation: Post acute rehabilitation services    See flowsheet documentation for full assessment

## 2023-05-26 NOTE — PROGRESS NOTES
PHYSICAL MEDICINE AND REHABILITATION   PREADMISSION ASSESSMENT     Projected [facility] Diagnoses:  Impairment of mobility, safety and Activities of Daily Living (ADLs) due to Stroke:  01 1  Left Body Involvement (Right Brain)  Etiologic: L MCA stroke w/ LM1 partial occlusion  Date of Onset: 5/15/23   Date of surgery: n/a    PATIENT INFORMATION  Name: Kelly Ford Phone #: [phone] (home)   Address: [address]  YOB: 1980 Age: 40 y o  SS#   Marital Status: Single  Ethnicity: Patient Refused/Declined to Answer  Employment Status: currently employed  Extended Emergency Contact Information  Primary Emergency Contact: TateRob  Mobile Phone: [phone]  Relation: Father  Secondary Emergency Contact: Joi Ambrocio  Mobile Phone: [phone]  Relation: Sister  Advance Directive: Level 1 Full Code (no ACP docs)     INSURANCE/COVERAGE:     Primary Payor: Bretta Nose / Plan: [id number] / Product Type: Blue Fee for Service /   Secondary Payer: Self Pay   Payer Contact:  Payer Contact:   Contact Phone:   Fax: [phone] Contact Phone:     Authorization #:  [id number]  Coverage Dates: 5/25 - 5/31  LCD: 5/31/23   Medical Record #: [id number]    REFERRAL SOURCE:   Referring provider: Paola Atkinson MD  Referring facility: [address] [place] [address]   Room: [address] [place] [address]  PCP: Bran Siddiqui DO PCP phone number: [phone]    MEDICAL INFORMATION  HPI: Pt is a  40 y o  male with no significant past medical history patient presented to the [address] [place] [address] on 5/15 with right facial droop and right upper extremity greater than right lower extremity weakness  He was on the phone with a friend and was supposed to meet up with them however did not show up and would not answer his phone prompting the friend to call EMS    On arrival CTh was completed without contrast showing a left MCA stroke and a CTA was completed of the head and neck showing and left M1 partial occlusion however was not a TNK candidate due to timing  He underwent a left MCA thrombectomy with Dr Radha Fregoso on 5/15  Course further complicated by seizures  Pathology suspected to be a proximal embolism given findings  Repeat CT head in the morning of 5/16 demonstrated continued edema and sulcal effacement on the left similar to previous study, improvement of contrast staining versus possible hemorrhage  Subsequently, after initial examination in the morning of 5/16 patient noted to have seizure that consisted of eye deviation to the right followed by postictal period  STAT CTH demonstrated no acute change, evolving known left MCA infarct  MRI was completed showing a large left MCA territory infarct involving the left frontoparietal, temporal lobes left insula left lateral putamen posterior left insular capsule and corona radiata with associated edema and regional mass effect without midline shift  Video EEG started with no seizures captured, and subsequently discontinued  Patient underwent ALYSSA on 5/18 and found to have a large PFO  Lower duplex studies were negative  Possibly due to post-COVID hypercoagulable state from infection back in February  Ruled out occult malignancy  Positive COVID 5/15, asymptomatic  Recent COVID infection in February  Discussed with infection control, no need for contact precautions  Upon review of patient’s case and correspondence with PT/OT therapies and PM&R services, [facility] physician feels patient will benefit and is a good candidate / appropriate for inpatient acute rehab at this time  Patient has demonstrated the willingness / desire and tolerance to participate in the required 3 hours or more of therapies per day  No vaccines on file; Covid test***      Past Medical History:   Past Surgical History: Allergies:     History reviewed  No pertinent past medical history   Past Surgical History:   Procedure Laterality Date   • IR STROKE ALERT  5/15/2023     No Known Allergies      Medical/functional conditions requiring inpatient rehabilitation: L MCA stroke, PFO, Seizure, Decreased mobility, Decreased self care, Decreased strength/endurance    Risk for medical/clinical complications:  Risk for falls, Risk for skin breakdown secondary to decreased mobility, Risk for additional infarcts or extension of stroke, Risk of seizure    Comorbidities: L MCA, PFO, Covid, Seizure    Surgeries in the last 100 days:  left MCA thrombectomy with Dr Jayme Loomis on 5/15  CURRENT VITAL SIGNS:   Temp:  [98 4 °F (36 9 °C)] 98 4 °F (36 9 °C)  HR:  [50-67] 53  Resp:  [18] 18  BP: (106-112)/(62-64) 107/64   Intake/Output Summary (Last 24 hours) at 5/26/2023 1633  Last data filed at 5/26/2023 0701  Gross per 24 hour   Intake --   Output 850 ml   Net -850 ml        LABORATORY RESULTS:      Lab Results   Component Value Date    HCT 36 7 05/24/2023    HGB 13 0 05/24/2023    WBC 5 09 05/24/2023     Lab Results   Component Value Date    BUN 10 05/24/2023     05/24/2023    CREATININE 0 77 05/24/2023    K 3 9 05/24/2023     Lab Results   Component Value Date    INR 0 89 05/15/2023    PROTIME 12 3 05/15/2023        DIAGNOSTIC STUDIES:  CT chest abdomen pelvis w contrast    Result Date: 5/20/2023  Impression: No evidence of malignancy to account for hypercoagulable state  Moderate bilateral pleural effusions, small amount of ascites, and diffuse body wall subcutaneous edema  Workstation performed: [id number]     CT head wo contrast    Result Date: 5/19/2023  Impression: Continued evolution of left MCA territory infarct as above with stable mass effect  No significant midline shift Question small volume petechial hemorrhage left inferior parietal and temporal region  No billie parenchymal hemorrhage  Continued short-term interval follow-up advised  Study was marked in [facility] for immediate notification   Workstation performed: [id number]     XR ankle 3+ vw right    Result Date: 5/17/2023  Impression: No acute osseous abnormality  Workstation performed: [id number]     MRI brain seizure wo and w contrast    Addendum Date: 5/17/2023    ADDENDUM: Correcting comparison study: Head CT 5/16/2023  Result Date: 5/17/2023  Impression: 1  Recent large left MCA territory infarct with associated edema and a few foci of petechial hemorrhage  There is regional mass effect without midline shift  2   Cannot reliably evaluate the infarcted brain for structural abnormality  No structural abnormality is identified in the remainder brain  Normal hippocampal formations  Workstation performed: [id number]     CT head wo contrast    Result Date: 5/17/2023  Impression: Grossly stable recent large left MCA territory infarct with regional mass effect without midline shift  No acute hemorrhage  Workstation performed: [id number]     XR chest portable ICU    Result Date: 5/16/2023  Impression: No acute cardiopulmonary disease  Right jugular catheter in lower SVC with no pneumothorax  Workstation performed: [id number]     CT head wo contrast    Result Date: 5/16/2023  Impression: Evolving left MCA infarct with increased cortical hypodensity and stable sulcal effacement/mass effect throughout the left lobe  No acute intracranial hemorrhage or midline shift  Workstation performed: [id number]     CT head wo contrast    Result Date: 5/16/2023  Impression: Compared to the most immediate prior study, significant interval decrease/near total resolution of the previously noted extensive left hemispheric hyperdensity involving the cortex and subcortical white matter and left basal ganglia  Minimal residual hyperdensity in the left basal ganglia region  Overall these findings suggest resolving contrast staining post endovascular therapy although small amount of superimposed hemorrhage is difficult to entirely exclude   Recommend short-term interval follow-up  scan and/or further evaluation with MRI for definitive evaluation  Stable left hemispheric edema and sulcal effacement without significant midline shift  Above findings discussed with Dr Jose Rafael Moon at 4:30 a m  on 5/16/2023  Workstation performed: [id number]     CT head wo contrast    Result Date: 5/15/2023  Impression: New extensive left hemispheric intraparenchymal and subarachnoid hemorrhage with mass effect upon the adjacent brain parenchyma and associated cerebral edema predominantly in the left MCA vascular distribution  No significant midline shift or evidence of  transtentorial herniation noted  Findings discussed with Dr Anibal Sanchez at 10:30 p m  Workstation performed: [id number]     CT cerebral perfusion    Result Date: 5/15/2023  Impression: CT perfusion performed  Data available on PACS  Workstation performed: [id number]     CT stroke alert brain    Result Date: 5/15/2023  Impression: Late acute to early subacute left MCA territory infarct  No hemorrhagic conversion  Findings were directly discussed with Juma Bowser at  7:10 PM    Workstation performed: [id number]     CTA stroke alert (head/neck)    Result Date: 5/15/2023  Impression: 1  Acute occlusion of the proximal left M1 segment with partial opacification of distal M2 and M3 branches  2  No evidence of thrombus, stenosis or occlusion of the carotid or vertebral arteries   Findings were directly discussed with Juma Bowser at  7:14 PM    Workstation performed: [id number]       PRECAUTIONS/SPECIAL NEEDS:  Anticoagulation:  aspirin 81 mg orally every day and Lovenox, Edema Management, Safety Concerns, Pain Management, Bladder Incontinence: # of accidents 6, Bowel Incontinence: # of accidents 1, Dietary Restrictions: Vegetarian and Language Preference: English    MEDICATIONS:     Current Facility-Administered Medications:   •  acetaminophen (TYLENOL) tablet 975 mg, 975 mg, Oral, Q8H PRN, Adalid Ace DO, 975 mg at 05/22/23 1157  •  aspirin chewable tablet 81 mg, 81 mg, Oral, Daily, Ang Courtney Dub, DO, 81 mg at 23 0901  •  chlorhexidine (PERIDEX) 0 12 % oral rinse 15 mL, 15 mL, Mouth/Throat, Q12H [address], Ang Courtney Dub, DO, 15 mL at 23 0901  •  enoxaparin (LOVENOX) subcutaneous injection 40 mg, 40 mg, Subcutaneous, Q24H [address], Ang Courtney Dub, DO, 40 mg at 23 0901  •  hydrALAZINE (APRESOLINE) injection 10 mg, 10 mg, Intravenous, Q6H PRN, Ang Courtney Dub, DO, 10 mg at 23 9194  •  labetalol (NORMODYNE) injection 10 mg, 10 mg, Intravenous, Q4H PRN, Ang Courtney Dub, DO, 10 mg at 23 1102  •  nicotine (NICODERM CQ) 14 mg/24hr TD 24 hr patch 1 patch, 1 patch, Transdermal, Daily, Ang Courtney Dub, DO, 1 patch at 23 09    SKIN INTEGRITY:   no rashes, no erythema, no peripheral edema    PRIOR LEVEL OF FUNCTION:  He lives in [facility] apartment  Armando Stubbs is single and lives alone  Self Care: Independent, Indoor Mobility: Independent, Stairs (in/outdoor): Independent and Cognition: Independent    FALLS IN THE LAST 6 MONTHS: 0    HOME ENVIRONMENT:  The living area: can live on one level  There are No steps to enter the home  The patient will not have 24 hour supervision/physical assistance available upon discharge      PREVIOUS DME:  Equipment in home (previous DME): None    FUNCTIONAL STATUS:***  Physical Therapy Occupational Therapy Speech Therapy          CARE SCORES:  Self Care:  Eating: {ARC Pre Admission Screenin}  Oral hygiene: {ARC Pre Admission Screenin}  Toilet hygiene: {ARC Pre Admission Screenin}  Shower/bathing self: {ARC Pre Admission Screenin}  Upper body dressing: {ARC Pre Admission Screenin}  Lower body dressing: {ARC Pre Admission Screenin}  Putting on/taking off footwear: {ARC Pre Admission Screenin}  Transfers:  Roll left and right: {ARC Pre Admission Screenin}  Sit to lying: {ARC Pre Admission Screenin}  Lying to sitting on side of bed: {ARC Pre Admission Screenin}  Sit to stand: {ARC Pre Admission Screenin}  Chair/bed to chair transfer: {ARC Pre Admission Screenin}  Toilet transfer: {ARC Pre Admission Screenin}  Mobility:  Walk 10 ft: {ARC Pre Admission Screenin}  Walk 50 ft with two turns: {ARC Pre Admission Screenin}  Walk 150ft: {ARC Pre Admission Screenin}    CURRENT GAP IN FUNCTION  Prior to Admission: Functional Status: Patient was independent with mobility/ambulation, transfers, ADL's, IADL's  Expected functional outcomes: It is expected that with skilled acute rehabilitation services the patient will progress to Supervision for self care and Supervision for mobility     Estimated length of stay: 10 to 14 days    Anticipated Post-Discharge Disposition/Treatment  Disposition: Return to previous home/apartment  Outpatient Services: Physical Therapy (PT), Occupational Therapy (OT) and Speech Therapy    BARRIERS TO DISCHARGE  Lovenox, Weakness, Pain, Balance Difficulty, Fatigue, Home Accessibility, Caregiver Accessibility, Equipment Needs and Lives Alone    INTERVENTIONS FOR DISCHARGE  Adaptive equipment, Patient/Family/Caregiver Education, [employer], Arrange DME needs, Therapy exercises and Energy conservation education     REQUIRED THERAPY:  Patient will require PT, OT and ST 60 minutes each per day, five days per week to achieve rehab goals  REQUIRED FUNCTIONAL AND MEDICAL MANAGEMENT FOR INPATIENT REHABILITATION:  Skin:  Monitor skin for breakdown secondary to decreased mobility, Pain Management: Overall pain is moderately controlled, Deep Vein Thrombosis (DVT) Prophylaxis:  Per MD orders, further internal medicine management of additional medical conditions while on ARC, PT/OT intervention, patient/family education and training, and any needed consults PRN      RECOMMENDED LEVEL OF CARE:    Pt is a  40 y o  male with no significant past medical history patient presented to the [facility] on 5/15 with right facial droop and right upper extremity greater than right lower extremity weakness  He was on the phone with a friend and was supposed to meet up with them however did not show up and would not answer his phone prompting the friend to call EMS  On arrival CTh was completed without contrast showing a left MCA stroke and a CTA was completed of the head and neck showing and left M1 partial occlusion however was not a TNK candidate due to timing  He underwent a left MCA thrombectomy with Dr Laurent Crawford on 5/15  Course further complicated by seizures  Pathology suspected to be a proximal embolism given findings  Repeat CT head in the morning of 5/16 demonstrated continued edema and sulcal effacement on the left similar to previous study, improvement of contrast staining versus possible hemorrhage  Subsequently, after initial examination in the morning of 5/16 patient noted to have seizure that consisted of eye deviation to the right followed by postictal period  STAT CTH demonstrated no acute change, evolving known left MCA infarct  MRI was completed showing a large left MCA territory infarct involving the left frontoparietal, temporal lobes left insula left lateral putamen posterior left insular capsule and corona radiata with associated edema and regional mass effect without midline shift  Video EEG started with no seizures captured, and subsequently discontinued  Patient underwent ALYSSA on 5/18 and found to have a large PFO  Lower duplex studies were negative  Possibly due to post-COVID hypercoagulable state from infection back in February  Ruled out occult malignancy  Patient was independent with mobility/ambulation, transfers, ADL's, IADL's  Pt lives alone in an apartment with no HONEY  Pt is currently functioning below baseline needing *** A for ADLs and ** A for transfers/amb   Pt would benefit from [facility] Model admission to have close medical management while participating in 3 hours of therapy per day that will include physical and occupational therapy  Physical and occupational therapists will address functional mobility deficits and assist patient in improving their strength, endurance, ROM, and self care  Pt will have 24/7 nursing care to monitor routine vitals, I/Os, skin integrity, and overall condition  The rehab nursing staff will follow therapy recommendations to have 24 hour follow through during non-therapy hours  PM&R to maximize function and provide medical oversight  The MD will monitor patient co-morbidities while on the unit as well as order any additional tests, labs, and consults needed  [facility] specialized interdisciplinary team will meet weekly to discuss patient overall medical status and rehab goals in preparation for D/C home  Inpatient acute rehab is recommended for patient to maximize overall strength, endurance, self care, and mobility for a safe and timely transition back home

## 2023-05-26 NOTE — CASE MANAGEMENT
Liset Moncada 51 has received approved authorization from insurance:  [address]    Emailed in by Rep: Lennox Beal  Authorization received for: Acute Rehab  Facility: [address] [place] [address] #: [id number]  Start of Care: 5/25  Next Review Date: 5/31  Continued Stay Care Coordinator: randy Torres, none assigned at this time   Submit next review to: fax# [phone]  Care Manager notified: Mandy May

## 2023-05-26 NOTE — PHYSICAL THERAPY NOTE
Physical Therapy Progress Note     05/26/23 1105   PT Last Visit   PT Visit Date 05/26/23   Note Type   Note Type Treatment   Pain Assessment   Pain Assessment Tool 0-10   Pain Score No Pain   Restrictions/Precautions   Other Precautions Fall Risk   Subjective   Subjective The patient is eager to get to rehab  Bed Mobility   Supine to Sit 6  Modified independent   Additional items Increased time required   Sit to Supine 6  Modified independent   Additional items Increased time required   Transfers   Sit to Stand 6  Modified independent   Stand to Sit 6  Modified independent   Ambulation/Elevation   Gait pattern Excessively slow; Step to;Short stride; Inconsistent benitez;Circumduction;Decreased foot clearance;R Foot drag  (Circumduction and hip hiking RLE )   Gait Assistance 4  Minimal assist   Additional items Assist x 1   Assistive Device None   Distance 150 feet x 2, 100 feet x 2  Balance   Static Sitting Normal   Dynamic Sitting Good   Static Standing Fair +   Dynamic Standing Fair -   Ambulatory Poor +   Activity Tolerance   Activity Tolerance Patient tolerated treatment well   Exercises   Neuro re-ed Dynamic head and trunk movements as well as picking up objects off of the floor  Patient with more difficulty turning head and trunk to the left  Assessment   Prognosis Excellent   Problem List Decreased strength;Decreased endurance; Impaired balance;Decreased mobility   Assessment The patient continues to demonstrate weakness in his RLE as well as deficits in motor planning with dynamic activities  These are more pronounced with activities looking or turning to the left side  He is challenged with reaching for objects off of the floor as well  The patient continues to have increasing foot drag as he fatigues, but he does attempt to compensate for this  He continues to have difficulty with speech, and continue to recommend an aggressive rehab regimen at discharge     Barriers to Discharge Inaccessible home environment;Decreased caregiver support   Goals   Patient Goals To regain his independence  LTG Expiration Date 05/31/23   PT Treatment Day 4   Plan   Treatment/Interventions Functional transfer training;LE strengthening/ROM; Elevations; Therapeutic exercise; Endurance training;Patient/family training;Bed mobility;Gait training   Progress Progressing toward goals   PT Frequency 3-5x/wk   Recommendation   PT Discharge Recommendation Post acute rehabilitation services   AM-PAC Basic Mobility Inpatient   Turning in Flat Bed Without Bedrails 4   Lying on Back to Sitting on Edge of Flat Bed Without Bedrails 4   Moving Bed to Chair 4   Standing Up From Chair Using Arms 4   Walk in Room 3   Climb 3-5 Stairs With Railing 3   Basic Mobility Inpatient Raw Score 22   Basic Mobility Standardized Score 47 4   Highest Level Of Mobility   JH-HLM Goal 7: Walk 25 feet or more   JH-HLM Achieved 8: Walk 250 feet ot more         An AM-PAC Basic Mobility raw score less than 16 suggests the patient may benefit from discharge to post-acute rehab services      Ashleigh Guzman, PTA

## 2023-05-26 NOTE — PROGRESS NOTES
PROGRESS NOTE - [address] 1980, 40 y o  male  MRN: [id number]    Unit/Bed#: [facility] 727-01 Encounter: [id number]  Primary Care Provider: Dick Pozo DO      Admission Date: 5/15/2023  Length of Stay: 11 days  Code Status:  Level 1 - Full Code  Diet: Diet Regular; Vegetarian  Consult:   IP CONSULT TO NEUROLOGY  IP CONSULT TO NEUROSURGERY  IP CONSULT TO CASE MANAGEMENT  IP CONSULT TO NEUROLOGY  IP CONSULT TO PHYSICAL MEDICINE REHAB  IP CONSULT TO NUTRITION SERVICES  IP CONSULT TO CASE MANAGEMENT  IP CONSULT TO INTREVENTIONAL CARDIOLOGY  IP CONSULT TO NUTRITION SERVICES      Assessment/Plan :     Discussed with [facility] [employer] team and finalization is pending attending physician attestation  * L MCA stroke w/ LM1 partial occlusion  Assessment & Plan  Patient presented as a stroke alert 5/15 with right-sided hand neglect, facial droop, RUE flaccid paralysis and expressive aphasia  NIHSS score 16  CTH showed Acute ischemic left MCA stroke, status post thrombectomy 5/15 with TICI 3 revascularization  Imaging:   · [facility] 5/15: Late acute to early subacute left MCA territory infarct  No hemorrhagic conversion  · CTA head/neck 5/15: Acute occlusion of the proximal left M1 segment with partial opacification of distal M2 and M3 branches  · CTH 5/15: New extensive left hemispheric intraparenchymal and subarachnoid hemorrhage with mass effect upon the adjacent brain parenchyma and associated cerebral edema predominantly in the left MCA vascular distribution  No significant midline shift or evidence of transtentorial herniation noted  · [facility] 5/16: Grossly stable recent large left MCA territory infarct with regional mass effect without midline shift  No acute hemorrhage  · MRI Brain w/ contrast 5/17/23: Recent large left MCA territory infarct with associated edema and a few foci of petechial hemorrhage  There is regional mass effect without midline shift   Cannot reliably evaluate the infarcted brain for structural abnormality  No structural abnormality is identified in the remainder brain  Normal hippocampal formations  · [facility] 5/19: Continued evolution of left MCA territory infarct as above with stable mass effect  No significant midline shift  Question small volume petechial hemorrhage left inferior parietal and temporal region  No billie parenchymal hemorrhage  · CT CAP: No evidence of malignancy to account for hypercoagulable state  · ALYSSA- EF 55%, large patent PFO with bidirectional flow with saline contrast injection  Left atrial appendate with wind sock appearance with no thrombus  Assessment:  Young 40 YOM with no significant PMH presented for stroke  Possibly due to post-COVID hypercoagulable state from infection back in February  Ruled out occult malignancy, does have large and patent PFO but negative for DVT   Hypercoagulable workup in progress    Medically stable, discharge to [address] [place] [address] E:  · Neurosurgery signed off  · Neurology, appreciate recs - can be discharged on ASA, will need outpatient neurology follow-up and cardiology follow-up for PFO  · Maintain -150, normotension  · Maintain normal Na - will discontinue salt tabs given stable results   · [facility]shift neuro checks   · Intervention cardiology consulted for PFO - outpatient follow-up   · Continue ASA 81mg daily, DVT ppx  · Keppra 750mg BID through 5/25 - will discontinue afterwards  · FU hypercoagulable workup  - Homocystine normal  - BELEM normal  - RF normal  - ANCA negative  - dsDNA negative  - MTHFR pending  - Factor V Leiden negative  - Lupus anticoagulant negative  - Thrombosis panel  - Antithrombin III 81, likely this is not clinically relevant as it is higher than 80%  - Protein S decreased at 65%  - Protein C normal  - Repeat thrombosis panel in 3 months recommended  - Cannot rule out that February 2023 COVID infection causing hypercoagulable state      PFO (patent foramen ovale)  Assessment & Plan  5/18 ALYSSA- EF 55%, large patent PFO with bidirectional flow with saline contrast injection  Left atrial appendate with wind sock appearance with no thrombus  · Interventional cardiology consulted - outpatient follow-up in 4-6 weeks     Seizure [facility])  Assessment & Plan  Seizure noted on 5/16 - eye deviation to right followed by postictal state  · Stat CTH no acute change, evolving known left MCA infarct  · cVEEG with no further seizure activities  Plan:  · Keppra 750 mg through 5/25 - now d/c'ed    COVID-resolved as of 5/25/2023  Assessment & Plan  Positive COVID 5/15, asymptomatic   Recent COVID infection in February  Discussed with infection control, no need for contact precautions   Monitor respiratory status         Principal Problem:    L MCA stroke w/ LM1 partial occlusion  Active Problems:    Seizure (HCC)    PFO (patent foramen ovale)      VTE Pharm PPX: Enoxaparin (Lovenox)  VTE Mech PPX: sequential compression device and/or foot pump applied unless otherwise contraindicated       Hospital Course & 24hr events:       Overnight/24hr events:  Patient without acute events overnight per sign-out  No concern or report per nursing staff  Patient seen and examined at bedside and without questions or concerns  Subjective  & Review of Systems:     Review of Systems   Constitutional: Negative for chills and fever  HENT: Negative for ear pain and sore throat  Eyes: Negative for pain and visual disturbance  Respiratory: Negative for cough and shortness of breath  Cardiovascular: Negative for chest pain and palpitations  Gastrointestinal: Negative for abdominal pain and vomiting  Genitourinary: Negative for dysuria and hematuria  Musculoskeletal: Negative for arthralgias and back pain  Skin: Negative for color change and rash  Neurological: Positive for weakness  Negative for seizures and syncope  All other systems reviewed and are negative            Objective :     Vitals:    Vitals:    05/25/23 1908 05/25/23 2204 05/26/23 0600 05/26/23 0732   BP: 112/62 106/62  107/64   Pulse: 67 59  (!) 50   Resp:    18   Temp:  98 4 °F (36 9 °C)     TempSrc:       SpO2: 95% 95%  97%   Weight:   62 5 kg (137 lb 12 6 oz)    Height:         Temp:  [98 °F (36 7 °C)-98 4 °F (36 9 °C)] 98 4 °F (36 9 °C)  HR:  [50-67] 50  Resp:  [18] 18  BP: (106-114)/(62-73) 107/64  Weight (last 2 days)     Date/Time Weight    05/26/23 0600 62 5 (137 79)    05/25/23 0559 62 4 (137 57)    05/24/23 0600 76 9 (169 53)          Intake/Output Summary (Last 24 hours) at 5/26/2023 1506  Last data filed at 5/26/2023 0701  Gross per 24 hour   Intake --   Output 1100 ml   Net -1100 ml     Invasive Devices     Peripheral Intravenous Line  Duration           Peripheral IV 05/23/23 Left;Ventral (anterior) Forearm 3 days                Labs: I have personally reviewed pertinent reports        Results from last 7 days   Lab Units 05/24/23  0544 05/23/23  0602 05/22/23  0452 05/21/23  0542 05/20/23  0435   HEMATOCRIT % 36 7 33 8* 29 9* 29 5* 30 1*   HEMOGLOBIN g/dL 13 0 12 2 10 7* 10 1* 10 1*   NEUTROS ABS Thousands/µL 2 09 2 82 2 56 2 34 3 90   PLATELETS Thousands/uL 282 269 199 172 156   WBC Thousand/uL 5 09 5 30 4 67 4 01* 5 37       Results from last 7 days   Lab Units 05/24/23  0544 05/23/23  0602 05/22/23  0452 05/21/23  0543 05/21/23  0542 05/21/23  0245 05/20/23  1917 05/20/23  1320 05/20/23  0436 05/20/23  0435   BUN mg/dL 10 7 4* 3*  --  3* 3* 3*   < >  --    CALCIUM mg/dL 8 7 8 6 8 1* 7 8*  --  7 7* 8 1* 7 8*   < >  --    CHLORIDE mmol/L 108 109* 111* 119*  --  120* 120* 120*   < >  --    CO2 mmol/L 30 30 29 26  --  27 24 24   < >  --    CREATININE mg/dL 0 77 0 62 0 57* 0 50*  --  0 46* 0 48* 0 55*   < >  --    EGFR ml/min/1 73sq m 112 123 127 134  --  139 136 129   < >  --    POTASSIUM mmol/L 3 9 3 5 3 6 3 6  --  3 6 3 3* 3 6   < >  --    MAGNESIUM mg/dL  --   --  2 0  --  1 8  --   --   --   --  2 0   PHOSPHORUS mg/dL  --   --  4 4  --  3 7  -- --   --   --  2 4*    < > = values in this interval not displayed  EKG, Pathology, Imaging, and Other Studies:          CT chest abdomen pelvis w contrast    Result Date: 5/20/2023  Impression: No evidence of malignancy to account for hypercoagulable state  Moderate bilateral pleural effusions, small amount of ascites, and diffuse body wall subcutaneous edema  Workstation performed: [id number]     CT head wo contrast    Result Date: 5/19/2023  Impression: Continued evolution of left MCA territory infarct as above with stable mass effect  No significant midline shift Question small volume petechial hemorrhage left inferior parietal and temporal region  No billie parenchymal hemorrhage  Continued short-term interval follow-up advised  Study was marked in [facility] for immediate notification  Workstation performed: [id number]     XR ankle 3+ vw right    Result Date: 5/17/2023  Impression: No acute osseous abnormality  Workstation performed: [id number]     MRI brain seizure wo and w contrast    Addendum Date: 5/17/2023    ADDENDUM: Correcting comparison study: Head CT 5/16/2023  Result Date: 5/17/2023  Impression: 1  Recent large left MCA territory infarct with associated edema and a few foci of petechial hemorrhage  There is regional mass effect without midline shift  2   Cannot reliably evaluate the infarcted brain for structural abnormality  No structural abnormality is identified in the remainder brain  Normal hippocampal formations  Workstation performed: [id number]     CT head wo contrast    Result Date: 5/17/2023  Impression: Grossly stable recent large left MCA territory infarct with regional mass effect without midline shift  No acute hemorrhage  Workstation performed: [id number]     XR chest portable ICU    Result Date: 5/16/2023  Impression: No acute cardiopulmonary disease  Right jugular catheter in lower SVC with no pneumothorax   Workstation performed: [id number]     CT head wo contrast    Result Date: 5/16/2023  Impression: Evolving left MCA infarct with increased cortical hypodensity and stable sulcal effacement/mass effect throughout the left lobe  No acute intracranial hemorrhage or midline shift  Workstation performed: [id number]     CT head wo contrast    Result Date: 5/16/2023  Impression: Compared to the most immediate prior study, significant interval decrease/near total resolution of the previously noted extensive left hemispheric hyperdensity involving the cortex and subcortical white matter and left basal ganglia  Minimal residual hyperdensity in the left basal ganglia region  Overall these findings suggest resolving contrast staining post endovascular therapy although small amount of superimposed hemorrhage is difficult to entirely exclude  Recommend short-term interval follow-up  scan and/or further evaluation with MRI for definitive evaluation  Stable left hemispheric edema and sulcal effacement without significant midline shift  Above findings discussed with Dr Jay Gomez at 4:30 a m  on 5/16/2023  Workstation performed: [id number]     CT head wo contrast    Result Date: 5/15/2023  Impression: New extensive left hemispheric intraparenchymal and subarachnoid hemorrhage with mass effect upon the adjacent brain parenchyma and associated cerebral edema predominantly in the left MCA vascular distribution  No significant midline shift or evidence of  transtentorial herniation noted  Findings discussed with Dr Raj Garcia at 10:30 p m  Workstation performed: [id number]     CT cerebral perfusion    Result Date: 5/15/2023  Impression: CT perfusion performed  Data available on PACS  Workstation performed: [id number]     CT stroke alert brain    Result Date: 5/15/2023  Impression: Late acute to early subacute left MCA territory infarct  No hemorrhagic conversion   Findings were directly discussed with Brad Guillen at  7:10 PM    Workstation performed: [id number]     CTA stroke alert (head/neck)    Result Date: 5/15/2023  Impression: 1  Acute occlusion of the proximal left M1 segment with partial opacification of distal M2 and M3 branches  2  No evidence of thrombus, stenosis or occlusion of the carotid or vertebral arteries  Findings were directly discussed with Vanessa Smith at  7:14 PM    Workstation performed: [id number]         Inpatient medications:     Current Facility-Administered Medications   Medication Dose Route Frequency   • acetaminophen (TYLENOL) tablet 975 mg  975 mg Oral Q8H PRN   • aspirin chewable tablet 81 mg  81 mg Oral Daily   • chlorhexidine (PERIDEX) 0 12 % oral rinse 15 mL  15 mL Mouth/Throat Q12H BRANDON   • enoxaparin (LOVENOX) subcutaneous injection 40 mg  40 mg Subcutaneous Q24H BRANDON   • hydrALAZINE (APRESOLINE) injection 10 mg  10 mg Intravenous Q6H PRN   • labetalol (NORMODYNE) injection 10 mg  10 mg Intravenous Q4H PRN   • nicotine (NICODERM CQ) 14 mg/24hr TD 24 hr patch 1 patch  1 patch Transdermal Daily         Physical Exam :     Physical Exam  Vitals reviewed  Constitutional:       General: He is not in acute distress  Appearance: Normal appearance  HENT:      Head: Normocephalic and atraumatic  Nose: Nose normal    Eyes:      Extraocular Movements: Extraocular movements intact  Conjunctiva/sclera: Conjunctivae normal    Cardiovascular:      Rate and Rhythm: Normal rate and regular rhythm  Heart sounds: No murmur heard  Pulmonary:      Effort: Pulmonary effort is normal  No respiratory distress  Breath sounds: Normal breath sounds  Abdominal:      General: Abdomen is flat  Bowel sounds are normal       Palpations: Abdomen is soft  Tenderness: There is no abdominal tenderness  Musculoskeletal:         General: No tenderness  Normal range of motion  Cervical back: Normal range of motion and neck supple  Neurological:      Mental Status: He is alert and oriented to person, place, and time        Cranial Nerves: No cranial nerve deficit  Motor: Weakness (4/5 strength of RUE/RLE, 5/5 strength in LUE/LLE) present        Gait: Gait abnormal       Comments: Expressive aphasia   Psychiatric:         Mood and Affect: Mood normal          Behavior: Behavior normal                Julio Cook MD  PGY-2, Family Medicine  05/26/23  3:06 PM

## 2023-05-26 NOTE — SPEECH THERAPY NOTE
"Speech Language/Pathology    Speech/Language Pathology Progress Note    Patient Name: Wagner Espitia  KIKQY'D Date: 5/26/2023     Problem List  Principal Problem:    L MCA stroke w/ LM1 partial occlusion  Active Problems:    Seizure (HCC)    PFO (patent foramen ovale)       Past Medical History  History reviewed  No pertinent past medical history  Past Surgical History  Past Surgical History:   Procedure Laterality Date   • IR STROKE ALERT  5/15/2023         Subjective: \"Thank you\"  Pt awake and alert  Objective:  Pt seen for f/u speech and language therapy  Pt continues to make progress with his speech  Areas addressed include repetition and sentence completion  Repetition included 3-4 word phrases  Sentence completion: 6/6  Repetition (3 word): 10/10  Repetition (4 word): 3/5  Pt continues to have some trouble with spontaneous speech  Longer syllable words are also challenging  Pt continues to benefit from being provided with the initial syllable of the word he is stuck on  Pt is able to ask for repeats when he needs them  For longer syllable words, pt benefits from breaking the word into smaller parts  Assessment:  Pt continues to make progress towards speech and language goals  Plan/Recommendations:  No further ST needed in acute care  Recommend f/u ST in rehab setting        "

## 2023-05-27 ENCOUNTER — HOSPITAL ENCOUNTER (INPATIENT)
Facility: HOSPITAL | Age: 43
LOS: 13 days | Discharge: HOME | DRG: 057 | End: 2023-06-09
Attending: PHYSICAL MEDICINE & REHABILITATION | Admitting: PHYSICAL MEDICINE & REHABILITATION
Payer: COMMERCIAL

## 2023-05-27 VITALS
HEART RATE: 47 BPM | RESPIRATION RATE: 16 BRPM | HEIGHT: 69 IN | SYSTOLIC BLOOD PRESSURE: 112 MMHG | TEMPERATURE: 97.6 F | BODY MASS INDEX: 23.8 KG/M2 | DIASTOLIC BLOOD PRESSURE: 59 MMHG | WEIGHT: 160.72 LBS | OXYGEN SATURATION: 100 %

## 2023-05-27 DIAGNOSIS — R52 MILD PAIN: ICD-10-CM

## 2023-05-27 DIAGNOSIS — F43.20 ADJUSTMENT DISORDER: ICD-10-CM

## 2023-05-27 DIAGNOSIS — I63.9 CVA (CEREBRAL VASCULAR ACCIDENT) (HCC): Primary | ICD-10-CM

## 2023-05-27 DIAGNOSIS — R93.89 ABNORMAL FINDINGS ON IMAGING TEST: ICD-10-CM

## 2023-05-27 DIAGNOSIS — B34.9 VIRAL INFECTION, UNSPECIFIED: ICD-10-CM

## 2023-05-27 DIAGNOSIS — I63.512 ACUTE ISCHEMIC LEFT MCA STROKE (HCC): ICD-10-CM

## 2023-05-27 DIAGNOSIS — I69.398 DEPRESSION AS LATE EFFECT OF CEREBROVASCULAR ACCIDENT (CVA): ICD-10-CM

## 2023-05-27 DIAGNOSIS — I63.9 STROKE (HCC): ICD-10-CM

## 2023-05-27 DIAGNOSIS — F06.31 DEPRESSION AS LATE EFFECT OF CEREBROVASCULAR ACCIDENT (CVA): ICD-10-CM

## 2023-05-27 DIAGNOSIS — Z72.0 TOBACCO USE: ICD-10-CM

## 2023-05-27 DIAGNOSIS — R00.1 BRADYCARDIA: ICD-10-CM

## 2023-05-27 DIAGNOSIS — U07.1 POSITIVE SELF-ADMINISTERED ANTIGEN TEST FOR COVID-19: ICD-10-CM

## 2023-05-27 DIAGNOSIS — G81.91 RIGHT HEMIPARESIS (HCC): ICD-10-CM

## 2023-05-27 DIAGNOSIS — R56.9 SEIZURE (HCC): ICD-10-CM

## 2023-05-27 DIAGNOSIS — Z74.09 IMPAIRED MOBILITY AND ACTIVITIES OF DAILY LIVING: ICD-10-CM

## 2023-05-27 DIAGNOSIS — R44.9 SENSORY DEFICIT, RIGHT: ICD-10-CM

## 2023-05-27 DIAGNOSIS — Q21.12 PFO (PATENT FORAMEN OVALE): ICD-10-CM

## 2023-05-27 DIAGNOSIS — Z78.9 IMPAIRED MOBILITY AND ACTIVITIES OF DAILY LIVING: ICD-10-CM

## 2023-05-27 PROBLEM — Z87.891 SMOKING HX: Status: ACTIVE | Noted: 2023-05-27

## 2023-05-27 LAB — GLUCOSE SERPL-MCNC: 47 MG/DL (ref 65–140)

## 2023-05-27 PROCEDURE — 99222 1ST HOSP IP/OBS MODERATE 55: CPT | Performed by: INTERNAL MEDICINE

## 2023-05-27 PROCEDURE — 99223 1ST HOSP IP/OBS HIGH 75: CPT | Performed by: PHYSICAL MEDICINE & REHABILITATION

## 2023-05-27 PROCEDURE — 99418 PROLNG IP/OBS E/M EA 15 MIN: CPT | Performed by: PHYSICAL MEDICINE & REHABILITATION

## 2023-05-27 RX ORDER — ACETAMINOPHEN 325 MG/1
975 TABLET ORAL EVERY 8 HOURS PRN
Qty: 180 TABLET | Refills: 0 | Status: ON HOLD
Start: 2023-05-27 | End: 2023-06-09 | Stop reason: SDUPTHER

## 2023-05-27 RX ORDER — NICOTINE 21 MG/24HR
1 PATCH, TRANSDERMAL 24 HOURS TRANSDERMAL DAILY
Status: DISCONTINUED | OUTPATIENT
Start: 2023-05-28 | End: 2023-06-06

## 2023-05-27 RX ORDER — POLYETHYLENE GLYCOL 3350 17 G/17G
17 POWDER, FOR SOLUTION ORAL DAILY PRN
Status: DISCONTINUED | OUTPATIENT
Start: 2023-05-27 | End: 2023-06-09 | Stop reason: HOSPADM

## 2023-05-27 RX ORDER — ACETAMINOPHEN 325 MG/1
975 TABLET ORAL EVERY 8 HOURS PRN
Status: DISCONTINUED | OUTPATIENT
Start: 2023-05-27 | End: 2023-06-09 | Stop reason: HOSPADM

## 2023-05-27 RX ORDER — ONDANSETRON 4 MG/1
4 TABLET, ORALLY DISINTEGRATING ORAL EVERY 6 HOURS PRN
Status: DISCONTINUED | OUTPATIENT
Start: 2023-05-27 | End: 2023-06-09 | Stop reason: HOSPADM

## 2023-05-27 RX ORDER — ASPIRIN 81 MG/1
81 TABLET, CHEWABLE ORAL DAILY
Qty: 90 TABLET | Refills: 3 | Status: ON HOLD
Start: 2023-05-27 | End: 2023-06-09 | Stop reason: SDUPTHER

## 2023-05-27 RX ORDER — BISACODYL 10 MG
10 SUPPOSITORY, RECTAL RECTAL DAILY PRN
Status: DISCONTINUED | OUTPATIENT
Start: 2023-05-27 | End: 2023-06-09 | Stop reason: HOSPADM

## 2023-05-27 RX ORDER — NICOTINE 21 MG/24HR
1 PATCH, TRANSDERMAL 24 HOURS TRANSDERMAL DAILY
Qty: 28 PATCH | Refills: 0
Start: 2023-05-27 | End: 2023-06-09

## 2023-05-27 RX ORDER — ENOXAPARIN SODIUM 100 MG/ML
40 INJECTION SUBCUTANEOUS
Status: DISCONTINUED | OUTPATIENT
Start: 2023-05-28 | End: 2023-06-05

## 2023-05-27 RX ORDER — LEVETIRACETAM 750 MG/1
750 TABLET ORAL EVERY 12 HOURS SCHEDULED
Status: DISCONTINUED | OUTPATIENT
Start: 2023-05-27 | End: 2023-06-09 | Stop reason: HOSPADM

## 2023-05-27 RX ORDER — ASPIRIN 81 MG/1
81 TABLET, CHEWABLE ORAL DAILY
Status: DISCONTINUED | OUTPATIENT
Start: 2023-05-28 | End: 2023-06-09 | Stop reason: HOSPADM

## 2023-05-27 RX ORDER — PANTOPRAZOLE SODIUM 40 MG/1
40 TABLET, DELAYED RELEASE ORAL
Status: DISCONTINUED | OUTPATIENT
Start: 2023-05-28 | End: 2023-06-09 | Stop reason: HOSPADM

## 2023-05-27 RX ADMIN — LEVETIRACETAM 750 MG: 750 TABLET, FILM COATED ORAL at 21:29

## 2023-05-27 RX ADMIN — LEVETIRACETAM 750 MG: 750 TABLET, FILM COATED ORAL at 14:07

## 2023-05-27 RX ADMIN — NICOTINE 1 PATCH: 14 PATCH, EXTENDED RELEASE TRANSDERMAL at 08:27

## 2023-05-27 RX ADMIN — CHLORHEXIDINE GLUCONATE 0.12% ORAL RINSE 15 ML: 1.2 LIQUID ORAL at 08:27

## 2023-05-27 RX ADMIN — ASPIRIN 81 MG CHEWABLE TABLET 81 MG: 81 TABLET CHEWABLE at 08:27

## 2023-05-27 RX ADMIN — ENOXAPARIN SODIUM 40 MG: 40 INJECTION SUBCUTANEOUS at 08:27

## 2023-05-27 NOTE — TREATMENT PLAN
Individualized Plan of [address] [facility] Antoni Ambrocio 40 y o  male MRN: [id number]  Unit/Bed#: -01 Encounter: [id number]     PATIENT INFORMATION  ADMISSION DATE: 5/27/2023 12:27 PM THUY CATEGORY: Stroke   ADMISSION DIAGNOSIS: CVA (cerebral vascular accident) ([facility] [address] ) [I63 [id number]]  EXPECTED LOS: 10-14 days      MEDICAL/FUNCTIONAL PROGNOSIS  Based on my assessment of the patient's medical conditions and current functional status, the prognosis for attaining medical and functional goals or the IRF stay is:  Fair    Medical Goals: Patient will be medically stable for discharge to [facility] restrictive envrionment upon completion of rehab program and Patient will be able to manage medical conditions and comorbid conditions with medications and follow up upon completion of rehab program    [address]: Home - Assistance    ANTICIPATED FOLLOW-UP SERVICE:   Outpatient Therapy Services: PT, OT and SLP         DISCIPLINE SPECIFIC PLANS:  Required Disciplines & Services: Rehabillitation Nursing, Case Management, Dietay/Nutrition and Psychology    REQUIRED THERAPY:  Therapy Hours per Day Days per Week Total Days   Physical Therapy 1 5-6 7   Occupational Therapy 1 5-6 7   Speech/Language Therapy 1 5-6 7   NOTE: Additional therapy time(s) may be added as appropriate to meet patient needs and to achieve functional goals  ANTICIPATED FUNCTIONAL OUTCOMES:  ADL:  Supervision - Mod I with LRAD   Bladder/Bowel: Mod I with LRAD   Transfers:   Mod I with LRAD   Locomotion:   Mod I with LRAD   Cognitive:  Supervision - Mod I      DISCHARGE PLANNING NEEDS  Equipment needs: Discharge needs to be reviewed with team      REHAB ANTICIPATED PARTICIPATION RESTRICTIONS:  None

## 2023-05-27 NOTE — H&P
PHYSICAL MEDICINE AND REHABILITATION H&P/ADMISSION NOTE  Pari Mims 40 y o  male MRN: [id number]  Unit/Bed#: [facility] 458-01 Encounter: [id number]     Rehab Diagnosis: Impairment of mobility, safety, Activities of Daily Living (ADLs), and cognitive/communication skills due to Stroke:  01 2  Right Body Involvement (Left Brain)  Left MCA CVA    History of Present Illness:   Pari Mims is a 40 y o  male with past medical history significant for recent COVID-19 infection in February, who presented to the [address] [place] [address] on 5/15/2023 with right hemiparesis, aphasia  Stroke alert initiated  Did not receive TNK as out of the window  Imaging showing a large left MCA territory ischemic stroke with left M1 thrombus  Patient underwent thrombectomy TICI 3 revascularization of a left M1 occlusion on 5/15/2023 by Dr Ruben Montes De Oca  On 5/16/2023, patient had seizure-like activity with eye deviation and clinical worsening  Loaded with Ativan and Keppra  Transition to oral Keppra 750 mg every 12 hours  Video EEG was performed, negative for additional seizures  patient seen by neurology  Etiology of stroke unclear  MRI brain showing large left MCA infarct with minimal petechial hemorrhage and stable edema  ALYSSA with EF 55%, and confirmed a large PFO  Hypercoagulable work-up in progress, largely unremarkable, MTHFR pending  CT C/A/P negative for malignancy  Incidentally found to have bilateral pleural effusions, small ascites, diffuse body wall subcutaenous edema  Cardiology consulted and an outpatient appointment to consider PFO closure was recommended in 4-6 weeks  Patient was placed on aspirin for stroke prophylaxis  After medical stabilization, patient found to have acute functional deficits in mobility, self care, speech, cognition, therefore admitted to [facility] on 5/27/23       Plan:     Rehabilitation  • Functional deficits: right hemiparesis, right sensory deficits, aphasia, possible cognitive deficits, impaired mobility, self care  • Begin PT/OT/SLP  Rehabilitation goals are to achieve a Mod I level with mobility and self care  Supervision level with speech, cognition  Prognosis is good  ELOS is 10-14 days  Estimated discharge is home  DVT prophylaxis  • Managed on Lovenox     Bladder plan  • Continent    Bowel plan  • Continent    Code Status  • FULL CODE      * CVA (cerebral vascular accident) [facility])  Assessment & Plan  Presented 5/15/23 as stroke alert  CTH/MRI brain confirmed Left MCA ischemic stroke  S/p thrombectomy TICI 3 revascularization 5/16/23  Etiology unclear, (possibly related to recent COVID infection in Feb 2023)  Work-up: +Large PFO on ALYSSA  Hypercoagulable work-up and malignancy work-up largely negative - MHTFR pending  Repeat hypercoagulable what the serial work-up as outpatient recommended  · Secondary CVA ppx: Aspirin 81 mg daily  · CVA education and reduction of modifiable risk factors  · Follow-up with neurology and cardiology as an outpatient (patient is a candidate for PFO closure)      Adjustment disorder  Assessment & Plan  Will consult Neuropsychology     Abnormal findings on imaging test  Assessment & Plan  CT C/A/P: Moderate bilateral pleural effusions, small amount of ascites, and diffuse body wall subcutaneous edema  Follow-up with PCP    Bradycardia  Assessment & Plan  HR 50-60s  Asymptomatic  Monitor  Follow-up with Cardiology as outpatient    Tobacco use  Assessment & Plan  Smoking cessation encouraged  Continue nicotine patch    PFO (patent foramen ovale)  Assessment & Plan  Large PFO identified on ALYSSA  Cardiology recommending follow-up in 4-6 weeks as an outpatient for possible closure    Seizure [facility])  Assessment & Plan  Witnessed seizure-like activity times 2/5/1623  Video EEG negative however patient continued on Keppra 750 mg every 12 hours    Placed on seizure precautions  Follow-up with neurology to determine length of Keppra duration      Subjective: "  Patient seen face to face upon arrival   No acute issues currently  Patient looking forward to rehabilitation and getting better  Speech still difficult to express  Following all commands  Review of Systems   Constitutional: Negative  HENT: Negative  Eyes: Negative  Respiratory: Negative  Cardiovascular: Negative  Gastrointestinal: Negative  Endocrine: Negative  Genitourinary: Negative  Musculoskeletal: Negative  Skin: Negative  Allergic/Immunologic: Negative  Neurological: Negative  Hematological: Negative  Psychiatric/Behavioral: Negative  Function:  PRIOR LEVEL OF FUNCTION:  He lives in [facility] apartment  Alexander Simmons is single and lives alone  Self Care: Independent, Indoor Mobility: Independent, Stairs (in/outdoor): Independent and Cognition: Independent     HOME ENVIRONMENT:  The living area: can live on one level  There are No steps to enter the home  The patient will not have 24 hour supervision/physical assistance available upon discharge  Current level of function:  Physical Therapy: Mod I with bed mobility, Min A with ambulation 150 feet,   Occupational Therapy: Supervision with grooming, UB and LB ADLs, Toileting  Speech Therapy: Aphasia, cognition deficits  Cleared for regular diet  Physical Exam:  /74 (BP Location: Right arm)   Pulse (!) 53   Temp 97 6 °F (36 4 °C) (Oral)   Resp 18   Ht 5' 9\" (1 753 m)   Wt 72 9 kg (160 lb 11 5 oz)   SpO2 100%   BMI 23 73 kg/m²        Intake/Output Summary (Last 24 hours) at 5/28/2023 0907  Last data filed at 5/28/2023 0636  Gross per 24 hour   Intake 330 ml   Output 1200 ml   Net -870 ml       Body mass index is 23 73 kg/m²  Physical Exam  Vitals and nursing note reviewed  Constitutional:       General: He is not in acute distress  HENT:      Head: Normocephalic and atraumatic        Nose: Nose normal       Mouth/Throat:      Mouth: Mucous membranes are moist    Eyes:      " Conjunctiva/sclera: Conjunctivae normal    Cardiovascular:      Rate and Rhythm: Normal rate and regular rhythm  Pulses: Normal pulses  Pulmonary:      Effort: Pulmonary effort is normal       Breath sounds: Normal breath sounds  No wheezing or rales  Abdominal:      General: Bowel sounds are normal  There is no distension  Palpations: Abdomen is soft  Tenderness: There is no abdominal tenderness  Musculoskeletal:         General: No swelling  Cervical back: Neck supple  Skin:     General: Skin is warm  Findings: Bruising present  Neurological:      Mental Status: He is alert and oriented to person, place, and time  Sensory: Sensory deficit (right hemibody) present  Motor: Weakness present  Comments: Speech - non-fluent  Expressive > comprehensive aphasia  Following commands  Right sensory deficits  Motor:  LUE/LLE:  5/5  RUE: 4/5 SAB, SF, EE, EF, WE, 3-/5 , FA  RLE: 4-/5 HF, KE, KF, 4/5 DF, EHL, PF     Psychiatric:         Mood and Affect: Mood normal           Labs, medications, and imaging personally reviewed      Laboratory:    Lab Results   Component Value Date    BUN 10 05/24/2023    CALCIUM 8 7 05/24/2023     05/24/2023    CO2 30 05/24/2023    CREATININE 0 77 05/24/2023    GLUC 83 05/24/2023    K 3 9 05/24/2023    SODIUM 138 05/24/2023     Lab Results   Component Value Date    HCT 36 7 05/24/2023    HGB 13 0 05/24/2023    MCV 95 05/24/2023     05/24/2023    WBC 5 09 05/24/2023     Lab Results   Component Value Date    INR 0 89 05/15/2023    PROTIME 12 3 05/15/2023         Current Facility-Administered Medications:   •  acetaminophen (TYLENOL) tablet 975 mg, 975 mg, Oral, Q8H PRN, Maddy Hou MD  •  aspirin chewable tablet 81 mg, 81 mg, Oral, Daily, Maddy Hou MD  •  bisacodyl (DULCOLAX) rectal suppository 10 mg, 10 mg, Rectal, Daily PRN, Maddy Hou MD  •  enoxaparin (LOVENOX) subcutaneous injection 40 mg, 40 mg, Subcutaneous, Q24H [address] Arminda Jin MD  •  levETIRAcetam (KEPPRA) tablet 750 mg, 750 mg, Oral, Q12H [address] Arminda Jin MD, 750 mg at 05/27/23 2129  •  nicotine (NICODERM CQ) 14 mg/24hr TD 24 hr patch 1 patch, 1 patch, Transdermal, Daily, Arminda Fang MD  •  ondansetron (ZOFRAN-ODT) dispersible tablet 4 mg, 4 mg, Oral, Q6H PRN, Armidna Fang MD  •  pantoprazole (PROTONIX) EC tablet 40 mg, 40 mg, Oral, Early Morning, Arminda Fang MD, 40 mg at 05/28/23 3982  •  polyethylene glycol (MIRALAX) packet 17 g, 17 g, Oral, Daily PRN, Arminda Fang MD    No Known Allergies     Patient Active Problem List    Diagnosis Date Noted   • [facility] 05/15/2023   • Abnormal findings on imaging test 05/28/2023   • Adjustment disorder 05/28/2023   • Tobacco use 05/27/2023   • Mild pain 05/27/2023   • Bradycardia 05/27/2023   • PFO (patent foramen ovale) 05/22/2023   • Seizure ([facility] ) 05/16/2023   • Positive self-administered antigen test for COVID-19 02/10/2023   • Viral infection, unspecified 11/10/2022   • Bilateral low back pain without sciatica 01/29/2020     History reviewed  No pertinent past medical history    Past Surgical History:   Procedure Laterality Date   • IR STROKE ALERT  5/15/2023     Social History     Socioeconomic History   • Marital status: Single     Spouse name: Not on file   • Number of children: Not on file   • Years of education: Not on file   • Highest education level: Not on file   Occupational History   • Not on file   Tobacco Use   • Smoking status: Every Day     Packs/day: 0 50     Years: 15 00     Total pack years: 7 50     Types: Cigarettes   • Smokeless tobacco: Never   Vaping Use   • Vaping Use: Never used   Substance and Sexual Activity   • Alcohol use: Yes     Comment: Social   • Drug use: Never   • Sexual activity: Not Currently   Other Topics Concern   • Not on file   Social History Narrative   • Not on file     Social Determinants of Health     Financial Resource Strain: High Risk (11/10/2022)    Overall Financial Resource Strain (CARDIA)    • Difficulty of Paying Living Expenses: Hard   Food Insecurity: No Food Insecurity (5/16/2023)    Hunger Vital Sign    • Worried About Running Out of Food in the Last Year: Never true    • Ran Out of Food in the Last Year: Never true   Transportation Needs: No Transportation Needs (5/16/2023)    PRAPARE - Transportation    • Lack of Transportation (Medical): No    • Lack of Transportation (Non-Medical): No   Physical Activity: Sufficiently Active (11/10/2022)    Exercise Vital Sign    • Days of Exercise per Week: 7 days    • Minutes of Exercise per Session: 60 min   Stress: No Stress Concern Present (11/10/2022)    Cole7 Juan Carlos Cole    • Feeling of Stress : Not at all   Social Connections: Not on file   Intimate Partner Violence: Not At Risk (11/10/2022)    Humiliation, Afraid, Rape, and Kick questionnaire    • Fear of Current or Ex-Partner: No    • Emotionally Abused: No    • Physically Abused: No    • Sexually Abused: No   Housing Stability: Low Risk  (5/16/2023)    Housing Stability Vital Sign    • Unable to Pay for Housing in the Last Year: No    • Number of [place] in the Last Year: 1    • Unstable Housing in the Last Year: No     Social History     Tobacco Use   Smoking Status Every Day   • Packs/day: 0 50   • Years: 15 00   • Total pack years: 7 50   • Types: Cigarettes   Smokeless Tobacco Never     Social History     Substance and Sexual Activity   Alcohol Use Yes    Comment: Social     History reviewed  No pertinent family history  Medical Necessity Criteria for [facility] Admission: Bowel/Bladder Management, Seizures and stroke education, monitoring of mood, risk factor reduction education   In addition, the preadmission screen, post-admission physical evaluation, overall plan of care and admissions order demonstrate a reasonable expectation that the following criteria were met at the time of admission to the [facility]  1  The patient requires active and ongoing therapeutic intervention of multiple therapy disciplines (physical therapy, occupational therapy, speech-language pathology, or prosthetics/orthotics), one of which is physical or occupational therapy  2  Patient requires an intensive rehabilitation therapy program, as defined in Chapter 1, section 110 2 2 of the CMS Medicare Policy Manual  This intensive rehabilitation therapy program will consist of at least 3 hours of therapy per day at least 5 days per week or at least 15 hours of intensive rehabilitation therapy within a 7 consecutive day period, beginning with the date of admission to the [facility]  3  The patient is reasonably expected to actively participate in, and benefit significantly from, the intensive rehabilitation therapy program as defined in Chapter 1, section 110 2 2 of the CMS Medicare Policy Manual at this time of admission to the [facility]  He can reasonably be expected to make measurable improvement (that will be of practical value to improve the patient’s functional capacity or adaptation to impairments) as a result of the rehabilitation treatment, as defined in section 110 3, and such improvement can be expected to be made within the prescribed period of time  As noted in the CMS Medicare Policy Manual, the patient need not be expected to achieve complete independence in the domain of self-care nor be expected to return to his or her prior level of functioning in order to meet this standard  4  The patient must require physician supervision by a rehabilitation physician   As such, a rehabilitation physician will conduct face-to-face visits with the patient at least 3 days per week throughout the patient’s stay in the [facility] to assess the patient both medically and functionally, as well as to modify the course of treatment as needed to maximize the patient’s capacity to benefit from the rehabilitation process  5  The patient requires an intensive and coordinated interdisciplinary approach to providing rehabilitation, as defined in Chapter 1, section 110 2 5 of the CMS Medicare Policy Manual  This will be achieved through periodic team conferences, conducted at least once in a 7-day period, and comprising of an interdisciplinary team of medical professionals consisting of: a rehabilitation physician, registered nurse,  and/or , and a licensed/certified therapist from each therapy discipline involved in treating the patient  Changes Since Pre-admission Assessment: None -This patient's participation in rehab continues to be reasonable, necessary and appropriate  CMS Required Post-Admission Physician Evaluation Elements  History and Physical, including medical history, functional history and active comorbidities as in above text  Post-Admission Physician Evaluation:  The patient has the potential to make improvement and is in need of physical, occupational, and/or therapy services  The patient may also need nutritional services  Given the patient's complex medical condition and risk of further medical complications, rehabilitative services cannot be safely provided at a lower level of care, such as a skilled nursing facility  I have reviewed the patient's functional and medical status at the time of the preadmission screening and they are the same as on the day of this admission  I acknowledge that I have personally performed a full physical examination on this patient within 24 hours of admission  The patient and/or family demonstrated understanding the rehabilitation program and the discharge process after we discussed them  Agree in entirety: yes  Minor adaptions: none    Major changes: none    Brittney Shahid MD    ** Please Note: Fluency Direct voice to text software may have been used in the creation of this document   **      I have spent a total time of 95 minutes on 05/28/23 in caring for this patient including Diagnostic results, Instructions for management, Patient and family education, Impressions, Counseling / Coordination of care, Documenting in the medical record, Reviewing / ordering tests, medicine, procedures  , Obtaining or reviewing history   and Communicating with other healthcare professionals

## 2023-05-27 NOTE — PROGRESS NOTES
PHYSICAL MEDICINE AND REHABILITATION   PREADMISSION ASSESSMENT     Projected [facility] Diagnoses:  Impairment of mobility, safety and Activities of Daily Living (ADLs) due to Stroke:  01 1  Left Body Involvement (Right Brain)  Etiologic: L MCA stroke w/ LM1 partial occlusion  Date of Onset: 5/15/23   Date of surgery: n/a    PATIENT INFORMATION  Name: Kelly Ford Phone #: [phone] (home)   Address: [address]  YOB: 1980 Age: 40 y o  SS#   Marital Status: Single  Ethnicity: Patient Refused/Declined to Answer  Employment Status: currently employed  Extended Emergency Contact Information  Primary Emergency Contact: Rob Ambrocio  Mobile Phone: [phone]  Relation: Father  Secondary Emergency Contact: Joi Ambrocio  Mobile Phone: [phone]  Relation: Sister  Advance Directive: Level 1 Full Code (no ACP docs)     INSURANCE/COVERAGE:     Primary Payor: Bretta Nose / Plan: XFEXBOEA / Product Type: Blue Fee for Service /   Secondary Payer: Self Pay   Payer Contact:  Payer Contact:   Contact Phone:   Fax: [phone] Contact Phone:     Authorization #:  [id number]  Coverage Dates: 5/25 - 5/31  LCD: 5/31/23   Medical Record #: [id number]    REFERRAL SOURCE:   Referring provider: Mitzi Fairbanks MD  Referring facility: [address] [place] [address]   Room: [address] [place] [address]  PCP: Bran Siddiqui DO PCP phone number: [phone]    MEDICAL INFORMATION  HPI: Pt is a  40 y o  male with no significant past medical history patient presented to the [address] [place] [address] on 5/15 with right facial droop and right upper extremity greater than right lower extremity weakness  He was on the phone with a friend and was supposed to meet up with them however did not show up and would not answer his phone prompting the friend to call EMS    On arrival CTh was completed without contrast showing a left MCA stroke and a CTA was completed of the head and neck showing and left M1 partial occlusion however was not a TNK candidate due to timing  He underwent a left MCA thrombectomy with Dr Melchor Bridges on 5/15  Course further complicated by seizures  Pathology suspected to be a proximal embolism given findings  Repeat CT head in the morning of 5/16 demonstrated continued edema and sulcal effacement on the left similar to previous study, improvement of contrast staining versus possible hemorrhage  Subsequently, after initial examination in the morning of 5/16 patient noted to have seizure that consisted of eye deviation to the right followed by postictal period  STAT CTH demonstrated no acute change, evolving known left MCA infarct  MRI was completed showing a large left MCA territory infarct involving the left frontoparietal, temporal lobes left insula left lateral putamen posterior left insular capsule and corona radiata with associated edema and regional mass effect without midline shift  Video EEG started with no seizures captured, and subsequently discontinued  Patient underwent ALYSSA on 5/18 and found to have a large PFO  Lower duplex studies were negative  Possibly due to post-COVID hypercoagulable state from infection back in February  Ruled out occult malignancy  Positive COVID 5/15, asymptomatic  Recent COVID infection in February  Discussed with infection control, no need for contact precautions  Upon review of patient’s case and correspondence with PT/OT therapies and PM&R services, [facility] physician feels patient will benefit and is a good candidate / appropriate for inpatient acute rehab at this time  Patient has demonstrated the willingness / desire and tolerance to participate in the required 3 hours or more of therapies per day  No vaccines on file; Covid test negative 5/26      Past Medical History:   Past Surgical History: Allergies:     History reviewed  No pertinent past medical history   Past Surgical History: Procedure Laterality Date   • IR STROKE ALERT  5/15/2023     No Known Allergies      Medical/functional conditions requiring inpatient rehabilitation: L MCA stroke, PFO, Seizure, Decreased mobility, Decreased self care, Decreased strength/endurance    Risk for medical/clinical complications:  Risk for falls, Risk for skin breakdown secondary to decreased mobility, Risk for additional infarcts or extension of stroke, Risk of seizure    Comorbidities: L MCA, PFO, Covid, Seizure    Surgeries in the last 100 days:  left MCA thrombectomy with Dr Liana Mcintyre on 5/15  CURRENT VITAL SIGNS:   Temp:  [97 6 °F (36 4 °C)-98 4 °F (36 9 °C)] 97 6 °F (36 4 °C)  HR:  [47-53] 47  Resp:  [16-18] 16  BP: ()/(48-64) 112/59   Intake/Output Summary (Last 24 hours) at 5/27/2023 0906  Last data filed at 5/27/2023 0400  Gross per 24 hour   Intake 480 ml   Output 450 ml   Net 30 ml        LABORATORY RESULTS:      Lab Results   Component Value Date    HCT 36 7 05/24/2023    HGB 13 0 05/24/2023    WBC 5 09 05/24/2023     Lab Results   Component Value Date    BUN 10 05/24/2023     05/24/2023    CREATININE 0 77 05/24/2023    K 3 9 05/24/2023     Lab Results   Component Value Date    INR 0 89 05/15/2023    PROTIME 12 3 05/15/2023        DIAGNOSTIC STUDIES:  CT chest abdomen pelvis w contrast    Result Date: 5/20/2023  Impression: No evidence of malignancy to account for hypercoagulable state  Moderate bilateral pleural effusions, small amount of ascites, and diffuse body wall subcutaneous edema  Workstation performed: [id number]     CT head wo contrast    Result Date: 5/19/2023  Impression: Continued evolution of left MCA territory infarct as above with stable mass effect  No significant midline shift Question small volume petechial hemorrhage left inferior parietal and temporal region  No billie parenchymal hemorrhage  Continued short-term interval follow-up advised  Study was marked in [facility] for immediate notification   Workstation performed: [id number]     XR ankle 3+ vw right    Result Date: 5/17/2023  Impression: No acute osseous abnormality  Workstation performed: [id number]     MRI brain seizure wo and w contrast    Addendum Date: 5/17/2023    ADDENDUM: Correcting comparison study: Head CT 5/16/2023  Result Date: 5/17/2023  Impression: 1  Recent large left MCA territory infarct with associated edema and a few foci of petechial hemorrhage  There is regional mass effect without midline shift  2   Cannot reliably evaluate the infarcted brain for structural abnormality  No structural abnormality is identified in the remainder brain  Normal hippocampal formations  Workstation performed: [id number]     CT head wo contrast    Result Date: 5/17/2023  Impression: Grossly stable recent large left MCA territory infarct with regional mass effect without midline shift  No acute hemorrhage  Workstation performed: [id number]     XR chest portable ICU    Result Date: 5/16/2023  Impression: No acute cardiopulmonary disease  Right jugular catheter in lower SVC with no pneumothorax  Workstation performed: [id number]     CT head wo contrast    Result Date: 5/16/2023  Impression: Evolving left MCA infarct with increased cortical hypodensity and stable sulcal effacement/mass effect throughout the left lobe  No acute intracranial hemorrhage or midline shift  Workstation performed: [id number]     CT head wo contrast    Result Date: 5/16/2023  Impression: Compared to the most immediate prior study, significant interval decrease/near total resolution of the previously noted extensive left hemispheric hyperdensity involving the cortex and subcortical white matter and left basal ganglia  Minimal residual hyperdensity in the left basal ganglia region  Overall these findings suggest resolving contrast staining post endovascular therapy although small amount of superimposed hemorrhage is difficult to entirely exclude   Recommend short-term interval follow-up scan and/or further evaluation with MRI for definitive evaluation  Stable left hemispheric edema and sulcal effacement without significant midline shift  Above findings discussed with Dr Sidney Caldwell at 4:30 a m  on 5/16/2023  Workstation performed: [id number]     CT head wo contrast    Result Date: 5/15/2023  Impression: New extensive left hemispheric intraparenchymal and subarachnoid hemorrhage with mass effect upon the adjacent brain parenchyma and associated cerebral edema predominantly in the left MCA vascular distribution  No significant midline shift or evidence of  transtentorial herniation noted  Findings discussed with Dr Magdalena Mena at 10:30 p m  Workstation performed: [id number]     CT cerebral perfusion    Result Date: 5/15/2023  Impression: CT perfusion performed  Data available on PACS  Workstation performed: [id number]     CT stroke alert brain    Result Date: 5/15/2023  Impression: Late acute to early subacute left MCA territory infarct  No hemorrhagic conversion  Findings were directly discussed with French Goodman at  7:10 PM    Workstation performed: [id number]     CTA stroke alert (head/neck)    Result Date: 5/15/2023  Impression: 1  Acute occlusion of the proximal left M1 segment with partial opacification of distal M2 and M3 branches  2  No evidence of thrombus, stenosis or occlusion of the carotid or vertebral arteries   Findings were directly discussed with French Goodman at  7:14 PM    Workstation performed: [id number]       PRECAUTIONS/SPECIAL NEEDS:  Anticoagulation:  aspirin 81 mg orally every day and Lovenox, Edema Management, Safety Concerns, Pain Management, Bladder Incontinence: # of accidents 6, Bowel Incontinence: # of accidents 1, Dietary Restrictions: Vegetarian and Language Preference: English    MEDICATIONS:     Current Facility-Administered Medications:   •  acetaminophen (TYLENOL) tablet 975 mg, 975 mg, Oral, Q8H PRN, Adalid Kumar, DO, 975 mg at 05/22/23 1157  •  aspirin chewable tablet 81 mg, 81 mg, Oral, Daily, Ang Jacqulynn Ann Marie, DO, 81 mg at 05/27/23 0827  •  chlorhexidine (PERIDEX) 0 12 % oral rinse 15 mL, 15 mL, Mouth/Throat, Q12H [address], Ang Jacqulynn Ann Marie, DO, 15 mL at 05/27/23 0827  •  enoxaparin (LOVENOX) subcutaneous injection 40 mg, 40 mg, Subcutaneous, Q24H [address], Ang Jacqulynn Ann Marie, DO, 40 mg at 05/27/23 1366  •  hydrALAZINE (APRESOLINE) injection 10 mg, 10 mg, Intravenous, Q6H PRN, Ang Jacqulynn Ann Marie, DO, 10 mg at 05/21/23 5115  •  nicotine (NICODERM CQ) 14 mg/24hr TD 24 hr patch 1 patch, 1 patch, Transdermal, Daily, Ang Jacqulynn Ann Marie, DO, 1 patch at 05/27/23 0827    SKIN INTEGRITY:   no rashes, no erythema, no peripheral edema    PRIOR LEVEL OF FUNCTION:  [place] [facility] [place]  Marisol Pacheco is single and lives alone  Self Care: Independent, Indoor Mobility: Independent, Stairs (in/outdoor): Independent and Cognition: Independent    FALLS IN THE LAST 6 MONTHS: 0    HOME ENVIRONMENT:  The living area: can live on one level  There are No steps to enter the home  The patient will not have 24 hour supervision/physical assistance available upon discharge  PREVIOUS DME:  Equipment in home (previous DME): None    FUNCTIONAL STATUS:  Physical Therapy Occupational Therapy Speech Therapy   05/26/23 1105    PT Last Visit   PT Visit Date 05/26/23   Note Type   Note Type Treatment   Pain Assessment   Pain Assessment Tool 0-10   Pain Score No Pain   Restrictions/Precautions   Other Precautions Fall Risk   Subjective   Subjective The patient is eager to get to rehab  Bed Mobility   Supine to Sit 6  Modified independent   Additional items Increased time required   Sit to Supine 6  Modified independent   Additional items Increased time required   Transfers   Sit to Stand 6  Modified independent   Stand to Sit 6  Modified independent   Ambulation/Elevation   Gait pattern Excessively slow; Step to;Short stride; Inconsistent benitez;Circumduction;Decreased foot clearance;R Foot drag  (Circumduction and hip hiking RLE )   Gait Assistance 4  Minimal assist   Additional items Assist x 1   Assistive Device None   Distance 150 feet x 2, 100 feet x 2  Balance   Static Sitting Normal   Dynamic Sitting Good   Static Standing Fair +   Dynamic Standing Fair -   Ambulatory Poor +   Activity Tolerance   Activity Tolerance Patient tolerated treatment well   Exercises   Neuro re-ed Dynamic head and trunk movements as well as picking up objects off of the floor  Patient with more difficulty turning head and trunk to the left  Assessment   Prognosis Excellent   Problem List Decreased strength;Decreased endurance; Impaired balance;Decreased mobility   Assessment The patient continues to demonstrate weakness in his RLE as well as deficits in motor planning with dynamic activities  These are more pronounced with activities looking or turning to the left side  He is challenged with reaching for objects off of the floor as well  The patient continues to have increasing foot drag as he fatigues, but he does attempt to compensate for this  He continues to have difficulty with speech, and continue to recommend an aggressive rehab regimen at discharge        05/25/23 0854    OT Last Visit   OT Visit Date 05/25/23   Note Type   Note Type Treatment for insurance authorization   Pain Assessment   Pain Assessment Tool 0-10   Pain Score No Pain   Restrictions/Precautions   Weight Bearing Precautions Per Order No   Other Precautions Pain;Cognitive   ADL   Where Assessed Standing at sink   Grooming Assistance 5  Supervision/Setup   Grooming Deficit Verbal cueing;Setup   UB Bathing Assistance 5  Supervision/Setup   UB Bathing Deficit Verbal cueing   LB Bathing Assistance 5  Supervision/Setup   LB Bathing Deficit Verbal cueing   UB Dressing Assistance 5  Supervision/Setup   UB Dressing Deficit Verbal cueing   LB Dressing Assistance 5  Supervision/Setup   LB Dressing Deficit Verbal cueing   Toileting Assistance  5  Supervision/Setup   Transfers "  Sit to Stand 5  Supervision   Additional items Assist x 1   Stand to Sit 5  Supervision   Additional items Assist x 1   Functional Mobility   Functional Mobility 5  Supervision   Subjective   Subjective pt stated \" I feel so completely  Beena Sportsman willy aguilera\"   Cognition   Overall Cognitive Status WFL   Arousal/Participation Alert; Responsive   Attention Attends with cues to redirect   Orientation Level Oriented X4   Memory Unable to assess   Following Commands Follows one step commands without difficulty   Activity Tolerance   Activity Tolerance Patient tolerated treatment well   Assessment   Assessment Pt seen for participation in Occupational Therapy session with focus on activity tolerance, functional transfers/mob, sitting balance and tolerance and standing tolerance and balance for pt engagement in UB/LB self-care tasks and energy conservation techniques  Pt cleared by RN/ [place] for pt participated in OT session  Pt presented sitting out of bed to bathroom upon initiation of OT session and agreeable to participate in therapy following pt identifiers confirmed  Pt was unable to report his therapy goal 2* pt language impairments  Pt required assist for AM self-care tasks 2* cognitive impairment  Pt will require post acute rehab service to continue to address these above noted pt deficit which currently impair pt ADL and functional mob  Pt return to sitting out of bed to bedside chair post session, and all needs within reach  Today's Date: 5/26/2023     Problem List  Principal Problem:    L MCA stroke w/ LM1 partial occlusion  Active Problems:    Seizure (HCC)    PFO (patent foramen ovale)        Past Medical History  Medical History   History reviewed  No pertinent past medical history         Past Surgical History  Surgical History         Past Surgical History:   Procedure Laterality Date   • IR STROKE ALERT   5/15/2023               Subjective: \"Thank you\"   Pt awake and alert       Objective:  Pt seen for f/u " speech and language therapy  Pt continues to make progress with his speech  Areas addressed include repetition and sentence completion  Repetition included 3-4 word phrases  Sentence completion: 6/6  Repetition (3 word): 10/10  Repetition (4 word): 3/5  Pt continues to have some trouble with spontaneous speech  Longer syllable words are also challenging  Pt continues to benefit from being provided with the initial syllable of the word he is stuck on  Pt is able to ask for repeats when he needs them  For longer syllable words, pt benefits from breaking the word into smaller parts      Assessment:  Pt continues to make progress towards speech and language goals       Plan/Recommendations:  No further ST needed in acute care  Recommend f/u ST in rehab setting  CARE SCORES:  Self Care:  Eatin: Supervision or touching  assistance  Oral hygiene: 04: Supervision or touching  assistance  Toilet hygiene: 04: Supervision or touching  assistance  Shower/bathing self: 04: Supervision or touching  assistance  Upper body dressin: Supervision or touching  assistance  Lower body dressin: Supervision or touching  assistance  Putting on/taking off footwear: 04: Supervision or touching  assistance  Transfers:  Roll left and right: 06: Independent  Sit to lyin: Independent  Lying to sitting on side of bed: 06: Independent  Sit to stand: 06: Independent  Chair/bed to chair transfer: 06: Independent  Toilet transfer: 09: Not applicable  Mobility:  Walk 10 ft: 03: Partial/moderate assistance  Walk 50 ft with two turns: 03: Partial/moderate assistance  Walk 150ft: 03: Partial/moderate assistance    CURRENT GAP IN FUNCTION  Prior to Admission: Functional Status: Patient was independent with mobility/ambulation, transfers, ADL's, IADL's  Expected functional outcomes:  It is expected that with skilled acute rehabilitation services the patient will progress to Supervision for self care and Supervision for mobility     Estimated length of stay: 10 to 14 days    Anticipated Post-Discharge Disposition/Treatment  Disposition: Return to previous home/apartment  Outpatient Services: Physical Therapy (PT), Occupational Therapy (OT) and Speech Therapy    BARRIERS TO DISCHARGE  Lovenox, Weakness, Pain, Balance Difficulty, Fatigue, Home Accessibility, Caregiver Accessibility, Equipment Needs and Lives Alone    INTERVENTIONS FOR DISCHARGE  Adaptive equipment, Patient/Family/Caregiver Education, [employer], [employer] DME needs, Therapy exercises and Energy conservation education     REQUIRED THERAPY:  Patient will require PT, OT and ST 60 minutes each per day, five days per week to achieve rehab goals  REQUIRED FUNCTIONAL AND MEDICAL MANAGEMENT FOR INPATIENT REHABILITATION:  Skin:  Monitor skin for breakdown secondary to decreased mobility, Pain Management: Overall pain is moderately controlled, Deep Vein Thrombosis (DVT) Prophylaxis:  Per MD orders, further internal medicine management of additional medical conditions while on ARC, PT/OT intervention, patient/family education and training, and any needed consults PRN  RECOMMENDED LEVEL OF CARE:    Pt is a  40 y o  male with no significant past medical history patient presented to the [address] on 5/15 with right facial droop and right upper extremity greater than right lower extremity weakness  He was on the phone with a friend and was supposed to meet up with them however did not show up and would not answer his phone prompting the friend to call EMS  On arrival CTh was completed without contrast showing a left MCA stroke and a CTA was completed of the head and neck showing and left M1 partial occlusion however was not a TNK candidate due to timing  He underwent a left MCA thrombectomy with Dr Radha Fregoso on 5/15  Course further complicated by seizures  Pathology suspected to be a proximal embolism given findings    Repeat CT head in the morning of 5/16 demonstrated continued edema and sulcal effacement on the left similar to previous study, improvement of contrast staining versus possible hemorrhage  Subsequently, after initial examination in the morning of 5/16 patient noted to have seizure that consisted of eye deviation to the right followed by postictal period  STAT CTH demonstrated no acute change, evolving known left MCA infarct  MRI was completed showing a large left MCA territory infarct involving the left frontoparietal, temporal lobes left insula left lateral putamen posterior left insular capsule and corona radiata with associated edema and regional mass effect without midline shift  Video EEG started with no seizures captured, and subsequently discontinued  Patient underwent ALYSSA on 5/18 and found to have a large PFO  Lower duplex studies were negative  Possibly due to post-COVID hypercoagulable state from infection back in February  Ruled out occult malignancy  Patient was independent with mobility/ambulation, transfers, ADL's, IADL's  Pt lives alone in an apartment with no HONEY  Pt is currently functioning below baseline needing Supervision for ADLs and Min A for transfers/amb  Pt would benefit from [facility] admission to have close medical management while participating in 3 hours of therapy per day that will include physical and occupational therapy  Physical and occupational therapists will address functional mobility deficits and assist patient in improving their strength, endurance, ROM, and self care  Pt will have 24/7 nursing care to monitor routine vitals, I/Os, skin integrity, and overall condition  The rehab nursing staff will follow therapy recommendations to have 24 hour follow through during non-therapy hours  PM&R to maximize function and provide medical oversight  The MD will monitor patient co-morbidities while on the unit as well as order any additional tests, labs, and consults needed   The [facility] specialized interdisciplinary team will meet weekly to discuss patient overall medical status and rehab goals in preparation for D/C home  Inpatient acute rehab is recommended for patient to maximize overall strength, endurance, self care, and mobility for a safe and timely transition back home

## 2023-05-27 NOTE — CONSULTS
Internal Medicine Consultation Note    Patient: Erlinda Moeller  Age/sex: 40 y o  male  Medical Record #: [id number]      Assessment/Plan:    Acute left MCA CVA  · S/p left MCA thrombectomy  · Workup:   · Homocystine normal, BELEM normal, RF normal, ANCA negative, dsDNA negative, Factor V Leiden/lupus anticoagulant negative, Antithrombin III 81 (felt likely not clinically relevant was higher than 80%, Protein S decreased at 65%, Protein C normal    MTHFR is pending    Repeat thrombosis panel in 3 months   · Continue ASA 81mg qd and statin  · Question if etio is from COVID infection in 2/2023    Seizures  · Was noted with eyes deviating to the right 5/16 with a post ictal period   · Had 2 episodes  · Keppra stopped 5/25 but Dr Pawan Casanova restarted since he had 2 sz in hospital    PFO  · Large PFO on ALYSSA  · Negative DVT  · To see Cards as OP 4-6 weeks    Bradycardia  · Has HRs in 50's  · On no meds to affect HR  · Will watch    Nicotine abuse  · Continue patch    Moderate B/L pleural effusions  · Will monitor for resolution       Discharge date:  Team       Subjective/HPI:   Erlinda Moeller is a 40year old patient with a history of nicotine abuse, Covid infection 2/2023 who was brought to the hospital on 5/15/23 when he was found down  He was supposoed to attend a meeting but did not show and a welfare check found him on the floor covered in feces  He presented with right sided weakness, right facial droop and aphasia  CTH/CTA showed a large left MCA with left M1 thrombus  He then underwent a left MCA thrombectomy  He was positive for COVID but since he had been ill with COVID in February, he did not require precautions  Hypercoagulable panel, MTHFR and rheumatoid disease markers were sent  The MTHFR is pending  The Antithrombin III was 81, Protein S decreased  He is for a repeat panel 3 months  Rheumatoid disease markers were negative  ALYSSA revealed a large PFO but venous doppler was negative    Cardiology saw in consult and they will see him as an outpatient 4-6 weeks  CT chest/abdomen/pelvis did not reveal malignancy  He did have moderate bilateral pleural effusions, small amount of ascites, and diffuse body wall subcutaneous edema     Patient is now in [facility] for inpatient acute rehabilitation and we are ask to assist with medical management  Currently there are no complaints of CP, SOB, dizziness, N/V/D        ROS:   A 10 point ROS was performed; negative except as noted above  Social History:    Substance Use History:   Social History     Substance and Sexual Activity   Alcohol Use Yes    Comment: Social     Social History     Tobacco Use   Smoking Status Every Day   • Packs/day: 0 50   • Years: 15 00   • Total pack years: 7 50   • Types: Cigarettes   Smokeless Tobacco Never     Social History     Substance and Sexual Activity   Drug Use Never       Family History:    History reviewed  No pertinent family history        Review of Scheduled Meds:  Current Facility-Administered Medications   Medication Dose Route Frequency Provider Last Rate   • acetaminophen  975 mg Oral Q8H PRN Dominick Haas MD     • [START ON 5/28/2023] aspirin  81 mg Oral Daily Dominick Haas MD     • bisacodyl  10 mg Rectal Daily PRN Dominick Haas MD     • [START ON 5/28/2023] enoxaparin  40 mg Subcutaneous Q24H [facility] Dominick Haas MD     • [START ON 5/28/2023] nicotine  1 patch Transdermal Daily Dominick Haas MD     • ondansetron  4 mg Oral Q6H PRN Dominick Haas MD     • [START ON 5/28/2023] pantoprazole  40 mg Oral Early Morning Dominick Haas MD     • polyethylene glycol  17 g Oral Daily PRN Dominick Haas MD         Labs:     Results from last 7 days   Lab Units 05/24/23  0544 05/23/23  0602   HEMATOCRIT % 36 7 33 8*   HEMOGLOBIN g/dL 13 0 12 2   PLATELETS Thousands/uL 282 269   WBC Thousand/uL 5 09 5 30     Results from last 7 days   Lab Units 05/24/23  0544 05/23/23  0602   BUN mg/dL 10 7 "  CALCIUM mg/dL 8 7 8 6   CHLORIDE mmol/L 108 109*   CO2 mmol/L 30 30   CREATININE mg/dL 0 77 0 62   POTASSIUM mmol/L 3 9 3 5   SODIUM mmol/L 138 138                Results from last 7 days   Lab Units 23  1054 23  0611   POC GLUCOSE mg/dl 47* 82       No results found for: \"BLOODCX\", \"SPUTUMCULTUR\", \"URINECX\", \"WOUNDCULT\"    Input and Output Summary (last 24 hours):     No intake or output data in the 24 hours ending 23 1255    Imaging:     No orders to display       *Labs /Radiology studies reviewed  *Medications reviewed and reconciled as needed  *Please refer to order section for additional ordered labs studies  *Case discussed with primary attending during morning huddle case rounds    Vitals:   Temp (24hrs), Av 1 °F (36 7 °C), Min:97 6 °F (36 4 °C), Max:98 4 °F (36 9 °C)    Temp:  [97 6 °F (36 4 °C)-98 4 °F (36 9 °C)] 98 2 °F (36 8 °C)  HR:  [47-53] 47  Resp:  [16-18] 17  BP: ()/(48-64) 131/62  SpO2:  [96 %-100 %] 100 %  Body mass index is 23 73 kg/m²  Physical Exam:   General Appearance: no distress, conversive  HEENT: PERRLA, conjuctiva normal; oropharynx clear; mucous membranes moist   Neck:  Supple, normal ROM  Lungs: CTA, normal respiratory effort, no retractions, expiratory effort normal  CV: regular rate and rhythm; no rubs/murmurs/gallops, PMI normal   ABD: soft; ND/NT; +BS  EXT: no edema  Skin: normal turgor, normal texture, no rashes  Psych: affect normal, mood normal  Neuro: AAO; expressive>receptive aphasia   +mild RUE/RLE weakness and incoordination  Invasive Devices     None                  VTE Pharmacologic Prophylaxis: Enoxaparin (Lovenox)  Code Status: Level 1 - Full Code  Current Length of Stay: 0 day(s)    Total floor / unit time spent today 1 hour with more than 50% spent counseling/coordinating care  Counseling includes discussion with patient re: progress  and discussion with patient of his/her current medical state/information   Coordination of " patient's care was performed in conjunction with primary service  Time invested included review of patient's labs, vitals, and management of their comorbidities with continued monitoring  In addition, this patient was discussed with medical team including physician and advanced extenders  The care of the patient was extensively discussed and appropriate treatment plan was formulated unique for this patient by supervising physician unless stated otherwise in their attestation statement  ** Please Note: voice to text software may have been used in the creation of this document   Audio transcription errors may occur**

## 2023-05-27 NOTE — CASE MANAGEMENT
Case Management Discharge Planning Note    Patient name Eboni Gamez  [facility]-43 MRN [id number]  : 1980 Date 2023       Current Admission Date: 5/15/2023  Current Admission Diagnosis:L MCA stroke w/ LM1 partial occlusion   Patient Active Problem List    Diagnosis Date Noted   • PFO (patent foramen ovale) 2023   • Seizure ([address] ) 2023   • L MCA stroke w/ LM1 partial occlusion 05/15/2023   • Positive self-administered antigen test for COVID-19 02/10/2023   • Viral infection, unspecified 11/10/2022   • Bilateral low back pain without sciatica 2020      LOS (days): 12  Geometric Mean LOS (GMLOS) (days): 7 30  Days to GMLOS:-4 2     OBJECTIVE:  Risk of Unplanned Readmission Score: 9 41         Current admission status: Inpatient   Preferred Pharmacy:   [address]  [address] [facility] [address]  [facility] [address]  Phone: [phone] Fax: [address] [facility] [address] [facility], [address] [place] [address] [address]  [address] [facility] [place] [address]  Phone: [phone] Fax: [phone]    Primary Care Provider: Juan F Mendenhall DO    Primary Insurance: [facility]  Secondary Insurance:     DISCHARGE DETAILS:                                          Other Referral/Resources/Interventions Provided:  Referral Comments: Pt accepted to  [facility], per Liaison bed available today, transfer set up for 12pm Rm 458  Treatment Team Recommendation: Acute Rehab  Discharge Destination Plan[de-identified] Acute Rehab                                      Family notified[de-identified] CM made outreach to pt's father, Cassidy Kumar, left   Additional Comments: Plan for transfer to [address] [place] [address] Rd report # [phone]

## 2023-05-28 PROBLEM — I63.9 CVA (CEREBRAL VASCULAR ACCIDENT) (HCC): Status: ACTIVE | Noted: 2023-05-15

## 2023-05-28 PROBLEM — Z72.0 TOBACCO USE: Status: ACTIVE | Noted: 2023-05-27

## 2023-05-28 PROBLEM — F43.20 ADJUSTMENT DISORDER: Status: ACTIVE | Noted: 2023-05-28

## 2023-05-28 PROBLEM — R93.89 ABNORMAL FINDINGS ON IMAGING TEST: Status: ACTIVE | Noted: 2023-05-28

## 2023-05-28 PROCEDURE — 92523 SPEECH SOUND LANG COMPREHEN: CPT

## 2023-05-28 PROCEDURE — 97166 OT EVAL MOD COMPLEX 45 MIN: CPT

## 2023-05-28 PROCEDURE — 99232 SBSQ HOSP IP/OBS MODERATE 35: CPT | Performed by: INTERNAL MEDICINE

## 2023-05-28 PROCEDURE — 97530 THERAPEUTIC ACTIVITIES: CPT

## 2023-05-28 PROCEDURE — 97535 SELF CARE MNGMENT TRAINING: CPT

## 2023-05-28 PROCEDURE — 97163 PT EVAL HIGH COMPLEX 45 MIN: CPT

## 2023-05-28 PROCEDURE — 97116 GAIT TRAINING THERAPY: CPT

## 2023-05-28 PROCEDURE — 92507 TX SP LANG VOICE COMM INDIV: CPT

## 2023-05-28 RX ADMIN — ENOXAPARIN SODIUM 40 MG: 40 INJECTION SUBCUTANEOUS at 09:10

## 2023-05-28 RX ADMIN — ASPIRIN 81 MG CHEWABLE TABLET 81 MG: 81 TABLET CHEWABLE at 09:10

## 2023-05-28 RX ADMIN — PANTOPRAZOLE SODIUM 40 MG: 40 TABLET, DELAYED RELEASE ORAL at 05:26

## 2023-05-28 RX ADMIN — LEVETIRACETAM 750 MG: 750 TABLET, FILM COATED ORAL at 09:10

## 2023-05-28 RX ADMIN — LEVETIRACETAM 750 MG: 750 TABLET, FILM COATED ORAL at 20:39

## 2023-05-28 NOTE — PROGRESS NOTES
SLP TAA     05/28/23 1030   Patient Data   Rehab Impairment Impairment of mobility, safety and Activities of Daily Living (ADLs) due to Stroke:  01 1  Left Body Involvement (Right Brain)   Etiologic Diagnosis L MCA stroke w/ LM1 partial occlusion   Date of Onset 05/15/23   Support System   Name Father Dulce Cardenas - works in maintenance, per pt Father already been off work for the past 2 weeks and does not think Father can take additional days off work  sister Morena Ward is 40 is a nurse  Meli Jiménez is an  - per pt he can't live with either sisters or father - therapy will confirm with family   Able to provide 24 hour supervision No   Able to provide physical help? 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)   Transportation    Prior IADL Participation   Money Management Identify Money;Estimate Costs;Estimate Change;Combine Bills;Manage Checkbook   Meal Preparation Full Participation   Laundry Full Participation   Home Cleaning Full Participation   Prior Level of Function   Functional Cognition 3  Independent - Patient completed the activities by him/herself, with or without an assistive device, with no assistance from a helper  Patient Preference   Nickname (Patient Preference) Andrea   Psychosocial   Psychosocial (WDL) WDL   Patient Behaviors/Mood Appropriate for age; Appropriate for situation;Brightens with approach;Calm; Cooperative;Pleasant   Restrictions/Precautions   Precautions Aphasia;Cognitive;Bed/chair alarms; Fall Risk;Impulsive;Supervision on toilet/commode;Seizure   Pain Assessment   Pain Assessment Tool 0-10   Pain Score No Pain   Comprehension   Assist Devices Glasses   Auditory Basic   Findings Speech/language evaluation was completed this session  See SLP Rehab note for details and findings  QI: Comprehension 2   Sometimes Understands: Understands only basic conversations or simple, direct phrases  Frequently requires cues to understand   Comprehension (FIM) 2 - Understands basic info/conversation 25-49% of time   Expression   Verbal Basic   Non-Verbal Basic   Findings Speech/language evaluation was completed this session  See SLP Rehab note for details and findings  QI: Expression 2  Frequently exhibits difficulty with expressing needs and ideas   Expression (FIM) 3 - Expresses basic info/needs 50-74% of time   Social Interaction   Cooperation with staff   Participation Individual   Behaviors observed Appropriate  (impulsivity)   Findings Pt was cooperative and agreeable throughout session, though noted to be impulsive  Social Interaction (FIM) 5 - Requires redirection but less than 10% of the time  Problem Solving   Findings Speech/language evaluation was completed this session  See SLP Rehab note for details and findings  Problem solving (FIM) 2 - Solves basic problems 25-49% of time   Memory   Initiates Tasks Yes   Short-Term Impaired   Long Term Impaired   Recalls Precaution No   Findings Speech/language evaluation was completed this session  See SLP Rehab note for details and findings  Memory (FIM) 2 - Recognizes, recalls/performs 25-49%   Cognition   Overall Cognitive Status Impaired   Arousal/Participation Alert; Cooperative   Attention Attends with cues to redirect   Orientation Level Oriented to person;Oriented to place;Oriented to situation   Memory Decreased recall of biographical information;Decreased short term memory;Decreased recall of recent events;Decreased recall of precautions   Following Commands Follows one step commands inconsistently   Comments Speech/language evaluation was completed this session  See SLP Rehab note for details and findings  Discharge Information   Vocational Plan Return to work; Full Time   Barriers to Return to [employer] Access;Skill Set Limitation;Transportation Issues;Strength; Endurance;Communication   Patient's Discharge Plan home with family support   Patient's Rehab Expectations to return home and get better   Barriers to Discharge Home Limited Family Support;Decreased Cognitive Function;Decreased Strength;Decreased Endurance; Safety Considerations  (expressive and receptive aphasia)   Impressions Pt is a 40year old male s/p CVA on 5/15 with CTH showed acute ischemic left MCA stroke status post thrombectomy with TICI 3 revascularization by Dr Ames Severance  ST orders received with speech/language evaluation completed on admission  Pt presents with severe expressive and receptive aphasia at this time, impacting overall functional communication and independence  Current deficits which present are barriers include impairments in the following areas: comprehension, expression, insight into deficits, problem solving, safety awareness, judgement and executive functions  Pt presents with good rehab potential to improve overall language/communication skills, but will likely be recommended to have increased level of support on discharge, which is anticipated home, due to the above deficits impacting safety  Pt with ELOS of 10-14 days  Based on current evaluation, pt is recommended for skilled SLP services during acute rehab stay targeting expressive and receptive language skills in order to maximize overall functional communication abilities     SLP Therapy Minutes   SLP Time In 1030   SLP Time Out 1130   SLP Total Time (minutes) 60   SLP Mode of treatment - Individual (minutes) 60   SLP Mode of treatment - Concurrent (minutes) 0   SLP Mode of treatment - Group (minutes) 0   SLP Mode of treatment - Co-treat (minutes) 0   SLP Mode of Treatment - Total time(minutes) 60 minutes   SLP Cumulative Minutes 60   Cumulative Minutes   Cumulative therapy minutes 120

## 2023-05-28 NOTE — ASSESSMENT & PLAN NOTE
Large PFO identified on ALYSSA  Cardiology recommending follow-up in 4-6 weeks as an outpatient for possible closure

## 2023-05-28 NOTE — ASSESSMENT & PLAN NOTE
Presented 5/15/23 as stroke alert  CTH/MRI brain confirmed Left MCA ischemic stroke  S/p thrombectomy TICI 3 revascularization 5/16/23  Etiology unclear, (possibly related to recent COVID infection in Feb 2023)  Work-up: +Large PFO on ALYSSA  Hypercoagulable work-up and malignancy work-up largely negative - MHTFR pending    Repeat hypercoagulable what the serial work-up as outpatient recommended  · Secondary CVA ppx: Aspirin 81 mg daily  · CVA education and reduction of modifiable risk factors  · Follow-up with neurology and cardiology as an outpatient (patient is a candidate for PFO closure)

## 2023-05-28 NOTE — ASSESSMENT & PLAN NOTE
Appreciate neuropsychology following while in [facility]  Mood has significantly improved  Continue Lexapro 5 mg daily - started 5/30/23

## 2023-05-28 NOTE — PROGRESS NOTES
Occupational Therapy Initial Evaluation     05/28/23 0700   Patient Data   Rehab Impairment Impairment of mobility, safety and Activities of Daily Living (ADLs) due to Stroke:  01 1  Left Body Involvement (Right Brain)   Etiologic Diagnosis L MCA stroke w/ LM1 partial occlusion   Date of Onset 05/15/23   Support System   Name pt reports supportive local father, Vidhya Last, and sister, Anastasiia Barrios, who are able to A at d/c  Able to provide 24 hour supervision No   Able to provide physical help? No   Home Setup   Type of Home Apartment;Single Level   Number of Stairs 0   Number of Stairs in Home 0   First Floor Bathroom Full;Tub; Shower;Combo   First Floor Setup Available Yes   Home Modifications Necessary?   (TBD)   Available Equipment   (none)   Baseline Information   Vocation Work Full Time   Transportation    Prior Device(s) Used   (none)   Prior IADL Participation   Money Management Estimate Costs; Identify Money;Combine Bills;Estimate Change   Meal Preparation Full Participation   Laundry Full Participation   Home Cleaning Full Participation   Prior Level of Function   Self-Care 3  Independent - Patient completed the activities by him/herself, with or without an assistive device, with no assistance from a helper  Indoor-Mobility (Ambulation) 3  Independent - Patient completed the activities by him/herself, with or without an assistive device, with no assistance from a helper  Stairs 3  Independent - Patient completed the activities by him/herself, with or without an assistive device, with no assistance from a helper  Functional Cognition 3  Independent - Patient completed the activities by him/herself, with or without an assistive device, with no assistance from a helper  Prior Device Used Z   None of the above   Falls in the Last Year   Number of falls in the past 12 months 0   Patient Preference   Nickname (Patient Preference) (S)  [address] (WDL) WDL   Patient Behaviors/Mood Appropriate for age; Appropriate for situation;Brightens with approach;Calm; Cooperative;Pleasant   Restrictions/Precautions   Precautions Bed/chair alarms; Fall Risk;Aphasia; Seizure;Supervision on toilet/commode   Weight Bearing Restrictions No   ROM Restrictions No   Pain Assessment   Pain Assessment Tool 0-10   Pain Score No Pain   Eating Assessment   Type of Assistance Needed Independent   Physical Assistance Level No physical assistance   Eating CARE Score 6   Oral Hygiene   Type of Assistance Needed Physical assistance   Physical Assistance Level 25% or less   Comment in stance at sink, req vc's and demo to maximize use of RUE/hand  Oral Hygiene CARE Score 3   Tub/Shower Transfer   Reason Not Assessed Sponge Bath  (Pt w/ active shower orders however pt declined shower, agreeable to sponge bath )   Shower/Bathe Self   Type of Assistance Needed Physical assistance;Verbal cues; Set-up / clean-up   Physical Assistance Level 51%-75%   Comment pt reports standing for bathing at baseline  Pt bathed BUE, abdomen, groin, buttocks, and upper legs while in stance  would req Mod-Max A to steady in stance to bathe lower legs in unilateral stance however completed while seated w/ crossed leg technique to inc safety  Shower/Bathe Self CARE Score 2   Dressing/Undressing Clothing   Type of Assistance Needed Physical assistance   Physical Assistance Level 25% or less   Comment Min A to steady in stance   Upper Body Dressing CARE Score 3   Type of Assistance Needed Physical assistance   Physical Assistance Level 51%-75%   Comment pt reports donning pants in stance at baseline, would req Mod A to steady in unilateral stance to thread BLE, threaded BLE while seated w/ dynamic fxnl reach to inc safety w/ task  Min A to steady in stance for clothing management  inc time req to fasten pant snap     Lower Body Dressing CARE Score 2   Putting On/Taking Off Footwear   Type of Assistance Needed Supervision;Set-up / Leanora Spies Physical Assistance Level No physical assistance   Comment seated, crossed leg technique  Putting On/Taking Off Footwear CARE Score 4   Toileting Hygiene   Type of Assistance Needed Physical assistance   Physical Assistance Level 25% or less   Comment in stance for hygiene and clothing management  Toileting Hygiene CARE Score 3   Toilet Transfer   Type of Assistance Needed Physical assistance   Physical Assistance Level 25% or less   Comment Min A without AD to standard toilet without grab bar  Toilet Transfer CARE Score 3   Transfer Bed/Chair/Wheelchair   Type of Assistance Needed Physical assistance   Physical Assistance Level 25% or less   Comment ambulated<>bathroom w/ Min A without AD, guard on R  Chair/Bed-to-Chair Transfer CARE Score 3   Sit to Stand   Type of Assistance Needed Physical assistance   Physical Assistance Level 25% or less   Comment initally Min A  With Min cues for pacing, CGA  Sit to Stand CARE Score 3   Comprehension   QI: Comprehension 3  Usually Understands: Understands most conversations, but misses some part/intent of message  Requires cues at times to understand  Expression   QI: Expression 2  Frequently exhibits difficulty with expressing needs and ideas   RUE Assessment   RUE Assessment X  (R-hand dominant)   Strength - RUE   R Shoulder Flexion 4/5   R Shoulder ABduction 4/5   R Elbow Flexion 4/5   R Elbow Extension 4/5   R Wrist Flexion 4/5   R Wrist Extension 4/5   R Digit Flexion 3-/5   R Digit Extension 3-/5   LUE Assessment   LUE Assessment WFL   Coordination   Movements are Fluid and Coordinated 0   Coordination and Movement Description R dysmetria   Sensation   Light Touch Partial deficits in the RUE;Partial deficits in the RLE   Additional Comments dec sensation to RUE/LE, will benefit from further assessment  Cognition   Overall Cognitive Status Impaired   Arousal/Participation Alert; Cooperative   Attention Attends with cues to redirect   Orientation Level "Oriented X4;Oriented to person;Oriented to place;Oriented to time;Oriented to situation   Memory Decreased recall of precautions   Following Commands Follows one step commands with increased time or repetition   Comments expressive>receptive aphasia, +frustration  Noted w/ impaired insight/judgement, dec comprehension, impaired safety awareness  Vision   Vision Comments Pt wears bifocals at baseline, pt denies visual changes since CVA  Objective Measure   OT Measure(s) BP seated 106/65, BP standing 107/74  Time spent reviewing daily schedule, role of OT, and therapy expectations  Discharge Information   Vocational Plan Return to work; Full Time   Barriers to Return to [employer] Access;Skill Set Limitation;Transportation Issues;Strength;Communication   Patient's Discharge Plan home w/ family support, OP neuro OT  Patient's Rehab Expectations \"Ready to work hard  \"   Barriers to Discharge Home Limited Family Support; Unsafe Home Setup; Decreased Strength;Decreased Endurance; Safety Considerations; Other  (expressive>receptive aphasia)   Impressions Pt is a 41 y/o male presenting to hospitals on 5/15/23 w/ R hemiparesis and aphasia  PMH includes COVID-19 2/2023  Pt reports completing ADLs/IADLs and fxnl mobility independently without AD, (+) driving, works full-time  Pt is currently functioning at overall Min A for ADLs and Min A for fxnl mobility without AD  Pt is limited by dec strength, dec endurance, impaired balance, expressive>receptive aphasia, R-sided weakness,  impaired sensation to RUE/LE, impaired coordination, impaired safety awareness, impaired insight/judgement, limited social support, unsafe home set-up, and ADL dysfunction  Pt will benefit from skilled OT services w/ focus on above barriers, assess need for DME, provide pt/family edu, and maximize fxnl indep to return to OF in 10-14 day ELOS to meet overall set-up to supervision ADL/IADL goals     OT Therapy Minutes   OT Time In 0700   OT Time " Out 0800   OT Total Time (minutes) 60   OT Mode of treatment - Individual (minutes) 60   OT Mode of treatment - Concurrent (minutes) 0   OT Mode of treatment - Group (minutes) 0   OT Mode of treatment - Co-treat (minutes) 0   OT Mode of Treatment - Total time(minutes) 60 minutes   OT Cumulative Minutes 60   Cumulative Minutes   Cumulative therapy minutes 60     Velia Galarza MS, OTR/L

## 2023-05-28 NOTE — ASSESSMENT & PLAN NOTE
Witnessed seizure-like activity times 2/5/1623  Video EEG negative however patient continued on Keppra 750 mg every 12 hours    Placed on seizure precautions  Per neurology continue Keppra until outpatient appointment for duration to be determined  [place] form completed

## 2023-05-28 NOTE — PROGRESS NOTES
Internal Medicine Progress Note  Patient: Prasanth Menendez  Age/sex: 40 y o  male  Medical Record #: [id number]      ASSESSMENT/PLAN: (Interval History)  Prasanth Menendez is seen and examined and management for following issues:    Acute left MCA CVA  • S/p left MCA thrombectomy  • Workup:   • Homocystine normal, BELEM normal, RF normal, ANCA negative, dsDNA negative, Factor V Leiden/lupus anticoagulant negative, Antithrombin III 81 (felt likely not clinically relevant was higher than 80%, Protein S decreased at 65%, Protein C normal                          MTHFR mutation pending                          Repeat thrombosis panel in 3 months   • Continue ASA 81mg qd and statin  • Question if etio is from COVID infection in 2/2023     Seizures  • Was noted with eyes deviating to the right 5/16 with a post ictal period   • Had 2 episodes  • Keppra stopped 5/25 but Dr Rosey Loo restarted since he had 2 sz in hospital     PFO  • Large PFO on ALYSSA  • Negative DVT  • To see Cards as OP 4-6 weeks     Bradycardia  • Has HRs in 50's  • On no meds to affect HR  • Will watch     Nicotine abuse  • Continue patch     Moderate B/L pleural effusions  • Will monitor for resolution   • Chest clear and O2 sats on RA normal, no SOB        Discharge date:  Team    The above assessment and plan was reviewed and updated as determined by my evaluation of the patient on 5/28/2023      Labs:   Results from last 7 days   Lab Units 05/24/23  0544 05/23/23  0602   HEMATOCRIT % 36 7 33 8*   HEMOGLOBIN g/dL 13 0 12 2   PLATELETS Thousands/uL 282 269   WBC Thousand/uL 5 09 5 30     Results from last 7 days   Lab Units 05/24/23  0544 05/23/23  0602   BUN mg/dL 10 7   CALCIUM mg/dL 8 7 8 6   CHLORIDE mmol/L 108 109*   CO2 mmol/L 30 30   CREATININE mg/dL 0 77 0 62   POTASSIUM mmol/L 3 9 3 5   SODIUM mmol/L 138 138             Results from last 7 days   Lab Units 05/27/23  1054 05/23/23  0611   POC GLUCOSE mg/dl 47* 82       Review of Scheduled Meds:  Current "Facility-Administered Medications   Medication Dose Route Frequency Provider Last Rate   • acetaminophen  975 mg Oral Q8H PRN Maddy Hou MD     • aspirin  81 mg Oral Daily Maddy Hou MD     • bisacodyl  10 mg Rectal Daily PRN Maddy Hou MD     • enoxaparin  40 mg Subcutaneous Q24H [facility] Maddy Hou MD     • levETIRAcetam  750 mg Oral Q12H [facility] Marline Everett MD     • nicotine  1 patch Transdermal Daily Maddy Hou MD     • ondansetron  4 mg Oral Q6H PRN Maddy Hou MD     • pantoprazole  40 mg Oral Early Morning Maddy Hou MD     • polyethylene glycol  17 g Oral Daily PRN Maddy Hou MD         Subjective/ HPI: Patient seen and examined  Patients overnight issues or events were reviewed with nursing or staff during rounds or morning huddle session  New or overnight issues include the following:     No new or overnight issues  Offers no complaints    ROS:   A 10 point ROS was performed; negative except as noted above  Imaging:     No orders to display       *Labs /Radiology studies reviewed  *Medications reviewed and reconciled as needed  *Please refer to order section for additional ordered labs studies  *Case discussed with primary attending during morning huddle case rounds    Physical Examination:  Vitals:   Vitals:    05/27/23 1238 05/27/23 2110 05/28/23 0730   BP: 131/62 112/63 107/74   BP Location: Left arm Left arm Right arm   Pulse:  65 (!) 53   Resp: 17 16 18   Temp: 98 2 °F (36 8 °C) 98 4 °F (36 9 °C) 97 6 °F (36 4 °C)   TempSrc: Oral Oral Oral   SpO2: 100% 97% 100%   Weight: 72 9 kg (160 lb 11 5 oz)     Height: 5' 9\" (1 753 m)       General Appearance: no distress, conversive  HEENT:  External ear normal   Nose normal w/o drainage  Mucous membranes are moist  Oropharynx is clear  Conjunctiva clear w/o icterus or redness    Neck:  Supple, normal ROM  Lungs: BBS without crackles/wheeze/rhonchi; respirations unlabored with normal " inspiratory/expiratory effort  No retractions noted  On RA  CV: regular rate and rhythm; no rubs/murmurs/gallops, PMI normal   ABD: Abdomen is soft  Bowel sounds all quadrants  Nontender with no distention  EXT: no edema  Skin: normal turgor, normal texture, no rashes  Psych: affect normal, mood normal  Neuro: AAO  Has expressive>receptive aphasia and unchanged right sided weakness     The above physical exam was reviewed and updated as determined by my evaluation of the patient on 5/28/2023  Invasive Devices     None                    VTE Pharmacologic Prophylaxis: Enoxaparin  Code Status: Level 1 - Full Code  Current Length of Stay: 1 day(s)      Total time spent:  30 minutes with more than 50% spent counseling/coordinating care  Counseling includes discussion with patient re: progress  and discussion with patient of his/her current medical state/information  Coordination of patient's care was performed in conjunction with primary service  Time invested included review of patient's labs, vitals, and management of their comorbidities with continued monitoring  In addition, this patient was discussed with medical team including physician and advanced extenders  The care of the patient was extensively discussed and appropriate treatment plan was formulated unique for this patient  Medical decision making for the day was made by supervising physician unless otherwise noted in their attestation statement  ** Please Note:  voice to text software may have been used in the creation of this document   Although proof errors in transcription or interpretation are a potential of such software**

## 2023-05-28 NOTE — ASSESSMENT & PLAN NOTE
CT C/A/P: Moderate bilateral pleural effusions, small amount of ascites, and diffuse body wall subcutaneous edema    Follow-up with PCP  Stable from a pulmonary standpoint in [facility]

## 2023-05-28 NOTE — PROGRESS NOTES
05/28/23 1220   Rehab Team Goals   Transfer Team Goal Patient will require supervision with transfers with least restrictive device upon completion of rehab program   Locomotion Team Goal Patient will require supervision with locomotion with least restrictive device upon completion of rehab program   Rehab Team Interventions   PT Interventions Neuromuscualr Reeducation; Therapeutic Exercise;Gait Training;Transfer Training;Community Reintegration;Bed Mobility;Modalities; Patient/Family Education; Wheelchair Mobility   PT Transfer Goal   Roll left and right Goal 06  Independent - Patient completes the activity by him/herself with no assistance from a helper  Sit to lying Goal 06  Independent - Patient completes the activity by him/herself with no assistance from a helper  Lying to sitting on side of bed Goal 06  Independent - Patient completes the activity by him/herself with no assistance from a helper  Sit to stand Goal 05  Setup or clean-up assistance - [place] SETS UP or CLEANS UP, patient completes activity  [place] assists only prior to or following the activity  Chair/bed-to-chair transfer Goal 05  Setup or clean-up assistance - [place] SETS UP or CLEANS UP, patient completes activity  [place] assists only prior to or following the activity  Car Transfer Goal 05  Setup or clean-up assistance - [place] SETS UP or CLEANS UP, patient completes activity  [place] assists only prior to or following the activity  Assistive Device   (no AD vs LRAD)   Safety Precautions Hand Placement   Environment Level Surface; Well Lit   Status Ongoing; Target goal - two weeks  (10-14 days)   Locomotion Goal   Primary discharge mode of locomotion Walking   Target Walk Distance 300 ft   Assist Device   (no AD vs LRAD)   Gait Pattern Improvement Inconsistant Tess; Excess Slow;R hemiparesis;R foot drag;Improper weight shift;Decreased foot clearance;Decreased R stance  (R circumduction)   Environment Level Surface; Well Lit   Walk 10 feet Goal 05  Setup or clean-up assistance - [place] SETS UP or CLEANS UP, patient completes activity  [place] assists only prior to or following the activity  Walk 50 feet with 2 turns Goal 04  Supervision or touching assistance- [place] provides VERBAL CUES or supervision throughout activity  Walk 150 feet Goal 04  Supervision or touching assistance- [place] provides VERBAL CUES or supervision throughout activity  Walking 10 feet on uneven surface 04  Supervision or touching assistance- [place] provides VERBAL CUES or supervision throughout activity  Walking Goal Status Ongoing; Target goal - two weeks  (10-14 days)   Wheel 50 feet with 2 turns Goal 09  Not applicable   Wheel 662 feet Goal 09  Not applicable   Stairs Goal   1 step or curb goal 04  Supervision or touching assistance- [place] provides VERBAL CUES or supervision throughout activity  4 steps Goal 04  Supervision or touching assistance- [place] provides VERBAL CUES or supervision throughout activity  12 steps Goal 04  Supervision or touching assistance- [place] provides VERBAL CUES or supervision throughout activity  Assist Level Supervision   Number of Stairs 12   Technique Reciprocal;Curb Step   Hand Rail   (at least 1 rail)   Status Ongoing; Target goal - two weeks  (10-14 days)   Object Retrieval Goal   Picking up object Goal 05  Setup or clean-up assistance - [place] SETS UP or CLEANS UP, patient completes activity  [place] assists only prior to or following the activity     Assistive Device  Other (Comment)  (none vs reacher)   Small Object Picked Up pen

## 2023-05-28 NOTE — PROGRESS NOTES
Speech and Language Evaluation     05/28/23 1030   Pain Assessment   Pain Assessment Tool 0-10   Pain Score No Pain   Restrictions/Precautions   Precautions Aphasia;Cognitive;Bed/chair alarms; Fall Risk;Impulsive;Supervision on toilet/commode;Seizure   Comprehension   Assist Devices Glasses   Auditory Basic   Findings Speech/language evaluation was completed this session  See SLP Rehab note for details and findings  QI: Comprehension 2  Sometimes Understands: Understands only basic conversations or simple, direct phrases  Frequently requires cues to understand   Comprehension (FIM) 2 - Understands basic info/conversation 25-49% of time   Expression   Verbal Basic   Non-Verbal Basic   Findings Speech/language evaluation was completed this session  See SLP Rehab note for details and findings  QI: Expression 2  Frequently exhibits difficulty with expressing needs and ideas   Expression (FIM) 3 - Expresses basic info/needs 50-74% of time   Social Interaction   Cooperation with staff   Participation Individual   Behaviors observed Appropriate  (impulsivity)   Findings Pt was cooperative and agreeable throughout session, though noted to be impulsive  Social Interaction (FIM) 5 - Requires redirection but less than 10% of the time  Problem Solving   Findings Speech/language evaluation was completed this session  See SLP Rehab note for details and findings  Problem solving (FIM) 2 - Solves basic problems 25-49% of time   Memory   Initiates Tasks Yes   Short-Term Impaired   Long Term Impaired   Recalls Precaution No   Findings Speech/language evaluation was completed this session  See SLP Rehab note for details and findings  Memory (FIM) 2 - Recognizes, recalls/performs 25-49%   Memory Skills   Orientation Level Oriented to person;Oriented to place;Oriented to situation   Language Assessments   Informal Speech Language Assessment Pt completed portions of the Informal Language Assessment   Results are as follows with the below sections addressed:      Orientation Information:    Person: +   Place: + to place and name of hospital, though noted phonemic errors   Time: + month, - year   Situation: +    LTM Biographical Information:              : +              Age: + with self correction              Names of family members: +               Address: able to accurately elicit only the town and zip code      Auditory Comprehension Tasks:   Yes/No Questions:  Biographical Yes/No Questions: 7/10 accurate  Simple Yes/No Questions: 5/10 accurate  Moderate Yes/No Questions: 5/10 accurate     Verbal Command Followin-step: 3/5 accurate  2-step: 1/5 accurate  3-step:  0/5 accurate    Body ID: 8/10 accurate    Left vs Right Discrimination: errored     Receptive Picture/Object Identification (from a [id number] [place]):  accurate      Reading Comprehension:    1, 2, 3 steps: unable to follow written commands-also difficulty in reading info aloud   Simple words: -   Phrases:  -    Verbal Expression Tasks:   Automatic Speech:   Counting: 10/10 accurate but counted backwards from 10 without given that direction  STEVEN:  accurate  LES:  accurate with given an initial phonemic cue to begin  Melodic Intonation: 100% accurate, noting only one single phonemic error which did not impact intelligibility     Responsive [place]/10 accurate    Written Expression:   Name/Address: + for name; only partial address     Western Aphasia Battery (WAB) Bedside Western Aphasia Battery        Subtest  Score   Spontaneous Speech: Content 6   Spontaneous Speech: Fluency 4   Auditory Verbal Comprehension: Yes/No Questions 7   Sequential Commands 2   Repetition 3 5   Object Naming 6 5   Bedside Aphasia Score Severely impaired    Bedside Aphasia Classification Broca's Type        Speech/Language/Cognition Assessment   Treatment Assessment   Speech and Language evaluaiton was completed this session consisting of patient interview, the Bedside WAB and portions of the Informal Speech/Language Assessment Tool  See above for details related to each assessment tool as based on current results of the Bedside WAB, pt is presenting with severe language deficits and Broca's Type aphasia  Overall, pt is presenting with both expressive and receptive communication deficits/aphasia at this time, impacting both comprehension of basic information and expression of basic information  Pt's verbal expression is characterized by dysfluent speech containing semantic content, though also with anomia, phonemic paraphasias and neologisms  Pt presents with difficulty in following simple commands, though ? ing if due to comprehension vs combination of comprehension and motor planning deficits, along with deficits in yes/no questions and basic conversation  He benefits from repetition and rewording of information, as well as use og gestures paired with instructions  Throughout session, pt was provided with education on current language deficits in relation to location of stroke, along with severity of deficits  Educated on basic stroke recovery timeline with current recommendations for pt to continue to receive skilled SLP services after discharge due to likely need for longer recovery related to speech/language/cognitive linguistic skills in comparison to the shorter timeline of an acute rehab unit  Pt agreeable to recommendations  SLP also reviewed treatment plan in acute rehab setting to focus on both expressive and receptive communication/language skills, as well as cognitive linguistic skills as able  Suspect pt will benefit from additional and continued education  At end of session, when SLP left to retrieve pt's lunch tray from just outside of pt's room but first asked pt about any needs that he had, pt found to be setting off his chair alarm and when SLP returned to room, pt had already transferred himself from the recliner to the bed   SLP educated on need for ringing call bell and waiting for staff to assist with all transfers by giving examples of different scenarios and using rewording, along with gestures  SLP requested pt to point to the button on his call ball to request help, however, pt then turning channel on TV  Required direct model from SLP to locate button  Able to reproduce calling for nursing staff x2 then, but SLP then relayed this to RN and therapy OT/PT team with plan to use repetitive training to reinforce need for calling and to maintain safety  Pt reports that he lives alone but has good support from his father and family  He was working full time, 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  However, when SLP attempting to clarify position and job duties, presence of aphasia impacted pt's ability to convey info  Pt was fully independent for all IADLs prior to admission  Also note, pt presenting with deficits in the following areas: insight into deficits, problem solving, safety awareness, judgement and executive functions  Based on current evaluation, pt is recommended for skilled SLP services during acute rehab stay targeting expressive and receptive language skills in order to maximize overall functional communication abilities  Additionally, due to communication deficits, pt will likely be recommended to have increased supervision/assist on discharge  SLP Therapy Minutes   SLP Time In 1030   SLP Time Out 1130   SLP Total Time (minutes) 60   SLP Mode of treatment - Individual (minutes) 60   SLP Mode of treatment - Concurrent (minutes) 0   SLP Mode of treatment - Group (minutes) 0   SLP Mode of treatment - Co-treat (minutes) 0   SLP Mode of Treatment - Total time(minutes) 60 minutes   SLP Cumulative Minutes 60   Therapy Time missed   Time missed?  No

## 2023-05-28 NOTE — PROGRESS NOTES
OT LTGs: 10-14 day ELOS     05/28/23 0700   Rehab Team Goals   ADL Team Goal Patient will be supervision with ADLs with least restrictive device upon completion of rehab program   Rehab Team Interventions   OT Interventions Self Care;Home Management; Therapeutic Exercise;Community Reintegration; Energy Conservation;Patient/Family Education   Eating Goal   Eating Goal 06  Independent - Patient completes the activity by him/herself with no assistance from a helper  Meal Complete All meals   Status Target goal - one week; Target goal - two weeks  (10-14 days)   Interventions Optimal Position   Grooming Goal   Oral Hygiene Goal 04  Supervision or touching assistance- [place] provides VERBAL CUES or supervision throughout activity  Task Wash/Dry Face;Wash/Dry Hands;Brush Teeth;Comb Hair; Shave;Acquire Items; Initiate Task;Complete Groom   Environment Seated in Chair;Unsupported sit; Seated at Sink;Stand at [facility]   Status Target goal - one week; Target goal - two weeks  (10-14 days)   Intervention Assistive Device;Balance Work;Neuromuscular Education; Therapeutic Exercise; Tolerance Work   Tub/Shower Transfer Goal   Method Tub Shower  (S)   Assist Device Seat with Back;Grab Bar;Hand Held Shower   Status Target goal - one week; Target goal - two weeks  (10-14 days)   Interventions ADL Training;Neuromuscular Education;Assistive Device   Bathing Goal   Shower/bathe self Goal 04  Supervision or touching assistance- [place] provides VERBAL CUES or supervision throughout activity  Environment Seated;Standing;Sponge Bath;Tub   Adaptive Equipment Hand Held [place] with back; Seat without back   Status Target goal - one week; Target goal - two weeks  (10-14 days)   Intervention ADL Training;Assistive Device; Neuromuscular Education; Therapeutic Exercise   Upper Body Dressing Goal   Upper body dressing Goal 05  Setup or clean-up assistance - [place] SETS UP or CLEANS UP, patient completes activity   [place] assists only prior to or following the activity  Task Upper Body;Arms in/out; Over Head   Environment Seated;Standing   Status Target goal - one week; Target goal - two weeks  (10-14 days)   Intervention Balance Work;Assistive Device; Neuromuscular Education; Therapeutic Exercise; Tolerance Work   Lower Body Dressing Goal   Lower body dressing Goal 04  Supervision or touching assistance- [place] provides VERBAL CUES or supervision throughout activity  Putting on/taking off footwear Goal 05  Setup or clean-up assistance - [place] SETS UP or CLEANS UP, patient completes activity  [place] assists only prior to or following the activity  Task Lower Body;Shoe/Slipper;Socks;Pants; Undergarment; Fasteners   Environment Seated;Standing   Status Target goal - one week; Target goal - two weeks  (10-14 days)   Intervention Assistive Device;Balance Work;Neuromuscular Education; Therapeutic Exercise; Tolerance Work   Toileting Transfer Goal   Toilet transfer Goal 05  Setup or clean-up assistance - [place] SETS UP or CLEANS UP, patient completes activity  [place] assists only prior to or following the activity  Assistive Device Raised Toilet Seat;Grab Bar   Status Target goal - one week; Target goal - two weeks  (10-14 days)   Intervention ADL Training;Balance Work;Assistive Device   Toileting Goal   Toileting hygiene Goal 05  Setup or clean-up assistance - [place] SETS UP or CLEANS UP, patient completes activity  [place] assists only prior to or following the activity  Task Pants Up;Pants Down;Hygiene   Safety Balance   Status Target goal - one week; Target goal - two weeks  (10-14 days)   Intervention ADL Training;Balance Work;Assistive Device   Meal Prep and Kitchen Mobility   Assist Level Supervision  (light meal prep)   Status Target goal - one week; Target goal - two weeks  (10-14 days)   Medication Management   Assist Level Supervision   Status Target goal - one week; Target goal - two weeks; Target goal - three weeks  (10-14 days)   Laundry   Assist Level Supervision   Status Target goal - one week; Target goal - two weeks  (10-14 days)   Finance Management   Assist Level Supervision   Status Target goal - one week; Target goal - two weeks  (10-14 days)     Hipolito Hess MS, OTR/L

## 2023-05-28 NOTE — PROGRESS NOTES
[facility] PT Initial Evaluation Note     05/28/23 1220   Patient Data   Rehab Impairment Impairment of mobility, safety and Activities of Daily Living (ADLs) due to Stroke:  01 1  Left Body Involvement (Right Brain)   Etiologic Diagnosis L MCA stroke w/ LM1 partial occlusion   Date of Onset 05/15/23   Support System   Name Father Baron Nones - works in maintenance, per pt Father already been off work for the past 2 weeks and does not think Father can take additional days off work  sister Miki Cordoba is 40 is a nurse  Jacquelyn Dominguez is an  - per pt he can't live with either sisters or father - therapy will confirm with family   Able to provide 24 hour supervision No   Able to provide physical help? No   Home Setup   Type of Home Apartment;Single Level  (lives with 2 dogs (pecan and León) in a house apt without HONEY - will confirm with family )   Number of Stairs 0   Number of Stairs in Home 0   First Floor Bathroom Full;Tub; Shower   First Floor Setup Available Yes   Baseline Information   Vocation Work Full Time  (pt works in a school food court covering [facility] 45   Prior Device(s) Used   (none)   Prior Level of Function   Prior Device Used Z  None of the above   Falls in the Last Year   Number of falls in the past 12 months 0   Patient Preference   Nickname (Patient Preference) Andrea   Psychosocial   Psychosocial (WDL) WDL   Restrictions/Precautions   Precautions Cognitive;Bed/chair alarms; Fall Risk;Seizure;Supervision on toilet/commode; Impulsive  (expressive > receptive aphasia, slight R side inattention)   Pain Assessment   Pain Assessment Tool 0-10   Pain Score No Pain   Transfer Bed/Chair/Wheelchair   Type of Assistance Needed Physical assistance;Verbal cues   Physical Assistance Level 25% or less   Comment min A without AD   Chair/Bed-to-Chair Transfer CARE Score 3   Roll Left and Right   Type of Assistance Needed Independent   Comment HOB flat without rail   Roll Left and Right CARE Score 6 Sit to Lying   Type of Assistance Needed Set-up / clean-up   Comment HOB flat without rail   Sit to Lying CARE Score 5   Lying to Sitting on Side of Bed   Type of Assistance Needed Set-up / clean-up   Comment HOB flat without rail   Lying to Sitting on Side of Bed CARE Score 5   Sit to Stand   Type of Assistance Needed Physical assistance;Verbal cues   Physical Assistance Level 25% or less   Comment min-CGA no AD   Sit to Stand CARE Score 3   Picking Up Object   Type of Assistance Needed Physical assistance;Verbal cues   Physical Assistance Level 26%-50%   Comment min-mod with or without AD   Picking Up Object CARE Score 3   Car Transfer   Type of Assistance Needed Physical assistance;Verbal cues   Physical Assistance Level 25% or less   Comment min A no AD   Car Transfer CARE Score 3   Ambulation   Primary Mode of Locomotion Prior to Admission Walk   Gait Pattern Decreased foot clearance; Inconsistant Tess; Slow Tess;R knee hyperextension; Improper weight shift;Decreased R stance;R foot drag;R hemiparesis  (R circumduction, R foot slapping)   Limitations Noted In Balance; Endurance; Heel Strike;Posture; Safety;Speed;Strength;Swing;Sensation   Provided Assistance with: Balance;Weight Shift   Walk Assist Level Minimum Assist;Moderate Assist;Chair Follow   Walk 10 Feet   Type of Assistance Needed Physical assistance;Verbal cues   Physical Assistance Level 25% or less   Comment no AD min A   Walk 10 Feet CARE Score 3   Walk 50 Feet with Two Turns   Type of Assistance Needed Physical assistance;Verbal cues   Physical Assistance Level 26%-50%   Comment no AD but assist on pelvis for weightshifting to improve R foot clearance/advancement with min-mod   Walk 50 Feet with Two Turns CARE Score 3   Walk 150 Feet   Type of Assistance Needed Physical assistance;Verbal cues   Physical Assistance Level Total assistance   Comment CFA of 2nd person for safety x 150' with standing rest breaks required   due to aphasia? does not "seem to demo self correction of R foot clearance when provided external/internal cues   Walk 150 Feet CARE Score 1   Walking 10 Feet on Uneven Surfaces   Type of Assistance Needed Physical assistance;Verbal cues   Physical Assistance Level 26%-50%   Comment over foam mat min-mod -demo'd difficulty following 1 step instruction to step off the mat on the other end and attempted to bend over so provided demo of task desired   Walking 10 Feet on Uneven Surfaces CARE Score 3   Wheelchair mobility   Findings w/c propulsion CW4 to/from NW9 for LE strengthening pt completed task at min A   Wheel 50 Feet with Two Turns   Reason if not Attempted Activity not applicable   Wheel 50 Feet with Two Turns CARE Score 9   Wheel 150 Feet   Reason if not Attempted Activity not applicable   Wheel 398 Feet CARE Score 9   Curb or Single Stair   Style negotiated Single stair   Type of Assistance Needed Physical assistance;Verbal cues; Adaptive equipment   Physical Assistance Level 26%-50%   1 Step (Curb) CARE Score 3   4 Steps   Type of Assistance Needed Physical assistance;Verbal cues; Adaptive equipment   Physical Assistance Level 26%-50%   Comment 4 steps on 6\"  using bilat HR, non reciprocal if leading with R LE both ascending/descending, reciprocal ascending leading with L LE but non reciprocal descending   4 Steps CARE Score 3   12 Steps   Comment no HONEY and inside apt  per pt father and sisters house has steps but unable to recall if has a railing so PT will practice steps negotiation for community access   Reason if not Attempted Safety concerns   12 Steps CARE Score 88   Comprehension   QI: Comprehension 3  Usually Understands: Understands most conversations, but misses some part/intent of message  Requires cues at times to understand  Expression   QI: Expression 2   Frequently exhibits difficulty with expressing needs and ideas   RLE Assessment   RLE Assessment X   Strength RLE   RLE Overall Strength 4/5   LLE Assessment " LLE Assessment WFL   Strength LLE   LLE Overall Strength 5/5   Coordination   Movements are Fluid and Coordinated 0   Coordination and Movement Description R foot drag despite demonstrating 4/5 R LE strength   Sensation   Light Touch Partial deficits in the LLE  (reports ~ 20% deficit compared to L LE)   Propioception Not tested  (attempted but due to aphasia unable to correctly assess although anticipate impairement based on dec ability to self correct R foot dragging so will trial adding ankle wt on R LE for proprioceptive input)   Cognition   Overall Cognitive Status Impaired   Arousal/Participation Alert; Cooperative   Attention Attends with cues to redirect   Orientation Level Oriented X4   Memory Decreased recall of precautions   Following Commands Follows one step commands with increased time or repetition   Comments expressive> receptive aphasia chris with fatigue onset   Perception   Inattention/Neglect Cues to attend to right side of body  (had to redirect pt to turn head to the right side when PT was talking to him)   Motor Planning Cues to use objects appropriately  (using reacher appropriately after PT demo'd)   Objective Measure   PT Measure(s) (S)  will perform 10 MWT next tx   Therapeutic Exercise   Therapeutic Exercise/Activity Pt demo'd noticeable changed in demeanor and was somewhat down after education regarding stroke recovery that could take up to 2 years  Pt indicated that he thought he will do therapy up to 3 weeks then he will be able to return to work  Noted dec insights to deficits for does not identify global aphasia as barrier to being indep, living alone and returning to work  MD White made aware  Pt indicated willingness to also see neuropsych provider while in [facility]  also reviewed with pt appropriate use of call bell for staff assistance which pt able to verbalized although will f/u for demo'd dec compliance earlier during ST after education as well     Discharge Information   Vocational "Plan Return to work; Full Time   Barriers to Return to [employer] Access;Skill Set Limitation;Transportation Issues;Strength; Endurance;Communication   Patient's Discharge Plan home with family support   Patient's Rehab Expectations \" no goals now\"   Barriers to Discharge Home Limited Family Support;Decreased Strength;Decreased Endurance; Safety Considerations;Decreased Cognitive Function   Impressions Pt is a 40year old male s/p CVA on 5/15 with CTH showed acute ischemic left MCA stroke status post thrombectomy with TICI 3 revascularization by Dr Laurita Ronquillo  Pt is seen for high complexity eval with medical co-morbidities affecting functional progress include PFO, adjustment disorder, nicotine abuse, bradycardia and Seizure  Please see medical chart for more comprehensive list  Personal factors affecting pt at time of IE include limited family availability to provide physical assistance/S at d/c, inability to complete functional household and community distance mobilities without using an AD  Pt lives at home alone with 2 dogs and was fully indep at baseline without AD on even and uneven surfaces including stairs negotiation, indep  and with full time job  On eval pt presentation impacting functional performance include global aphasia (expressive>receptive), dec cognition with dec safety and insights to current deficits, R hemiparesis with inattention, impaired standing balance/tolerance and righting reaction, impaired coordination and motor planning, impaired sensation/proprioception/kinesthesia, dec endurance and gait dysfunction  Due to above mentioned medical co-morbidities and impairments/deficits pt is at high risk for falls, i at risk for dec skin integrity due to dec mobility,  risk for additional infarcts or extension of stroke, at risk of seizure, at risk for PE & DVT and increase caregiver burden so unsafe to return home at this time    Pt requires 1-2 person assist to complete functional " activities safely without using an AD, see above info for details of PT evaluation  Pt is a good rehab candidate with anticipated DS- S goals using Least restrictive AD with ELOS of 2 weeks (10-14 days)  Skilled PT will work on therapeutic exercises, therapeutic activities including stroke education series and family meeting/training for dispo planning, NMR with emphasis on NPP without using an AD, w/c mobility and gait training to improve overall functional indep in order for pt to return home safely with reduce risk for falls     PT Therapy Minutes   PT Time In 1220   PT Time Out 1320   PT Total Time (minutes) 60   PT Mode of treatment - Individual (minutes) 60   PT Mode of treatment - Concurrent (minutes) 0   PT Mode of treatment - Group (minutes) 0   PT Mode of treatment - Co-treat (minutes) 0   PT Mode of Treatment - Total time(minutes) 60 minutes   PT Cumulative Minutes 60   Cumulative Minutes   Cumulative therapy minutes 120

## 2023-05-29 PROBLEM — I69.398 DEPRESSION AS LATE EFFECT OF CEREBROVASCULAR ACCIDENT (CVA): Status: ACTIVE | Noted: 2023-05-29

## 2023-05-29 PROBLEM — F06.31 DEPRESSION AS LATE EFFECT OF CEREBROVASCULAR ACCIDENT (CVA): Status: ACTIVE | Noted: 2023-05-29

## 2023-05-29 LAB
ANION GAP SERPL CALCULATED.3IONS-SCNC: -1 MMOL/L (ref 4–13)
BASOPHILS # BLD AUTO: 0.09 THOUSANDS/ÂΜL (ref 0–0.1)
BASOPHILS NFR BLD AUTO: 2 % (ref 0–1)
BUN SERPL-MCNC: 13 MG/DL (ref 5–25)
CALCIUM SERPL-MCNC: 8.7 MG/DL (ref 8.3–10.1)
CHLORIDE SERPL-SCNC: 105 MMOL/L (ref 96–108)
CO2 SERPL-SCNC: 31 MMOL/L (ref 21–32)
CREAT SERPL-MCNC: 0.9 MG/DL (ref 0.6–1.3)
EOSINOPHIL # BLD AUTO: 0.35 THOUSAND/ÂΜL (ref 0–0.61)
EOSINOPHIL NFR BLD AUTO: 8 % (ref 0–6)
ERYTHROCYTE [DISTWIDTH] IN BLOOD BY AUTOMATED COUNT: 11.9 % (ref 11.6–15.1)
GFR SERPL CREATININE-BSD FRML MDRD: 105 ML/MIN/1.73SQ M
GLUCOSE SERPL-MCNC: 160 MG/DL (ref 65–140)
HCT VFR BLD AUTO: 36.4 % (ref 36.5–49.3)
HGB BLD-MCNC: 12.8 G/DL (ref 12–17)
IMM GRANULOCYTES # BLD AUTO: 0.05 THOUSAND/UL (ref 0–0.2)
IMM GRANULOCYTES NFR BLD AUTO: 1 % (ref 0–2)
LYMPHOCYTES # BLD AUTO: 1.6 THOUSANDS/ÂΜL (ref 0.6–4.47)
LYMPHOCYTES NFR BLD AUTO: 36 % (ref 14–44)
MCH RBC QN AUTO: 33.7 PG (ref 26.8–34.3)
MCHC RBC AUTO-ENTMCNC: 35.2 G/DL (ref 31.4–37.4)
MCV RBC AUTO: 96 FL (ref 82–98)
MONOCYTES # BLD AUTO: 0.33 THOUSAND/ÂΜL (ref 0.17–1.22)
MONOCYTES NFR BLD AUTO: 7 % (ref 4–12)
NEUTROPHILS # BLD AUTO: 2.03 THOUSANDS/ÂΜL (ref 1.85–7.62)
NEUTS SEG NFR BLD AUTO: 46 % (ref 43–75)
NRBC BLD AUTO-RTO: 0 /100 WBCS
PLATELET # BLD AUTO: 343 THOUSANDS/UL (ref 149–390)
PMV BLD AUTO: 9 FL (ref 8.9–12.7)
POTASSIUM SERPL-SCNC: 4.2 MMOL/L (ref 3.5–5.3)
RBC # BLD AUTO: 3.8 MILLION/UL (ref 3.88–5.62)
SODIUM SERPL-SCNC: 135 MMOL/L (ref 135–147)
WBC # BLD AUTO: 4.45 THOUSAND/UL (ref 4.31–10.16)

## 2023-05-29 PROCEDURE — 80048 BASIC METABOLIC PNL TOTAL CA: CPT | Performed by: NURSE PRACTITIONER

## 2023-05-29 PROCEDURE — 97112 NEUROMUSCULAR REEDUCATION: CPT

## 2023-05-29 PROCEDURE — 97530 THERAPEUTIC ACTIVITIES: CPT

## 2023-05-29 PROCEDURE — 99233 SBSQ HOSP IP/OBS HIGH 50: CPT | Performed by: PHYSICAL MEDICINE & REHABILITATION

## 2023-05-29 PROCEDURE — 97110 THERAPEUTIC EXERCISES: CPT

## 2023-05-29 PROCEDURE — 85025 COMPLETE CBC W/AUTO DIFF WBC: CPT | Performed by: NURSE PRACTITIONER

## 2023-05-29 PROCEDURE — 92507 TX SP LANG VOICE COMM INDIV: CPT

## 2023-05-29 PROCEDURE — 99232 SBSQ HOSP IP/OBS MODERATE 35: CPT | Performed by: INTERNAL MEDICINE

## 2023-05-29 RX ORDER — ESCITALOPRAM OXALATE 10 MG/1
5 TABLET ORAL DAILY
Status: DISCONTINUED | OUTPATIENT
Start: 2023-05-30 | End: 2023-06-09 | Stop reason: HOSPADM

## 2023-05-29 RX ADMIN — LEVETIRACETAM 750 MG: 750 TABLET, FILM COATED ORAL at 09:33

## 2023-05-29 RX ADMIN — ASPIRIN 81 MG CHEWABLE TABLET 81 MG: 81 TABLET CHEWABLE at 09:33

## 2023-05-29 RX ADMIN — NICOTINE 1 PATCH: 14 PATCH, EXTENDED RELEASE TRANSDERMAL at 09:33

## 2023-05-29 RX ADMIN — LEVETIRACETAM 750 MG: 750 TABLET, FILM COATED ORAL at 21:08

## 2023-05-29 RX ADMIN — ENOXAPARIN SODIUM 40 MG: 40 INJECTION SUBCUTANEOUS at 09:33

## 2023-05-29 RX ADMIN — PANTOPRAZOLE SODIUM 40 MG: 40 TABLET, DELAYED RELEASE ORAL at 05:29

## 2023-05-29 NOTE — PLAN OF CARE
Problem: INFECTION - ADULT  Goal: Absence or prevention of progression during hospitalization  Description: INTERVENTIONS:  - Assess and monitor for signs and symptoms of infection  - Monitor lab/diagnostic results  - Monitor all insertion sites, i e  indwelling lines, tubes, and drains  - Monitor endotracheal if appropriate and nasal secretions for changes in amount and color  - [place] appropriate cooling/warming therapies per order  - Administer medications as ordered  - Instruct and encourage patient and family to use good hand hygiene technique  - Identify and instruct in appropriate isolation precautions for identified infection/condition  Outcome: Progressing  Goal: Absence of fever/infection during neutropenic period  Description: INTERVENTIONS:  - Monitor WBC    Outcome: Progressing     Problem: SAFETY ADULT  Goal: Patient will remain free of falls  Description: INTERVENTIONS:  - Educate patient/family on patient safety including physical limitations  - Instruct patient to call for assistance with activity   - Consult OT/PT to assist with strengthening/mobility   - Keep Call bell within reach  - Keep bed low and locked with side rails adjusted as appropriate  - Keep care items and personal belongings within reach  - Initiate and maintain comfort rounds  - Make Fall Risk Sign visible to staff  - Offer Toileting every 2 Hours, in advance of need  - Initiate/Maintain bed alarm  - Obtain necessary fall risk management equipment: bed alarm  - Apply yellow socks and bracelet for high fall risk patients  - Consider moving patient to room near nurses station  Outcome: Progressing  Goal: Maintain or return to baseline ADL function  Description: INTERVENTIONS:  -  Assess patient's ability to carry out ADLs; assess patient's baseline for ADL function and identify physical deficits which impact ability to perform ADLs (bathing, care of mouth/teeth, toileting, grooming, dressing, etc )  - Assess/evaluate cause of self-care deficits   - Assess range of motion  - Assess patient's mobility; develop plan if impaired  - Assess patient's need for assistive devices and provide as appropriate  - Encourage maximum independence but intervene and supervise when necessary  - Involve family in performance of ADLs  - Assess for home care needs following discharge   - Consider OT consult to assist with ADL evaluation and planning for discharge  - Provide patient education as appropriate  Outcome: Progressing  Goal: Maintains/Returns to pre admission functional level  Description: INTERVENTIONS:  - Perform BMAT or MOVE assessment daily    - Set and communicate daily mobility goal to care team and patient/family/caregiver  - Collaborate with rehabilitation services on mobility goals if consulted  - Perform Range of Motion 3 times a day  - Reposition patient every 2 hours    - Dangle patient 3 times a day  - Stand patient 3 times a day  - Ambulate patient 3 times a day  - Out of bed to chair 3 times a day   - Out of bed for meals 3 times a day  - Out of bed for toileting  - Record patient progress and toleration of activity level   Outcome: Progressing     Problem: DISCHARGE PLANNING  Goal: Discharge to home or other facility with appropriate resources  Description: INTERVENTIONS:  - Identify barriers to discharge w/patient and caregiver  - Arrange for needed discharge resources and transportation as appropriate  - Identify discharge learning needs (meds, wound care, etc )  - Arrange for interpretive services to assist at discharge as needed  - Refer to Case Management Department for coordinating discharge planning if the patient needs post-hospital services based on physician/advanced practitioner order or complex needs related to functional status, cognitive ability, or social support system  Outcome: Progressing

## 2023-05-29 NOTE — PROGRESS NOTES
"   05/29/23 1230   Pain Assessment   Pain Assessment Tool 0-10   Pain Score No Pain   Restrictions/Precautions   Precautions Bed/chair alarms;Cognitive; Fall Risk;Impulsive;Supervision on toilet/commode;Aphasia; Seizure   Weight Bearing Restrictions No   ROM Restrictions No   Lifestyle   Autonomy \"My dogs  I need them  \"   Sit to Lying   Type of Assistance Needed Supervision   Physical Assistance Level No physical assistance   Sit to Lying CARE Score 4   Sit to Stand   Type of Assistance Needed Physical assistance   Physical Assistance Level 25% or less   Comment CGA-Min A   Sit to Stand CARE Score 3   Bed-Chair Transfer   Type of Assistance Needed Physical assistance   Physical Assistance Level 25% or less   Comment Min A without AD, guard on R  + gait belt  ambulated<>therapy gym  Chair/Bed-to-Chair Transfer CARE Score 3   Exercise Tools   Other Exercise Tool 1 Provided w/ green graded hand sponge completing hand squeezes x50, thumb flexion/ext x30, and palm pushes x30  Encouraged to complete as part of HEP  Coordination   Fine Motor 9-Hole peg test L: 28 seconds, R: 1 minute 9 seconds  Box and Blocks L: 44, R: 29  Results demonstrate RUE impaired coordination and strength  Plan to readminister closer to d/c to assess for objective progress  Engaged in small, smooth item retrieval from table w/ focus on facilitation of R tripod grasp, noted to drop x4  Total reps x46  Attempted palm<>digit translation, however limited by impaired comprehension despite inc time, repetition, and demonstration  Large wooden pegboard used to facilitate R tripod grasp, dual-taking w/ pt asked to name color of peg  Pt req inc time, cues req to pace task and improvement noted w/ accuracy of color  With inc time, pt able to identify speech errors more cosistently  Cognition   Overall Cognitive Status Impaired   Comments expressive>receptive aphasia     Assessment   Treatment Assessment Pt seen for skilled OT session focusing on short " distance fxnl mobility without AD and RUE NMR  Pt req inc time t/o session to communicate 2* aphasia  Pt w/ inc frustration compared to yesterday, pt expressed feeling stressed about work, missing his 2 dogs, and fear of not improving; extended emotional support and encouragement provided  Provided w/ ongoing basic stroke edu t/o session to maximize participation  Dr Piper Martinez present during session and provided edu and encouragement to pt as well  Pt's dad present near end of session and brought in pt's belongings  Time spent providing update on pt's current LOF, role of OT, and therapy expectations  Scheduled family observation w/ pt's dad on Thursday starting at [address] [place] [address] OT POC: dynamic standing balance, repetitive safety training, R NMR, ADL/light IADL retraining, coordination, and ongoing stroke edu  Would benefit from further visual assessment, pt noted w/ mild R-inattention during fxnl act  Pt requested to rest in bed, all needs within reach and alarm activated  Add'lly, pt will benefit from repetitive call bell training as nursing staff reports pt attempts to transfer without assistance, pt demo ability to use call bell during session however carryover may be limited by impaired comprehension vs STM  Prognosis Good   Problem List Decreased strength;Decreased endurance; Impaired balance;Decreased mobility; Decreased coordination;Decreased safety awareness; Impaired judgement;Decreased cognition   Plan   Treatment/Interventions ADL retraining;Functional transfer training; Therapeutic exercise; Endurance training;Patient/family training;Cognitive reorientation   Progress Progressing toward goals   Recommendation   OT Discharge Recommendation   (pending progress)   OT Therapy Minutes   OT Time In 1230   OT Time Out 1355   OT Total Time (minutes) 85   OT Mode of treatment - Individual (minutes) 85   OT Mode of treatment - Concurrent (minutes) 0   OT Mode of treatment - Group (minutes) 0   OT Mode of treatment - Co-treat (minutes) 0   OT Mode of Treatment - Total time(minutes) 85 minutes   OT Cumulative Minutes 145   Therapy Time missed   Time missed?  No

## 2023-05-29 NOTE — PCC SPEECH THERAPY
Speech and language evaluation was completed on admission where pt found to be presenting with severe expressive and receptive language deficits/aphasia  Pt's verbal expression is characterized by dysfluent speech containing semantic content, though also with anomia, phonemic paraphasias and neologisms  Pt presents with difficulty in following simple commands, though ? ing if due to comprehension vs combination of comprehension and motor planning deficits, along with deficits in yes/no questions and basic conversation  He benefits from repetition and rewording of information, as well as use of gestures paired with instructions  Current deficits which present are barriers include impairments in the following areas: comprehension, expression, insight into deficits, problem solving, safety awareness, judgement, impulsivity, sequencing, attention, STM recall, LTM recall, and executive functions  Pt is also limited by mood, which has impacted pt's eagerness to participate in therapy  Pt was been provided with education regarding stroke recovery and will benefit from continued education and training  Pt is currently functioning at max assist for comprehension, expression, problem solving and memory  Plan to reach out to pt's family this week to initiate family education and training  Due to the above deficits, pt will likely be recommended for 24/hr supervision on discharge, along with continued skilled SLP therapy through outpatient services  At this time, pt is recommended for skilled SLP services during acute rehab stay targeting expressive and receptive language skills in order to maximize overall functional communication abilities  Update from week of 6/5/2023: Pt conts to be followed for skilled SLP services targeting expressive and receptive communication skills  Pt is making very good progress towards his goals and maximizing independence communication of wants and needs   Pt currently functioning at moderate assistance for comprehension, expression, problem solving and memory  Pt does best with simple conversation, answering yes/no questions and completing simple one word to short phrase answers  Pt conts with anomia, perseveration, and paraphasic errors  Fatigue and external distractions greatly impact pt's overall communication skills, requiring rest breaks and extended time for processing and completing tasks  In regards to comprehension, pt is stronger with listening compared to reading comprehension at this time  Pt benefits from auditory cues and unison reading for increasing understanding during reading tasks  Education was initiated with pt's father in regards to current communication and cognitive deficits, cuing strategies, and recommendations for discharge  Pt will require 24hr S/A at discharge initially given communication and suspected cognitive deficits at this time in which pt's father is agreeable to provide  Pt will cont to benefit from cont'd skilled SLP services targeting expressive and receptive communication skills for increased independence and decreased burden of care upon safe discharge home  Tentative discharge date set for Friday 6/9 with outpt SLP services

## 2023-05-29 NOTE — PLAN OF CARE
Problem: INFECTION - ADULT  Goal: Absence or prevention of progression during hospitalization  Description: INTERVENTIONS:  - Assess and monitor for signs and symptoms of infection  - Monitor lab/diagnostic results  - Monitor all insertion sites, i e  indwelling lines, tubes, and drains  - Monitor endotracheal if appropriate and nasal secretions for changes in amount and color  - [place] appropriate cooling/warming therapies per order  - Administer medications as ordered  - Instruct and encourage patient and family to use good hand hygiene technique  - Identify and instruct in appropriate isolation precautions for identified infection/condition  Outcome: Progressing  Goal: Absence of fever/infection during neutropenic period  Description: INTERVENTIONS:  - Monitor WBC    Outcome: Progressing     Problem: SAFETY ADULT  Goal: Patient will remain free of falls  Description: INTERVENTIONS:  - Educate patient/family on patient safety including physical limitations  - Instruct patient to call for assistance with activity   - Consult OT/PT to assist with strengthening/mobility   - Keep Call bell within reach  - Keep bed low and locked with side rails adjusted as appropriate  - Keep care items and personal belongings within reach  - Initiate and maintain comfort rounds  - Make Fall Risk Sign visible to staff  - Offer Toileting every 2 Hours, in advance of need  - Initiate/Maintain bed  chair alarm  - Obtain necessary fall risk management equipment: nonskid socks  - Apply yellow socks and bracelet for high fall risk patients  - Consider moving patient to room near nurses station  Outcome: Progressing  Goal: Maintain or return to baseline ADL function  Description: INTERVENTIONS:  -  Assess patient's ability to carry out ADLs; assess patient's baseline for ADL function and identify physical deficits which impact ability to perform ADLs (bathing, care of mouth/teeth, toileting, grooming, dressing, etc )  - Assess/evaluate cause of self-care deficits   - Assess range of motion  - Assess patient's mobility; develop plan if impaired  - Assess patient's need for assistive devices and provide as appropriate  - Encourage maximum independence but intervene and supervise when necessary  - Involve family in performance of ADLs  - Assess for home care needs following discharge   - Consider OT consult to assist with ADL evaluation and planning for discharge  - Provide patient education as appropriate  Outcome: Progressing  Goal: Maintains/Returns to pre admission functional level  Description: INTERVENTIONS:  - Perform BMAT or MOVE assessment daily    - Set and communicate daily mobility goal to care team and patient/family/caregiver  - Collaborate with rehabilitation services on mobility goals if consulted  - Perform Range of Motion 3 times a day  - Reposition patient every 2 hours    - Dangle patient 3 times a day  - Stand patient 3 times a day  - Ambulate patient 3 times a day  - Out of bed to chair 3 times a day   - Out of bed for meals 3 times a day  - Out of bed for toileting  - Record patient progress and toleration of activity level   Outcome: Progressing     Problem: DISCHARGE PLANNING  Goal: Discharge to home or other facility with appropriate resources  Description: INTERVENTIONS:  - Identify barriers to discharge w/patient and caregiver  - Arrange for needed discharge resources and transportation as appropriate  - Identify discharge learning needs (meds, wound care, etc )  - Arrange for interpretive services to assist at discharge as needed  - Refer to Case Management Department for coordinating discharge planning if the patient needs post-hospital services based on physician/advanced practitioner order or complex needs related to functional status, cognitive ability, or social support system  Outcome: Progressing

## 2023-05-29 NOTE — PROGRESS NOTES
05/29/23 7423   Pain Assessment   Pain Assessment Tool 0-10   Pain Score No Pain   Restrictions/Precautions   Precautions Aphasia;Bed/chair alarms;Cognitive; Fall Risk;Impulsive;Supervision on toilet/commode   Comprehension   Comprehension (FIM) 2 - Understands basic info/conversation 25-49% of time   Expression   Expression (FIM) 2 - Uses only simple expressions or gestures (waves, hello)   Social Interaction   Social Interaction (FIM) 4 - Needs redirecting for appropriate language or to initiate interaction   Problem Solving   Problem solving (FIM) 2 - Solves basic problems 25-49% of time   Memory   Memory (FIM) 2 - Recognizes, recalls/performs 25-49%   Speech/Language/Cognition Assessment   Treatment Assessment Pt participated in skilled ST session focusing on expressive and receptive language skills  As SLP entered room, pt was eating breakfast  SLP encouraged pt to name items on his breakfast tray, where speech was minimally effective as he accurately named 2/6 items with speech characterized by semantic and phonemic paraphasias and neologisms  At this time, SLP observed pt to be presenting with a saddened mood, which appeared to impact pt's attempts at verbal expression  This presentation was significantly different today in comparison to session SLP completed with pt yesterday on evaluation  Pt making minimal eye contact and using single word responses  Through lengthy discussion and follow up questions from SLP, it was determined that pt perceived and/or comprehended education provided by therapy staff yesterday as that he was not going to recover or improve from his stroke  Pt reports feeling this way since yesterday afternoon  SLP reviewed the education that was provided by all therapists from initial evaluation regarding stroke recovery process   During this time, pt consistently dismissed this SLP's education regarding stroke, stroke recovery, rehab process, additional contributors to him making "improvements during our rehab stay, etc  Pt continued to make comments such as \"what's the point,\" \"it won't help,\" etc  SLP provided significant verbal encouragement in both a nurturing manner and later in a more direct manner  SLP provided examples and improvements noted even from yesterday to today in pt's verbal speech output of his biographical info  After long discussion with SLP supporting pt in that participating in therapy will help his recovery, pt was eventually more receptive to information  It was also found that pt initially was under the impression that he would come to the rehab and be \"cured\" as he stated  He expressed that he feels \"frustrated\" with the situation and \"down  \" As pt mentioned concerns about his dogs, SLP and RN also offered for pets to come visit (pending the appropriate paperwork is complete) but pt declined  Encouraged to remember this and inform staff if he changes his mind  Pt then agreeable to completing remaining portions of the Informal Speech and Language Assessment tool   He completed the following tasks:    Verbal Expression:   Phrase Completion:   Opposites:  8/10 accurate  Simple Phrases:  /10 accurate due to both anomia and phonemic paraphasias     Confrontation Namin/14 accurate     Repetition: able to repeat single words inconsistently; unable to repeat sentences     Oral Motor Skills: 5/5 accurate in oral commands    Spontaneous Speech/Language:   Tell me about work/home: minimal spontaneous output, likely suspecting due to down mood-difficulty in conveying actual communicative intent, despite follow up questions given by SLP      Written Expression:   Name/Address: able to write name, only portion of address  Copying words, phrases, sentences: able to copy single words and shorter sentences-pt noted to be using L nondominant hand as his dominant R hand was impacted by the stroke-decreased legibility but suspect due to using non dominant hand      Additionally, " engaged in a reading comprehension task which also targeted word retrieval, pt was presented with short sentences (e g , Open the ___), along with a Fo3 word choices per trial where he independently read info to himself and chopse the correct word to complete the sentence in 13/20 trials  SLP encouraged pt to read sentences and words aloud, but pt declined at this time  SLP did not push the matter today due to pt's current mood  When reviewing errors, SLP read sentences aloud in which this appeared to improve pt's comprehension of info, achieving 18/20 accuracy  At end of session, pt appeared to brighten as SLP engaged pt in convo related to his interests, such as sports  Pt with interest in [place] teams  While pt with increased difficulty communicating specific information, pt with good knowledge of current events such as the [employer] finals coming up  During this time, pt presented with the level of engagement, mood and willingness to communicate as he did in yesterday's session  SLP then later spoke to Dr Rosita Stanley, RN, OT and PT regarding this morning's events in order to develop a plan to support pt  At this time, pt is recommended for skilled SLP services during acute rehab stay targeting expressive and receptive language skills in order to maximize overall functional communication abilities  SLP Therapy Minutes   SLP Time In 0730   SLP Time Out 2228   SLP Total Time (minutes) 85   SLP Mode of treatment - Individual (minutes) 85   SLP Mode of treatment - Concurrent (minutes) 0   SLP Mode of treatment - Group (minutes) 0   SLP Mode of treatment - Co-treat (minutes) 0   SLP Mode of Treatment - Total time(minutes) 85 minutes   SLP Cumulative Minutes 145   Therapy Time missed   Time missed?  No

## 2023-05-29 NOTE — PCC OCCUPATIONAL THERAPY
Pt continues to present with impairments in activity tolerance, endurance, standing balance/tolerance, memory, attention , communication, and executive functioning   Additional functional barriers include  expressive > receptive aphasia, impaired R UE sensation, strength coordination, R inattention   Pt demonstrates improvements in ADL routine and functional mobility/transfers  Pt will continue to benefit from skilled OT services to address above mentioned barriers and maximize functional independence in baseline areas of occupation  OT D/C recommendation is for d/c home with initial 24/7 supervision from pt's father  Family training continues to occur with pt's father  He will benefit from outpatient OT at neuro center upon d/c

## 2023-05-29 NOTE — PCC PHYSICAL THERAPY
6/5/23  Pt cont to demo consistent functional progress with skilled PT intervention with treatment focusing on NMR/NPP multi-tasking training without using an AD except during steps negotiation due to dec confidence and R toe catching on steps nosing inc risk for falls  Due to impaired proprioception and dec motor control cont to demo R knee hyperextension and R foot dragging chris with fatigue onset, dec dynamic balance with dec righting reactions  At this time pt is functioning at indep level for bed mobility,S for transfers and short distance amb  no AD but min-CGA for community outdoor mobilities without using an AD while CGA for FF negotiation using 1 rail  Pt will benefit from additional skilled PT to address ongoing impairments and complete family training in preparation for anticipated home d/c  Due to impaired cognition and speech therapy is recommending for pt to have S to optimize safety at home and in the community

## 2023-05-29 NOTE — PROGRESS NOTES
PM&R PROGRESS NOTE:  Noelle Hubbard 40 y o  male MRN: [id number]  Unit/Bed#: -01 Encounter: [id number]           Rehab Diagnosis: Impairment of mobility, safety, Activities of Daily Living (ADLs), and cognitive/communication skills due to Stroke:  01 2  Right Body Involvement (Left Brain)  Left MCA CVA    SUBJECTIVE: Patient seen face to face  Feeling frustrated and down regarding his CVA and thinking he won't be able to do anything in his future  Counseled extensively on his prognosis and recovery potential which is good  He is progressing in rehabilitation  Agreeable to neuropscyhology consultation  Also discussed SSRI for anxiety/mood with his agreement  Spoke to his father today regarding his disposition  Father will be staying with patient post discharge  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  FMLA paperwork to be filled out and father to bring this in  Family training likely to be needed with father, sister, and niece    ASSESSMENT: Stable, progressing      PLAN:    Rehabilitation  • Functional deficits: right hemiparesis, right sensory deficits, aphasia, possible cognitive deficits, impaired mobility, self care  • Continue current rehabilitation plan of care to maximize function  • Functional update:   o shuals in progress  • Estimated Discharge: TBD    DVT prophylaxis  • Managed on Lovenox      Bladder plan  • Continent     Bowel plan  • Continent      * CVA (cerebral vascular accident) [facility])  Assessment & Plan  Presented 5/15/23 as stroke alert  CTH/MRI brain confirmed Left MCA ischemic stroke  S/p thrombectomy TICI 3 revascularization 5/16/23  Etiology unclear, (possibly related to recent COVID infection in Feb 2023)  Work-up: +Large PFO on ALYSSA  Hypercoagulable work-up and malignancy work-up largely negative - MHTFR pending    Repeat hypercoagulable what the serial work-up as outpatient recommended  · Secondary CVA ppx: Aspirin 81 mg daily  · CVA "education and reduction of modifiable risk factors  · Follow-up with neurology and cardiology as an outpatient (patient is a candidate for PFO closure)      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)  Assessment & Plan  Starting on Lexapro 5 mg in AM 5/30/23    Adjustment disorder  Assessment & Plan  Consulting Neuropsychology as patient with depressed mood  Also started on Lexapro 5 mg daily on 5/30/23    Abnormal findings on imaging test  Assessment & Plan  CT C/A/P: Moderate bilateral pleural effusions, small amount of ascites, and diffuse body wall subcutaneous edema  Follow-up with PCP    Bradycardia  Assessment & Plan  HR 50-60s  Asymptomatic  Monitor  Follow-up with Cardiology as outpatient    Tobacco use  Assessment & Plan  Smoking cessation encouraged  Continue nicotine patch    PFO (patent foramen ovale)  Assessment & Plan  Large PFO identified on ALYSSA  Cardiology recommending follow-up in 4-6 weeks as an outpatient for possible closure    Seizure [facility])  Assessment & Plan  Witnessed seizure-like activity times 2/5/1623  Video EEG negative however patient continued on Keppra 750 mg every 12 hours  Placed on seizure precautions  Follow-up with neurology to determine length of Keppra duration        Appreciate IM consultants medical co-management  Labs, medications, and imaging personally reviewed  ROS:  A ten point review of systems was completed on 05/29/23 and pertinent positives are listed in subjective section  All other systems reviewed were negative  OBJECTIVE:   /69 (BP Location: Left arm)   Pulse 74   Temp 98 2 °F (36 8 °C) (Oral)   Resp 20   Ht 5' 9\" (1 753 m)   Wt 72 9 kg (160 lb 11 5 oz)   SpO2 97%   BMI 23 73 kg/m²     Physical Exam  Vitals and nursing note reviewed  Constitutional:       General: He is not in acute distress  HENT:      Head: Normocephalic and atraumatic        Nose: Nose normal       Mouth/Throat:      Mouth: Mucous membranes are " moist    Eyes:      Conjunctiva/sclera: Conjunctivae normal    Cardiovascular:      Rate and Rhythm: Normal rate and regular rhythm  Pulses: Normal pulses  Pulmonary:      Effort: Pulmonary effort is normal       Breath sounds: Normal breath sounds  No wheezing or rales  Abdominal:      General: Bowel sounds are normal  There is no distension  Palpations: Abdomen is soft  Tenderness: There is no abdominal tenderness  Musculoskeletal:         General: No swelling  Cervical back: Neck supple  Skin:     General: Skin is warm  Neurological:      Mental Status: He is alert and oriented to person, place, and time  Sensory: Sensory deficit present        Comments: Expressive > comprehension aphasia  Strength improving   Psychiatric:      Comments: Mood is sad/sullen/frustrated          Lab Results   Component Value Date    HCT 36 4 (L) 05/29/2023    HGB 12 8 05/29/2023    MCV 96 05/29/2023     05/29/2023    WBC 4 45 05/29/2023     Lab Results   Component Value Date    BUN 13 05/29/2023    CALCIUM 8 7 05/29/2023     05/29/2023    CO2 31 05/29/2023    CREATININE 0 90 05/29/2023    GLUC 160 (H) 05/29/2023    K 4 2 05/29/2023    SODIUM 135 05/29/2023     Lab Results   Component Value Date    INR 0 89 05/15/2023    PROTIME 12 3 05/15/2023           Current Facility-Administered Medications:   •  acetaminophen (TYLENOL) tablet 975 mg, 975 mg, Oral, Q8H PRN, Barby Guy MD  •  aspirin chewable tablet 81 mg, 81 mg, Oral, Daily, Barby Guy MD, 81 mg at 05/29/23 2882  •  bisacodyl (DULCOLAX) rectal suppository 10 mg, 10 mg, Rectal, Daily PRN, Barby Guy MD  •  enoxaparin (LOVENOX) subcutaneous injection 40 mg, 40 mg, Subcutaneous, Q24H [address], Barby Guy MD, 40 mg at 05/29/23 0914  •  [START ON 5/30/2023] escitalopram (LEXAPRO) tablet 5 mg, 5 mg, Oral, Daily, Barby Guy MD  •  levETIRAcetam (KEPPRA) tablet 750 mg, 750 mg, Oral, Q12H [address] Jin Aleida Odonnell MD, 750 mg at 05/29/23 0933  •  nicotine (NICODERM CQ) 14 mg/24hr TD 24 hr patch 1 patch, 1 patch, Transdermal, Daily, Nicole Montoya MD, 1 patch at 05/29/23 0933  •  ondansetron (ZOFRAN-ODT) dispersible tablet 4 mg, 4 mg, Oral, Q6H PRN, Nicole Montoya MD  •  pantoprazole (PROTONIX) EC tablet 40 mg, 40 mg, Oral, Early Morning, Nicole Montoya MD, 40 mg at 05/29/23 0529  •  polyethylene glycol (MIRALAX) packet 17 g, 17 g, Oral, Daily PRN, Nicole Montoya MD    History reviewed  No pertinent past medical history  Patient Active Problem List    Diagnosis Date Noted   • CVA (cerebral vascular accident) ([facility] [address] ) 05/15/2023   • 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) 05/29/2023   • Abnormal findings on imaging test 05/28/2023   • Adjustment disorder 05/28/2023   • Tobacco use 05/27/2023   • Mild pain 05/27/2023   • Bradycardia 05/27/2023   • PFO (patent foramen ovale) 05/22/2023   • Seizure ([facility] [address] ) 05/16/2023   • Positive self-administered antigen test for COVID-19 02/10/2023   • Viral infection, unspecified 11/10/2022   • Bilateral low back pain without sciatica 01/29/2020          Nicole Montoya MD    I have spent a total time of 55 minutes on 05/29/23 in caring for this patient including Impressions, Counseling / Coordination of care, Documenting in the medical record, Communicating with other healthcare professionals  and speaking to patient on his mood  ** Please Note:  voice to text software may have been used in the creation of this document   Although proof errors in transcription or interpretation are a potential of such software**

## 2023-05-29 NOTE — PROGRESS NOTES
Internal Medicine Progress Note  Patient: Sarah Overall  Age/sex: 40 y o  male  Medical Record #: [id number]      ASSESSMENT/PLAN: (Interval History)  Sarah Overall is seen and examined and management for following issues:    Acute left MCA CVA  • S/p left MCA thrombectomy  • Workup:   • Homocystine normal, BELEM normal, RF normal, ANCA negative, dsDNA negative, Factor V Leiden/lupus anticoagulant negative, Antithrombin III 81 (felt likely not clinically relevant was higher than 80%, Protein S decreased at 65%, Protein C normal                          MTHFR mutation pending                          Repeat thrombosis panel in 3 months   • Continue ASA 81mg qd and statin  • Garrel Ax is from COVID infection in 2/2023     Seizures  • Was noted with eyes deviating to the right 5/16 with a post ictal period   • Had 2 episodes  • Mancel Barley Dr Jack Ramirez restarted since he had 2 sz in hospital     PFO  • Large PFO on ALYSSA  • Negative DVT  • To see Cards as OP 4-6 weeks     Bradycardia  • Has HRs in 50's at times as in hospital  • On no meds to affect HR  • Will watch     Nicotine abuse  • Continue patch     Moderate B/L pleural effusions  • Will monitor for resolution   • Chest clear and O2 sats on RA normal, no SOB        Discharge date:  Team       The above assessment and plan was reviewed and updated as determined by my evaluation of the patient on 5/29/2023      Labs:   Results from last 7 days   Lab Units 05/29/23  0807 05/24/23  0544   HEMATOCRIT % 36 4* 36 7   HEMOGLOBIN g/dL 12 8 13 0   PLATELETS Thousands/uL 343 282   WBC Thousand/uL 4 45 5 09     Results from last 7 days   Lab Units 05/29/23  0807 05/24/23  0544   BUN mg/dL 13 10   CALCIUM mg/dL 8 7 8 7   CHLORIDE mmol/L 105 108   CO2 mmol/L 31 30   CREATININE mg/dL 0 90 0 77   POTASSIUM mmol/L 4 2 3 9   SODIUM mmol/L 135 138             Results from last 7 days   Lab Units 05/27/23  1054 05/23/23  0611   POC GLUCOSE mg/dl 47* 82       Review of Scheduled Meds:  Current Facility-Administered Medications   Medication Dose Route Frequency Provider Last Rate   • acetaminophen  975 mg Oral Q8H PRN Shira eBlla MD     • aspirin  81 mg Oral Daily Shira Bella MD     • bisacodyl  10 mg Rectal Daily PRN Shira Bella MD     • enoxaparin  40 mg Subcutaneous Q24H [address] Shira Bella MD     • [START ON 5/30/2023] escitalopram  5 mg Oral Daily Shira Bella MD     • levETIRAcetam  750 mg Oral Q12H Iza Whitehead MD     • nicotine  1 patch Transdermal Daily Shira Bella MD     • ondansetron  4 mg Oral Q6H PRN Shira Bella MD     • pantoprazole  40 mg Oral Early Morning Shira Bella MD     • polyethylene glycol  17 g Oral Daily PRN Shira Bella MD         Subjective/ HPI: Patient seen and examined  Patients overnight issues or events were reviewed with nursing or staff during rounds or morning huddle session  New or overnight issues include the following:     No new or overnight issues  Offers no complaints    ROS:   A 10 point ROS was performed; negative except as noted above         Imaging:     No orders to display       *Labs /Radiology studies reviewed  *Medications reviewed and reconciled as needed  *Please refer to order section for additional ordered labs studies  *Case discussed with primary attending during morning huddle case rounds    Physical Examination:  Vitals:   Vitals:    05/28/23 0730 05/28/23 1420 05/28/23 1926 05/29/23 0814   BP: 107/74 107/59 102/57 112/60   BP Location: Right arm Left arm Left arm Right arm   Pulse: (!) 53 55 58 60   Resp: 18 17 20 18   Temp: 97 6 °F (36 4 °C) 98 1 °F (36 7 °C) 98 4 °F (36 9 °C) 98 5 °F (36 9 °C)   TempSrc: Oral Oral Oral Oral   SpO2: 100% 98% 97% 98%   Weight:       Height:           General Appearance: no distress, conversive  HEENT: PERRLA, conjuctiva normal; oropharynx clear; mucous membranes moist   Neck:  Supple, normal ROM  Lungs: CTA, normal respiratory effort, no retractions, expiratory effort normal  CV: regular rate and rhythm; no rubs/murmurs/gallops, PMI normal   ABD: soft; ND/NT; +BS  EXT: no edema  Skin: normal turgor, normal texture, no rashes  Psych: affect normal, mood normal  Neuro: AAO  Expressive>receptive aphasia      The above physical exam was reviewed and updated as determined by my evaluation of the patient on 5/29/2023  Invasive Devices     None                    VTE Pharmacologic Prophylaxis: Enoxaparin  Code Status: Level 1 - Full Code  Current Length of Stay: 2 day(s)      Total time spent:  30 minutes with more than 50% spent counseling/coordinating care  Counseling includes discussion with patient re: progress  and discussion with patient of his/her current medical state/information  Coordination of patient's care was performed in conjunction with primary service  Time invested included review of patient's labs, vitals, and management of their comorbidities with continued monitoring  In addition, this patient was discussed with medical team including physician and advanced extenders  The care of the patient was extensively discussed and appropriate treatment plan was formulated unique for this patient  Medical decision making for the day was made by supervising physician unless otherwise noted in their attestation statement  ** Please Note:  voice to text software may have been used in the creation of this document   Although proof errors in transcription or interpretation are a potential of such software**

## 2023-05-29 NOTE — PROGRESS NOTES
05/29/23 1318   Pain Assessment   Pain Assessment Tool 0-10   Pain Score No Pain   Restrictions/Precautions   Precautions Cognitive;Bed/chair alarms; Fall Risk;Impulsive;Supervision on toilet/commode  (expressive >receptive aphasia, min R side inattention)   Cognition   Overall Cognitive Status Impaired   Arousal/Participation Alert; Cooperative   Attention Attends with cues to redirect   Subjective   Subjective pt agreeable to PT although was quiet  and initially gives out 1 word answer in a polite manner  Demeanor and mood improved after friend Cassidy Sonia short visit and positive feedback with performances done in therapy  Sit to Lying   Comment requested to sit on the recliner at end of tx   Lying to Sitting on Side of Bed   Type of Assistance Needed Set-up / clean-up   Comment can be impulsive, sat at egde of bed eventhough bedrail on L is still up   Lying to Sitting on Side of Bed CARE Score 5   Sit to Stand   Type of Assistance Needed Physical assistance;Verbal cues   Physical Assistance Level 25% or less   Comment emphasis on controlled stand to sit transition   Sit to Stand CARE Score 3   Bed-Chair Transfer   Type of Assistance Needed Physical assistance;Verbal cues   Physical Assistance Level 25% or less   Chair/Bed-to-Chair Transfer CARE Score 3   Walk 10 Feet   Type of Assistance Needed Physical assistance;Verbal cues   Physical Assistance Level 25% or less   Walk 10 Feet CARE Score 3   Walk 50 Feet with Two Turns   Type of Assistance Needed Physical assistance;Verbal cues   Physical Assistance Level 26%-50%   Walk 50 Feet with Two Turns CARE Score 3   Walk 150 Feet   Type of Assistance Needed Physical assistance;Verbal cues   Physical Assistance Level Total assistance   Walk 150 Feet CARE Score 1   Ambulation   Gait Pattern Inconsistant Tess;Decreased foot clearance;R hemiparesis; Improper weight shift;R foot drag;R knee hyperextension   Curb or Single Stair   Style negotiated Single stair   Type of "Assistance Needed Physical assistance;Verbal cues   Physical Assistance Level 26%-50%   1 Step (Curb) CARE Score 3   4 Steps   Type of Assistance Needed Physical assistance;Verbal cues   Physical Assistance Level 26%-50%   4 Steps CARE Score 3   12 Steps   Type of Assistance Needed Physical assistance;Verbal cues   Physical Assistance Level 26%-50%   Comment FF with bilat HR reciprocal pattern   12 Steps CARE Score 3   Toilet Transfer   Type of Assistance Needed Physical assistance;Verbal cues   Physical Assistance Level 25% or less   Comment requested to use the bathroom at end of tx and allowed PT to supervised  Toilet Transfer CARE Score 3   Therapeutic Interventions   Neuromuscular Re-Education NPP gait speed training x 400' walking fwd, walking fwd/bwd x 200' x 2 reps, repeated 6\"  step through training with weightbearing LE on first step initially then progressed to top step with 2# ankle wts on each LE - tomorrow will trial 3 or 4# ankle wts  Equipment Use   NuStep lvl 3 x 15 mins (completed lap 3), SPM>80 SpO2 94-95%, TX 91   Assessment   Treatment Assessment pt actively participated in therapy with pt admitting to not feeling /noticing R foot dragging when he is walking so NMR/NPP training focused on improving gait efficiency/pattern to dec risk for falls chris when negotiating steps  Pt demos adequate R LE strength but anticipate R foot dragging due to dec proprioception, impaired motor control/coordination and ongoing R side inattention  Pt have to be cued to grasp firmly R rail or R nu step handle when performing  noted pt demo'd improved speech with spontaneous communication as observed when conversing with his friend compared to when doing question/answered type which challenges pt cognitively as well  PT will cont to focus on NMR/NPP training to improve righting reaction and gait to allow inc safety and indep with functional mobilities     Family/Caregiver Present no  (pt to ask father to " bring in pt sneakers)   Barriers to Discharge Inaccessible home environment;Decreased caregiver support   PT Barriers   Functional Limitation Walking;Stair negotiation;Ramp negotiation   Plan   Treatment/Interventions Functional transfer training;LE strengthening/ROM; Therapeutic exercise; Bed mobility;Gait training;Spoke to nursing;OT;ST   Progress Progressing toward goals   Recommendation   PT Discharge Recommendation Home with outpatient rehabilitation   PT Therapy Minutes   PT Time In 0930   PT Time Out 1030   PT Total Time (minutes) 60   PT Mode of treatment - Individual (minutes) 60   PT Mode of treatment - Concurrent (minutes) 0   PT Mode of treatment - Group (minutes) 0   PT Mode of treatment - Co-treat (minutes) 0   PT Mode of Treatment - Total time(minutes) 60 minutes   PT Cumulative Minutes 120

## 2023-05-30 PROCEDURE — 97535 SELF CARE MNGMENT TRAINING: CPT

## 2023-05-30 PROCEDURE — 97530 THERAPEUTIC ACTIVITIES: CPT

## 2023-05-30 PROCEDURE — 99233 SBSQ HOSP IP/OBS HIGH 50: CPT | Performed by: PHYSICAL MEDICINE & REHABILITATION

## 2023-05-30 PROCEDURE — 97112 NEUROMUSCULAR REEDUCATION: CPT

## 2023-05-30 PROCEDURE — 99232 SBSQ HOSP IP/OBS MODERATE 35: CPT | Performed by: INTERNAL MEDICINE

## 2023-05-30 PROCEDURE — 92507 TX SP LANG VOICE COMM INDIV: CPT

## 2023-05-30 RX ADMIN — LEVETIRACETAM 750 MG: 750 TABLET, FILM COATED ORAL at 08:45

## 2023-05-30 RX ADMIN — NICOTINE 1 PATCH: 14 PATCH, EXTENDED RELEASE TRANSDERMAL at 08:45

## 2023-05-30 RX ADMIN — PANTOPRAZOLE SODIUM 40 MG: 40 TABLET, DELAYED RELEASE ORAL at 05:49

## 2023-05-30 RX ADMIN — LEVETIRACETAM 750 MG: 750 TABLET, FILM COATED ORAL at 20:40

## 2023-05-30 RX ADMIN — ASPIRIN 81 MG CHEWABLE TABLET 81 MG: 81 TABLET CHEWABLE at 08:45

## 2023-05-30 RX ADMIN — ENOXAPARIN SODIUM 40 MG: 40 INJECTION SUBCUTANEOUS at 08:45

## 2023-05-30 RX ADMIN — ESCITALOPRAM OXALATE 5 MG: 10 TABLET ORAL at 08:45

## 2023-05-30 NOTE — PROGRESS NOTES
"Occupational Therapy Treatment Note         05/30/23 0715   Pain Assessment   Pain Assessment Tool 0-10   Pain Score No Pain   Restrictions/Precautions   Precautions Bed/chair alarms;Cognitive; Fall Risk;Supervision on toilet/commode;Aphasia   Lifestyle   Autonomy \"I have 2 dogs  Peeka and mandujano\"   Eating   Type of Assistance Needed Supervision   Physical Assistance Level No physical assistance   Comment Pt can complete independently using L UE but is R UE dominant  OT educated pt on using R UE as dominant UE, which pt was able to do with supervision and verbal cues, occasional assist to adjust fork/knife in hand  Pt then noted to compensate by bringing trunk/head forward to plate, OT provided pt with tactile cues at chest to stay sitting upright and bring R UE to mouth  After 6 tactile cues, pt was able to carry this over without tactile cues and just verbal cue to \"stay upright\" 4 more times, then pt able to carryover himself  Eating CARE Score 4   Oral Hygiene   Type of Assistance Needed Physical assistance   Physical Assistance Level 25% or less   Comment min assist in stance at sink  pt dips tooth brush into mouthwash first to rinse his mouth, then uses toothpaste  Pt may do this at home at baseline, but difficult to assess 2* aphasia   Oral Hygiene CARE Score 3   Shower/Bathe Self   Type of Assistance Needed Physical assistance   Physical Assistance Level 51%-75%   Comment Mod/max assist if in stance  While seated on tub bench pt able to complete bathing of UB and LEs, min assist x1 in stance for buttock/groin     Shower/Bathe Self CARE Score 2   Tub/Shower Transfer   Findings mod assist shower transfer   Upper Body Dressing   Type of Assistance Needed Physical assistance   Physical Assistance Level 25% or less   Comment min assist to steady in stance   Upper Body Dressing CARE Score 3   Lower Body Dressing   Type of Assistance Needed Physical assistance   Physical Assistance Level 51%-75%   Comment mod/max " assist if in stance  while seated pt thread LEs into pants then stands with min assist to manage over hips   Lower Body Dressing CARE Score 2   Putting On/Taking Off Footwear   Type of Assistance Needed Supervision   Physical Assistance Level No physical assistance   Putting On/Taking Off Footwear CARE Score 4   Lying to Sitting on Side of Bed   Type of Assistance Needed Supervision   Physical Assistance Level No physical assistance   Lying to Sitting on Side of Bed CARE Score 4   Sit to Stand   Type of Assistance Needed Physical assistance   Physical Assistance Level 25% or less   Sit to Stand CARE Score 3   Bed-Chair Transfer   Type of Assistance Needed Physical assistance   Physical Assistance Level 25% or less   Comment min assist x1 hands on hips to/from bathroom  verbal cues 2* R toe catching/foot drag with sneakers donned  use of gait belt to/from shower room with mod assist for longer distance   Chair/Bed-to-Chair Transfer CARE Score 3   Toileting Hygiene   Type of Assistance Needed Physical assistance   Physical Assistance Level 25% or less   Comment in stance for clothing management and hygiene   Toileting Hygiene CARE Score 3   Toilet Transfer   Type of Assistance Needed Physical assistance   Physical Assistance Level 25% or less   Toilet Transfer CARE Score 3   Cognition   Overall Cognitive Status Impaired   Arousal/Participation Alert   Attention Attends with cues to redirect   Orientation Level Oriented to person;Oriented to place;Oriented to situation   Following Commands Follows one step commands with increased time or repetition   Comments Expressive > receptive aphasia noted  However decreased comprehension regarding higher level education during stroke education  Pt also noted with impaired ability to copy body movements, will continue to assess motor planning versus receptive language deficits     Activity Tolerance   Activity Tolerance Patient tolerated treatment well   Assessment   Treatment "Assessment Pt participated in skilled OT tx session  See above for further details on functional performance  Pt demonstrates good rehab potential  Incorporated language into session by having pt name ADL items, foods on trays, encouraging speech  While pt has full ROM R UE and 4/5 strength, pt noted with decreased attention and utilizing compensatory strategies already, reporting 'this arm was dead\" a few days ago  OT educating pt on focusing on R UE during all functional tasks as pt is R UE dominant  Pt will continue to benefit from skilled OT intervention to address R attention, R UE coordination and strengthening/functional use, standing balance, functional mobility without device, meal preparation, in order to maximize functional independence in ADLS, functional mobility/transfers and IADLS, while decreasing burden of care  Pt left positioned in recliner with call bell in reach and chair alarm on  Pt enjoys - running, cooking, taking care of his 2 pugs  Pt reports having supportive father and sisters who live locally, but currently pt lives at home alone in an apartment  Pt reports working for [employer], per Dr Bernardino Baca 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 [employer]  Plan for pt's father to move in with pt upon d/c  Prognosis Good   Problem List Decreased strength;Decreased endurance;Decreased mobility; Impaired balance;Decreased coordination;Decreased cognition; Impaired judgement   Barriers to Discharge Decreased caregiver support; Inaccessible home environment   Plan   Treatment/Interventions ADL retraining;Functional transfer training; Therapeutic exercise; Endurance training;Cognitive reorientation;Patient/family training;Equipment eval/education; Bed mobility; Compensatory technique education   Progress Progressing toward goals   Recommendation   OT Discharge Recommendation Home with outpatient rehabilitation   OT Therapy Minutes   OT Time In 0715   OT Time Out 0830   OT Total Time (minutes) " 75   OT Mode of treatment - Individual (minutes) 75   OT Mode of treatment - Concurrent (minutes) 0   OT Mode of treatment - Group (minutes) 0   OT Mode of treatment - Co-treat (minutes) 0   OT Mode of Treatment - Total time(minutes) 75 minutes   OT Cumulative Minutes 220   Therapy Time missed   Time missed?  No

## 2023-05-30 NOTE — PROGRESS NOTES
"   23 0900   Pain Assessment   Pain Assessment Tool 0-10   Pain Score No Pain   Restrictions/Precautions   Precautions Bed/chair alarms;Cognitive; Fall Risk;Aphasia; Supervision on toilet/commode   Weight Bearing Restrictions No   ROM Restrictions No   Comprehension   Comprehension (FIM) 3 - Understands basic info/conversation 50-74% of time   Expression   Expression (FIM) 2 - Uses only simple expressions or gestures (waves, hello)   Social Interaction   Social Interaction (FIM) 5 - Interacts appropriately with others 90% of time   Problem Solving   Problem solving (FIM) 2 - Solves basic problems 25-49% of time   Memory   Memory (FIM) 3 - Recognizes, recalls/performs 50-74%   Speech/Language/Cognition Assessmetn   Treatment Assessment Pt seen for skilled speech therapy session targeting expressive/receptive communication skills  Pt engaged in rapport conversation with review of events and some stroke education  Pt appears to follow conversation, attempting to ask appropriate questions in regards to further outpt therapy and frequency, discussed not driving, family assistance at discharge, following up with PCP, and education on aphasia and deficits present  Pt conts with hesitations, word finding, neologism and paraphasic errors  Pt appears in more \"motivated\" mood today as compared to the weekend, understanding more that his recovery will take time  Verbal Expression:  Pt was able to state first and last name, , age and full address  Pt also able to state name of place with extended time, noted pt with some phonemic errors but aware, and cuing self to correct name  Pt also able to state situation of \"stroke\" however with decreased organization for events leading up to hospitalization  Pt was able to state the current month but needed cues for sequencing numbers of year- pt perseveration \"\" and eventually stating \"I need help\" and SLP given cues for current correct year   Pt also aware and able to " "state correct Cathryn  Pt able to state his two dogs names \"Peeka\" and \"Martines\" but 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, difficult to fully explain position, stating \"food truck\"  Pt also completed object naming of items in room with 15/15acc  Noted pt with his own phonemic errors (poam for foam, ped for bed) and able to recognize his error and self correct  Pt also completed divergent naming task of concrete categories 24/24acc and object naming by description with 5/10acc increasing with verbal, semantic, and initial letter cues  Reading Comprehension:  Pt completed reading comp task of answering yes/no questions- able to complete with 17/20acc increasing with cues to attend to key words for more context to question  Pt also completed picture name ID with Fx3 options- able to [place] correct word to picture with 15/26acc  Pt will cont to benefit from skilled SLP services targeting expressive and receptive language deficits in order to maximize overall communication skills for increased independence and decreased burden of care at discharge  SLP Therapy Minutes   SLP Time In 0900   SLP Time Out 1000   SLP Total Time (minutes) 60   SLP Mode of treatment - Individual (minutes) 60   SLP Mode of treatment - Concurrent (minutes) 0   SLP Mode of treatment - Group (minutes) 0   SLP Mode of treatment - Co-treat (minutes) 0   SLP Mode of Treatment - Total time(minutes) 60 minutes   SLP Cumulative Minutes 205   Therapy Time missed   Time missed?  No       "

## 2023-05-30 NOTE — PLAN OF CARE
Problem: INFECTION - ADULT  Goal: Absence or prevention of progression during hospitalization  Description: INTERVENTIONS:  - Assess and monitor for signs and symptoms of infection  - Monitor lab/diagnostic results  - Monitor all insertion sites, i e  indwelling lines, tubes, and drains  - Monitor endotracheal if appropriate and nasal secretions for changes in amount and color  - [place] appropriate cooling/warming therapies per order  - Administer medications as ordered  - Instruct and encourage patient and family to use good hand hygiene technique  - Identify and instruct in appropriate isolation precautions for identified infection/condition  Outcome: Progressing  Goal: Absence of fever/infection during neutropenic period  Description: INTERVENTIONS:  - Monitor WBC    Outcome: Progressing     Problem: SAFETY ADULT  Goal: Patient will remain free of falls  Description: INTERVENTIONS:  - Educate patient/family on patient safety including physical limitations  - Instruct patient to call for assistance with activity   - Consult OT/PT to assist with strengthening/mobility   - Keep Call bell within reach  - Keep bed low and locked with side rails adjusted as appropriate  - Keep care items and personal belongings within reach  - Initiate and maintain comfort rounds  - Make Fall Risk Sign visible to staff  - Offer Toileting every  Hours, in advance of need  - Initiate/Maintain alarm  - Obtain necessary fall risk management equipment:   - Apply yellow socks and bracelet for high fall risk patients  - Consider moving patient to room near nurses station  Outcome: Progressing  Goal: Maintain or return to baseline ADL function  Description: INTERVENTIONS:  -  Assess patient's ability to carry out ADLs; assess patient's baseline for ADL function and identify physical deficits which impact ability to perform ADLs (bathing, care of mouth/teeth, toileting, grooming, dressing, etc )  - Assess/evaluate cause of self-care deficits   - Assess range of motion  - Assess patient's mobility; develop plan if impaired  - Assess patient's need for assistive devices and provide as appropriate  - Encourage maximum independence but intervene and supervise when necessary  - Involve family in performance of ADLs  - Assess for home care needs following discharge   - Consider OT consult to assist with ADL evaluation and planning for discharge  - Provide patient education as appropriate  Outcome: Progressing  Goal: Maintains/Returns to pre admission functional level  Description: INTERVENTIONS:  - Perform BMAT or MOVE assessment daily    - Set and communicate daily mobility goal to care team and patient/family/caregiver  - Collaborate with rehabilitation services on mobility goals if consulted  - Perform Range of Motion  times a day  - Reposition patient every hours    - Dangle patient  times a day  - Stand patient  times a day  - Ambulate patient  times a day  - Out of bed to chair  times a day   - Out of bed for meals  times a day  - Out of bed for toileting  - Record patient progress and toleration of activity level   Outcome: Progressing     Problem: DISCHARGE PLANNING  Goal: Discharge to home or other facility with appropriate resources  Description: INTERVENTIONS:  - Identify barriers to discharge w/patient and caregiver  - Arrange for needed discharge resources and transportation as appropriate  - Identify discharge learning needs (meds, wound care, etc )  - Arrange for interpretive services to assist at discharge as needed  - Refer to Case Management Department for coordinating discharge planning if the patient needs post-hospital services based on physician/advanced practitioner order or complex needs related to functional status, cognitive ability, or social support system  Outcome: Progressing     Problem: Prexisting or High Potential for Compromised Skin Integrity  Goal: Skin integrity is maintained or improved  Description: INTERVENTIONS:  - Identify patients at risk for skin breakdown  - Assess and monitor skin integrity  - Assess and monitor nutrition and hydration status  - Monitor labs   - Assess for incontinence   - Turn and reposition patient  - Assist with mobility/ambulation  - Relieve pressure over bony prominences  - Avoid friction and shearing  - Provide appropriate hygiene as needed including keeping skin clean and dry  - Evaluate need for skin moisturizer/barrier cream  - Collaborate with interdisciplinary team   - Patient/family teaching  - Consider wound care consult   Outcome: Progressing     Problem: MOBILITY - ADULT  Goal: Maintain or return to baseline ADL function  Description: INTERVENTIONS:  -  Assess patient's ability to carry out ADLs; assess patient's baseline for ADL function and identify physical deficits which impact ability to perform ADLs (bathing, care of mouth/teeth, toileting, grooming, dressing, etc )  - Assess/evaluate cause of self-care deficits   - Assess range of motion  - Assess patient's mobility; develop plan if impaired  - Assess patient's need for assistive devices and provide as appropriate  - Encourage maximum independence but intervene and supervise when necessary  - Involve family in performance of ADLs  - Assess for home care needs following discharge   - Consider OT consult to assist with ADL evaluation and planning for discharge  - Provide patient education as appropriate  Outcome: Progressing  Goal: Maintains/Returns to pre admission functional level  Description: INTERVENTIONS:  - Perform BMAT or MOVE assessment daily    - Set and communicate daily mobility goal to care team and patient/family/caregiver  - Collaborate with rehabilitation services on mobility goals if consulted  - Perform Range of Motion  times a day  - Reposition patient every hours    - Dangle patient  times a day  - Stand patient  times a day  - Ambulate patient times a day  - Out of bed to chair  times a day   - Out of bed for meals times a day  - Out of bed for toileting  - Record patient progress and toleration of activity level   Outcome: Progressing

## 2023-05-30 NOTE — PCC CARE MANAGEMENT
6/6- Pt lives alone in first floor apartment, [address]  Prior to admission, pt was independent with ADLs IADLs and drove self as needed  Pt reports having a shower seat in home  Pt reports no hx of STR/acute rehab, op therapies or HHC  Pt reports at dc his dad will be moving in with him to assist him with things as needed  PCP is Aurelia Serrano DO, preferred pharmacy is [facility] in [facility] [place]

## 2023-05-30 NOTE — PLAN OF CARE
Reviewed    Problem: INFECTION - ADULT  Goal: Absence or prevention of progression during hospitalization  Description: INTERVENTIONS:  - Assess and monitor for signs and symptoms of infection  - Monitor lab/diagnostic results  - Monitor all insertion sites, i e  indwelling lines, tubes, and drains  - Monitor endotracheal if appropriate and nasal secretions for changes in amount and color  - [place] appropriate cooling/warming therapies per order  - Administer medications as ordered  - Instruct and encourage patient and family to use good hand hygiene technique  - Identify and instruct in appropriate isolation precautions for identified infection/condition  Outcome: Progressing  Goal: Absence of fever/infection during neutropenic period  Description: INTERVENTIONS:  - Monitor WBC    Outcome: Progressing     Problem: SAFETY ADULT  Goal: Patient will remain free of falls  Description: INTERVENTIONS:  - Educate patient/family on patient safety including physical limitations  - Instruct patient to call for assistance with activity   - Consult OT/PT to assist with strengthening/mobility   - Keep Call bell within reach  - Keep bed low and locked with side rails adjusted as appropriate  - Keep care items and personal belongings within reach  - Initiate and maintain comfort rounds  - Make Fall Risk Sign visible to staff  - Offer Toileting every 4 Hours, in advance of need  - Initiate/Maintain bed and chair alarm  - Apply yellow socks and bracelet for high fall risk patients  - Consider moving patient to room near nurses station  Outcome: Progressing  Goal: Maintain or return to baseline ADL function  Description: INTERVENTIONS:  -  Assess patient's ability to carry out ADLs; assess patient's baseline for ADL function and identify physical deficits which impact ability to perform ADLs (bathing, care of mouth/teeth, toileting, grooming, dressing, etc )  - Assess/evaluate cause of self-care deficits   - Assess range of motion  - Assess patient's mobility; develop plan if impaired  - Assess patient's need for assistive devices and provide as appropriate  - Encourage maximum independence but intervene and supervise when necessary  - Involve family in performance of ADLs  - Assess for home care needs following discharge   - Consider OT consult to assist with ADL evaluation and planning for discharge  - Provide patient education as appropriate  Outcome: Progressing  Goal: Maintains/Returns to pre admission functional level  Description: INTERVENTIONS:  - Set and communicate daily mobility goal to care team and patient/family/caregiver     - Collaborate with rehabilitation services on mobility goals if consulted  - Out of bed for toileting  - Record patient progress and toleration of activity level   Outcome: Progressing     Problem: DISCHARGE PLANNING  Goal: Discharge to home or other facility with appropriate resources  Description: INTERVENTIONS:  - Identify barriers to discharge w/patient and caregiver  - Arrange for needed discharge resources and transportation as appropriate  - Identify discharge learning needs (meds, wound care, etc )  - Arrange for interpretive services to assist at discharge as needed  - Refer to Case Management Department for coordinating discharge planning if the patient needs post-hospital services based on physician/advanced practitioner order or complex needs related to functional status, cognitive ability, or social support system  Outcome: Progressing

## 2023-05-30 NOTE — PCC NURSING
Acute left MCA CVA, S/p left MCA thrombectomy  Workup: Homocystine normal, BELEM normal, RF normal, ANCA negative, dsDNA negative, Factor V Leiden/lupus anticoagulant negative, Antithrombin III 81 (felt likely not clinically relevant was higher than 80%, Protein S decreased at 65%, Protein C normal  MTHFR mutation pending  Repeat thrombosis panel in 3 months, Continue ASA 81mg qd and statin, Question if etio is from COVID infection in 2/2023  Seizures Was noted with eyes deviating to the right 5/16 with a post ictal period, Had 2 episodes, Keppra stopped 5/25 but Dr Pako Tapia restarted since he had 2 sz in hospital    PFO, Large PFO on ALYSSA, Negative DVT, To see Cards as OP 4-6 weeks  Bradycardia Has HRs in 50's at times as in hospital, On no meds to affect HR  Nicotine abuse, Continue patch  Moderate B/L pleural effusions, Will monitor for resolution, Chest clear and O2 sats on RA normal, no SOB  Pt with depression, starting on lexapro in am on 5/30, neuropsych consult  5/30- Pt is AAOx4, with expressive aphasia  Pt requires alarms for safety  Pt with no c/o pain  Pt with control of bowel and bladder  6/6-Expressive aphasia improvement  No further changes    This week we will encourage independence with ADLs  We will monitor labs and vital signs  WE will educate pt/family about repositioning to prevent skin breakdown  We will assist w repositioning and perform routine skin checks  We will monitor for adequate pain control  We will monitor for constipation and medicate pt as ordered  We will increase safety awareness and keep pt free from falls

## 2023-05-30 NOTE — PROGRESS NOTES
23 1230   Pain Assessment   Pain Assessment Tool 0-10   Pain Score No Pain   Comprehension   Comprehension (FIM) 3 - Needs parts of sentences repeated   Expression   Expression (FIM) 2 - Uses only simple expressions or gestures (waves, hello)   Social Interaction   Social Interaction (FIM) 5 - Interacts appropriately with others 90% of time   Problem Solving   Problem solving (FIM) 2 - Solves basic problems 25-49% of time   Memory   Memory (FIM) 3 - Recognizes, recalls/performs 50-74%   Speech/Language/Cognition Assessmetn   Treatment Assessment Pt lying in recliner upon SLP entering, agreeable to skilled ST session targeting expressive/receptive skills  SLP initially engaging in brief rapport building with pt as she is new to pt's care and with review of therapies completed today--all to also work on his expressive language abilities for speech  Pt demonstrating anomia, increased processing time, paraphasias, and perseverations  Of note, pt at end of his therapy day and reporting/demonstrating fatigue  SLP provided brief education on stroke recovery and the normality of such fatigue at this point in his journey and given the amount of therapy completed today  Pt nodding in understanding  Moving to more structured tx, pt asked to report on his bio and demographic info, as well as some orientation info  Able to independently state his first name, town of residence, zip code, age, STEVEN, , today's date and the current year  Required multisensory cueing, modA, and increased processing time to state his last name, street address, today's month, where he is currently (hospital), and his lunch items  He mostly benefits from slow visual modeling and automatic phrases/phrase completion  Next, pt asked to demonstrate reading comprehension from given 2 step, 4 component directions  Able to complete only 0 5/5 accurately, increasing to 4 5/5 where auditory cues (SLP reading direction aloud) provided   Finally, pt completing phrase imitation task  Able to complete 7/10 i'ly, increasing to 8/10 where self corrected  Based on today's PM session, it is recommended the pt continue to receive skilled ST targeting expressive receptive language skills as he continues to demonstrate benefit  This is to further optimize his functional language abilities to limit communication breakdown while at the [facility] and upon return home, to the community, and work  SLP Therapy Minutes   SLP Time In 4304   SLP Time Out 1300   SLP Total Time (minutes) 30   SLP Mode of treatment - Individual (minutes) 30   SLP Mode of treatment - Concurrent (minutes) 0   SLP Mode of treatment - Group (minutes) 0   SLP Mode of treatment - Co-treat (minutes) 0   SLP Mode of Treatment - Total time(minutes) 30 minutes   SLP Cumulative Minutes 235   Therapy Time missed   Time missed?  No

## 2023-05-30 NOTE — PROGRESS NOTES
PM&R PROGRESS NOTE:  Luca Moscoso 40 y o  male MRN: [id number]  Unit/Bed#: -01 Encounter: [id number]           Rehab Diagnosis: Impairment of mobility, safety, Activities of Daily Living (ADLs), and cognitive/communication skills due to Stroke:  01 2  Right Body Involvement (Left Brain)  Left MCA CVA    SUBJECTIVE: Patient seen face-to-face  No acute issues overnight  Feels that he slept very well  In a much better mood today  Father is at the bedside and provided disability paperwork as well as FMLA forms to be filled out    ASSESSMENT: Stable, progressing      PLAN:    Rehabilitation  • Functional deficits: right hemiparesis, right sensory deficits, aphasia, possible cognitive deficits, impaired mobility, self care  • Continue current rehabilitation plan of care to maximize function  I personally attended, reviewed, and discussed medical and functional updates in team conference today  Please refer to advance care planning note for details    • Estimated Discharge: Friday 6/9/23 with outpatient neuro day program vs outpatient therapies    Physical Therapy Occupational Therapy Speech Therapy   Transfers: Minimal Assistance  Bed Mobility: Supervision (set up)  Amulation Distance (ft): 10 feet (to 400' at best)  Ambulation: Assist of 2 (min A of 1 for household distance while CFA of 2nd person for community distance mobilities)  Assistive Device for Ambulation: Other (no AD but hands on hips or HHA)  Wheelchair Mobility:  (anticipate pt will be amb at d/c)  Number of Stairs: 12 (FF)  Assistive Device for Stairs: Bilateral Hand Rails  Stair Assistance: Assist of 2 (min of 2 with bilat HR)  Ramp:  (will assess outdoor ramp when pt has proper footwear)  Discharge Recommendations: Home with:  [address] with[de-identified] 24 Hour Supervision, Family Support, Outpatient Physical Therapy   Eating: Independent  Grooming: Minimal Assistance  Bathing: Minimal Assistance  Bathing: Minimal Assistance  Upper Body Dressing: Supervision  Lower Body Dressing: Minimal Assistance  Toileting: Minimal Assistance  Tub/Shower Transfer: Minimal Assistance  Toilet Transfer: Minimal Assistance  Cognition: Exceptions to WNL  Cognition: Decreased Memory, Decreased Executive Functions, Decreased Attention, Decreased Safety  Orientation: Person, Place, Time, Situation   Mode of Communication: Verbal  Speech/Language: Expressive Aphasia (expressive and receptive aphasia)  Cognition: Exceptions to WNL  Cognition: Decreased Memory, Decreased Executive Functions, Decreased Attention, Decreased Comprehension, Decreased Safety, Impulsive  Orientation: Person, Place, Situation  Discharge Recommendations: Home with:  [address] with[de-identified] 24 Hour Supervision, Family Support, Outpatient Speech Therapy         DVT prophylaxis  • Managed on Lovenox      Bladder plan  • Continent     Bowel plan  • Continent      * CVA (cerebral vascular accident) ([facility] [address] )  Assessment & Plan  Presented 5/15/23 as stroke alert  CTH/MRI brain confirmed Left MCA ischemic stroke  S/p thrombectomy TICI 3 revascularization 5/16/23  Etiology unclear, (possibly related to recent COVID infection in Feb 2023)  Work-up: +Large PFO on ALYSSA  Hypercoagulable work-up and malignancy work-up largely negative - MHTFR pending    Repeat hypercoagulable what the serial work-up as outpatient recommended  · Secondary CVA ppx: Aspirin 81 mg daily  · CVA education and reduction of modifiable risk factors  · Follow-up with neurology and cardiology as an outpatient (patient is a candidate for PFO closure)      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)  Assessment & Plan  Starting on Lexapro 5 mg in AM 5/30/23    Adjustment disorder  Assessment & Plan  Consulting Neuropsychology as patient with depressed mood  Also started on Lexapro 5 mg daily on 5/30/23    Abnormal findings on imaging test  Assessment & Plan  CT C/A/P: Moderate bilateral pleural effusions, small amount of ascites, and diffuse body "wall subcutaneous edema  Follow-up with PCP    Bradycardia  Assessment & Plan  HR 50-60s  Asymptomatic  Monitor  Follow-up with Cardiology as outpatient    Tobacco use  Assessment & Plan  Smoking cessation encouraged  Continue nicotine patch    PFO (patent foramen ovale)  Assessment & Plan  Large PFO identified on ALYSSA  Cardiology recommending follow-up in 4-6 weeks as an outpatient for possible closure    Seizure [facility])  Assessment & Plan  Witnessed seizure-like activity times 2/5/1623  Video EEG negative however patient continued on Keppra 750 mg every 12 hours  Placed on seizure precautions  Follow-up with neurology to determine length of Keppra duration        Appreciate IM consultants medical co-management  Labs, medications, and imaging personally reviewed  ROS:  A ten point review of systems was completed on 05/30/23 and pertinent positives are listed in subjective section  All other systems reviewed were negative  OBJECTIVE:   /68 (BP Location: Left arm)   Pulse (!) 51   Temp 98 2 °F (36 8 °C) (Oral)   Resp 18   Ht 5' 9\" (1 753 m)   Wt 72 9 kg (160 lb 11 5 oz)   SpO2 98%   BMI 23 73 kg/m²     Physical Exam  Vitals and nursing note reviewed  Constitutional:       General: He is not in acute distress  HENT:      Head: Normocephalic and atraumatic  Nose: Nose normal       Mouth/Throat:      Mouth: Mucous membranes are moist    Eyes:      Conjunctiva/sclera: Conjunctivae normal    Cardiovascular:      Rate and Rhythm: Normal rate and regular rhythm  Pulses: Normal pulses  Pulmonary:      Effort: Pulmonary effort is normal       Breath sounds: Normal breath sounds  No wheezing or rales  Abdominal:      General: Bowel sounds are normal  There is no distension  Palpations: Abdomen is soft  Tenderness: There is no abdominal tenderness  Musculoskeletal:         General: No swelling  Cervical back: Neck supple  Skin:     General: Skin is warm   " Neurological:      Mental Status: He is alert and oriented to person, place, and time  Sensory: Sensory deficit present  Motor: Weakness (right hemiparesis - distally more than proximally) present        Comments: Expressive > comprehension aphasia  Strength improving   Psychiatric:         Mood and Affect: Mood normal           Lab Results   Component Value Date    HCT 36 4 (L) 05/29/2023    HGB 12 8 05/29/2023    MCV 96 05/29/2023     05/29/2023    WBC 4 45 05/29/2023     Lab Results   Component Value Date    BUN 13 05/29/2023    CALCIUM 8 7 05/29/2023     05/29/2023    CO2 31 05/29/2023    CREATININE 0 90 05/29/2023    GLUC 160 (H) 05/29/2023    K 4 2 05/29/2023    SODIUM 135 05/29/2023     Lab Results   Component Value Date    INR 0 89 05/15/2023    PROTIME 12 3 05/15/2023           Current Facility-Administered Medications:   •  acetaminophen (TYLENOL) tablet 975 mg, 975 mg, Oral, Q8H PRN, Magaly Hurtado MD  •  aspirin chewable tablet 81 mg, 81 mg, Oral, Daily, Magaly Hurtado MD, 81 mg at 05/30/23 0845  •  bisacodyl (DULCOLAX) rectal suppository 10 mg, 10 mg, Rectal, Daily PRN, Magaly Hurtado MD  •  enoxaparin (LOVENOX) subcutaneous injection 40 mg, 40 mg, Subcutaneous, Q24H [facility], Magaly Hurtado MD, 40 mg at 05/30/23 0845  •  escitalopram (LEXAPRO) tablet 5 mg, 5 mg, Oral, Daily, Magaly Hurtado MD, 5 mg at 05/30/23 0845  •  levETIRAcetam (KEPPRA) tablet 750 mg, 750 mg, Oral, Q12H [facility], Magaly Hurtado MD, 750 mg at 05/30/23 0845  •  nicotine (NICODERM CQ) 14 mg/24hr TD 24 hr patch 1 patch, 1 patch, Transdermal, Daily, Magaly Hurtado MD, 1 patch at 05/30/23 0845  •  ondansetron (ZOFRAN-ODT) dispersible tablet 4 mg, 4 mg, Oral, Q6H PRN, Magaly Hurtado MD  •  pantoprazole (PROTONIX) EC tablet 40 mg, 40 mg, Oral, Early Morning, Magaly Hurtado MD, 40 mg at 05/30/23 0549  •  polyethylene glycol (MIRALAX) packet 17 g, 17 g, Oral, Daily PRN, Gloria Kimble Piper Martinez MD    History reviewed  No pertinent past medical history  Patient Active Problem List    Diagnosis Date Noted   • CVA (cerebral vascular accident) ([facility] [address] ) 05/15/2023   • 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) 05/29/2023   • Abnormal findings on imaging test 05/28/2023   • Adjustment disorder 05/28/2023   • Tobacco use 05/27/2023   • Mild pain 05/27/2023   • Bradycardia 05/27/2023   • PFO (patent foramen ovale) 05/22/2023   • Seizure ([facility] [address] ) 05/16/2023   • Positive self-administered antigen test for COVID-19 02/10/2023   • Viral infection, unspecified 11/10/2022   • Bilateral low back pain without sciatica 01/29/2020          Belle Lamas MD    I have spent a total time of 55 minutes on 05/30/23 in caring for this patient including Impressions, Counseling / Coordination of care, Documenting in the medical record, Communicating with other healthcare professionals  and Weekly team conference  ** Please Note:  voice to text software may have been used in the creation of this document   Although proof errors in transcription or interpretation are a potential of such software**

## 2023-05-30 NOTE — PROGRESS NOTES
Internal Medicine Progress Note  Patient: Juani Cerrato  Age/sex: 40 y o  male  Medical Record #: [id number]      ASSESSMENT/PLAN: (Interval History)  Juani Cerrato is seen and examined and management for following issues:    Acute left MCA CVA  • S/p left MCA thrombectomy  • Workup:   • Homocystine normal, BELEM normal, RF normal, ANCA negative, dsDNA negative, Factor V Leiden/lupus anticoagulant negative, Antithrombin III 81 (felt likely not clinically relevant was higher than 80%, Protein S decreased at 65%, Protein C normal                          MTHFR mutation pending                          Repeat thrombosis panel in 3 months   • Continue ASA 81mg qd and statin  • Miladis Zapata is from COVID infection in 2/2023     Seizures  • Was noted with eyes deviating to the right 5/16 with a post ictal period   • Had 2 episodes  • Xochitl Ross restarted since he had 2 sz in hospital     PFO  • Large PFO on ALYSSA  • Negative DVT  • To see Cards as OP 4-6 weeks     Bradycardia  • Has HRs in 50's at times as in hospital  • Pt was an avid runner prior to CVA so likely d/t conditioning  • stable     Nicotine abuse  • Continue patch     Moderate B/L pleural effusions  • Repeat Cxray in 6 wks or sooner if symptomatic        Discharge date:  Team       The above assessment and plan was reviewed and updated as determined by my evaluation of the patient on 5/30/2023      Labs:   Results from last 7 days   Lab Units 05/29/23  0807 05/24/23  0544   HEMATOCRIT % 36 4* 36 7   HEMOGLOBIN g/dL 12 8 13 0   PLATELETS Thousands/uL 343 282   WBC Thousand/uL 4 45 5 09     Results from last 7 days   Lab Units 05/29/23  0807 05/24/23  0544   BUN mg/dL 13 10   CALCIUM mg/dL 8 7 8 7   CHLORIDE mmol/L 105 108   CO2 mmol/L 31 30   CREATININE mg/dL 0 90 0 77   POTASSIUM mmol/L 4 2 3 9   SODIUM mmol/L 135 138             Results from last 7 days   Lab Units 05/27/23  1054   POC GLUCOSE mg/dl 47*       Review of Scheduled Meds:  Current Facility-Administered Medications   Medication Dose Route Frequency Provider Last Rate   • acetaminophen  975 mg Oral Q8H PRN Nabeel Ortega MD     • aspirin  81 mg Oral Daily Nabeel Ortega MD     • bisacodyl  10 mg Rectal Daily PRN Nabeel Ortega MD     • enoxaparin  40 mg Subcutaneous Q24H [address] Marlinejonathan Jason MD     • escitalopram  5 mg Oral Daily Nabeel Ortega MD     • levETIRAcetam  750 mg Oral Q12H Cayla Duran MD     • nicotine  1 patch Transdermal Daily Nabeel Ortega MD     • ondansetron  4 mg Oral Q6H PRN Nabeel Ortega MD     • pantoprazole  40 mg Oral Early Morning Nabeel Ortega MD     • polyethylene glycol  17 g Oral Daily PRN Nabeel Ortega MD         Subjective/ HPI: Patient seen and examined  Patients overnight issues or events were reviewed with nursing or staff during rounds or morning huddle session  New or overnight issues include the following:     Pt seen    ROS:   A 10 point ROS was performed; negative except as noted above         Imaging:     No orders to display       *Labs /Radiology studies reviewed  *Medications reviewed and reconciled as needed  *Please refer to order section for additional ordered labs studies  *Case discussed with primary attending during morning huddle case rounds    Physical Examination:  Vitals:   Vitals:    05/29/23 0814 05/29/23 1317 05/29/23 2045 05/30/23 0550   BP: 112/60 121/69 133/64 113/68   BP Location: Right arm Left arm Left arm Left arm   Pulse: 60 74 56 (!) 51   Resp: 18 20 20 18   Temp: 98 5 °F (36 9 °C) 98 2 °F (36 8 °C) 98 4 °F (36 9 °C) 98 2 °F (36 8 °C)   TempSrc: Oral Oral Oral Oral   SpO2: 98% 97% 97% 98%   Weight:       Height:           GEN: NAD; pleasant  NEURO: Alert and aphasic  HEENT: Pupils are equal/reactive; normocephalic, face is asymmetrical, hearing is normal  CV: S1 S2 regular, no murmur/rub/gallops, 2/4 pedal pulses, no LE edema present  RESP: Lungs are clear bilaterally, no wheezes rales or rhonchi, on room air, no distress, respirations are easy and non labored  GI: Abdomen is flat, soft, non tender, +BS x4; non distended  : Voiding without issues  MUSC: Moves all extremities except right hemiparesis  SKIN: pink, warm, normal turgor, no rashes or lesions noted       The above physical exam was reviewed and updated as determined by my evaluation of the patient on 5/30/2023  Invasive Devices     None                    VTE Pharmacologic Prophylaxis: Enoxaparin  Code Status: Level 1 - Full Code  Current Length of Stay: 3 day(s)      Total time spent:  30 minutes with more than 50% spent counseling/coordinating care  Counseling includes discussion with patient re: progress  and discussion with patient of his/her current medical state/information  Coordination of patient's care was performed in conjunction with primary service  Time invested included review of patient's labs, vitals, and management of their comorbidities with continued monitoring  In addition, this patient was discussed with medical team including physician and advanced extenders  The care of the patient was extensively discussed and appropriate treatment plan was formulated unique for this patient  Medical decision making for the day was made by supervising physician unless otherwise noted in their attestation statement  ** Please Note:  voice to text software may have been used in the creation of this document   Although proof errors in transcription or interpretation are a potential of such software**

## 2023-05-30 NOTE — PROGRESS NOTES
05/30/23 1000   Pain Assessment   Pain Score No Pain   Restrictions/Precautions   Precautions Aphasia;Bed/chair alarms;Cognitive; Fall Risk;Supervision on toilet/commode   Cognition   Arousal/Participation Alert; Cooperative   Attention Attends with cues to redirect   Memory Decreased short term memory   Following Commands Follows one step commands with increased time or repetition   Subjective   Subjective Pt  ha sno new complaints, is ready to participate in therapy   Sit to Stand   Type of Assistance Needed Incidental touching;Physical assistance   Physical Assistance Level 25% or less   Comment no AD using gait belt   Sit to Stand CARE Score 3   Bed-Chair Transfer   Type of Assistance Needed Incidental touching;Physical assistance   Physical Assistance Level 25% or less   Comment no AD using gait belt   Chair/Bed-to-Chair Transfer CARE Score 3   Transfer Bed/Chair/Wheelchair   Adaptive Equipment None  (gait belt)   Walk 10 Feet   Type of Assistance Needed Physical assistance   Physical Assistance Level 25% or less   Comment CGA/ min A no AD using gait belt   Walk 10 Feet CARE Score 3   Walk 50 Feet with Two Turns   Type of Assistance Needed Physical assistance   Physical Assistance Level 25% or less   Comment CGA/ min A no AD using gait belt   Walk 50 Feet with Two Turns CARE Score 3   Walk 150 Feet   Type of Assistance Needed Physical assistance   Physical Assistance Level 26%-50%   Comment min A, mod A as he gets fatigue with inc R foot drag, provided L HHA to improve wt  shift and R foot clearance   Walk 150 Feet CARE Score 3   Walking 10 Feet on Uneven Surfaces   Type of Assistance Needed Physical assistance   Physical Assistance Level 51%-75%   Comment L HHA needed walking up long ramp outside with significant R foot drag with no HHA provided   Walking 10 Feet on Uneven Surfaces CARE Score 2   Ambulation   Primary Mode of Locomotion Prior to Admission Walk   Distance Walked (feet) 200 ft  (to 300 feet) Assist Device Other;Hand Hold  (no AD , HHA prn with increasing R foot drag)   Gait Pattern Inconsistant Tess;Decreased foot clearance;R foot drag;Improper weight shift; Ataxic   Limitations Noted In Balance; Coordination;Strength   Provided Assistance with: Balance;Weight Shift   Walk Assist Level Minimum Assist;Moderate Assist   [facility], walked up and down long ramp and walked on side walk of parking B   Does the patient walk? 2  Yes   Wheel 50 Feet with Two Turns   Reason if not Attempted Activity not applicable   Wheel 50 Feet with Two Turns CARE Score 9   Wheel 150 Feet   Reason if not Attempted Activity not applicable   Wheel 904 Feet CARE Score 9   Wheelchair mobility   Does the patient use a wheelchair? 0  No   Curb or Single Stair   Style negotiated Curb   Type of Assistance Needed Physical assistance   Physical Assistance Level 26%-50%   Comment outisde curb   1 Step (Curb) CARE Score 3   4 Steps   Type of Assistance Needed Physical assistance   Physical Assistance Level 26%-50%   Comment using L hand on rail   4 Steps CARE Score 3   12 Steps   Type of Assistance Needed Physical assistance   Physical Assistance Level 26%-50%   Comment using L hand on rail   12 Steps CARE Score 3   Stairs   Type Stairs   # of Steps   (Ff X 2)   Weight Bearing Precautions Fall Risk   Assist Devices Single Rail   Findings first set of FF pt  did non reciprocal pattern , 2nd set of FF pt  did non reciprocal, did well acsending but significant R knee buckle descending   Therapeutic Interventions   Neuromuscular Re-Education stepping over obstacles, forward then side ways, kicking and walking beach ball, ball toss while walking   Step ups of L LE from groung to 2nd step on  to improve R knee control and to inc stability, pt  demonstrates >75% knee buckling on this exercise even with tactile cues on knee provided   Assessment   Treatment Assessment Pt  engaged in 100 mins session and was able to participate in above activities as well as stroke education  Tx focused on NPP/NMR to normalize gait pattern as well as community ambulation and balance training improving balance reactions  Pt  demonstrating good progress in mobility with CGA at [facility] with mobility with no AD  However when fatigue, pt  demonstrates inc R foot drag, knee buckling and ataxia   No major LOB noted during session  Pt  highly motivated and is smiling a lot today and expressed that he was glad he was able to go outside  Family/Caregiver Present At end of session  pt's dad was present   Problem List Decreased strength;Decreased endurance; Impaired balance;Decreased mobility; Decreased coordination; Impaired judgement;Decreased safety awareness;Decreased cognition   Barriers to Discharge Decreased caregiver support   PT Barriers   Functional Limitation Car transfers;Stair negotiation;Standing;Transfers; Walking   Plan   Treatment/Interventions Functional transfer training;LE strengthening/ROM; Elevations; Therapeutic exercise; Endurance training;Patient/family training;Equipment eval/education; Bed mobility;Gait training   Recommendation   PT Discharge Recommendation Home with outpatient rehabilitation   Equipment Recommended   (LRAD vs no AD)   PT Therapy Minutes   PT Time In 1000   PT Time Out 1140   PT Total Time (minutes) 100   PT Mode of treatment - Individual (minutes) 100   PT Mode of treatment - Concurrent (minutes) 0   PT Mode of treatment - Group (minutes) 0   PT Mode of treatment - Co-treat (minutes) 0   PT Mode of Treatment - Total time(minutes) 100 minutes   PT Cumulative Minutes 220   Therapy Time missed   Time missed? No     Stroke Education Series    Pt participated in skilled Stroke Education Series in a group setting to address the topic of Stroke 101: Understanding the Basics of Stroke (stroke diagnosis and ischemic stroke) in both verbal and written formats   Education within this session reviewed the basic structural/functional components of the brain and included information on the causes of stroke, related signs/symptoms, risk factors, and the process of stroke rehabilitation  The goal of this education was to provide the patient with general understanding of how the brain functions and how a stroke can impact his/her performance  In addition, this series aimed to provide the patient with the information that can help reduce the risk of sustaining another stroke  Following education, pt's response to education is: verbalizes understanding        Start Time: 1115    End Time: 1140

## 2023-05-30 NOTE — PLAN OF CARE
Problem: INFECTION - ADULT  Goal: Absence or prevention of progression during hospitalization  Description: INTERVENTIONS:  - Assess and monitor for signs and symptoms of infection  - Monitor lab/diagnostic results  - Monitor all insertion sites, i e  indwelling lines, tubes, and drains  - Monitor endotracheal if appropriate and nasal secretions for changes in amount and color  - [place] appropriate cooling/warming therapies per order  - Administer medications as ordered  - Instruct and encourage patient and family to use good hand hygiene technique  - Identify and instruct in appropriate isolation precautions for identified infection/condition  Outcome: Progressing  Goal: Absence of fever/infection during neutropenic period  Description: INTERVENTIONS:  - Monitor WBC    Outcome: Progressing

## 2023-05-30 NOTE — TEAM CONFERENCE
Acute RehabilitationTeam Conference Note  Date: 5/30/2023   Time: 10:37 AM       Patient Name:  Susannah Philippe       Medical Record Number: [id number]   YOB: 1980  Sex:  Male          Room/Bed:  Banner 458/Banner 458-01  Payor Info:  Payor: [address] [place] [address] / Plan: [id number] / Product Type: Blue Fee for Service /      Admitting Diagnosis: CVA (cerebral vascular accident) ([facility] ) [I63 9]   Admit Date/Time:  5/27/2023 12:27 PM  Admission Comments: No comment available     Primary Diagnosis:  CVA (cerebral vascular accident) ([facility] )  Principal Problem: CVA (cerebral vascular accident) [facility])    Patient Active Problem List    Diagnosis Date Noted   • 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) 05/29/2023   • Abnormal findings on imaging test 05/28/2023   • Adjustment disorder 05/28/2023   • Tobacco use 05/27/2023   • Mild pain 05/27/2023   • Bradycardia 05/27/2023   • PFO (patent foramen ovale) 05/22/2023   • Seizure ([facility] ) 05/16/2023   • CVA (cerebral vascular accident) ([facility] ) 05/15/2023   • Positive self-administered antigen test for COVID-19 02/10/2023   • Viral infection, unspecified 11/10/2022   • Bilateral low back pain without sciatica 01/29/2020       Physical Therapy:    Transfers: Minimal Assistance  Bed Mobility: Supervision (set up)  Amulation Distance (ft): 10 feet (to 400' at best)  Ambulation: Assist of 2 (min A of 1 for household distance while CFA of 2nd person for community distance mobilities)  Assistive Device for Ambulation: Other (no AD but hands on hips or HHA)  Wheelchair Mobility:  (anticipate pt will be amb at d/c)  Number of Stairs: 12 (FF)  Assistive Device for Stairs: Bilateral [employer]  Stair Assistance: Assist of 2 (min of 2 with bilat HR)  Ramp:  (will assess outdoor ramp when pt has proper footwear)  Discharge Recommendations: Home with:  [address] with[de-identified] 24 Hour Supervision, Family Support, Outpatient Physical Therapy    5/29/23  Pt is a 40year old male s/p CVA on 5/15 with CTH showed acute ischemic left MCA stroke status post thrombectomy with TICI 3 revascularization by Dr Rigoberto Senior  Barriers to home d/c include limited family availability to provide physical assistance/S at d/c, inability to complete functional household and community distance mobilities without using an AD  Pt lives at home alone with 2 dogs and was fully indep at baseline without AD on even and uneven surfaces including stairs negotiation, indep  and with full time job  In addition post stroke impairments/deficits impacting functional performance and indep include dec cognition with dec safety and insights to current deficits, R hemiparesis with inattention, impaired standing balance/tolerance and righting reaction, impaired coordination and motor planning, impaired sensation/proprioception/kinesthesia, dec endurance, gait dysfunction and global aphasia so pt requires inc caregiver burden and not safe to return home  alone at this time  Pt requires 1-2 person assist to complete functional activities safely without using an AD  Pt is a good rehab candidate with anticipated DS- S goals using Least restrictive AD with ELOS of 2 weeks (10-14 days)  Skilled PT will work on therapeutic exercises, therapeutic activities including stroke education series and family meeting/training for dispo planning, NMR with emphasis on NPP without using an AD, w/c mobility and gait training to improve overall functional indep in order for pt to return home safely with reduce risk for falls        Occupational Therapy:  Eating: Independent  Grooming: Minimal Assistance  Bathing: Minimal Assistance  Bathing: Minimal Assistance  Upper Body Dressing: Supervision  Lower Body Dressing: Minimal Assistance  Toileting: Minimal Assistance  Tub/Shower Transfer: Minimal Assistance  Toilet Transfer: Minimal Assistance  Cognition: Exceptions to WNL  Cognition: Decreased Memory, Decreased Executive Functions, Decreased Attention, Decreased Safety  Orientation: Person, Place, Time, Situation  Discharge Recommendations: Home with:  [address] with[de-identified] 24 Hour Supervision, Family Support       Pt continues to present with impairments in activity tolerance, endurance, R hemiparesis, standing balance/tolerance, UE strength, FMC, GMC, memory, insight, safety , judgement , attention , and coping skills   Additional functional barriers include fatigue, impulsivity, decreased caregiver support, risk for falls, and home environment  Pt is functioning at overall Min A for ADLs and fxnl mobility without AD  Pt will continue to benefit from skilled OT services to address above mentioned barriers and maximize functional independence in baseline areas of occupation  ELOS 10-14 days to meet supervision to independent ADL goals  Speech Therapy:  Mode of Communication: Verbal  Speech/Language: Expressive Aphasia (expressive and receptive aphasia)  Cognition: Exceptions to WNL  Cognition: Decreased Memory, Decreased Executive Functions, Decreased Attention, Decreased Comprehension, Decreased Safety, Impulsive  Orientation: Person, Place, Situation  Discharge Recommendations: Home with:  [address] with[de-identified] 24 Hour Supervision, Family Support, Outpatient Speech Therapy  Speech and language evaluation was completed on admission where pt found to be presenting with severe expressive and receptive language deficits/aphasia  Pt's verbal expression is characterized by dysfluent speech containing semantic content, though also with anomia, phonemic paraphasias and neologisms  Pt presents with difficulty in following simple commands, though ? ing if due to comprehension vs combination of comprehension and motor planning deficits, along with deficits in yes/no questions and basic conversation  He benefits from repetition and rewording of information, as well as use of gestures paired with instructions   Current deficits which present are barriers include impairments in the following areas: comprehension, expression, insight into deficits, problem solving, safety awareness, judgement, impulsivity, sequencing, attention, STM recall, LTM recall, and executive functions  Pt is also limited by mood, which has impacted pt's eagerness to participate in therapy  Pt was been provided with education regarding stroke recovery and will benefit from continued education and training  Pt is currently functioning at max assist for comprehension, expression, problem solving and memory  Plan to reach out to pt's family this week to initiate family education and training  Due to the above deficits, pt will likely be recommended for 24/hr supervision on discharge, along with continued skilled SLP therapy through outpatient services  At this time, pt is recommended for skilled SLP services during acute rehab stay targeting expressive and receptive language skills in order to maximize overall functional communication abilities  Nursing Notes:  Appetite: Good  Diet Type: Vegetarian                      Diet Patient/Family Education Complete: No                         Type of Wound Patient/Family Education: No (n/a)  Bladder: Continent     Bladder Patient/Family Education: Yes  Bowel: Continent     Bowel Patient/Family Education: Yes     Pain Score: 0                          Pain Patient/Family Education: Yes       Acute left MCA CVA, S/p left MCA thrombectomy  Workup: Homocystine normal, BELEM normal, RF normal, ANCA negative, dsDNA negative, Factor V Leiden/lupus anticoagulant negative, Antithrombin III 81 (felt likely not clinically relevant was higher than 80%, Protein S decreased at 65%, Protein C normal  MTHFR mutation pending  Repeat thrombosis panel in 3 months, Continue ASA 81mg qd and statin, Question if etio is from COVID infection in 2/2023   Seizures Was noted with eyes deviating to the right 5/16 with a post ictal period, Had 2 episodes, Keppra stopped 5/25 but Dr Susannah Louis restarted since he had 2 sz in hospital    PFO, Large PFO on ALYSSA, Negative DVT, To see Cards as OP 4-6 weeks  Bradycardia Has HRs in 50's at times as in hospital, On no meds to affect HR  Nicotine abuse, Continue patch  Moderate B/L pleural effusions, Will monitor for resolution, Chest clear and O2 sats on RA normal, no SOB  Pt with depression, starting on lexapro in am on 5/30, neuropsych consult  5/30- Pt is AAOx4, with expressive aphasia  Pt requires alarms for safety  Pt with no c/o pain  Pt with control of bowel and bladder  This week we will encourage independence with ADLs  We will monitor labs and vital signs  WE will educate pt/family about repositioning to prevent skin breakdown  We will assist w repositioning and perform routine skin checks  We will monitor for adequate pain control  We will monitor for constipation and medicate pt as ordered  We will increase safety awareness and keep pt free from falls  Case Management:     Discharge Planning  Living Arrangements: Lives Alone  Support Systems: Parent, Self  Assistance Needed: independent prior to admission  [facility]  Current Home Care Services: No  5/30- Cm to assess  Is the patient actively participating in therapies? yes  List any modifications to the treatment plan: None    Barriers Interventions   Right hemiperis Neuro muscular michael   Aphagia- impaired cog, mod comprehension, expressive aphagia, uncuuable  SLP Services   Depression/mood Supportive counseling, neuro psych   PFO Med managment   Decreased coordination Education   Right inattention Compensatory strategies   Decreased standing balance/righting reaction NMRE, balance retraining   Stairs Stair training, decreased fall risk, high intensity gait training, forced production of right leg   Poor motor planning Repetitive education     Is the patient making expected progress toward goals?  yes  List any update or changes to goals: None    Medical Goals: Patient will be medically stable for discharge to [facility] upon completion of rehab program and Patient will be able to manage medical conditions and comorbid conditions with medications and follow up upon completion of rehab program    Weekly Team Goals:   Rehab Team Goals  ADL Team Goal: Patient will require supervision with ADLs with least restrictive device upon completion of rehab program  Transfer Team Goal: Patient will require supervision with transfers with least restrictive device upon completion of rehab program  Locomotion Team Goal: Patient will require supervision with locomotion with least restrictive device upon completion of rehab program  Cognitive Team Goal: Patient will require assist for basic cognitive tasks upon completion of rehab program    Discussion: Pt participating in therapies and making progress  Pt functioning at supervision  With bed mobility, assist of 2 with community distance ambulation  Min assist for ADL IADLs, max expressive, mod to max receptive  Team recommending dc date of Friday 6/9 OP neuro day program, PT OT ST  Anticipated Discharge Date:  Dc Friday 6/9 OP PT OT ST neuro day program  [facility] Team Members Present: The following team members are supervising care for this patient and were present during this Weekly Team Conference      Physician: Dr Eliel Seals MD   : Glenna Hernandez MSW  Registered Nurse: Zoey Mera, RN  Physical Therapist: Stephanie Condon DPT  Occupational Therapist: Rogelio Varma MS, OTR/L, CBIS  Speech Therapist: Chris Patrick [address], [id number] [facility]

## 2023-05-31 PROCEDURE — 97129 THER IVNTJ 1ST 15 MIN: CPT

## 2023-05-31 PROCEDURE — 99232 SBSQ HOSP IP/OBS MODERATE 35: CPT | Performed by: PHYSICAL MEDICINE & REHABILITATION

## 2023-05-31 PROCEDURE — 97112 NEUROMUSCULAR REEDUCATION: CPT

## 2023-05-31 PROCEDURE — 97110 THERAPEUTIC EXERCISES: CPT

## 2023-05-31 PROCEDURE — 99232 SBSQ HOSP IP/OBS MODERATE 35: CPT | Performed by: INTERNAL MEDICINE

## 2023-05-31 PROCEDURE — 92507 TX SP LANG VOICE COMM INDIV: CPT

## 2023-05-31 PROCEDURE — 97530 THERAPEUTIC ACTIVITIES: CPT

## 2023-05-31 RX ADMIN — ESCITALOPRAM OXALATE 5 MG: 10 TABLET ORAL at 07:54

## 2023-05-31 RX ADMIN — LEVETIRACETAM 750 MG: 750 TABLET, FILM COATED ORAL at 07:53

## 2023-05-31 RX ADMIN — ASPIRIN 81 MG CHEWABLE TABLET 81 MG: 81 TABLET CHEWABLE at 07:53

## 2023-05-31 RX ADMIN — LEVETIRACETAM 750 MG: 750 TABLET, FILM COATED ORAL at 20:43

## 2023-05-31 RX ADMIN — ENOXAPARIN SODIUM 40 MG: 40 INJECTION SUBCUTANEOUS at 07:53

## 2023-05-31 RX ADMIN — NICOTINE 1 PATCH: 14 PATCH, EXTENDED RELEASE TRANSDERMAL at 07:54

## 2023-05-31 RX ADMIN — PANTOPRAZOLE SODIUM 40 MG: 40 TABLET, DELAYED RELEASE ORAL at 06:26

## 2023-05-31 NOTE — PLAN OF CARE
Problem: INFECTION - ADULT  Goal: Absence or prevention of progression during hospitalization  Description: INTERVENTIONS:  - Assess and monitor for signs and symptoms of infection  - Monitor lab/diagnostic results  - Monitor all insertion sites, i e  indwelling lines, tubes, and drains  - Monitor endotracheal if appropriate and nasal secretions for changes in amount and color  - [place] appropriate cooling/warming therapies per order  - Administer medications as ordered  - Instruct and encourage patient and family to use good hand hygiene technique  - Identify and instruct in appropriate isolation precautions for identified infection/condition  Outcome: Progressing  Goal: Absence of fever/infection during neutropenic period  Description: INTERVENTIONS:  - Monitor WBC    Outcome: Progressing     Problem: SAFETY ADULT  Goal: Patient will remain free of falls  Description: INTERVENTIONS:  - Educate patient/family on patient safety including physical limitations  - Instruct patient to call for assistance with activity   - Consult OT/PT to assist with strengthening/mobility   - Keep Call bell within reach  - Keep bed low and locked with side rails adjusted as appropriate  - Keep care items and personal belongings within reach  - Initiate and maintain comfort rounds  - Make Fall Risk Sign visible to staff  - Offer Toileting every 2 Hours, in advance of need  - Initiate/Maintain bed  chair alarm  - Obtain necessary fall risk management equipment: nonskid socks  - Apply yellow socks and bracelet for high fall risk patients  - Consider moving patient to room near nurses station  Outcome: Progressing  Goal: Maintain or return to baseline ADL function  Description: INTERVENTIONS:  -  Assess patient's ability to carry out ADLs; assess patient's baseline for ADL function and identify physical deficits which impact ability to perform ADLs (bathing, care of mouth/teeth, toileting, grooming, dressing, etc )  - Assess/evaluate cause of self-care deficits   - Assess range of motion  - Assess patient's mobility; develop plan if impaired  - Assess patient's need for assistive devices and provide as appropriate  - Encourage maximum independence but intervene and supervise when necessary  - Involve family in performance of ADLs  - Assess for home care needs following discharge   - Consider OT consult to assist with ADL evaluation and planning for discharge  - Provide patient education as appropriate  Outcome: Progressing  Goal: Maintains/Returns to pre admission functional level  Description: INTERVENTIONS:  - Perform BMAT or MOVE assessment daily    - Set and communicate daily mobility goal to care team and patient/family/caregiver  - Collaborate with rehabilitation services on mobility goals if consulted  - Perform Range of Motion 3 times a day  - Reposition patient every 2 hours    - Dangle patient 3 times a day  - Stand patient 3 times a day  - Ambulate patient 3 times a day  - Out of bed to chair 3 times a day   - Out of bed for meals 3 times a day  - Out of bed for toileting  - Record patient progress and toleration of activity level   Outcome: Progressing     Problem: DISCHARGE PLANNING  Goal: Discharge to home or other facility with appropriate resources  Description: INTERVENTIONS:  - Identify barriers to discharge w/patient and caregiver  - Arrange for needed discharge resources and transportation as appropriate  - Identify discharge learning needs (meds, wound care, etc )  - Arrange for interpretive services to assist at discharge as needed  - Refer to Case Management Department for coordinating discharge planning if the patient needs post-hospital services based on physician/advanced practitioner order or complex needs related to functional status, cognitive ability, or social support system  Outcome: Progressing     Problem: Prexisting or High Potential for Compromised Skin Integrity  Goal: Skin integrity is maintained or improved  Description: INTERVENTIONS:  - Identify patients at risk for skin breakdown  - Assess and monitor skin integrity  - Assess and monitor nutrition and hydration status  - Monitor labs   - Assess for incontinence   - Turn and reposition patient  - Assist with mobility/ambulation  - Relieve pressure over bony prominences  - Avoid friction and shearing  - Provide appropriate hygiene as needed including keeping skin clean and dry  - Evaluate need for skin moisturizer/barrier cream  - Collaborate with interdisciplinary team   - Patient/family teaching  - Consider wound care consult   Outcome: Progressing     Problem: MOBILITY - ADULT  Goal: Maintain or return to baseline ADL function  Description: INTERVENTIONS:  -  Assess patient's ability to carry out ADLs; assess patient's baseline for ADL function and identify physical deficits which impact ability to perform ADLs (bathing, care of mouth/teeth, toileting, grooming, dressing, etc )  - Assess/evaluate cause of self-care deficits   - Assess range of motion  - Assess patient's mobility; develop plan if impaired  - Assess patient's need for assistive devices and provide as appropriate  - Encourage maximum independence but intervene and supervise when necessary  - Involve family in performance of ADLs  - Assess for home care needs following discharge   - Consider OT consult to assist with ADL evaluation and planning for discharge  - Provide patient education as appropriate  Outcome: Progressing  Goal: Maintains/Returns to pre admission functional level  Description: INTERVENTIONS:  - Perform BMAT or MOVE assessment daily    - Set and communicate daily mobility goal to care team and patient/family/caregiver  - Collaborate with rehabilitation services on mobility goals if consulted  - Perform Range of Motion 3 times a day  - Reposition patient every 2 hours    - Dangle patient 3 times a day  - Stand patient 3 times a day  - Ambulate patient 3 times a day  - Out of bed to chair 3 times a day   - Out of bed for meals 3 times a day  - Out of bed for toileting  - Record patient progress and toleration of activity level   Outcome: Progressing

## 2023-05-31 NOTE — CASE MANAGEMENT
Case Management Update:  Cm met with pt at bedside to introduce role and complete cm open:    Pt lives alone in first floor apartment, [address]  Prior to admission, pt was independent with ADLs IADLs and drove self as needed  Pt reports having a shower seat in home  Pt reports no hx of STR/acute rehab, op therapies or HHC  Pt reports at dc his dad will be moving in with him to assist him with things as needed  PCP is Joseph Kirby DO, preferred pharmacy [facility] [place]  The patient was educated on the rehabilitation process including therapy program, the interdisciplinary team, and weekly team meetings  Estimated length of stay was reviewed with the patient as well as expectations of discussions of discharge planning  The role of the  was reviewed including providing care coordination, discharge planning and discharge facilitation  IMM was reviewed with the patient and a copy was provided for their reference  The patient verbalized understanding of the information provided and denied any further questions at this time  CM will continue to follow and assist the patient throughout their rehabilitation stay

## 2023-05-31 NOTE — PROGRESS NOTES
23 1300   Pain Assessment   Pain Assessment Tool 0-10   Pain Score No Pain   Restrictions/Precautions   Precautions Aphasia;Bed/chair alarms;Cognitive;Seizure;Supervision on toilet/commode   Comprehension   Comprehension (FIM) 3 - Understands basic info/conversation 50-74% of time   Expression   Expression (FIM) 2 - Uses only simple expressions or gestures (waves, hello)   Social Interaction   Social Interaction (FIM) 5 - Interacts appropriately with others 90% of time   Problem Solving   Problem solving (FIM) 2 - Needs direction more than ½ time to initiate, plan or complete simple tasks   Memory   Memory (FIM) 3 - Recognizes, recalls/performs 50-74%   Speech/Language/Cognition Assessmetn   Treatment Assessment Pt was awake, alert and engaged for session today, which began as a review given orientation as well as LTM biographical recall as current SLP is new to pt's care  In addition, SLP also reviewing daily events, including prior therapy sessions and recalling items consumed at last meal (lunch)  In addition to this the following structured items were targeted:     Orientation, Daily Review and LTM Biographcial Information:  Pt was able to elicit full name, address, , age and family members names (father and 2 sisters) w/o cues  Additionally, able to provide the names of dogs in addition to the breed of dogs w/o cues  When asking about work history, pt was able to state location of where he works, but was noted to exhibit more word finding deficits when attempting to elicit the job itself  When asking pt about current month/year, pt was able to elicit only, needing phonemic cues for place  As for reasoning being in the hospital, pt was exhibiting increased circumlocutions and word finding given the overall reason but in conjunction to expressing self, pt was gesturing to mouth due to speech deficit in addition to point to R arm and leg due to weakness which also occurred post stroke   When asking pt "about food items consumed at lunch, pt was exhibiting increased difficulty given specific items, stating multiple times \"2 things\" where again he was attempting to initiate and use word retrieval strategies and gestures to increased communicative intent but increased hesitations in attempts to name food items consumed  Pt did benefit from probing questions which would elicit yes/no responses to confirm food items or aid in attempts to name more of the foods which pt consumed at lunch today  Use of [employer] iPad: [employer] [facility] [place]:  SLP introducing a speech/language/cognitive alba which is presented through [employer]  Of note, when SLP asking pt about using cell phone, pt denied frequent use, but stated that he's slowly getting back to using cell phone over time  Pt was receptive to try this in session today and while this was new learning given the tasks presented, over time, pt was more receptive and comfortable given use of this device  SLP introducing both comprehension and expression activities today  Comprehension Skills: Listening: Completing the Phrase- Level 1  With this task, the verbal prompt provided gave pt the verbal carrier phrase to where pt was given Fo2 pictures to choose from to complete the phrase  It was noted that initially, pt was more focused on naming both of the picture options presented vs understanding the objective in pointing to the picture which would complete the phrase  SLP needing to provide pt w/ max verbal repetition of carrier phrase (ie: over and ____) in attempts to maximize comprehension given this task, but when pt was provided the written cue of the carrier phrase, pt began to demonstrate improved comprehension given this task  However, pt was only 4/10 accurate in completing, having most difficulty at the beginning of the tasks and at the end of the task   Due to this SLP still continuing to target comprehension skills, but switched task to Word ID under " the same umbrella category of Listening under comprehension skills  With this task, pt was able to complete Levels 1-4 which grew in overall complexity given words to match the corresponding pictures as well as increasing the number of pictures to choose from, ranging from Fo2-Fo4  For Level 1, which provided pt Fo2 pictures to match provided the target words presented verbally, pt was 9/10 accurate; Level 2, now [facility] pictures, pt was 7/10 accurate, noting ability to increased to 10/10 when choosing the next picture accurately; Level 3, now w/ Fo4 pictures, pt remained 7/10 accurate, increasing to 9/10 w/ next choice for matching picture to target word and Level 4, still w/ Fo4 pictures, pt was 8/10 accurate  Expression Skills: Speaking: Flashcard Naming Nouns- Levels 1 and 2  In addition to targeting comprehension, expressive language also targeted by naming pictures presented  There was a hierarchy of cueing which also was able to provided beginning w/ definition, then phrase completion, binary choices and written cues  For Level 1, most of the pictures presented were simple and/or common items to name, where pt was 9/10 accurate in completing, requiring only definition cue to then name item, increasing to 10/10  When engaging pt in Level 2, pt was 8/10 accurate, noting that pt was demonstrating semantic associations provided the 2 pictures w/ more difficulty to spontaneously name at this time  Pt required definition and phrase cues to name 1 out of the items and for the last picture, pt require all 4 levels of cues to elicit name of picture  Lastly, SLP reviewing how fatigue will play a factor into overall difficulty given both comprehension and expression over time  SLP educating pt about when this occurs w/ any person whom pt is communicating with, to tell them that he's just having a hard time getting the thoughts out and need a rest  Pt receptive to this education currently   At this time, pt is recommended for skilled SLP services during acute rehab stay targeting expressive and receptive language skills in order to maximize overall functional communication abilities in attempts to decrease overall caregiver burden over time  SLP Therapy Minutes   SLP Time In 1300   SLP Time Out 1400   SLP Total Time (minutes) 60   SLP Mode of treatment - Individual (minutes) 60   SLP Mode of treatment - Concurrent (minutes) 0   SLP Mode of treatment - Group (minutes) 0   SLP Mode of treatment - Co-treat (minutes) 0   SLP Mode of Treatment - Total time(minutes) 60 minutes   SLP Cumulative Minutes 355   Therapy Time missed   Time missed?  No

## 2023-05-31 NOTE — PROGRESS NOTES
"   23 0887   Pain Assessment   Pain Assessment Tool 0-10   Pain Score No Pain   Restrictions/Precautions   Precautions Aphasia;Bed/chair alarms;Cognitive; Fall Risk;Seizure;Supervision on toilet/commode   Weight Bearing Restrictions No   ROM Restrictions No   Comprehension   Comprehension (FIM) 3 - Understands basic info/conversation 50-74% of time   Expression   Expression (FIM) 2 - Uses only simple expressions or gestures (waves, hello)   Social Interaction   Social Interaction (FIM) 5 - Interacts appropriately with others 90% of time   Problem Solving   Problem solving (FIM) 3 - Solves basic problmes 50-74% of time   Memory   Memory (FIM) 3 - Recognizes, recalls/performs 50-74%   Speech/Language/Cognition Assessmetn   Treatment Assessment Pt seen for skilled speech therapy session targeting expressive/receptive communication skills  Engaged in rapport conversation in which pt was able to communicate poor sleep over the last 3 days given being in the hospital environment and not \"home\"  Discussed avenues to help (e g , melatonin) however pt declining for now  Discussed stroke fatigue as well in that it will take him twice as much energy to complete tasks now compared to before- pt receptive  Discussed team meeting yesterday with tentative discharge set for next  in which pt was in agreement  Informed that Dr Blade Chance is working with his dad to complete any financial/FMLA paperwork needed  Pt's speech conts to be present with anomia, phonemic/semantic errors, paraphasias and perseveration  Verbal Expression  Pt was able to recite his name, , age, and address as well as state place with extended processing and situation  Pt able to state the month and day but needed phrase completion cues for sequencing of current year as pt initially stating \"\"  Pt completed object naming task with items from [address] [place] [address]- acc increasing with phonemic and phrase completion cues   Task expanded to work on " word finding strategies for association in which pt then asked to name object use with 9/14acc  Noted pt's often stating single action/noun word for this and benefitted from carrier phrases to expand utterances  Reading Comprehension  Pt completed concrete category sort task with sorting words in categories- able to complete with 20/20acc  Pt encouraged to complete the writing portions, initially using his nondominant Left hand and encouraged to use his Right hand as this is his dominant AND affected side  Pt with good spelling, able to complete without referencing the word below- script mix of capital and lowercase letters, smaller print  Discussed providing homework with writing tasks on the side  Next completed word exclusion task (concrete) with ID which word does not belong with the others  Pt was able to to complete with 15/15acc  Task expanded for pt to complete verbal task of naming category words belonged to- able to complete with 4/9acc benefitting from multimodal cuing of extended time, semantic, initial letter, phonemic, and phrase completion  Pt is making good progress with skilled SLP services and will cont to benefit targeting expressive and receptive language deficits in order to maximize overall communication skills for increased independence and decreased burden of care at discharge  SLP Therapy Minutes   SLP Time In 0830   SLP Time Out 0930   SLP Total Time (minutes) 60   SLP Mode of treatment - Individual (minutes) 60   SLP Mode of treatment - Concurrent (minutes) 0   SLP Mode of treatment - Group (minutes) 0   SLP Mode of treatment - Co-treat (minutes) 0   SLP Mode of Treatment - Total time(minutes) 60 minutes   SLP Cumulative Minutes 295   Therapy Time missed   Time missed?  No

## 2023-05-31 NOTE — PROGRESS NOTES
Internal Medicine Progress Note  Patient: Cristian Neri  Age/sex: 40 y o  male  Medical Record #: [id number]      ASSESSMENT/PLAN: (Interval History)  Cristian Neri is seen and examined and management for following issues:    Acute left MCA CVA  • S/p left MCA thrombectomy  • Workup:   • Homocystine normal, BELEM normal, RF normal, ANCA negative, dsDNA negative, Factor V Leiden/lupus anticoagulant negative, Antithrombin III 81 (felt likely not clinically relevant was higher than 80%, Protein S decreased at 65%, Protein C normal                          MTHFR mutation pending                          Repeat thrombosis panel in 3 months   • Continue ASA 81mg qd and statin  • Beronica Cons is from COVID infection in 2/2023     Seizures  • Was noted with eyes deviating to the right 5/16 with a post ictal period   • Had 2 episodes  • Genesis Pressley restarted since he had 2 sz in hospital     PFO  • Large PFO on ALYSSA  • Negative DVT  • To see Cards as OP 4-6 weeks for possible closure     Bradycardia  • Has HRs in 50's at times as in hospital  • Pt was an avid runner prior to CVA so likely d/t conditioning  • stable     Nicotine abuse  • Continue patch     Moderate B/L pleural effusions  • Repeat Cxray in 6 wks or sooner if symptomatic        Discharge date:  Team       The above assessment and plan was reviewed and updated as determined by my evaluation of the patient on 5/31/2023      Labs:   Results from last 7 days   Lab Units 05/29/23  0807   HEMATOCRIT % 36 4*   HEMOGLOBIN g/dL 12 8   PLATELETS Thousands/uL 343   WBC Thousand/uL 4 45     Results from last 7 days   Lab Units 05/29/23  0807   BUN mg/dL 13   CALCIUM mg/dL 8 7   CHLORIDE mmol/L 105   CO2 mmol/L 31   CREATININE mg/dL 0 90   POTASSIUM mmol/L 4 2   SODIUM mmol/L 135             Results from last 7 days   Lab Units 05/27/23  1054   POC GLUCOSE mg/dl 47*       Review of Scheduled Meds:  Current Facility-Administered Medications Medication Dose Route Frequency Provider Last Rate   • acetaminophen  975 mg Oral Q8H PRN Jag Conde MD     • aspirin  81 mg Oral Daily Jag Conde MD     • bisacodyl  10 mg Rectal Daily PRN Jag Conde MD     • enoxaparin  40 mg Subcutaneous Q24H [address] Marline Pena MD     • escitalopram  5 mg Oral Daily Jag Conde MD     • levETIRAcetam  750 mg Oral Q12H Jamarcus Seaman MD     • nicotine  1 patch Transdermal Daily Jag Conde MD     • ondansetron  4 mg Oral Q6H PRN Jag Conde MD     • pantoprazole  40 mg Oral Early Morning Jag Conde MD     • polyethylene glycol  17 g Oral Daily PRN Jag Conde MD         Subjective/ HPI: Patient seen and examined  Patients overnight issues or events were reviewed with nursing or staff during rounds or morning huddle session  New or overnight issues include the following:     Pt seen in his room, states he slept well  Speech and aphasia seems to be improving    ROS:   A 10 point ROS was performed; negative except as noted above         Imaging:     No orders to display       *Labs /Radiology studies reviewed  *Medications reviewed and reconciled as needed  *Please refer to order section for additional ordered labs studies  *Case discussed with primary attending during morning huddle case rounds    Physical Examination:  Vitals:   Vitals:    05/30/23 0550 05/30/23 1426 05/30/23 1928 05/31/23 0550   BP: 113/68 110/61 115/55 116/63   BP Location: Left arm Left arm Left arm Left arm   Pulse: (!) 51 57 58 (!) 52   Resp: 18 20 20 18   Temp: 98 2 °F (36 8 °C) 98 6 °F (37 °C) 99 5 °F (37 5 °C) 97 7 °F (36 5 °C)   TempSrc: Oral Oral Oral Oral   SpO2: 98% 98% 100% 99%   Weight:    72 6 kg (160 lb 0 9 oz)   Height:           GEN: NAD; pleasant  NEURO: Alert and aphasic seems to be improving  HEENT: Pupils are equal/reactive; normocephalic, face is asymmetrical, hearing is normal  CV: S1 S2 regular, no murmur/rub/gallops, 2/4 pedal pulses, no LE edema present  RESP: Lungs are clear bilaterally, no wheezes rales or rhonchi, on room air, no distress, respirations are easy and non labored  GI: Abdomen is flat, soft, non tender, +BS x4; non distended  : Voiding without issues  MUSC: Moves all extremities except right hemiparesis improving  SKIN: pink, warm, normal turgor, no rashes or lesions noted       The above physical exam was reviewed and updated as determined by my evaluation of the patient on 5/31/2023  Invasive Devices     None                    VTE Pharmacologic Prophylaxis: Enoxaparin  Code Status: Level 1 - Full Code  Current Length of Stay: 4 day(s)      Total time spent:  30 minutes with more than 50% spent counseling/coordinating care  Counseling includes discussion with patient re: progress  and discussion with patient of his/her current medical state/information  Coordination of patient's care was performed in conjunction with primary service  Time invested included review of patient's labs, vitals, and management of their comorbidities with continued monitoring  In addition, this patient was discussed with medical team including physician and advanced extenders  The care of the patient was extensively discussed and appropriate treatment plan was formulated unique for this patient  Medical decision making for the day was made by supervising physician unless otherwise noted in their attestation statement  ** Please Note:  voice to text software may have been used in the creation of this document   Although proof errors in transcription or interpretation are a potential of such software**

## 2023-05-31 NOTE — PLAN OF CARE
Problem: INFECTION - ADULT  Goal: Absence or prevention of progression during hospitalization  Description: INTERVENTIONS:  - Assess and monitor for signs and symptoms of infection  - Monitor lab/diagnostic results  - Monitor all insertion sites, i e  indwelling lines, tubes, and drains  - Monitor endotracheal if appropriate and nasal secretions for changes in amount and color  - Thornton appropriate cooling/warming therapies per order  - Administer medications as ordered  - Instruct and encourage patient and family to use good hand hygiene technique  - Identify and instruct in appropriate isolation precautions for identified infection/condition  Outcome: Progressing  Goal: Absence of fever/infection during neutropenic period  Description: INTERVENTIONS:  - Monitor WBC    Outcome: Progressing     Problem: SAFETY ADULT  Goal: Patient will remain free of falls  Description: INTERVENTIONS:  - Educate patient/family on patient safety including physical limitations  - Instruct patient to call for assistance with activity   - Consult OT/PT to assist with strengthening/mobility   - Keep Call bell within reach  - Keep bed low and locked with side rails adjusted as appropriate  - Keep care items and personal belongings within reach  - Initiate and maintain comfort rounds  - Make Fall Risk Sign visible to staff  - Offer Toileting every Hours, in advance of need  - Initiate/Maintain alarm  - Obtain necessary fall risk management equipment:   - Apply yellow socks and bracelet for high fall risk patients  - Consider moving patient to room near nurses station  Outcome: Progressing  Goal: Maintain or return to baseline ADL function  Description: INTERVENTIONS:  -  Assess patient's ability to carry out ADLs; assess patient's baseline for ADL function and identify physical deficits which impact ability to perform ADLs (bathing, care of mouth/teeth, toileting, grooming, dressing, etc )  - Assess/evaluate cause of self-care deficits   - Assess range of motion  - Assess patient's mobility; develop plan if impaired  - Assess patient's need for assistive devices and provide as appropriate  - Encourage maximum independence but intervene and supervise when necessary  - Involve family in performance of ADLs  - Assess for home care needs following discharge   - Consider OT consult to assist with ADL evaluation and planning for discharge  - Provide patient education as appropriate  Outcome: Progressing  Goal: Maintains/Returns to pre admission functional level  Description: INTERVENTIONS:  - Perform BMAT or MOVE assessment daily    - Set and communicate daily mobility goal to care team and patient/family/caregiver  - Collaborate with rehabilitation services on mobility goals if consulted  - Perform Range of Motion  times a day  - Reposition patient every  hours    - Dangle patient  times a day  - Stand patient  times a day  - Ambulate patient  times a day  - Out of bed to chair  times a day   - Out of bed for meals  times a day  - Out of bed for toileting  - Record patient progress and toleration of activity level   Outcome: Progressing     Problem: DISCHARGE PLANNING  Goal: Discharge to home or other facility with appropriate resources  Description: INTERVENTIONS:  - Identify barriers to discharge w/patient and caregiver  - Arrange for needed discharge resources and transportation as appropriate  - Identify discharge learning needs (meds, wound care, etc )  - Arrange for interpretive services to assist at discharge as needed  - Refer to Case Management Department for coordinating discharge planning if the patient needs post-hospital services based on physician/advanced practitioner order or complex needs related to functional status, cognitive ability, or social support system  Outcome: Progressing     Problem: Prexisting or High Potential for Compromised Skin Integrity  Goal: Skin integrity is maintained or improved  Description: INTERVENTIONS:  - Identify patients at risk for skin breakdown  - Assess and monitor skin integrity  - Assess and monitor nutrition and hydration status  - Monitor labs   - Assess for incontinence   - Turn and reposition patient  - Assist with mobility/ambulation  - Relieve pressure over bony prominences  - Avoid friction and shearing  - Provide appropriate hygiene as needed including keeping skin clean and dry  - Evaluate need for skin moisturizer/barrier cream  - Collaborate with interdisciplinary team   - Patient/family teaching  - Consider wound care consult   Outcome: Progressing     Problem: MOBILITY - ADULT  Goal: Maintain or return to baseline ADL function  Description: INTERVENTIONS:  -  Assess patient's ability to carry out ADLs; assess patient's baseline for ADL function and identify physical deficits which impact ability to perform ADLs (bathing, care of mouth/teeth, toileting, grooming, dressing, etc )  - Assess/evaluate cause of self-care deficits   - Assess range of motion  - Assess patient's mobility; develop plan if impaired  - Assess patient's need for assistive devices and provide as appropriate  - Encourage maximum independence but intervene and supervise when necessary  - Involve family in performance of ADLs  - Assess for home care needs following discharge   - Consider OT consult to assist with ADL evaluation and planning for discharge  - Provide patient education as appropriate  Outcome: Progressing  Goal: Maintains/Returns to pre admission functional level  Description: INTERVENTIONS:  - Perform BMAT or MOVE assessment daily    - Set and communicate daily mobility goal to care team and patient/family/caregiver  - Collaborate with rehabilitation services on mobility goals if consulted  - Perform Range of Motion  times a day  - Reposition patient every  hours    - Dangle patient  times a day  - Stand patient  times a day  - Ambulate patient  times a day  - Out of bed to chair  times a day - Out of bed for meals  times a day  - Out of bed for toileting  - Record patient progress and toleration of activity level   Outcome: Progressing

## 2023-05-31 NOTE — PROGRESS NOTES
PM&R PROGRESS NOTE:  Yovani Smith 40 y o  male MRN: [id number]  Unit/Bed#: -01 Encounter: [id number]           Rehab Diagnosis: Impairment of mobility, safety, Activities of Daily Living (ADLs), and cognitive/communication skills due to Stroke:  01 2  Right Body Involvement (Left Brain)  Left MCA CVA    SUBJECTIVE: No acute issues reported overnight  Doing well and progressing  ASSESSMENT: Stable, progressing      PLAN:    Rehabilitation  • Functional deficits: right hemiparesis, right sensory deficits, aphasia, possible cognitive deficits, impaired mobility, self care  • Continue current rehabilitation plan of care to maximize function      • Estimated Discharge: Friday 6/9/23 with outpatient neuro day program vs outpatient therapies    Physical Therapy Occupational Therapy Speech Therapy   Transfers: Minimal Assistance  Bed Mobility: Supervision (set up)  Amulation Distance (ft): 10 feet (to 400' at best)  Ambulation: Assist of 2 (min A of 1 for household distance while CFA of 2nd person for community distance mobilities)  Assistive Device for Ambulation: Other (no AD but hands on hips or HHA)  Wheelchair Mobility:  (anticipate pt will be amb at d/c)  Number of Stairs: 12 (FF)  Assistive Device for Stairs: Bilateral Hand Rails  Stair Assistance: Assist of 2 (min of 2 with bilat HR)  Ramp:  (will assess outdoor ramp when pt has proper footwear)  Discharge Recommendations: Home with:  [address] with[de-identified] 24 Hour Supervision, Family Support, Outpatient Physical Therapy   Eating: Independent  Grooming: Minimal Assistance  Bathing: Minimal Assistance  Bathing: Minimal Assistance  Upper Body Dressing: Supervision  Lower Body Dressing: Minimal Assistance  Toileting: Minimal Assistance  Tub/Shower Transfer: Minimal Assistance  Toilet Transfer: Minimal Assistance  Cognition: Exceptions to WNL  Cognition: Decreased Memory, Decreased Executive Functions, Decreased Attention, Decreased Safety  Orientation: Person, Place, Time, Situation   Mode of Communication: Verbal  Speech/Language: Expressive Aphasia (expressive and receptive aphasia)  Cognition: Exceptions to WNL  Cognition: Decreased Memory, Decreased Executive Functions, Decreased Attention, Decreased Comprehension, Decreased Safety, Impulsive  Orientation: Person, Place, Situation  Discharge Recommendations: Home with:  [address] with[de-identified] 24 Hour Supervision, Family Support, Outpatient Speech Therapy         DVT prophylaxis  • Managed on Lovenox      Bladder plan  • Continent     Bowel plan  • Continent      * CVA (cerebral vascular accident) ([facility] [address] )  Assessment & Plan  Presented 5/15/23 as stroke alert  CTH/MRI brain confirmed Left MCA ischemic stroke  S/p thrombectomy TICI 3 revascularization 5/16/23  Etiology unclear, (possibly related to recent COVID infection in Feb 2023)  Work-up: +Large PFO on ALYSSA  Hypercoagulable work-up and malignancy work-up largely negative - MHTFR pending  Repeat hypercoagulable what the serial work-up as outpatient recommended  · Secondary CVA ppx: Aspirin 81 mg daily  · CVA education and reduction of modifiable risk factors  · Follow-up with neurology and cardiology as an outpatient (patient is a candidate for PFO closure)      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)  Assessment & Plan  Continue Lexapro 5 mg in AM 5/30/23    Adjustment disorder  Assessment & Plan  Consulting Neuropsychology as patient with depressed mood  Also started on Lexapro 5 mg daily on 5/30/23    Abnormal findings on imaging test  Assessment & Plan  CT C/A/P: Moderate bilateral pleural effusions, small amount of ascites, and diffuse body wall subcutaneous edema    Follow-up with PCP    Bradycardia  Assessment & Plan  HR 50-60s  Asymptomatic  Monitor  Follow-up with Cardiology as outpatient    Tobacco use  Assessment & Plan  Smoking cessation encouraged  Continue nicotine patch    PFO (patent foramen ovale)  Assessment & Plan  Large PFO identified on "ALYSSA  Cardiology recommending follow-up in 4-6 weeks as an outpatient for possible closure    Seizure [facility])  Assessment & Plan  Witnessed seizure-like activity times 2/5/1623  Video EEG negative however patient continued on Keppra 750 mg every 12 hours  Placed on seizure precautions  Follow-up with neurology to determine length of Keppra duration        Appreciate IM consultants medical co-management  Labs, medications, and imaging personally reviewed  ROS:  A ten point review of systems was completed on 05/31/23 and pertinent positives are listed in subjective section  All other systems reviewed were negative  OBJECTIVE:   /76 (BP Location: Left arm)   Pulse 60   Temp 97 7 °F (36 5 °C) (Oral)   Resp 18   Ht 5' 9\" (1 753 m)   Wt 72 6 kg (160 lb 0 9 oz)   SpO2 99%   BMI 23 64 kg/m²     Physical Exam  Vitals and nursing note reviewed  Constitutional:       General: He is not in acute distress  HENT:      Head: Normocephalic and atraumatic  Nose: Nose normal       Mouth/Throat:      Mouth: Mucous membranes are moist    Eyes:      Conjunctiva/sclera: Conjunctivae normal    Cardiovascular:      Rate and Rhythm: Normal rate and regular rhythm  Pulses: Normal pulses  Pulmonary:      Effort: Pulmonary effort is normal       Breath sounds: Normal breath sounds  No wheezing or rales  Abdominal:      General: Bowel sounds are normal  There is no distension  Palpations: Abdomen is soft  Tenderness: There is no abdominal tenderness  Musculoskeletal:         General: No swelling  Cervical back: Neck supple  Skin:     General: Skin is warm  Neurological:      Mental Status: He is alert and oriented to person, place, and time  Motor: Weakness (right hemparesis) present     Psychiatric:         Mood and Affect: Mood normal           Lab Results   Component Value Date    HCT 36 4 (L) 05/29/2023    HGB 12 8 05/29/2023    MCV 96 05/29/2023     05/29/2023    WBC " 4 45 05/29/2023     Lab Results   Component Value Date    BUN 13 05/29/2023    CALCIUM 8 7 05/29/2023     05/29/2023    CO2 31 05/29/2023    CREATININE 0 90 05/29/2023    GLUC 160 (H) 05/29/2023    K 4 2 05/29/2023    SODIUM 135 05/29/2023     Lab Results   Component Value Date    INR 0 89 05/15/2023    PROTIME 12 3 05/15/2023           Current Facility-Administered Medications:   •  acetaminophen (TYLENOL) tablet 975 mg, 975 mg, Oral, Q8H PRN, Barby Guy MD  •  aspirin chewable tablet 81 mg, 81 mg, Oral, Daily, Barby Guy MD, 81 mg at 05/31/23 4332  •  bisacodyl (DULCOLAX) rectal suppository 10 mg, 10 mg, Rectal, Daily PRN, Barby Guy MD  •  enoxaparin (LOVENOX) subcutaneous injection 40 mg, 40 mg, Subcutaneous, Q24H [facility], Barby Guy MD, 40 mg at 05/31/23 075  •  escitalopram (LEXAPRO) tablet 5 mg, 5 mg, Oral, Daily, Barby Guy MD, 5 mg at 05/31/23 0752  •  levETIRAcetam (KEPPRA) tablet 750 mg, 750 mg, Oral, Q12H [facility], Barby Guy MD, 750 mg at 05/31/23 0759  •  nicotine (NICODERM CQ) 14 mg/24hr TD 24 hr patch 1 patch, 1 patch, Transdermal, Daily, Barby Guy MD, 1 patch at 05/31/23 0752  •  ondansetron (ZOFRAN-ODT) dispersible tablet 4 mg, 4 mg, Oral, Q6H PRN, Barby Guy MD  •  pantoprazole (PROTONIX) EC tablet 40 mg, 40 mg, Oral, Early Morning, Barby Guy MD, 40 mg at 05/31/23 9029  •  polyethylene glycol (MIRALAX) packet 17 g, 17 g, Oral, Daily PRN, Barby Guy MD    History reviewed  No pertinent past medical history      Patient Active Problem List    Diagnosis Date Noted   • [facility] ) 05/15/2023   • 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) 05/29/2023   • Abnormal findings on imaging test 05/28/2023   • Adjustment disorder 05/28/2023   • Tobacco use 05/27/2023   • Mild pain 05/27/2023   • Bradycardia 05/27/2023   • PFO (patent foramen ovale) 05/22/2023   • Seizure ([facility] ) 05/16/2023   • Positive self-administered antigen test for COVID-19 02/10/2023   • Viral infection, unspecified 11/10/2022   • Bilateral low back pain without sciatica 01/29/2020          Doyle Garcia MD    I have spent a total time of 35 minutes on 05/31/23 in caring for this patient including Impressions, Counseling / Coordination of care and Documenting in the medical record  ** Please Note:  voice to text software may have been used in the creation of this document   Although proof errors in transcription or interpretation are a potential of such software**

## 2023-05-31 NOTE — PROGRESS NOTES
05/31/23 1400   Pain Assessment   Pain Assessment Tool 0-10   Pain Score No Pain   Restrictions/Precautions   Precautions Aphasia;Bed/chair alarms;Cognitive; Fall Risk;Supervision on toilet/commode;Seizure   Cognition   Overall Cognitive Status Impaired   Arousal/Participation Alert; Cooperative   Attention Attends with cues to redirect   Subjective   Subjective pt no c/o and ready for PT   Sit to Stand   Type of Assistance Needed Supervision;Verbal cues   Comment no AD   Sit to Stand CARE Score 4   Bed-Chair Transfer   Type of Assistance Needed Incidental touching;Verbal cues   Comment no AD   Chair/Bed-to-Chair Transfer CARE Score 4   Walk 10 Feet   Type of Assistance Needed Physical assistance;Verbal cues   Physical Assistance Level 25% or less   Walk 10 Feet CARE Score 3   Walk 50 Feet with Two Turns   Type of Assistance Needed Physical assistance;Verbal cues   Physical Assistance Level 25% or less   Walk 50 Feet with Two Turns CARE Score 3   Walk 150 Feet   Type of Assistance Needed Physical assistance;Verbal cues   Physical Assistance Level 25% or less   Comment no AD min-CGA but still demos dec R foot clearance and knee hyperextension more than knee buckling noted with dec awareness and self correction   Walk 150 Feet CARE Score 3   Curb or Single Stair   Style negotiated Single stair   Type of Assistance Needed Physical assistance;Verbal cues; Adaptive equipment   Physical Assistance Level 26%-50%   1 Step (Curb) CARE Score 3   4 Steps   Type of Assistance Needed Physical assistance;Verbal cues   Physical Assistance Level 26%-50%   4 Steps CARE Score 3   12 Steps   Type of Assistance Needed Physical assistance;Verbal cues   Physical Assistance Level 26%-50%   Comment bilat HR to inc utilization/awareness of R UE, reciprocal pattern x  2 FF   tomorrow will trial with just R rail   12 Steps CARE Score 3   Therapeutic Interventions   Neuromuscular Re-Education repeated step throughs  to 3rd step with Wbing ext on 2nd step on FF bilat HR with 3# ankle wts x 6 trials with seated rest break in between trials  used red bolster for visual cues to facilitate R foot clearance over step nosing to better facitate self correction vs verbal cues only  also did repeated stepping over dowels /SPC initially but changed to quad canes/small red bolster for added height to challenge pt with emphasis on leading with L LE for seems to have more difficulty R foot clearance when R is behind  repeated stepping over QC/bolster while reading then while donning/doffing shirt and carrying laundry basket with both hands but noted as he was walking back to chair unable to sustained R hand hold on the basket with dec awareness  bilat HHA NPP gait speed training with external cues fwd/bwd walking x 600', side stepping x 200' with hands on hips guarding  SLS initially with SPC then no AD but PT blocking R knee to prevent hyperextension/knee buckling   Equipment Use   NuStep lvl 4 x 16 mins, SPM>85 bilat LE and R UE only   Other Comments   Comments 10 MWT test self selected velocity1 18 m/sec, 10 MWT fast velocity without AD 1 55 m/sec -indicating pt is community ambulator with ability to cross street with normal walking speed   Assessment   Treatment Assessment skilled PT focused on NPP/NMR to improve motor control, gait pattern, righting reactions and promoting multi-tasking  Due to ongoing impaired proprioception on R LE pt cont to have dec self awareness/correction ability when demos R hyperextension, knee buckling or foot scuffing so provided with visual cues  at times pt trips on R foot chris as he fatigue requiring min A to stabilized  Pt has the most difficulty controlling knee during SLS training requiring blocking by PT  PT will trial small knee cage tomorrow for the knee hyperextension and cont with NMR/NPP training without an AD     Barriers to Discharge Inaccessible home environment;Decreased caregiver support   PT Barriers   Functional Limitation Stair negotiation;Ramp negotiation;Car transfers;Standing;Transfers; Walking   Plan   Treatment/Interventions Functional transfer training;LE strengthening/ROM; Therapeutic exercise; Endurance training;Bed mobility;Gait training;Spoke to nursing;OT   Progress Progressing toward goals   Recommendation   PT Discharge Recommendation Home with outpatient rehabilitation   PT Therapy Minutes   PT Time In 1400   PT Time Out 1530   PT Total Time (minutes) 90   PT Mode of treatment - Individual (minutes) 90   PT Mode of treatment - Concurrent (minutes) 0   PT Mode of treatment - Group (minutes) 0   PT Mode of treatment - Co-treat (minutes) 0   PT Mode of Treatment - Total time(minutes) 90 minutes   PT Cumulative Minutes 310

## 2023-05-31 NOTE — PROGRESS NOTES
"OT Treatment Note       05/31/23 1000   Pain Assessment   Pain Assessment Tool 0-10   Pain Score No Pain   Restrictions/Precautions   Precautions Aphasia;Bed/chair alarms;Cognitive; Fall Risk;Seizure;Supervision on toilet/commode   Weight Bearing Restrictions No   ROM Restrictions No   Lifestyle   Autonomy \"It's getting better,\" referring to function of RUE   Grooming   Findings washing hands with CGA in stance at sink   Sit to Stand   Type of Assistance Needed Supervision   Physical Assistance Level No physical assistance   Comment without AD   Sit to Stand CARE Score 4   Bed-Chair Transfer   Type of Assistance Needed Incidental touching   Physical Assistance Level No physical assistance   Comment without AD using gait belt   Chair/Bed-to-Chair Transfer CARE Score 4   Toileting Hygiene   Type of Assistance Needed Incidental touching   Physical Assistance Level No physical assistance   Comment urinating standing at toilet with CGA; able to complete CM with CGA in stance   [address] Score 4   Toilet Transfer   Comment stands at toilet to urinate, functionally mobilizing to toilet with CGA without AD and gait belt   Light Housekeeping   Light Housekeeping Level   (no AD)   Light Housekeeping Level of Assistance Contact guard   Light Housekeeping Pt participating in simple household management task of functionally mobilizing throughout OT gym to retrieve clothing at various heights including floor level while carrying laundry basket; 2 5# wrist weight placed on RUE for increased input and added effort during task  Pt instructed to name each article of clothing as he retrieved it and the color  Pt consistently able to state tshirt and socks, but increased difficulty and time required with undershirt  Pt able to accurately state colors of each piece of clothing, with minimal increased difficulty stating blue 1x   Pt able to functionally mobilizing throughout OT gym with CGA without AD and gait belt, no LOB " "noted  Min vc's to carry laundry basket in BUE rather than just with LUE, min vc's to fully scan environment to R side during task  Neuromuscular Education   Comments Standing at table with LLE on ~4\" step for increased WB through RLE, pt retrieving cylindrical pegs from pegboard using blue, then green resistive clothespins (switching to green with fatigue) with R hand and placing into bucket  Minimal droppage of clip from R hand with fatigue, able to retrieve from floor with CGA  Pt completing task with CGA and no LOB noted  Cognition   Overall Cognitive Status Impaired   Arousal/Participation Alert; Cooperative   Attention Attends with cues to redirect   Orientation Level Oriented X4   Memory Decreased short term memory   Following Commands Follows one step commands with increased time or repetition   Comments Pt completing safety cards during session  Pt able to accurately state safe vs  unsafe situation 9/10 times  Pt accurately sorting these, but requiring min vc's to state \"safe\" or \"unsafe\" and able to state appropriate simple reasoning verbally and with gestures with increased time 2* aphasia  Pt with 1 error - microwave safety card, unable to recognize or correct error and stating safe situation was food placed in microwave with foil  Additional Activities   Additional Activities Comments Pt functionally mobilizing throughout hallway to visually scan and retrieve 20 cards on either side of hallway at various heights  Pt functionally mobliizing throughout task with CGA without AD using gait belt  Pt frequently missing cards on R side of hallway, requires vc's to fully scan environment with fair carryover  Pt able to state number on each card without difficulty, increased difficulty stating \"Derek\" and \"kim  \" Pt able to accurately state color of cards with increased time; with errors, pt quick to recognize and correct   Pt with most difficulty stating suit on cards, often stating \"diamonds\"  but aware " this was incorrect, difficulty self-correcting  Pt then completing card matching activity on tabletop standing with close SUP, minimal knee buckling after extended standing/mobility in hallway but able to correct without OT assistance  Pt able to match cards without much difficulty noted, demo'ing appropriate R<->L scanning pattern throughout tabletop activity  Activity Tolerance   Activity Tolerance Patient tolerated treatment well   Assessment   Treatment Assessment Pt seen for 90 min OT session focusing on functional transfers/mobility, NMR RUE, visual scanning, dynamic standing balance, safety and naming activities  Pt tolerated session well, requires increased time for communication/naming tasks during session 2* aphasia and increased frustration noted with fatigue  Pt requires vc's to visually scan full environment to R side during functional tasks  OT to continue POC to continue to progress towards goals to maximize safety and (I) prior to d/c  Prognosis Good   Problem List Decreased strength;Decreased endurance; Impaired balance;Decreased mobility; Decreased coordination;Decreased cognition; Impaired judgement;Decreased safety awareness   Plan   Treatment/Interventions ADL retraining;Functional transfer training; Therapeutic exercise; Endurance training;Cognitive reorientation;Patient/family training;Equipment eval/education; Bed mobility;Gait training; Compensatory technique education   Progress Progressing toward goals   Recommendation   OT Discharge Recommendation Home with outpatient rehabilitation   OT Therapy Minutes   OT Time In 1000   OT Time Out 1130   OT Total Time (minutes) 90   OT Mode of treatment - Individual (minutes) 90   OT Mode of treatment - Concurrent (minutes) 0   OT Mode of treatment - Group (minutes) 0   OT Mode of treatment - Co-treat (minutes) 0   OT Mode of Treatment - Total time(minutes) 90 minutes   OT Cumulative Minutes 310   Therapy Time missed   Time missed?  No

## 2023-06-01 PROCEDURE — 97535 SELF CARE MNGMENT TRAINING: CPT

## 2023-06-01 PROCEDURE — 97112 NEUROMUSCULAR REEDUCATION: CPT

## 2023-06-01 PROCEDURE — 92507 TX SP LANG VOICE COMM INDIV: CPT

## 2023-06-01 PROCEDURE — 99233 SBSQ HOSP IP/OBS HIGH 50: CPT | Performed by: PHYSICAL MEDICINE & REHABILITATION

## 2023-06-01 PROCEDURE — 99232 SBSQ HOSP IP/OBS MODERATE 35: CPT | Performed by: INTERNAL MEDICINE

## 2023-06-01 PROCEDURE — 97530 THERAPEUTIC ACTIVITIES: CPT

## 2023-06-01 PROCEDURE — 97110 THERAPEUTIC EXERCISES: CPT

## 2023-06-01 RX ADMIN — LEVETIRACETAM 750 MG: 750 TABLET, FILM COATED ORAL at 20:39

## 2023-06-01 RX ADMIN — ASPIRIN 81 MG CHEWABLE TABLET 81 MG: 81 TABLET CHEWABLE at 10:00

## 2023-06-01 RX ADMIN — ENOXAPARIN SODIUM 40 MG: 40 INJECTION SUBCUTANEOUS at 10:00

## 2023-06-01 RX ADMIN — LEVETIRACETAM 750 MG: 750 TABLET, FILM COATED ORAL at 10:00

## 2023-06-01 RX ADMIN — PANTOPRAZOLE SODIUM 40 MG: 40 TABLET, DELAYED RELEASE ORAL at 05:31

## 2023-06-01 RX ADMIN — ESCITALOPRAM OXALATE 5 MG: 10 TABLET ORAL at 10:00

## 2023-06-01 NOTE — PROGRESS NOTES
Pastoral Care Progress Note    2023  Patient: Pari Mims : 1980  Admission Date & Time: 2023 1227  MRN: [id number] [facility]: [id number]          Introduced self/pastoral care to Pt and father in his [facility] room  Pt smiled, used body language, but didn't speak to me, father spoke for both of them as he said 'thanks, no needs at present' and referring to Pt, further said 'he's a fighter, he'll keep making progress '   remains available

## 2023-06-01 NOTE — PROGRESS NOTES
"   06/01/23 0700   Pain Assessment   Pain Assessment Tool 0-10   Pain Score No Pain   Restrictions/Precautions   Precautions Aphasia;Bed/chair alarms;Cognitive; Fall Risk;Seizure;Supervision on toilet/commode   Weight Bearing Restrictions No   ROM Restrictions No   Lifestyle   Autonomy \"I feel like I can do a lot more with this arm, but some things are still challenging\"   Eating   Type of Assistance Needed Set-up / clean-up   Physical Assistance Level No physical assistance   Comment Pt seated in bed at end of session with breakfast tray  Set up requierd for opening small containers  Educ provided of importance of sitting as upright as possible during meal times for proper positioning and recommend sitting in recliner chair  however, Pt insisting to sit in bed  Eating CARE Score 5   Oral Hygiene   Type of Assistance Needed Incidental touching   Physical Assistance Level No physical assistance   Comment Pt complete in stance at sink with CGA for safety  Pt perform oral hygiene appropriately   Oral Hygiene CARE Score 4   Grooming   Findings Pt complete hand washing with CGA in stance at sink   Shower/Bathe Self   Type of Assistance Needed Physical assistance   Physical Assistance Level 25% or less   Comment Pt complete washing both seated on shower chair and in stance with min A for balance  Pt able to wash all parts seated, using xleg technique for washing BLE, and standing to complete mayte/groin area  Noted impulsivity with standing and rushing through bathing routine, req verbal cues to slow down and to alert therapist prior to standing  Shower/Bathe Self CARE Score 3   Upper Body Dressing   Type of Assistance Needed Physical assistance   Physical Assistance Level 25% or less   Comment Pt able to don/off pullover shirt while seated  Pt then attempt to complete donning in stance, req min A for balance   No LOB noted   Upper Body Dressing CARE Score 3   Lower Body Dressing   Type of Assistance Needed Physical " assistance   Physical Assistance Level 25% or less   Comment Pt complete threading of BLE into pants while seated on TTB using xleg technique  Pt then stand with min A-CGA for steadying during management over hips  Lower Body Dressing CARE Score 3   Putting On/Taking Off Footwear   Type of Assistance Needed Supervision   Physical Assistance Level No physical assistance   Comment Pt don/doff socks/shoes while seated using xleg technique   Putting On/Taking Off Footwear CARE Score 4   Sit to Lying   Type of Assistance Needed Supervision   Physical Assistance Level No physical assistance   Sit to Lying CARE Score 4   Lying to Sitting on Side of Bed   Type of Assistance Needed Supervision   Physical Assistance Level No physical assistance   Lying to Sitting on Side of Bed CARE Score 4   Sit to Stand   Type of Assistance Needed Supervision   Physical Assistance Level No physical assistance   Comment no AD   Sit to Stand CARE Score 4   Bed-Chair Transfer   Type of Assistance Needed Incidental touching   Physical Assistance Level No physical assistance   Comment no AD, gait belt on   Chair/Bed-to-Chair Transfer CARE Score 4   Functional Standing Tolerance   Time 5:00   Activity Meal ordering   Comments Pt complete ordering of lunch, dinner, and breakfast meal while in stance  Min A provided for balance while in stance   ROM- Right Upper Extremities   RUE ROM Comment Pt engaged in UE strength training while seated in order to maximize independence in fucntional tasks  Pt complete 2x15 chest press and bicep curl with 3# weight  Pt complete on both sides with seated rest break to manage fatigue  At end of activity, pt demo impulsivenesss in standing to return weight to rack  educ provided on importance on waiting for therapist or other qualified individual to be present before standing for safety  Coordination   Fine Motor Pt complete fine motor sort activity using tweezers to focus on inc fmc and functional use of RUE  Pt standing on balance pad with narrow base of support for inc challenge of activity  Pt engaged on sorting activity using large pompoms, small pompoms, and beads  Pt able to adequately sort large and small pompoms into respective containers  However, demo inc difficulty in sorting beads due to need to extremely fine coordination skills  Pt demo some dropping of objects throughout task, however, able to pick object back up and finish  Pt self implement rest beaks as needed to combat fatigue in RUE  Cognition   Overall Cognitive Status Impaired   Arousal/Participation Alert; Cooperative   Attention Attends with cues to redirect   Orientation Level Oriented X4   Memory Within functional limits   Following Commands Follows one step commands inconsistently   Additional Activities   Additional Activities Comments Pt complete functional mobility activity in order to complete parquetry puzzle  Pices spread [employer] therapy gym and Pt instructed to collect each piece one by one and place on board with RUE  Focus of activity on short distance mobility, scanning, fmc, and balance  Pt complete with min A for steadying and verbal cueing for sole usage of RUE throughout tasks  requiring repeat cueing for maintaining usage of RUE  Pt become inc fatigue throughout duration of activity noted through slowing in walking pace, however, Pt deny needing to take a rest break  At end of activity, Pt requiring verbal cues for locating 4 puzzle pieces, was able to find with general verbal cues (i e  look by the clock)  Activity Tolerance   Activity Tolerance Patient tolerated treatment well   Assessment   Treatment Assessment Pt participated in 90 minute OT session with focus on ADL retraining, short distance mobility, nmr, fmc, UE strengthening, and activity tolerance  Pt tolerated treatment well, however, does demonstrate impulsivity in standing at times requiring verbal cueing to correct   Additionally, Pt is requiring inc verbal cueing for thoroughness of cleaning self and not rushing through activities  Pt would benefit from continued OT treatment focused on ADL retraining, iADL retraining, fine motor coordination, UE strengthening, balance, safety awareness, and transfers  At end of session, Pt sittting upright in bed with breakfast tray with all needs met  Prognosis Good   Problem List Decreased strength;Decreased endurance; Impaired balance;Decreased mobility; Decreased coordination;Decreased cognition; Impaired judgement;Decreased safety awareness   Plan   Treatment/Interventions ADL retraining;Functional transfer training; Therapeutic exercise; Endurance training;Cognitive reorientation;Patient/family training;Equipment eval/education; Compensatory technique education   Progress Progressing toward goals   OT Therapy Minutes   OT Time In 0700   OT Time Out 0830   OT Total Time (minutes) 90   OT Mode of treatment - Individual (minutes) 90   OT Mode of treatment - Concurrent (minutes) 0   OT Mode of treatment - Group (minutes) 0   OT Mode of treatment - Co-treat (minutes) 0   OT Mode of Treatment - Total time(minutes) 90 minutes   OT Cumulative Minutes 400   Therapy Time missed   Time missed?  No

## 2023-06-01 NOTE — PROGRESS NOTES
NEUROPSYCHOLOGY INITIAL CONSULT  NOTE  Susie See 40 y o  :1980 male MRN: [id number]  DOS:23  Unit/Bed#: -01 Encounter: [id number]      Requested by (Physician/Service): Denis Jin MD  Reason for Consultation: Evaluate and treat impact of mood and coping on progress in rehabilitation  HPI: Susie See is a 40 y o  male with past medical history significant for recent COVID-19 infection in February, who presented to the [facility] [address] [facility] on 5/15/2023 with right hemiparesis, aphasia  Stroke alert initiated  Did not receive TNK as out of the window  Imaging showing a large left MCA territory ischemic stroke with left M1 thrombus  Patient underwent thrombectomy TICI 3 revascularization of a left M1 occlusion on 5/15/2023 by Dr Gail Mar  On 2023, patient had seizure-like activity with eye deviation and clinical worsening  Loaded with Ativan and Keppra  Transition to oral Keppra 750 mg every 12 hours  Video EEG was performed, negative for additional seizures  patient seen by neurology  Etiology of stroke unclear  MRI brain showing large left MCA infarct with minimal petechial hemorrhage and stable edema  ALYSSA with EF 55%, and confirmed a large PFO  Hypercoagulable work-up in progress, largely unremarkable, MTHFR pending  CT C/A/P negative for malignancy  Incidentally found to have bilateral pleural effusions, small ascites, diffuse body wall subcutaenous edema  Cardiology consulted and an outpatient appointment to consider PFO closure was recommended in 4-6 weeks  Patient was placed on aspirin for stroke prophylaxis  After medical stabilization, patient found to have acute functional deficits in mobility, self care, speech, cognition, therefore admitted [facility] on 23  Functional deficits: right hemiparesis, right sensory deficits, aphasia, possible cognitive deficits, impaired mobility, self care    Rehabilitation goals are to achieve a Mod I level with mobility and self care  Supervision level with speech, cognition  Prognosis is good  ELOS is 10-14 days  Estimated discharge is home         HISTORY:     Patient Active Problem List    Diagnosis Date Noted   • 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) 05/29/2023   • Abnormal findings on imaging test 05/28/2023   • Adjustment disorder 05/28/2023   • Tobacco use 05/27/2023   • Mild pain 05/27/2023   • Bradycardia 05/27/2023   • PFO (patent foramen ovale) 05/22/2023   • Seizure ([address] ) 05/16/2023   • CVA (cerebral vascular accident) ([facility] [address] ) 05/15/2023   • Positive self-administered antigen test for COVID-19 02/10/2023   • Viral infection, unspecified 11/10/2022   • Bilateral low back pain without sciatica 01/29/2020       Body mass index is 23 64 kg/m²  Past Medical History:     History reviewed  No pertinent past medical history       Past Surgical History:     Past Surgical History:   Procedure Laterality Date   • IR STROKE ALERT  5/15/2023         Allergies:     No Known Allergies      Social History:    Social History     Socioeconomic History   • Marital status: Single     Spouse name: None   • Number of children: None   • Years of education: None   • Highest education level: None   Occupational History   • None   Tobacco Use   • Smoking status: Every Day     Packs/day: 0 50     Years: 15 00     Total pack years: 7 50     Types: Cigarettes   • Smokeless tobacco: Never   Vaping Use   • Vaping Use: Never used   Substance and Sexual Activity   • Alcohol use: Yes     Comment: Social   • Drug use: Never   • Sexual activity: Not Currently   Other Topics Concern   • None   Social History Narrative   • None     Social Determinants of Health     Financial Resource Strain: High Risk (11/10/2022)    Overall Financial Resource Strain (CARDIA)    • Difficulty of Paying Living Expenses: Hard   Food Insecurity: No Food Insecurity (5/16/2023)    Hunger Vital Sign    • Worried About Running Out of Food in the Last Year: Never true    • Ran Out of Food in the Last Year: Never true   Transportation Needs: No Transportation Needs (5/16/2023)    PRAPARE - Transportation    • Lack of Transportation (Medical): No    • Lack of Transportation (Non-Medical): No   Physical Activity: Sufficiently Active (11/10/2022)    Exercise Vital Sign    • Days of Exercise per Week: 7 days    • Minutes of Exercise per Session: 60 min   Stress: No Stress Concern Present (11/10/2022)    Cole7 Juan Carlos Cole    • Feeling of Stress : Not at all   Social Connections: Not on file   Intimate Partner Violence: Not At Risk (11/10/2022)    Humiliation, Afraid, Rape, and Kick questionnaire    • Fear of Current or Ex-Partner: No    • Emotionally Abused: No    • Physically Abused: No    • Sexually Abused: No   Housing Stability: Low Risk  (5/16/2023)    Housing Stability Vital Sign    • Unable to Pay for Housing in the Last Year: No    • Number of [place] in the Last Year: 1    • Unstable Housing in the Last Year: No        Family History:    History reviewed  No pertinent family history      Medications:     Current Facility-Administered Medications:   •  acetaminophen (TYLENOL) tablet 975 mg, 975 mg, Oral, Q8H PRN, Arminda Fang MD  •  aspirin chewable tablet 81 mg, 81 mg, Oral, Daily, Arminda Fang MD, 81 mg at 06/01/23 1000  •  bisacodyl (DULCOLAX) rectal suppository 10 mg, 10 mg, Rectal, Daily PRN, Arminda Fang MD  •  enoxaparin (LOVENOX) subcutaneous injection 40 mg, 40 mg, Subcutaneous, Q24H [address] Arminda Jin MD, 40 mg at 06/01/23 1000  •  escitalopram (LEXAPRO) tablet 5 mg, 5 mg, Oral, Daily, Arminda Fang MD, 5 mg at 06/01/23 1000  •  levETIRAcetam (KEPPRA) tablet 750 mg, 750 mg, Oral, Q12H [address]Arminda MD, 750 mg at 06/01/23 1000  •  nicotine (NICODERM CQ) 14 mg/24hr TD 24 hr patch 1 patch, 1 patch, Transdermal, DailyRyne Winter Paez MD, 1 patch at 05/31/23 0754  •  ondansetron (ZOFRAN-ODT) dispersible tablet 4 mg, 4 mg, Oral, Q6H PRN, Aida Darden MD  •  pantoprazole (PROTONIX) EC tablet 40 mg, 40 mg, Oral, Early Morning, Aida Darden MD, 40 mg at 06/01/23 0531  •  polyethylene glycol (MIRALAX) packet 17 g, 17 g, Oral, Daily PRN, Aida Darden MD      ASSESSMENT:   Nelsy Ball is a very pleasant and engaging 40year old unmarried male who was admitted to [address] [place] [address] on 5/27/23 to regain strength and functioning s/p Cerebral Vascular Accident  Pt reports he had COVID in Feb this year and never seemed to fully recover  He developed a headache and pain in his R arm for several days  At first he thought he was just coming down with something but when the headache did not resolve his father encouraged him to go to the hospital   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, 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  He reports that his father operates the food truck since he was hospitalized to help him out  He reports he never , has no children and he lives alone  Pt plans to live with his father upon discharge who will assist with his care  Pt  maintained good eye contact and engaged appropriately throughout the interview  He was initially resting in bed but became alert and participated in this evaluation  He was seen in his room at bedside, presented as pleasant,  cooperative and established rapport without difficulty  Mood was positive with no evidence of overt depression, euphoria or emotional lability  Pt denies a psychiatric history and reports a history of positive coping  He denies suicidal/homicidal ideation, intent and plan  He denies disturbances in appetite and reports he typically doesn't eat large portions of food at one time and is vegan  He denies difficulty with sleep    No evidence of poor boundaries was present  Pt  denies incidents of losing time or flash backs  No pressured speech, repetitive or perseverative behavior is present  No obsessive-compulsive or associated rituals  Pt's conversational speech was significant for significant word finding difficulty  His auditory comprehension is intact  He responds well to verbal cueing which helps him to communicate his needs, feelings and requests  Pt understands he is here to improve his functioning and relearn tasks impaired by the stroke  Given the substantial decline in his health and functioning within the past few months, pt is struggling to cope  He may benefit by supportive psychotherapy during rehab to help him process his emotions and learn ways to adapt to the changes in his functioning  Given there is no premorbid psychiatric history, he should respond well to interventions  CBT and DBT techniques to help manage emotions, tolerate distress and employ effective coping will be utilized  DIAGNOSIS:  Adjustment Disorder with Mixed Emotional Functioning      RECOMMENDATIONS:   I will follow Mr Janna Adams during his stay to provide the following interventions:    · Supportive psychotherapy, utilizing CBT and mindfulness strategies  · DBT distress tolerance techniques  to improve coping and mood  · Meditation and relaxation training          Thank you for the opportunity to participate in   [facility]  Madi Duncan, Ph D   Licensed Psychologist

## 2023-06-01 NOTE — PROGRESS NOTES
06/01/23 1400   Pain Assessment   Pain Assessment Tool 0-10   Pain Score No Pain   Restrictions/Precautions   Precautions Aphasia;Bed/chair alarms;Cognitive; Fall Risk;Supervision on toilet/commode;Seizure   Cognition   Overall Cognitive Status Impaired   Arousal/Participation Alert; Cooperative   Subjective   Subjective pt had no c/o and ready for PT  Pt's father agreeable to attend PT session for observation and was receptive to education and recommendation  per father he will stay with pt 24/7 if needed     Roll Left and Right   Type of Assistance Needed Independent   Roll Left and Right CARE Score 6   Sit to Lying   Type of Assistance Needed Set-up / clean-up   Sit to Lying CARE Score 5   Lying to Sitting on Side of Bed   Type of Assistance Needed Set-up / clean-up   Lying to Sitting on Side of Bed CARE Score 5   Sit to Stand   Type of Assistance Needed Supervision   Comment no AD with emphasis on slow and controlled stand to sit transition with multiple angle isometrics as able to improve knee control   Sit to Stand CARE Score 4   Bed-Chair Transfer   Type of Assistance Needed Incidental touching;Verbal cues   Comment no AD, gait belt on   Chair/Bed-to-Chair Transfer CARE Score 4   Walk 10 Feet   Type of Assistance Needed Incidental touching;Verbal cues   Comment CG no AD   Walk 10 Feet CARE Score 4   Walk 50 Feet with Two Turns   Type of Assistance Needed Incidental touching;Verbal cues   Comment no AD   Walk 50 Feet with Two Turns CARE Score 4   Walk 150 Feet   Type of Assistance Needed Physical assistance;Verbal cues   Physical Assistance Level 25% or less   Comment min-CGA with occasional foot tripping with 1x requiring assist to stabilize due to R foot tripping while turning around   Walk 150 Feet CARE Score 3   Walking 10 Feet on Uneven Surfaces   Comment (S)  will practice outdoor mobility training tomorrow   Curb or Single Stair   Style negotiated Single stair   Type of Assistance Needed Incidental "touching;Verbal cues; Adaptive equipment   1 Step (Curb) CARE Score 4   4 Steps   Type of Assistance Needed Incidental touching;Verbal cues; Adaptive equipment   4 Steps CARE Score 4   12 Steps   Type of Assistance Needed Physical assistance;Verbal cues; Adaptive equipment   Physical Assistance Level 25% or less   Comment FF x 3 with R HR only, reciprocal pattern  external cues to clear R foot over steps nosing, ascending/descending fwd facing   12 Steps CARE Score 3   Picking Up Object   Type of Assistance Needed Incidental touching;Verbal cues   Comment picking up laundry basket, bolster and quad canes from floor with R hand   Picking Up Object CARE Score 4   Therapeutic Interventions   Neuromuscular Re-Education repeated NPP gait speed training with R knee cage x 800', 600' no AD incorporating sudden stops, repeated walking while stepping over quad canes/bolsters while carrying laundry basket with both hands, repeated walking fwd/bwd while donning/doffing pullover jacket, repeated stepping over obstacles while reading, step through on FF bilat HR with LE 5# ankle wts, moving LE from ground to 3rd step and back to ground with reps till noted fatigue x 3 bouts- red bolster for visual cue to promote consistent carry over of R foot clearance over the first step, L step taps on 8\" foot stool without UE support while wearing knee cage/blocking on R knee in stance x 20 reps x 2 trials   Equipment Use   NuStep lvl 5 x 20 mins, SPM> 80 all ext ( 93 miles) - SpO2 96%, SC 71-73 bpm   Assessment   Treatment Assessment Skilled PT focused on NMR/NPP training to improve R LE motor control, righting reactions and gait normalization with emphasis on multi-tasking  trialed medium size knee cage which was effective in preventing R knee hyperextension so will cont to utilize in therapy  noted dec frequencyof R foot scuffing during level surface amb this session so will challenge with outdoor mobility tomorrow weather permitting   PT " also did pt and father education on current impairments/deficits that impacts functional indep and safety as well as PT POC and rationale of treatment with understanding verbalized  Father confirmed availability to move in with pt and stay 24/7 if needed at d/c, he will return for additional FT on 6/9  Problem List Decreased cognition;Decreased safety awareness;Decreased coordination;Decreased mobility; Impaired balance   Barriers to Discharge Decreased caregiver support; Inaccessible home environment   PT Barriers   Functional Limitation Stair negotiation; Walking;Ramp negotiation   Plan   Treatment/Interventions Functional transfer training;LE strengthening/ROM; Therapeutic exercise; Endurance training;Bed mobility;Gait training;Spoke to nursing;OT   Progress Progressing toward goals   Recommendation   PT Discharge Recommendation Home with outpatient rehabilitation   PT Therapy Minutes   PT Time In 1400   PT Time Out 1530   PT Total Time (minutes) 90   PT Mode of treatment - Individual (minutes) 90   PT Mode of treatment - Concurrent (minutes) 0   PT Mode of treatment - Group (minutes) 0   PT Mode of treatment - Co-treat (minutes) 0   PT Mode of Treatment - Total time(minutes) 90 minutes   PT Cumulative Minutes 400   Therapy Time missed   Time missed?  No

## 2023-06-01 NOTE — PLAN OF CARE
Problem: INFECTION - ADULT  Goal: Absence or prevention of progression during hospitalization  Description: INTERVENTIONS:  - Assess and monitor for signs and symptoms of infection  - Monitor lab/diagnostic results  - Monitor all insertion sites, i e  indwelling lines, tubes, and drains  - Monitor endotracheal if appropriate and nasal secretions for changes in amount and color  - [place] appropriate cooling/warming therapies per order  - Administer medications as ordered  - Instruct and encourage patient and family to use good hand hygiene technique  - Identify and instruct in appropriate isolation precautions for identified infection/condition  Outcome: Progressing  Goal: Absence of fever/infection during neutropenic period  Description: INTERVENTIONS:  - Monitor WBC    Outcome: Progressing     Problem: SAFETY ADULT  Goal: Patient will remain free of falls  Description: INTERVENTIONS:  - Educate patient/family on patient safety including physical limitations  - Instruct patient to call for assistance with activity   - Consult OT/PT to assist with strengthening/mobility   - Keep Call bell within reach  - Keep bed low and locked with side rails adjusted as appropriate  - Keep care items and personal belongings within reach  - Initiate and maintain comfort rounds  - Make Fall Risk Sign visible to staff  - Offer Toileting every  Hours, in advance of need  - Initiate/Maintain alarm  - Obtain necessary fall risk management equipment:   - Apply yellow socks and bracelet for high fall risk patients  - Consider moving patient to room near nurses station  Outcome: Progressing  Goal: Maintain or return to baseline ADL function  Description: INTERVENTIONS:  -  Assess patient's ability to carry out ADLs; assess patient's baseline for ADL function and identify physical deficits which impact ability to perform ADLs (bathing, care of mouth/teeth, toileting, grooming, dressing, etc )  - Assess/evaluate cause of self-care deficits   - Assess range of motion  - Assess patient's mobility; develop plan if impaired  - Assess patient's need for assistive devices and provide as appropriate  - Encourage maximum independence but intervene and supervise when necessary  - Involve family in performance of ADLs  - Assess for home care needs following discharge   - Consider OT consult to assist with ADL evaluation and planning for discharge  - Provide patient education as appropriate  Outcome: Progressing  Goal: Maintains/Returns to pre admission functional level  Description: INTERVENTIONS:  - Perform BMAT or MOVE assessment daily    - Set and communicate daily mobility goal to care team and patient/family/caregiver  - Collaborate with rehabilitation services on mobility goals if consulted  - Perform Range of Motion 3 times a day  - Reposition patient every 2 hours    - Dangle patient 3 times a day  - Stand patient 3 times a day  - Ambulate patient 3 times a day  - Out of bed to chair 3 times a day   - Out of bed for meals 3 times a day  - Out of bed for toileting  - Record patient progress and toleration of activity level   Outcome: Progressing     Problem: DISCHARGE PLANNING  Goal: Discharge to home or other facility with appropriate resources  Description: INTERVENTIONS:  - Identify barriers to discharge w/patient and caregiver  - Arrange for needed discharge resources and transportation as appropriate  - Identify discharge learning needs (meds, wound care, etc )  - Arrange for interpretive services to assist at discharge as needed  - Refer to Case Management Department for coordinating discharge planning if the patient needs post-hospital services based on physician/advanced practitioner order or complex needs related to functional status, cognitive ability, or social support system  Outcome: Progressing     Problem: Prexisting or High Potential for Compromised Skin Integrity  Goal: Skin integrity is maintained or improved  Description: INTERVENTIONS:  - Identify patients at risk for skin breakdown  - Assess and monitor skin integrity  - Assess and monitor nutrition and hydration status  - Monitor labs   - Assess for incontinence   - Turn and reposition patient  - Assist with mobility/ambulation  - Relieve pressure over bony prominences  - Avoid friction and shearing  - Provide appropriate hygiene as needed including keeping skin clean and dry  - Evaluate need for skin moisturizer/barrier cream  - Collaborate with interdisciplinary team   - Patient/family teaching  - Consider wound care consult   Outcome: Progressing     Problem: MOBILITY - ADULT  Goal: Maintain or return to baseline ADL function  Description: INTERVENTIONS:  -  Assess patient's ability to carry out ADLs; assess patient's baseline for ADL function and identify physical deficits which impact ability to perform ADLs (bathing, care of mouth/teeth, toileting, grooming, dressing, etc )  - Assess/evaluate cause of self-care deficits   - Assess range of motion  - Assess patient's mobility; develop plan if impaired  - Assess patient's need for assistive devices and provide as appropriate  - Encourage maximum independence but intervene and supervise when necessary  - Involve family in performance of ADLs  - Assess for home care needs following discharge   - Consider OT consult to assist with ADL evaluation and planning for discharge  - Provide patient education as appropriate  Outcome: Progressing  Goal: Maintains/Returns to pre admission functional level  Description: INTERVENTIONS:  - Perform BMAT or MOVE assessment daily    - Set and communicate daily mobility goal to care team and patient/family/caregiver  - Collaborate with rehabilitation services on mobility goals if consulted  - Perform Range of Motion 3 times a day  - Reposition patient every 2 hours    - Dangle patient 3 times a day  - Stand patient 3 times a day  - Ambulate patient 3 times a day  - Out of bed to chair 3 times a day   - Out of bed for meals 3 times a day  - Out of bed for toileting  - Record patient progress and toleration of activity level   Outcome: Progressing

## 2023-06-01 NOTE — PROGRESS NOTES
Internal Medicine Progress Note  Patient: Viridiana Ibrahim  Age/sex: 40 y o  male  Medical Record #: [id number]      ASSESSMENT/PLAN: (Interval History)  Viridiana Ibrahim is seen and examined and management for following issues:    Acute left MCA CVA  • S/p left MCA thrombectomy  • Workup:   • Homocystine normal, BELEM normal, RF normal, ANCA negative, dsDNA negative, Factor V Leiden/lupus anticoagulant negative, Antithrombin III 81 (felt likely not clinically relevant was higher than 80%, Protein S decreased at 65%, Protein C normal                          MTHFR mutation pending                          Repeat thrombosis panel in 3 months   • Continue ASA 81mg qd and statin  • George Molina is from COVID infection in 2/2023     Seizures  • Was noted with eyes deviating to the right 5/16 with a post ictal period   • Had 2 episodes  • Lilibeth Drop Dr Mckay Serna restarted since he had 2 sz in hospital     PFO  • Large PFO on ALYSSA  • Negative DVT  • To see Cards as OP 4-6 weeks for possible closure     Bradycardia  • Has HRs in 50's at times as in hospital  • Pt was an avid runner prior to CVA so likely d/t conditioning  • stable     Nicotine abuse  • Continue patch     Moderate B/L pleural effusions  • Repeat Cxray in 6 wks or sooner if symptomatic        Discharge date:  Team       The above assessment and plan was reviewed and updated as determined by my evaluation of the patient on 6/1/2023      Labs:   Results from last 7 days   Lab Units 05/29/23  0807   HEMATOCRIT % 36 4*   HEMOGLOBIN g/dL 12 8   PLATELETS Thousands/uL 343   WBC Thousand/uL 4 45     Results from last 7 days   Lab Units 05/29/23  0807   BUN mg/dL 13   CALCIUM mg/dL 8 7   CHLORIDE mmol/L 105   CO2 mmol/L 31   CREATININE mg/dL 0 90   POTASSIUM mmol/L 4 2   SODIUM mmol/L 135             Results from last 7 days   Lab Units 05/27/23  1054   POC GLUCOSE mg/dl 47*       Review of Scheduled Meds:  Current Facility-Administered Medications Medication Dose Route Frequency Provider Last Rate   • acetaminophen  975 mg Oral Q8H PRN Cammy Montalvo MD     • aspirin  81 mg Oral Daily Cammy Montalvo MD     • bisacodyl  10 mg Rectal Daily PRN Cammy Montalvo MD     • enoxaparin  40 mg Subcutaneous Q24H [address] Marline Val Pineda MD     • escitalopram  5 mg Oral Daily Cammy Montalvo MD     • levETIRAcetam  750 mg Oral Q12H Brad Hwang MD     • nicotine  1 patch Transdermal Daily Cammy Montalvo MD     • ondansetron  4 mg Oral Q6H PRN Cammy Montalvo MD     • pantoprazole  40 mg Oral Early Morning Cammy Montalvo MD     • polyethylene glycol  17 g Oral Daily PRN Cammy Montalvo MD         Subjective/ HPI: Patient seen and examined  Patients overnight issues or events were reviewed with nursing or staff during rounds or morning huddle session  New or overnight issues include the following:     Pt seen in his room  He states that he is doing well  He denies any current complaints  ROS:   A 10 point ROS was performed; negative except as noted above         Imaging:     No orders to display       *Labs /Radiology studies reviewed  *Medications reviewed and reconciled as needed  *Please refer to order section for additional ordered labs studies  *Case discussed with primary attending during morning huddle case rounds    Physical Examination:  Vitals:   Vitals:    05/31/23 0550 05/31/23 1000 05/31/23 1935 06/01/23 0527   BP: 116/63 139/76 128/63 105/66   BP Location: Left arm Left arm Left arm Left arm   Pulse: (!) 52 60 61 (!) 50   Resp: 18  20 16   Temp: 97 7 °F (36 5 °C)  98 8 °F (37 1 °C) 98 4 °F (36 9 °C)   TempSrc: Oral  Oral Oral   SpO2: 99%  98% 100%   Weight: 72 6 kg (160 lb 0 9 oz)      Height:           GEN: NAD; pleasant  NEURO: Alert  + aphasia  HEENT: Pupils are equal/reactive; normocephalic, face is asymmetrical, hearing is normal  CV: S1 S2 regular, no murmur/rub/gallops, 2/4 pedal pulses, no LE edema present  RESP: Lungs are clear bilaterally, no wheezes rales or rhonchi, on room air, no distress, respirations are easy and non labored  GI: Abdomen is flat, soft, non tender, +BS x4; non distended  : Voiding without issues  MUSC: Moves all extremities except right hemiparesis improving  SKIN: pink, warm, normal turgor, no rashes or lesions noted       The above physical exam was reviewed and updated as determined by my evaluation of the patient on 6/1/2023  Invasive Devices     None                    VTE Pharmacologic Prophylaxis: Enoxaparin  Code Status: Level 1 - Full Code  Current Length of Stay: 5 day(s)      Total time spent:  30 minutes with more than 50% spent counseling/coordinating care  Counseling includes discussion with patient re: progress  and discussion with patient of his/her current medical state/information  Coordination of patient's care was performed in conjunction with primary service  Time invested included review of patient's labs, vitals, and management of their comorbidities with continued monitoring  In addition, this patient was discussed with medical team including physician and advanced extenders  The care of the patient was extensively discussed and appropriate treatment plan was formulated unique for this patient  Medical decision making for the day was made by supervising physician unless otherwise noted in their attestation statement  ** Please Note:  voice to text software may have been used in the creation of this document   Although proof errors in transcription or interpretation are a potential of such software**

## 2023-06-01 NOTE — CASE MANAGEMENT
Case Management Update:  Faxed clinicals to [facility], requesting additional coverage until dc date of 6/9  Awaiting determination

## 2023-06-01 NOTE — PROGRESS NOTES
06/01/23 1230   Pain Assessment   Pain Assessment Tool 0-10   Pain Score No Pain   Restrictions/Precautions   Precautions Aphasia;Bed/chair alarms;Cognitive; Fall Risk;Supervision on toilet/commode;Seizure   Weight Bearing Restrictions No   ROM Restrictions No   Comprehension   Comprehension (FIM) 3 - Understands basic info/conversation 50-74% of time   Expression   Expression (FIM) 2 - Uses only simple expressions or gestures (waves, hello)   Social Interaction   Social Interaction (FIM) 5 - Interacts appropriately with others 90% of time   Problem Solving   Problem solving (FIM) 2 - Solves basic problems 25-49% of time   Memory   Memory (FIM) 3 - Recognizes, recalls/performs 50-74%   Speech/Language/Cognition Assessmetn   Treatment Assessment Family Training/Observation    Pt seen with father, Julissa Marroquin, for education/training regarding current speech/communication deficits  Prior to speech education, Dr Dorothy Batres present and reviewed FMLA paperwork as well as LT disability paperwork  It was encouraged to reach out to employer regarding ST disability benefits as that would be used now and pt would most likely not quality for the LT portion quite yet  Reviewed plan for follow ups with PCP, neurology and cardiology  Discussed plan for discharge tentatively end of next week with recommendations for outpt services  Reviewed with pt and father pt's current insurance benefits- SLP informed that within a calendar year, pt is only offered 12 ST visits, 12 OT visits, and 20 PT visits in outpt setting  Expressed that this is limiting and therefore outpt at [facility] is made aware so that they can maximize the time with these sessions  However did discuss possibly [facility] services in the area that may be able to assist in providing speech therapy services at little to no cost- janes Michael regarding their campus clinic   Explained this is a teaching clinic but with supervising licensed clinicians monitoring sessions  Encouraged that this is simply an option pending need for further services when insurance benefit runs out  Next, provided both with handouts for Broca's Aphasia, Communication Strategies For Aphasia, and Cuing Techniques for Aphasia  Discussed current deficits and cause of such as well as education on both receptive and expressive deficits  Explained that pt's receptive skills appear greater than expressive at the basic level, but pt does have breakdown with more complex languages tasks too (examples provided with therapy tasks completed next)  Pt engaged in conversation at beginning of session during education as well  It is noted pt's conversational skills are improving with more connect speech and sentences  Pt still with hesitations, anomia, and phonemic errors  Pt able to complete the following:    Verbal expression:  Word association pairs- pt able to complete pairs given verbally with 14/17acc  Pt also completed more open ended phrase completions with 14/19acc  Pt benefitting both from repetition, rephrasing, initial letter cue, and phonemic cue  Pt next completed object naming by description with Food/Drink  Pt was able to complete with 15/18acc increasing with similar cuing of repetition, rephrasing, semantic and phonemic  Noted pt with sounds errors in which he is aware and conts to repeat word slowly to be able to accurately state correct word with confidence  Reading comprehension:  Pt provided with more abstract category sort with 4 categories and list of words to sort in correct spot  Pt was able to do so with 16/20acc increasing with verbal cues and more semantic content to allow pt to further deduce correct category base on object use/description       Written Expression:  Given activity above, pt was encouraged to write in his responses on grid- pt observed to be able to write words without looking at word below to copy however given weakness in dominant Right hand, conts to require cues to monitor  on pen as well as writing in larger more fluent font  Pt observed to use cursive this session as compared to previous were he alternated print of large and lowercase letters  Pt stated cursive is his baseline writing preference  Cognitive task with Functional Reading:  Pt provided with grocery ad and comprehension questions below  Pt instructed to answer the questions using visual aid  Given fatigue, pt required SLP assistance with reading questions outloud as well as locating key words in question to better determine what he was looking for in regards to wh- questions (where, when, how much, how many)  Pt was able to complete with 5/7acc increasing with again auditory prompting for increased comprehension  Provided pt with writing homework for him to work on independently to practice his handwriting  Discussed with father, Tamra, at end of session after he observed how pt is progressing that given language deficits with some suspected cognitive deficits, pt will require assistance with IADL tasks including bills and meds  Pt commented he is completing bills on his phone currently, was able to list out things he paid already via direct deposit  Discussed as well assisting pt with phone calls and organizing appointments but to keep pt involved as well with tasks  All receptive and agreeable  Plan to cont to target expressive and receptive communication skills for increased communication of wants and needs and independence and decreased burden of care by time of discharge  SLP Therapy Minutes   SLP Time In 9479   SLP Time Out 1400   SLP Total Time (minutes) 90   SLP Mode of treatment - Individual (minutes) 90   SLP Mode of treatment - Concurrent (minutes) 0   SLP Mode of treatment - Group (minutes) 0   SLP Mode of treatment - Co-treat (minutes) 0   SLP Mode of Treatment - Total time(minutes) 90 minutes   SLP Cumulative Minutes 445   Therapy Time missed   Time missed?  No

## 2023-06-01 NOTE — PROGRESS NOTES
PM&R PROGRESS NOTE:  Alexander Simmons 40 y o  male MRN: [id number]  Unit/Bed#: -01 Encounter: [id number]           Rehab Diagnosis: Impairment of mobility, safety, Activities of Daily Living (ADLs), and cognitive/communication skills due to Stroke:  01 2  Right Body Involvement (Left Brain)  Left MCA CVA    SUBJECTIVE: Patient seen face to face  Doing well  No acute issues overnight  Denies pain  FMLA and disability paperwork completed    ASSESSMENT: Stable, progressing      PLAN:    Rehabilitation  • Functional deficits: right hemiparesis, right sensory deficits, aphasia, possible cognitive deficits, impaired mobility, self care  • Continue current rehabilitation plan of care to maximize function      • Estimated Discharge: Friday 6/9/23 with outpatient neuro day program vs outpatient therapies    Physical Therapy Occupational Therapy Speech Therapy   Transfers: Minimal Assistance  Bed Mobility: Supervision (set up)  Amulation Distance (ft): 10 feet (to 400' at best)  Ambulation: Assist of 2 (min A of 1 for household distance while CFA of 2nd person for community distance mobilities)  Assistive Device for Ambulation: Other (no AD but hands on hips or HHA)  Wheelchair Mobility:  (anticipate pt will be amb at d/c)  Number of Stairs: 12 (FF)  Assistive Device for Stairs: Bilateral Hand Rails  Stair Assistance: Assist of 2 (min of 2 with bilat HR)  Ramp:  (will assess outdoor ramp when pt has proper footwear)  Discharge Recommendations: Home with:  [address] with[de-identified] 24 Hour Supervision, Family Support, Outpatient Physical Therapy   Eating: Independent  Grooming: Minimal Assistance  Bathing: Minimal Assistance  Bathing: Minimal Assistance  Upper Body Dressing: Supervision  Lower Body Dressing: Minimal Assistance  Toileting: Minimal Assistance  Tub/Shower Transfer: Minimal Assistance  Toilet Transfer: Minimal Assistance  Cognition: Exceptions to WNL  Cognition: Decreased Memory, Decreased Executive Functions, Decreased Attention, Decreased Safety  Orientation: Person, Place, Time, Situation   Mode of Communication: Verbal  Speech/Language: Expressive Aphasia (expressive and receptive aphasia)  Cognition: Exceptions to WNL  Cognition: Decreased Memory, Decreased Executive Functions, Decreased Attention, Decreased Comprehension, Decreased Safety, Impulsive  Orientation: Person, Place, Situation  Discharge Recommendations: Home with:  [address] with[de-identified] 24 Hour Supervision, Family Support, Outpatient Speech Therapy       DVT prophylaxis  • Managed on Lovenox      Bladder plan  • Continent     Bowel plan  • Continent      * CVA (cerebral vascular accident) ([facility] [place] [address] )  Assessment & Plan  Presented 5/15/23 as stroke alert  CTH/MRI brain confirmed Left MCA ischemic stroke  S/p thrombectomy TICI 3 revascularization 5/16/23  Etiology unclear, (possibly related to recent COVID infection in Feb 2023)  Work-up: +Large PFO on ALYSSA  Hypercoagulable work-up and malignancy work-up largely negative - MHTFR pending  Repeat hypercoagulable what the serial work-up as outpatient recommended  · Secondary CVA ppx: Aspirin 81 mg daily  · CVA education and reduction of modifiable risk factors  · Follow-up with neurology and cardiology as an outpatient (patient is a candidate for PFO closure)      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)  Assessment & Plan  Continue Lexapro 5 mg in AM 5/30/23    Adjustment disorder  Assessment & Plan  Consulting Neuropsychology as patient with depressed mood  Also started on Lexapro 5 mg daily on 5/30/23    Abnormal findings on imaging test  Assessment & Plan  CT C/A/P: Moderate bilateral pleural effusions, small amount of ascites, and diffuse body wall subcutaneous edema    Follow-up with PCP    Bradycardia  Assessment & Plan  HR 50-60s  Asymptomatic  Monitor  Follow-up with Cardiology as outpatient    Tobacco use  Assessment & Plan  Smoking cessation encouraged  Continue nicotine patch    PFO (patent "foramen ovale)  Assessment & Plan  Large PFO identified on ALYSSA  Cardiology recommending follow-up in 4-6 weeks as an outpatient for possible closure    Seizure [facility])  Assessment & Plan  Witnessed seizure-like activity times 2/5/1623  Video EEG negative however patient continued on Keppra 750 mg every 12 hours  Placed on seizure precautions  Follow-up with neurology to determine length of Keppra duration        Appreciate IM consultants medical co-management  Labs, medications, and imaging personally reviewed  ROS:  A ten point review of systems was completed on 06/01/23 and pertinent positives are listed in subjective section  All other systems reviewed were negative  OBJECTIVE:   /66 (BP Location: Left arm)   Pulse (!) 50   Temp 98 4 °F (36 9 °C) (Oral)   Resp 16   Ht 5' 9\" (1 753 m)   Wt 72 6 kg (160 lb 0 9 oz)   SpO2 100%   BMI 23 64 kg/m²     Physical Exam  Vitals and nursing note reviewed  Constitutional:       General: He is not in acute distress  HENT:      Head: Normocephalic and atraumatic  Nose: Nose normal       Mouth/Throat:      Mouth: Mucous membranes are moist    Eyes:      Conjunctiva/sclera: Conjunctivae normal    Cardiovascular:      Rate and Rhythm: Normal rate and regular rhythm  Pulses: Normal pulses  Pulmonary:      Effort: Pulmonary effort is normal       Breath sounds: Normal breath sounds  No wheezing or rales  Abdominal:      General: Bowel sounds are normal  There is no distension  Palpations: Abdomen is soft  Tenderness: There is no abdominal tenderness  Musculoskeletal:         General: No swelling  Cervical back: Neck supple  Skin:     General: Skin is warm  Neurological:      Mental Status: He is alert and oriented to person, place, and time  Motor: Weakness (right hemparesis) present     Psychiatric:         Mood and Affect: Mood normal           Lab Results   Component Value Date    HCT 36 4 (L) 05/29/2023    HGB " 12 8 05/29/2023    MCV 96 05/29/2023     05/29/2023    WBC 4 45 05/29/2023     Lab Results   Component Value Date    BUN 13 05/29/2023    CALCIUM 8 7 05/29/2023     05/29/2023    CO2 31 05/29/2023    CREATININE 0 90 05/29/2023    GLUC 160 (H) 05/29/2023    K 4 2 05/29/2023    SODIUM 135 05/29/2023     Lab Results   Component Value Date    INR 0 89 05/15/2023    PROTIME 12 3 05/15/2023           Current Facility-Administered Medications:   •  acetaminophen (TYLENOL) tablet 975 mg, 975 mg, Oral, Q8H PRN, Dominick Haas MD  •  aspirin chewable tablet 81 mg, 81 mg, Oral, Daily, Dominick Haas MD, 81 mg at 06/01/23 1000  •  bisacodyl (DULCOLAX) rectal suppository 10 mg, 10 mg, Rectal, Daily PRN, Dominick Haas MD  •  enoxaparin (LOVENOX) subcutaneous injection 40 mg, 40 mg, Subcutaneous, Q24H [address], Dominick Haas MD, 40 mg at 06/01/23 1000  •  escitalopram (LEXAPRO) tablet 5 mg, 5 mg, Oral, Daily, Dominick Haas MD, 5 mg at 06/01/23 1000  •  levETIRAcetam (KEPPRA) tablet 750 mg, 750 mg, Oral, Q12H [address], Dominick Haas MD, 750 mg at 06/01/23 1000  •  nicotine (NICODERM CQ) 14 mg/24hr TD 24 hr patch 1 patch, 1 patch, Transdermal, Daily, Dominick Haas MD, 1 patch at 05/31/23 0754  •  ondansetron (ZOFRAN-ODT) dispersible tablet 4 mg, 4 mg, Oral, Q6H PRN, Dominick Haas MD  •  pantoprazole (PROTONIX) EC tablet 40 mg, 40 mg, Oral, Early Morning, Dominick Haas MD, 40 mg at 06/01/23 0531  •  polyethylene glycol (MIRALAX) packet 17 g, 17 g, Oral, Daily PRN, Dominick Haas MD    History reviewed  No pertinent past medical history      Patient Active Problem List    Diagnosis Date Noted   • CVA (cerebral vascular accident) ([facility] [address] ) 05/15/2023   • 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) 05/29/2023   • Abnormal findings on imaging test 05/28/2023   • Adjustment disorder 05/28/2023   • Tobacco use 05/27/2023   • Mild pain 05/27/2023   • Bradycardia 05/27/2023   • PFO (patent foramen ovale) 05/22/2023   • Seizure ([address] ) 05/16/2023   • Positive self-administered antigen test for COVID-19 02/10/2023   • Viral infection, unspecified 11/10/2022   • Bilateral low back pain without sciatica 01/29/2020          Jose Rafael Spicer MD    I have spent a total time of 55 minutes on 06/01/23 in caring for this patient including Impressions, Counseling / Coordination of care, Documenting in the medical record and filling out FMLA and disability paperwork  ** Please Note:  voice to text software may have been used in the creation of this document   Although proof errors in transcription or interpretation are a potential of such software**

## 2023-06-01 NOTE — PROGRESS NOTES
Consult received  Will be onsite 6/1 to evaluate pt  Thank you for this referral     Vilma Manzo, Ph D

## 2023-06-02 ENCOUNTER — TRANSITIONAL CARE MANAGEMENT (OUTPATIENT)
Dept: FAMILY MEDICINE CLINIC | Facility: CLINIC | Age: 43
End: 2023-06-02

## 2023-06-02 PROCEDURE — 97112 NEUROMUSCULAR REEDUCATION: CPT

## 2023-06-02 PROCEDURE — 97530 THERAPEUTIC ACTIVITIES: CPT

## 2023-06-02 PROCEDURE — 99232 SBSQ HOSP IP/OBS MODERATE 35: CPT | Performed by: PHYSICAL MEDICINE & REHABILITATION

## 2023-06-02 PROCEDURE — 92507 TX SP LANG VOICE COMM INDIV: CPT

## 2023-06-02 PROCEDURE — 97110 THERAPEUTIC EXERCISES: CPT

## 2023-06-02 PROCEDURE — 99232 SBSQ HOSP IP/OBS MODERATE 35: CPT | Performed by: INTERNAL MEDICINE

## 2023-06-02 RX ADMIN — ESCITALOPRAM OXALATE 5 MG: 10 TABLET ORAL at 08:44

## 2023-06-02 RX ADMIN — PANTOPRAZOLE SODIUM 40 MG: 40 TABLET, DELAYED RELEASE ORAL at 05:54

## 2023-06-02 RX ADMIN — ASPIRIN 81 MG CHEWABLE TABLET 81 MG: 81 TABLET CHEWABLE at 08:43

## 2023-06-02 RX ADMIN — LEVETIRACETAM 750 MG: 750 TABLET, FILM COATED ORAL at 20:47

## 2023-06-02 RX ADMIN — LEVETIRACETAM 750 MG: 750 TABLET, FILM COATED ORAL at 08:43

## 2023-06-02 RX ADMIN — ENOXAPARIN SODIUM 40 MG: 40 INJECTION SUBCUTANEOUS at 08:47

## 2023-06-02 NOTE — PROGRESS NOTES
23 1015   Pain Assessment   Pain Assessment Tool 0-10   Pain Score No Pain   Restrictions/Precautions   Precautions Aphasia;Bed/chair alarms;Cognitive;Supervision on toilet/commode;Visual deficit; Fall Risk   Comprehension   Comprehension (FIM) 4 - Understands basic info/conversation 75-90% of time   Expression   Expression (FIM) 2 - Uses only simple expressions or gestures (waves, hello)   Social Interaction   Social Interaction (FIM) 5 - Interacts appropriately with others 90% of time   Problem Solving   Problem solving (FIM) 3 - Solves basic problmes 50-74% of time   Memory   Memory (FIM) 3 - Recognizes, recalls/performs 50-74%   Speech/Language/Cognition Assessmetn   Treatment Assessment Pt was awake, alert and engaged for AM session  Of note, pt was in bed be agreeable to sit EOB for session today  The focus of session pertained to the following events as listed below:    Conversational Speech, LTM biographical recall and Orientation Review:  SLP began session by ongoing review given LTM biographical info to where pt remains able to state full name, , age, city/state/zip w/o cues  Pt does continue to present difficulty in stating house number only but fluent in stating street address  When allowing increased time, pt is able to self correct house number  Also reviewing orientation info to where pt was able to elicit current place/city, month/date and situation  Of note, pt did have minimal difficulty w/ year, but when allowing increased time again as well as self correction, pt was able to express current year  SLP also engaged in review of daily events, including how family training went yesterday in addition to discussion about food items consumed at last meal (breakfast)  It was noted that pt is more fluent in spontaneous expression in conversation  BUT when error does occur given a word, pt will attempt to correct word in that moment which does decrease the communicative intent   Errors which continue to be present include neologism, jargon words at times, semantic paraphasias and letter/phonemic substitutions  Reading Comprehension of Menu-Written Expression:  SLP targeting pt's reading comprehension given a menu of a mock [facility] food restaurant  Of note, pt was initially noted to verbalize answers which pt demonstrated increased difficulty in stating name of restaurant ([place]--->pt perseverative on [facility])  Due to the difficulty in attempts to verbalize the name of the restaurant, SLP encouraging pt to write his answers  This also was able to target written expression given R hand which is his dominant hand but weaker since sustaining stroke  It was observed that pt's overall comprehension in ability to answer questions was good, noting that pt would point to responses first prior to answering  Written expression was fairly legibile and w/o errors on 5 out of the 6 questions, noting decreased legibility when writing out longer information  Word Deduction Taks: Verbal and Written Expression:   Last task which SLP engaged pt in was a word deduction activity in which 2 short description phrases were presented and pt was to provide both the verbal and written answer  For pt's ability to name target words, he was 11/17 accurate in VERBALIZING target words  When providing an additional semantic description of the target word in addition to phrases provided, pt increased to 14/17 accurate  Mod-max cues required for remaining target words  Of note, SLP did have pt verbalize words prior to writing out the words  It was noted that pt was able to accurately write the words verbalized w/ 8/17 accuracy  Pt did write semantic associations for most of the words (ie: sit-->chair, pie-->cake)  Otherwise words which were written which pt verbalized correctly were non-related   Currently it is noted that pt is demonstrating difficulty in more spontaneous written information vs when having a word bank present to write out words  At this time, pt is recommended for skilled SLP services during acute rehab stay targeting expressive and receptive language skills in order to maximize overall functional communication abilities in attempts to decrease overall caregiver burden over time  SLP Therapy Minutes   SLP Time In 6015   SLP Time Out 1100   SLP Total Time (minutes) 45   SLP Mode of treatment - Individual (minutes) 45   SLP Mode of treatment - Concurrent (minutes) 0   SLP Mode of treatment - Group (minutes) 0   SLP Mode of treatment - Co-treat (minutes) 0   SLP Mode of Treatment - Total time(minutes) 45 minutes   SLP Cumulative Minutes 490   Therapy Time missed   Time missed?  No

## 2023-06-02 NOTE — PROGRESS NOTES
"   06/02/23 0900   Pain Assessment   Pain Assessment Tool 0-10   Pain Score No Pain   Restrictions/Precautions   Precautions Aphasia;Bed/chair alarms;Cognitive; Fall Risk;Supervision on toilet/commode;Seizure  (R side inattention)   Cognition   Overall Cognitive Status Impaired   Arousal/Participation Alert; Cooperative   Attention Attends with cues to redirect   Subjective   Subjective pt already seated EOB, had no c/o and ready for PT   Sit to Lying   Type of Assistance Needed Set-up / clean-up   Sit to Lying CARE Score 5   Sit to Stand   Type of Assistance Needed Supervision   Comment CS no AD   Sit to Stand CARE Score 4   Bed-Chair Transfer   Type of Assistance Needed Supervision;Verbal cues   Comment CS no AD   Chair/Bed-to-Chair Transfer CARE Score 4   Car Transfer   Type of Assistance Needed Incidental touching;Verbal cues   Comment CG- no AD on mock car   Car Transfer CARE Score 4   Walk 10 Feet   Type of Assistance Needed Supervision;Verbal cues   Comment CS no AD   Walk 10 Feet CARE Score 4   Walk 50 Feet with Two Turns   Type of Assistance Needed Incidental touching;Verbal cues   Comment CS no AD   Walk 50 Feet with Two Turns CARE Score 4   Walk 150 Feet   Type of Assistance Needed Incidental touching;Verbal cues; Supervision   Comment CG- no AD   Walk 150 Feet CARE Score 4   Walking 10 Feet on Uneven Surfaces   Type of Assistance Needed Physical assistance;Verbal cues   Physical Assistance Level 25% or less   Comment outdoor pavement, long ramp no AD   Walking 10 Feet on Uneven Surfaces CARE Score 3   Wheelchair mobility   Findings w/c for strengthening CW4 to/from NW9/outside using bilat LE only   Curb or Single Stair   Style negotiated Curb   Type of Assistance Needed Incidental touching;Verbal cues   Comment 8\" curb x 2 no AD   1 Step (Curb) CARE Score 4   4 Steps   Type of Assistance Needed Incidental touching;Verbal cues; Adaptive equipment   4 Steps CARE Score 4   12 Steps   Type of Assistance " Needed Incidental touching;Verbal cues; Adaptive equipment   Comment outdoor stairs (employee entrance by ramp) with R rail only  reciprocal pattern, external cues for R LE clearance off steps nosing   12 Steps CARE Score 4   Picking Up Object   Type of Assistance Needed Incidental touching;Verbal cues   Comment no AD - pen x 2 trials using R hand only   Picking Up Object CARE Score 4   Therapeutic Interventions   Neuromuscular Re-Education NPP gait speed training including repeated fwd/bwd walking on outdoor long ramp, pavement (B entrance loop) no AD with emphasis on R foot clerance while wearing R knee cage x 2 person assist for safety without major LOB  trialed [facility] outdoor but did not see any difference with R foot dragging with/without AD  knee cage cont to prevent R knee hyperextension during amb   Equipment Use   NuStep lvl 6 bilat LE and R UE only, SPM>75 - SpO2 97%, IA 91   Assessment   Treatment Assessment skilled PT focused on outdoor mobility training including crossing street with noted dec head turning /awareness to the R due to ongoing R side inattention so pt provided cues to check R environment before crossing the street, so will need additional training  Pt still demos dec R foot clearance with whip component  crhis with fatigue but did not tripped nor had any LOB requiring external cues to promote self correction ability  Pt will benefit from additional skilled PT intervention with inc focus on NMR/NPP training no AD on different surfaces and incorporating multi-tasking to allow inc functional indep and dec risk for falls chris during community distance mobilities     Family/Caregiver Present no   Recommendation   PT Discharge Recommendation Home with outpatient rehabilitation   PT Therapy Minutes   PT Time In 0900   PT Time Out 1000   PT Total Time (minutes) 60   PT Mode of treatment - Individual (minutes) 60   PT Mode of treatment - Concurrent (minutes) 0   PT Mode of treatment - Group (minutes) 0   PT Mode of treatment - Co-treat (minutes) 0   PT Mode of Treatment - Total time(minutes) 60 minutes   PT Cumulative Minutes 460

## 2023-06-02 NOTE — PROGRESS NOTES
06/02/23 1330   Pain Assessment   Pain Assessment Tool 0-10   Pain Score No Pain   Restrictions/Precautions   Precautions Aphasia;Bed/chair alarms;Cognitive; Fall Risk;Supervision on toilet/commode;Visual deficit   Comprehension   Comprehension (FIM) 4 - Understands basic info/conversation 75-90% of time   Expression   Expression (FIM) 2 - Uses only simple expressions or gestures (waves, hello)   Social Interaction   Social Interaction (FIM) 5 - Interacts appropriately with others 90% of time   Problem Solving   Problem solving (FIM) 3 - Solves basic problmes 50-74% of time   Memory   Memory (FIM) 4 - Recalls 2 of 3 steps   Speech/Language/Cognition Assessmetn   Treatment Assessment This afternoon's session was held in therapy gym after pt completed OT session  It was observed when attempting to engaged pt in conversational speech discussing event since last AM session today, pt was exhibiting more difficulty in overall fluency of speech output, exhibiting neologisms, jargon and many phonemic and letter substitions in words  Since this was observed, SLP did ask if pt was more tired since earlier which pt did confirm  Also when asking pt about overall sleep from prior night, pt did confirm that his sleep was not the best  However, this does not deter pt from participation in session this PM  The following tasks were completed: Word Spelling with use of [employer] iPad/[employer]ba:  As it was observed from earlier session as pt was exhibiting difficulty in spontaneous written expression despite stating word prior to writing words, targeted more of spelling out words  Using the [employer] [place] targeting Language: Spelling: Words: Levels 1 and 3  For Level 1, a picture as well as verbal statement of the word to type was presented  A keyboard was presented w/ all letters, HOWEVER all letters not to spell out the words were gray and the letters to use to spell the letters were in white   All words presented in this level were 2-3 letters in length  Pt was 10/10 accurate in spelling all the words  When increasing the difficulty to Level 3, still a picture and verbal statement of word was presented to pt  This time the WHOLE keyboard was in use for pt to spell out the words presented  Pt did exhibit more difficulty given this task to where he was 4/10 accurate  Of note, pt would type in what he thought was the word (ie: us-->it/me or sob-->say/sod/sow, etc), but the program does have a feature to where it indicates errors by presenting typed word verbally  As this task progressed, SLP attempting to provide additional phonemic cues by repeating words or even at time providing verbal phonemic sounds of the letters  Again, pt was able to verbalize at times the target letters, but recognition was decreased in attempts to spell out the words  Pt required max cues to spell out 6 other words and unable to determine 2 words requiring directive cues by SLP  Even when providing pt w/ the letters of the alphabet in succession, his ability to ID letters remained decreased  Receptive ID of Words-Fo2 and Letters/Numbers- Fo5:  Due to the observed difficulty given pt's letter recognition SLP targeted a few more tasks in pt's ability to comprehend a verbal presentation of a word to then Fo2 words to point to the word stated  Pt was 15/15 accurate in his ability to ID correct words stated  SLP then switched to number and letter ID as a row of 5 letters or numbers were presented and pt was to match the verbalized letter or number presented  For the numbers, pt was 2/2 accurate (as only 2 rows were presented)  SLP did target and have pt ID remaining numbers to where pt was able to accurately ID all 8 numbers remaining  For the letter ID, pt was 4/5 accurate on the first round, but when going back to letter which pt could not initially recognize (e-->pt pointed to p), pt was able to ID accordingly   As w/ the numbers, SLP did [place] back to more letters within the rows  On the next round, pt was 5/5 accurate in ability to identify the correct letter verbalized as well as remaining 5/5 accurate for the last round  Conversational Speech: At the end of session, as more comprehension skills targeted vs verbal, pt did demonstrate more improvement in fluency given discussion, which pt was able to state food items consumed at last meal (lunch)  Additionally, SLP had overheard from pt's conversation in OT session about the high school he attended which was the same as current SLP  Due to this, pt and SLP engaged in naming activities, sports and prior teachers which were in common  While still due to fatigue, pt's was exhibiting more fluency, the true content of targeted words was decreased where it was more phonemic errors and substitutions presented, but due to known context, SLP was able to maximize the understanding given pt's overall communicative intent  It is noted that basic simple conversational speech is fluent even when fatigued  Currently, pt will continue to benefit from skilled SLP services to maximize overall receptive and expressive language skills as well as cognitive linguistic skills in attempts to improve functional communication abilities to decrease overall caregiver burden over time  SLP Therapy Minutes   SLP Time In 1330   SLP Time Out 1430   SLP Total Time (minutes) 60   SLP Mode of treatment - Individual (minutes) 60   SLP Mode of treatment - Concurrent (minutes) 0   SLP Mode of treatment - Group (minutes) 0   SLP Mode of treatment - Co-treat (minutes) 0   SLP Mode of Treatment - Total time(minutes) 60 minutes   SLP Cumulative Minutes 550   Therapy Time missed   Time missed?  No

## 2023-06-02 NOTE — PROGRESS NOTES
06/02/23 1230   Pain Assessment   Pain Assessment Tool 0-10   Pain Score No Pain   Restrictions/Precautions   Precautions Aphasia;Bed/chair alarms;Cognitive; Fall Risk;Seizure;Supervision on toilet/commode  (R side inattention)   Weight Bearing Restrictions No   ROM Restrictions No   Lifestyle   Autonomy Pt agreeable to OT Tx session  Lying to Sitting on Side of Bed   Type of Assistance Needed Set-up / clean-up   Physical Assistance Level No physical assistance   Lying to Sitting on Side of Bed CARE Score 5   Sit to Stand   Type of Assistance Needed Supervision   Physical Assistance Level No physical assistance   Comment CS no AD   Sit to Stand CARE Score 4   Bed-Chair Transfer   Type of Assistance Needed Supervision   Physical Assistance Level No physical assistance   Comment CS no AD   Chair/Bed-to-Chair Transfer CARE Score 4   Commmunity Re-entry   Community Re-entry Level   (no AD)   Community Re-entry Level of Assistance Close supervision   Community Re-entry Pt engaged in [employer] Re-entry activity req pt to navigate ARC <> outside of hospital environment  Pt performed fxnl mobility at CS level w/o AD, demonstrating ability to manage elevator buttons  manuever around obstacles within pathway, and neogitate within a busier environment  Pt did experience 2 LOB, however, able to self correct without intervention from this FRIAS  Focus of activity to maximize IND with mobility and community re-entry in preparation for upcoming MD and Therapy appoinments  Health Management   Health Management Level of Assistance Close supervision   Health Management Pt provided with current list of scheduled medications with EDU provided on each medications purpose/frequency/dosage  Brief introduction to AM/PM pill organizer, as pt has 1 medication with frequency of 2 x's/daily   Pt demonstrates difficulty verbalizing medication purpose/frequency, however, demonstrates ability to correctly sort all medications into AM/PM pill organizer utilizing RUE  No difficulty observed managing pill bottles open or with manipulating beads into pill organizer utilizing RUE  MD Remi Menezes) present during med management task, providing additional EDU of medications and probability that pt will not D/C home with Protonix  MD also reports anticipated plan to D/C Lovenox Injection in upcoming days  Pt would benefit from further review of medication management prior to D/C home  Cognition   Overall Cognitive Status Impaired   Arousal/Participation Alert; Cooperative   Attention Attends with cues to redirect   Orientation Level Oriented X4   Memory Within functional limits   Following Commands Follows one step commands with increased time or repetition   Activity Tolerance   Activity Tolerance Patient tolerated treatment well   Assessment   Treatment Assessment Pt participated in 60 min skilled OT Tx session with focus on IADL management tasks of medication management and community re-entry  See above for further Tx details  Initiated medication management task with pt dmeonstrating ability to correctly sort all scheduled medications into AM/PM pill organizer  Difficulty verbalizing each medications purpose/frequency  Continues to demonstrate improvement with RUE FMC to manipulate beads into pill organizer  CS for community re-entry task with 2 LOB which pt was able to self correct without intervention  Pt would continue to benefit from skilled OT services to improve dynamic standing balance, engage in stroke EDU, continue RUE NM Re-ed, engage in FT, and maximize IND with ADL/IADL performance in roder to progress towards OT goals and dec caregiver burden upon D/C  Prognosis Good   Problem List Impaired balance;Decreased coordination;Decreased cognition;Decreased mobility; Decreased safety awareness   Barriers to Discharge Inaccessible home environment;Decreased caregiver support   Plan   Treatment/Interventions ADL retraining;Functional transfer training; Therapeutic exercise; Endurance training;Patient/family training   Progress Progressing toward goals   Recommendation   OT Discharge Recommendation Home with outpatient rehabilitation   OT Therapy Minutes   OT Time In 1230   OT Time Out 1330   OT Total Time (minutes) 60   OT Mode of treatment - Individual (minutes) 60   OT Mode of treatment - Concurrent (minutes) 0   OT Mode of treatment - Group (minutes) 0   OT Mode of treatment - Co-treat (minutes) 0   OT Mode of Treatment - Total time(minutes) 60 minutes   OT Cumulative Minutes 460   Therapy Time missed   Time missed?  No

## 2023-06-02 NOTE — PLAN OF CARE
Problem: INFECTION - ADULT  Goal: Absence or prevention of progression during hospitalization  Description: INTERVENTIONS:  - Assess and monitor for signs and symptoms of infection  - Monitor lab/diagnostic results  - Monitor all insertion sites, i e  indwelling lines, tubes, and drains  - Monitor endotracheal if appropriate and nasal secretions for changes in amount and color  - [place] appropriate cooling/warming therapies per order  - Administer medications as ordered  - Instruct and encourage patient and family to use good hand hygiene technique  - Identify and instruct in appropriate isolation precautions for identified infection/condition  6/2/2023 1136 by Al López RN  Outcome: Progressing  6/2/2023 1116 by Al López RN  Outcome: Progressing  Goal: Absence of fever/infection during neutropenic period  Description: INTERVENTIONS:  - Monitor WBC    6/2/2023 1136 by Al López RN  Outcome: Progressing  6/2/2023 1116 by Al López RN  Outcome: Progressing     Problem: SAFETY ADULT  Goal: Patient will remain free of falls  Description: INTERVENTIONS:  - Educate patient/family on patient safety including physical limitations  - Instruct patient to call for assistance with activity   - Consult OT/PT to assist with strengthening/mobility   - Keep Call bell within reach  - Keep bed low and locked with side rails adjusted as appropriate  - Keep care items and personal belongings within reach  - Initiate and maintain comfort rounds  - Make Fall Risk Sign visible to staff  - Offer Toiletiours, in advance of need  - Initiatearm  - Obtain necessary fall risk management   - Apply yellow socks and bracelet for high fall risk patients  - Consider moving patient to room near nurses station  6/2/2023 1136 by Al López RN  Outcome: Progressing  6/2/2023 1116 by Al López RN  Outcome: Progressing  Goal: Maintain or return to baseline ADL function  Description: INTERVENTIONS:  -  Assess patient's ability to carry out ADLs; assess patient's baseline for ADL function and identify physical deficits which impact ability to perform ADLs (bathing, care of mouth/teeth, toileting, grooming, dressing, etc )  - Assess/evaluate cause of self-care deficits   - Assess range of motion  - Assess patient's mobility; develop plan if impaired  - Assess patient's need for assistive devices and provide as appropriate  - Encourage maximum independence but intervene and supervise when necessary  - Involve family in performance of ADLs  - Assess for home care needs following discharge   - Consider OT consult to assist with ADL evaluation and planning for discharge  - Provide patient education as appropriate  6/2/2023 1136 by Koki Feldman RN  Outcome: Progressing  6/2/2023 1116 by Koki Feldman RN  Outcome: Progressing  Goal: Maintains/Returns to pre admission functional level  Description: INTERVENTIONS:  - Perform BMAT or MOVE assessment daily    - Set and communicate daily mobility goal to care team and patient/family/caregiver  - Collaborate with rehabilitation services on mobility goals if consulted  - Perform Range of mes a day  - Reposition patient ours    - Danrusselle mickyy  - St  - Ambulate jyotsna  - Out of bed to taran   - Out of bed for   - Out of bed for toileting  - Record patient progress and toleration of activity level   6/2/2023 1136 by Koki Feldman, RN  Outcome: Progressing  6/2/2023 1116 by Koki Feldman, RN  Outcome: Progressing     Problem: DISCHARGE PLANNING  Goal: Discharge to home or other facility with appropriate resources  Description: INTERVENTIONS:  - Identify barriers to discharge w/patient and caregiver  - Arrange for needed discharge resources and transportation as appropriate  - Identify discharge learning needs (meds, wound care, etc )  - Arrange for interpretive services to assist at discharge as needed  - Refer to Case Management Department for coordinating discharge planning if the patient needs post-hospital services based on physician/advanced practitioner order or complex needs related to functional status, cognitive ability, or social support system  6/2/2023 1136 by Trinity Felder RN  Outcome: Progressing  6/2/2023 1116 by Trinity Felder RN  Outcome: Progressing     Problem: Prexisting or High Potential for Compromised Skin Integrity  Goal: Skin integrity is maintained or improved  Description: INTERVENTIONS:  - Identify patients at risk for skin breakdown  - Assess and monitor skin integrity  - Assess and monitor nutrition and hydration status  - Monitor labs   - Assess for incontinence   - Turn and reposition patient  - Assist with mobility/ambulation  - Relieve pressure over bony prominences  - Avoid friction and shearing  - Provide appropriate hygiene as needed including keeping skin clean and dry  - Evaluate need for skin moisturizer/barrier cream  - Collaborate with interdisciplinary team   - Patient/family teaching  - Consider wound care consult   6/2/2023 1136 by Trinity Felder RN  Outcome: Progressing  6/2/2023 1116 by Trinity Felder RN  Outcome: Progressing     Problem: MOBILITY - ADULT  Goal: Maintain or return to baseline ADL function  Description: INTERVENTIONS:  -  Assess patient's ability to carry out ADLs; assess patient's baseline for ADL function and identify physical deficits which impact ability to perform ADLs (bathing, care of mouth/teeth, toileting, grooming, dressing, etc )  - Assess/evaluate cause of self-care deficits   - Assess range of motion  - Assess patient's mobility; develop plan if impaired  - Assess patient's need for assistive devices and provide as appropriate  - Encourage maximum independence but intervene and supervise when necessary  - Involve family in performance of ADLs  - Assess for home care needs following discharge   - Consider OT consult to assist with ADL evaluation and planning for discharge  - Provide patient education as appropriate  6/2/2023 1136 by Walt Bolivar RN  Outcome: Progressing  6/2/2023 1116 by Walt Bolivar RN  Outcome: Progressing  Goal: Maintains/Returns to pre admission functional level  Description: INTERVENTIONS:  - Perform BMAT or MOVE assessment daily    - Set and communicate daily mobility goal to care team and patient/family/caregiver  - Collaborate with rehabilitation services on mobility goals if consulted  - Perform Range ofimes a day  - Reposition patientours    - Dangle patient   - Stand patient  - Ambulate patient  - Out of bed to   - Out of bed for me                      - Out of bed for toileting  - Record patient progress and toleration of activity level   6/2/2023 1136 by Walt Bolivar RN  Outcome: Progressing  6/2/2023 1116 by Walt Bolivar RN  Outcome: Progressing

## 2023-06-02 NOTE — PROGRESS NOTES
PM&R PROGRESS NOTE:  Alexander Simmons 40 y o  male MRN: [id number]  Unit/Bed#: -01 Encounter: [id number]           Rehab Diagnosis: Impairment of mobility, safety, Activities of Daily Living (ADLs), and cognitive/communication skills due to Stroke:  01 2  Right Body Involvement (Left Brain)  Left MCA CVA    SUBJECTIVE: Patient seen face-to-face  No acute issues overnight  Happy he did therapy outside today  Denies pain  Feels his speech is improving daily  ASSESSMENT: Stable, progressing      PLAN:    Rehabilitation  • Functional deficits: right hemiparesis, right sensory deficits, aphasia, possible cognitive deficits, impaired mobility, self care  • Continue current rehabilitation plan of care to maximize function      • Estimated Discharge: Friday 6/9/23 with outpatient neuro day program vs outpatient therapies    Physical Therapy Occupational Therapy Speech Therapy   Transfers: Minimal Assistance  Bed Mobility: Supervision (set up)  Amulation Distance (ft): 10 feet (to 400' at best)  Ambulation: Assist of 2 (min A of 1 for household distance while CFA of 2nd person for community distance mobilities)  Assistive Device for Ambulation: Other (no AD but hands on hips or HHA)  Wheelchair Mobility:  (anticipate pt will be amb at d/c)  Number of Stairs: 12 (FF)  Assistive Device for Stairs: Bilateral Hand Rails  Stair Assistance: Assist of 2 (min of 2 with bilat HR)  Ramp:  (will assess outdoor ramp when pt has proper footwear)  Discharge Recommendations: Home with:  [address] with[de-identified] 24 Hour Supervision, Family Support, Outpatient Physical Therapy   Eating: Independent  Grooming: Minimal Assistance  Bathing: Minimal Assistance  Bathing: Minimal Assistance  Upper Body Dressing: Supervision  Lower Body Dressing: Minimal Assistance  Toileting: Minimal Assistance  Tub/Shower Transfer: Minimal Assistance  Toilet Transfer: Minimal Assistance  Cognition: Exceptions to WNL  Cognition: Decreased Memory, Decreased Executive Functions, Decreased Attention, Decreased Safety  Orientation: Person, Place, Time, Situation   Mode of Communication: Verbal  Speech/Language: Expressive Aphasia (expressive and receptive aphasia)  Cognition: Exceptions to WNL  Cognition: Decreased Memory, Decreased Executive Functions, Decreased Attention, Decreased Comprehension, Decreased Safety, Impulsive  Orientation: Person, Place, Situation  Discharge Recommendations: Home with:  [address] with[de-identified] 24 Hour Supervision, Family Support, Outpatient Speech Therapy       DVT prophylaxis  • Managed on Lovenox      Bladder plan  • Continent     Bowel plan  • Continent      * CVA (cerebral vascular accident) ([facility] [address] )  Assessment & Plan  Presented 5/15/23 as stroke alert  CTH/MRI brain confirmed Left MCA ischemic stroke  S/p thrombectomy TICI 3 revascularization 5/16/23  Etiology unclear, (possibly related to recent COVID infection in Feb 2023)  Work-up: +Large PFO on ALYSSA  Hypercoagulable work-up and malignancy work-up largely negative - MHTFR pending  Repeat hypercoagulable what the serial work-up as outpatient recommended  · Secondary CVA ppx: Aspirin 81 mg daily  · CVA education and reduction of modifiable risk factors  · Follow-up with neurology and cardiology as an outpatient (patient is a candidate for PFO closure)      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)  Assessment & Plan  Continue Lexapro 5 mg in AM 5/30/23    Adjustment disorder  Assessment & Plan  Consulting Neuropsychology as patient with depressed mood  Also started on Lexapro 5 mg daily on 5/30/23    Abnormal findings on imaging test  Assessment & Plan  CT C/A/P: Moderate bilateral pleural effusions, small amount of ascites, and diffuse body wall subcutaneous edema    Follow-up with PCP    Bradycardia  Assessment & Plan  HR 50-60s  Asymptomatic  Monitor  Follow-up with Cardiology as outpatient    Tobacco use  Assessment & Plan  Smoking cessation encouraged  Continue nicotine "patch    PFO (patent foramen ovale)  Assessment & Plan  Large PFO identified on ALYSSA  Cardiology recommending follow-up in 4-6 weeks as an outpatient for possible closure    Seizure [facility])  Assessment & Plan  Witnessed seizure-like activity times 2/5/1623  Video EEG negative however patient continued on Keppra 750 mg every 12 hours  Placed on seizure precautions  Follow-up with neurology to determine length of Keppra duration - will reach out to them on Monday      Appreciate IM consultants medical co-management  Labs, medications, and imaging personally reviewed  ROS:  A ten point review of systems was completed on 06/02/23 and pertinent positives are listed in subjective section  All other systems reviewed were negative  OBJECTIVE:   /69 (BP Location: Left arm)   Pulse (!) 51   Temp 97 9 °F (36 6 °C) (Oral)   Resp 16   Ht 5' 9\" (1 753 m)   Wt 72 6 kg (160 lb 0 9 oz)   SpO2 98%   BMI 23 64 kg/m²       Physical Exam  Vitals and nursing note reviewed  Constitutional:       General: He is not in acute distress  HENT:      Head: Normocephalic and atraumatic  Nose: Nose normal       Mouth/Throat:      Mouth: Mucous membranes are moist    Eyes:      Conjunctiva/sclera: Conjunctivae normal    Cardiovascular:      Rate and Rhythm: Normal rate and regular rhythm  Pulses: Normal pulses  Pulmonary:      Effort: Pulmonary effort is normal       Breath sounds: Normal breath sounds  No wheezing or rales  Abdominal:      General: Bowel sounds are normal  There is no distension  Palpations: Abdomen is soft  Tenderness: There is no abdominal tenderness  Musculoskeletal:         General: No swelling  Cervical back: Neck supple  Skin:     General: Skin is warm  Neurological:      Mental Status: He is alert and oriented to person, place, and time  Motor: Weakness (right hemiparesis) present  Gait: Gait abnormal (antalgic gait)        Comments: aphasia (expressive " greater than comprehensive)   Psychiatric:         Mood and Affect: Mood normal           Lab Results   Component Value Date    HCT 36 4 (L) 05/29/2023    HGB 12 8 05/29/2023    MCV 96 05/29/2023     05/29/2023    WBC 4 45 05/29/2023     Lab Results   Component Value Date    BUN 13 05/29/2023    CALCIUM 8 7 05/29/2023     05/29/2023    CO2 31 05/29/2023    CREATININE 0 90 05/29/2023    GLUC 160 (H) 05/29/2023    K 4 2 05/29/2023    SODIUM 135 05/29/2023     Lab Results   Component Value Date    INR 0 89 05/15/2023    PROTIME 12 3 05/15/2023         Current Facility-Administered Medications:   •  acetaminophen (TYLENOL) tablet 975 mg, 975 mg, Oral, Q8H PRN, Pascual Loredo MD  •  aspirin chewable tablet 81 mg, 81 mg, Oral, Daily, Pascual Loredo MD, 81 mg at 06/02/23 9862  •  bisacodyl (DULCOLAX) rectal suppository 10 mg, 10 mg, Rectal, Daily PRN, Pascual Loredo MD  •  enoxaparin (LOVENOX) subcutaneous injection 40 mg, 40 mg, Subcutaneous, Q24H [address], Pascual Loredo MD, 40 mg at 06/02/23 0847  •  escitalopram (LEXAPRO) tablet 5 mg, 5 mg, Oral, Daily, Pascual Loredo MD, 5 mg at 06/02/23 0844  •  levETIRAcetam (KEPPRA) tablet 750 mg, 750 mg, Oral, Q12H [address], Pascual Loredo MD, 750 mg at 06/02/23 0843  •  nicotine (NICODERM CQ) 14 mg/24hr TD 24 hr patch 1 patch, 1 patch, Transdermal, Daily, Pascual Loredo MD, 1 patch at 05/31/23 0754  •  ondansetron (ZOFRAN-ODT) dispersible tablet 4 mg, 4 mg, Oral, Q6H PRN, Pascual Loredo MD  •  pantoprazole (PROTONIX) EC tablet 40 mg, 40 mg, Oral, Early Morning, Pascual Loredo MD, 40 mg at 06/02/23 0554  •  polyethylene glycol (MIRALAX) packet 17 g, 17 g, Oral, Daily PRN, Pascual Loredo MD    History reviewed  No pertinent past medical history      Patient Active Problem List    Diagnosis Date Noted   • CVA (cerebral vascular accident) ([facility] [address] ) 05/15/2023   • 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) 05/29/2023   • Abnormal findings on imaging test 05/28/2023   • Adjustment disorder 05/28/2023   • Tobacco use 05/27/2023   • Mild pain 05/27/2023   • Bradycardia 05/27/2023   • PFO (patent foramen ovale) 05/22/2023   • Seizure ([address] ) 05/16/2023   • Positive self-administered antigen test for COVID-19 02/10/2023   • Viral infection, unspecified 11/10/2022   • Bilateral low back pain without sciatica 01/29/2020          Hugo Ibrahim MD    I have spent a total time of 35 minutes on 06/02/23 in caring for this patient including Impressions, Counseling / Coordination of care and Documenting in the medical record  ** Please Note:  voice to text software may have been used in the creation of this document   Although proof errors in transcription or interpretation are a potential of such software**

## 2023-06-02 NOTE — PROGRESS NOTES
Internal Medicine Progress Note  Patient: Juani Cerrato  Age/sex: 40 y o  male  Medical Record #: [id number]      ASSESSMENT/PLAN: (Interval History)  Juani Cerrato is seen and examined and management for following issues:    Acute left MCA CVA  • S/p left MCA thrombectomy  • Workup:   • Homocystine normal, BELEM normal, RF normal, ANCA negative, dsDNA negative, Factor V Leiden/lupus anticoagulant negative, Antithrombin III 81 (felt likely not clinically relevant was higher than 80%, Protein S decreased at 65%, Protein C normal                          MTHFR mutation pending                          Repeat thrombosis panel in 3 months   • Continue ASA 81mg qd and statin  • Miladis Zapata is from COVID infection in 2/2023     Seizures  • Was noted with eyes deviating to the right 5/16 with a post ictal period   • Had 2 episodes  • Xochitl Ross restarted since he had 2 sz in hospital     PFO  • Large PFO on ALYSSA  • Negative DVT  • To see Cards as OP 4-6 weeks for possible closure     Bradycardia  • Has HRs in 50's at times as in hospital  • Pt was an avid runner prior to CVA so likely d/t conditioning  • stable     Nicotine abuse  • Continue patch     Moderate B/L pleural effusions  • Repeat Cxray in 6 wks or sooner if symptomatic        Discharge date:  Team       The above assessment and plan was reviewed and updated as determined by my evaluation of the patient on 6/2/2023      Labs:   Results from last 7 days   Lab Units 05/29/23  0807   HEMATOCRIT % 36 4*   HEMOGLOBIN g/dL 12 8   PLATELETS Thousands/uL 343   WBC Thousand/uL 4 45     Results from last 7 days   Lab Units 05/29/23  0807   BUN mg/dL 13   CALCIUM mg/dL 8 7   CHLORIDE mmol/L 105   CO2 mmol/L 31   CREATININE mg/dL 0 90   POTASSIUM mmol/L 4 2   SODIUM mmol/L 135             Results from last 7 days   Lab Units 05/27/23  1054   POC GLUCOSE mg/dl 47*       Review of Scheduled Meds:  Current Facility-Administered Medications Medication Dose Route Frequency Provider Last Rate   • acetaminophen  975 mg Oral Q8H PRN Justino Jeter MD     • aspirin  81 mg Oral Daily Justino Jeter MD     • bisacodyl  10 mg Rectal Daily PRN Justino Jeter MD     • enoxaparin  40 mg Subcutaneous Q24H [address] Marline Hall MD     • escitalopram  5 mg Oral Daily Justino Jeter MD     • levETIRAcetam  750 mg Oral Q12H Marco Noel MD     • nicotine  1 patch Transdermal Daily Justino Jeter MD     • ondansetron  4 mg Oral Q6H PRN Justino Jeter MD     • pantoprazole  40 mg Oral Early Morning Justino Jeter MD     • polyethylene glycol  17 g Oral Daily PRN Justino Jeter MD         Subjective/ HPI: Patient seen and examined  Patients overnight issues or events were reviewed with nursing or staff during rounds or morning huddle session  New or overnight issues include the following:     Pt seen in his room  No issues overnight, his speech continues to improve      ROS:   A 10 point ROS was performed; negative except as noted above         Imaging:     No orders to display       *Labs /Radiology studies reviewed  *Medications reviewed and reconciled as needed  *Please refer to order section for additional ordered labs studies  *Case discussed with primary attending during morning huddle case rounds    Physical Examination:  Vitals:   Vitals:    06/01/23 1403 06/01/23 2227 06/02/23 0536 06/02/23 0537   BP: 122/64 105/58 110/69    BP Location: Left arm Left arm Left arm    Pulse: 55 (!) 52 (!) 48 (!) 51   Resp: 16 16 16    Temp: (!) 97 4 °F (36 3 °C) 98 °F (36 7 °C) 97 9 °F (36 6 °C)    TempSrc: Oral Oral Oral    SpO2: 99% 98% 98%    Weight:       Height:           GEN: NAD; pleasant  NEURO: Alert  + aphasia but significant improvement  HEENT: Pupils are equal/reactive; normocephalic, face is asymmetrical, hearing is normal  CV: S1 S2 regular, no murmur/rub/gallops, 2/4 pedal pulses, no LE edema present  RESP: Lungs are clear bilaterally, no wheezes rales or rhonchi, on room air, no distress, respirations are easy and non labored  GI: Abdomen is flat, soft, non tender, +BS x4; non distended  : Voiding without issues  MUSC: Moves all extremities except right hemiparesis improving  SKIN: pink, warm, normal turgor, no rashes or lesions noted       The above physical exam was reviewed and updated as determined by my evaluation of the patient on 6/2/2023  Invasive Devices     None                    VTE Pharmacologic Prophylaxis: Enoxaparin  Code Status: Level 1 - Full Code  Current Length of Stay: 6 day(s)      Total time spent:  30 minutes  with more than 50% spent counseling/coordinating care  Counseling includes discussion with patient re: progress  and discussion with patient of his/her current medical state/information  Coordination of patient's care was performed in conjunction with primary service  Time invested included review of patient's labs, vitals, and management of their comorbidities with continued monitoring  In addition, this patient was discussed with medical team including physician and advanced extenders  The care of the patient was extensively discussed and appropriate treatment plan was formulated unique for this patient  Medical decision making for the day was made by supervising physician unless otherwise noted in their attestation statement  ** Please Note:  voice to text software may have been used in the creation of this document   Although proof errors in transcription or interpretation are a potential of such software**

## 2023-06-02 NOTE — PLAN OF CARE
Problem: INFECTION - ADULT  Goal: Absence or prevention of progression during hospitalization  Description: INTERVENTIONS:  - Assess and monitor for signs and symptoms of infection  - Monitor lab/diagnostic results  - Monitor all insertion sites, i e  indwelling lines, tubes, and drains  - Monitor endotracheal if appropriate and nasal secretions for changes in amount and color  - [place] appropriate cooling/warming therapies per order  - Administer medications as ordered  - Instruct and encourage patient and family to use good hand hygiene technique  - Identify and instruct in appropriate isolation precautions for identified infection/condition  Outcome: Progressing  Goal: Absence of fever/infection during neutropenic period  Description: INTERVENTIONS:  - Monitor WBC    Outcome: Progressing     Problem: SAFETY ADULT  Goal: Patient will remain free of falls  Description: INTERVENTIONS:  - Educate patient/family on patient safety including physical limitations  - Instruct patient to call for assistance with activity   - Consult OT/PT to assist with strengthening/mobility   - Keep Call bell within reach  - Keep bed low and locked with side rails adjusted as appropriate  - Keep care items and personal belongings within reach  - Initiate and maintain comfort rounds  - Make Fall Risk Sign visible to staff  - Offer Toirs, in advance of need  - Initi  - Obtain necessary fall   - Apply yellow socks and bracelet for high fall risk patients  - Consider moving patient to room near nurses station  Outcome: Progressing  Goal: Maintain or return to baseline ADL function  Description: INTERVENTIONS:  -  Assess patient's ability to carry out ADLs; assess patient's baseline for ADL function and identify physical deficits which impact ability to perform ADLs (bathing, care of mouth/teeth, toileting, grooming, dressing, etc )  - Assess/evaluate cause of self-care deficits   - Assess range of motion  - Assess patient's mobility; develop plan if impaired  - Assess patient's need for assistive devices and provide as appropriate  - Encourage maximum independence but intervene and supervise when necessary  - Involve family in performance of ADLs  - Assess for home care needs following discharge   - Consider OT consult to assist with ADL evaluation and planning for discharge  - Provide patient education as appropriate  Outcome: Progressing  Goal: Maintains/Returns to pre admission functional level  Description: INTERVENTIONS:  - Perform BMAT or MOVE assessment daily    - Set and communicate daily mobility goal to care team and patient/family/caregiver     - Collaborate with rehabilitation services on mobility goals if consulted  - Perform Range o  - Out of bed for toileting  - Record patient progress and toleration of activity level   Outcome: Progressing     Problem: DISCHARGE PLANNING  Goal: Discharge to home or other facility with appropriate resources  Description: INTERVENTIONS:  - Identify barriers to discharge w/patient and caregiver  - Arrange for needed discharge resources and transportation as appropriate  - Identify discharge learning needs (meds, wound care, etc )  - Arrange for interpretive services to assist at discharge as needed  - Refer to Case Management Department for coordinating discharge planning if the patient needs post-hospital services based on physician/advanced practitioner order or complex needs related to functional status, cognitive ability, or social support system  Outcome: Progressing     Problem: Prexisting or High Potential for Compromised Skin Integrity  Goal: Skin integrity is maintained or improved  Description: INTERVENTIONS:  - Identify patients at risk for skin breakdown  - Assess and monitor skin integrity  - Assess and monitor nutrition and hydration status  - Monitor labs   - Assess for incontinence   - Turn and reposition patient  - Assist with mobility/ambulation  - Relieve pressure over bony prominences  - Avoid friction and shearing  - Provide appropriate hygiene as needed including keeping skin clean and dry  - Evaluate need for skin moisturizer/barrier cream  - Collaborate with interdisciplinary team   - Patient/family teaching  - Consider wound care consult   Outcome: Progressing     Problem: MOBILITY - ADULT  Goal: Maintain or return to baseline ADL function  Description: INTERVENTIONS:  -  Assess patient's ability to carry out ADLs; assess patient's baseline for ADL function and identify physical deficits which impact ability to perform ADLs (bathing, care of mouth/teeth, toileting, grooming, dressing, etc )  - Assess/evaluate cause of self-care deficits   - Assess range of motion  - Assess patient's mobility; develop plan if impaired  - Assess patient's need for assistive devices and provide as appropriate  - Encourage maximum independence but intervene and supervise when necessary  - Involve family in performance of ADLs  - Assess for home care needs following discharge   - Consider OT consult to assist with ADL evaluation and planning for discharge  - Provide patient education as appropriate  Outcome: Progressing  Goal: Maintains/Returns to pre admission functional level  Description: INTERVENTIONS:  - Perform BMAT or MOVE assessment daily    - Set and communicate daily mobility goal to care team and patient/family/caregiver     - Collaborate with rehabilitation services on mobility goals if consulted  - Perform Range of  - Out of bed for toileting  - Record patient progress and toleration of activity level   Outcome: Progressing

## 2023-06-02 NOTE — PROGRESS NOTES
Pastoral Care Progress Note    2023  Patient: Cele Gamez : 1980  Admission Date & Time: 2023 1227  MRN: [id number] [facility]: [id number]          Suzan Ferrer was smiling, sitting in bed, eating lunch, ready to cheerfully engage in conversation  Zac Thacker he was doing well with therapy today and looking forward to the next upcoming session   provided emotional support, encouragement, and remains available

## 2023-06-03 PROCEDURE — 99232 SBSQ HOSP IP/OBS MODERATE 35: CPT | Performed by: INTERNAL MEDICINE

## 2023-06-03 PROCEDURE — 97112 NEUROMUSCULAR REEDUCATION: CPT

## 2023-06-03 PROCEDURE — 97110 THERAPEUTIC EXERCISES: CPT

## 2023-06-03 PROCEDURE — 97530 THERAPEUTIC ACTIVITIES: CPT

## 2023-06-03 RX ADMIN — NICOTINE 1 PATCH: 14 PATCH, EXTENDED RELEASE TRANSDERMAL at 08:27

## 2023-06-03 RX ADMIN — ASPIRIN 81 MG CHEWABLE TABLET 81 MG: 81 TABLET CHEWABLE at 08:27

## 2023-06-03 RX ADMIN — LEVETIRACETAM 750 MG: 750 TABLET, FILM COATED ORAL at 21:21

## 2023-06-03 RX ADMIN — LEVETIRACETAM 750 MG: 750 TABLET, FILM COATED ORAL at 08:27

## 2023-06-03 RX ADMIN — ENOXAPARIN SODIUM 40 MG: 40 INJECTION SUBCUTANEOUS at 08:27

## 2023-06-03 RX ADMIN — PANTOPRAZOLE SODIUM 40 MG: 40 TABLET, DELAYED RELEASE ORAL at 05:35

## 2023-06-03 RX ADMIN — ESCITALOPRAM OXALATE 5 MG: 10 TABLET ORAL at 08:27

## 2023-06-03 NOTE — PROGRESS NOTES
"OT Treatment Note       06/03/23 0315   Pain Assessment   Pain Assessment Tool 0-10   Pain Score No Pain   Restrictions/Precautions   Precautions Aphasia;Bed/chair alarms;Cognitive; Fall Risk;Supervision on toilet/commode;Visual deficit  (R inattention)   Weight Bearing Restrictions No   ROM Restrictions No   Lifestyle   Autonomy Pt agreeable to OT session, requesting to use bathroom upon OT arrival to room  Grooming   Findings washing hands in stance at sink with close SUP   Sit to Stand   Type of Assistance Needed Supervision   Physical Assistance Level No physical assistance   Comment close SUP without AD   Sit to Stand CARE Score 4   Bed-Chair Transfer   Type of Assistance Needed Supervision   Physical Assistance Level No physical assistance   Comment close SUP without AD   Chair/Bed-to-Chair Transfer CARE Score 4   Toileting Hygiene   Type of Assistance Needed Supervision   Physical Assistance Level No physical assistance   Comment urinating standing at toilet with close SUP; able to complete CM with close SUP in stance   Tono Issai 83 Score 4   Toilet Transfer   Comment stands at toilet to urinate, functionally mobilizing to toilet with close SUP without AD   Kitchen Mobility   Kitchen-Mobility Level   (without AD)   Kitchen Activity Retrieve items; Other (Comment)   Kitchen Mobility Comments Pt participating in kitchen task of finding 9 food items placed in [place] and lower kitchen cabinets and then transporting to [facility]  Pt able to recall to retrieve 9/9 items during task  Pt requiring min vc's for scanning fully to lower R cabinets to locate cabinet labeled with sticky note for food item category  With each food item located, pt instructed to state what object was  Pt was able to complete this task accurately with extended time, and self-correcting errors (i e  initially says \"peasta\" for pasta and vegetable \"juice\" rather than vegetable oil)   pt able to retrieve/transport all items with close " SUP without AD, able low kneel on floor on LLE for lower cabinets and return to stance without difficulty  Exercise Tools   Other Exercise Tool 1 For increased functional strength, endurance, and standing balance in prep for ADL tasks/functional mobility, pt completing BUE therex using 4# db to complete: chest press while stepping forward/backward each LE; lat raises while sidestepping with each LE; bicep curls while stepping backwards each LE  Completing all exercises with CGA, no LOB noted however min vc's to fully clear RLE at times  Then completing static bicep push press simulating motion of placing item onto high shelf BUE using 7# db  Each exercise completed 10x3 reps with seated rest breaks as needed  Pt with 1 instance of db droppage of 4# db during task, requires min vc's throughout exercises to attned to R hand and maintain gross grasp on db  Pt appears to have some difficulty motor planning during tasks  Cognition   Overall Cognitive Status Impaired   Arousal/Participation Alert; Cooperative   Attention Attends with cues to redirect   Orientation Level Oriented X4   Memory Within functional limits   Following Commands Follows one step commands with increased time or repetition   Additional Activities   Additional Activities Comments Pt completing visual scanning task, funcitonally mobilizing in hallway to retrieve 9 items on both sides at various heights, often neglecting objects on R side requiring vc's and tactile cues to fully scan environment to locate items; able to recall number of items to find  In prep for IADL tasks i e  laundry/grocery tasks, pt participating in weighted carry tasks for RUE strengthening with functional mobility, including initially 9# weighted laundry basket, then 15# weighted laundry basket carried with BUE   Pt able to maintain  on laundry basket throughout tasks with RUE and functionally mobilizing household ditsances in hallway with close SUP, but with 1 instance of minor LOB 2* not fully clearing RLE with fatigue, requiring Aliyah to correct  Activity Tolerance   Activity Tolerance Patient tolerated treatment well   Assessment   Treatment Assessment Pt seen for 60 min OT session focusing on functional transfers/mobility, IADL tasks with naming, standing balance, BUE strengthening and NMR RUE, and visual scanning tasks  Pt tolerating session well, completing funcitonal transfers/mobility with close SUP however when weighted carry task added with functional mobility pt with 1 minor LOB requiring Aliyah to correct  OT to continue POC to continue to progress towards goals in prep for d/c home with family  Prognosis Good   Problem List Decreased strength;Decreased coordination;Decreased cognition;Decreased safety awareness; Impaired balance;Decreased mobility   Barriers to Discharge Inaccessible home environment;Decreased caregiver support   Plan   Treatment/Interventions ADL retraining;Functional transfer training; Therapeutic exercise; Endurance training;Cognitive reorientation;Patient/family training;Equipment eval/education;Gait training;Bed mobility; Compensatory technique education   Progress Progressing toward goals   Recommendation   OT Discharge Recommendation Home with outpatient rehabilitation   OT Therapy Minutes   OT Time In 0830   OT Time Out 0930   OT Total Time (minutes) 60   OT Mode of treatment - Individual (minutes) 60   OT Mode of treatment - Concurrent (minutes) 0   OT Mode of treatment - Group (minutes) 0   OT Mode of treatment - Co-treat (minutes) 0   OT Mode of Treatment - Total time(minutes) 60 minutes   OT Cumulative Minutes 520   Therapy Time missed   Time missed?  No

## 2023-06-03 NOTE — PLAN OF CARE
Problem: INFECTION - ADULT  Goal: Absence or prevention of progression during hospitalization  Description: INTERVENTIONS:  - Assess and monitor for signs and symptoms of infection  - Monitor lab/diagnostic results  - Monitor all insertion sites, i e  indwelling lines, tubes, and drains  - Monitor endotracheal if appropriate and nasal secretions for changes in amount and color  - [place] appropriate cooling/warming therapies per order  - Administer medications as ordered  - Instruct and encourage patient and family to use good hand hygiene technique  - Identify and instruct in appropriate isolation precautions for identified infection/condition  Outcome: Progressing     Problem: SAFETY ADULT  Goal: Patient will remain free of falls  Description: INTERVENTIONS:  - Educate patient/family on patient safety including physical limitations  - Instruct patient to call for assistance with activity   - Consult OT/PT to assist with strengthening/mobility   - Keep Call bell within reach  - Keep bed low and locked with side rails adjusted as appropriate  - Keep care items and personal belongings within reach  - Initiate and maintain comfort rounds  - Make Fall Risk Sign visible to staff  - Offer Toileting every 2 Hours, in advance of need  - Initiate/Maintain alarm  - Obtain necessary fall risk management equipment  - Apply yellow socks and bracelet for high fall risk patients  - Consider moving patient to room near nurses station  Outcome: Progressing

## 2023-06-03 NOTE — PLAN OF CARE
Problem: INFECTION - ADULT  Goal: Absence or prevention of progression during hospitalization  Description: INTERVENTIONS:  - Assess and monitor for signs and symptoms of infection  - Monitor lab/diagnostic results  - Monitor all insertion sites, i e  indwelling lines, tubes, and drains  - Monitor endotracheal if appropriate and nasal secretions for changes in amount and color  - [place] appropriate cooling/warming therapies per order  - Administer medications as ordered  - Instruct and encourage patient and family to use good hand hygiene technique  - Identify and instruct in appropriate isolation precautions for identified infection/condition  Outcome: Progressing  Goal: Absence of fever/infection during neutropenic period  Description: INTERVENTIONS:  - Monitor WBC    Outcome: Progressing     Problem: SAFETY ADULT  Goal: Patient will remain free of falls  Description: INTERVENTIONS:  - Educate patient/family on patient safety including physical limitations  - Instruct patient to call for assistance with activity   - Consult OT/PT to assist with strengthening/mobility   - Keep Call bell within reach  - Keep bed low and locked with side rails adjusted as appropriate  - Keep care items and personal belongings within reach  - Initiate and maintain comfort rounds  - Make Fall Risk Sign visible to staff  - Offer Toileting every Hours, in advance of need  - Initiate/Maintain alarm  - Obtain necessary fall risk management equipment:   - Apply yellow socks and bracelet for high fall risk patients  - Consider moving patient to room near nurses station  Outcome: Progressing  Goal: Maintain or return to baseline ADL function  Description: INTERVENTIONS:  -  Assess patient's ability to carry out ADLs; assess patient's baseline for ADL function and identify physical deficits which impact ability to perform ADLs (bathing, care of mouth/teeth, toileting, grooming, dressing, etc )  - Assess/evaluate cause of self-care deficits   - Assess range of motion  - Assess patient's mobility; develop plan if impaired  - Assess patient's need for assistive devices and provide as appropriate  - Encourage maximum independence but intervene and supervise when necessary  - Involve family in performance of ADLs  - Assess for home care needs following discharge   - Consider OT consult to assist with ADL evaluation and planning for discharge  - Provide patient education as appropriate  Outcome: Progressing  Goal: Maintains/Returns to pre admission functional level  Description: INTERVENTIONS:  - Perform BMAT or MOVE assessment daily    - Set and communicate daily mobility goal to care team and patient/family/caregiver  - Collaborate with rehabilitation services on mobility goals if consulted  - Perform Range of Motion  times a day  - Reposition patient every  hours    - Dangle patient  times a day  - Stand patient  times a day  - Ambulate patient  times a day  - Out of bed to chair  times a day   - Out of bed for meals  times a day  - Out of bed for toileting  - Record patient progress and toleration of activity level   Outcome: Progressing     Problem: DISCHARGE PLANNING  Goal: Discharge to home or other facility with appropriate resources  Description: INTERVENTIONS:  - Identify barriers to discharge w/patient and caregiver  - Arrange for needed discharge resources and transportation as appropriate  - Identify discharge learning needs (meds, wound care, etc )  - Arrange for interpretive services to assist at discharge as needed  - Refer to Case Management Department for coordinating discharge planning if the patient needs post-hospital services based on physician/advanced practitioner order or complex needs related to functional status, cognitive ability, or social support system  Outcome: Progressing     Problem: Prexisting or High Potential for Compromised Skin Integrity  Goal: Skin integrity is maintained or improved  Description: INTERVENTIONS:  - Identify patients at risk for skin breakdown  - Assess and monitor skin integrity  - Assess and monitor nutrition and hydration status  - Monitor labs   - Assess for incontinence   - Turn and reposition patient  - Assist with mobility/ambulation  - Relieve pressure over bony prominences  - Avoid friction and shearing  - Provide appropriate hygiene as needed including keeping skin clean and dry  - Evaluate need for skin moisturizer/barrier cream  - Collaborate with interdisciplinary team   - Patient/family teaching  - Consider wound care consult   Outcome: Progressing     Problem: MOBILITY - ADULT  Goal: Maintain or return to baseline ADL function  Description: INTERVENTIONS:  -  Assess patient's ability to carry out ADLs; assess patient's baseline for ADL function and identify physical deficits which impact ability to perform ADLs (bathing, care of mouth/teeth, toileting, grooming, dressing, etc )  - Assess/evaluate cause of self-care deficits   - Assess range of motion  - Assess patient's mobility; develop plan if impaired  - Assess patient's need for assistive devices and provide as appropriate  - Encourage maximum independence but intervene and supervise when necessary  - Involve family in performance of ADLs  - Assess for home care needs following discharge   - Consider OT consult to assist with ADL evaluation and planning for discharge  - Provide patient education as appropriate  Outcome: Progressing  Goal: Maintains/Returns to pre admission functional level  Description: INTERVENTIONS:  - Perform BMAT or MOVE assessment daily    - Set and communicate daily mobility goal to care team and patient/family/caregiver  - Collaborate with rehabilitation services on mobility goals if consulted  - Perform Range of Motion 3 times a day  - Reposition patient every 2 hours    - Dangle patient 3 times a day  - Stand patient 3 times a day  - Ambulate patient 3 times a day  - Out of bed to chair 3 times a day   - Out of bed for meals 3 times a day  - Out of bed for toileting  - Record patient progress and toleration of activity level   Outcome: Progressing

## 2023-06-03 NOTE — PROGRESS NOTES
Internal Medicine Progress Note  Patient: Collette Ware  Age/sex: 40 y o  male  Medical Record #: [id number]      ASSESSMENT/PLAN: (Interval History)  Collette Ware is seen and examined and management for following issues:    Acute left MCA CVA  • S/p left MCA thrombectomy  • Workup:   • Homocystine normal, BELEM normal, RF normal, ANCA negative, dsDNA negative, Factor V Leiden/lupus anticoagulant negative, Antithrombin III 81 (felt likely not clinically relevant was higher than 80%, Protein S decreased at 65%, Protein C normal                          MTHFR mutation pending                          Repeat thrombosis panel in 3 months   • Continue ASA 81mg qd and statin  • Delores Fernández is from COVID infection in 2/2023     Seizures  • Was noted with eyes deviating to the right 5/16 with a post ictal period   • Had 2 episodes  • Rola Batres restarted since he had 2 sz in hospital     PFO  • Large PFO on ALYSSA  • Negative DVT  • To see Cards as OP 4-6 weeks for possible closure     Bradycardia  • Has HRs in 50's at times as in hospital  • Pt was an avid runner prior to CVA so likely d/t conditioning  • stable     Nicotine abuse  • Continue patch     Moderate B/L pleural effusions  • Repeat Cxray in 6 wks or sooner if symptomatic        Discharge date:  Team       The above assessment and plan was reviewed and updated as determined by my evaluation of the patient on 6/3/2023      Labs:   Results from last 7 days   Lab Units 05/29/23  0807   HEMATOCRIT % 36 4*   HEMOGLOBIN g/dL 12 8   PLATELETS Thousands/uL 343   WBC Thousand/uL 4 45     Results from last 7 days   Lab Units 05/29/23  0807   BUN mg/dL 13   CALCIUM mg/dL 8 7   CHLORIDE mmol/L 105   CO2 mmol/L 31   CREATININE mg/dL 0 90   POTASSIUM mmol/L 4 2   SODIUM mmol/L 135             Results from last 7 days   Lab Units 05/27/23  1054   POC GLUCOSE mg/dl 47*       Review of Scheduled Meds:  Current Facility-Administered Medications Medication Dose Route Frequency Provider Last Rate   • acetaminophen  975 mg Oral Q8H PRN Hugo Ibrahim MD     • aspirin  81 mg Oral Daily Hugo Ibrahim MD     • bisacodyl  10 mg Rectal Daily PRN Hugo Ibrahim MD     • enoxaparin  40 mg Subcutaneous Q24H [facility] Marline Martin MD     • escitalopram  5 mg Oral Daily Hugo Ibrahim MD     • levETIRAcetam  750 mg Oral Q12H Huan Gilbert MD     • nicotine  1 patch Transdermal Daily Hugo Ibrahim MD     • ondansetron  4 mg Oral Q6H PRN Hugo Ibrahim MD     • pantoprazole  40 mg Oral Early Morning Hugo Ibrahim MD     • polyethylene glycol  17 g Oral Daily PRN Hugo Ibrahim MD         Subjective/ HPI: Patient seen and examined  Patients overnight issues or events were reviewed with nursing or staff during rounds or morning huddle session  New or overnight issues include the following:     Pt seen in his room  He denies any current complaints  ROS:   A 10 point ROS was performed; negative except as noted above  Imaging:     No orders to display       *Labs /Radiology studies reviewed  *Medications reviewed and reconciled as needed  *Please refer to order section for additional ordered labs studies  *Case discussed with primary attending during morning huddle case rounds    Physical Examination:  Vitals:   Vitals:    06/02/23 1500 06/02/23 2023 06/02/23 2100 06/03/23 0500   BP: 116/55 100/53  100/56   BP Location: Left arm Left arm  Left arm   Pulse: (!) 53 (!) 51  (!) 51   Resp: 16 20  18   Temp: 98 5 °F (36 9 °C) 97 8 °F (36 6 °C)  98 °F (36 7 °C)   TempSrc: Oral Oral  Oral   SpO2: 98% 97% 97% 97%   Weight:       Height:           GEN: NAD; pleasant  NEURO: Alert  + aphasia but significant improvement  HEENT: Pupils are equal/reactive; normocephalic, face is asymmetrical, hearing is normal  CV: S1 S2 regular, no murmur/rub/gallops, 2/4 pedal pulses, no edema    RESP: Lungs are clear bilaterally, no wheezes rales or rhonchi, on room air, no distress, respirations are easy and non labored  GI: Abdomen is flat, soft, non tender, +BS x4; non distended  : Voiding without issues  MUSC: Moves all extremities except right hemiparesis improving  SKIN: pink, warm, normal turgor, no rashes or lesions noted       The above physical exam was reviewed and updated as determined by my evaluation of the patient on 6/3/2023  Invasive Devices     None                    VTE Pharmacologic Prophylaxis: Enoxaparin  Code Status: Level 1 - Full Code  Current Length of Stay: 7 day(s)      Total time spent:  30 minutes  with more than 50% spent counseling/coordinating care  Counseling includes discussion with patient re: progress  and discussion with patient of his/her current medical state/information  Coordination of patient's care was performed in conjunction with primary service  Time invested included review of patient's labs, vitals, and management of their comorbidities with continued monitoring  In addition, this patient was discussed with medical team including physician and advanced extenders  The care of the patient was extensively discussed and appropriate treatment plan was formulated unique for this patient  Medical decision making for the day was made by supervising physician unless otherwise noted in their attestation statement  ** Please Note:  voice to text software may have been used in the creation of this document   Although proof errors in transcription or interpretation are a potential of such software**

## 2023-06-04 PROCEDURE — 97112 NEUROMUSCULAR REEDUCATION: CPT

## 2023-06-04 PROCEDURE — 92507 TX SP LANG VOICE COMM INDIV: CPT

## 2023-06-04 PROCEDURE — 97535 SELF CARE MNGMENT TRAINING: CPT

## 2023-06-04 PROCEDURE — 97530 THERAPEUTIC ACTIVITIES: CPT

## 2023-06-04 PROCEDURE — 99232 SBSQ HOSP IP/OBS MODERATE 35: CPT | Performed by: INTERNAL MEDICINE

## 2023-06-04 PROCEDURE — 97110 THERAPEUTIC EXERCISES: CPT

## 2023-06-04 RX ADMIN — ASPIRIN 81 MG CHEWABLE TABLET 81 MG: 81 TABLET CHEWABLE at 09:06

## 2023-06-04 RX ADMIN — LEVETIRACETAM 750 MG: 750 TABLET, FILM COATED ORAL at 20:06

## 2023-06-04 RX ADMIN — NICOTINE 1 PATCH: 14 PATCH, EXTENDED RELEASE TRANSDERMAL at 09:05

## 2023-06-04 RX ADMIN — ESCITALOPRAM OXALATE 5 MG: 10 TABLET ORAL at 09:06

## 2023-06-04 RX ADMIN — ENOXAPARIN SODIUM 40 MG: 40 INJECTION SUBCUTANEOUS at 09:06

## 2023-06-04 RX ADMIN — LEVETIRACETAM 750 MG: 750 TABLET, FILM COATED ORAL at 09:06

## 2023-06-04 RX ADMIN — PANTOPRAZOLE SODIUM 40 MG: 40 TABLET, DELAYED RELEASE ORAL at 05:44

## 2023-06-04 NOTE — PROGRESS NOTES
Internal Medicine Progress Note  Patient: Susie See  Age/sex: 40 y o  male  Medical Record #: [id number]      ASSESSMENT/PLAN: (Interval History)  Susie See is seen and examined and management for following issues:    Acute left MCA CVA  • S/p left MCA thrombectomy  • Workup:   • Homocystine normal, BELEM normal, RF normal, ANCA negative, dsDNA negative, Factor V Leiden/lupus anticoagulant negative, Antithrombin III 81 (felt likely not clinically relevant was higher than 80%, Protein S decreased at 65%, Protein C normal                          MTHFR mutation pending                          Repeat thrombosis panel in 3 months   • Continue ASA 81mg qd and statin  • Rohini Villagomez is from COVID infection in 2/2023     Seizures  • Was noted with eyes deviating to the right 5/16 with a post ictal period   • Had 2 episodes  • [facility] restarted since he had 2 sz in hospital     PFO  • Large PFO on ALYSSA  • Negative DVT  • To see Cards as OP 4-6 weeks for possible closure     Bradycardia  • Has HRs in 50's at times as in hospital  • Pt was an avid runner prior to CVA so likely d/t conditioning  • stable     Nicotine abuse  • Continue patch     Moderate B/L pleural effusions  • Repeat Cxray in 6 wks or sooner if symptomatic        Discharge date:  Team       The above assessment and plan was reviewed and updated as determined by my evaluation of the patient on 6/4/2023      Labs:   Results from last 7 days   Lab Units 05/29/23  0807   HEMATOCRIT % 36 4*   HEMOGLOBIN g/dL 12 8   PLATELETS Thousands/uL 343   WBC Thousand/uL 4 45     Results from last 7 days   Lab Units 05/29/23  0807   BUN mg/dL 13   CALCIUM mg/dL 8 7   CHLORIDE mmol/L 105   CO2 mmol/L 31   CREATININE mg/dL 0 90   POTASSIUM mmol/L 4 2   SODIUM mmol/L 135                   Review of Scheduled Meds:  Current Facility-Administered Medications   Medication Dose Route Frequency Provider Last Rate   • acetaminophen  975 mg Oral Q8H PRN Jose Rafael Spicer MD     • aspirin  81 mg Oral Daily Jose Rafael Spicer MD     • bisacodyl  10 mg Rectal Daily PRN Jose Rafael Spicer MD     • enoxaparin  40 mg Subcutaneous Q24H [address] Marline Asher MD     • escitalopram  5 mg Oral Daily Jose Rafael Spicer MD     • levETIRAcetam  750 mg Oral Q12H [address] Jose Rafael Spicer MD     • nicotine  1 patch Transdermal Daily Jose Rafael Spicer MD     • ondansetron  4 mg Oral Q6H PRN Jose Rafael Spicer MD     • pantoprazole  40 mg Oral Early Morning Jose Rafael Spicer MD     • polyethylene glycol  17 g Oral Daily PRN Jose Rafael Spicer MD         Subjective/ HPI: Patient seen and examined  Patients overnight issues or events were reviewed with nursing or staff during rounds or morning huddle session  New or overnight issues include the following:     Pt seen in his room resting in bed  He denies any current complaints  ROS:   A 10 point ROS was performed; negative except as noted above  Imaging:     No orders to display       *Labs /Radiology studies reviewed  *Medications reviewed and reconciled as needed  *Please refer to order section for additional ordered labs studies  *Case discussed with primary attending during morning huddle case rounds    Physical Examination:  Vitals:   Vitals:    06/03/23 0500 06/03/23 1358 06/03/23 2121 06/04/23 0544   BP: 100/56 104/58 (!) 103/48 107/64   BP Location: Left arm Left arm Left arm Left arm   Pulse: (!) 51 (!) 53 63 56   Resp: 18 17 18 16   Temp: 98 °F (36 7 °C) 97 9 °F (36 6 °C) 99 2 °F (37 3 °C) 98 4 °F (36 9 °C)   TempSrc: Oral Oral Oral Oral   SpO2: 97% 99% 97% 96%   Weight:       Height:           GEN: NAD; pleasant, short answers to questions  NEURO: Alert  + aphasia but significant improvement  HEENT: Pupils are equal/reactive; normocephalic, face is asymmetrical, hearing is normal  CV: S1 S2 regular, no murmur/rub/gallops, 2/4 pedal pulses, no edema    RESP: Lungs are clear bilaterally, no wheezes rales or rhonchi, on room air, no distress, respirations are easy and non labored  GI: Abdomen is flat, soft, non tender, +BS x4; non distended  : Voiding without issues  MUSC: Moves all extremities except right hemiparesis improving  SKIN: pink, warm, normal turgor, no rashes or lesions noted       The above physical exam was reviewed and updated as determined by my evaluation of the patient on 6/4/2023  Invasive Devices     None                    VTE Pharmacologic Prophylaxis: Enoxaparin  Code Status: Level 1 - Full Code  Current Length of Stay: 8 day(s)      Total time spent:  30 minutes  with more than 50% spent counseling/coordinating care  Counseling includes discussion with patient re: progress  and discussion with patient of his/her current medical state/information  Coordination of patient's care was performed in conjunction with primary service  Time invested included review of patient's labs, vitals, and management of their comorbidities with continued monitoring  In addition, this patient was discussed with medical team including physician and advanced extenders  The care of the patient was extensively discussed and appropriate treatment plan was formulated unique for this patient  Medical decision making for the day was made by supervising physician unless otherwise noted in their attestation statement  ** Please Note:  voice to text software may have been used in the creation of this document   Although proof errors in transcription or interpretation are a potential of such software**

## 2023-06-04 NOTE — PLAN OF CARE
Problem: INFECTION - ADULT  Goal: Absence or prevention of progression during hospitalization  Description: INTERVENTIONS:  - Assess and monitor for signs and symptoms of infection  - Monitor lab/diagnostic results  - Monitor all insertion sites, i e  indwelling lines, tubes, and drains  - Monitor endotracheal if appropriate and nasal secretions for changes in amount and color  - [place] appropriate cooling/warming therapies per order  - Administer medications as ordered  - Instruct and encourage patient and family to use good hand hygiene technique  - Identify and instruct in appropriate isolation precautions for identified infection/condition  Outcome: Progressing  Goal: Absence of fever/infection during neutropenic period  Description: INTERVENTIONS:  - Monitor WBC    Outcome: Progressing     Problem: SAFETY ADULT  Goal: Patient will remain free of falls  Description: INTERVENTIONS:  - Educate patient/family on patient safety including physical limitations  - Instruct patient to call for assistance with activity   - Consult OT/PT to assist with strengthening/mobility   - Keep Call bell within reach  - Keep bed low and locked with side rails adjusted as appropriate  - Keep care items and personal belongings within reach  - Initiate and maintain comfort rounds  - Make Fall Risk Sign visible to staff  - Offer Toileting every Hours, in advance of need  - Initiate/Maintain alarm  - Obtain necessary fall risk management equipment:   - Apply yellow socks and bracelet for high fall risk patients  - Consider moving patient to room near nurses station  Outcome: Progressing  Goal: Maintain or return to baseline ADL function  Description: INTERVENTIONS:  -  Assess patient's ability to carry out ADLs; assess patient's baseline for ADL function and identify physical deficits which impact ability to perform ADLs (bathing, care of mouth/teeth, toileting, grooming, dressing, etc )  - Assess/evaluate cause of self-care deficits   - Assess range of motion  - Assess patient's mobility; develop plan if impaired  - Assess patient's need for assistive devices and provide as appropriate  - Encourage maximum independence but intervene and supervise when necessary  - Involve family in performance of ADLs  - Assess for home care needs following discharge   - Consider OT consult to assist with ADL evaluation and planning for discharge  - Provide patient education as appropriate  Outcome: Progressing  Goal: Maintains/Returns to pre admission functional level  Description: INTERVENTIONS:  - Perform BMAT or MOVE assessment daily    - Set and communicate daily mobility goal to care team and patient/family/caregiver  - Collaborate with rehabilitation services on mobility goals if consulted  - Perform Range of Motion times a day  - Reposition patient every hours    - Dangle patient  times a day  - Stand patient  times a day  - Ambulate patient  times a day  - Out of bed to chair times a day   - Out of bed for meals times a day  - Out of bed for toileting  - Record patient progress and toleration of activity level   Outcome: Progressing     Problem: DISCHARGE PLANNING  Goal: Discharge to home or other facility with appropriate resources  Description: INTERVENTIONS:  - Identify barriers to discharge w/patient and caregiver  - Arrange for needed discharge resources and transportation as appropriate  - Identify discharge learning needs (meds, wound care, etc )  - Arrange for interpretive services to assist at discharge as needed  - Refer to Case Management Department for coordinating discharge planning if the patient needs post-hospital services based on physician/advanced practitioner order or complex needs related to functional status, cognitive ability, or social support system  Outcome: Progressing     Problem: Prexisting or High Potential for Compromised Skin Integrity  Goal: Skin integrity is maintained or improved  Description: INTERVENTIONS:  - Identify patients at risk for skin breakdown  - Assess and monitor skin integrity  - Assess and monitor nutrition and hydration status  - Monitor labs   - Assess for incontinence   - Turn and reposition patient  - Assist with mobility/ambulation  - Relieve pressure over bony prominences  - Avoid friction and shearing  - Provide appropriate hygiene as needed including keeping skin clean and dry  - Evaluate need for skin moisturizer/barrier cream  - Collaborate with interdisciplinary team   - Patient/family teaching  - Consider wound care consult   Outcome: Progressing     Problem: MOBILITY - ADULT  Goal: Maintain or return to baseline ADL function  Description: INTERVENTIONS:  -  Assess patient's ability to carry out ADLs; assess patient's baseline for ADL function and identify physical deficits which impact ability to perform ADLs (bathing, care of mouth/teeth, toileting, grooming, dressing, etc )  - Assess/evaluate cause of self-care deficits   - Assess range of motion  - Assess patient's mobility; develop plan if impaired  - Assess patient's need for assistive devices and provide as appropriate  - Encourage maximum independence but intervene and supervise when necessary  - Involve family in performance of ADLs  - Assess for home care needs following discharge   - Consider OT consult to assist with ADL evaluation and planning for discharge  - Provide patient education as appropriate  Outcome: Progressing  Goal: Maintains/Returns to pre admission functional level  Description: INTERVENTIONS:  - Perform BMAT or MOVE assessment daily    - Set and communicate daily mobility goal to care team and patient/family/caregiver  - Collaborate with rehabilitation services on mobility goals if consulted  - Perform Range of Motion times a day  - Reposition patient every  hours    - Dangle patient  times a day  - Stand patient  times a day  - Ambulate patient  times a day  - Out of bed to chair times a day   - Out of bed for meals  times a day  - Out of bed for toileting  - Record patient progress and toleration of activity level   Outcome: Progressing

## 2023-06-04 NOTE — PROGRESS NOTES
06/04/23 1100   Pain Assessment   Pain Assessment Tool 0-10   Pain Score No Pain   Restrictions/Precautions   Precautions Aphasia;Bed/chair alarms;Cognitive; Fall Risk;Supervision on toilet/commode;Visual deficit  (R inattention)   Subjective   Subjective pt agreeable to perform skilled PT   Roll Left and Right   Type of Assistance Needed Independent   Roll Left and Right CARE Score 6   Sit to Lying   Type of Assistance Needed Set-up / clean-up   Sit to Lying CARE Score 5   Lying to Sitting on Side of Bed   Type of Assistance Needed Set-up / clean-up   Lying to Sitting on Side of Bed CARE Score 5   Sit to Stand   Type of Assistance Needed Supervision   Sit to Stand CARE Score 4   Bed-Chair Transfer   Type of Assistance Needed Supervision   Chair/Bed-to-Chair Transfer CARE Score 4   Transfer Bed/Chair/Wheelchair   Adaptive Equipment None  (gait belt)   Walk 10 Feet   Type of Assistance Needed Supervision   Comment gait belt   Walk 10 Feet CARE Score 4   Walk 50 Feet with Two Turns   Type of Assistance Needed Incidental touching   Walk 50 Feet with Two Turns CARE Score 4   Walk 150 Feet   Type of Assistance Needed Incidental touching   Walk 150 Feet CARE Score 4   Ambulation   Primary Mode of Locomotion Prior to Admission Walk   Distance Walked (feet) 300 ft   Assist Device Other  (gait belt)   Does the patient walk? 2   Yes   Equipment Use   NuStep lvl 6 BL and R UE 20 min   Assessment   Treatment Assessment pt progressing with ambulation and Nu Step bike and will cont to need skilled PT adn NPP to meet DC goals , Cont working on deficits and DC goals , pt return to supine lvl in bed with all needs in reach and alarm on   Barriers to Discharge Inaccessible home environment;Decreased caregiver support   Plan   Progress Progressing toward goals   PT Therapy Minutes   PT Time In 1100   PT Time Out 1138   PT Total Time (minutes) 38   PT Mode of treatment - Individual (minutes) 38   PT Mode of treatment - Concurrent (minutes) 0   PT Mode of treatment - Group (minutes) 0   PT Mode of treatment - Co-treat (minutes) 0   PT Mode of Treatment - Total time(minutes) 38 minutes   PT Cumulative Minutes 498   Therapy Time missed   Time missed?  No

## 2023-06-04 NOTE — PROGRESS NOTES
23 1500   Pain Assessment   Pain Assessment Tool 0-10   Pain Score No Pain   Comprehension   Comprehension (FIM) 3 - Understands basic info/conversation 50-74% of time   Expression   Expression (FIM) 4 - Expresses basic info/needs 75-90% of time   Social Interaction   Social Interaction (FIM) 5 - Requires redirection but less than 10% of the time  Problem Solving   Problem solving (FIM) 3 - Solves basic problmes 50-74% of time   Memory   Memory (FIM) 3 - Recognizes, recalls/performs 50-74%   Speech/Language/Cognition Assessmetn   Treatment Assessment Pt sitting in recliner upon SLP entering, agreeable to skilled ST session  Verbal Expressive Language:  Pt initially engaged in conversational/spontaneous speech during small talk to warm pt up to structured tasks  Pt asking to talk about his breakfast, lunch, and other therapies from this weekend  Overall presenting with 90% intelligibility characterized by phonemic and literal/semantic paraphasias, and addition of some plural -s t/o his spontaneous speech  Pt with improvements in his self corrects since last session with current SLP  Next, pt engaged in numerical expressive task where he was asked bio, demographic, orientation, and fxl numerical questions (including simple math) in order to improve his ability to say numbers accurately, as observed in prior session and per chart review of notes with other treating SLPs  Pt able to accurately state the STEVEN, month, date, , house number(address), current age, approximate time, # of days in a week, phone number, zip code, and  simple addition/subtraction prompts (up to double digits)  With SLP writing his incorrect responses, pt able to correct himself for the current year, # in a dozen, days in a year, and the remaining simple math problems given this visual cue   SLP discussed using microphone feature on phone to double check if he has stated numbers correctly in future scenarios, such as when at "doctor's appts verifying , phone #, etc     Written Expressive Language:  SLP provided description of common objects or words, pt asked to utilize his word finding skills, and then his writing/spelling skills  He performed at  accuracy, and  where he wrote a semantically related word  Next, pt asked to write a shopping list of three items  Given residual auditory comprehension deficits, the pt MUSA 3 grocery items  SLP modeled what he should have done, pt exclaiming, \"I knew what I did wasn't right,\" SLP writing the word \"WRITE\" and \"DRAW\" and explaining verbally and with gestures the difference  Pt nodding in understanding  Auditory & Reading Comprehension:  Finally, pt provided with spoken directions (1 step, 3 component), requiring comprehension of prepositions, verbs, and nouns  Pt completing  i'ly, increasing to  where written directions (visual cue), gestures, and highlighting and underlining specific verbs, prepositions, and nouns were provided in conjunction with the spoken word  Pt presents mostly w/ difficulty with prepositions (in, around, etc ) both for auditory and reading, followed by nouns (shapes), then verbs  Given pt's overall performance, the pt demonstrates significant improvement in his expressive and receptive language skills given his skilled ST sessions and would continue to benefit from ongoing therapy specifically targeting verbal expression (specifically numerical as it pertains to his bio/demographics), auditory and reading comprehension (beginning with one part of speech at a time), and spelling/writing where phonemic, gestural, semantic, and written cues are provided so that the pt can optimize his communicative skills necessary for return to home, work, and the community and to reduce caregiver and family burden upon D/C from [facility]      SLP Therapy Minutes   SLP Time In 1500   SLP Time Out 1600   SLP Total Time (minutes) 60   SLP Mode of treatment - Individual " (minutes) 60   SLP Mode of treatment - Concurrent (minutes) 0   SLP Mode of treatment - Group (minutes) 0   SLP Mode of treatment - Co-treat (minutes) 0   SLP Mode of Treatment - Total time(minutes) 60 minutes   SLP Cumulative Minutes 610   Therapy Time missed   Time missed?  No

## 2023-06-04 NOTE — PROGRESS NOTES
"Occupational Therapy Treatment Note         06/04/23 0715   Pain Assessment   Pain Assessment Tool 0-10   Pain Score No Pain   Restrictions/Precautions   Precautions Bed/chair alarms; Fall Risk;Cognitive; Aphasia; Supervision on toilet/commode  (R inattention)   Lifestyle   Autonomy \"This is hard\" - card sorting activity   Eating   Type of Assistance Needed Independent   Comment Pt independently britton to feed self using L UE  However, for R UE neuromuscular re-education OT utilized towel over L UE for modified constraint induced approach to promote R UE use during self feeding (after pt had used B/L UEs to open containers/ setup tray)  Pt requires increased time to manipulate utensils 2* impaired R UE sensation and R inattention  OT provided initially min verbal cues and tactile cues to decrease compensatory trunk leaning forward and promote pt using R UE to bring food to mouth  After OT educated / cued pt initially, pt able to carryover for > 50% of the meal    Eating CARE Score 6   Oral Hygiene   Type of Assistance Needed Supervision;Verbal cues   Physical Assistance Level No physical assistance   Comment in stance at sink  pt initially went to utilize soap instead of toothpaste, required verbal cue to identify wrong item almost being applied to toothbrush   Oral Hygiene CARE Score 4   Shower/Bathe Self   Comment pt declined bathing today   Upper Body Dressing   Type of Assistance Needed Supervision   Physical Assistance Level No physical assistance   Comment eated   Upper Body Dressing CARE Score 4   Lower Body Dressing   Type of Assistance Needed Supervision   Physical Assistance Level No physical assistance   Comment pt able to thread LEs into clothing while seated, stands to manage clothing over hips with supervision   Lower Body Dressing CARE Score 4   Putting On/Taking Off Footwear   Type of Assistance Needed Set-up / clean-up   Physical Assistance Level No physical assistance   Comment increased time   pt " encouraged to wear hospital socks when sneakers are off to prevent mobilizing in barefeet/regular socks   Putting On/Taking Off Footwear CARE Score 5   Sit to Lying   Type of Assistance Needed Set-up / clean-up   Physical Assistance Level No physical assistance   Sit to Lying CARE Score 5   Lying to Sitting on Side of Bed   Type of Assistance Needed Set-up / clean-up   Physical Assistance Level No physical assistance   Lying to Sitting on Side of Bed CARE Score 5   Sit to Stand   Type of Assistance Needed Supervision   Physical Assistance Level No physical assistance   Comment no device   Sit to Stand CARE Score 4   Bed-Chair Transfer   Type of Assistance Needed Supervision   Physical Assistance Level No physical assistance   Comment close supervision to/from OT gym   Chair/Bed-to-Chair Transfer CARE Score 4   Toileting Hygiene   Type of Assistance Needed Supervision   Physical Assistance Level No physical assistance   Comment distant supervision (outside of doorway) provided while pt stood to urinate and sat on toilet for bowel movement  Toileting Hygiene CARE Score 4   Toilet Transfer   Type of Assistance Needed Supervision   Physical Assistance Level No physical assistance   Comment no device   Toilet Transfer CARE Score 4   Functional Standing Tolerance   Activity x5 minutes during R UE FMC activity and x10 minutes during card scanning   Neuromuscular Education   Comments Session focused on R UE neuromuscular re-education  Utilized modified constraint induced principles by applying sling to L UE to promote R UE functional use  Explained to pt this will only be done during therapy  OT utilized gait belt and while standing pt engaged in daily 9 item activity tools board (lock/key, oven faucet knob, etc) with R UE  Pt required about 5 minutes to complete 8/9 items without assistance but with increased time  Next engaged in card scanning activity (also focused on language) with R UE to retrieve items   Pt dropped cards about 25% of the time using R UE  Cognition   Overall Cognitive Status Impaired   Arousal/Participation Alert; Cooperative   Attention Difficulty dividing attention   Orientation Level Oriented to person;Oriented to place;Oriented to situation   Following Commands Follows multistep commands with increased time or repetition   Comments Language: During card scanning activity, pt required to locate card and state color, number/face, and suit  Pt correct 25% of the time without prompting  Pt with most difficulty identifying suit and staying in a pattern of repeating (ie: color, face/number, suit)  When provided with visual cues of 3 piece of paper stating (red, black; kim, queen, ace, li; hearts, diamonds, clubs, spades) pt better able to stay with order of how to list and accuracy with number/color > 75% of the time  Pt interested in activity like this being included in HEP  Activity Tolerance   Activity Tolerance Patient tolerated treatment well   Assessment   Treatment Assessment Pt participated in skilled OT tx session  See above for further details on functional performance  Pt progressing in mobility and ADL routine, close supervision today for tasks  While strength in R UE is mildly weaker than L UE (4/5 R UE compared to 5/5 L UE), pt's impaired R UE sensation and R inattention cause pt to utilize L UE (non dominant hand) more often than R UE  Trialed modified constraint induced therapy principles today - covering L UE with towel during meal and then placing L UE in sling during functional tasks - which pt responded well to  Regarding in room privileges, will need to assess this week as while mobility is progress pt still required verbal cue for appropriate oral care items and to wear hospital socks or sneakers for mobility  Pt may not fully progress to having in room privileges but be more at a distant supervision level initially upon d/c - will continue to assess this week   Last week OT did briefly speak with pt's father who was ordering shower chair with back and told pt's father to anticipate at least 1 week of 24/7 supervision upon d/c while pt adjusts back to being at home  Family training again Tuesday at 12:30 with OT/SLP  Pt will continue to benefit from skilled OT intervention to address R UE HEP including: strengthening R UE; FMC/GMC activities; activities that can be performed while sitting WITH SUPERVISION of pt's father using R UE while L UE in sling for modified constraint induced principles  Pt left positioned in bed with call bell in reach and bed alarm on  Prognosis Good   Problem List Decreased strength; Impaired balance;Decreased mobility; Decreased coordination;Decreased cognition; Impaired judgement   Barriers to Discharge None   Plan   Treatment/Interventions ADL retraining;Functional transfer training; Therapeutic exercise; Endurance training;Cognitive reorientation;Patient/family training;Equipment eval/education; Bed mobility; Compensatory technique education   Progress Progressing toward goals   Recommendation   OT Discharge Recommendation Home with outpatient rehabilitation   OT Therapy Minutes   OT Time In 0715   OT Time Out 0910   OT Total Time (minutes) 115   OT Mode of treatment - Individual (minutes) 115   OT Mode of treatment - Concurrent (minutes) 0   OT Mode of treatment - Group (minutes) 0   OT Mode of treatment - Co-treat (minutes) 0   OT Mode of Treatment - Total time(minutes) 115 minutes   OT Cumulative Minutes 734   Therapy Time missed   Time missed?  No

## 2023-06-05 LAB
ANION GAP SERPL CALCULATED.3IONS-SCNC: 0 MMOL/L (ref 4–13)
BASOPHILS # BLD AUTO: 0.06 THOUSANDS/ÂΜL (ref 0–0.1)
BASOPHILS NFR BLD AUTO: 2 % (ref 0–1)
BUN SERPL-MCNC: 8 MG/DL (ref 5–25)
CALCIUM SERPL-MCNC: 8.8 MG/DL (ref 8.3–10.1)
CHLORIDE SERPL-SCNC: 108 MMOL/L (ref 96–108)
CO2 SERPL-SCNC: 30 MMOL/L (ref 21–32)
CREAT SERPL-MCNC: 0.8 MG/DL (ref 0.6–1.3)
EOSINOPHIL # BLD AUTO: 0.42 THOUSAND/ÂΜL (ref 0–0.61)
EOSINOPHIL NFR BLD AUTO: 11 % (ref 0–6)
ERYTHROCYTE [DISTWIDTH] IN BLOOD BY AUTOMATED COUNT: 11.8 % (ref 11.6–15.1)
GFR SERPL CREATININE-BSD FRML MDRD: 110 ML/MIN/1.73SQ M
GLUCOSE P FAST SERPL-MCNC: 87 MG/DL (ref 65–99)
GLUCOSE SERPL-MCNC: 87 MG/DL (ref 65–140)
HCT VFR BLD AUTO: 34.7 % (ref 36.5–49.3)
HGB BLD-MCNC: 12.1 G/DL (ref 12–17)
IMM GRANULOCYTES # BLD AUTO: 0.01 THOUSAND/UL (ref 0–0.2)
IMM GRANULOCYTES NFR BLD AUTO: 0 % (ref 0–2)
LYMPHOCYTES # BLD AUTO: 1.93 THOUSANDS/ÂΜL (ref 0.6–4.47)
LYMPHOCYTES NFR BLD AUTO: 47 % (ref 14–44)
MCH RBC QN AUTO: 33.2 PG (ref 26.8–34.3)
MCHC RBC AUTO-ENTMCNC: 34.9 G/DL (ref 31.4–37.4)
MCV RBC AUTO: 95 FL (ref 82–98)
MONOCYTES # BLD AUTO: 0.46 THOUSAND/ÂΜL (ref 0.17–1.22)
MONOCYTES NFR BLD AUTO: 12 % (ref 4–12)
NEUTROPHILS # BLD AUTO: 1.11 THOUSANDS/ÂΜL (ref 1.85–7.62)
NEUTS SEG NFR BLD AUTO: 28 % (ref 43–75)
NRBC BLD AUTO-RTO: 0 /100 WBCS
PLATELET # BLD AUTO: 250 THOUSANDS/UL (ref 149–390)
PMV BLD AUTO: 9.6 FL (ref 8.9–12.7)
POTASSIUM SERPL-SCNC: 4.1 MMOL/L (ref 3.5–5.3)
RBC # BLD AUTO: 3.65 MILLION/UL (ref 3.88–5.62)
SODIUM SERPL-SCNC: 138 MMOL/L (ref 135–147)
WBC # BLD AUTO: 3.99 THOUSAND/UL (ref 4.31–10.16)

## 2023-06-05 PROCEDURE — 92507 TX SP LANG VOICE COMM INDIV: CPT

## 2023-06-05 PROCEDURE — 97130 THER IVNTJ EA ADDL 15 MIN: CPT

## 2023-06-05 PROCEDURE — 80048 BASIC METABOLIC PNL TOTAL CA: CPT | Performed by: PHYSICIAN ASSISTANT

## 2023-06-05 PROCEDURE — 97110 THERAPEUTIC EXERCISES: CPT

## 2023-06-05 PROCEDURE — 97112 NEUROMUSCULAR REEDUCATION: CPT

## 2023-06-05 PROCEDURE — 99232 SBSQ HOSP IP/OBS MODERATE 35: CPT | Performed by: PHYSICAL MEDICINE & REHABILITATION

## 2023-06-05 PROCEDURE — 99232 SBSQ HOSP IP/OBS MODERATE 35: CPT | Performed by: INTERNAL MEDICINE

## 2023-06-05 PROCEDURE — 97129 THER IVNTJ 1ST 15 MIN: CPT

## 2023-06-05 PROCEDURE — 97530 THERAPEUTIC ACTIVITIES: CPT

## 2023-06-05 PROCEDURE — 85025 COMPLETE CBC W/AUTO DIFF WBC: CPT | Performed by: PHYSICIAN ASSISTANT

## 2023-06-05 RX ADMIN — NICOTINE 1 PATCH: 14 PATCH, EXTENDED RELEASE TRANSDERMAL at 08:27

## 2023-06-05 RX ADMIN — ASPIRIN 81 MG CHEWABLE TABLET 81 MG: 81 TABLET CHEWABLE at 08:23

## 2023-06-05 RX ADMIN — LEVETIRACETAM 750 MG: 750 TABLET, FILM COATED ORAL at 21:40

## 2023-06-05 RX ADMIN — PANTOPRAZOLE SODIUM 40 MG: 40 TABLET, DELAYED RELEASE ORAL at 05:26

## 2023-06-05 RX ADMIN — ESCITALOPRAM OXALATE 5 MG: 10 TABLET ORAL at 08:23

## 2023-06-05 RX ADMIN — LEVETIRACETAM 750 MG: 750 TABLET, FILM COATED ORAL at 08:23

## 2023-06-05 RX ADMIN — ENOXAPARIN SODIUM 40 MG: 40 INJECTION SUBCUTANEOUS at 08:23

## 2023-06-05 NOTE — PROGRESS NOTES
"Occupational Therapy Treatment Note         06/05/23 1332   Pain Assessment   Pain Assessment Tool 0-10   Pain Score No Pain   Restrictions/Precautions   Precautions Bed/chair alarms; Aphasia;Cognitive; Fall Risk;Supervision on toilet/commode   Lifestyle   Autonomy \"my dogs\" - pt is excited to see his dogs Friday   Picking Up Object   Type of Assistance Needed Incidental touching   Physical Assistance Level No physical assistance   Picking Up Object CARE Score 4   Sit to Stand   Type of Assistance Needed Set-up / clean-up   Physical Assistance Level No physical assistance   Sit to Stand CARE Score 5   Bed-Chair Transfer   Type of Assistance Needed Supervision   Physical Assistance Level No physical assistance   Comment no device to/from bathroom and therapy gym  Chair/Bed-to-Chair Transfer CARE Score 4   Toileting Hygiene   Type of Assistance Needed Supervision   Physical Assistance Level No physical assistance   Comment sits to perform rear hygiene, stands for clothing management  distant supervision provided during toileting   [address] Score 4   Toilet Transfer   Type of Assistance Needed Supervision   Physical Assistance Level No physical assistance   Comment no device   Toilet Transfer CARE Score 4   Meal Prep   Meal Preparation OT educated pt that 2* impaired R UE sensation and R inattention, recommend pt has assistance using stove/oven  Brought pt to kitchen and described R UE dropping item on stove or in over to increase pt's understanding of burn risk, pt verbalizes understanding  At microwave setting, when asked pt to set time for 30 minutes pt selects numbers 1, and then 9, 8, 7  OT demonstrated to pt pressing 3-0  Pt then able to start microwave, but when asked to finish pt hits \"start\" button again 3x prior to probelm solving this was incorrect button and hitting \"stop\" button  Overall recommend supervision/assist with meal preparation     Exercise Tools   Other Exercise Tool 1 Pt engaged in " UE strengthening using 4# free weight to increase UB strength for increased independence and performance during functional transfers and overall ADL/IADL tasks  While seated in chair pt completed 2 set of 10 reps of the following exercises: chest press, shoulder raise to shoulder height  2 sets of 15 reps - bicep/tricep curls with R UE  Also engaged in R UE hand/digit strengthening using pink theraputty to complete digit flexion and digits 1-3 pinching for 2 sets of 5 reps  Coordination   Fine Motor Focused on R UE [address] and strengthening and grasp and maintain, pt engaged in item retrieval using tweezers to retrieve 1/2 inch cotton balls x15 reps without dropping > 75% of the time  With smaller items (less than 1/2 in beans/beads) pt completed 10 reps prior to reporting fatigue and dropped about 75% of the time  Provided pt with pen/paper and pt able to hold pen to write his first and last name, able to copy 5 word sentence and copy shapes, but unable to spontaneously write his own sentence  Pt will benefit from pen/paper copying/tracing activity as part of R UE HEP  Cognition   Overall Cognitive Status Impaired   Arousal/Participation Alert   Attention Attends with cues to redirect   Orientation Level Oriented to person;Oriented to place;Oriented to situation   Memory Decreased short term memory   Following Commands Follows one step commands with increased time or repetition   Comments Engaged in pen/paper trailmaking, with increased time pt able to choose correct sequence of numbers/letters in ascending order while therapist [place] lines  Then OT placed post its around therapy gym and pt scanned and engaged in functional mobility while reaching with R UE to retrieve items in trailmaking order 1-5 and A-E  Pt able to verbalize which number/letter in correct sequence with increased time, required min verbal cues to locate items     Activity Tolerance   Activity Tolerance Patient tolerated treatment well   Assessment Treatment Assessment Pt participated in skilled OT tx session  See above for further details on functional performance  Utilized L UE sling for modified constraint induced approach while pt engaged in R UE neuromuscular re-education for Casie Callejas while seated at table and during mobility while locating items without room  Pt  intact, strength 4/5 however difficult sustaining grasp appears 2* R inattention and impaired R sensation  Extensive time educated on this to allow for understanding  Additionally, cognitively pt presents with impaired divided attention and R UE noted to drop items more frequently when pt is also concentrating on his language or another task  Due to this, as well as language deficits, recommend pt has assistance with medication management and meal preparation at home  Plan during family training tomorrow to educate pt's father on pt's deficits, recommendation for initial 24/7 supervision, and educate on his ability to assist pt while pt completes modified constraint induced exercises while seated and under direct supervision only  OT to provide medbridge R UE HEP with exercises described above using 4# free weight and theraputty, highlighted activities from OT toolkit with Casie Callejas for R UE using modified constraint induced approach, and number/letter/sentence tracing/copying  Plan for family training tomorrow with pt's dad to review information above and dry tub transfer with shower chair  Pt left positioned in recliner with call bell in reach and chair alarm on  OT educated pt on plan for outpatient OT/PT/SLP [address] but also educated him that his insurance has limitations on days/year and educated on plan to transition to [facility] after this  Discussed d/c Friday and plan for family training tomorrow  Of note pt normally wakes up at 3:30 AM and goes to bed around 7:30 PM due to his work schedule   He would like to continue this routine, but will speak with him and his father tomorrow about having similar schedules and pt having direct supervision/assist for showering, medication management, meal prep  Prognosis Good   Problem List Decreased strength;Decreased endurance; Impaired balance;Decreased mobility; Decreased coordination;Decreased cognition; Impaired sensation   Barriers to Discharge None   Plan   Treatment/Interventions ADL retraining;Functional transfer training; Therapeutic exercise; Endurance training;Cognitive reorientation;Patient/family training;Bed mobility; Equipment eval/education; Compensatory technique education   Progress Progressing toward goals   Recommendation   OT Discharge Recommendation Home with outpatient rehabilitation   OT Therapy Minutes   OT Time In 1235   OT Time Out 1400   OT Total Time (minutes) 85   OT Mode of treatment - Individual (minutes) 85   OT Mode of treatment - Concurrent (minutes) 0   OT Mode of treatment - Group (minutes) 0   OT Mode of treatment - Co-treat (minutes) 0   OT Mode of Treatment - Total time(minutes) 85 minutes   OT Cumulative Minutes 720   Therapy Time missed   Time missed?  No

## 2023-06-05 NOTE — PLAN OF CARE
Problem: INFECTION - ADULT  Goal: Absence or prevention of progression during hospitalization  Description: INTERVENTIONS:  - Assess and monitor for signs and symptoms of infection  - Monitor lab/diagnostic results  - Monitor all insertion sites, i e  indwelling lines, tubes, and drains  - Monitor endotracheal if appropriate and nasal secretions for changes in amount and color  - [place] appropriate cooling/warming therapies per order  - Administer medications as ordered  - Instruct and encourage patient and family to use good hand hygiene technique  - Identify and instruct in appropriate isolation precautions for identified infection/condition  Outcome: Progressing  Goal: Absence of fever/infection during neutropenic period  Description: INTERVENTIONS:  - Monitor WBC    Outcome: Progressing     Problem: SAFETY ADULT  Goal: Patient will remain free of falls  Description: INTERVENTIONS:  - Educate patient/family on patient safety including physical limitations  - Instruct patient to call for assistance with activity   - Consult OT/PT to assist with strengthening/mobility   - Keep Call bell within reach  - Keep bed low and locked with side rails adjusted as appropriate  - Keep care items and personal belongings within reach  - Initiate and maintain comfort rounds  - Make Fall Risk Sign visible to staff  - Offer Toileting every 2 Hours, in advance of need  - Initiate/Maintain bed/chair alarm  - Obtain necessary fall risk management equipment: non skid footwear  - Apply yellow socks and bracelet for high fall risk patients  - Consider moving patient to room near nurses station  Outcome: Progressing  Goal: Maintain or return to baseline ADL function  Description: INTERVENTIONS:  -  Assess patient's ability to carry out ADLs; assess patient's baseline for ADL function and identify physical deficits which impact ability to perform ADLs (bathing, care of mouth/teeth, toileting, grooming, dressing, etc )  - Assess/evaluate cause of self-care deficits   - Assess range of motion  - Assess patient's mobility; develop plan if impaired  - Assess patient's need for assistive devices and provide as appropriate  - Encourage maximum independence but intervene and supervise when necessary  - Involve family in performance of ADLs  - Assess for home care needs following discharge   - Consider OT consult to assist with ADL evaluation and planning for discharge  - Provide patient education as appropriate  Outcome: Progressing  Goal: Maintains/Returns to pre admission functional level  Description: INTERVENTIONS:  - Perform BMAT or MOVE assessment daily    - Set and communicate daily mobility goal to care team and patient/family/caregiver  - Collaborate with rehabilitation services on mobility goals if consulted  - Perform Range of Motion 3 times a day  - Reposition patient every 2 hours    - Dangle patient 3 times a day  - Stand patient 3 times a day  - Ambulate patient 3 times a day  - Out of bed to chair 3 times a day   - Out of bed for meals 3 times a day  - Out of bed for toileting  - Record patient progress and toleration of activity level   Outcome: Progressing     Problem: DISCHARGE PLANNING  Goal: Discharge to home or other facility with appropriate resources  Description: INTERVENTIONS:  - Identify barriers to discharge w/patient and caregiver  - Arrange for needed discharge resources and transportation as appropriate  - Identify discharge learning needs (meds, wound care, etc )  - Arrange for interpretive services to assist at discharge as needed  - Refer to Case Management Department for coordinating discharge planning if the patient needs post-hospital services based on physician/advanced practitioner order or complex needs related to functional status, cognitive ability, or social support system  Outcome: Progressing     Problem: Prexisting or High Potential for Compromised Skin Integrity  Goal: Skin integrity is maintained or improved  Description: INTERVENTIONS:  - Identify patients at risk for skin breakdown  - Assess and monitor skin integrity  - Assess and monitor nutrition and hydration status  - Monitor labs   - Assess for incontinence   - Turn and reposition patient  - Assist with mobility/ambulation  - Relieve pressure over bony prominences  - Avoid friction and shearing  - Provide appropriate hygiene as needed including keeping skin clean and dry  - Evaluate need for skin moisturizer/barrier cream  - Collaborate with interdisciplinary team   - Patient/family teaching  - Consider wound care consult   Outcome: Progressing     Problem: MOBILITY - ADULT  Goal: Maintain or return to baseline ADL function  Description: INTERVENTIONS:  -  Assess patient's ability to carry out ADLs; assess patient's baseline for ADL function and identify physical deficits which impact ability to perform ADLs (bathing, care of mouth/teeth, toileting, grooming, dressing, etc )  - Assess/evaluate cause of self-care deficits   - Assess range of motion  - Assess patient's mobility; develop plan if impaired  - Assess patient's need for assistive devices and provide as appropriate  - Encourage maximum independence but intervene and supervise when necessary  - Involve family in performance of ADLs  - Assess for home care needs following discharge   - Consider OT consult to assist with ADL evaluation and planning for discharge  - Provide patient education as appropriate  Outcome: Progressing  Goal: Maintains/Returns to pre admission functional level  Description: INTERVENTIONS:  - Perform BMAT or MOVE assessment daily    - Set and communicate daily mobility goal to care team and patient/family/caregiver  - Collaborate with rehabilitation services on mobility goals if consulted  - Perform Range of Motion 3 times a day  - Reposition patient every 2 hours    - Dangle patient 3 times a day  - Stand patient 3 times a day  - Ambulate patient 3 times a day  - Out of bed to chair 3 times a day   - Out of bed for meals 3 times a day  - Out of bed for toileting  - Record patient progress and toleration of activity level   Outcome: Progressing

## 2023-06-05 NOTE — PROGRESS NOTES
06/05/23 0830   Pain Assessment   Pain Assessment Tool 0-10   Pain Score No Pain   Restrictions/Precautions   Precautions Aphasia;Bed/chair alarms;Cognitive; Fall Risk;Supervision on toilet/commode;Seizure   Cognition   Overall Cognitive Status Impaired   Arousal/Participation Alert; Cooperative   Attention Attends with cues to redirect   Subjective   Subjective pt ready for PT without any complaints   Roll Left and Right   Type of Assistance Needed Independent   Roll Left and Right CARE Score 6   Sit to Lying   Type of Assistance Needed Independent   Sit to Lying CARE Score 6   Lying to Sitting on Side of Bed   Type of Assistance Needed Independent   Lying to Sitting on Side of Bed CARE Score 6   Sit to Stand   Type of Assistance Needed Set-up / clean-up   Comment no AD, encourage not to reach back while sitting down   Sit to Stand CARE Score 5   Bed-Chair Transfer   Type of Assistance Needed Supervision   Comment no AD, gait belt   Chair/Bed-to-Chair Transfer CARE Score 4   Walk 10 Feet   Type of Assistance Needed Supervision   Comment no AD   Walk 10 Feet CARE Score 4   Walk 50 Feet with Two Turns   Type of Assistance Needed Supervision   Walk 50 Feet with Two Turns CARE Score 4   Walk 150 Feet   Type of Assistance Needed Verbal cues; Incidental touching   Comment CS with occasional CGA due to slight LOB due to tripping secondary to R foot dragging   Walk 150 Feet CARE Score 4   Walking 10 Feet on Uneven Surfaces   Type of Assistance Needed Physical assistance;Verbal cues   Physical Assistance Level 25% or less   Comment outdoor pavement CG-CS at best to min A at times due to R foot tripping requiring assist to stabilize   Walking 10 Feet on Uneven Surfaces CARE Score 3   Curb or Single Stair   Style negotiated Curb   Type of Assistance Needed Incidental touching;Verbal cues   Comment CG-CS with outdoor curb no AD x 4 reps, VC to avoid catching R heel while descending but did not trip nor had LOB   1 Step (Curb) CARE Score 4   4 Steps   Type of Assistance Needed Incidental touching;Verbal cues; Adaptive equipment   Comment CS-CG with gait belt on FF   4 Steps CARE Score 4   12 Steps   Type of Assistance Needed Incidental touching;Verbal cues   Comment CS-CG with gait belt on FF x 5 reps with R rail, FF no HR x 2 reps, outdoor FF by employee outside entrance with R rail x 2 reps reciprocal pattern   12 Steps CARE Score 4   Picking Up Object   Type of Assistance Needed Incidental touching;Verbal cues   Comment CG-CS without AD - 20 lbs bag and 9 lbs bag, pen   Picking Up Object CARE Score 4   Therapeutic Interventions   Strengthening repeated multi angle mini squats x 5 SH at a time with reps till fatigue, resisted R terminal knee extension with black tband with reps till fatigue   Neuromuscular Re-Education repeated sit to stand with L LE on a 2 inch foot stool then progress task to pt shaking PT's hand, reaching for pen, picking up pen from floor as he stands up with emphasis on controlled sitting without UE support, outdoor walking entrance B loop with noted improvement in safety before and while crossing street, repeated fwd/bwd walking on the long ramp with emphasis on pt taking longer R stride lengths during retrowalking, walking while carrying  (craddle manner) 20 lbs bag on L hand then 20 lbs on R hand side only, 20 lbs on L +9 lbs on R hand while walking (simulating carrying dogs in arms), FF negotiation without rail then progress to pt holding 9 lbs wt on R hand without rail  - dec confidence stepping down requiring use of 1 rail while holding  weighted bag on R hand with VC to keep R hand  on the plastic bag  Fall floor transfer training which pt completed indep x 3 reps , reviewed post fall self assessment, safety practice to carry phone at all times, pt able to verbalized calling 911 for emergency      Equipment Use   NuStep lvl 6 x 16 mins bilat LE and R UE only (reminder to keep R hand  on handle) SpO2 95-96%, IN 86-88 with SPM> 70   Assessment   Treatment Assessment skilled PT focused on NPP/NMR training with emphasis on multi-tasking for both indoor and outdoor mobilities without using an AD  He still demos R knee hyperextension so still used knee cage this session  He cont to demo intermittent R foot drag chris noted with fatigue or outdoor mobility and this session demo'd occasional foot tripping with delayed righting so provided assist to stabilize  only noted R toe catching on steps nosing when pt negotiating FF while carrying object or when not using HR although did not have any foot tripping  also unable to sustained /hold on R hand so required assist /VC to ensure pt kept  on the object on his R hand while performing mobility tasks  cont PT POC with emphasis on NMR/NPP no AD incorporating inc R hand utilization to promote improve attention and functions  PT Barriers   Functional Limitation Car transfers; Ramp negotiation;Stair negotiation;Standing;Transfers; Walking   Plan   Treatment/Interventions Functional transfer training;LE strengthening/ROM; Therapeutic exercise; Endurance training;Bed mobility;Gait training;Equipment eval/education;Spoke to nursing;OT;Patient/family training;Cognitive reorientation   Progress Progressing toward goals   Recommendation   PT Discharge Recommendation Home with outpatient rehabilitation   PT Therapy Minutes   PT Time In 0830   PT Time Out 1000   PT Total Time (minutes) 90   PT Mode of treatment - Individual (minutes) 90   PT Mode of treatment - Concurrent (minutes) 0   PT Mode of treatment - Group (minutes) 0   PT Mode of treatment - Co-treat (minutes) 0   PT Mode of Treatment - Total time(minutes) 90 minutes   PT Cumulative Minutes 588   Therapy Time missed   Time missed?  No

## 2023-06-05 NOTE — PLAN OF CARE
Problem: INFECTION - ADULT  Goal: Absence or prevention of progression during hospitalization  Description: INTERVENTIONS:  - Assess and monitor for signs and symptoms of infection  - Monitor lab/diagnostic results  - Monitor all insertion sites, i e  indwelling lines, tubes, and drains  - Monitor endotracheal if appropriate and nasal secretions for changes in amount and color  - [place] appropriate cooling/warming therapies per order  - Administer medications as ordered  - Instruct and encourage patient and family to use good hand hygiene technique  - Identify and instruct in appropriate isolation precautions for identified infection/condition  Outcome: Progressing  Goal: Absence of fever/infection during neutropenic period  Description: INTERVENTIONS:  - Monitor WBC    Outcome: Progressing     Problem: SAFETY ADULT  Goal: Patient will remain free of falls  Description: INTERVENTIONS:  - Educate patient/family on patient safety including physical limitations  - Instruct patient to call for assistance with activity   - Consult OT/PT to assist with strengthening/mobility   - Keep Call bell within reach  - Keep bed low and locked with side rails adjusted as appropriate  - Keep care items and personal belongings within reach  - Initiate and maintain comfort rounds  - Make Fall Risk Sign visible to staff  - Offer Toileting every 2 Hours, in advance of need  - Initiate/Maintain bed/chair alarm  - Obtain necessary fall risk management equipment: nonskid footwear  - Apply yellow socks and bracelet for high fall risk patients  - Consider moving patient to room near nurses station  Outcome: Progressing  Goal: Maintain or return to baseline ADL function  Description: INTERVENTIONS:  -  Assess patient's ability to carry out ADLs; assess patient's baseline for ADL function and identify physical deficits which impact ability to perform ADLs (bathing, care of mouth/teeth, toileting, grooming, dressing, etc )  - Assess/evaluate cause of self-care deficits   - Assess range of motion  - Assess patient's mobility; develop plan if impaired  - Assess patient's need for assistive devices and provide as appropriate  - Encourage maximum independence but intervene and supervise when necessary  - Involve family in performance of ADLs  - Assess for home care needs following discharge   - Consider OT consult to assist with ADL evaluation and planning for discharge  - Provide patient education as appropriate  Outcome: Progressing  Goal: Maintains/Returns to pre admission functional level  Description: INTERVENTIONS:  - Perform BMAT or MOVE assessment daily    - Set and communicate daily mobility goal to care team and patient/family/caregiver  - Collaborate with rehabilitation services on mobility goals if consulted  - Perform Range of Motion 3 times a day  - Reposition patient every 2 hours    - Dangle patient 3 times a day  - Stand patient 3 times a day  - Ambulate patient 3 times a day  - Out of bed to chair 3 times a day   - Out of bed for meals 3 times a day  - Out of bed for toileting  - Record patient progress and toleration of activity level   Outcome: Progressing     Problem: DISCHARGE PLANNING  Goal: Discharge to home or other facility with appropriate resources  Description: INTERVENTIONS:  - Identify barriers to discharge w/patient and caregiver  - Arrange for needed discharge resources and transportation as appropriate  - Identify discharge learning needs (meds, wound care, etc )  - Arrange for interpretive services to assist at discharge as needed  - Refer to Case Management Department for coordinating discharge planning if the patient needs post-hospital services based on physician/advanced practitioner order or complex needs related to functional status, cognitive ability, or social support system  Outcome: Progressing     Problem: Prexisting or High Potential for Compromised Skin Integrity  Goal: Skin integrity is maintained or improved  Description: INTERVENTIONS:  - Identify patients at risk for skin breakdown  - Assess and monitor skin integrity  - Assess and monitor nutrition and hydration status  - Monitor labs   - Assess for incontinence   - Turn and reposition patient  - Assist with mobility/ambulation  - Relieve pressure over bony prominences  - Avoid friction and shearing  - Provide appropriate hygiene as needed including keeping skin clean and dry  - Evaluate need for skin moisturizer/barrier cream  - Collaborate with interdisciplinary team   - Patient/family teaching  - Consider wound care consult   Outcome: Progressing     Problem: MOBILITY - ADULT  Goal: Maintain or return to baseline ADL function  Description: INTERVENTIONS:  -  Assess patient's ability to carry out ADLs; assess patient's baseline for ADL function and identify physical deficits which impact ability to perform ADLs (bathing, care of mouth/teeth, toileting, grooming, dressing, etc )  - Assess/evaluate cause of self-care deficits   - Assess range of motion  - Assess patient's mobility; develop plan if impaired  - Assess patient's need for assistive devices and provide as appropriate  - Encourage maximum independence but intervene and supervise when necessary  - Involve family in performance of ADLs  - Assess for home care needs following discharge   - Consider OT consult to assist with ADL evaluation and planning for discharge  - Provide patient education as appropriate  Outcome: Progressing  Goal: Maintains/Returns to pre admission functional level  Description: INTERVENTIONS:  - Perform BMAT or MOVE assessment daily    - Set and communicate daily mobility goal to care team and patient/family/caregiver  - Collaborate with rehabilitation services on mobility goals if consulted  - Perform Range of Motion 3 times a day  - Reposition patient every 2 hours    - Dangle patient 3 times a day  - Stand patient 3 times a day  - Ambulate patient 3 times a day  - Out of bed to chair 3 times a day   - Out of bed for meals 3 times a day  - Out of bed for toileting  - Record patient progress and toleration of activity level   Outcome: Progressing

## 2023-06-05 NOTE — PROGRESS NOTES
06/05/23 1400   Pain Assessment   Pain Assessment Tool 0-10   Pain Score No Pain   Restrictions/Precautions   Precautions Bed/chair alarms;Cognitive; Fall Risk;Aphasia; Supervision on toilet/commode;Seizure   Weight Bearing Restrictions No   ROM Restrictions No   Comprehension   Comprehension (FIM) 3 - Understands basic info/conversation 50-74% of time   Expression   Expression (FIM) 3 - Needs to repeat parts of sentences   Social Interaction   Social Interaction (FIM) 5 - Interacts appropriately with others 90% of time   Problem Solving   Problem solving (FIM) 3 - Solves basic problmes 50-74% of time   Memory   Memory (FIM) 3 - Recognizes, recalls/performs 50-74%   Speech/Language/Cognition Assessmetn   Treatment Assessment Pt seen for skilled SLP services targeting expressive and receptive communication skills  Noted this session is PM session and pt already expressing fatigue from day of therapy  Educated that pt may struggle more this session, which is okay, therefore may switch tasks or provide breaks as needed  Pt receptive and motivated to participate  Verbal and Written Expression:  Pt completed divergent naming task with naming 5 items that best fit concrete categories  Pt was able to complete this portion verbally with 30/30acc increasing with some extended processing time  Pt then instructed to write out his responses in the lists below  Pt was able to legibly and successfully write out his responses with 24/30ac increasing with verbal cues (spelling outloud) and written cues (starting the word for him)  Noted with with some perseveration on words already written on page and needed cues to attend to these errors  Pt often needing breaks towards end of task as well  Reading Comprehension/Funcitonal cognitive task:  Pt presented with reading comprehension task with medication label   Pt instructed to first look over the label before jumping to the questions below to be able to answer them as best as able  Given fatigue, pt was able to read the questions himself with 4/10acc increasing with SLP unison speech to aid in increased word approximations  Then given fatigue, SLP read the remaining questions to him however pt was able to answer the questions using the label with 7/10acc increasing with verbal cues and gestural cues to locations on label to focus on  Discussed at end of session plan for family training tomorrow to review medications and further recommendations for assistance with this at home given deficits in reading comprehension  Pt appears receptive and agreeable  Discussed using pill box and phone alarms at home as well to better successfully get him into a routine with taking daily meds as this will be all new to him  Pt receptive as well  Assisted with transfer back to bed as pt done with therapy for the day and pointing to request to lay down  All items in reach and alarm on  Plan for FT session tomorrow afternoon with pt's father and plan to cont to target expressive and receptive communication skills for increased communication of wants and needs and independence and decreased burden of care by time of discharge  SLP Therapy Minutes   SLP Time In 1400   SLP Time Out 1500   SLP Total Time (minutes) 60   SLP Mode of treatment - Individual (minutes) 60   SLP Mode of treatment - Concurrent (minutes) 0   SLP Mode of treatment - Group (minutes) 0   SLP Mode of treatment - Co-treat (minutes) 0   SLP Mode of Treatment - Total time(minutes) 60 minutes   SLP Cumulative Minutes 670   Therapy Time missed   Time missed?  No

## 2023-06-05 NOTE — PROGRESS NOTES
Internal Medicine Progress Note  Patient: Prasanth Menendez  Age/sex: 40 y o  male  Medical Record #: [id number]      ASSESSMENT/PLAN: (Interval History)  Prasanth Menendez is seen and examined and management for following issues:    Acute left MCA CVA  • S/p left MCA thrombectomy  • Workup:   • Homocystine normal, BELEM normal, RF normal, ANCA negative, dsDNA negative, Factor V Leiden/lupus anticoagulant negative, Antithrombin III 81 (felt likely not clinically relevant was higher than 80%, Protein S decreased at 65%, Protein C normal                          MTHFR mutation pending                          Repeat thrombosis panel in 3 months   • Continue ASA 81mg qd and statin  • Enid Michaels is from COVID infection in 2/2023     Seizures  • Was noted with eyes deviating to the right 5/16 with a post ictal period   • Had 2 episodes  • Vereloya Gonzales Dr Rosey Loo restarted since he had 2 sz in hospital     PFO  • Large PFO on ALYSSA  • Negative DVT  • To see Cards as OP 4-6 weeks for possible closure     Bradycardia  • Has HRs in 50's at times as in hospital  • Pt was an avid runner prior to CVA so likely d/t conditioning  • stable     Nicotine abuse  • Continue patch     Moderate B/L pleural effusions  • Repeat Cxray in 6 wks or sooner if symptomatic        Discharge date:  Team       The above assessment and plan was reviewed and updated as determined by my evaluation of the patient on 6/5/2023      Labs:   Results from last 7 days   Lab Units 06/05/23  0525   HEMATOCRIT % 34 7*   HEMOGLOBIN g/dL 12 1   PLATELETS Thousands/uL 250   WBC Thousand/uL 3 99*     Results from last 7 days   Lab Units 06/05/23  0525   BUN mg/dL 8   CALCIUM mg/dL 8 8   CHLORIDE mmol/L 108   CO2 mmol/L 30   CREATININE mg/dL 0 80   POTASSIUM mmol/L 4 1   SODIUM mmol/L 138                   Review of Scheduled Meds:  Current Facility-Administered Medications   Medication Dose Route Frequency Provider Last Rate   • acetaminophen  975 mg Oral Q8H PRN Brittney Shahid MD     • aspirin  81 mg Oral Daily Brittney Shahid MD     • bisacodyl  10 mg Rectal Daily PRN Brittney Shahid MD     • enoxaparin  40 mg Subcutaneous Q24H [facility] Marline Mckeon Res, MD     • escitalopram  5 mg Oral Daily Brittney Shahid MD     • levETIRAcetam  750 mg Oral Q12H [facility] Brittney Shahid MD     • nicotine  1 patch Transdermal Daily Brittney Shahid MD     • ondansetron  4 mg Oral Q6H PRN Brittney Shahid MD     • pantoprazole  40 mg Oral Early Morning Brittney Shahid MD     • polyethylene glycol  17 g Oral Daily PRN Brittney Shahid MD         Subjective/ HPI: Patient seen and examined  Patients overnight issues or events were reviewed with nursing or staff during rounds or morning huddle session  New or overnight issues include the following:     Pt seen in his room working with SLP  He denies any current complaints  ROS:   A 10 point ROS was performed; negative except as noted above  Imaging:     No orders to display       *Labs /Radiology studies reviewed  *Medications reviewed and reconciled as needed  *Please refer to order section for additional ordered labs studies  *Case discussed with primary attending during morning huddle case rounds    Physical Examination:  Vitals:   Vitals:    06/04/23 0544 06/04/23 1437 06/04/23 2005 06/05/23 0530   BP: 107/64 114/60 125/65 115/73   BP Location: Left arm Left arm Left arm Left arm   Pulse: 56 (!) 52 (!) 52 (!) 50   Resp: 16 17 16 16   Temp: 98 4 °F (36 9 °C) 98 1 °F (36 7 °C) 98 6 °F (37 °C) 97 8 °F (36 6 °C)   TempSrc: Oral Oral Oral Oral   SpO2: 96% 98% 97% 99%   Weight:       Height:           GEN: NAD; pleasant, short answers to questions  NEURO: Alert  +aphasia  HEENT: Pupils are equal/reactive; normocephalic, face is asymmetrical, hearing is normal  CV: S1 S2 regular, no murmur/rub/gallops, 2/4 pedal pulses, no edema    RESP: Lungs are clear bilaterally, no wheezes rales or rhonchi, on room air, no distress, respirations are easy and non labored  GI: Abdomen is flat, soft, non tender, +BS x4; non distended  : Voiding without issues  MUSC: Moves all extremities  SKIN: pink, warm, normal turgor, no rashes or lesions noted       The above physical exam was reviewed and updated as determined by my evaluation of the patient on 6/5/2023  Invasive Devices     None                    VTE Pharmacologic Prophylaxis: Enoxaparin  Code Status: Level 1 - Full Code  Current Length of Stay: 9 day(s)      Total time spent:  30 minutes  with more than 50% spent counseling/coordinating care  Counseling includes discussion with patient re: progress  and discussion with patient of his/her current medical state/information  Coordination of patient's care was performed in conjunction with primary service  Time invested included review of patient's labs, vitals, and management of their comorbidities with continued monitoring  In addition, this patient was discussed with medical team including physician and advanced extenders  The care of the patient was extensively discussed and appropriate treatment plan was formulated unique for this patient  Medical decision making for the day was made by supervising physician unless otherwise noted in their attestation statement  ** Please Note:  voice to text software may have been used in the creation of this document   Although proof errors in transcription or interpretation are a potential of such software**

## 2023-06-05 NOTE — PROGRESS NOTES
"   23 1045   Pain Assessment   Pain Assessment Tool 0-10   Pain Score No Pain   Restrictions/Precautions   Precautions Aphasia;Bed/chair alarms;Cognitive; Fall Risk;Supervision on toilet/commode   Comprehension   Comprehension (FIM) 3 - Understands basic info/conversation 50-74% of time   Expression   Expression (FIM) 3 - Expresses basic info/needs 50-74% of time   Social Interaction   Social Interaction (FIM) 5 - Interacts appropriately with others 90% of time   Problem Solving   Problem solving (FIM) 3 - Solves basic problmes 50-74% of time   Memory   Memory (FIM) 3 - Recognizes, recalls/performs 50-74%   Speech/Language/Cognition Assessment   Treatment Assessment Pt participated in skilled ST session focusing on expressive and receptive language skills  Began session re-introducing self to which pt immediately acknowledged improved \"mood\" from the last time we had work together and clearly stated \"I'm done with the pity party, sorry about that  \" Briefly engaged in discussion about pt's progress in the last week to which pt was pleased and relayed that he recognizes his improvements but also that he will need to continue with therapies on discharge  Pt also recalled discharge date set for this Friday  Pt then engaged in the following tasks:     Expression of Orientation and Biographical Info:  Pt accurately stated his name, , age and family member names  He accurately stated his full address with the exception of one digit in his house number, to which was off by one number  He also improved in providing info related to his work hx this date in comparison to initial evaluation, but did still cont to require follow up questions from SLP to clarify info  Pt oriented x4 with nearly accurate articulation of all info with a close approximation of the year       Verbal Expression:  Verbally presented with a concrete/basic concept category, pt stated words which belong to each category for 30/30 trials with " "accurate semantic content  In only two trials, pt presented with close approximations of words due to articulation errors in which he was not able to self correct independently and required verbal models from SLP  However, pt was able to frequently self correct during task  As this task was completed at the end of the session, pt was verbally presented with [facility] characteristics/parts of an item where he determined the described item for 3/10 trials, independently  Pt noted to require more moderate amount of cuing to assist in word retrieval and expression skills, consisting of semantic, verbal and phonemic cues  Reading Comprehension:  Presented with a sentence containing more complex info and at times prepositions, along with Fo2 pictures which were similar in content, pt accurately matched the sentence to the correct picture in 30/35 trials, improving when given verbal cues with specific focus on key words in the sentence as SLP then read sentence aloud after pt's initial attempt  Of note, pt appeared to have the most difficulty with comprehension of the concepts of \"in front\" and \"behind  \" In a worksheet, presented with the name of a basic/concrete concept category, along with Fo12 words below  Pt independently highlighted words belonging to categories for 32/49 trials  Most errors were noted to occur for words in the middle and R sided columns  Also suspect errors due to the speed in which pt completed task, in which he was verbally cued to slow down x2 during task  When reviewing all targets again, pt was able to identify remaining targets  Of note, pt also had one additional error in which he included a word which did not belong  When reviewing this error, pt initially did not understand the error and required additional cuing and explanation       During informal convo, pt's speech noted to be more effective in communication semantic content, but remained characterized by times of haulting speech with word " retrieval deficits, multiple attempts at eliciting the correct word and times of inadequate semantic content  Overall, pt continues to present with expressive>receptive language deficits/aphasia  At this time, pt is recommended for further skilled SLP services during acute rehab stay in order to improve both expressive/receptive language skills and to maximize overall functional communication abilities  SLP Therapy Minutes   SLP Time In 5785   SLP Time Out 4409   SLP Total Time (minutes) 60   SLP Mode of treatment - Individual (minutes) 60   SLP Mode of treatment - Concurrent (minutes) 0   SLP Mode of treatment - Group (minutes) 0   SLP Mode of treatment - Co-treat (minutes) 0   SLP Mode of Treatment - Total time(minutes) 60 minutes   SLP Cumulative Minutes 670   Therapy Time missed   Time missed?  No

## 2023-06-05 NOTE — PROGRESS NOTES
PM&R PROGRESS NOTE:  Keyana Segura 40 y o  male MRN: [id number]  Unit/Bed#: -01 Encounter: [id number]           Rehab Diagnosis: Impairment of mobility, safety, Activities of Daily Living (ADLs), and cognitive/communication skills due to Stroke:  01 2  Right Body Involvement (Left Brain)  Left MCA CVA    SUBJECTIVE: Patient seen face-to-face today  No acute issues overnight  Had a great weekend  Speech is slowly improving, patient is able to name objects much easily than before  ASSESSMENT: Stable, progressing      PLAN:    Rehabilitation  • Functional deficits: right hemiparesis, right sensory deficits, aphasia, possible cognitive deficits, impaired mobility, self care  • Continue current rehabilitation plan of care to maximize function  o Progressing to a min assist level with PT ambulating greater than 300 feet with a gait belt  • Estimated Discharge: Friday 6/9/23 with outpatient neuro day program vs outpatient therapies    DVT prophylaxis  • Discontinued Lovenox 6/5/2023 patient is ambulating a great distance in therapies daily     Bladder plan  • Continent     Bowel plan  • Continent      * CVA (cerebral vascular accident) [facility])  Assessment & Plan  Presented 5/15/23 as stroke alert  CTH/MRI brain confirmed Left MCA ischemic stroke  S/p thrombectomy TICI 3 revascularization 5/16/23  Etiology unclear, (possibly related to recent COVID infection in Feb 2023)  Work-up: +Large PFO on ALYSSA  Hypercoagulable work-up and malignancy work-up largely negative - MHTFR pending    Repeat hypercoagulable what the serial work-up as outpatient recommended  · Secondary CVA ppx: Aspirin 81 mg daily  · CVA education and reduction of modifiable risk factors  · Follow-up with neurology and cardiology as an outpatient (patient is a candidate for PFO closure)      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)  Assessment & Plan  Continue Lexapro 5 mg in AM 5/30/23    Adjustment disorder  Assessment & "Plan  Consulting Neuropsychology as patient with depressed mood  Also started on Lexapro 5 mg daily on 5/30/23    Abnormal findings on imaging test  Assessment & Plan  CT C/A/P: Moderate bilateral pleural effusions, small amount of ascites, and diffuse body wall subcutaneous edema  Follow-up with PCP    Bradycardia  Assessment & Plan  HR 50-60s  Asymptomatic  Monitor  Follow-up with Cardiology as outpatient    Tobacco use  Assessment & Plan  Smoking cessation encouraged  Continue nicotine patch    PFO (patent foramen ovale)  Assessment & Plan  Large PFO identified on ALYSSA  Cardiology recommending follow-up in 4-6 weeks as an outpatient for possible closure    Seizure [facility])  Assessment & Plan  Witnessed seizure-like activity times 2/5/1623  Video EEG negative however patient continued on Keppra 750 mg every 12 hours  Placed on seizure precautions  Follow-up with neurology to determine length of Keppra duration - will reach out to them on Monday      Appreciate IM consultants medical co-management  Labs, medications, and imaging personally reviewed  ROS:  A ten point review of systems was completed on 06/05/23 and pertinent positives are listed in subjective section  All other systems reviewed were negative  OBJECTIVE:   /73 (BP Location: Left arm)   Pulse (!) 50   Temp 97 8 °F (36 6 °C) (Oral)   Resp 16   Ht 5' 9\" (1 753 m)   Wt 72 6 kg (160 lb 0 9 oz)   SpO2 99%   BMI 23 64 kg/m²       Physical Exam  Vitals and nursing note reviewed  Constitutional:       General: He is not in acute distress  HENT:      Head: Normocephalic and atraumatic  Nose: Nose normal       Mouth/Throat:      Mouth: Mucous membranes are moist    Eyes:      Conjunctiva/sclera: Conjunctivae normal    Cardiovascular:      Rate and Rhythm: Normal rate and regular rhythm  Pulses: Normal pulses  Pulmonary:      Effort: Pulmonary effort is normal       Breath sounds: Normal breath sounds  No wheezing or rales   " Abdominal:      General: Bowel sounds are normal  There is no distension  Palpations: Abdomen is soft  Tenderness: There is no abdominal tenderness  Musculoskeletal:         General: No swelling  Cervical back: Neck supple  Skin:     General: Skin is warm  Neurological:      Mental Status: He is alert and oriented to person, place, and time        Comments: Right  much improved  Right foot drop only apparent after 300 feet of walking  Occasional recurvatum  Expressive aphasia improving   Psychiatric:         Mood and Affect: Mood normal           Lab Results   Component Value Date    HCT 34 7 (L) 06/05/2023    HGB 12 1 06/05/2023    MCV 95 06/05/2023     06/05/2023    WBC 3 99 (L) 06/05/2023     Lab Results   Component Value Date    BUN 8 06/05/2023    CALCIUM 8 8 06/05/2023     06/05/2023    CO2 30 06/05/2023    CREATININE 0 80 06/05/2023    GLUC 87 06/05/2023    K 4 1 06/05/2023    SODIUM 138 06/05/2023     Lab Results   Component Value Date    INR 0 89 05/15/2023    PROTIME 12 3 05/15/2023         Current Facility-Administered Medications:   •  acetaminophen (TYLENOL) tablet 975 mg, 975 mg, Oral, Q8H PRN, Doyle Garcia MD  •  aspirin chewable tablet 81 mg, 81 mg, Oral, Daily, Doyle Garcia MD, 81 mg at 06/05/23 7364  •  bisacodyl (DULCOLAX) rectal suppository 10 mg, 10 mg, Rectal, Daily PRN, Doyle Garcia MD  •  enoxaparin (LOVENOX) subcutaneous injection 40 mg, 40 mg, Subcutaneous, Q24H [facility], Doyle Garcia MD, 40 mg at 06/05/23 0210  •  escitalopram (LEXAPRO) tablet 5 mg, 5 mg, Oral, Daily, Doyle Garcia MD, 5 mg at 06/05/23 2155  •  levETIRAcetam (KEPPRA) tablet 750 mg, 750 mg, Oral, Q12H [facility], Doyle Garcia MD, 750 mg at 06/05/23 1104  •  nicotine (NICODERM CQ) 14 mg/24hr TD 24 hr patch 1 patch, 1 patch, Transdermal, Daily, Doyle Garcia MD, 1 patch at 06/05/23 2546  •  ondansetron (ZOFRAN-ODT) dispersible tablet 4 mg, 4 mg, Oral, Q6H PRN, Barby Guy MD  •  pantoprazole (PROTONIX) EC tablet 40 mg, 40 mg, Oral, Early Morning, Barby Guy MD, 40 mg at 06/05/23 0526  •  polyethylene glycol (MIRALAX) packet 17 g, 17 g, Oral, Daily PRN, Barby Guy MD    History reviewed  No pertinent past medical history  Patient Active Problem List    Diagnosis Date Noted   • CVA (cerebral vascular accident) ([facility] [address] ) 05/15/2023   • 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) 05/29/2023   • Abnormal findings on imaging test 05/28/2023   • Adjustment disorder 05/28/2023   • Tobacco use 05/27/2023   • Mild pain 05/27/2023   • Bradycardia 05/27/2023   • PFO (patent foramen ovale) 05/22/2023   • [facility] 05/16/2023   • Positive self-administered antigen test for COVID-19 02/10/2023   • Viral infection, unspecified 11/10/2022   • Bilateral low back pain without sciatica 01/29/2020          Barby Guy MD    I have spent a total time of 35 minutes on 06/05/23 in caring for this patient including Impressions, Counseling / Coordination of care, Documenting in the medical record and Reviewing / ordering tests, medicine, procedures    ** Please Note:  voice to text software may have been used in the creation of this document   Although proof errors in transcription or interpretation are a potential of such software**

## 2023-06-06 LAB — MTHFR GENE MUT ANL BLD/T: NORMAL

## 2023-06-06 PROCEDURE — 97129 THER IVNTJ 1ST 15 MIN: CPT

## 2023-06-06 PROCEDURE — 97530 THERAPEUTIC ACTIVITIES: CPT

## 2023-06-06 PROCEDURE — 97110 THERAPEUTIC EXERCISES: CPT

## 2023-06-06 PROCEDURE — 97112 NEUROMUSCULAR REEDUCATION: CPT

## 2023-06-06 PROCEDURE — 99232 SBSQ HOSP IP/OBS MODERATE 35: CPT | Performed by: INTERNAL MEDICINE

## 2023-06-06 PROCEDURE — 99233 SBSQ HOSP IP/OBS HIGH 50: CPT | Performed by: PHYSICAL MEDICINE & REHABILITATION

## 2023-06-06 PROCEDURE — 97130 THER IVNTJ EA ADDL 15 MIN: CPT

## 2023-06-06 PROCEDURE — 97535 SELF CARE MNGMENT TRAINING: CPT

## 2023-06-06 PROCEDURE — 92507 TX SP LANG VOICE COMM INDIV: CPT

## 2023-06-06 RX ADMIN — NICOTINE 1 PATCH: 14 PATCH, EXTENDED RELEASE TRANSDERMAL at 08:09

## 2023-06-06 RX ADMIN — LEVETIRACETAM 750 MG: 750 TABLET, FILM COATED ORAL at 08:09

## 2023-06-06 RX ADMIN — ESCITALOPRAM OXALATE 5 MG: 10 TABLET ORAL at 08:09

## 2023-06-06 RX ADMIN — ASPIRIN 81 MG CHEWABLE TABLET 81 MG: 81 TABLET CHEWABLE at 08:09

## 2023-06-06 RX ADMIN — PANTOPRAZOLE SODIUM 40 MG: 40 TABLET, DELAYED RELEASE ORAL at 05:45

## 2023-06-06 RX ADMIN — LEVETIRACETAM 750 MG: 750 TABLET, FILM COATED ORAL at 21:15

## 2023-06-06 NOTE — PROGRESS NOTES
NEUROPSYCHOLOGY  CLINICAL PROGRESS NOTE   Carroll Swift 40 y o  :1980 male MRN: [id number]  DOS:23   Unit/Bed#: [facility] 458-01 Encounter: [id number]      Requested by (Physician/Service): Liseth Barbour MD    HISTORY: Burak Jackson a 40 y  o  male with past medical history significant for recent COVID-19 infection in [facility] presented to the [employer] [facility] 5/15/2023 with right hemiparesis, aphasia   Stroke alert initiated   Did not receive TNK as out of the window  Pieter Counts showing a large left MCA territory ischemic stroke with left M1 thrombus   Patient underwent thrombectomy TICI 3 revascularization of a left M1 occlusion on 5/15/2023 by Dr Krys Arguelles 2023, patient had seizure-like activity with eye deviation and clinical worsening   Loaded with Ativan and Keppra   Transition to oral Keppra 750 mg every 12 hours   Video EEG was performed, negative for additional seizures  patient seen by neurology   Etiology of stroke unclear   MRI brain showing large left MCA infarct with minimal petechial hemorrhage and stable edema  ALYSSA with EF 55%, and confirmed a large PFO   Hypercoagulable work-up in progress, largely unremarkable, MTHFR pending  CT C/A/P negative for malignancy  Incidentally found to have bilateral pleural effusions, small ascites, diffuse body wall subcutaenous edema  Cardiology consulted and an outpatient appointment to consider PFO closure was recommended in 4-6 weeks   Patient was placed on aspirin for stroke prophylaxis  After medical stabilization, patient found to have acute functional deficits in mobility, self care, speech, cognition, therefore admitted to [facility] on 23   Functional deficits: right hemiparesis, right sensory deficits, aphasia, possible cognitive deficits, impaired mobility, self care    Rehabilitation goals are to achieve a Mod I level with mobility and self care   Supervision level with speech, cognition   Prognosis is good   ELOS is 10-14 days  Estimated discharge is home         Patient Active Problem List    Diagnosis Date Noted   • 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) 05/29/2023   • Abnormal findings on imaging test 05/28/2023   • Adjustment disorder 05/28/2023   • Tobacco use 05/27/2023   • Mild pain 05/27/2023   • Bradycardia 05/27/2023   • PFO (patent foramen ovale) 05/22/2023   • Seizure ([facility] [address] ) 05/16/2023   • CVA (cerebral vascular accident) ([facility] [address] ) 05/15/2023   • Positive self-administered antigen test for COVID-19 02/10/2023   • Viral infection, unspecified 11/10/2022   • Bilateral low back pain without sciatica 01/29/2020       Body mass index is 23 64 kg/m²  Past Medical History:     History reviewed  No pertinent past medical history       Past Surgical History:     Past Surgical History:   Procedure Laterality Date   • IR STROKE ALERT  5/15/2023         Allergies:     No Known Allergies      Family and Social Support:   No data recorded    Social History:    Social History     Socioeconomic History   • Marital status: Single     Spouse name: None   • Number of children: None   • Years of education: None   • Highest education level: None   Occupational History   • None   Tobacco Use   • Smoking status: Every Day     Packs/day: 0 50     Years: 15 00     Total pack years: 7 50     Types: Cigarettes   • Smokeless tobacco: Never   Vaping Use   • Vaping Use: Never used   Substance and Sexual Activity   • Alcohol use: Yes     Comment: Social   • Drug use: Never   • Sexual activity: Not Currently   Other Topics Concern   • None   Social History Narrative   • None     Social Determinants of Health     Financial Resource Strain: High Risk (11/10/2022)    Overall Financial Resource Strain (CARDIA)    • Difficulty of Paying Living Expenses: Hard   Food Insecurity: No Food Insecurity (5/16/2023)    Hunger Vital Sign    • Worried About Running Out of Food in the Last Year: Never true    • Ran Out of Food in the Last Year: Never true   Transportation Needs: No Transportation Needs (5/16/2023)    PRAPARE - Transportation    • Lack of Transportation (Medical): No    • Lack of Transportation (Non-Medical): No   Physical Activity: Sufficiently Active (11/10/2022)    Exercise Vital Sign    • Days of Exercise per Week: 7 days    • Minutes of Exercise per Session: 60 min   Stress: No Stress Concern Present (11/10/2022)    [address]    • Feeling of Stress : Not at all   Social Connections: Not on file   Intimate Partner Violence: Not At Risk (11/10/2022)    Humiliation, Afraid, Rape, and Kick questionnaire    • Fear of Current or Ex-Partner: No    • Emotionally Abused: No    • Physically Abused: No    • Sexually Abused: No   Housing Stability: Low Risk  (5/16/2023)    Housing Stability Vital Sign    • Unable to Pay for Housing in the Last Year: No    • Number of [place] in the Last Year: 1    • Unstable Housing in the Last Year: No        Family History:    History reviewed  No pertinent family history      Medications:     Current Facility-Administered Medications:   •  acetaminophen (TYLENOL) tablet 975 mg, 975 mg, Oral, Q8H PRN, Maddy Hou MD  •  aspirin chewable tablet 81 mg, 81 mg, Oral, Daily, Maddy Hou MD, 81 mg at 06/06/23 0809  •  bisacodyl (DULCOLAX) rectal suppository 10 mg, 10 mg, Rectal, Daily PRN, Maddy Hou MD  •  escitalopram (LEXAPRO) tablet 5 mg, 5 mg, Oral, Daily, Maddy Hou MD, 5 mg at 06/06/23 0809  •  levETIRAcetam (KEPPRA) tablet 750 mg, 750 mg, Oral, Q12H [address], Maddy Hou MD, 750 mg at 06/06/23 0809  •  [START ON 6/7/2023] nicotine (NICODERM CQ) 7 mg/24hr TD 24 hr patch 7 mg, 7 mg, Transdermal, Daily, Maddy Hou MD  •  ondansetron (ZOFRAN-ODT) dispersible tablet 4 mg, 4 mg, Oral, Q6H PRN, Maddy Hou MD  •  pantoprazole (PROTONIX) EC tablet 40 mg, 40 mg, Oral, Early Morning, Hugo Ibrahim MD, 40 mg at 06/06/23 0545  •  polyethylene glycol (MIRALAX) packet 17 g, 17 g, Oral, Daily PRN, Hugo Ibrahim MD        OBSERVATIONS:     Appearance  ___x_Neat ____Disheveled ____Overweight ____Underweight ____Frail    Speech  ____Fluid, Articulate ____Spontaneous ____Circumlocutions _x___Word Finding difficulties __x__Dysarthria ____Circumstantial ____Paucity ____Perseverative ____Pressured ____ Tangential     Thought Process/Content  __x__Coherent  ____Preoccupations____Ruminations____Obsessions____Hallucinations ____Delusions ____Flight of Ideas ____Distortions ____Distracted ____Suicidal Ideation ____Homicidal Ideation ____ Memory Issues ____ Perseveration ____ Tangential    Interaction  _x___Engaged___x_Cooperative____Superficial____Detached____Fearful____Guarded____Suspicious ____Poor Boundaries ____Aggressive    Affect  ____Neutral__x__Positive____Anxiety____Worried____Irritable____Angry____Depressed/Dysphoric ____Euthymic _____ Tearful ____ Fatigued     Behavior  __x_Spontaneous ____Purposeful ____Slowed Initiation ____Apathetic ____Impulsive ____Dyscontrolled ____Hypoactive ____Hyperactive ____Repetitive/Perseverative      Overall Progress:    ____Very Declined ____Declined ____Stable __x__Improved ____Very Improved    Motivation and Participation:    ____Very Poor ____Poor ____Fair __x__Good ____Very Good                    ASSESSMENT & RECOMMENDATIONS:   Pt seen with father present for part of session  Father clarified that 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 and 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 he doesn't need to worry about the operations  Dad confirms that the entire family will work with pt to help him post discharge  Pt's premorbid functioning was very high  He ran 7 miles daily and maintained a vegan diet  He has worked diligently to to maintain a healthy lifestyle  He was vaccinated for COVID and was compliant with precautions    He feels frustrated that he not only developed COVID but also had a CVA at such a young age  He is working to maintain a positive outlook  He feels hopeful about his discharge Friday and does see that he is making progress in rehab  He reports that he slept well yesterday and felt that today was the first day he awakened feeling rested  Provided CBT and mindfulness based interventions, as well as supportive psychotherapy  Addressed coping, adjustment, and motivation  I will continue to evaluate pt's emotional functioning and status and provide supportive and mindfulness interventions during pt's stay  Effective coping strategies, distress tolerance, breathing exercises will be key interventions  Time spent with pt:  25 min  Continued Psychological intervention is medically necessary to:  __x__ Maintain current progress  __X_   Achieve Therapy Goals   __X__Prevent relapse       DIAGNOSIS:  Adjustment Disorder with Mixed Emotional Functioning      Thank you for the opportunity to participate in Mr Meredith Ruiz care  Roosevelt Vanegas, Ph D   Licensed Psychologist

## 2023-06-06 NOTE — PROGRESS NOTES
06/06/23 1415   Pain Assessment   Pain Assessment Tool 0-10   Pain Score No Pain   Grant-Baker FACES Pain Rating 0   Restrictions/Precautions   Precautions Aphasia;Cognitive; Fall Risk;Supervision on toilet/commode;Seizure   Weight Bearing Restrictions No   ROM Restrictions No   Comprehension   Comprehension (FIM) 3 - Understands basic info/conversation 50-74% of time   Expression   Expression (FIM) 2 - Uses only simple expressions or gestures (waves, hello)   Social Interaction   Social Interaction (FIM) 5 - Interacts appropriately with others 90% of time   Problem Solving   Problem solving (FIM) 3 - Solves basic problmes 50-74% of time   Memory   Memory (FIM) 3 - Recognizes, recalls/performs 50-74%   Speech/Language/Cognition Assessmetn   Treatment Assessment Family Training/Observation    Pt seen with father, Jensen Quintanilla for cont'd education/training session for communication and cognitive skills  Pt observed to be fatigue given afternoon session and completed OT session prior  Engaged in conversation regarding any questions for discharge- nothing at this time  Reviewed meds with pt and father, pt only on 4 oral medications and a nicotine patch which father stated that pt is on the lowest dose and should not need it any more at home  Pt shaking his head too in agreement about not smoking any more  Reviewed use of pill organizer and alarms on phone as reminders for meds  Educated that father is to complete with pt to ensure organization is correct but encouraged to engage him in the label reading and locating of information/comprehension of dosage, frequency and purpose of medications  All in agreement  Next discussed HEP of different worksheets targeting verbal, written, and reading comprehension  Reviewed tasks that pt previously has been successful with and which ones he can attempt to complete alone for father to check after and tasks that father can engage him with together   Provided demonstration on cuing strategies during object naming by description task in which father was able to provide additional semantic cues to help pt word find for correct answer  Last, discussed options for apps for pt to complete communication tasks on his phone- discussed pt does have android therefore this SLP unsure which apps  are available as well as which would be appropriate for him to utilize as some do require some level of programming assistance- to cont to be reviewed and discussed before encouraging pt to purchase any at this time  Last, reviewed how reading comprehension impacts functional cognitive tasks like organizing meds, doing finances, cooking/laundry, etc  Encouraged father to complete tasks with pt, providing him cues to locate items, read off labels, any thing involving communication to maximize his skills at this time  Both father and pt receptive and with no further questions at this time  Engaged in structured tasks targeting education/use of word finding strategies- provided pt with targeted word and prompted him to try to list words associated with the targeted word  Pt required moderate cuing to be able to name, category, what color, what to do with it, where to find it, etc  Given fatigue level as well, extended processing time needed as well as maximal cuing to complete  Last completed reading comprehension task with recipe label  Pt required moderate to max cues for reading comprehension of the questions but then moderate cues to locate within the label above  Pt again educated to laura in on the context/content words to better cue him to what the questions were asking (e g  How many, when, how much)  Pt conts to make good progress towards more functional communication skills but conts with deficits in both expressive and receptive language skills  Pt planned for discharge home with 24hr S/A on Friday 6/9 with outpt therapy services   Pt will cont to benefit from skilled SLP services targeting cognitive lingusitic communication and expressive/receptive communication skills for increased independence and decreased burden of care upon safe discharge home  SLP Therapy Minutes   SLP Time In 7099   SLP Time Out 1500   SLP Total Time (minutes) 45   SLP Mode of treatment - Individual (minutes) 45   SLP Mode of treatment - Concurrent (minutes) 0   SLP Mode of treatment - Group (minutes) 0   SLP Mode of treatment - Co-treat (minutes) 0   SLP Mode of Treatment - Total time(minutes) 45 minutes   SLP Cumulative Minutes 744   Therapy Time missed   Time missed?  No

## 2023-06-06 NOTE — CASE MANAGEMENT
Team Update:  Cm met with pt and pt's father Salvador Oh as bedside to review dc date of Friday 6/9  Cm also scheduled PT OT ST therapies at [facility] for Wednesday 6/14 beginning at 4:15PM, pt and Salvador Irvin agreeable  Added to DCI

## 2023-06-06 NOTE — PROGRESS NOTES
PM&R PROGRESS NOTE:  Luca Moscoso 40 y o  male MRN: [id number]  Unit/Bed#: -01 Encounter: [id number]           Rehab Diagnosis: Impairment of mobility, safety, Activities of Daily Living (ADLs), and cognitive/communication skills due to Stroke:  01 2  Right Body Involvement (Left Brain)  Left MCA CVA    SUBJECTIVE: Patient seen face-to-face  No acute issues overnight  Going forward to his discharge on Friday  Patient will be committing to quitting smoking  We have discussed weaning off his NicoDerm patch this week  Answered several questions with his dad at the bedside today with his understanding  ASSESSMENT: Stable, progressing      PLAN:    Rehabilitation  • Functional deficits: right hemiparesis, right sensory deficits, aphasia, possible cognitive deficits, impaired mobility, self care  • Continue current rehabilitation plan of care to maximize function  I personally attended, reviewed, and discussed medical and functional updates in team conference today  Please refer to advance care planning note for details    • Estimated Discharge: Friday 6/9/23 with outpatient therapies PT, OT, SLP  o appts with Neurology/Cardiology     Current Function:  Physical Therapy Occupational Therapy Speech Therapy   Weight Bearing Status: Full Weight Bearing  Transfers: Supervision  Bed Mobility: Independent  Amulation Distance (ft): 200 feet  Ambulation: Incidental Touching, Minimal Assistance, Supervision (occassional min due to LOB otherwise CG-CS)  Assistive Device for Ambulation:  (no AD)  Wheelchair Mobility Distance:  (n/a)  Wheelchair Mobility:  (anticipate pt will be amb at d/c)  Number of Stairs: 12  Assistive Device for Stairs: Right Hand Rail  Stair Assistance: Incidental Touching  Ramp: Incidental Touching  Assistive Device for Ramp: None  Discharge Recommendations: Home with:  [address] with[de-identified] 24 Hour Supervision, Family Support, Outpatient Physical Therapy   Eating: Independent  Grooming: Supervision  Bathing: Minimal Assistance  Bathing: Minimal Assistance  Upper Body Dressing: Supervision  Lower Body Dressing: Supervision  Toileting: Supervision  Tub/Shower Transfer: Minimal Assistance  Toilet Transfer: Supervision  Cognition: Exceptions to WNL  Cognition: Decreased Executive Functions, Decreased Attention  Orientation: Person, Place, Situation   Mode of Communication: Verbal  Speech/Language: Expressive Aphasia (and receptive aphasia)  Cognition: Exceptions to WNL  Cognition: Decreased Memory, Decreased Executive Functions, Decreased Attention, Decreased Comprehension, Decreased Safety, Impulsive  Orientation: Person, Place, Time, Situation  Discharge Recommendations: Home with:  [address] with[de-identified] 24 Hour Supervision, 24 Hour Assisteance, Family Support, Outpatient Speech Therapy       DVT prophylaxis  • Discontinued Lovenox 6/5/2023 patient is ambulating a great distance in therapies daily     Bladder plan  • Continent     Bowel plan  • Continent      * CVA (cerebral vascular accident) [facility])  Assessment & Plan  Presented 5/15/23 as stroke alert  CTH/MRI brain confirmed Left MCA ischemic stroke  S/p thrombectomy TICI 3 revascularization 5/16/23  Etiology unclear, (possibly related to recent COVID infection in Feb 2023)  Work-up: +Large PFO on ALYSSA  Hypercoagulable work-up and malignancy work-up largely negative - MHTFR pending    Repeat hypercoagulable what the serial work-up as outpatient recommended  · Secondary CVA ppx: Aspirin 81 mg daily  · CVA education and reduction of modifiable risk factors  · Follow-up with neurology and cardiology as an outpatient (patient is a candidate for PFO closure)      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)  Assessment & Plan  Continue Lexapro 5 mg -started 5/30/2023    Adjustment disorder  Assessment & Plan  Appreciate neuropsychology following while in [facility]  Mood has significantly improved  Continue Lexapro 5 mg daily - started "5/30/23    Abnormal findings on imaging test  Assessment & Plan  CT C/A/P: Moderate bilateral pleural effusions, small amount of ascites, and diffuse body wall subcutaneous edema  Follow-up with PCP  Stable from a pulmonary standpoint in [facility]    Bradycardia  Assessment & Plan  HR 50-60s  Asymptomatic  Monitor  Follow-up with Cardiology as outpatient    Tobacco use  Assessment & Plan  Smoking cessation encouraged  Continue nicotine patch -weaned to 7 mg daily    PFO (patent foramen ovale)  Assessment & Plan  Large PFO identified on ALYSSA  Cardiology recommending follow-up in 4-6 weeks as an outpatient for possible closure    Seizure [facility])  Assessment & Plan  Witnessed seizure-like activity times 2/5/1623  Video EEG negative however patient continued on Keppra 750 mg every 12 hours  Placed on seizure precautions  Per neurology continue Keppra until outpatient appointment for duration to be determined  [place] form completed      Appreciate IM consultants medical co-management  Labs, medications, and imaging personally reviewed  ROS:  A ten point review of systems was completed on 06/06/23 and pertinent positives are listed in subjective section  All other systems reviewed were negative  OBJECTIVE:   /67 (BP Location: Left arm)   Pulse (!) 48   Temp 97 6 °F (36 4 °C) (Oral)   Resp 16   Ht 5' 9\" (1 753 m)   Wt 72 6 kg (160 lb 0 9 oz)   SpO2 97%   BMI 23 64 kg/m²       Physical Exam  Vitals and nursing note reviewed  Constitutional:       General: He is not in acute distress  HENT:      Head: Normocephalic and atraumatic  Nose: Nose normal       Mouth/Throat:      Mouth: Mucous membranes are moist    Eyes:      Conjunctiva/sclera: Conjunctivae normal    Cardiovascular:      Rate and Rhythm: Normal rate and regular rhythm  Pulses: Normal pulses  Pulmonary:      Effort: Pulmonary effort is normal       Breath sounds: Normal breath sounds  No wheezing or rales     Abdominal:      " General: Bowel sounds are normal  There is no distension  Palpations: Abdomen is soft  Tenderness: There is no abdominal tenderness  Musculoskeletal:         General: No swelling  Cervical back: Neck supple  Skin:     General: Skin is warm  Neurological:      Mental Status: He is alert and oriented to person, place, and time        Comments: Right mild inattention  Mild right hemiparesis  Aphasia   Psychiatric:         Mood and Affect: Mood normal           Lab Results   Component Value Date    HCT 34 7 (L) 06/05/2023    HGB 12 1 06/05/2023    MCV 95 06/05/2023     06/05/2023    WBC 3 99 (L) 06/05/2023     Lab Results   Component Value Date    BUN 8 06/05/2023    CALCIUM 8 8 06/05/2023     06/05/2023    CO2 30 06/05/2023    CREATININE 0 80 06/05/2023    GLUC 87 06/05/2023    K 4 1 06/05/2023    SODIUM 138 06/05/2023     Lab Results   Component Value Date    INR 0 89 05/15/2023    PROTIME 12 3 05/15/2023         Current Facility-Administered Medications:   •  acetaminophen (TYLENOL) tablet 975 mg, 975 mg, Oral, Q8H PRN, Td Pozo MD  •  aspirin chewable tablet 81 mg, 81 mg, Oral, Daily, Td Pozo MD, 81 mg at 06/06/23 0809  •  bisacodyl (DULCOLAX) rectal suppository 10 mg, 10 mg, Rectal, Daily PRN, Td Pozo MD  •  escitalopram (LEXAPRO) tablet 5 mg, 5 mg, Oral, Daily, Td Pozo MD, 5 mg at 06/06/23 0809  •  levETIRAcetam (KEPPRA) tablet 750 mg, 750 mg, Oral, Q12H [facility], Td Pozo MD, 750 mg at 06/06/23 0809  •  [START ON 6/7/2023] nicotine (NICODERM CQ) 7 mg/24hr TD 24 hr patch 7 mg, 7 mg, Transdermal, Daily, Td Pozo MD  •  ondansetron (ZOFRAN-ODT) dispersible tablet 4 mg, 4 mg, Oral, Q6H PRN, Td Pozo MD  •  pantoprazole (PROTONIX) EC tablet 40 mg, 40 mg, Oral, Early Morning, Td Pozo MD, 40 mg at 06/06/23 0545  •  polyethylene glycol (MIRALAX) packet 17 g, 17 g, Oral, Daily PRN, Td Pozo, MD    History reviewed  No pertinent past medical history  Patient Active Problem List    Diagnosis Date Noted   • CVA (cerebral vascular accident) ([facility] [address] ) 05/15/2023   • 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) 05/29/2023   • Abnormal findings on imaging test 05/28/2023   • Adjustment disorder 05/28/2023   • Tobacco use 05/27/2023   • Mild pain 05/27/2023   • Bradycardia 05/27/2023   • PFO (patent foramen ovale) 05/22/2023   • Seizure ([facility] [address] ) 05/16/2023   • Positive self-administered antigen test for COVID-19 02/10/2023   • Viral infection, unspecified 11/10/2022   • Bilateral low back pain without sciatica 01/29/2020          Aida Darden MD    I have spent a total time of 55 minutes on 06/06/23 in caring for this patient including Impressions, Counseling / Coordination of care, Documenting in the medical record and Weekly team conference and answering several questions with his father  ** Please Note:  voice to text software may have been used in the creation of this document   Although proof errors in transcription or interpretation are a potential of such software**

## 2023-06-06 NOTE — PROGRESS NOTES
06/06/23 0830   Pain Assessment   Pain Assessment Tool 0-10   Pain Score No Pain   Restrictions/Precautions   Precautions Aphasia;Bed/chair alarms;Cognitive; Fall Risk;Seizure;Supervision on toilet/commode   Cognition   Overall Cognitive Status Impaired   Arousal/Participation Alert; Cooperative   Attention Attends with cues to redirect   Subjective   Subjective pt had no c/o and ready for PT sitting EOB   Roll Left and Right   Type of Assistance Needed Independent   Roll Left and Right CARE Score 6   Sit to Lying   Type of Assistance Needed Independent   Comment no AD   Sit to Lying CARE Score 6   Lying to Sitting on Side of Bed   Type of Assistance Needed Independent   Comment no AD   Lying to Sitting on Side of Bed CARE Score 6   Sit to Stand   Type of Assistance Needed Set-up / clean-up   Sit to Stand CARE Score 5   Bed-Chair Transfer   Type of Assistance Needed Supervision   Comment no AD   Chair/Bed-to-Chair Transfer CARE Score 4   Car Transfer   Type of Assistance Needed Supervision;Verbal cues   Comment VC to move L LE out of the car before standing up to reduce risk for falls x 2 reps while holding 3# weighted bag on R hand   Car Transfer CARE Score 4   Walk 10 Feet   Type of Assistance Needed Supervision;Verbal cues   Walk 10 Feet CARE Score 4   Walk 50 Feet with Two Turns   Type of Assistance Needed Supervision;Verbal cues   Walk 50 Feet with Two Turns CARE Score 4   Walk 150 Feet   Type of Assistance Needed Supervision;Verbal cues   Comment CS no AD or knee cage, no LOB or foot tripping but occasional R foot scuffing   Also audible R foot slapping   Walk 150 Feet CARE Score 4   Walking 10 Feet on Uneven Surfaces   Type of Assistance Needed Incidental touching;Verbal cues   Comment CG-CS outdoor ramp+ pavement , no AD or knee cage   Walking 10 Feet on Uneven Surfaces CARE Score 4   Wheel 50 Feet with Two Turns   Reason if not Attempted Activity not applicable   Wheel 50 Feet with Two Turns CARE Score 9 Wheel 150 Feet   Reason if not Attempted Activity not applicable   Wheel 812 Feet CARE Score 9   Curb or Single Stair   Style negotiated Curb   Comment outdoor ramp without AD or knee cage while carrying 3# weighted bag on R hand x 4 reps - with emphasis on R foot clearance over nosing when ascending to reduce risk for falls   4 Steps   Type of Assistance Needed Supervision;Verbal cues; Adaptive equipment   4 Steps CARE Score 4   12 Steps   Type of Assistance Needed Supervision;Verbal cues; Adaptive equipment   Comment CS with/without HR reciprocal pattern although performs non reciprocal when first descend then switched to non reciprocal with subsequent steps   12 Steps CARE Score 4   Picking Up Object   Type of Assistance Needed Supervision;Verbal cues   Comment no AD, 20 lbs weighted bag, pen, 3 lbs weighted bag   Picking Up Object CARE Score 4   Therapeutic Interventions   Strengthening repeated mult-angle mini squats without UE support x 5 SH at a time and reps till fatigue   Neuromuscular Re-Education outdoor mobiliy training without AD or knee cage but carrying 3# weighted bag on R hand as he repeatedly walking up/down long ramp fwd/bwd walking, going up/down outdoor FF with 1 rail and carrying bag, picking up cap and weighted bag off the ground, repeatedly donning/doffing sweatshirt while walking up/down outdoor ramp  NPP gait entrance B loop x 2 reps while carrying weighted bag including crossing street with demo appropriate safety awareness   Indoor mobility training include repeated step through on stairs training and FF negotiation with/without using of rail and PT used of ankle wts for visual cues to facilitate R toe clearance on nosing,stepping over different sizes bolsters (kicked red and green bolsters due to dec R foot clearance initially reps) noted improvement after repeated training, carrying 20 lbs weighted bag on R arm to simulate dog weight, walking while carrying open top styrofoam cup on R hand (spilled water 3x) then pt had used mop on R to clean spilled water on floor, cleaning counter + nu step + mock car with R hand, taking off gloves while walking x 2 and stepping on trash can lever to open then throw gloves in   Equipment Use   NuStep lvl 7, SPM>70 bilat LE and R UE only x  15 mins - SpO2 97%, [place]    Assessment   Treatment Assessment skilled PT cont to focused on multi-tasking NMR/NPP training incorporating inc use of R UE due to inattention and impaired propriception  Pt did not dropped 3# weighted bag during outdoor mobility training except when we were on the elevator and pt looked up to see another person enter the elevator  provided increased cues provided to keep 20 lbs bag on R arm to avoid dropping it  Pt demo'd occasional R foot scuffing and knee hyperextension but no LOB nor tripped on his foot and at this time denied need for a knee cage  Currently pt is functioning at CS level but due to aphasia still inconsistent with command follow ability and expression so therefore will recommend 24/7 S at home to optimize safety  Family/Caregiver Present no   PT Barriers   Functional Limitation Ramp negotiation;Stair negotiation;Car transfers;Standing;Transfers; Walking   Plan   Treatment/Interventions Functional transfer training;LE strengthening/ROM; Therapeutic exercise; Endurance training;Bed mobility;Gait training;Spoke to nursing;OT   Progress Progressing toward goals   Recommendation   PT Discharge Recommendation Home with outpatient rehabilitation   PT Therapy Minutes   PT Time In 0830   PT Time Out 1000   PT Total Time (minutes) 90   PT Mode of treatment - Individual (minutes) 90   PT Mode of treatment - Concurrent (minutes) 0   PT Mode of treatment - Group (minutes) 0   PT Mode of treatment - Co-treat (minutes) 0   PT Mode of Treatment - Total time(minutes) 90 minutes   PT Cumulative Minutes 678   Therapy Time missed   Time missed?  No

## 2023-06-06 NOTE — TEAM CONFERENCE
Acute RehabilitationTeam Conference Note  Date: 6/6/2023   Time: 11:17 AM       Patient Name:  Susannah Philippe       Medical Record Number: [id number]   YOB: 1980  Sex:  Male          Room/Bed:  [facility] 458/[facility] 458-01  Payor Info:  Payor: [address] [place] [address] / Plan: [id number] / Product Type: Blue Fee for Service /      Admitting Diagnosis: CVA (cerebral vascular accident) ([facility] ) [I63 9]   Admit Date/Time:  5/27/2023 12:27 PM  Admission Comments: No comment available     Primary Diagnosis:  CVA (cerebral vascular accident) ([facility] )  Principal Problem: CVA (cerebral vascular accident) [facility])    Patient Active Problem List    Diagnosis Date Noted   • 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) 05/29/2023   • Abnormal findings on imaging test 05/28/2023   • Adjustment disorder 05/28/2023   • Tobacco use 05/27/2023   • Mild pain 05/27/2023   • Bradycardia 05/27/2023   • PFO (patent foramen ovale) 05/22/2023   • Seizure ([facility] ) 05/16/2023   • CVA (cerebral vascular accident) ([facility] ) 05/15/2023   • Positive self-administered antigen test for COVID-19 02/10/2023   • Viral infection, unspecified 11/10/2022   • Bilateral low back pain without sciatica 01/29/2020       Physical Therapy:    Weight Bearing Status: Full Weight Bearing  Transfers: Supervision  Bed Mobility: Independent  Amulation Distance (ft): 200 feet  Ambulation: Incidental Touching, Minimal Assistance, Supervision (occassional min due to LOB otherwise CG-CS)  Assistive Device for Ambulation:  (no AD)  Wheelchair Mobility Distance:  (n/a)  Wheelchair Mobility:  (anticipate pt will be amb at d/c)  Number of Stairs: 12  [address] for Stairs: Right Hand Rail  Stair Assistance: Incidental Touching  Ramp: Incidental Touching  Assistive Device for Ramp: None  Discharge Recommendations: Home with:  [address] with[de-identified] 24 Hour Supervision, Family Support, Outpatient Physical Therapy    6/5/23  Pt cont to demo consistent functional progress with skilled PT intervention with treatment focusing on NMR/NPP multi-tasking training without using an AD except during steps negotiation due to dec confidence and R toe catching on steps nosing inc risk for falls  Due to impaired proprioception and dec motor control cont to demo R knee hyperextension and R foot dragging chris with fatigue onset, dec dynamic balance with dec righting reactions  At this time pt is functioning at indep level for bed mobility,S for transfers and short distance amb  no AD but min-CGA for community outdoor mobilities without using an AD while CGA for FF negotiation using 1 rail  Pt will benefit from additional skilled PT to address ongoing impairments and complete family training in preparation for anticipated home d/c  Due to impaired cognition and speech therapy is recommending for pt to have S to optimize safety at home and in the community  Occupational Therapy:  Eating: Independent  Grooming: Supervision  Bathing: Minimal Assistance  Bathing: Minimal Assistance  Upper Body Dressing: Supervision  Lower Body Dressing: Supervision  Toileting: Supervision  Tub/Shower Transfer: Minimal Assistance  Toilet Transfer: Supervision  Cognition: Exceptions to WNL  Cognition: Decreased Executive Functions, Decreased Attention  Orientation: Person, Place, Situation  Discharge Recommendations: Home with:  [address] [facility] Je[address] Sarabiajuan with[de-identified] 24 Hour Supervision, Family Support, Outpatient Occupational Therapy       Pt continues to present with impairments in activity tolerance, endurance, standing balance/tolerance, memory, attention , communication, and executive functioning   Additional functional barriers include  expressive > receptive aphasia, impaired R UE sensation, strength coordination, R inattention   Pt demonstrates improvements in ADL routine and functional mobility/transfers   Pt will continue to benefit from skilled OT services to address above mentioned barriers and maximize functional independence in baseline areas of occupation  OT D/C recommendation is for d/c home with initial 24/7 supervision from pt's father  Family training continues to occur with pt's father  He will benefit from outpatient OT at neuro center upon d/c               Speech Therapy:  Mode of Communication: Verbal  Speech/Language: Expressive Aphasia (and receptive aphasia)  Cognition: Exceptions to WNL  Cognition: Decreased Memory, Decreased Executive Functions, Decreased Attention, Decreased Comprehension, Decreased Safety, Impulsive  Orientation: Person, Place, Time, Situation  Discharge Recommendations: Home with:  [address] with[de-identified] 24 Hour Supervision, 24 Hour Assisteance, Family Support, Outpatient Speech Therapy  Speech and language evaluation was completed on admission where pt found to be presenting with severe expressive and receptive language deficits/aphasia  Pt's verbal expression is characterized by dysfluent speech containing semantic content, though also with anomia, phonemic paraphasias and neologisms  Pt presents with difficulty in following simple commands, though ? ing if due to comprehension vs combination of comprehension and motor planning deficits, along with deficits in yes/no questions and basic conversation  He benefits from repetition and rewording of information, as well as use of gestures paired with instructions  Current deficits which present are barriers include impairments in the following areas: comprehension, expression, insight into deficits, problem solving, safety awareness, judgement, impulsivity, sequencing, attention, STM recall, LTM recall, and executive functions  Pt is also limited by mood, which has impacted pt's eagerness to participate in therapy  Pt was been provided with education regarding stroke recovery and will benefit from continued education and training  Pt is currently functioning at max assist for comprehension, expression, problem solving and memory   Plan to reach out to pt's family this week to initiate family education and training  Due to the above deficits, pt will likely be recommended for 24/hr supervision on discharge, along with continued skilled SLP therapy through outpatient services  At this time, pt is recommended for skilled SLP services during acute rehab stay targeting expressive and receptive language skills in order to maximize overall functional communication abilities  Update from week of 6/5/2023: Pt conts to be followed for skilled SLP services targeting expressive and receptive communication skills  Pt is making very good progress towards his goals and maximizing independence communication of wants and needs  Pt currently functioning at moderate assistance for comprehension, expression, problem solving and memory  Pt does best with simple conversation, answering yes/no questions and completing simple one word to short phrase answers  Pt conts with anomia, perseveration, and paraphasic errors  Fatigue and external distractions greatly impact pt's overall communication skills, requiring rest breaks and extended time for processing and completing tasks  In regards to comprehension, pt is stronger with listening compared to reading comprehension at this time  Pt benefits from auditory cues and unison reading for increasing understanding during reading tasks  Education was initiated with pt's father in regards to current communication and cognitive deficits, cuing strategies, and recommendations for discharge  Pt will require 24hr S/A at discharge initially given communication and suspected cognitive deficits at this time in which pt's father is agreeable to provide  Pt will cont to benefit from cont'd skilled SLP services targeting expressive and receptive communication skills for increased independence and decreased burden of care upon safe discharge home  Tentative discharge date set for Friday 6/9 with outpt SLP services         Nursing Notes:  Appetite: Good  Diet Type: Vegetarian                      Diet Patient/Family Education Complete: Yes                         Type of Wound Patient/Family Education: No (n/a)  Bladder: Continent     Bladder Patient/Family Education: Yes  Bowel: Continent     Bowel Patient/Family Education: Yes     Pain Score: 0                       Hospital Pain Intervention(s): Repositioned, Rest  Pain Patient/Family Education: Yes  Medication Management/Safety  Safe Administration: Yes  Medication Patient/Family Education Complete: Yes (ongoing)    Acute left MCA CVA, S/p left MCA thrombectomy  Workup: Homocystine normal, BELEM normal, RF normal, ANCA negative, dsDNA negative, Factor V Leiden/lupus anticoagulant negative, Antithrombin III 81 (felt likely not clinically relevant was higher than 80%, Protein S decreased at 65%, Protein C normal  MTHFR mutation pending  Repeat thrombosis panel in 3 months, Continue ASA 81mg qd and statin, Question if etio is from COVID infection in 2/2023  Seizures Was noted with eyes deviating to the right 5/16 with a post ictal period, Had 2 episodes, Keppra stopped 5/25 but Dr Stuart Bush restarted since he had 2 sz in hospital    PFO, Large PFO on ALYSSA, Negative DVT, To see Cards as OP 4-6 weeks  Bradycardia Has HRs in 50's at times as in hospital, On no meds to affect HR  Nicotine abuse, Continue patch  Moderate B/L pleural effusions, Will monitor for resolution, Chest clear and O2 sats on RA normal, no SOB  Pt with depression, starting on lexapro in am on 5/30, neuropsych consult  5/30- Pt is AAOx4, with expressive aphasia  Pt requires alarms for safety  Pt with no c/o pain  Pt with control of bowel and bladder  6/6-Expressive aphasia improvement  No further changes    This week we will encourage independence with ADLs  We will monitor labs and vital signs  WE will educate pt/family about repositioning to prevent skin breakdown  We will assist w repositioning and perform routine skin checks    We will monitor for adequate pain control  We will monitor for constipation and medicate pt as ordered  We will increase safety awareness and keep pt free from falls  Case Management:     Discharge Planning  Living Arrangements: Lives Alone  Support Systems: Parent, Self  Assistance Needed: independent prior to admission  Type of Current Residence: Other (Comment) (apartament)  Current Home Care Services: No  6/6- Pt lives alone in first floor apartment, [address]  Prior to admission, pt was independent with ADLs IADLs and drove self as needed  Pt reports having a shower seat in home  Pt reports no hx of STR/acute rehab, op therapies or HHC  Pt reports at [place] his dad will be moving in with him to assist him with things as needed  PCP is Dwayne Bello DO, preferred pharmacy is "[facility] in [place]  Is the patient actively participating in therapies? yes  List any modifications to the treatment plan: None    Barriers Interventions   Med management Education   Aphagia- impaired cog, mod comprehension, expressive aphagia, uncuuable SLP Services, family training    Depression/mood Neuro psych   Fatigue Endurance training    Decreased coordination Education   Right inattention Compensatory strategies   Poor motor planning Repetitive education   Stairs Stair training     Is the patient making expected progress toward goals?  yes  List any update or changes to goals: None    Medical Goals: Patient will be medically stable for discharge to [facility] upon completion of rehab program and Patient will be able to manage medical conditions and comorbid conditions with medications and follow up upon completion of rehab program    Weekly Team Goals:   Rehab Team Goals  ADL Team Goal: Patient will require supervision with ADLs with least restrictive device upon completion of rehab program  Transfer Team Goal: Patient will require supervision with transfers with least restrictive device upon completion of rehab program  Locomotion Team Goal: Patient will require supervision with locomotion with least restrictive device upon completion of rehab program  Cognitive Team Goal: Patient will require assist for basic cognitive tasks upon completion of rehab program    Discussion: Pt making progress in rehab program and participating in therapies  Pt functioning at  for ambulation ADLs IADLs  Mod assist for comp and expression, memory and executive functions  Team recommending dc date of Friday 6/9 with OP PT OT ST [address]  Cm to coordinate  Anticipated Discharge Date:  Dc date of 6/9 with OP PT OT ST    [facility] Team Members Present: The following team members are supervising care for this patient and were present during this Weekly Team Conference      Physician: Dr Benito Goldman MD  : Lupe Umanzor MSW  Registered Nurse: Reyna Malone RN  Physical Therapist: Raphael Posey DPT  Occupational Therapist: Agustin Berrios MS, OTR/L, CBIS  Speech Therapist: Ras Cooley, [address] [place], CCC-SLP

## 2023-06-06 NOTE — PROGRESS NOTES
Internal Medicine Progress Note  Patient: Moshe Dey  Age/sex: 40 y o  male  Medical Record #: [id number]      ASSESSMENT/PLAN: (Interval History)  Moshe Dey is seen and examined and management for following issues:    Acute left MCA CVA  • S/p left MCA thrombectomy  • Workup:   • Homocystine normal, BELEM normal, RF normal, ANCA negative, dsDNA negative, Factor V Leiden/lupus anticoagulant negative, Antithrombin III 81 (felt likely not clinically relevant was higher than 80%, Protein S decreased at 65%, Protein C normal                          MTHFR mutation pending                          Repeat thrombosis panel in 3 months   • Continue ASA 81mg qd and statin  • Sampson Doyle is from COVID infection in 2/2023     Seizures  • Was noted with eyes deviating to the right 5/16 with a post ictal period   • Had 2 episodes  • Juan Frame Dr Katherin Vickers restarted since he had 2 sz in hospital     PFO  • Large PFO on ALYSSA  • Negative DVT  • To see Cards as OP 4-6 weeks for possible closure     Bradycardia  • Has HRs in 50's at times as in hospital  • Pt was an avid runner prior to CVA so likely d/t conditioning  • stable     Nicotine abuse  • Continue patch     Moderate B/L pleural effusions  • Repeat Cxray in 6 wks or sooner if symptomatic        Discharge date:  Team       The above assessment and plan was reviewed and updated as determined by my evaluation of the patient on 6/6/2023      Labs:   Results from last 7 days   Lab Units 06/05/23  0525   HEMATOCRIT % 34 7*   HEMOGLOBIN g/dL 12 1   PLATELETS Thousands/uL 250   WBC Thousand/uL 3 99*     Results from last 7 days   Lab Units 06/05/23  0525   BUN mg/dL 8   CALCIUM mg/dL 8 8   CHLORIDE mmol/L 108   CO2 mmol/L 30   CREATININE mg/dL 0 80   POTASSIUM mmol/L 4 1   SODIUM mmol/L 138                   Review of Scheduled Meds:  Current Facility-Administered Medications   Medication Dose Route Frequency Provider Last Rate   • acetaminophen  975 mg Oral Q8H PRN Nabeel Ortega MD     • aspirin  81 mg Oral Daily Nabeel Ortega MD     • bisacodyl  10 mg Rectal Daily PRN Nabeel Ortega MD     • escitalopram  5 mg Oral Daily Nabeel Ortega MD     • levETIRAcetam  750 mg Oral Q12H Cayla Duran MD     • nicotine  1 patch Transdermal Daily Nabeel Ortega MD     • ondansetron  4 mg Oral Q6H PRN Nabeel Ortega MD     • pantoprazole  40 mg Oral Early Morning Nabeel Ortega MD     • polyethylene glycol  17 g Oral Daily PRN Nabeel Ortega MD         Subjective/ HPI: Patient seen and examined  Patients overnight issues or events were reviewed with nursing or staff during rounds or morning huddle session  New or overnight issues include the following:     Pt seen in therapy  No overnight issues, aphasia is significantly improved    ROS:   A 10 point ROS was performed; negative except as noted above  Imaging:     No orders to display       *Labs /Radiology studies reviewed  *Medications reviewed and reconciled as needed  *Please refer to order section for additional ordered labs studies  *Case discussed with primary attending during morning huddle case rounds    Physical Examination:  Vitals:   Vitals:    06/04/23 2005 06/05/23 0530 06/05/23 2016 06/06/23 0543   BP: 125/65 115/73 108/65 105/67   BP Location: Left arm Left arm Left arm Left arm   Pulse: (!) 52 (!) 50 56 (!) 48   Resp: 16 16 20 16   Temp: 98 6 °F (37 °C) 97 8 °F (36 6 °C) 99 °F (37 2 °C) 97 6 °F (36 4 °C)   TempSrc: Oral Oral Oral Oral   SpO2: 97% 99% 97% 97%   Weight:       Height:           GEN: NAD; pleasant, short answers to questions  NEURO: Alert  +aphasia  HEENT: Pupils are equal/reactive; normocephalic, face is asymmetrical, hearing is normal  CV: S1 S2 regular, no murmur/rub/gallops, 2/4 pedal pulses, no edema    RESP: Lungs are clear bilaterally, no wheezes rales or rhonchi, on room air, no distress, respirations are easy and non labored  GI: Abdomen is flat, soft, non tender, +BS x4; non distended  : Voiding without issues  MUSC: Moves all extremities; right hemiparesis mild  SKIN: pink, warm, normal turgor, no rashes or lesions noted       The above physical exam was reviewed and updated as determined by my evaluation of the patient on 6/6/2023  Invasive Devices     None                    VTE Pharmacologic Prophylaxis: Enoxaparin  Code Status: Level 1 - Full Code  Current Length of Stay: 10 day(s)      Total time spent:  30 minutes   with more than 50% spent counseling/coordinating care  Counseling includes discussion with patient re: progress  and discussion with patient of his/her current medical state/information  Coordination of patient's care was performed in conjunction with primary service  Time invested included review of patient's labs, vitals, and management of their comorbidities with continued monitoring  In addition, this patient was discussed with medical team including physician and advanced extenders  The care of the patient was extensively discussed and appropriate treatment plan was formulated unique for this patient  Medical decision making for the day was made by supervising physician unless otherwise noted in their attestation statement  ** Please Note:  voice to text software may have been used in the creation of this document   Although proof errors in transcription or interpretation are a potential of such software**

## 2023-06-06 NOTE — OCCUPATIONAL THERAPY NOTE
Stroke Education Series    Pt participated in skilled Stroke Education Series in an individual setting to address the topic of Purpose of Rehab post stroke in both verbal and written formats  Education within this session included a review of the individual roles of the rehab team, functions of the acute rehab center, and neuro rehabilitation treatment strategies  The goal of this education session was to provide the patient with an understanding of the overall importance of the therapy process  This section reviews neuroplasticity principles as well that includes how patiens can incorporate specificity, intensity, repetition and salience into their home exercise program  This allows the patient to connect neuro rehabilitation treatment strategies to his or her individual therapy process  The patients are engaged in conversation to incorporate activities they like to do into therapy sessions  Following education, the patient's response to education is: verbalizes understanding  Additional topics to individualize this section of stroke education include: adapting to stroke  and how family can help in the rehab process   Stroke Education Series    Pt participated in skilled Stroke Education Series in an individual setting to address the topic of Stroke 101: Understanding the Basics of Stroke in both verbal and written formats  Education within this session reviewed the basic structural and functional components of the brain and included information on the causes of stroke, related signs/symptoms, risk factors, and the process of stroke rehabilitation  The goal of this education was to provide the patient with general understanding of how the brain functions and how a stroke can impact his/her functional mobility and independence  In addition, the intention of this education is to provide the patient with the information to reduce the risk of a second stroke   Following education, pt's response to education is: "verbalizes understanding  To individualize the education, the following topics were included based upon the patients' past and current medical history: smoking  Additional topics that were covered include decreased coordination, aphasia and cognitive changes  Stroke Education Series    Pt participated in skilled Stroke Education Series in an individualized setting to address the topic of Preparing To Go Home  This education was provided in both verbal and written formats  Education within this session focused on providing safety strategies including environmental and lifestyle changes that if made will support a safe discharge to his/her home setting  One goal of this session is to focus on ways to improve the patient's independence while maintaining safety and decreasing fall risk  Following education, pt's response to education is: verbalizes understanding  To individualize this session, the following topics were reviewed: caregiver considerations, assistive devices, ADL recommendations, IADL recommendations  and family training  Stroke Education Series    Patient (and family/caregivers as appropriate) participated in skilled Stroke Education Series in an individualized setting to address the topic of Caregiver Education post stroke in both verbal and written formats  Education within this session included the roles of family/caregivers within the rehabilitation process and how they will be involved and instrumental in his/her return home  In addition, education on how \"home life\" may be affected and the impact of caring for loved ones post stroke was provided  The goal of this education session was to provide patients and caregivers with a greater understanding of their roles in the rehabilitation process and how instrumental that relationship could and will be at time of discharge   Education was provided to improve both patient and caregiver safety and self-efficacy, therefore improving overall " quality of life individually, and as a unit  Following education, pt's response to education is: verbalizes understanding  Stroke Education Series    Pt participated in skilled Stroke Education Series in an individualized setting to address the topic of What Comes Next post stroke in both verbal and written formats  Education within this session included information on recommendations and resources after leaving the [facility]  This education session links together what has been covered in previous stroke education classes to improve the patient's understanding of how managing risk factors for stroke, participating in therapy, working with family and friends in the rehab process, and reviewing their role in the rehab process will maximize outcomes and their functional gains  This education also incorporates what the patient wants to achieve and helps them set realistic goals  To individualize this education the following topics were covered: continued therapy (home versus outpatient), driving programs and caregiver support  Following education, pt's response to education is: verbalizes understanding        Pt and pt's father present for all education     Start Time: 1230    End Time: 0518

## 2023-06-06 NOTE — PROGRESS NOTES
"Occupational Therapy Treatment Note           06/06/23 1230   Pain Assessment   Pain Assessment Tool 0-10   Pain Score No Pain   Restrictions/Precautions   Precautions Aphasia;Bed/chair alarms;Cognitive; Fall Risk;Supervision on toilet/commode;Seizure   Lifestyle   Autonomy \"When can I exercise\" - discussing when pt can return to his normal running/heavy work out routines   Sit to Lying   Type of Assistance Needed Independent   Physical Assistance Level No physical assistance   Sit to Lying CARE Score 6   Lying to Sitting on Side of Bed   Type of Assistance Needed Independent   Physical Assistance Level No physical assistance   Lying to Sitting on Side of Bed CARE Score 6   Sit to Stand   Type of Assistance Needed Set-up / clean-up   Physical Assistance Level No physical assistance   Sit to Stand CARE Score 5   Bed-Chair Transfer   Type of Assistance Needed Supervision   Physical Assistance Level No physical assistance   Comment distant supervision   Chair/Bed-to-Chair Transfer CARE Score 4   Toileting Hygiene   Type of Assistance Needed Supervision   Physical Assistance Level No physical assistance   Comment distant supervision  ensure pt turns on lights, pt then able to complete while OT outside of bathroom   Toileting Hygiene CARE Score 4   Toilet Transfer   Type of Assistance Needed Supervision   Physical Assistance Level No physical assistance   Comment distant supervision   Toilet Transfer CARE Score 4   Meal Prep   Meal Prep Level of Assistance Minimal verbal cues; Close supervision;Minimum assistance   Meal Preparation Pt able to fill up pot of water and carry to stovetop  Increased time and repetition for pt to follow instructions to boil water  Pt chooses correct dial for burner   OT recommending pt does not carry heavy or hot items 2* R UE deficits   Kitchen Mobility   Kitchen Activity Retrieve items;Transport items   Kitchen Mobility Comments supervision no device   Cognition   Overall Cognitive Status " Impaired   Arousal/Participation Alert   Attention Difficulty dividing attention   Orientation Level Oriented to person;Oriented to place;Oriented to situation   Following Commands Follows multistep commands with increased time or repetition   Activity Tolerance   Activity Tolerance Patient tolerated treatment well   Assessment   Treatment Assessment see below   Prognosis Good   Problem List Decreased strength;Decreased endurance; Impaired balance;Decreased mobility; Decreased coordination;Decreased cognition; Impaired sensation   Barriers to Discharge None   Plan   Treatment/Interventions ADL retraining;Functional transfer training; Therapeutic exercise; Endurance training;Cognitive reorientation;Patient/family training;Bed mobility; Equipment eval/education; Compensatory technique education   Progress Progressing toward goals   Recommendation   OT Discharge Recommendation Home with outpatient rehabilitation   OT Therapy Minutes   OT Time In 1230   OT Time Out 1415   OT Total Time (minutes) 105   OT Mode of treatment - Individual (minutes) 105   OT Mode of treatment - Concurrent (minutes) 0   OT Mode of treatment - Group (minutes) 0   OT Mode of treatment - Co-treat (minutes) 0   OT Mode of Treatment - Total time(minutes) 105 minutes   OT Cumulative Minutes 825   Therapy Time missed   Time missed? No         Pt participated in skilled OT tx session  See above for further details on functional performance  Session focused on family training with pt's father  OT reviewed the following: while pt's mobility is improving continue to recommend 24/7 supervision (this can be distant supervision while someone is still in the same room) 2* language and cognitive deficits  Recommend pt has assistance with IADLs including - meal preparation (microwave and oven/stove), medication management, cleaning, driving; pt has R inattention as well as impaired R sensation/proprioception and this leads to items being dropped   Recommend pt does not carry heavy or hot items in R UE  Additionally pt has impaired divided attention and when dividing attention (even when attempting language while walking) and this increases items being dropped  Pt also with impaired comprehension, command following and executive functioning and motor planning  Recommend supervision for tub transfer and using shower chair to decrease fall risk (simulated in hallway with side stepping technique and UE support on wall)  Outpatient [facility] [address] ran pt's insurance and stated pt's insurance only approves 20 PT, 12 OT, and 12 ST sessions/year  While [address] will help transition pt to probono clinic after this, pt's HEP will be important  Educated pt's father that if pt is sitting and father is directly supervising, apply L UE sling to promote R attention and functional use of R UE during fine motor exercises, reaching, eating food (already cut)  Sling should be removed when pt stands/walks and not applied unless father is sitting directly with pt  Provided with OT toolkit and highlighted exercises for home including opening jars/containers, picking up coins/beans, using tweezers in R UE to  small items, twirling pen/pencils  Will also provide [employer] handout described below with pink putty and 4# free weight for R UE strengthening  Pt's father present to observe activities including cutting paper, copying shapes/sentences, and instructed pt's father will provide him with exercises like this to do as well  Pt's father will stay with pt upon d/c and will assist pt in completing structured therapy program as well as educated on ways for non-formal therapy program (ie: folding clothes and verbalizing their colors and names of clothing items)  Recommend setting 1 hour in morning and 1 hour in afternoon for structured therapy exercises/homework and then incorporating language/cogntion throughout the day while also incorporating rest breaks   Pt's father receptive to all education and demonstrates ability to be able to assist with IADLS and supervise ADLs as well as assist pt in physical and language/cognitive exercise programs  Pt will continue to benefit from skilled OT intervention to address R inattention, cognitive retraining focused on multi-step command following, executive functioning, R UE neuromuscular re-education focused on strengthening and coordination in order to maximize functional independence in ADLS, functional mobility/transfers and IADLS, while decreasing burden of care  Pt left positioned in bed with call bell in reach and bed alarm on  OT to provide formalized HEP prior to d/c Thursday  Rest of stroke education completed during this session with pt's father (see separate note)

## 2023-06-07 PROCEDURE — 97530 THERAPEUTIC ACTIVITIES: CPT

## 2023-06-07 PROCEDURE — 99232 SBSQ HOSP IP/OBS MODERATE 35: CPT | Performed by: PHYSICAL MEDICINE & REHABILITATION

## 2023-06-07 PROCEDURE — 97112 NEUROMUSCULAR REEDUCATION: CPT

## 2023-06-07 PROCEDURE — 97110 THERAPEUTIC EXERCISES: CPT

## 2023-06-07 PROCEDURE — 99232 SBSQ HOSP IP/OBS MODERATE 35: CPT | Performed by: INTERNAL MEDICINE

## 2023-06-07 PROCEDURE — 97535 SELF CARE MNGMENT TRAINING: CPT

## 2023-06-07 PROCEDURE — 92507 TX SP LANG VOICE COMM INDIV: CPT

## 2023-06-07 RX ADMIN — LEVETIRACETAM 750 MG: 750 TABLET, FILM COATED ORAL at 20:33

## 2023-06-07 RX ADMIN — LEVETIRACETAM 750 MG: 750 TABLET, FILM COATED ORAL at 09:18

## 2023-06-07 RX ADMIN — ESCITALOPRAM OXALATE 5 MG: 10 TABLET ORAL at 09:18

## 2023-06-07 RX ADMIN — ASPIRIN 81 MG CHEWABLE TABLET 81 MG: 81 TABLET CHEWABLE at 09:18

## 2023-06-07 RX ADMIN — NICOTINE 7 MG: 7 PATCH, EXTENDED RELEASE TRANSDERMAL at 09:19

## 2023-06-07 RX ADMIN — PANTOPRAZOLE SODIUM 40 MG: 40 TABLET, DELAYED RELEASE ORAL at 05:57

## 2023-06-07 NOTE — PLAN OF CARE
Problem: INFECTION - ADULT  Goal: Absence or prevention of progression during hospitalization  Description: INTERVENTIONS:  - Assess and monitor for signs and symptoms of infection  - Monitor lab/diagnostic results  - Monitor all insertion sites, i e  indwelling lines, tubes, and drains  - Monitor endotracheal if appropriate and nasal secretions for changes in amount and color  - [place] appropriate cooling/warming therapies per order  - Administer medications as ordered  - Instruct and encourage patient and family to use good hand hygiene technique  - Identify and instruct in appropriate isolation precautions for identified infection/condition  Outcome: Progressing  Goal: Absence of fever/infection during neutropenic period  Description: INTERVENTIONS:  - Monitor WBC    Outcome: Progressing     Problem: SAFETY ADULT  Goal: Patient will remain free of falls  Description: INTERVENTIONS:  - Educate patient/family on patient safety including physical limitations  - Instruct patient to call for assistance with activity   - Consult OT/PT to assist with strengthening/mobility   - Keep Call bell within reach  - Keep bed low and locked with side rails adjusted as appropriate  - Keep care items and personal belongings within reach  - Initiate and maintain comfort rounds  - Make Fall Risk Sign visible to staff  - Offer Toileting every 2 Hours, in advance of need  - Initiate/Maintain bed  chair alarm  - Obtain necessary fall risk management equipment: nonskid socks  - Apply yellow socks and bracelet for high fall risk patients  - Consider moving patient to room near nurses station  Outcome: Progressing  Goal: Maintain or return to baseline ADL function  Description: INTERVENTIONS:  -  Assess patient's ability to carry out ADLs; assess patient's baseline for ADL function and identify physical deficits which impact ability to perform ADLs (bathing, care of mouth/teeth, toileting, grooming, dressing, etc )  - Assess/evaluate cause of self-care deficits   - Assess range of motion  - Assess patient's mobility; develop plan if impaired  - Assess patient's need for assistive devices and provide as appropriate  - Encourage maximum independence but intervene and supervise when necessary  - Involve family in performance of ADLs  - Assess for home care needs following discharge   - Consider OT consult to assist with ADL evaluation and planning for discharge  - Provide patient education as appropriate  Outcome: Progressing  Goal: Maintains/Returns to pre admission functional level  Description: INTERVENTIONS:  - Perform BMAT or MOVE assessment daily    - Set and communicate daily mobility goal to care team and patient/family/caregiver  - Collaborate with rehabilitation services on mobility goals if consulted  - Perform Range of Motion 3 times a day  - Reposition patient every 2 hours    - Dangle patient 3 times a day  - Stand patient 3 times a day  - Ambulate patient 3 times a day  - Out of bed to chair 3 times a day   - Out of bed for meals 3 times a day  - Out of bed for toileting  - Record patient progress and toleration of activity level   Outcome: Progressing     Problem: DISCHARGE PLANNING  Goal: Discharge to home or other facility with appropriate resources  Description: INTERVENTIONS:  - Identify barriers to discharge w/patient and caregiver  - Arrange for needed discharge resources and transportation as appropriate  - Identify discharge learning needs (meds, wound care, etc )  - Arrange for interpretive services to assist at discharge as needed  - Refer to Case Management Department for coordinating discharge planning if the patient needs post-hospital services based on physician/advanced practitioner order or complex needs related to functional status, cognitive ability, or social support system  Outcome: Progressing     Problem: Prexisting or High Potential for Compromised Skin Integrity  Goal: Skin integrity is maintained or improved  Description: INTERVENTIONS:  - Identify patients at risk for skin breakdown  - Assess and monitor skin integrity  - Assess and monitor nutrition and hydration status  - Monitor labs   - Assess for incontinence   - Turn and reposition patient  - Assist with mobility/ambulation  - Relieve pressure over bony prominences  - Avoid friction and shearing  - Provide appropriate hygiene as needed including keeping skin clean and dry  - Evaluate need for skin moisturizer/barrier cream  - Collaborate with interdisciplinary team   - Patient/family teaching  - Consider wound care consult   Outcome: Progressing     Problem: MOBILITY - ADULT  Goal: Maintain or return to baseline ADL function  Description: INTERVENTIONS:  -  Assess patient's ability to carry out ADLs; assess patient's baseline for ADL function and identify physical deficits which impact ability to perform ADLs (bathing, care of mouth/teeth, toileting, grooming, dressing, etc )  - Assess/evaluate cause of self-care deficits   - Assess range of motion  - Assess patient's mobility; develop plan if impaired  - Assess patient's need for assistive devices and provide as appropriate  - Encourage maximum independence but intervene and supervise when necessary  - Involve family in performance of ADLs  - Assess for home care needs following discharge   - Consider OT consult to assist with ADL evaluation and planning for discharge  - Provide patient education as appropriate  Outcome: Progressing  Goal: Maintains/Returns to pre admission functional level  Description: INTERVENTIONS:  - Perform BMAT or MOVE assessment daily    - Set and communicate daily mobility goal to care team and patient/family/caregiver  - Collaborate with rehabilitation services on mobility goals if consulted  - Perform Range of Motion 3 times a day  - Reposition patient every 2 hours    - Dangle patient 3 times a day  - Stand patient 3 times a day  - Ambulate patient 3 times a day  - Out of bed to chair 3 times a day   - Out of bed for meals 3 times a day  - Out of bed for toileting  - Record patient progress and toleration of activity level   Outcome: Progressing

## 2023-06-07 NOTE — PROGRESS NOTES
Pastoral Care Progress Note    2023  Patient: Jose Villa : 1980  Admission Date & Time: 2023 1227  MRN: [id number] CSN: [id number]          Caught Marshall Flatter briefly in the hallway during one of his walking PT sessions and gave him verbal support and encouragement   remains available

## 2023-06-07 NOTE — PROGRESS NOTES
"   06/07/23 2455   Pain Assessment   Pain Assessment Tool 0-10   Pain Score No Pain   Restrictions/Precautions   Precautions Aphasia;Bed/chair alarms;Cognitive; Fall Risk;Seizure;Supervision on toilet/commode   Comprehension   Comprehension (FIM) 3 - Understands basic info/conversation 50-74% of time   Expression   Expression (FIM) 3 - Expresses basic info/needs 50-74% of time   Social Interaction   Social Interaction (FIM) 6 - Interacts appropriately with others BUT requires extra  time   Problem Solving   Problem solving (FIM) 3 - Solves basic problmes 50-74% of time   Memory   Memory (FIM) 3 - Recognizes, recalls/performs 50-74%   Speech/Language/Cognition Assessmetn   Treatment Assessment SLP engaged pt in review of daily events since last working w/ pt, to where pt's spontaneous speech output continues to improve in overall fluency, however, as pt does elicit errors in speech output, pt will attempt to self correct which then decreases flow of conversational speech but exhibiting more haulting speech patterns  When SLP asking about discharge, pt continued to state that it is this Friday  Pt verbalizing excitement for discharge but also acknowledging some nervousness, stating \"my words still aren't right  \" SLP did provide education about continued services as a OP, continuing HEP which is currently being finalized, as well as engagement w/ father, who is pt's primary support at discharge  SLP further educating pt about how a \"cheat sheet\" given biographical information will be provided to pt, despite ongoing ability to verbalize this fairly consistently  Primary purpose for the cheat sheet is for pt to use as scripting in emergency situations if he would ever have to call 911, MD office, etc  Pt receptive to this education and will provide to pt in HEP folder   Otherwise focus of session was towards the following tasks:    Sentence Completion Activity: Reading Comprehension, Verbal and Written Expression:  SLP " "engaging pt in completing a sentence completion task in which [address] Atticous words were provided to the R side of the page for pt to choose from to complete the sentence best  It was noted that pt was orally reading carrier phrases to self to aid in overall comprehension given sentence and then to choose the word which best completed each sentence  When verbalizing carrier portion of the sentences provided (ie: watch the ___, listen to the ___, lend me some ___), pt was 13/15 accurate in his ability to state all words fluently as presented  As for pt's ability to choose the correct target word, pt was 14/15 accurate  Pt's ability to verbalize target word accurately was 14/15 accurate and ability to write in the target word to complete the sentence was 14/15 accurate  Pt was noted to demonstrate increased difficulty given one item \"Wear your ___  \" Even as SLP provided the carrier sentence w/ placement of all possible words to complete the sentence, pt continued to demonstrate difficulty in choosing correct target word of \"glasses\" requiring directive cues for this answer  Completed this task again given new sentences as carrier phrases but still did have [id number] Atticous words to choose from for target word which best completes each sentence  Just as in task task, pt was noted to orally read the carrier portion of the sentence which suspect a level of fatigue did set in as pt had more difficulty in fluently reading all words in the sentence (ie: I wish it would stop ___, he took her to a ____, etc)  Overall ability to fully state carrier portion of the sentences was 10/20 accurate, benefiting from phonemic cues to elicit words more difficulty to string together in sentence  As for pt's ability to verbalize target words to complete the sentences, pt was 18/20 accurate, needing minimal cues for 2 items   However, what was more observed w/ this round is pt's difficulty and overall attention to writing in the correct target words to complete " "sentences  Pt was 15/20 accurate, despite verbalizing correct target words  Decreased awareness of these errors were also noted  SLP providing pt w/ education on continuing to target this task as part of HEP, which pt was receptive to at this time  Picture Identification w/ Specific Target Letter:   SLP introducing a picture ID task to where pt was to only locate the pictures which began w/ a stated letter  For this task, pt was to ID the pictures which began w/ the letter \"S  \" Pt was noted to have minimal difficulty in comprehension, pointing to the number 4, but as SLP prompted pt if that word began w/ an \"s\" pt was able to recognize this  Given increased time, pt was able to ID all 10 pictures presented  In addition to identifying the pictures provided, SLP also having pt name all the pictures which he identified  Ability for pt to spontaneously name the pictures was 8/10 accurate, observing perseveration on another name of a picture  Of note, pt was able to demonstrate awareness given this, and w/ increased time was able to state target word (ie: pencil, which was pictured right above---->for scissors)  For the last picture, pt w/ increased difficulty and requested cues by SLP  When SLP provided both phonemic and phrase clues, pt was able to name accordingly  More of this task also to be included in pt's HEP folder for home  Lastly, SLP reviewed the Speech Apps which are available for pt to use on his phone and/or computer  SLP indicated whether apps were free vs a cost, but also left a note for pt's father in regard to how the free apps offered by [employer] can be found on their website and accessed that way  Also in pt's HEP folder, SLP providing pt w/ a BE FAST sheet for warning signs/sxs for stroke to place on fridge at home  Also, SLP did provided a pamphlet of the [address], which SLP did highly encourage pt to participate in the future   Pt was receptive to all provided information at " this time  Currently, pt will continue to benefit from skilled SLP services to maximize overall receptive and expressive language skills as well as cognitive linguistic skills in attempts to improve functional communication abilities to decrease overall caregiver burden over time  SLP Therapy Minutes   SLP Time In 0930   SLP Time Out 1030   SLP Total Time (minutes) 60   SLP Mode of treatment - Individual (minutes) 60   SLP Mode of treatment - Concurrent (minutes) 0   SLP Mode of treatment - Group (minutes) 0   SLP Mode of treatment - Co-treat (minutes) 0   SLP Mode of Treatment - Total time(minutes) 60 minutes   SLP Cumulative Minutes 835   Therapy Time missed   Time missed?  No

## 2023-06-07 NOTE — PROGRESS NOTES
Internal Medicine Progress Note  Patient: Khadijah Munoz  Age/sex: 40 y o  male  Medical Record #: [id number]      ASSESSMENT/PLAN: (Interval History)  Khadijah Munoz is seen and examined and management for following issues:    Acute left MCA CVA  • S/p left MCA thrombectomy  • Workup:   • Homocystine normal, BELEM normal, RF normal, ANCA negative, dsDNA negative, Factor V Leiden/lupus anticoagulant negative, Antithrombin III 81 (felt likely not clinically relevant was higher than 80%, Protein S decreased at 65%, Protein C normal                          MTHFR mutation negative                          Repeat thrombosis panel in 3 months   • Continue ASA 81mg qd and statin  • Etiology seems to be likely to COVID infection in 2/2023     Seizures  • Was noted with eyes deviating to the right 5/16 with a post ictal period   • Had 2 episodes  • Bright Yanez restarted since he had 2 sz in hospital     PFO  • Large PFO on ALYSSA  • Negative DVT  • To see Cards as OP 4-6 weeks for possible closure     Bradycardia  • Has HRs in 50's at times as in hospital  • Pt was an avid runner prior to CVA so likely d/t conditioning  • stable     Nicotine abuse  • Continue patch     Moderate B/L pleural effusions  • Repeat Cxray in 6 wks or sooner if symptomatic        Discharge date:  6/9/23       The above assessment and plan was reviewed and updated as determined by my evaluation of the patient on 6/7/2023      Labs:   Results from last 7 days   Lab Units 06/05/23  0525   HEMATOCRIT % 34 7*   HEMOGLOBIN g/dL 12 1   PLATELETS Thousands/uL 250   WBC Thousand/uL 3 99*     Results from last 7 days   Lab Units 06/05/23  0525   BUN mg/dL 8   CALCIUM mg/dL 8 8   CHLORIDE mmol/L 108   CO2 mmol/L 30   CREATININE mg/dL 0 80   POTASSIUM mmol/L 4 1   SODIUM mmol/L 138                   Review of Scheduled Meds:  Current Facility-Administered Medications   Medication Dose Route Frequency Provider Last Rate   • acetaminophen  975 mg Oral Q8H PRN Ander Closs, MD     • aspirin  81 mg Oral Daily Ander Closs, MD     • bisacodyl  10 mg Rectal Daily PRN Ander Closs, MD     • escitalopram  5 mg Oral Daily Ander Closs, MD     • levETIRAcetam  750 mg Oral Q12H [address] Ander Closs, MD     • nicotine  7 mg Transdermal Daily Ander Closs, MD     • ondansetron  4 mg Oral Q6H PRN Ander Closs, MD     • pantoprazole  40 mg Oral Early Morning Ander Closs, MD     • polyethylene glycol  17 g Oral Daily PRN Ander Closs, MD         Subjective/ HPI: Patient seen and examined  Patients overnight issues or events were reviewed with nursing or staff during rounds or morning huddle session  New or overnight issues include the following:     Pt seen in bed  Therapy is going well, no complaints and looking forward to dc Friday    ROS:   A 10 point ROS was performed; negative except as noted above  Imaging:     No orders to display       *Labs /Radiology studies reviewed  *Medications reviewed and reconciled as needed  *Please refer to order section for additional ordered labs studies  *Case discussed with primary attending during morning huddle case rounds    Physical Examination:  Vitals:   Vitals:    06/06/23 0543 06/06/23 1505 06/06/23 2028 06/07/23 0515   BP: 105/67 111/66 109/60 101/60   BP Location: Left arm Left arm Left arm Left arm   Pulse: (!) 48 (!) 52 (!) 51 (!) 46   Resp: 16 16 20 16   Temp: 97 6 °F (36 4 °C) 97 9 °F (36 6 °C) 98 3 °F (36 8 °C) 97 9 °F (36 6 °C)   TempSrc: Oral Oral Oral Oral   SpO2: 97% 98% 97% 97%   Weight:    73 1 kg (161 lb 2 5 oz)   Height:           GEN: NAD; pleasant, short answers to questions  NEURO: Alert  Minimal aphasia  HEENT: Pupils are equal/reactive; normocephalic, face is asymmetrical, hearing is normal  CV: S1 S2 regular, no murmur/rub/gallops, 2/4 pedal pulses, no edema    RESP: Lungs are clear bilaterally, no wheezes rales or rhonchi, on room air, no distress, respirations are easy and non labored  GI: Abdomen is flat, soft, non tender, +BS x4; non distended  : Voiding without issues  MUSC: Moves all extremities except mild right hemiparesis  SKIN: pink, warm, normal turgor, no rashes or lesions noted       The above physical exam was reviewed and updated as determined by my evaluation of the patient on 6/7/2023  Invasive Devices     None                    VTE Pharmacologic Prophylaxis: Enoxaparin  Code Status: Level 1 - Full Code  Current Length of Stay: 11 day(s)      Total time spent:  30 minutes   with more than 50% spent counseling/coordinating care  Counseling includes discussion with patient re: progress  and discussion with patient of his/her current medical state/information  Coordination of patient's care was performed in conjunction with primary service  Time invested included review of patient's labs, vitals, and management of their comorbidities with continued monitoring  In addition, this patient was discussed with medical team including physician and advanced extenders  The care of the patient was extensively discussed and appropriate treatment plan was formulated unique for this patient  Medical decision making for the day was made by supervising physician unless otherwise noted in their attestation statement  ** Please Note:  voice to text software may have been used in the creation of this document   Although proof errors in transcription or interpretation are a potential of such software**

## 2023-06-07 NOTE — PROGRESS NOTES
"   06/07/23 1230   Pain Assessment   Pain Assessment Tool 0-10   Restrictions/Precautions   Precautions Bed/chair alarms;Cognitive; Fall Risk;Supervision on toilet/commode;Aphasia; Seizure   Cognition   Overall Cognitive Status Impaired   Arousal/Participation Alert; Cooperative   Subjective   Subjective pt sitting EOB and ready for PT   Sit to Lying   Type of Assistance Needed Set-up / clean-up   Sit to Lying CARE Score 5   Sit to Stand   Type of Assistance Needed Set-up / clean-up   Comment no /AD or arm support   Sit to Stand CARE Score 5   Bed-Chair Transfer   Type of Assistance Needed Set-up / clean-up   Comment no AD   Chair/Bed-to-Chair Transfer CARE Score 5   Car Transfer   Type of Assistance Needed Supervision;Verbal cues   Comment no AD   Car Transfer CARE Score 4   Walk 10 Feet   Type of Assistance Needed Set-up / clean-up   Walk 10 Feet CARE Score 5   Walk 50 Feet with Two Turns   Type of Assistance Needed Supervision   Walk 50 Feet with Two Turns CARE Score 4   Walk 150 Feet   Type of Assistance Needed Supervision   Comment 400 + CW4 to/NW9, NW9 to lobby to CW4 no AD   Walk 150 Feet CARE Score 4   Walking 10 Feet on Uneven Surfaces   Type of Assistance Needed Supervision   Comment over NW9 short  ramp + indoor lobby ramp no AD   Walking 10 Feet on Uneven Surfaces CARE Score 4   Wheel 50 Feet with Two Turns   Reason if not Attempted Activity not applicable   Wheel 50 Feet with Two Turns CARE Score 9   Wheel 150 Feet   Reason if not Attempted Activity not applicable   Wheel 760 Feet CARE Score 9   Curb or Single Stair   Style negotiated Curb   Type of Assistance Needed Supervision   Comment 8\" curb without AD   1 Step (Curb) CARE Score 4   4 Steps   Type of Assistance Needed Supervision;Verbal cues; Adaptive equipment   Comment CS with/without rail   4 Steps CARE Score 4   12 Steps   Type of Assistance Needed Supervision;Verbal cues; Adaptive equipment   Comment FF reciprocal pattern for both " ascending/descending   12 Steps CARE Score 4   Picking Up Object   Type of Assistance Needed Supervision   Comment no AD picked up pen with R hand   Picking Up Object CARE Score 4   Toilet Transfer   Type of Assistance Needed Set-up / clean-up   Comment no AD   Toilet Transfer CARE Score 5   Therapeutic Interventions   Strengthening resisted hamstring curls and R DF with black tband x 10 reps x 3 sets  HEP prescription with handout via [address] program access code [employer] [facility] for standing exercise   Neuromuscular Re-Education FF negotiation with R rail x 1  then FF without rail x 2 then progressed to FF without rail while holding 6# weighted bag x 2 FF reciprocal pattern  walking x 400' while carrying 6# weighted bag on R hand, walking while holding half water filled mug then just the mug ( no significant difference noted)  requiring assist/VC to keep  on the mug to avoid from dropping it  walking while holding closed lid water bottle with R hand, required VC only to keep R hand  in bottle  walking while holding small bottle of lotion  community re-integration walking in busy lobby, tight spaces in gift shop and pharmacy incorporated 2-3 objects finding  trialed overground running with 2 person assist but tripped requiring assist to stabilize so pt re-ed not appropriate and safe to go running at d/c for he will need additional training which can be address with outpatient PT  spraying and wiping mock car and nu step with R hand only, walking while taking off glove and pushing on the lever with R foot to open trash can then throw glove in   Equipment Use   NuStep lvl 7 x 10 mins, bilat LE and R hand only, SPM> 75   Assessment   Treatment Assessment skilled PT cont to focus on NMR/NPP multi-tasking training incorporating cognitive task and R UE  also worked on strengthening exercise including HEP prescription with handout provided   noted improving indep and confidence when descending non reciprocal pattern on the FF without using a rail  although still catches R shoe toe box over nosing when ascending but no tripping  However still demo difficulty controlling R foot slapping or scuffing with demo'd multiple (~6x) slight tripping while walking but pt able to self stabilized  PT to reassess care score mobility sections tomorrow as well as cont with NMR/NPP training  Family/Caregiver Present no   Barriers to Discharge None   Barriers to Discharge Comments FT completed, pt's father to move in with pt   Recommendation   PT Discharge Recommendation Home with outpatient rehabilitation   Equipment Recommended Other (Comment)  (no AD)   PT Therapy Minutes   PT Time In 1230   PT Time Out 1400   PT Total Time (minutes) 90   PT Mode of treatment - Individual (minutes) 90   PT Mode of treatment - Concurrent (minutes) 0   PT Mode of treatment - Group (minutes) 0   PT Mode of treatment - Co-treat (minutes) 0   PT Mode of Treatment - Total time(minutes) 90 minutes   PT Cumulative Minutes 768   Therapy Time missed   Time missed?  No

## 2023-06-07 NOTE — PROGRESS NOTES
PM&R PROGRESS NOTE:  Haily Bueno 40 y o  male MRN: [id number]  Unit/Bed#: -01 Encounter: [id number]           Rehab Diagnosis: Impairment of mobility, safety, Activities of Daily Living (ADLs), and cognitive/communication skills due to Stroke:  01 2  Right Body Involvement (Left Brain)  Left MCA CVA    SUBJECTIVE: Patient seen face-to-face  No acute issues overnight  Seen ambulating with PT with a gait belt  ASSESSMENT: Stable, progressing      PLAN:    Rehabilitation  • Functional deficits: right hemiparesis, right sensory deficits, aphasia, possible cognitive deficits, impaired mobility, self care  • Continue current rehabilitation plan of care to maximize function      • Estimated Discharge: Friday 6/9/23 with outpatient therapies PT, OT, SLP  o Appts with Neurology/Cardiology     Current Function:  Physical Therapy Occupational Therapy Speech Therapy   Weight Bearing Status: Full Weight Bearing  Transfers: Supervision  Bed Mobility: Independent  Amulation Distance (ft): 200 feet  Ambulation: Incidental Touching, Minimal Assistance, Supervision (occassional min due to LOB otherwise CG-CS)  Assistive Device for Ambulation:  (no AD)  Wheelchair Mobility Distance:  (n/a)  Wheelchair Mobility:  (anticipate pt will be amb at d/c)  Number of Stairs: 12  Assistive Device for Stairs: Right Hand Rail  Stair Assistance: Incidental Touching  Ramp: Incidental Touching  Assistive Device for Ramp: None  Discharge Recommendations: Home with:  DC Home with[de-identified] 24 Hour Supervision, Family Support, Outpatient Physical Therapy   Eating: Independent  Grooming: Supervision  Bathing: Minimal Assistance  Bathing: Minimal Assistance  Upper Body Dressing: Supervision  Lower Body Dressing: Supervision  Toileting: Supervision  Tub/Shower Transfer: Minimal Assistance  Toilet Transfer: Supervision  Cognition: Exceptions to WNL  Cognition: Decreased Executive Functions, Decreased Attention  Orientation: Person, Place, Situation Mode of Communication: Verbal  Speech/Language: Expressive Aphasia (and receptive aphasia)  Cognition: Exceptions to WNL  Cognition: Decreased Memory, Decreased Executive Functions, Decreased Attention, Decreased Comprehension, Decreased Safety, Impulsive  Orientation: Person, Place, Time, Situation  Discharge Recommendations: Home with:  [address] with[de-identified] 24 Hour Supervision, 24 Hour Assisteance, Family Support, Outpatient Speech Therapy       DVT prophylaxis  • Discontinued Lovenox 6/5/2023 patient is ambulating a great distance in therapies daily     Bladder plan  • Continent     Bowel plan  • Continent      * CVA (cerebral vascular accident) [facility])  Assessment & Plan  Presented 5/15/23 as stroke alert  CTH/MRI brain confirmed Left MCA ischemic stroke  S/p thrombectomy TICI 3 revascularization 5/16/23  Etiology unclear, (possibly related to recent COVID infection in Feb 2023)  Work-up: +Large PFO on ALYSSA  Hypercoagulable work-up and malignancy work-up largely negative - MHTFR pending  Repeat hypercoagulable what the serial work-up as outpatient recommended  · Secondary CVA ppx: Aspirin 81 mg daily  · CVA education and reduction of modifiable risk factors  · Follow-up with neurology and cardiology as an outpatient (patient is a candidate for PFO closure)      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)  Assessment & Plan  Continue Lexapro 5 mg -started 5/30/2023    Adjustment disorder  Assessment & Plan  Appreciate neuropsychology following while in [facility]  Mood has significantly improved  Continue Lexapro 5 mg daily - started 5/30/23    Abnormal findings on imaging test  Assessment & Plan  CT C/A/P: Moderate bilateral pleural effusions, small amount of ascites, and diffuse body wall subcutaneous edema    Follow-up with PCP  Stable from a pulmonary standpoint in [facility]    Bradycardia  Assessment & Plan  HR 50-60s  Asymptomatic  Monitor  Follow-up with Cardiology as outpatient    Tobacco use  Assessment & "Plan  Smoking cessation encouraged  Continue nicotine patch -weaned to 7 mg daily    PFO (patent foramen ovale)  Assessment & Plan  Large PFO identified on ALYSSA  Cardiology recommending follow-up in 4-6 weeks as an outpatient for possible closure    Seizure [facility])  Assessment & Plan  Witnessed seizure-like activity times 2/5/1623  Video EEG negative however patient continued on Keppra 750 mg every 12 hours  Placed on seizure precautions  Per neurology continue Keppra until outpatient appointment for duration to be determined  [place] form completed      Appreciate IM consultants medical co-management  Labs, medications, and imaging personally reviewed  ROS:  A ten point review of systems was completed on 06/07/23 and pertinent positives are listed in subjective section  All other systems reviewed were negative  OBJECTIVE:   /60 (BP Location: Left arm)   Pulse (!) 46   Temp 97 9 °F (36 6 °C) (Oral)   Resp 16   Ht 5' 9\" (1 753 m)   Wt 73 1 kg (161 lb 2 5 oz)   SpO2 97%   BMI 23 80 kg/m²       Physical Exam  Vitals and nursing note reviewed  Constitutional:       General: He is not in acute distress  HENT:      Head: Normocephalic and atraumatic  Nose: Nose normal       Mouth/Throat:      Mouth: Mucous membranes are moist    Eyes:      Conjunctiva/sclera: Conjunctivae normal    Cardiovascular:      Rate and Rhythm: Normal rate and regular rhythm  Pulses: Normal pulses  Pulmonary:      Effort: Pulmonary effort is normal       Breath sounds: Normal breath sounds  No wheezing or rales  Abdominal:      General: Bowel sounds are normal  There is no distension  Palpations: Abdomen is soft  Tenderness: There is no abdominal tenderness  Musculoskeletal:         General: No swelling  Cervical back: Neck supple  Skin:     General: Skin is warm  Neurological:      Mental Status: He is alert and oriented to person, place, and time        Comments: Right mild " inattention  Mild right hemiparesis  Aphasia   Psychiatric:         Mood and Affect: Mood normal           Lab Results   Component Value Date    HCT 34 7 (L) 06/05/2023    HGB 12 1 06/05/2023    MCV 95 06/05/2023     06/05/2023    WBC 3 99 (L) 06/05/2023     Lab Results   Component Value Date    BUN 8 06/05/2023    CALCIUM 8 8 06/05/2023     06/05/2023    CO2 30 06/05/2023    CREATININE 0 80 06/05/2023    GLUC 87 06/05/2023    K 4 1 06/05/2023    SODIUM 138 06/05/2023     Lab Results   Component Value Date    INR 0 89 05/15/2023    PROTIME 12 3 05/15/2023         Current Facility-Administered Medications:   •  acetaminophen (TYLENOL) tablet 975 mg, 975 mg, Oral, Q8H PRN, Liseth Barbour MD  •  aspirin chewable tablet 81 mg, 81 mg, Oral, Daily, Liseth Barbour MD, 81 mg at 06/06/23 0809  •  bisacodyl (DULCOLAX) rectal suppository 10 mg, 10 mg, Rectal, Daily PRN, Liseth Barbour MD  •  escitalopram (LEXAPRO) tablet 5 mg, 5 mg, Oral, Daily, Liseth Barbour MD, 5 mg at 06/06/23 0809  •  levETIRAcetam (KEPPRA) tablet 750 mg, 750 mg, Oral, Q12H [facility], Liseth Barbour MD, 750 mg at 06/06/23 2115  •  nicotine (NICODERM CQ) 7 mg/24hr TD 24 hr patch 7 mg, 7 mg, Transdermal, Daily, Liseth Barbour MD  •  ondansetron (ZOFRAN-ODT) dispersible tablet 4 mg, 4 mg, Oral, Q6H PRN, Liseth Barbour MD  •  pantoprazole (PROTONIX) EC tablet 40 mg, 40 mg, Oral, Early Morning, Liseth Barbour MD, 40 mg at 06/07/23 0557  •  polyethylene glycol (MIRALAX) packet 17 g, 17 g, Oral, Daily PRN, Liseth Barbour MD    History reviewed  No pertinent past medical history      Patient Active Problem List    Diagnosis Date Noted   • CVA (cerebral vascular accident) ([address] ) 05/15/2023   • 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) 05/29/2023   • Abnormal findings on imaging test 05/28/2023   • Adjustment disorder 05/28/2023   • Tobacco use 05/27/2023   • Mild pain 05/27/2023   • Bradycardia 05/27/2023   • PFO (patent foramen ovale) 05/22/2023   • Seizure ([address] ) 05/16/2023   • Positive self-administered antigen test for COVID-19 02/10/2023   • Viral infection, unspecified 11/10/2022   • Bilateral low back pain without sciatica 01/29/2020          Deven Santillan MD    I have spent a total time of 25 minutes on 06/07/23 in caring for this patient including Impressions, Counseling / Coordination of care and Documenting in the medical record  ** Please Note:  voice to text software may have been used in the creation of this document   Although proof errors in transcription or interpretation are a potential of such software**

## 2023-06-07 NOTE — PROGRESS NOTES
Occupational Therapy Treatment Note         06/07/23 0700   Pain Assessment   Pain Assessment Tool 0-10   Pain Score No Pain   Restrictions/Precautions   Precautions Bed/chair alarms;Cognitive; Fall Risk;Aphasia; Supervision on toilet/commode;Seizure   Eating   Type of Assistance Needed Independent   Physical Assistance Level No physical assistance   Eating CARE Score 6   Oral Hygiene   Type of Assistance Needed Supervision   Physical Assistance Level No physical assistance   Comment in stance at sink  verbal cues to not compensate by turning head and promote movement of R UE instead  pt uses mirror as cue and is able to carry this over with min verbal cues   Oral Hygiene CARE Score 4   Shower/Bathe Self   Type of Assistance Needed Supervision   Physical Assistance Level No physical assistance   Comment sits/stands on shower chair  min verbal cues to thoroughly wash LEs while seated on shower chair and then buttock while in stance  Shower/Bathe Self CARE Score 4   Bathing   Findings  supervision walk in shower  plan to trial dry tub transfer tomorrow   Upper Body Dressing   Type of Assistance Needed Set-up / clean-up   Physical Assistance Level No physical assistance   Comment seated   Upper Body Dressing CARE Score 5   Lower Body Dressing   Type of Assistance Needed Supervision   Physical Assistance Level No physical assistance   Comment seated to thread LEs, in stance to manage clothing over hips   Lower Body Dressing CARE Score 4   Putting On/Taking Off Footwear   Type of Assistance Needed Set-up / clean-up   Physical Assistance Level No physical assistance   Comment seated     Putting On/Taking Off Footwear CARE Score 5   Roll Left and Right   Type of Assistance Needed Independent   Physical Assistance Level No physical assistance   Roll Left and Right CARE Score 6   Sit to Lying   Type of Assistance Needed Independent   Physical Assistance Level No physical assistance   Sit to Lying CARE Score 6   Lying to Sitting on Side of Bed   Type of Assistance Needed Independent   Physical Assistance Level No physical assistance   Lying to Sitting on Side of Bed CARE Score 6   Sit to Stand   Type of Assistance Needed Set-up / clean-up   Physical Assistance Level No physical assistance   Sit to Stand CARE Score 5   Bed-Chair Transfer   Type of Assistance Needed Supervision   Physical Assistance Level No physical assistance   Comment verbal cue to identify sneaker untied 2x during session  Mobility to/from room and shower room close supervision   Chair/Bed-to-Chair Transfer CARE Score 4   Toileting Hygiene   Type of Assistance Needed Supervision   Physical Assistance Level No physical assistance   Toileting Hygiene CARE Score 4   Toilet Transfer   Type of Assistance Needed Supervision   Physical Assistance Level No physical assistance   Toilet Transfer CARE Score 4   Meal Prep   Meal Prep Level of Assistance Close supervision   Meal Preparation pt able to set microwave x20 seconds and select start without verbal cue this session (today is 3rd time with microwave management)   Kitchen Mobility   Kitchen Activity Retrieve items;Transport items   Kitchen Mobility Comments supervision to carry breakfast tray to/from OT gym and room   Coordination   Fine Motor engaged in writing activity where pt used R UE to trace letters A-E alternating with capital and lower case letters, pt requires increased time and min cues for correct holding of pen   Cognition   Overall Cognitive Status Impaired   Arousal/Participation Alert   Attention Difficulty dividing attention   Orientation Level Oriented to person;Oriented to place;Oriented to time;Oriented to situation   Following Commands Follows multistep commands with increased time or repetition   Activity Tolerance   Activity Tolerance Patient tolerated treatment well   Assessment   Treatment Assessment Pt participated in skilled OT tx session   See above for further details on functional performance  Pt performing at overall supervision level  Pt requires overall min verbal cues for thoroughness during bathing, to identify sneaker came untied 2x during session  Will not progress to in room privileges, recommending 24/7 supervision upon d/c which pt's dad will be able to provide  Pt reporting family training went well yesterday with pt's dad  OT created R UE HEP for pt  Medbridge exercises see below plan to use 4# dumb bell which pt's father will purchase  Additioanlly OT created folder with tracing for all letters and blank lined paper for pt to practice copying sentences  Plan for d/c ADL tomorrow in shower, also complete dry tub transfer step over, review strengthening portion of HEP, and provide pt with written d/c instructions  Pt left positioned in bed with call bell in reach and bed alarm on  Prognosis Good   Problem List Decreased strength;Decreased endurance; Impaired balance;Decreased mobility; Decreased coordination;Decreased cognition; Impaired sensation   Barriers to Discharge None   Plan   Treatment/Interventions ADL retraining;Functional transfer training; Therapeutic exercise; Endurance training;Cognitive reorientation;Patient/family training;Equipment eval/education; Bed mobility; Compensatory technique education   Progress Progressing toward goals   Recommendation   OT Discharge Recommendation Home with outpatient rehabilitation   OT Therapy Minutes   OT Time In 0700   OT Time Out 0830   OT Total Time (minutes) 90   OT Mode of treatment - Individual (minutes) 90   OT Mode of treatment - Concurrent (minutes) 0   OT Mode of treatment - Group (minutes) 0   OT Mode of treatment - Co-treat (minutes) 0   OT Mode of Treatment - Total time(minutes) 90 minutes   OT Cumulative Minutes 915   Therapy Time missed   Time missed? No       Access Code: [email or web address]  URL: [email or web address]  Date: 06/07/2023      Exercises  - Seated Single Arm Shoulder Flexion with Dumbbells  - 1 x daily - 7 x weekly - 2 sets - 10 reps  - Seated Single Arm Shoulder Abduction with Elbow Bent and Dumbbell  - 1 x daily - 7 x weekly - 2 sets - 10 reps  - Seated Single Arm Shoulder Extension with Dumbbell  - 1 x daily - 7 x weekly - 2 sets - 10 reps  - Seated Single Arm Bicep Curls with Rotation and Dumbbell  - 1 x daily - 7 x weekly - 2 sets - 10 reps  - Seated Single Arm Chest Press with Dumbbell  - 1 x daily - 7 x weekly - 2 sets - 10 reps  - Putty Squeezes  - 1 x daily - 7 x weekly - 2 sets - 5 reps  - 3-Point Pinch with Putty  - 1 x daily - 7 x weekly - 2 sets - 5 reps

## 2023-06-08 PROCEDURE — 99232 SBSQ HOSP IP/OBS MODERATE 35: CPT | Performed by: PHYSICAL MEDICINE & REHABILITATION

## 2023-06-08 PROCEDURE — 97110 THERAPEUTIC EXERCISES: CPT

## 2023-06-08 PROCEDURE — 99232 SBSQ HOSP IP/OBS MODERATE 35: CPT | Performed by: INTERNAL MEDICINE

## 2023-06-08 PROCEDURE — 92507 TX SP LANG VOICE COMM INDIV: CPT

## 2023-06-08 PROCEDURE — 97112 NEUROMUSCULAR REEDUCATION: CPT

## 2023-06-08 PROCEDURE — 97535 SELF CARE MNGMENT TRAINING: CPT

## 2023-06-08 PROCEDURE — 97530 THERAPEUTIC ACTIVITIES: CPT

## 2023-06-08 RX ADMIN — ESCITALOPRAM OXALATE 5 MG: 10 TABLET ORAL at 09:09

## 2023-06-08 RX ADMIN — ASPIRIN 81 MG CHEWABLE TABLET 81 MG: 81 TABLET CHEWABLE at 09:09

## 2023-06-08 RX ADMIN — LEVETIRACETAM 750 MG: 750 TABLET, FILM COATED ORAL at 09:09

## 2023-06-08 RX ADMIN — NICOTINE 7 MG: 7 PATCH, EXTENDED RELEASE TRANSDERMAL at 09:10

## 2023-06-08 RX ADMIN — PANTOPRAZOLE SODIUM 40 MG: 40 TABLET, DELAYED RELEASE ORAL at 05:33

## 2023-06-08 RX ADMIN — LEVETIRACETAM 750 MG: 750 TABLET, FILM COATED ORAL at 20:55

## 2023-06-08 NOTE — PROGRESS NOTES
PM&R PROGRESS NOTE:  Jose Villa 40 y o  male MRN: [id number]  Unit/Bed#: -01 Encounter: [id number]           Rehab Diagnosis: Impairment of mobility, safety, Activities of Daily Living (ADLs), and cognitive/communication skills due to Stroke:  01 2  Right Body Involvement (Left Brain)  Left MCA CVA    SUBJECTIVE: No issues reported by nursing  Preparing for discharge in AM       ASSESSMENT: Stable, progressing      PLAN:    Rehabilitation  • Functional deficits: right hemiparesis, right sensory deficits, aphasia, possible cognitive deficits, impaired mobility, self care  • Continue current rehabilitation plan of care to maximize function      • Estimated Discharge: Friday 6/9/23 with outpatient therapies PT, OT, SLP  o Appts with Neurology/Cardiology     Current Function:  Physical Therapy Occupational Therapy Speech Therapy   Weight Bearing Status: Full Weight Bearing  Transfers: Supervision  Bed Mobility: Independent  Amulation Distance (ft): 200 feet  Ambulation: Incidental Touching, Minimal Assistance, Supervision (occassional min due to LOB otherwise CG-CS)  Assistive Device for Ambulation:  (no AD)  Wheelchair Mobility Distance:  (n/a)  Wheelchair Mobility:  (anticipate pt will be amb at d/c)  Number of Stairs: 12  Assistive Device for Stairs: Right Hand Rail  Stair Assistance: Incidental Touching  Ramp: Incidental Touching  Assistive Device for Ramp: None  Discharge Recommendations: Home with:  DC Home with[de-identified] 24 Hour Supervision, Family Support, Outpatient Physical Therapy   Eating: Independent  Grooming: Supervision  Bathing: Minimal Assistance  Bathing: Minimal Assistance  Upper Body Dressing: Supervision  Lower Body Dressing: Supervision  Toileting: Supervision  Tub/Shower Transfer: Minimal Assistance  Toilet Transfer: Supervision  Cognition: Exceptions to WNL  Cognition: Decreased Executive Functions, Decreased Attention  Orientation: Person, Place, Situation   Mode of Communication: Verbal  Speech/Language: Expressive Aphasia (and receptive aphasia)  Cognition: Exceptions to WNL  Cognition: Decreased Memory, Decreased Executive Functions, Decreased Attention, Decreased Comprehension, Decreased Safety, Impulsive  Orientation: Person, Place, Time, Situation  Discharge Recommendations: Home with:  [address] with[de-identified] 24 Hour Supervision, 24 Hour Assisteance, Family Support, Outpatient Speech Therapy       DVT prophylaxis  • Discontinued Lovenox 6/5/2023 patient is ambulating a great distance in therapies daily     Bladder plan  • Continent     Bowel plan  • Continent      * CVA (cerebral vascular accident) [facility])  Assessment & Plan  Presented 5/15/23 as stroke alert  CTH/MRI brain confirmed Left MCA ischemic stroke  S/p thrombectomy TICI 3 revascularization 5/16/23  Etiology unclear, (possibly related to recent COVID infection in Feb 2023)  Work-up: +Large PFO on ALYSSA  Hypercoagulable work-up and malignancy work-up largely negative - MHTFR pending  Repeat hypercoagulable what the serial work-up as outpatient recommended  · Secondary CVA ppx: Aspirin 81 mg daily  · CVA education and reduction of modifiable risk factors  · Follow-up with neurology and cardiology as an outpatient (patient is a candidate for PFO closure)      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)  Assessment & Plan  Continue Lexapro 5 mg -started 5/30/2023    Adjustment disorder  Assessment & Plan  Appreciate neuropsychology following while in [facility]  Mood has significantly improved  Continue Lexapro 5 mg daily - started 5/30/23    Abnormal findings on imaging test  Assessment & Plan  CT C/A/P: Moderate bilateral pleural effusions, small amount of ascites, and diffuse body wall subcutaneous edema    Follow-up with PCP  Stable from a pulmonary standpoint in [facility]    Bradycardia  Assessment & Plan  HR 50-60s  Asymptomatic  Monitor  Follow-up with Cardiology as outpatient    Tobacco use  Assessment & Plan  Smoking cessation "encouraged  Continue nicotine patch -weaned to 7 mg daily    PFO (patent foramen ovale)  Assessment & Plan  Large PFO identified on ALYSSA  Cardiology recommending follow-up in 4-6 weeks as an outpatient for possible closure    Seizure [facility])  Assessment & Plan  Witnessed seizure-like activity times 2/5/1623  Video EEG negative however patient continued on Keppra 750 mg every 12 hours  Placed on seizure precautions  Per neurology continue Keppra until outpatient appointment for duration to be determined  [place] form completed      Appreciate IM consultants medical co-management  Labs, medications, and imaging personally reviewed  ROS:  A ten point review of systems was completed on 06/08/23 and pertinent positives are listed in subjective section  All other systems reviewed were negative  OBJECTIVE:   /58 (BP Location: Left arm)   Pulse (!) 52   Temp 98 °F (36 7 °C) (Oral)   Resp 16   Ht 5' 9\" (1 753 m)   Wt 73 1 kg (161 lb 2 5 oz)   SpO2 98%   BMI 23 80 kg/m²       Physical Exam  Vitals and nursing note reviewed  Constitutional:       General: He is not in acute distress  HENT:      Head: Normocephalic and atraumatic  Nose: Nose normal       Mouth/Throat:      Mouth: Mucous membranes are moist    Eyes:      Conjunctiva/sclera: Conjunctivae normal    Cardiovascular:      Rate and Rhythm: Normal rate and regular rhythm  Pulses: Normal pulses  Pulmonary:      Effort: Pulmonary effort is normal       Breath sounds: Normal breath sounds  No wheezing or rales  Abdominal:      General: Bowel sounds are normal  There is no distension  Palpations: Abdomen is soft  Tenderness: There is no abdominal tenderness  Musculoskeletal:         General: No swelling  Cervical back: Neck supple  Skin:     General: Skin is warm  Neurological:      Mental Status: He is alert and oriented to person, place, and time        Comments: Right mild inattention  Mild right " hemiparesis  Aphasia   Psychiatric:         Mood and Affect: Mood normal           Lab Results   Component Value Date    HCT 34 7 (L) 06/05/2023    HGB 12 1 06/05/2023    MCV 95 06/05/2023     06/05/2023    WBC 3 99 (L) 06/05/2023     Lab Results   Component Value Date    BUN 8 06/05/2023    CALCIUM 8 8 06/05/2023     06/05/2023    CO2 30 06/05/2023    CREATININE 0 80 06/05/2023    GLUC 87 06/05/2023    K 4 1 06/05/2023    SODIUM 138 06/05/2023     Lab Results   Component Value Date    INR 0 89 05/15/2023    PROTIME 12 3 05/15/2023         Current Facility-Administered Medications:   •  acetaminophen (TYLENOL) tablet 975 mg, 975 mg, Oral, Q8H PRN, Aida Darden MD  •  aspirin chewable tablet 81 mg, 81 mg, Oral, Daily, Aiad Darden MD, 81 mg at 06/08/23 0909  •  bisacodyl (DULCOLAX) rectal suppository 10 mg, 10 mg, Rectal, Daily PRN, Aida Darden MD  •  escitalopram (LEXAPRO) tablet 5 mg, 5 mg, Oral, Daily, Aida Darden MD, 5 mg at 06/08/23 0909  •  levETIRAcetam (KEPPRA) tablet 750 mg, 750 mg, Oral, Q12H [address], Aida Darden MD, 750 mg at 06/08/23 0909  •  nicotine (NICODERM CQ) 7 mg/24hr TD 24 hr patch 7 mg, 7 mg, Transdermal, Daily, Aida Darden MD, 7 mg at 06/08/23 0910  •  ondansetron (ZOFRAN-ODT) dispersible tablet 4 mg, 4 mg, Oral, Q6H PRN, Aida Darden MD  •  pantoprazole (PROTONIX) EC tablet 40 mg, 40 mg, Oral, Early Morning, Aida Darden MD, 40 mg at 06/08/23 0533  •  polyethylene glycol (MIRALAX) packet 17 g, 17 g, Oral, Daily PRN, Aida Darden MD    History reviewed  No pertinent past medical history      Patient Active Problem List    Diagnosis Date Noted   • CVA (cerebral vascular accident) ([facility] [address] ) 05/15/2023   • 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) 05/29/2023   • Abnormal findings on imaging test 05/28/2023   • Adjustment disorder 05/28/2023   • Tobacco use 05/27/2023   • Mild pain 05/27/2023   • Bradycardia 05/27/2023   • PFO (patent foramen ovale) 05/22/2023   • Seizure ([address] ) 05/16/2023   • Positive self-administered antigen test for COVID-19 02/10/2023   • Viral infection, unspecified 11/10/2022   • Bilateral low back pain without sciatica 01/29/2020          Hugo Ibrahim MD    I have spent a total time of 20 minutes on 06/08/23 in caring for this patient including Impressions, Counseling / Coordination of care, Documenting in the medical record and preparing for discharge  ** Please Note:  voice to text software may have been used in the creation of this document   Although proof errors in transcription or interpretation are a potential of such software**

## 2023-06-08 NOTE — PLAN OF CARE
Problem: SAFETY ADULT  Goal: Patient will remain free of falls  Description: INTERVENTIONS:  - Educate patient/family on patient safety including physical limitations  - Instruct patient to call for assistance with activity   - Consult OT/PT to assist with strengthening/mobility   - Keep Call bell within reach  - Keep bed low and locked with side rails adjusted as appropriate  - Keep care items and personal belongings within reach  - Initiate and maintain comfort rounds  - Make Fall Risk Sign visible to staff  - Offer Toileting every 2 Hours, in advance of need  - Initiate/Maintain bed/chair alarm  - Obtain necessary fall risk management equipment:   - Apply yellow socks and bracelet for high fall risk patients  - Consider moving patient to room near nurses station  Outcome: Progressing

## 2023-06-08 NOTE — PROGRESS NOTES
Internal Medicine Progress Note  Patient: Susannah Philippe  Age/sex: 40 y o  male  Medical Record #: [id number]      ASSESSMENT/PLAN: (Interval History)  Susannah Philippe is seen and examined and management for following issues:    Acute left MCA CVA  • S/p left MCA thrombectomy  • Workup:   • Homocystine normal, BELEM normal, RF normal, ANCA negative, dsDNA negative, Factor V Leiden/lupus anticoagulant negative, Antithrombin III 81 (felt likely not clinically relevant was higher than 80%, Protein S decreased at 65%, Protein C normal                          MTHFR mutation negative                          Repeat thrombosis panel in 3 months   • Continue ASA 81mg qd and statin  • Etiology seems to be likely to COVID infection in 2/2023     Seizures  • Was noted with eyes deviating to the right 5/16 with a post ictal period   • Had 2 episodes  • Keppra stopped 5/25 but Dr Tipton Resides restarted since he had 2 sz in hospital     PFO  • Large PFO on ALYSSA  • Negative DVT  • To see Cards as OP 4-6 weeks for possible closure     Bradycardia  • Has HRs in 50's at times as in hospital  • Pt was an avid runner prior to CVA so likely d/t conditioning  • stable     Nicotine abuse  • Continue patch     Moderate B/L pleural effusions  • Repeat Cxray in 6 wks or sooner if symptomatic        Discharge date:  6/9/23       The above assessment and plan was reviewed and updated as determined by my evaluation of the patient on 6/8/2023      Labs:   Results from last 7 days   Lab Units 06/05/23  0525   HEMATOCRIT % 34 7*   HEMOGLOBIN g/dL 12 1   PLATELETS Thousands/uL 250   WBC Thousand/uL 3 99*     Results from last 7 days   Lab Units 06/05/23  0525   BUN mg/dL 8   CALCIUM mg/dL 8 8   CHLORIDE mmol/L 108   CO2 mmol/L 30   CREATININE mg/dL 0 80   POTASSIUM mmol/L 4 1   SODIUM mmol/L 138                   Review of Scheduled Meds:  Current Facility-Administered Medications   Medication Dose Route Frequency Provider Last Rate   • acetaminophen  975 mg Oral Q8H PRN Justino Jeter MD     • aspirin  81 mg Oral Daily Justino Jeter MD     • bisacodyl  10 mg Rectal Daily PRN Justino Jeter MD     • escitalopram  5 mg Oral Daily Justino Jeter MD     • levETIRAcetam  750 mg Oral Q12H [facility] Justino Jeter MD     • nicotine  7 mg Transdermal Daily Justino Jeter MD     • ondansetron  4 mg Oral Q6H PRN Justino Jeter MD     • pantoprazole  40 mg Oral Early Morning Justino Jeter MD     • polyethylene glycol  17 g Oral Daily PRN Justino Jeter MD         Subjective/ HPI: Patient seen and examined  Patients overnight issues or events were reviewed with nursing or staff during rounds or morning huddle session  New or overnight issues include the following:     Pt seen in his room  He states he is doing well  He denies any current complaints  ROS:   A 10 point ROS was performed; negative except as noted above  Imaging:     No orders to display       *Labs /Radiology studies reviewed  *Medications reviewed and reconciled as needed  *Please refer to order section for additional ordered labs studies  *Case discussed with primary attending during morning huddle case rounds    Physical Examination:  Vitals:   Vitals:    06/07/23 0515 06/07/23 1354 06/07/23 1945 06/08/23 0602   BP: 101/60 124/53 105/59 106/58   BP Location: Left arm Right arm Left arm Left arm   Pulse: (!) 46 74 (!) 48 (!) 52   Resp: 16 20 20 16   Temp: 97 9 °F (36 6 °C) 98 2 °F (36 8 °C) 98 1 °F (36 7 °C) 98 °F (36 7 °C)   TempSrc: Oral Oral Oral Oral   SpO2: 97% 97% 97% 98%   Weight: 73 1 kg (161 lb 2 5 oz)      Height:           GEN: NAD; pleasant, short answers to questions  NEURO: Alert  Expressive aphasia  HEENT: Pupils are equal/reactive; normocephalic, face is asymmetrical, hearing is normal  CV: S1 S2 regular, no murmur/rub/gallops, 2/4 pedal pulses, no edema    RESP: Lungs are clear bilaterally, no wheezes rales or rhonchi, on room air, no distress, respirations are easy and non labored  GI: Abdomen is flat, soft, non tender, +BS x4; non distended  : Voiding without issues  MUSC: Moves all extremities except mild right hemiparesis  SKIN: pink, warm, normal turgor, no rashes or lesions noted       The above physical exam was reviewed and updated as determined by my evaluation of the patient on 6/8/2023  Invasive Devices     None                    VTE Pharmacologic Prophylaxis: Enoxaparin  Code Status: Level 1 - Full Code  Current Length of Stay: 12 day(s)      Total time spent:  30 minutes   with more than 50% spent counseling/coordinating care  Counseling includes discussion with patient re: progress  and discussion with patient of his/her current medical state/information  Coordination of patient's care was performed in conjunction with primary service  Time invested included review of patient's labs, vitals, and management of their comorbidities with continued monitoring  In addition, this patient was discussed with medical team including physician and advanced extenders  The care of the patient was extensively discussed and appropriate treatment plan was formulated unique for this patient  Medical decision making for the day was made by supervising physician unless otherwise noted in their attestation statement  ** Please Note:  voice to text software may have been used in the creation of this document   Although proof errors in transcription or interpretation are a potential of such software**

## 2023-06-08 NOTE — PROGRESS NOTES
"   06/08/23 0710   Pain Assessment   Pain Assessment Tool 0-10   Pain Score No Pain   Restrictions/Precautions   Precautions Aphasia;Bed/chair alarms;Cognitive; Fall Risk;Seizure;Supervision on toilet/commode   Weight Bearing Restrictions No   ROM Restrictions No   Lifestyle   Autonomy \"Going home tomorrow  \"   Eating   Type of Assistance Needed Independent   Physical Assistance Level No physical assistance   Eating CARE Score 6   Oral Hygiene   Type of Assistance Needed Supervision   Physical Assistance Level No physical assistance   Comment in stance at sink  Oral Hygiene CARE Score 4   Shower/Bathe Self   Type of Assistance Needed Supervision   Physical Assistance Level No physical assistance   Comment completed at S level, Min vc's to bathe BLE while seated to inc safety w/ task  Shower/Bathe Self CARE Score 4   Tub/Shower Transfer   Findings CS for step-over tub/shower xfer  S for walk-in shower xfer  Upper Body Dressing   Type of Assistance Needed Set-up / clean-up   Physical Assistance Level No physical assistance   Comment seated  S in stance   Upper Body Dressing CARE Score 5   Lower Body Dressing   Type of Assistance Needed Supervision   Physical Assistance Level No physical assistance   Comment seated to thread BLE, in stance for clothing management     Lower Body Dressing CARE Score 4   Putting On/Taking Off Footwear   Type of Assistance Needed Set-up / clean-up   Physical Assistance Level No physical assistance   Comment seated   Putting On/Taking Off Footwear CARE Score 5   Sit to Lying   Type of Assistance Needed Independent   Physical Assistance Level No physical assistance   Sit to Lying CARE Score 6   Lying to Sitting on Side of Bed   Type of Assistance Needed Independent   Physical Assistance Level No physical assistance   Lying to Sitting on Side of Bed CARE Score 6   Sit to Stand   Type of Assistance Needed Set-up / clean-up   Physical Assistance Level No physical assistance   Sit to Stand " CARE Score 5   Bed-Chair Transfer   Type of Assistance Needed Set-up / clean-up   Physical Assistance Level No physical assistance   Comment no AD  ambulated<>bathroom, <>therapy gym  Chair/Bed-to-Chair Transfer CARE Score 5   Toileting Hygiene   Type of Assistance Needed Set-up / clean-up   Physical Assistance Level No physical assistance   Toileting Hygiene CARE Score 5   Toilet Transfer   Type of Assistance Needed Set-up / clean-up   Physical Assistance Level No physical assistance   Comment no AD   Toilet Transfer CARE Score 5   Exercise Tools   Other Exercise Tool 1 Pt engaged in BUE strengthening using 4# DB to increase UB strength for inc independence and performance during functional mobility and overall ADL/IADL tasks  While seated in chair pt completed 2x10 of the following exercises: chest press, shoulder raise to shoulder height  2 sets of 15 reps - bicep/tricep curls with R UE  Also engaged in R hand/digit strengthening using red theraputty to complete digit flexion and digits 1-3 pinching for 3 sets of 5 reps  Reviewed HEP folder, encouraged to complete daily to maximize RUE NMR, pt receptive to same and father was edu and will A w/ carryover at d/c  Cognition   Overall Cognitive Status Impaired   Assessment   Treatment Assessment Pt seen for skilled OT session focusing on ADL retraining, dry tub/shower xfer, and review of RUE HEP  Details on ADL noted above, completes at S level w/ occ vc's to inc safety for example to bathe lower legs while seated rather than in stance  Reviewed RUE HEP folder to include Anna Marie Cap w/ 4# DB and red theraputty, pt encouraged to complete daily  Family training was completed on 6/7, [place] BELL understanding of recommendation of 24 hour S 2* mild R-sided weakness, language and cognitive deficits  Recommending shower chair and 4# DB, father agreeable to A w/ independent purchase   From OT standpoint, pt is okay to d/c home w/ family to provide 24 hour S and recommendation for OP neuro OT to maximize fnxl indep, dec caregiver burden  OPOT w/ focus on RUE strength/coordination and fxnl cognitive retraining  Pt was left resting in bed at request w/ alarm activated and all needs within reach, meal tray set-up  Recommendation   OT Discharge Recommendation Home with outpatient rehabilitation   OT Therapy Minutes   OT Time In 0710   OT Time Out 0810   OT Total Time (minutes) 60   OT Mode of treatment - Individual (minutes) 60   OT Mode of treatment - Concurrent (minutes) 0   OT Mode of treatment - Group (minutes) 0   OT Mode of treatment - Co-treat (minutes) 0   OT Mode of Treatment - Total time(minutes) 60 minutes   OT Cumulative Minutes 975   Therapy Time missed   Time missed?  No

## 2023-06-08 NOTE — PLAN OF CARE
Problem: INFECTION - ADULT  Goal: Absence or prevention of progression during hospitalization  Description: INTERVENTIONS:  - Assess and monitor for signs and symptoms of infection  - Monitor lab/diagnostic results  - Monitor all insertion sites, i e  indwelling lines, tubes, and drains  - Monitor endotracheal if appropriate and nasal secretions for changes in amount and color  - [place] appropriate cooling/warming therapies per order  - Administer medications as ordered  - Instruct and encourage patient and family to use good hand hygiene technique  - Identify and instruct in appropriate isolation precautions for identified infection/condition  6/8/2023 1033 by Georgi Shone, RN  Outcome: Progressing  6/8/2023 1032 by Georgi Shone, RN  Outcome: Progressing     Problem: DISCHARGE PLANNING  Goal: Discharge to home or other facility with appropriate resources  Description: INTERVENTIONS:  - Identify barriers to discharge w/patient and caregiver  - Arrange for needed discharge resources and transportation as appropriate  - Identify discharge learning needs (meds, wound care, etc )  - Arrange for interpretive services to assist at discharge as needed  - Refer to Case Management Department for coordinating discharge planning if the patient needs post-hospital services based on physician/advanced practitioner order or complex needs related to functional status, cognitive ability, or social support system  6/8/2023 1033 by Georgi Shone, RN  Outcome: Progressing  6/8/2023 1032 by Georgi Shone, RN  Outcome: Progressing     Problem: Prexisting or High Potential for Compromised Skin Integrity  Goal: Skin integrity is maintained or improved  Description: INTERVENTIONS:  - Identify patients at risk for skin breakdown  - Assess and monitor skin integrity  - Assess and monitor nutrition and hydration status  - Monitor labs   - Assess for incontinence   - Turn and reposition patient  - Assist with mobility/ambulation  - Relieve pressure over bony prominences  - Avoid friction and shearing  - Provide appropriate hygiene as needed including keeping skin clean and dry  - Evaluate need for skin moisturizer/barrier cream  - Collaborate with interdisciplinary team   - Patient/family teaching  - Consider wound care consult   6/8/2023 1033 by Sharlene Abraham RN  Outcome: Progressing  6/8/2023 1032 by Sharlene Abraham RN  Outcome: Progressing     Problem: MOBILITY - ADULT  Goal: Maintain or return to baseline ADL function  Description: INTERVENTIONS:  -  Assess patient's ability to carry out ADLs; assess patient's baseline for ADL function and identify physical deficits which impact ability to perform ADLs (bathing, care of mouth/teeth, toileting, grooming, dressing, etc )  - Assess/evaluate cause of self-care deficits   - Assess range of motion  - Assess patient's mobility; develop plan if impaired  - Assess patient's need for assistive devices and provide as appropriate  - Encourage maximum independence but intervene and supervise when necessary  - Involve family in performance of ADLs  - Assess for home care needs following discharge   - Consider OT consult to assist with ADL evaluation and planning for discharge  - Provide patient education as appropriate  6/8/2023 1033 by Sharlene Abraham RN  Outcome: Progressing  6/8/2023 1032 by Sharlene Abraham RN  Outcome: Progressing

## 2023-06-08 NOTE — PROGRESS NOTES
"   06/08/23 1438   Pain Assessment   Pain Assessment Tool 0-10   Pain Score No Pain   Restrictions/Precautions   Precautions Cognitive;Bed/chair alarms; Fall Risk;Aphasia; Supervision on toilet/commode   Cognition   Overall Cognitive Status Impaired   Arousal/Participation Alert; Cooperative   Subjective   Subjective pt reported fatigue after ST session but agreeable to PT   Roll Left and Right   Type of Assistance Needed Independent   Roll Left and Right CARE Score 6   Sit to Lying   Type of Assistance Needed Independent   Sit to Lying CARE Score 6   Lying to Sitting on Side of Bed   Type of Assistance Needed Independent   Lying to Sitting on Side of Bed CARE Score 6   Sit to Stand   Type of Assistance Needed Set-up / clean-up   Sit to Stand CARE Score 5   Bed-Chair Transfer   Type of Assistance Needed Set-up / clean-up   Comment no AD   Chair/Bed-to-Chair Transfer CARE Score 5   Car Transfer   Type of Assistance Needed Set-up / clean-up   Car Transfer CARE Score 5   Walk 10 Feet   Type of Assistance Needed Set-up / clean-up   Walk 10 Feet CARE Score 5   Walk 50 Feet with Two Turns   Type of Assistance Needed Supervision   Walk 50 Feet with Two Turns CARE Score 4   Walk 150 Feet   Type of Assistance Needed Supervision   Comment no AD, not limited with distance   re-ed to be mindful of audible R foot slapping which pt expressed of awareness but unable to sustained attempt at self correction   Walk 150 Feet CARE Score 4   Walking 10 Feet on Uneven Surfaces   Type of Assistance Needed Supervision   Comment lobby ramp no AD   Walking 10 Feet on Uneven Surfaces CARE Score 4   Wheel 50 Feet with Two Turns   Reason if not Attempted Activity not applicable   Wheel 50 Feet with Two Turns CARE Score 9   Wheel 150 Feet   Reason if not Attempted Activity not applicable   Wheel 518 Feet CARE Score 9   Curb or Single Stair   Style negotiated Curb   Type of Assistance Needed Supervision   Comment 6\" curb no AD x 3 reps   1 Step " "(Curb) CARE Score 4   4 Steps   Type of Assistance Needed Supervision;Verbal cues   4 Steps CARE Score 4   12 Steps   Type of Assistance Needed Supervision;Verbal cues   Comment no AD reciprocal pattern   12 Steps CARE Score 4   Picking Up Object   Type of Assistance Needed Set-up / clean-up   Comment no AD   Picking Up Object CARE Score 5   Therapeutic Interventions   Neuromuscular Re-Education walking with 3# ankle wt on R LE x 200' x 2, walking while holding 8# weighted bag on R hand x 200' x 2 reps,  repeated stepping over quad canes  then progress task with pt holding 8# weighted bag on R UE, R LE in stance for eccentric lowering control on 6\" curb  x 15 reps x 3 sets, R SLS with terminal knee extension using // bars for support  x 15 reps x 2 sets  community re-integration mobility on a busy lobby , entering/exiting and operating elevator, walking around w/c up on the 9th floor as he was walking to/from mock car  Assessment   Treatment Assessment pt tolerated skilled PT session despite notable and reported fatigue after ST session  noted consistent improvement with R foot clearance over steps nosing while going up steps without using an AD x 2 reps or even when carrying 8#  weighted bag on R hand  during FF negotiation x 2 reps although dropped bag 1x but able to bend over to  bag with R hand to complete task  But was not so consistent with R foot clearance stepping over quad canes while carrying weighted bag on R hand  although did not tripped over canes  also demo'd occasional R foot tripping on even/uneven surface but no overt LOB and was able to self stabilized without external assist  no reported concerns regarding home d/c tomorrow and was supervised back to bed at end of tx with all needs within reach and alarm on  Barriers to Discharge None   Plan   Treatment/Interventions Functional transfer training;LE strengthening/ROM; Therapeutic exercise; Endurance training;Gait training;Bed " mobility;Spoke to nursing;OT   Progress Improving as expected   Recommendation   PT Discharge Recommendation Home with outpatient rehabilitation   PT Therapy Minutes   PT Time In 1438   PT Time Out 1538   PT Total Time (minutes) 60   PT Mode of treatment - Individual (minutes) 60   PT Mode of treatment - Concurrent (minutes) 0   PT Mode of treatment - Group (minutes) 0   PT Mode of treatment - Co-treat (minutes) 0   PT Mode of Treatment - Total time(minutes) 60 minutes   PT Cumulative Minutes 888   Therapy Time missed   Time missed?  No

## 2023-06-08 NOTE — PROGRESS NOTES
"Speech-Language Pathology Initial Evaluation    Today's date: 2023  Patient’s name: Germán Rios  : 1980  MRN: [id number]  Safety measures: h/o CVA, seizure  Referring provider: Mariano Dillon MD    Encounter Diagnosis     ICD-10-CM    1  Aphasia as late effect of cerebrovascular accident  I69 [id number]       2  L MCA stroke w/ LM1 partial occlusion  I63 [id number] Ambulatory referral to Speech Therapy        Visit tracking:  -Referring provider: Epic  -Billing guidelines: CMS  -Visit # Genna Pace required after eval**)  -Insurance: [employer]elvin Carranza (12 visits PCY)  -RE due 2023    Subjective comments: Patient presented to today's session independently  Although patient demonstrated difficulty communicating due to aphasia (expressive > receptive language deficits) s/p CVA, with additional language processing time, self-corrections, yes/no questions, and verbal repetition/rephrasing, communication exchanges with clinician were able to take place  How did the patient hear about us? Physician    Patient's goal(s): \"to learn\" (Patient had difficulty formulating a goal due to his aphasia )    Reason for referral: Change in cognitive status and Decreased language skills  Prior functional status: Communication effective and appropriate in all situations  Clinically complex situations: Discharge from SNF or Hospital in the last 30 days    History: Patient is a 40 y o  male who was referred to outpatient skilled Speech Therapy services for an aphasia evaluation  Per chart review, patient presented to the ED on 05/15/2023 as a stroke alert  CTH/MRI brain revealed L MCA ischemic CVA  Patient is s/p thrombectomy TICI 3 revascularization (2023)  Etiology is unclear (possible related to recent COVID-19 infection in 2023)  Work-up revealed a large PFO on ALYSSA  SLP Discharge Summary (2023): \"Pt was discharged home w/ family supervision/support on 2023   While on the acute rehab center, pt was " making steady gains towards overall goals as established on initial assessment  However by time of discharge due to ongoing language/speech deficits, recommendations are for pt to require 24/7 supervision at this time  Primary purpose of ST services while on the [facility] was towards speech/language and cognitive skills to where by time of discharge, pt was functioning at mod A for comprehension, executive function and memory skills, fluctuating given expression ranging from min A to max A and social interactions at mod I level      Speech and language evaluations, both the Bedside WAB as well as Informal Language Assessment, was completed on admission where pt found to be presenting with severe expressive and receptive language deficits/aphasia  Per the bedside WAB, pt was noted to demonstrate Broca's Type Aphasia  Overall, pt's verbal expression is characterized by dysfluent speech containing semantic content, though also with anomia, phonemic paraphasias and neologisms  Pt presents with difficulty in following simple commands, though ? ing if due to comprehension vs combination of comprehension and motor planning deficits, along with deficits in yes/no questions and basic conversation  He benefits from repetition and rewording of information, as well as use of gestures paired with instructions  Current deficits which present are barriers include impairments in the following areas: comprehension, expression, insight into deficits, problem solving, safety awareness, judgement, impulsivity, sequencing, attention, STM recall, LTM recall, and executive functions  Pt was inirially limited by mood, which impacted pt's eagerness to participate in therapy  Pt was been provided with education regarding stroke recovery and will benefit from continued education and training  However, pt's overall demeanor/mood had improved and overall participation in [address] [facility] sessions improved, noting increased engagement and motivation   While "throughout his stay on the rehab, pt continued to demonstrate moderate language deficits (expressive> receptive language deficits)  Pt did best with simple conversation, answering yes/no questions and completing simple one word to short phrase answers  Pt continued with anomia, perseveration, and paraphasic errors  Fatigue and external distractions does greatly impact pt's overall communication skills, benefiting from rest breaks and extended time for processing and completing tasks  In regards to comprehension, pt was stronger with listening compared to reading comprehension at this time  Pt benefited from auditory cues and unison reading for increasing understanding during reading tasks  Education was initiated and ongoing training had been completed with pt's father in regards to current communication and cognitive deficits, cuing strategies, and recommendations for discharge  Pt to require 24hr S/A at discharge initially given communication and suspected cognitive deficits at this time in which pt's father is agreeable to provide  Additionally, extensive HEP was provided to pt and pt's father for tasks targeting both language and cognitive skills until initiation of OP ST services  Additionally included in the HEP ST folder was Apps which target speech/language skills, in addition to stroke warning signs/sxs and information for the [facility] support group  Overall,  pt will continue to highly benefit from cont'd skilled SLP services targeting expressive and receptive communication skills for increased independence and decreased burden of care upon safe discharge home  Maple Sarai Maple Sarai \"    Hearing: WFL for testing  Vision: WFL for testing with glasses    Home environment/lifestyle: Lives with father  Highest level of education: [employer] degree  Vocational status: full-time prior to CVA ( for the [employer])    Mental status: Alert  Behavior status: Cooperative  Communication modalities: " "Verbal  Rehabilitation prognosis: Good rehab potential to reach the established goals    Assessments    The Western Aphasia Battery-Revised (WAB-R) is designed to evaluate a patient's language function following CVA, dementia, or other acquired neurological disorder  It measures a patient’s linguistic skills, such as speech content, fluency, auditory comprehension, repetition, naming, reading, and writing  The WAB-R also measures a patient’s nonlinguistic skills, including drawing, calculation, block design, and apraxia  The purpose of this standardized assessment is to (1) determine the presence, severity, and type of aphasia; (2) measure the patient's level of performance to provide a baseline for detecting change over time; (3) provide a comprehensive assessment of the patient's language assets and deficits in order to guide treatment and management; and (4) infer the location and etiology of the lesion causing aphasia  The following results were obtained during the administration of the assessment:     PART 1: Score:   -Spontaneous Speech:    -Auditory Verbal Comprehension: 6 3/10   -Repetition: 5 2/10   -Naming & Word Findin/10     *Pt scores correlated most consistently with Broca's Aphasia  Due to time constraints, only portions of the standardized assessment were administered on this date of service  (\"DNT\" = did not test)    PART 2: Score:   -Reading Score:    -Writin    -Apraxia: DNT10   -Constructional, Visuospatial, & Calculation: DNT/10        Score:   *Aphasia Quotient (AQ): 65/100   *Language Quotient (LQ): 56 1/100   *Cortical Quotient (CQ): DNT/100     Goals    Short-term goals:   Patient will name opposite (e g , hot and ___) with 80% accuracy to build expressive vocabulary for conversation (to be achieved in 4-6 weeks)  Patient will name a synonym (e g , street or ___) with 80% accuracy to build expressive vocabulary for conversation (to be achieved in 4-6 weeks)   " Patient will name appropriate category for three words (e g , apple, banana, pear = fruits) with 80% accuracy to facilitate improved generative naming skills (to be achieved in 4-6 weeks)  Patient will complete confrontational naming tasks with 80% accuracy to build expressive vocabulary for conversation (to be achieved in 4-6 weeks)  Patient will name up to 5 features to describe a person/place/thing with 80% accuracy to facilitate improved utilization of circumlocution strategy (to be achieved in 4-6 weeks)  Patient will complete simple writing tasks at the word level with >90% acc to increase writing skills within FLE (to be achieved in 4-6 weeks)  Patient will follow multi-step verbal directions with 80% accuracy to facilitate increased auditory comprehension skills (to be achieved in 4-6 weeks)  Patient will facilitate functional reading skills by providing an appropriate word for sentence completion task (e g , open the ___) with 80% accuracy to facilitate increased expressive language skills (to be achieved in 4-6 weeks)  Long-term goals:  Patient will demonstrate improved expressive and receptive language skills during structured and unstructured tasks (to be achieved by discharge)  Patient will improve ability to facilitate communication to meet needs including use of compensatory strategies to promote meaningful interactions for improved quality of life and maximize level of independence (to be achieved by discharge)  Impressions/Recommendations    Impressions:   -Patient presents with moderate-severe aphasia s/p CVA (expressive > receptive language deficits)  Verbal expression deficits (fluctuating between moderate-severe depending upon level of complexity) can be c/b difficulty with word retrieval, production of grammatical sentences, and production of short descriptions of places, people, and actions   Halting, often telegraphic, speech is present with some grammatical organization  Marked word finding difficulty is noted with breakdowns and frustration by patient  Verbal repetition is impaired  Graphic expression deficits (moderate-severe) can be c/b difficulty with letter formation, accuracy of spelling of single, and organization of phrases/sentences  Auditory comprehension deficits (moderate) can be c/b difficulty with following complex and multi-step commands and following simple conversations depending upon topic  Reading comprehension deficits (moderate) can be c/b difficulty with sentences  Patient would benefit from outpatient skilled Speech Therapy services to support the highest level of independence with his current speech-language skills, promote positive communication interactions with both familiar & unfamiliar listeners, further education/training compensatory strategies, increase communication of wants/needs, follow directions within functional activities, participate in meaningful activities, allow for increased socialization, promote safety, facilitate overall improved quality of life, reduce caregiver burden, instruction on HEP and complete caregiver education/training      Recommendations:  -Patient would benefit from outpatient skilled Speech Therapy services: Speech-language therapy    -Frequency: 3x weekly; however, due to insurance limitations (maximum of 12 outpatient Speech Therapy visits per calendar year), recommend 1-2x/week  -Duration: 10-12 weeks      -Intervention certification from: 9/43/3579  -Intervention certification to: 52/94/7114    -Intervention comments:   -Initial evaluation/administration of standardized aphasia test with patient (60 minutes)  -Scoring/interpretation of the standardized aphasia test for the development of POC, and write-up of the report (60 minutes)

## 2023-06-08 NOTE — PROGRESS NOTES
06/08/23 1315   Pain Assessment   Pain Assessment Tool 0-10   Pain Score No Pain   Restrictions/Precautions   Precautions Cognitive; Aphasia;Bed/chair alarms; Fall Risk;Supervision on toilet/commode   Comprehension   Comprehension (FIM) 3 - Understands basic info/conversation 50-74% of time   Expression   Expression (FIM) 3 - Expresses basic info/needs 50-74% of time   Social Interaction   Social Interaction (FIM) 6 - Interacts appropriately with others BUT requires extra  time   Problem Solving   Problem solving (FIM) 3 - Solves basic problmes 50-74% of time   Memory   Memory (FIM) 3 - Recognizes, recalls/performs 50-74%   Speech/Language/Cognition Assessment   Treatment Assessment Pt participated in skilled ST session focusing on expressive and receptive language skills  Pt engaged in the following tasks:    Home Exercise Program (HEP)/Education Materials:  Pt was provided with a HEP folder and was provided with a review of all items within his folder  Within pt's folder and was reviewed include the following materials: Personal Info Sheet to be used in Emergencies (biographical info, info related to emergency contact info, etc), Info/flyer about [facility] stroke support group, Stroke Signs/Symptoms, smartphone/device language apps (free and paid), extensive language activities which target all areas (verbal expression, written expression, reading comprehension, auditory comprehension), and therapist/unit card of contact info  SLP provided education on all info present and gave examples of how to complete all activities in folder  Reviewed current plan for d/c to occur tomorrow with outpatient skilled SLP services scheduled-pt verbalized awareness of all info  Expression of Biographical Info:   In review of Personal Info Cheat Sheet to be used in emergencies which is now in pt's HEP folder, pt with general accuracy/intelligibility read aloud 4/6 statements, benefiting from verbal cues to read remaining two statements with improved accuracy as pt noted to at times substitute different words, which did change meanings of content  Verbal Expression:  Verbally presented with a concrete/basic concept category, pt stated words which belong to each category for 29/30 trials with accurate semantic content  Required verbal cue to elicit final item  Throughout task, pt with x5 errors in production/articulation of words, however, this did not significantly impact intelligibility  Pt at times also self corrected errors  SLP also educated on strategy of pacing and tapping with each syllable to slow down his speech during times when making multiple attempts at words but fast rate continues to impact accuracy of production/motor planning of words  Pt then educated on the word finding strategy of circumlocution in order to assist with self cuing when experiencing word retrieval difficulties  Presented with a word, pt requested to provide three descriptive words, pt independently provided words in 5/12 trials  Pt fairly consistently needed verbal and semantic cues to improve word retrieval for descriptions  Pt would benefit from practice of this strategy in the future  Written Expression:  Presented with written phrases which omitted the final word, pt independently read all phrases and wrote appropriate words to complete each for 18/20 trials regarding semantic content and comprehension  Pt used R dominant hand and wrote cursive was fair legibility of writing, with the exception of a few trials  Additionally, pt occasionally with difficulty in spelling to which despite written cues, pt verbalized that he remained unable to spell words, which occurred in 3 trials, but did benefit from written cues in one trial      Reading Comprehension:  Engaged in a written sentence completion task, in which pt was also provided with [address] words to complete the sentence, pt was 12/15 accurate with task   For errored trials, SLP read trials aloud, along with Fo3 options in which this did appear to assist pt in comprehension of information  When pt reading sentences aloud himself for task, pt remained with production errors which at times did not appear to significantly impact intelligibility or semantic content of sentence, but other times did impact both  When SLP re-read sentences aloud after pt had completed each trial, pt presented with improvement in production due to verbal models  Listening Comprehension:  Verbally presented with complex yes/no questions, pt was 15/20 accurate in responding, at times also benefiting from repetition of information  Pt required a range of cuing from repetition, paired with rewording of info and indirect verbal cues, occasionally with gestural cues to improve comprehension of remaining questions  Towards end of session, pt's father, who will be staying with patient on discharge, was present  SLP also showed pt's father HEP folder and explained all contents  Pt's father was highly receptive and appreciative of all info and support from the team  Pt is planned for discharge tomorrow, 6/9/23  As pt continues to present with expressive> receptive aphasia/deficits impacting overall functional communication, pt is recommended for outpatient skilled SLP services on discharge  SLP Therapy Minutes   SLP Time In 5266   SLP Time Out 8383   SLP Total Time (minutes) 83   SLP Mode of treatment - Individual (minutes) 83   SLP Mode of treatment - Concurrent (minutes) 0   SLP Mode of treatment - Group (minutes) 0   SLP Mode of treatment - Co-treat (minutes) 0   SLP Mode of Treatment - Total time(minutes) 83 minutes   SLP Cumulative Minutes 918   Therapy Time missed   Time missed?  No

## 2023-06-08 NOTE — PROGRESS NOTES
"   06/08/23 1469   Pain Assessment   Pain Assessment Tool 0-10   Pain Score No Pain   Restrictions/Precautions   Precautions Fall Risk;Cognitive; Aphasia; Supervision on toilet/commode   Cognition   Overall Cognitive Status Impaired   Arousal/Participation Alert; Cooperative   Subjective   Subjective pt had no c/o and ready for PT   Roll Left and Right   Type of Assistance Needed Independent   Roll Left and Right CARE Score 6   Sit to Lying   Type of Assistance Needed Independent   Sit to Lying CARE Score 6   Lying to Sitting on Side of Bed   Type of Assistance Needed Independent   Lying to Sitting on Side of Bed CARE Score 6   Sit to Stand   Type of Assistance Needed Set-up / clean-up   Sit to Stand CARE Score 5   Bed-Chair Transfer   Type of Assistance Needed Set-up / clean-up   Comment no AD   Chair/Bed-to-Chair Transfer CARE Score 5   Car Transfer   Type of Assistance Needed Set-up/clean up   Car Transfer CARE Score 5   Walk 10 Feet   Type of Assistance Needed Set-up / clean-up   Comment no AD   Walk 10 Feet CARE Score 5   Walk 50 Feet with Two Turns   Type of Assistance Needed Supervision   Walk 50 Feet with Two Turns CARE Score 4   Walk 150 Feet   Type of Assistance Needed Supervision   Comment not limited distance, no AD   Walk 150 Feet CARE Score 4   Walking 10 Feet on Uneven Surfaces   Type of Assistance Needed Supervision   Comment no AD   Walking 10 Feet on Uneven Surfaces CARE Score 4   Wheel 50 Feet with Two Turns   Reason if not Attempted Activity not applicable   Wheel 50 Feet with Two Turns CARE Score 9   Wheel 150 Feet   Reason if not Attempted Activity not applicable   Wheel 747 Feet CARE Score 9   Curb or Single Stair   Style negotiated Curb   Comment 8\" curb with RW x 1 rep with S, with SPC x 2 with CGA   4 Steps   Type of Assistance Needed Supervision;Verbal cues   4 Steps CARE Score 4   12 Steps   Type of Assistance Needed Supervision;Verbal cues   Comment FF no rail, reciprocal pattern, " ascent/descent fwd facing   12 Steps CARE Score 4   Picking Up Object   Type of Assistance Needed Set-up / clean-up   Comment no AD   Picking Up Object CARE Score 5   Toilet Transfer   Type of Assistance Needed Set-up / clean-up   Comment including turning on/off bathroom lights   Toilet Transfer CARE Score 5   Therapeutic Interventions   Strengthening standing DF x 10 SH x 30 reps with light counter support, resisted DF x 10 SH x 10 reps x 3 sets with black tband, resisted R hamstring curls with black tband  x 30 reps and terminal knee extension with black tband 30 reps, R SLS x 5 SH x 10 reps ( still unable to sustained knee ext)  repeated multi angle hold x 10 count squat with chair touch exercise without UE support x 30 reps   Neuromuscular Re-Education walking while holding 20 # weighted bag on R arm, walking with 8# weighted bag on R hand with sudden stops/turns, walking fast fwd/bwd including repeated doffing/donning of jacket, object retrieval with bean bags off the floor using R hand only, spraying/wiping car with R hand only   Equipment Use   NuStep lvl 7 x 10 mins, SPM> 70 using bilat LE and R UE   Assessment   Treatment Assessment Pt tolerated tx with continued increase focus on NMR/NPP multi-tasking training  no LOB with occasional R foot tripping but able to self correct  However noted R foot slapping and knee hyperextension more pronounced when performing multi-tasking training with dec self awareness/correction  currently functioning at DS for household distance mobilities without AD while S for community distance and stairs negotiation  Due to aphasia still not consistent with 1 step command requiring repetition or demo  Family/Caregiver Present no   Barriers to Discharge None   PT Barriers   Physical Impairment Decreased cognition;Decreased coordination;Decreased safety awareness;Decreased strength; Impaired balance  (speech)   Plan   Treatment/Interventions Functional transfer training;LE strengthening/ROM; Therapeutic exercise; Endurance training;Gait training;Bed mobility;Spoke to nursing;OT   Progress Improving as expected   Recommendation   PT Discharge Recommendation Home with outpatient rehabilitation   PT Therapy Minutes   PT Time In 0930   PT Time Out 1030   PT Total Time (minutes) 60   PT Mode of treatment - Individual (minutes) 60   PT Mode of treatment - Concurrent (minutes) 0   PT Mode of treatment - Group (minutes) 0   PT Mode of treatment - Co-treat (minutes) 0   PT Mode of Treatment - Total time(minutes) 60 minutes   PT Cumulative Minutes 852

## 2023-06-09 ENCOUNTER — TELEPHONE (OUTPATIENT)
Dept: NEUROLOGY | Facility: CLINIC | Age: 43
End: 2023-06-09

## 2023-06-09 VITALS
WEIGHT: 161.16 LBS | BODY MASS INDEX: 23.87 KG/M2 | SYSTOLIC BLOOD PRESSURE: 103 MMHG | RESPIRATION RATE: 16 BRPM | HEIGHT: 69 IN | DIASTOLIC BLOOD PRESSURE: 57 MMHG | OXYGEN SATURATION: 97 % | HEART RATE: 50 BPM | TEMPERATURE: 98.5 F

## 2023-06-09 PROCEDURE — 99232 SBSQ HOSP IP/OBS MODERATE 35: CPT | Performed by: NURSE PRACTITIONER

## 2023-06-09 PROCEDURE — 99238 HOSP IP/OBS DSCHRG MGMT 30/<: CPT | Performed by: PHYSICAL MEDICINE & REHABILITATION

## 2023-06-09 RX ORDER — ESCITALOPRAM OXALATE 5 MG/1
5 TABLET ORAL DAILY
Qty: 30 TABLET | Refills: 0 | Status: SHIPPED | OUTPATIENT
Start: 2023-06-09

## 2023-06-09 RX ORDER — ESCITALOPRAM OXALATE 5 MG/1
5 TABLET ORAL DAILY
Qty: 30 TABLET | Refills: 0 | Status: SHIPPED | OUTPATIENT
Start: 2023-06-09 | End: 2023-06-09 | Stop reason: SDUPTHER

## 2023-06-09 RX ORDER — ACETAMINOPHEN 325 MG/1
650 TABLET ORAL EVERY 6 HOURS PRN
Refills: 0
Start: 2023-06-09

## 2023-06-09 RX ORDER — ACETAMINOPHEN 325 MG/1
650 TABLET ORAL EVERY 6 HOURS PRN
Refills: 0
Start: 2023-06-09 | End: 2023-06-09 | Stop reason: SDUPTHER

## 2023-06-09 RX ORDER — LEVETIRACETAM 750 MG/1
750 TABLET ORAL EVERY 12 HOURS SCHEDULED
Qty: 60 TABLET | Refills: 0 | Status: SHIPPED | OUTPATIENT
Start: 2023-06-09

## 2023-06-09 RX ORDER — LEVETIRACETAM 750 MG/1
750 TABLET ORAL EVERY 12 HOURS SCHEDULED
Qty: 60 TABLET | Refills: 0 | Status: SHIPPED | OUTPATIENT
Start: 2023-06-09 | End: 2023-06-09 | Stop reason: SDUPTHER

## 2023-06-09 RX ORDER — ASPIRIN 81 MG/1
81 TABLET, CHEWABLE ORAL DAILY
Qty: 30 TABLET | Refills: 0 | Status: SHIPPED | OUTPATIENT
Start: 2023-06-09 | End: 2023-06-09 | Stop reason: SDUPTHER

## 2023-06-09 RX ORDER — ASPIRIN 81 MG/1
81 TABLET, CHEWABLE ORAL DAILY
Qty: 30 TABLET | Refills: 0 | Status: SHIPPED | OUTPATIENT
Start: 2023-06-09

## 2023-06-09 RX ADMIN — NICOTINE 7 MG: 7 PATCH, EXTENDED RELEASE TRANSDERMAL at 09:59

## 2023-06-09 RX ADMIN — LEVETIRACETAM 750 MG: 750 TABLET, FILM COATED ORAL at 09:56

## 2023-06-09 RX ADMIN — ASPIRIN 81 MG CHEWABLE TABLET 81 MG: 81 TABLET CHEWABLE at 09:56

## 2023-06-09 RX ADMIN — PANTOPRAZOLE SODIUM 40 MG: 40 TABLET, DELAYED RELEASE ORAL at 05:09

## 2023-06-09 RX ADMIN — ESCITALOPRAM OXALATE 5 MG: 10 TABLET ORAL at 09:56

## 2023-06-09 NOTE — SPEECH THERAPY NOTE
SLP Discharge Summary    Pt was discharged home w/ family supervision/support on 6/9/2023  While on the acute rehab center, pt was making steady gains towards overall goals as established on initial assessment  However by time of discharge due to ongoing language/speech deficits, recommendations are for pt to require 24/7 supervision at this time  Primary purpose of ST services while on the ARC was towards speech/language and cognitive skills to where by time of discharge, pt was functioning at mod A for comprehension, executive function and memory skills, fluctuating given expression ranging from min A to max A and social interactions at mod I level  Speech and language evaluations, both the Bedside WAB as well as Informal Language Assessment, was completed on admission where pt found to be presenting with severe expressive and receptive language deficits/aphasia  Per the bedside WAB, pt was noted to demonstrate Broca's Type Aphasia  Overall, pt's verbal expression is characterized by dysfluent speech containing semantic content, though also with anomia, phonemic paraphasias and neologisms  Pt presents with difficulty in following simple commands, though ? ing if due to comprehension vs combination of comprehension and motor planning deficits, along with deficits in yes/no questions and basic conversation  He benefits from repetition and rewording of information, as well as use of gestures paired with instructions  Current deficits which present are barriers include impairments in the following areas: comprehension, expression, insight into deficits, problem solving, safety awareness, judgement, impulsivity, sequencing, attention, STM recall, LTM recall, and executive functions  Pt was inirially limited by mood, which impacted pt's eagerness to participate in therapy  Pt was been provided with education regarding stroke recovery and will benefit from continued education and training   However, pt's overall demeanor/mood had improved and overall participation in [address] sessions improved, noting increased engagement and motivation  While throughout his stay on the rehab, pt continued to demonstrate moderate language deficits (expressive> receptive language deficits)  Pt did best with simple conversation, answering yes/no questions and completing simple one word to short phrase answers  Pt continued with anomia, perseveration, and paraphasic errors  Fatigue and external distractions does greatly impact pt's overall communication skills, benefiting from rest breaks and extended time for processing and completing tasks  In regards to comprehension, pt was stronger with listening compared to reading comprehension at this time  Pt benefited from auditory cues and unison reading for increasing understanding during reading tasks  Education was initiated and ongoing training had been completed with pt's father in regards to current communication and cognitive deficits, cuing strategies, and recommendations for discharge  Pt to require 24hr S/A at discharge initially given communication and suspected cognitive deficits at this time in which pt's father is agreeable to provide  Additionally, extensive HEP was provided to pt and pt's father for tasks targeting both language and cognitive skills until initiation of OP ST services  Additionally included in the HEP ST folder was Apps which target speech/language skills, in addition to stroke warning signs/sxs and information for the [facility] support group  Overall,  pt will continue to highly benefit from cont'd skilled SLP services targeting expressive and receptive communication skills for increased independence and decreased burden of care upon safe discharge home

## 2023-06-09 NOTE — DISCHARGE SUMMARY
Discharge Summary - PMR   Sarah Hendrix 40 y o  male MRN: [id number]  Unit/Bed#: -96 Encounter: [id number]    Admission Date: 5/27/2023     Discharge Date: 6/9/23    Rehab Diagnosis: Impairment of mobility, safety, Activities of Daily Living (ADLs), and cognitive/communication skills due to Stroke:  01 2  Right Body Involvement (Left Brain)  Left MCA CVA     History of Present Illness:   Sarah Hendrix is a 40 y o  male with past medical history significant for recent COVID-19 infection in February, who presented to the [facility] on 5/15/2023 with right hemiparesis, aphasia  Stroke alert initiated  Did not receive TNK as out of the window  Imaging showing a large left MCA territory ischemic stroke with left M1 thrombus  Patient underwent thrombectomy TICI 3 revascularization of a left M1 occlusion on 5/15/2023 by Dr Mendoza Client  On 5/16/2023, patient had seizure-like activity with eye deviation and clinical worsening  Loaded with Ativan and Keppra  Transition to oral Keppra 750 mg every 12 hours  Video EEG was performed, negative for additional seizures  patient seen by neurology  Etiology of stroke unclear  MRI brain showing large left MCA infarct with minimal petechial hemorrhage and stable edema  ALYSSA with EF 55%, and confirmed a large PFO  Hypercoagulable work-up in progress, largely unremarkable, MTHFR pending  CT C/A/P negative for malignancy  Incidentally found to have bilateral pleural effusions, small ascites, diffuse body wall subcutaenous edema  Cardiology consulted and an outpatient appointment to consider PFO closure was recommended in 4-6 weeks  Patient was placed on aspirin for stroke prophylaxis  After medical stabilization, patient found to have acute functional deficits in mobility, self care, speech, cognition, therefore admitted to [facility] on 5/27/23       Acute Rehabilitation Center Course:   Functional deficits: right hemiparesis, right sensory deficits, aphasia, possible cognitive deficits, impaired mobility, self care  Patient participated in a comprehensive interdisciplinary inpatient rehabilitation program which included involvment of MD, therapies (PT, OT, and/or SLP), RN, CM, SW, dietary  He was able to be advanced to an overall supervision level of assist with all mobility and self care without assistive devices  Patient has expressive > comprehensive aphasia needing min-mod cues  Patient continues to have moderate deficits in problem solving and memory from a cognitive standpoint  Patient will be discharging with his father who will be providing assistance and supervision  His father did complete hands on family training  Patient remained stable and was considered safe for discharge home with family  Patient will continue his rehabilitation with outpatient PT, OT, SLP  Medical Issues Addressed During ARC:     * CVA (cerebral vascular accident) [facility])  Assessment & Plan  Presented 5/15/23 as stroke alert  CTH/MRI brain confirmed Left MCA ischemic stroke  Functional deficits: right hemiparesis, right sensory deficits, right inattention, aphasia, cognitive deficits, impaired mobility, self care  S/p thrombectomy TICI 3 revascularization 5/16/23  Etiology unclear, (possibly related to recent COVID infection in Feb 2023)  Work-up: +Large PFO on ALYSSA  Hypercoagulable work-up and malignancy work-up largely negative - MHTFR pending  Repeat hypercoagulable what the serial work-up as outpatient recommended  · Secondary CVA ppx: Aspirin 81 mg daily  · CVA education and reduction of modifiable risk factors  · Follow-up with neurology and cardiology as an outpatient (patient is a candidate for PFO closure)    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)  Assessment & Plan  Continue Lexapro 5 mg -started 5/30/2023  Neuropsychology also following  Mood has improved  Continue Lexapro suggested 2-3 months     PCP to decide on duration and patient and father aware this medication needs to be WEANED OFF in the future  Adjustment disorder  Assessment & Plan  Appreciate neuropsychology following while in [facility]  Mood has significantly improved  Continue Lexapro 5 mg daily - started 5/30/23  Continue Lexapro suggested 2-3 months  PCP to decide on duration and patient and father aware this medication needs to be WEANED OFF in the future  Abnormal findings on imaging test  Assessment & Plan  CT C/A/P: Moderate bilateral pleural effusions, small amount of ascites, and diffuse body wall subcutaneous edema  Follow-up with PCP  Stable from a pulmonary standpoint in [facility]    Bradycardia  Assessment & Plan  HR 50-60s  Asymptomatic  Monitor  Follow-up with Cardiology as outpatient    Tobacco use  Assessment & Plan  Smoking cessation encouraged  Continue nicotine patch -weaned to 7 mg daily  Patient has committed to quitting smoking    PFO (patent foramen ovale)  Assessment & Plan  Large PFO identified on ALYSSA  Cardiology recommending follow-up in 4-6 weeks as an outpatient for possible closure    Seizure [facility])  Assessment & Plan  Witnessed seizure-like activity times 2/5/1623  Video EEG negative however patient continued on Keppra 750 mg every 12 hours  Placed on seizure precautions  Per neurology continue Keppra until outpatient appointment for duration to be determined  [facility] form completed        Discharge Physical Examination:  Physical Exam  Vitals and nursing note reviewed  Constitutional:       General: He is not in acute distress  HENT:      Head: Normocephalic and atraumatic  Nose: Nose normal       Mouth/Throat:      Mouth: Mucous membranes are moist    Eyes:      Conjunctiva/sclera: Conjunctivae normal    Cardiovascular:      Rate and Rhythm: Normal rate and regular rhythm  Pulses: Normal pulses  Pulmonary:      Effort: Pulmonary effort is normal       Breath sounds: Normal breath sounds  No wheezing or rales     Abdominal: General: Bowel sounds are normal  There is no distension  Palpations: Abdomen is soft  Tenderness: There is no abdominal tenderness  Musculoskeletal:         General: No swelling  Cervical back: Neck supple  Skin:     General: Skin is warm  Neurological:      Mental Status: He is alert and oriented to person, place, and time  Sensory: Sensory deficit (sensory deficits on the right hemibody improving) present  Motor: Weakness (right sided weakness much improved, but still has foot drop, and hand weakness with longer usage) present  Psychiatric:         Mood and Affect: Mood normal          Discharge Medications:   See after visit summary for reconciled discharge medications provided to patient and family  Condition at Discharge: stable     Discharge instructions/Information to patient and family:   See after visit summary for information provided to patient and family  Provisions for Follow-Up Care:  See after visit summary for information related to follow-up care and any pertinent home health orders  Future Appointments   Date Time Provider Mike Jenkins   6/14/2023  4:15 PM Justin, OT [address]   6/14/2023  5:00 PM Jazmin De La Rosa CCC-SLP BE [address]   6/14/2023  6:00 PM Jo Ann Dai, PT BE PT [address]   9/6/2023  9:00 AM Lio Kent DO CV SURG [facility]       Disposition: Home      Planned Readmission: No    Discharge Statement   I spent more than 30 minutes discharging the patient  This time was spent on the day of discharge  I had direct contact with the patient on the day of discharge  Greater than 50% of the total time was spent examining patient, answering all patient questions, arranging and discussing plan of care with patient as well as directly providing post-discharge instructions  Additional time then spent on discharge activities      Discharge Medications:  See after visit summary for reconciled discharge medications provided to patient and family

## 2023-06-09 NOTE — DISCHARGE INSTR - AVS FIRST PAGE
DISCHARGE INSTRUCTIONS:  Please bring these instructions with you to your physician appointments      ACTIVITY:   Please follow mobility, self care instructions as your were taught by inpatient rehabilitation PT/OT therapists  Please follow compensatory measures as your were taught by your speech/cognitive therapists  Please continue using any adaptive equipment recommended  You will continue your rehabilitation with outpatient PT, OT, SLP  RESTRICTIONS:  No Driving - you have been reported to [facility] due to possible seizure  [facility] will send you information by mail   No Work until cleared by your physicians in the future  No Smoking       MEDICATION CHANGES:  For Stroke prevention: Please continue Aspirin 81 mg daily indefinately  For Seizure prevention: Please continue Keppra 750 mg Q12h   For post stroke depressed mood: Please continue Lexapro 5 mg daily  Your PCP Must Chong Ogren this off when it is decided by your PCP to discontinue  PLEASE SEE YOUR PCP FOR ALL MEDICATION REFILLS      OTHER:  See clinical references for more information on stroke, seizures, smoking cessation information  Your Neurology appointment will be made this summer - they will call Kari Cloud for dates for stroke and seizure follow-up     Your Cardiology appointment has been made for you :   [facility]  Cardiothoracic Surgery [phone] [address] [address]      Next Steps: Go on 9/6/2023  Appointment:   Instructions: Appointment with Dr Jessica Mina was made for you on 9/6/23 9 AM  They will call to confirm prior to your appointment

## 2023-06-09 NOTE — PROGRESS NOTES
Pt  is being discharged home today with services  Pt  is alert and oriented x 4; expressive aphasia  Pt  ambulates in room CS  level, no device  Pt  is continent of bowel and bladder  No skin breakdowns noted at the day of discharged from [facility]  Pt  denies any pain at this time

## 2023-06-09 NOTE — TELEPHONE ENCOUNTER
Kavita Mcknight, Dr Pena,     Can you please advise if patient can be seen by an AP and or Attending only and please provide HFU Instructions last Neurology hospital  consultation was on 5/19/2023 by you and Kyaleen Huff  Recommendations for outpatient neurological follow up have yet to be determined     Pending for discharge: per primary team     Thank you for your time and help,     Melissa De Los Santos       HFU/ JOANNA VILLARREAL/ L MCA STROKE WITH [address]- 5/27/2023

## 2023-06-09 NOTE — PLAN OF CARE
Problem: INFECTION - ADULT  Goal: Absence or prevention of progression during hospitalization  Description: INTERVENTIONS:  - Assess and monitor for signs and symptoms of infection  - Monitor lab/diagnostic results  - Monitor all insertion sites, i e  indwelling lines, tubes, and drains  - Monitor endotracheal if appropriate and nasal secretions for changes in amount and color  - Roland appropriate cooling/warming therapies per order  - Administer medications as ordered  - Instruct and encourage patient and family to use good hand hygiene technique  - Identify and instruct in appropriate isolation precautions for identified infection/condition  6/9/2023 0939 by Ricardo Rondon RN  Outcome: Adequate for Discharge  6/9/2023 0938 by Ricardo Rondon, RN  Outcome: Adequate for Discharge

## 2023-06-09 NOTE — TELEPHONE ENCOUNTER
Dr Dharmesh Bae called asking for this patient to be scheduled within 2-3 weeks for a hospital follow up with Stroke and Seizure however she wasn't sure if patient could see an AP or not  I told Dr Dharmesh Bea that we would look into this and possibly schedule a SNPLX if we can  Patient is also currently admitted

## 2023-06-09 NOTE — PROGRESS NOTES
Internal Medicine Progress Note  Patient: Collette Ware  Age/sex: 40 y o  male  Medical Record #: [id number]      ASSESSMENT/PLAN: (Interval History)  Collette Ware is seen and examined and management for following issues:    Acute left MCA CVA  • S/p left MCA thrombectomy  • Workup:   • Homocystine normal, BELEM normal, RF normal, ANCA negative, dsDNA negative, Factor V Leiden/lupus anticoagulant negative, Antithrombin III 81 (felt likely not clinically relevant was higher than 80%, Protein S decreased at 65%, Protein C normal                          MTHFR mutation negative                          Repeat thrombosis panel in 3 months   • Continue ASA 81mg qd and statin  • Etiology seems to be likely to COVID infection in 2/2023     Seizures  • Was noted with eyes deviating to the right 5/16 with a post ictal period   • Had 2 episodes  • Keppra stopped 5/25 but Dr Dorothy Batres restarted since he had 2 sz in hospital     PFO  • Large PFO on ALYSSA  • Negative DVT  • To see Cards as OP 4-6 weeks for possible closure     Bradycardia  • Has HRs in 50's at times as in hospital  • Pt was an avid runner prior to CVA so likely d/t conditioning  • stable     Nicotine abuse  • Continue patch     Moderate B/L pleural effusions  • Repeat Cxray in 6 wks or sooner if symptomatic        Discharge date:  6/9/23=OK from medicine standpoint       The above assessment and plan was reviewed and updated as determined by my evaluation of the patient on 6/9/2023      Labs:   Results from last 7 days   Lab Units 06/05/23  0525   HEMATOCRIT % 34 7*   HEMOGLOBIN g/dL 12 1   PLATELETS Thousands/uL 250   WBC Thousand/uL 3 99*     Results from last 7 days   Lab Units 06/05/23  0525   BUN mg/dL 8   CALCIUM mg/dL 8 8   CHLORIDE mmol/L 108   CO2 mmol/L 30   CREATININE mg/dL 0 80   POTASSIUM mmol/L 4 1   SODIUM mmol/L 138                   Review of Scheduled Meds:  Current Facility-Administered Medications   Medication Dose Route Frequency Provider Last Rate   • acetaminophen  975 mg Oral Q8H PRN Cammy Montalvo MD     • aspirin  81 mg Oral Daily Cammy Montalvo MD     • bisacodyl  10 mg Rectal Daily PRN Cammy Montalvo MD     • escitalopram  5 mg Oral Daily Cammy Montalvo MD     • levETIRAcetam  750 mg Oral Q12H [address] Cammy Montalvo MD     • nicotine  7 mg Transdermal Daily Cammy Montalvo MD     • ondansetron  4 mg Oral Q6H PRN Cammy Montalvo MD     • pantoprazole  40 mg Oral Early Morning Cammy Montalvo MD     • polyethylene glycol  17 g Oral Daily PRN Cammy Montalvo MD         Subjective/ HPI: Patient seen and examined  Patients overnight issues or events were reviewed with nursing or staff during rounds or morning huddle session  New or overnight issues include the following:     Pt seen in his room  Anxious for dc      ROS:   A 10 point ROS was performed; negative except as noted above  Imaging:     No orders to display       *Labs /Radiology studies reviewed  *Medications reviewed and reconciled as needed  *Please refer to order section for additional ordered labs studies  *Case discussed with primary attending during morning huddle case rounds    Physical Examination:  Vitals:   Vitals:    06/08/23 0602 06/08/23 1500 06/08/23 2135 06/09/23 0525   BP: 106/58 120/59 102/60 103/57   BP Location: Left arm Right arm Left arm Left arm   Pulse: (!) 52 58 56 (!) 50   Resp: 16 17 16 16   Temp: 98 °F (36 7 °C) 97 6 °F (36 4 °C) 97 6 °F (36 4 °C) 98 5 °F (36 9 °C)   TempSrc: Oral Oral Oral Oral   SpO2: 98% 97% 97% 97%   Weight:       Height:           GEN: NAD; pleasant  NEURO: Alert  Expressive aphasia minimal, does better when he takes his time  HEENT: Pupils are equal/reactive; normocephalic, face is asymmetrical, hearing is normal  CV: S1 S2 regular, no murmur/rub/gallops, 2/4 pedal pulses, no edema    RESP: Lungs are clear bilaterally, no wheezes rales or rhonchi, on room air, no distress, respirations are easy and non labored  GI: Abdomen is flat, soft, non tender, +BS x4; non distended  : Voiding without issues  MUSC: Moves all extremities except mild right hemiparesis  SKIN: pink, warm, normal turgor, no rashes or lesions noted       The above physical exam was reviewed and updated as determined by my evaluation of the patient on 6/9/2023  Invasive Devices     None                    VTE Pharmacologic Prophylaxis: Enoxaparin  Code Status: Level 1 - Full Code  Current Length of Stay: 13 day(s)      Total time spent:  30 minutes    with more than 50% spent counseling/coordinating care  Counseling includes discussion with patient re: progress  and discussion with patient of his/her current medical state/information  Coordination of patient's care was performed in conjunction with primary service  Time invested included review of patient's labs, vitals, and management of their comorbidities with continued monitoring  In addition, this patient was discussed with medical team including physician and advanced extenders  The care of the patient was extensively discussed and appropriate treatment plan was formulated unique for this patient  Medical decision making for the day was made by supervising physician unless otherwise noted in their attestation statement  ** Please Note:  voice to text software may have been used in the creation of this document   Although proof errors in transcription or interpretation are a potential of such software**

## 2023-06-09 NOTE — PLAN OF CARE
Problem: INFECTION - ADULT  Goal: Absence or prevention of progression during hospitalization  Description: INTERVENTIONS:  - Assess and monitor for signs and symptoms of infection  - Monitor lab/diagnostic results  - Monitor all insertion sites, i e  indwelling lines, tubes, and drains  - Monitor endotracheal if appropriate and nasal secretions for changes in amount and color  - [place] appropriate cooling/warming therapies per order  - Administer medications as ordered  - Instruct and encourage patient and family to use good hand hygiene technique  - Identify and instruct in appropriate isolation precautions for identified infection/condition  Outcome: Progressing  Goal: Absence of fever/infection during neutropenic period  Description: INTERVENTIONS:  - Monitor WBC    Outcome: Progressing     Problem: SAFETY ADULT  Goal: Patient will remain free of falls  Description: INTERVENTIONS:  - Educate patient/family on patient safety including physical limitations  - Instruct patient to call for assistance with activity   - Consult OT/PT to assist with strengthening/mobility   - Keep Call bell within reach  - Keep bed low and locked with side rails adjusted as appropriate  - Keep care items and personal belongings within reach  - Initiate and maintain comfort rounds  - Make Fall Risk Sign visible to staff  - Offer Toileting every Hours, in advance of need  - Initiate/Maintain   - Obtain necessary fall risk management equipment:   - Apply yellow socks and bracelet for high fall risk patients  - Consider moving patient to room near nurses station  Outcome: Progressing  Goal: Maintain or return to baseline ADL function  Description: INTERVENTIONS:  -  Assess patient's ability to carry out ADLs; assess patient's baseline for ADL function and identify physical deficits which impact ability to perform ADLs (bathing, care of mouth/teeth, toileting, grooming, dressing, etc )  - Assess/evaluate cause of self-care deficits   - Assess range of motion  - Assess patient's mobility; develop plan if impaired  - Assess patient's need for assistive devices and provide as appropriate  - Encourage maximum independence but intervene and supervise when necessary  - Involve family in performance of ADLs  - Assess for home care needs following discharge   - Consider OT consult to assist with ADL evaluation and planning for discharge  - Provide patient education as appropriate  Outcome: Progressing  Goal: Maintains/Returns to pre admission functional level  Description: INTERVENTIONS:  - Perform BMAT or MOVE assessment daily    - Set and communicate daily mobility goal to care team and patient/family/caregiver  - Collaborate with rehabilitation services on mobility goals if consulted  - Perform Range of Motion times a day  - Reposition patient every  hours    - Dangle patient  times a day  - Stand patient times a day  - Ambulate patient  times a day  - Out of bed to chair times a day   - Out of bed for meals times a day  - Out of bed for toileting  - Record patient progress and toleration of activity level   Outcome: Progressing     Problem: DISCHARGE PLANNING  Goal: Discharge to home or other facility with appropriate resources  Description: INTERVENTIONS:  - Identify barriers to discharge w/patient and caregiver  - Arrange for needed discharge resources and transportation as appropriate  - Identify discharge learning needs (meds, wound care, etc )  - Arrange for interpretive services to assist at discharge as needed  - Refer to Case Management Department for coordinating discharge planning if the patient needs post-hospital services based on physician/advanced practitioner order or complex needs related to functional status, cognitive ability, or social support system  Outcome: Progressing     Problem: Prexisting or High Potential for Compromised Skin Integrity  Goal: Skin integrity is maintained or improved  Description: INTERVENTIONS:  - Identify patients at risk for skin breakdown  - Assess and monitor skin integrity  - Assess and monitor nutrition and hydration status  - Monitor labs   - Assess for incontinence   - Turn and reposition patient  - Assist with mobility/ambulation  - Relieve pressure over bony prominences  - Avoid friction and shearing  - Provide appropriate hygiene as needed including keeping skin clean and dry  - Evaluate need for skin moisturizer/barrier cream  - Collaborate with interdisciplinary team   - Patient/family teaching  - Consider wound care consult   Outcome: Progressing     Problem: MOBILITY - ADULT  Goal: Maintain or return to baseline ADL function  Description: INTERVENTIONS:  -  Assess patient's ability to carry out ADLs; assess patient's baseline for ADL function and identify physical deficits which impact ability to perform ADLs (bathing, care of mouth/teeth, toileting, grooming, dressing, etc )  - Assess/evaluate cause of self-care deficits   - Assess range of motion  - Assess patient's mobility; develop plan if impaired  - Assess patient's need for assistive devices and provide as appropriate  - Encourage maximum independence but intervene and supervise when necessary  - Involve family in performance of ADLs  - Assess for home care needs following discharge   - Consider OT consult to assist with ADL evaluation and planning for discharge  - Provide patient education as appropriate  Outcome: Progressing  Goal: Maintains/Returns to pre admission functional level  Description: INTERVENTIONS:  - Perform BMAT or MOVE assessment daily    - Set and communicate daily mobility goal to care team and patient/family/caregiver  - Collaborate with rehabilitation services on mobility goals if consulted  - Perform Range of Motion 3 times a day  - Reposition patient every 2 hours    - Dangle patient 3 times a day  - Stand patient 3 times a day  - Ambulate patient 3 times a day  - Out of bed to chair 3 times a day   - Out of bed for meals 3times a day  - Out of bed for toileting  - Record patient progress and toleration of activity level   Outcome: Progressing

## 2023-06-09 NOTE — PHYSICAL THERAPY NOTE
[facility] PT Discharge Summary  Pt demonstrated good progress in PT presented to [facility] s/p CVA with noted impairments on eval include dec cognition, global aphasia, dec R LE strength, R side inattention, standing static & dynamic balance and righting reaction mpairments, gait dysfunction, dec endurance resulting to significant functional declined  Pt improved from requiring 1-2 person on eval to DS-S level  at d/c without AD  Unable to progressed pt passed S level due to ongoing aphasia and dec cognition  No DME needs identified at this admission  Pt and family education/training completed with good understanding of current safety recommendations  Pt also instructed with verbal training and modelling for HEP with handout provided to promote maintenance of increased muscle strength and endurance upon return to home  Pt scheduled to be d/c on 6/9/23 to home with family support and outpatient PT services to address ongoing rehab needs chris including improving dynamic balance, improve motor control, gait normalization/efficiency to facilitate return to PLOF without using an AD

## 2023-06-12 ENCOUNTER — TELEPHONE (OUTPATIENT)
Dept: NEUROLOGY | Facility: CLINIC | Age: 43
End: 2023-06-12

## 2023-06-12 NOTE — TELEPHONE ENCOUNTER
received vm-Yes, good morning, dr Susannah Louis, my name is Aminata Dunn, i'm cl lincoln's father  He was discharged on Friday  If you could please return my call  His date of birth is [birth year]  And I have a few questions about the, about his recovering at home that I need to speak to you about my number is [phone]  Thank you very much byelvin

## 2023-06-13 ENCOUNTER — TELEPHONE (OUTPATIENT)
Dept: NEUROLOGY | Facility: CLINIC | Age: 43
End: 2023-06-13

## 2023-06-13 ENCOUNTER — TELEPHONE (OUTPATIENT)
Dept: OTHER | Facility: HOSPITAL | Age: 43
End: 2023-06-13

## 2023-06-13 ENCOUNTER — TRANSITIONAL CARE MANAGEMENT (OUTPATIENT)
Dept: FAMILY MEDICINE CLINIC | Facility: CLINIC | Age: 43
End: 2023-06-13

## 2023-06-13 NOTE — TELEPHONE ENCOUNTER
PM&R Brief Note:    Returned patient's father phone call, Weyman Mail and answered questions about home set up  Patient has been overall doing well at home  He is preparing simple meals, not cooking yet, and caring for his dog independently  He is walking further distances but gets tired especially on his right side  Plan: Recommended he limist his distances until evaluated by PT in a controlled environment as he does remain a fall risk when he fatigues  Needs limitation goals and gradual progression  Father Weyman Mail verbalized understanding        Leon Villalta MD  PM&R

## 2023-06-14 ENCOUNTER — EVALUATION (OUTPATIENT)
Dept: SPEECH THERAPY | Facility: CLINIC | Age: 43
End: 2023-06-14
Payer: COMMERCIAL

## 2023-06-14 ENCOUNTER — EVALUATION (OUTPATIENT)
Dept: OCCUPATIONAL THERAPY | Facility: CLINIC | Age: 43
End: 2023-06-14
Payer: COMMERCIAL

## 2023-06-14 ENCOUNTER — EVALUATION (OUTPATIENT)
Dept: PHYSICAL THERAPY | Facility: CLINIC | Age: 43
End: 2023-06-14
Payer: COMMERCIAL

## 2023-06-14 ENCOUNTER — TELEPHONE (OUTPATIENT)
Dept: NEUROLOGY | Facility: CLINIC | Age: 43
End: 2023-06-14

## 2023-06-14 ENCOUNTER — APPOINTMENT (OUTPATIENT)
Dept: PHYSICAL THERAPY | Facility: CLINIC | Age: 43
End: 2023-06-14
Payer: COMMERCIAL

## 2023-06-14 DIAGNOSIS — R44.9 SENSORY DEFICIT, RIGHT: ICD-10-CM

## 2023-06-14 DIAGNOSIS — I63.512 CEREBROVASCULAR ACCIDENT (CVA) DUE TO OCCLUSION OF LEFT MIDDLE CEREBRAL ARTERY (HCC): Primary | ICD-10-CM

## 2023-06-14 DIAGNOSIS — Z78.9 IMPAIRED MOBILITY AND ACTIVITIES OF DAILY LIVING: ICD-10-CM

## 2023-06-14 DIAGNOSIS — Z74.09 IMPAIRED MOBILITY AND ACTIVITIES OF DAILY LIVING: ICD-10-CM

## 2023-06-14 DIAGNOSIS — I63.512 CEREBROVASCULAR ACCIDENT (CVA) DUE TO OCCLUSION OF LEFT MIDDLE CEREBRAL ARTERY (HCC): ICD-10-CM

## 2023-06-14 DIAGNOSIS — I63.9 CVA (CEREBRAL VASCULAR ACCIDENT) (HCC): ICD-10-CM

## 2023-06-14 DIAGNOSIS — I63.512 ACUTE ISCHEMIC LEFT MCA STROKE (HCC): ICD-10-CM

## 2023-06-14 DIAGNOSIS — G81.91 RIGHT HEMIPARESIS (HCC): Primary | ICD-10-CM

## 2023-06-14 DIAGNOSIS — I69.320 APHASIA AS LATE EFFECT OF CEREBROVASCULAR ACCIDENT: Primary | ICD-10-CM

## 2023-06-14 PROCEDURE — 97162 PT EVAL MOD COMPLEX 30 MIN: CPT | Performed by: PHYSICAL THERAPIST

## 2023-06-14 PROCEDURE — 97166 OT EVAL MOD COMPLEX 45 MIN: CPT

## 2023-06-14 PROCEDURE — 96105 ASSESSMENT OF APHASIA: CPT | Performed by: SPEECH-LANGUAGE PATHOLOGIST

## 2023-06-14 NOTE — PROGRESS NOTES
PT Evaluation     Today's date: 2023  Patient name: Keyana Segura  : 1980  MRN: [id number]  Referring provider: Oliva Calle MD  Dx:   Encounter Diagnosis     ICD-10-CM    1  Cerebrovascular accident (CVA) due to occlusion of left middle cerebral artery [facility])  A34 [id number]                      Assessment  Assessment details: Pt presents to physical therapy s/p MCA CVA  At this time patient has decreased dorsiflexor strength, plantarflexor strength with poor motor control and coordination of ankle, slightly reduce dhip and knee strength in all planes compared to contralateral side  These impariments are causing him to have decreased overall efficiency of gait and endurance per 6 mwt and decreased  per FGA  Pt will benefit from skilled therapy intervention in ordre to return patient to prior level of activity and balance confidence  Goals  STG  Pt will complete 6 mwt in 1800 ft within 4 weeks  Pt will return to running at home wtihin 4 weeks  Pt will complete HEP daily within 4 weeks    LTG  Pt will be 90% confidnet in balance per ABC within 8 weeks  Pt will be prepared to return to work from a balance, endurance, lower extremity coordination standpoint withn 8 weeks    Plan  Planned therapy interventions: balance, neuromuscular re-education, joint mobilization, patient education, strengthening, therapeutic exercise, therapeutic activities, home exercise program and gait training  Frequency: 2x week  Duration in weeks: 8  Plan of Care beginning date: 2023  Plan of Care expiration date: 2023  Treatment plan discussed with: patient        Subjective Evaluation    History of Present Illness  Mechanism of injury: Keyana Segura is a 40 y o  male with past medical history significant for recent COVID-19 infection in February, who presented to the [facility] on 5/15/2023 with right hemiparesis, aphasia  Stroke alert initiated    Did not receive TNK as out of the window  Imaging showing a large left MCA territory ischemic stroke with left M1 thrombus  Patient underwent thrombectomy TICI 3 revascularization of a left M1 occlusion on 5/15/2023 by Dr Lila Rich  On 5/16/2023, patient had seizure-like activity with eye deviation and clinical worsening  Loaded with Ativan and Keppra  Transition to oral Keppra 750 mg every 12 hours  Video EEG was performed, negative for additional seizures  patient seen by neurology  Etiology of stroke unclear  MRI brain showing large left MCA infarct with minimal petechial hemorrhage and stable edema  ALYSSA with EF 55%, and confirmed a large PFO  Hypercoagulable work-up in progress, largely unremarkable, MTHFR pending  CT C/A/P negative for malignancy  Incidentally found to have bilateral pleural effusions, small ascites, diffuse body wall subcutaenous edema  Cardiology consulted and an outpatient appointment to consider PFO closure was recommended in 4-6 weeks  Patient was placed on aspirin for stroke prophylaxis  Pt would like to get back to walking faster or even running  Wording finding is the most frustrating issue for him  He   Social Support  Steps to enter house: no  Stairs in house: no   Lives in: apartment  Lives with: alone    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    Exercise history: running - daily- about 6 miles or for an hour each time    Patient Goals  Patient goal: get back to running, improve balance        Objective   Neuro Exam:     Sensation   Light touch LE: left WNL and right WNL  Proprioception LE: right impaired  Proprioception LE: left WNL    Coordination   Heel to shin: right WNL  Heel to shin: left dysmetric  Rapid alternating movements: LE impaired     Modified Jasvir   Left gastroc: 1  Right gastroc: 0    Functional outcomes   6 minute walk test: 1600 ft   Gait speed: 1 58 m/s  5x sit to stand: 8 38 sec  (seconds)  Functional outcome gait comment: Foot slap on L LE with poor weight acceptance onto limb during stance phase with collapse at knee             Precautions: na      Manuals                                                                 Neuro Re-Ed                                                                                                        Ther Ex                                                                                                                     Ther Activity                                       Gait Training                                       Modalities

## 2023-06-14 NOTE — TELEPHONE ENCOUNTER
Post CVA Discharge Follow Up  Hospitalization: 5/15/23-5/27/23, 5/27/23-6/9/23 [facility]    Called patient's primary phone number listed in the chart  No answer  Left a voice message requesting for a return call  Provided the office's phone number

## 2023-06-14 NOTE — PROGRESS NOTES
OCCUPATIONAL THERAPY INITIAL EVALUATION:    6/14/2023  Haily Bueno  1980  [id number]  Bro Pena MD   Diagnosis ICD-10-CM Associated Orders   1  Right hemiparesis ([facility] [address] )  G81 91 Ambulatory referral to Occupational Therapy      2  L MCA stroke w/ LM1 partial occlusion  E36 682 Ambulatory referral to Occupational Therapy      3  CVA (cerebral vascular accident) [facility])  I63 9 Ambulatory referral to Occupational Therapy      4  Sensory deficit, right  R44 9 Ambulatory referral to Occupational Therapy      5  Impaired mobility and activities of daily living  Z74 09 Ambulatory referral to Occupational Therapy    Z78 9             Assessment/Plan    Skilled Analysis:  Haily Bueno is a 40 y o  male referred to Occupational Therapy s/p Right hemiparesis ([facility] [address] ) [G81 91]  Pt participated in skilled OT evaluation and following formalized testing as well as clinical observation, Pt presents with the following areas of deficit:  Significantly altered sensation in RUE, weakness proximally and distally, decreased proprioceptive and kinesthetic awareness with and with vision occluded, decreased GMC (slight dysdiadochokinesia), decreased automaticity of grasp and refined grasping patterns due to sensation deficits, and overall decreased reaction time  Pt has a hx of seizure while in hospital, contraindicated for using electrical stimulation/TENS for sensation although would benefit from vibration, timed trials, mirror therapy, and weight bearing tasks  In addition, pt would benefit from a modified constraint induced movement program for massed repetition/focus of using RUE for daily tasks  Pt will benefit from skilled Occupational Therapy services 2x/week for 8 weeks for 75 minute sessions to improve QOL, performance for work role and salient/leisure tasks  Haily Bueno is in agreement with POC          Duration in weeks: 8  Plan of care beginning date: 6/14/23  Plan of care expiration date: 8/14/23 Motor Short Term Goals (4 weeks)  Strength/Endurance  · Pt will increase R UE proximal shoulder strength to 4+/5  through the use of strengthening exercises and HEP for improved ability to return to work   · Pt will demo with G tolerance to in stance/high intensity exercise x 30 minutes with minimal rest breaks required for improved function of RUE   · Pt will demo with G carryover of HEP (continuous exercises- CIMT, resistive bands, theraputty, [facility] handouts) to improve functional progression towards goals in POC and for improved functional use of RUE     FMC/GMC  · Pt will increase RUE rate of manipulation by 15% for all FM tests for improved functional performance with salient tasks   · Pt will increase RUE Reaction time to < 1 0 seconds with hand to target timed trials for improved reaction time and automaticity of coordination for improved functional performance with ADLs/IADLs and salient tasks  · Pt will demo improved motor learning of constructional and new motor actions for improved b/l coordination and motor planning evident by PSFS score of 8/10     Functional Performance  ·  Pt will increase proprioception and kinesthetic awareness of RUE hand to target for improved functional reach vision occluded demonstrating less ataxia  x 80% accuracy     Modalities  · Pt will tolerate IASTM/Vibration/Mirror Therapy for improved motor and sensory performance for overall improved strength and sensation in RUE        Motor Long Term Goals (8 weeks)  Strength/Endurance  · Pt will increase R UE proximal shoulder strength to 5/5  through the use of strengthening exercises and HEP for improved ability to return to work   · Pt will demo with G tolerance to in stance/high intensity exercise x 50 minutes with minimal rest breaks required for improved function of RUE   · Pt will increase R gross grasp to 80+ lbs demo improved  and grasping patterns for salient/leisure tasks     FMC/GMC  · Pt will increase RUE rate of "manipulation by an additional 10% (from PN) for all FM tests for improved functional performance with salient tasks   · Pt will increase RUE Reaction time to <  6 seconds with hand to target timed trials for improved reaction time and automaticity of coordination for improved functional performance with ADLs/IADLs and salient tasks  · Pt will demo improved motor learning of constructional and new motor actions for improved b/l coordination and motor planning evident by PSFS score of 9/10     Functional Performance  ·  Pt will increase proprioception and kinesthetic awareness of RUE hand to target for improved functional reach vision occluded demonstrating less ataxia  x 95% accuracy     Modalities  · Pt will tolerate IASTM/Vibration/Mirror Therapy for improved motor and sensory performance for overall improved strength and sensation in RUE        Subjective    SUBJECTIVE: \"I don't notice it until I look and I am like oh my god this is hot! \"    PATIENT GOAL: \"Just trying to get myself back! \"    Patient-Specific Functional Scale   Task is scored 0 (unable to perform activity) to 10 (ability to perform activity independently)    Activity Date:  6/14/23 Date:   1  Pouring heavy objects (coffee)   6-7    2  Handwriting   6     3  Preparing meals/planning ahead for work   Fluor Cupple now, I dont know  Probably not  \"    4  HISTORY OF PRESENT ILLNESS:     Pt is a 40 y o  male who was referred to Occupational Therapy s/p  Right hemiparesis ([facility] [address] ) [G81 91]  Stephan De presented to [facility]15[facility]23 with R sided weakness, aphasia  Reports he had a fall day of stroke  MRI revealed a Large L MCA ischemic stroke  He did not receive TNK due to being outside window  He underwent thrombectomy of left M1 occlusion by Dr Mary Hoyt  On 5/16, the patient had seizure like activity with eye deviation/clinical worsening and was given Ativan/Keppra  Reports not having any new seizures    Had EEG performed and was negative for additional " seizures  Per neurology, etiology of L MCA unclear  From Cardiology standpoint, pt was found to have a large PFO, recommended for closure in 4-6 weeks outpatient  Pt then [facility] on 23 for about 2 weeks  Pt was able to be advanced to supervision level of assist with mobility and self care  He has moderate deficits in problem solving and memory from cognitive standpoint  Houston Major resides in a one level home with his 2 dogs and father  Pt is completing all ADLs independently  Pt is managing medications  Pt is caring/feeding for his 2 pugs  His dad has been cooking and making most meals  Is not driving, was told to wait 3 or 6 months  Has been running about 6 miles  Worked at [employer] as a   Has to prepare for 5 different lunch periods every day  Likes: running     Reports difficulties with speech/expressive aphasia, slight RUE weakness, decreased sensation  PMH: No past medical history on file          Pain Levels   Restin/10 in R hand     With Activity:  4/10 in R hand       Objective    Impairment Observations:    KRYSTAL RODGERS Comments           UPPER EXTREMITY FUNCTION   Impaired Intact Dominant Hand: RIGHT     /PINCH STRENGTH             Dynamometer    - Gross Grasp 57 lbs 86 lbs abnormal   Pinch Meter     - Pincer 8 lbs 10 lbs abnormal    - Tripod 10 lbs 15 lbs abnormal    - Lateral 13 lbs  14 lbs abnormal     AROM (Seated)         ALL motions within normal limits     Scapula   - Retraction/Protraction  - Elevation/Depression  - Upward/Downward rotation   WNL   WNL    Shoulder FF WNL WNL     Shoulder Ext      Shoulder internal rotation       Shoulder external rotation       Shoulder Abd      Shoulder Add      Horizontal Abd      Horizontal Add      Elbow Flex      Elbow Ext      Pronation      Supination      Wrist Flex      Wrist Ext      Digit Flex      Digit Ex      Composite Grasp      Hook Grasp      Subluxation  No sublux       MMT             Shoulder FF 4/5 5/5    Shoulder Ext 4-/5 5/5    Shoulder Abduction 4-/5 5/5    Shoulder Adduction 3+/5 5/5    Elbow Flex 4+/5 5/5    Elbow Ext 4/5 5/5    Wrist Flex 4/5 5/5    Wrist Ext 4/5 5/5      SENSATION      Monofilament Testing  Normal: 2 83 mm    Diminished light touch: 3 61 mm    Diminished protective sensation: 4 31 mm    Loss of protective sensation: 4 56    Complete loss of sensation / deep pressure: 6 65 2 83 mm ventral L elbow    3 61 mm dorsal forearm and distal forearm/wrist     4 31 mm L digits (ventral)    4 56 mm dorsal palm/digits    2 83 mm with slow to respond IMPAIRED   Sharp / Dull  Impaired Intact    Proprioception Impaired Intact    Hot/Cold Temp Impaired Intact    Stereognosis  Impaired Intact Unable to ID an eraser, marble, clip     Able to ID a pen       COORDINATION      Opposition Functional  Intact Slow movement throughout      Finger to Nose Impaired Intact Slight ataxia, impaired with vision occluded      Rapid Alternating Movement Impaired  Intact  Dysdiadochokinesia   9 Hole Peg Test 33 seconds 22 seconds abnormal   R: ataxia, dec proprioception   Fxnl Dexterity Test 37 seconds 18 seconds abnormal   R: compensating 50% of time, dec rotational skills, ataxia, arm abducted    Rk Hand Function Test         - Handwriting Decreased control of handwriting/signing name         - Card Turning         - Small Item p/u         - Simulated Feeding         - Stacking Checkers         - Moving Light Objects         - Moving Heavy Objects        TONE: MODIFIED SHANNON SCALE      No increase in muscle tone (0)      Slight Increase in muscle tone with catch and release or min resist at end range (1) Biceps and supinators   SUBTLE     Slight Increase in muscle tone with catch and release, followed by min resistance through remainder of range (1+)      Increased muscle tone through full range, able to be moved easily (2)      Considerable increase in tone, difficult to move (3)      Rigid in Flexion/Extension (4)                OTHER PLANNED THERAPY INTERVENTIONS:     Constraint-Induced Movement Therapy (exercises / modified protocol)   Seated and in stance neuro re-ed   - high intensity UB training/exercises  - PNF patterns  Vibration for sensation   BITS for target accuracy/reaction time  Virtual Reality for kinesthetic/proprioception awareness  Task-Oriented Training  FMC/prehension  GMC  Proximal to distal teaming  Timed Trials  Manual tx  IASTM  Hand to target  Sensory re-ed (Cutaneous/Proprioceptive)  Seated functional reach: crossing midline  Weight Bearing strategies   Closed chain activities  Open chain activities  Mirror Therapy (sensation)  HEP      INTERVENTION COMMENTS:  Diagnosis: Right hemiparesis (HCC) [G81 91]  Precautions: NO STIM (seizure hx)   Insurance: Payor: [address] [place] [address] / Plan: [id number] / Product Type: Blue Fee for Service /   1 of 12 visits, PN due 7/14/23  POC Expiration: 8/14/23 (8 wks)

## 2023-06-16 ENCOUNTER — OFFICE VISIT (OUTPATIENT)
Dept: OCCUPATIONAL THERAPY | Facility: CLINIC | Age: 43
End: 2023-06-16
Payer: COMMERCIAL

## 2023-06-16 DIAGNOSIS — Z74.09 IMPAIRED MOBILITY AND ACTIVITIES OF DAILY LIVING: Primary | ICD-10-CM

## 2023-06-16 DIAGNOSIS — I63.512 CEREBROVASCULAR ACCIDENT (CVA) DUE TO OCCLUSION OF LEFT MIDDLE CEREBRAL ARTERY (HCC): ICD-10-CM

## 2023-06-16 DIAGNOSIS — Z78.9 IMPAIRED MOBILITY AND ACTIVITIES OF DAILY LIVING: Primary | ICD-10-CM

## 2023-06-16 PROCEDURE — 97110 THERAPEUTIC EXERCISES: CPT

## 2023-06-16 PROCEDURE — 97112 NEUROMUSCULAR REEDUCATION: CPT

## 2023-06-16 NOTE — PROGRESS NOTES
"Occupational Therapy Daily Note:    Today's date: 2023  Patient name: Luca Moscoso  : 1980  MRN: [id number]  Referring provider: Jean Pierre Wiggins MD  Dx:   Encounter Diagnoses   Name Primary? • Impaired mobility and activities of daily living Yes   • Cerebrovascular accident (CVA) due to occlusion of left middle cerebral artery ([address] )      Duration in weeks: 8  Plan of care beginning date: 23  Plan of care expiration date: 23    Subjective: \"This hand couldn't do anything\"  Objective: Pt engaged in skilled OT treatment session with focus on UE NMR, visual perceptual training, visual scanning, UE strengthening, UE endurance, FMC/GMC, FMS/GMS, body awareness, sitting balance, core/trunk control and midline awareness to increase engagement, endurance, tolerance, and independence with daily ADL and IADL tasks  CPT Code Minutes                                           Task Details        Therapeutic Activity               Neuro Re-Ed 30 For benefit of neuro re-ed promoting core control, static balance in stance, GMC w/symmetry and B coordination of movements  Pt engaged in therex utilizing 3# tbar in stance focusing on proximal strength/endurance to improved quality of movement to increase AROM/joint flexibility needed for fxl reach and sinkside activities increasing indep engaging in home mngt activities  Pt completed MMP achieving >3/4 end range shoulder flex, PNF D-2, fwd/bwd rows, 2x15 achieving near full range w/o report of pain or LOB noted  Pt noted w/symmetrical movements b/t BUE in stance  Utilizing resistive tongs w/red/yellow bands, pt retrieved small foam discs from right side in OH diagonal plane facilitating PNF D-2 pattern placing to target positioned midline on tabletop  Pt demo-G quality of movement w/RUE in full extension during retrieval and accuracy to target placement       Pt engaged in pixie cube retrieval and placement focusing on in-hand manipulation " and fxl pinch patterns  Pt noted with decreased coordination of movement in hand, no droppage  Therapeutic Exercise 45 For UB exercise benefit and to increase overall cardiovascular health, proximal strength, gross grasp and endurance, pt engaged in [address] for 5 min prograde/5 min retrograde increasing resistance to L2 0  Theraputty Exercises   Patient educated and instructed on HEP for FMS/FMC with use of green resist theraputty to inc indep with ADL fxn including container management, fastener management, and item retrieval   Exercises instructed as followed:  · Putty Squeezes - 10 reps - 3 sets - 1x daily - 3x weekly  · Tip Pinch with Putty - 10 reps - 3 sets - 1x daily - 3x weekly  · Finger Lumbricals with Putty - 10 reps - 3 sets - 1x daily - 3x weekly  · 3-Point Pinch with Putty - 10 reps - 3 sets - 1x daily - 3x weekly  · Finger Key  with Putty - 10 reps - 3 sets - 1x daily - 3x weekly  · Thumb Opposition with Putty - 10 reps - 3 sets - 1x daily - 3x weekly                  Manual          Self Care       Assessment: Tolerated treatment well  Pt provided with demonstration and verbal instruction and demo G understanding of exercises as evidenced by participation in exercises completing 5 reps  Pt required visual demo and tactile cues to log roll putty and oppose digits  Pt required frequent verbal cues for continued use of right hand during fm engagement  Patient would benefit from continued skilled OT      Plan: Continued skilled OT per POC    INTERVENTION COMMENTS:  Diagnosis: Right hemiparesis ([facility] [address] ) [G81 91]  Precautions: NO STIM (seizure hx)   Insurance: Payor: Bella Pepe / Plan: [id number] / Product Type: Blue Fee for Service /   2 of 12 visits, PN due 7/14/23  POC Expiration: 8/14/23 (8 wks)

## 2023-06-20 ENCOUNTER — OFFICE VISIT (OUTPATIENT)
Dept: SPEECH THERAPY | Facility: CLINIC | Age: 43
End: 2023-06-20
Payer: COMMERCIAL

## 2023-06-20 ENCOUNTER — OFFICE VISIT (OUTPATIENT)
Dept: OCCUPATIONAL THERAPY | Facility: CLINIC | Age: 43
End: 2023-06-20
Payer: COMMERCIAL

## 2023-06-20 DIAGNOSIS — I63.512 ACUTE ISCHEMIC LEFT MCA STROKE (HCC): ICD-10-CM

## 2023-06-20 DIAGNOSIS — I69.320 APHASIA AS LATE EFFECT OF CEREBROVASCULAR ACCIDENT: Primary | ICD-10-CM

## 2023-06-20 DIAGNOSIS — G81.91 RIGHT HEMIPARESIS (HCC): Primary | ICD-10-CM

## 2023-06-20 PROCEDURE — 92507 TX SP LANG VOICE COMM INDIV: CPT

## 2023-06-20 PROCEDURE — 97110 THERAPEUTIC EXERCISES: CPT

## 2023-06-20 PROCEDURE — 97112 NEUROMUSCULAR REEDUCATION: CPT

## 2023-06-20 NOTE — OCCUPATIONAL THERAPY NOTE
Occupational Therapy Discharge Summary    Pt has made G progress during course of OT functioning at overall set-up to supervision for ADLs, S for fxnl mobility without AD  Recommending shower chair to inc safety at d/c  Due to cognitive deficits and expressive>receptive aphasia, FT completed w/ pt's father who is able to provide 24 hour S at time of d/c  From OT standpoint, pt is okay to d/c home w/ family to provide 24 hour S  Recommending OP neuro OT w/ focus on RUE NMR and fxnl cognitive retraining to maximize fxnl indep and dec caregiver burden  D/C IPOT      Velia Galarza MS, OTR/L

## 2023-06-20 NOTE — PROGRESS NOTES
Daily Speech Treatment Note    Today's date: 2023  Patient’s name: Kenton Galicia  : 1980  MRN: [id number]  Safety measures: h/o CVA, seizure  Referring provider: Gillian Adan MD    Encounter Diagnosis     ICD-10-CM    1  Aphasia as late effect of cerebrovascular accident  I69 320       2  L MCA stroke w/ LM1 partial occlusion  C04 538         Visit tracking:  -Referring provider: Epic  -Billing guidelines: CMS  -Visit #  -Insurance: Moshe Coleman (12 visits PCY)  -RE due 2023    Subjective/Behavioral:  -Patient reported that he has been practicing his speech-language skills at home with the worksheets presented by his prior SLP  Objective/Assessment:    Short-term goals:  Patient will name opposite (e g , hot and ___) with 80% accuracy to build expressive vocabulary for conversation (to be achieved in 4-6 weeks)       Patient will name a synonym (e g , street or ___) with 80% accuracy to build expressive vocabulary for conversation (to be achieved in 4-6 weeks)       Patient will name appropriate category for three words (e g , apple, banana, pear = fruits) with 80% accuracy to facilitate improved generative naming skills (to be achieved in 4-6 weeks)       Patient will complete confrontational naming tasks with 80% accuracy to build expressive vocabulary for conversation (to be achieved in 4-6 weeks)       Patient will name up to 5 features to describe a person/place/thing with 80% accuracy to facilitate improved utilization of circumlocution strategy (to be achieved in 4-6 weeks)  -Circumlocution strategy: Patient was presented with a Semantic Feature Analysis visual aid and asked to target word/sentence generation with circumlocution strategy  Patient was provided with a picture card stimuli and demonstrated utilization of circumlocution strategy in 5/7 opp provided mod assistance from clinician to use visual aid   Patient guessed clinician's picture card stimuli in 5/5 opp (100% acc)      Patient will complete simple writing tasks at the word level with >90% acc to increase writing skills within FLE (to be achieved in 4-6 weeks)  -Spelling/sentence generation: Patient was presented with 5 Bananagram tiles and was instructed to unscramble them to spell a word, and then verbalize a sentence containing that word  Unscrambling of words task completed in 4/7 opp (57% acc) independently, increasing to 7/7 opp (100% acc) provided a min phonemic cue from clinician  Sentence generation task completed in 3/7 opp (43% acc) independently, increasing to 5/7 opp (71% acc) with min verbal cues, increasing to 7/7 opp (100% acc) with mod-max verbal/visual cues from clinician  Patient will follow multi-step verbal directions with 80% accuracy to facilitate increased auditory comprehension skills (to be achieved in 4-6 weeks)       Patient will facilitate functional reading skills by providing an appropriate word for sentence completion task (e g , open the ___) with 80% accuracy to facilitate increased expressive language skills (to be achieved in 4-6 weeks)  Plan:  -Continue with current plan of care

## 2023-06-20 NOTE — PROGRESS NOTES
Daily Speech Treatment Note    Today's date: 2023  Patient’s name: Todd Amaral  : 1980  MRN: [id number]  Safety measures: h/o CVA, seizure  Referring provider: Rola Aranda MD    Encounter Diagnosis     ICD-10-CM    1  Aphasia as late effect of cerebrovascular accident  I69 320       2  L MCA stroke w/ LM1 partial occlusion  V96 526         Visit tracking:  -Referring provider: Epic  -Billing guidelines: CMS  -Visit #  -Insurance: [employer] (12 visits PCY)  -RE due 2023    Subjective/Behavioral:  -Patient was in good spirits and was open to working with unfamiliar therapist   - Patient was given monthly schedule  - Patient and clinician reviewed information regarding [facility] princess services at [facility]  Objective/Assessment:  -Reviewed testing results and goals in plan care with patient  Patient is in agreement at this time  Short-term goals:  Patient will name opposite (e g , hot and ___) with 80% accuracy to build expressive vocabulary for conversation (to be achieved in 4-6 weeks)  To target semantic association and lexicon building, patient asked to name an opposite/antonym of a concrete word (i e , big)  Task completed in  opp overall, increased to  with verbal cues  Patient will name a synonym (e g , street or ___) with 80% accuracy to build expressive vocabulary for conversation (to be achieved in 4-6 weeks)       Patient will name appropriate category for three words (e g , apple, banana, pear = fruits) with 80% accuracy to facilitate improved generative naming skills (to be achieved in 4-6 weeks)  To target generative naming of a category given three words, patient appropriately named 14/20 words; increasing to 20 with verbal choice of two cues      Patient will complete confrontational naming tasks with 80% accuracy to build expressive vocabulary for conversation (to be achieved in 4-6 weeks)       Patient named articles of clothing in 24/31 opportunities independently  Description cues were not effective  Given, carrier phrases increased to 28/31 opportunities  Given phonemic cues, increased to 30/31 opportunities  Patient will name up to 5 features to describe a person/place/thing with 80% accuracy to facilitate improved utilization of circumlocution strategy (to be achieved in 4-6 weeks)       Patient will complete simple writing tasks at the word level with >90% acc to increase writing skills within FLE (to be achieved in 4-6 weeks)       Patient will follow multi-step verbal directions with 80% accuracy to facilitate increased auditory comprehension skills (to be achieved in 4-6 weeks)       Patient will facilitate functional reading skills by providing an appropriate word for sentence completion task (e g , open the ___) with 80% accuracy to facilitate increased expressive language skills (to be achieved in 4-6 weeks)         Plan:  -Patient was provided with home exercises/activities to target goals in plan of care at the end of today's session   -Continue with current plan of care

## 2023-06-20 NOTE — PROGRESS NOTES
"Occupational Therapy Daily Note:    Today's date: 2023  Patient name: Syed Alvarez  : 1980  MRN: [id number]  Referring provider: Brittny Flores MD  Dx:   Encounter Diagnosis   Name Primary? • Right hemiparesis ([facility] [address] ) Yes     Duration in weeks: 8  Plan of care beginning date: 23  Plan of care expiration date: 23    Subjective: \"My hand hurt the next day but I did the putty\"  Objective: Pt engaged in skilled OT treatment session with focus on UE NMR, visual perceptual training, visual scanning, UE strengthening, UE endurance, FMC/GMC, FMS/GMS, body awareness, sitting balance, core/trunk control and midline awareness to increase engagement, endurance, tolerance, and independence with daily ADL and IADL tasks  CPT Code Minutes                                           Task Details        Therapeutic Activity               Neuro Re-Ed 45 For benefit of neuro-muscular re-education, promoting core strength, stabilization, proximal strength/stabilization, FMC/FMS and postural control improving dynamic balance, fxl standing tolerance/endurance increasing indep and safety during activity engagement and fxl mobility/transfers  In quadriped WB into RUE, pt instructed to retrieve puzzle pieces positioned on right facilitating increased WS/WB to right placing to template board positioned midline of pt  Activity then graded up to retrieving puzzle pieces from left using RUE \"threading the needle\" technique for puzzle piece retrieval and placement using RUE  No noted LOB during WB task  Pt engaged in FM activity focusing on [facility], in-hand manipulation and accurate placement of small pixie cubes  Pt instructed to retrieve small cubes and rotate in-hand to identify and accurately place cubes to complete pattern  Pt noted with difficulty to manipulate small cube in hand frequently placing on table to reposition  Improvement noted with mass practice           Therapeutic Exercise 30 For " "UB exercise benefit and to increase overall cardiovascular health, proximal strength, gross grasp and endurance, pt engaged in [address] for 5 min prograde/5 min retrograde bilaterally w/resistance at L2 0  followed by 2 min prograde/2 min retrograde unilaterally w/o added resistance  Pt engaged in therex utilizing 2#DB in stance focusing on proximal strength/endurance to improved quality of movement to increase AROM/joint flexibility needed for fxl reach activities to increase indep engaging in home mngt activities  Pt completed MMP attaining >1/2 range shoulder flex, \"Y\", fwd/bwd rows, bicep curls, 2x15 achieving near full range w/o report of pain  Manual          Self Care       Assessment: Tolerated treatment well  Pt demo- symmetry b/t BUE and quality of movements using 3# weights  Pt noted with decreased coordination when using RUE for puzzle placement, although able to sustain grasp from retrieval to placement  Patient would benefit from continued skilled OT      Plan: Continued skilled OT per POC    INTERVENTION COMMENTS:  Diagnosis: Right hemiparesis ([address] ) [G81 91]  Precautions: NO STIM (seizure hx)   Insurance: Payor: [address] [place] [address] / Plan: [id number] / Product Type: Blue Fee for Service /   3 of 12 visits, PN due 7/14/23  POC Expiration: 8/14/23 (8 wks)  "

## 2023-06-21 ENCOUNTER — APPOINTMENT (OUTPATIENT)
Dept: OCCUPATIONAL THERAPY | Facility: CLINIC | Age: 43
End: 2023-06-21
Payer: COMMERCIAL

## 2023-06-21 ENCOUNTER — OFFICE VISIT (OUTPATIENT)
Dept: OCCUPATIONAL THERAPY | Facility: CLINIC | Age: 43
End: 2023-06-21
Payer: COMMERCIAL

## 2023-06-21 ENCOUNTER — OFFICE VISIT (OUTPATIENT)
Dept: SPEECH THERAPY | Facility: CLINIC | Age: 43
End: 2023-06-21
Payer: COMMERCIAL

## 2023-06-21 DIAGNOSIS — I69.320 APHASIA AS LATE EFFECT OF CEREBROVASCULAR ACCIDENT: Primary | ICD-10-CM

## 2023-06-21 DIAGNOSIS — Z74.09 IMPAIRED MOBILITY AND ACTIVITIES OF DAILY LIVING: ICD-10-CM

## 2023-06-21 DIAGNOSIS — I63.512 CEREBROVASCULAR ACCIDENT (CVA) DUE TO OCCLUSION OF LEFT MIDDLE CEREBRAL ARTERY (HCC): Primary | ICD-10-CM

## 2023-06-21 DIAGNOSIS — G81.91 RIGHT HEMIPARESIS (HCC): ICD-10-CM

## 2023-06-21 DIAGNOSIS — Z78.9 IMPAIRED MOBILITY AND ACTIVITIES OF DAILY LIVING: ICD-10-CM

## 2023-06-21 DIAGNOSIS — R44.9 SENSORY DEFICIT, RIGHT: ICD-10-CM

## 2023-06-21 DIAGNOSIS — I63.512 ACUTE ISCHEMIC LEFT MCA STROKE (HCC): ICD-10-CM

## 2023-06-21 PROCEDURE — 97112 NEUROMUSCULAR REEDUCATION: CPT

## 2023-06-21 PROCEDURE — 97110 THERAPEUTIC EXERCISES: CPT

## 2023-06-21 PROCEDURE — 92507 TX SP LANG VOICE COMM INDIV: CPT

## 2023-06-21 NOTE — PROGRESS NOTES
Daily Speech Treatment Note    Today's date: 2023  Patient’s name: Zofia Gorman  : 1980  MRN: [id number]  Safety measures: h/o CVA, seizure  Referring provider: Marquez Cummings MD    Encounter Diagnosis     ICD-10-CM    1  Aphasia as late effect of cerebrovascular accident  I69 320       2  L MCA stroke w/ LM1 partial occlusion  Y28 511         Visit tracking:  -Referring provider: Epic  -Billing guidelines: CMS  -Visit #3/12  -Insurance: Johnathon Urias (12 visits PCY)  -RE due 2023    Subjective/Behavioral:  -Patient was in good spirits  Objective/Assessment:  -Reviewed patient's home exercises/activities completed since last appointment  Patient partially completed homework and did not return it  Short-term goals:  Patient will name opposite (e g , hot and ___) with 80% accuracy to build expressive vocabulary for conversation (to be achieved in 4-6 weeks)  Patient will name a synonym (e g , street or ___) with 80% accuracy to build expressive vocabulary for conversation (to be achieved in 4-6 weeks)  To target semantic association and lexicon building; patient was asked to name 1 synonym for a given word (i e , beautiful)  Patient reporting task as difficult and challenging, requiring verbal cues at times  Pt completed naming task in  opp      Patient will name appropriate category for three words (e g , apple, banana, pear = fruits) with 80% accuracy to facilitate improved generative naming skills (to be achieved in 4-6 weeks)         Patient will complete confrontational naming tasks with 80% accuracy to build expressive vocabulary for conversation (to be achieved in 4-6 weeks)  Patient will name up to 5 features to describe a person/place/thing with 80% accuracy to facilitate improved utilization of circumlocution strategy (to be achieved in 4-6 weeks)       Patient was educated on semantic feature analysis and provided 5 features for a person, place, or thing given a visual aid and additional verbal cues 7x  Patient required moderate cues from the therapist      Patient will complete simple writing tasks at the word level with >90% acc to increase writing skills within FLE (to be achieved in 4-6 weeks)       Patient will follow multi-step verbal directions with 80% accuracy to facilitate increased auditory comprehension skills (to be achieved in 4-6 weeks)       Patient will facilitate functional reading skills by providing an appropriate word for sentence completion task (e g , open the ___) with 80% accuracy to facilitate increased expressive language skills (to be achieved in 4-6 weeks)  Patient completed sentence completion task in 7/10 opportunities  He benefited from phonemic cues from the therapist to improve to 10/10 opportunities        Plan:  -Patient was provided with home exercises/activities to target goals in plan of care at the end of today's session   -Continue with current plan of care

## 2023-06-21 NOTE — TELEPHONE ENCOUNTER
Post CVA Discharge Follow Up  Hospitalization: 5/15/23-5/27/23, 5/27/23-6/9/23 [facility]     Called patient's primary phone number listed in the chart  No answer  Left a voice message requesting for a return call  Provided the office's phone number  Mailed unable to reach letter since this was the third attempt

## 2023-06-21 NOTE — PROGRESS NOTES
Occupational Therapy Daily Note:    Today's date: 2023  Patient name: Koki Mckeon  : 1980  MRN: [id number]  Referring provider: Pinky Goel MD  Dx:   Encounter Diagnoses   Name Primary? • Cerebrovascular accident (CVA) due to occlusion of left middle cerebral artery (HCC) Yes   • Sensory deficit, right    • Impaired mobility and activities of daily living    • Right hemiparesis ([address] )      Duration in weeks: 8  Plan of care beginning date: 23  Plan of care expiration date: 23    Subjective: Doing okay, says he is going to run 6 miles tonight  Feels frustrated with his speech  Objective: Pt engaged in skilled OT treatment session with focus on UE NMR, visual perceptual training, visual scanning, UE strengthening, UE endurance, FMC/GMC, FMS/GMS, body awareness, sitting balance, core/trunk control and midline awareness to increase engagement, endurance, tolerance, and independence with daily ADL and IADL tasks  CPT Code Minutes                                           Task Details        Therapeutic Activity  Pt concluded with alternating between handwriting (non-timed) and flipping cards (timed)  Pt wrote 2 paragraphs while saying words out loud followed by flipping cards with 1st trial in 50 sec and 2nd trial in 48 sec  Neuro Re-Ed  Facilitated weight bearing to RUE in quadruped facilitated reaching across midline and [facility] demands for the followin  3 min 30 sec RUE in quadruped only   2  3 min 30 sec RUE in quadruped over half-bosu ball   3  Side lying to RUE reaching in various planes to place to peg board   Pt completely filled peg board with weight bearing tasks  Pt then progressed to performing a 3 part direction task of turning on a timer, then removing 100 pegs from board as quick as possible and then turning off timer once finished  Pt completed within 2 min 47 seconds       Pt then advanced to performing weighted pushes with a 2 kg ball with added sensory input of vibration over ball to increase proprioceptive/sensory, kinesthetic awareness, concentric/eccentric control of RUE:  1  Shoulder press over head x 10 w/ vibration over ball  2  Shoulder flexion raises to 90* w/ vibration under ball   3  Hand squeezes for gross grasp with 2 sec hold x 10 reps     Pt continued with weight bearing over half bosu ball multiple trials with OT placing a marble on flat side and pt req to obtain marble in middle of ball for challenge of proprioceptive system  Pt completed chest push-ups on mat x 10 without LE on mat   Completed x 10 tricep push-ups with knees on mat  Therapeutic Exercise    Pt tolerated 4 min x 2 (prograde/retrograde motions at 2 2 resist) STANDING on UBE then 1 min prograde and 1 min retrograde in RUE ONLY  with focus on increasing proximal UE strength, cardiopulmonary endurance, act tolerance, R sustained grasp on handle to inc indep and safety with ADL/IADL fxn and fxnl reaching tasks  Pt sent exercises for push-ups, tricep push-ups, quadruped/high planks, shoulder taps through [address] [place] [address]  Provided FM in hand manipulation tasks ideas handout  Manual          Self Care       Assessment: Tolerated treatment well  Patient would benefit from continued skilled OT  Pt tolerated upgrade of UBE well while in stance with minimal challenge reported  Pt demo increased challenge with RUE over half bosu ball reporting 7/10 difficulty and noted dec tricep extension needing some tactile cues and 2 loss of balances with reaching at end range  Pt also demo a significant challenge with tricep push-ups needing to modify on knees and increased fatigue seen nearing 6th/7th rep  Pt demo decreased control with pen on paper with decreased legibility of words  Pt demo dec coordination of cards on table top requiring several attempts to rotate card  Plan: Continued skilled OT per POC    Review FMC/hand tasks for at home to be completed daily - modified constraint induced hand program*    Higher intensity mat exercises (shoulder plank taps, plank exercises), vibration for sensation, proximal > distal     INTERVENTION COMMENTS:  Diagnosis: Right hemiparesis (HCC) [G81 91]  Precautions: NO STIM (seizure hx)   Insurance: Payor: [address] [place] [address] / Plan: [id number] / Product Type: Blue Fee for Service /   4 of 12 visits, PN due 7/14/23  POC Expiration: 8/14/23 (8 wks)

## 2023-06-23 ENCOUNTER — OFFICE VISIT (OUTPATIENT)
Dept: FAMILY MEDICINE CLINIC | Facility: CLINIC | Age: 43
End: 2023-06-23

## 2023-06-23 VITALS
HEART RATE: 59 BPM | WEIGHT: 175 LBS | HEIGHT: 69 IN | BODY MASS INDEX: 25.92 KG/M2 | TEMPERATURE: 98.9 F | DIASTOLIC BLOOD PRESSURE: 72 MMHG | OXYGEN SATURATION: 98 % | SYSTOLIC BLOOD PRESSURE: 132 MMHG

## 2023-06-23 DIAGNOSIS — I63.012 CEREBROVASCULAR ACCIDENT (CVA) DUE TO THROMBOSIS OF LEFT VERTEBRAL ARTERY (HCC): Primary | ICD-10-CM

## 2023-06-23 PROCEDURE — 99495 TRANSJ CARE MGMT MOD F2F 14D: CPT | Performed by: FAMILY MEDICINE

## 2023-06-23 NOTE — ASSESSMENT & PLAN NOTE
-Pt admitted on 5/15/2023 for R  Hemiparesis, aphasia; found to have large L  MCA stroke with M1 thrombus; underwent Thrombectomy and started on Keppra for seizure ppx (pt had eye deviation, seizure like activity) Pt had a large PFO found on Echo; plan to follow up with cardiology outpatient for closure (Appoinment made for September)   -Patient has been improving in, motor control, aphasia, hemiparesis significantly improved with routine OT PT ST  -Patient continue taking Keppra for seizure prophylaxis, aspirin 81 mg   -Had appointment set up for PT and OT ST for the next few months as well as cardiology's appointment for PFO closure in September neurology to see patient in August  -Encouraging patient to stay on medication, attending appointment for therapies as well as cardiologist and neurologist  -Follow-up with patient in August for his annual physical

## 2023-06-23 NOTE — PROGRESS NOTES
"Assessment & Plan     1  Cerebrovascular accident (CVA) due to thrombosis of left vertebral artery [facility])  Assessment & Plan:  -Pt admitted on 5/15/2023 for R  Hemiparesis, aphasia; found to have large L  MCA stroke with M1 thrombus; underwent Thrombectomy and started on Keppra for seizure ppx (pt had eye deviation, seizure like activity) Pt had a large PFO found on Echo; plan to follow up with cardiology outpatient for closure (Appoinment made for September)   -Patient has been improving in, motor control, aphasia, hemiparesis significantly improved with routine OT PT ST  -Patient continue taking Keppra for seizure prophylaxis, aspirin 81 mg   -Had appointment set up for PT and OT ST for the next few months as well as cardiology's appointment for PFO closure in September neurology to see patient in August  -Encouraging patient to stay on medication, attending appointment for therapies as well as cardiologist and neurologist  -Follow-up with patient in August for his annual physical         Subjective     Transitional Care Management Review:     \"Pt admitted on 5/15/2023 for R  Hemiparesis, aphasia; found to have large L  MCA stroke with M1 thrombus; underwent Thrombectomy and started on Keppra for seizure ppx (pt had eye deviation, seizure like activity) Pt had a large PFO found on Echo; plan to follow up with cardiology outpatient for closure (Appoinment made for September) Patient has been improving in, motor control, aphasia, hemiparesis significantly improved with routine OT PT ST\"      During the TCM phone call patient stated:  TCM Call     Date and time call was made  6/13/2023  9:42 AM    Hospital care reviewed  Records reviewed    Patient [facility]ial[facility]    Date of Admission  05/27/23    Date of discharge  06/09/23    Diagnosis  CVA    Disposition  Home      TCM Call     Scheduled for follow up?   Yes    Comments  Leisa Vazquez         SHEMAR Enamorado is a " "40 y o  male here for TCM follow up  He has been compliant with PT OT ST, patient endorses aphasia and right hemiparesis improving  He has been compliant taking Keppra daily basis no additional episode seizure witnessed or experienced  He had appointment with PT OT and ST, neurology, cardiology set up in the next few months  Review of Systems   Constitutional: Negative for chills and fever  HENT: Positive for voice change  Negative for ear pain, sore throat and trouble swallowing  Eyes: Negative for pain and visual disturbance  Respiratory: Negative for cough and shortness of breath  Cardiovascular: Negative for chest pain and palpitations  Gastrointestinal: Negative for abdominal pain and vomiting  Genitourinary: Negative for dysuria and hematuria  Musculoskeletal: Negative for arthralgias and back pain  Skin: Negative for color change and rash  Neurological: Positive for speech difficulty and weakness  Negative for seizures, syncope and facial asymmetry  All other systems reviewed and are negative  Objective     /72 (BP Location: Left arm, Patient Position: Sitting, Cuff Size: Standard)   Pulse 59   Temp 98 9 °F (37 2 °C) (Temporal)   Ht 5' 9\" (1 753 m)   Wt 79 4 kg (175 lb)   SpO2 98%   BMI 25 84 kg/m²      Physical Exam  Constitutional:       General: He is not in acute distress  Appearance: He is not toxic-appearing  HENT:      Head: Normocephalic and atraumatic  Right Ear: External ear normal       Left Ear: External ear normal       Nose: No congestion  Mouth/Throat:      Pharynx: No oropharyngeal exudate  Eyes:      General: No scleral icterus  Cardiovascular:      Rate and Rhythm: Normal rate and regular rhythm  Heart sounds: No murmur heard  Pulmonary:      Effort: No respiratory distress  Breath sounds: Normal breath sounds  Abdominal:      General: Abdomen is flat  There is no distension  Palpations: Abdomen is soft        " Tenderness: There is no abdominal tenderness  Musculoskeletal:         General: Normal range of motion  Cervical back: No rigidity  Skin:     Capillary Refill: Capillary refill takes less than 2 seconds  Coloration: Skin is not jaundiced  Neurological:      Mental Status: He is alert  Cranial Nerves: No cranial nerve deficit  Sensory: No sensory deficit        Coordination: Coordination normal       Gait: Gait normal       Deep Tendon Reflexes: Reflexes normal       Comments: 4 out of 5 motor strength upper and lower extremities (R)     Psychiatric:         Mood and Affect: Mood normal        Medications have been reviewed by provider in current encounter    Frantz Haddad DO

## 2023-06-26 NOTE — PROGRESS NOTES
Daily Speech Treatment Note    Today's date: 2023 ***  Patient’s name: Chicho Tavarez  : 1980  MRN: [id number]  Safety measures: h/o CVA, seizure  Referring provider: Nan Welch MD    Encounter Diagnosis     ICD-10-CM    1. Aphasia as late effect of cerebrovascular accident  I69.320       2. L MCA stroke w/ LM1 partial occlusion  U91.246         Visit tracking:  -Referring provider: Epic  -Billing guidelines: CMS  -Visit #***  -Insurance: Adela Santo (12 visits PCY)  -RE due 2023    Subjective/Behavioral:  -***    Objective/Assessment:  -Reviewed patient's home exercises/activities completed since last appointment.  ***    Short-term goals:  Patient will name opposite (e.g., hot and ___) with 80% accuracy to build expressive vocabulary for conversation (to be achieved in 4-6 weeks).      Patient will name a synonym (e.g., street or ___) with 80% accuracy to build expressive vocabulary for conversation (to be achieved in 4-6 weeks).      Patient will name appropriate category for three words (e.g., apple, banana, pear = fruits) with 80% accuracy to facilitate improved generative naming skills (to be achieved in 4-6 weeks).      Patient will complete confrontational naming tasks with 80% accuracy to build expressive vocabulary for conversation (to be achieved in 4-6 weeks).      Patient will name up to 5 features to describe a person/place/thing with 80% accuracy to facilitate improved utilization of circumlocution strategy (to be achieved in 4-6 weeks).      Patient will complete simple writing tasks at the word level with >90% acc to increase writing skills within FLE (to be achieved in 4-6 weeks).      Patient will follow multi-step verbal directions with 80% accuracy to facilitate increased auditory comprehension skills (to be achieved in 4-6 weeks).      Patient will facilitate functional reading skills by providing an appropriate word for sentence completion task (e.g., open the ___) with 80% accuracy to facilitate increased expressive language skills (to be achieved in 4-6 weeks). Plan:  -Patient was provided with home exercises/activities to target goals in plan of care at the end of today's session.  -Continue with current plan of care.

## 2023-06-26 NOTE — PROGRESS NOTES
Daily Speech Treatment Note    Today's date: 7/3/2023  Patient’s name: [facility]  : 1980  MRN: [id number]  Safety measures: h/o CVA, seizure  Referring provider: Leesa Proctor MD    Encounter Diagnosis     ICD-10-CM    1. Aphasia as late effect of cerebrovascular accident  I69.320       2. L MCA stroke w/ LM1 partial occlusion  D89.926         Visit tracking:  -Referring provider: Epic  -Billing guidelines: CMS  -Visit #  -Insurance: Nereyda Workman (12 visits PCY)  -RE due 2023    Subjective/Behavioral:  -Patient reported that he had a nice weekend. Objective/Assessment:  -Clinician spent the majority of today's session training patient on a speech-language treatment-based smart tablet/phone judie ([employer]). Completed at least 5 trials of selected language subcategory activities during session to ensure comprehension of tasks and appropriate levels for patient to complete as part of his HEP. Activities included spelling, sentence completion, sentence unscramble, auditory comprehension, circumlocution strategy, and reading news articles aloud. Patient was encouraged to attempt activities as part of his HEP. Reciprocal comprehension verbally expressed. Short-term goals:  Patient will name opposite (e.g., hot and ___) with 80% accuracy to build expressive vocabulary for conversation (to be achieved in 4-6 weeks).      Patient will name a synonym (e.g., street or ___) with 80% accuracy to build expressive vocabulary for conversation (to be achieved in 4-6 weeks).      Patient will name appropriate category for three words (e.g., apple, banana, pear = fruits) with 80% accuracy to facilitate improved generative naming skills (to be achieved in 4-6 weeks). -Word finding: Patient played “Anomia” card game to naming based upon category labels (e.g., flower, pop band, candy bar). Level 3 cards utilized.  Patient completed over 24 trials with 6 min semantic and phonemic cues.      Patient will complete confrontational naming tasks with 80% accuracy to build expressive vocabulary for conversation (to be achieved in 4-6 weeks).      Patient will name up to 5 features to describe a person/place/thing with 80% accuracy to facilitate improved utilization of circumlocution strategy (to be achieved in 4-6 weeks).      Patient will complete simple writing tasks at the word level with >90% acc to increase writing skills within FLE (to be achieved in 4-6 weeks).      Patient will follow multi-step verbal directions with 80% accuracy to facilitate increased auditory comprehension skills (to be achieved in 4-6 weeks).      Patient will facilitate functional reading skills by providing an appropriate word for sentence completion task (e.g., open the ___) with 80% accuracy to facilitate increased expressive language skills (to be achieved in 4-6 weeks). Plan:  -Patient was provided with home exercises/activities to target goals in plan of care at the end of today's session.  -Continue with current plan of care.

## 2023-06-27 ENCOUNTER — OFFICE VISIT (OUTPATIENT)
Dept: SPEECH THERAPY | Facility: CLINIC | Age: 43
End: 2023-06-27
Payer: COMMERCIAL

## 2023-06-27 ENCOUNTER — OFFICE VISIT (OUTPATIENT)
Dept: OCCUPATIONAL THERAPY | Facility: CLINIC | Age: 43
End: 2023-06-27
Payer: COMMERCIAL

## 2023-06-27 DIAGNOSIS — I69.320 APHASIA AS LATE EFFECT OF CEREBROVASCULAR ACCIDENT: Primary | ICD-10-CM

## 2023-06-27 DIAGNOSIS — I63.512 ACUTE ISCHEMIC LEFT MCA STROKE (HCC): ICD-10-CM

## 2023-06-27 DIAGNOSIS — I63.512 ACUTE ISCHEMIC LEFT MCA STROKE (HCC): Primary | ICD-10-CM

## 2023-06-27 PROCEDURE — 97110 THERAPEUTIC EXERCISES: CPT

## 2023-06-27 PROCEDURE — 97112 NEUROMUSCULAR REEDUCATION: CPT

## 2023-06-27 PROCEDURE — 92507 TX SP LANG VOICE COMM INDIV: CPT

## 2023-06-27 NOTE — PROGRESS NOTES
"Occupational Therapy Daily Note:    Today's date: 2023  Patient name: Any Patrick  : 1980  MRN: [id number]  Referring provider: Wagner Ricks MD  Dx:   Encounter Diagnosis   Name Primary? • L MCA stroke w/ LM1 partial occlusion Yes     Duration in weeks: 8  Plan of care beginning date: 23  Plan of care expiration date: 23    Subjective: \"I ran 7 miles yesterday in about 61 min\"  Objective: Pt engaged in skilled OT treatment session with focus on UE NMR, visual perceptual training, visual scanning, UE strengthening, UE endurance, FMC/GMC, FMS/GMS, body awareness, sitting balance, core/trunk control and midline awareness to increase engagement, endurance, tolerance, and independence with daily ADL and IADL tasks  CPT Code Minutes                                           Task Details        Therapeutic Activity               Neuro Re-Ed 45 For benefit of neuro re-ed promoting static stance, FMC/FMS, body awareness and postural control improving accuracy and precision during FM and sink-side activities  In unsupported stance pt engaged in stacking task retrieving dowels tabletop and transferring to target board placed about 5 feet from dowels using [address] placed in 3rd notch->large peg placement to dowels using tongs->marble placement to large pegs using small wooden spoon, pt removed marbles using green resistive clip  Pt noted with difficulty at times to position HG in hand for retrieval and placement of dowels, frequent droppage during marble placement using wooden spoon improving with mass practice  Therapeutic Exercise 30 For UB exercise benefit and to increase overall cardiovascular health, proximal strength, gross grasp and endurance, pt engaged in [address] for 4 min prograde/4 min retrograde in unsupported stance increasing resistance to L3 0 followed by RUE engagement completing additional 2 min each way, R2 2  Pt tolerated increase in resistance well           Pt " "engaged in therex utilizing 3# DB in stance focusing on proximal strength/endurance to improved quality of movement to increase AROM/joint flexibility needed for fxl reach activities to increase indep engaging in home mngt activities  Pt completed MMP w/shoulder flex, PNF \"Y\" pattern, fwd/bwd rows, and tricep dips, 2x15 achieving near full range w/o report of pain sustaining G postural control improving AROM  Manual          Self Care       Assessment: Tolerated treatment well  Pt noted with frequent droppage with mass practice of dowel placement and marble retrieval to RUE fatigue, pt required 1 rest break  Pt exhibited G technique during therapeutic exercises this session although fatigue noted post session  Patient would benefit from continued skilled OT  Plan: Continued skilled OT per POC w/educationprovided on hand coordination activities       INTERVENTION COMMENTS:  Diagnosis: Right hemiparesis ([facility] [address] ) [G81 91]  Precautions: NO STIM (seizure hx)   Insurance: Payor: [address] [place] [address] / Plan: [id number] / Product Type: Blue Fee for Service /   5 of 12 visits, PN due 7/14/23  POC Expiration: 8/14/23 (8 wks)  "

## 2023-06-27 NOTE — PROGRESS NOTES
Daily Speech Treatment Note    Today's date: 2023  Patient’s name: Geraldine Crawford  : 1980  MRN: [id number]  Safety measures: h/o CVA, seizure  Referring provider: Sydney Adams MD    Encounter Diagnosis     ICD-10-CM    1  Aphasia as late effect of cerebrovascular accident  I69 320       2  L MCA stroke w/ LM1 partial occlusion  L22 355         Visit tracking:  -Referring provider: Epic  -Billing guidelines: CMS  -Visit #  -Insurance: [facility] (12 visits PCY)  -RE due 2023    Subjective/Behavioral:  -Patient was in good spirits  Objective/Assessment:  -Reviewed patient's home exercises/activities completed since last appointment  Patient partially completed homework and did not return it  Short-term goals:  Patient will name opposite (e g , hot and ___) with 80% accuracy to build expressive vocabulary for conversation (to be achieved in 4-6 weeks)  Patient will name a synonym (e g , street or ___) with 80% accuracy to build expressive vocabulary for conversation (to be achieved in 4-6 weeks)       Patient will name appropriate category for three words (e g , apple, banana, pear = fruits) with 80% accuracy to facilitate improved generative naming skills (to be achieved in 4-6 weeks)  To target word generation, patient was given a list of three concrete words and ask to state the category that they make up  Patient completed this exercise accurately in 20/25 opportunities  Increased to 25/25 opportunities given verbal semantic cue      Patient will complete confrontational naming tasks with 80% accuracy to build expressive vocabulary for conversation (to be achieved in 4-6 weeks)         Patient will name up to 5 features to describe a person/place/thing with 80% accuracy to facilitate improved utilization of circumlocution strategy (to be achieved in 4-6 weeks)         Patient will complete simple writing tasks at the word level with >90% acc to increase writing skills within FLE (to be achieved in 4-6 weeks)  To target written expression, patient was asked to write the alphabet, numbers up to 20, and personal information (I e  name, address, phone number)  Patient wrote 24/26 letters of the alphabet independently, 20/20 numbers independently, and 3/3 personal information independently        Patient will follow multi-step verbal directions with 80% accuracy to facilitate increased auditory comprehension skills (to be achieved in 4-6 weeks)  To target comprehensive language, patient was asked to follow 1-step directions with two components  Patient completed this activity with 80% accuracy (8/10)     Patient will facilitate functional reading skills by providing an appropriate word for sentence completion task (e g , open the ___) with 80% accuracy to facilitate increased expressive language skills (to be achieved in 4-6 weeks)  Plan:  -Patient was provided with home exercises/activities to target goals in plan of care at the end of today's session   -Continue with current plan of care

## 2023-06-29 ENCOUNTER — OFFICE VISIT (OUTPATIENT)
Dept: SPEECH THERAPY | Facility: CLINIC | Age: 43
End: 2023-06-29
Payer: COMMERCIAL

## 2023-06-29 ENCOUNTER — OFFICE VISIT (OUTPATIENT)
Dept: OCCUPATIONAL THERAPY | Facility: CLINIC | Age: 43
End: 2023-06-29
Payer: COMMERCIAL

## 2023-06-29 DIAGNOSIS — I69.320 APHASIA AS LATE EFFECT OF CEREBROVASCULAR ACCIDENT: Primary | ICD-10-CM

## 2023-06-29 DIAGNOSIS — G81.91 RIGHT HEMIPARESIS (HCC): Primary | ICD-10-CM

## 2023-06-29 DIAGNOSIS — I63.512 ACUTE ISCHEMIC LEFT MCA STROKE (HCC): ICD-10-CM

## 2023-06-29 DIAGNOSIS — Q21.12 PATENT FORAMEN OVALE: ICD-10-CM

## 2023-06-29 PROCEDURE — 97530 THERAPEUTIC ACTIVITIES: CPT

## 2023-06-29 PROCEDURE — 92507 TX SP LANG VOICE COMM INDIV: CPT | Performed by: SPEECH-LANGUAGE PATHOLOGIST

## 2023-06-29 PROCEDURE — 97110 THERAPEUTIC EXERCISES: CPT

## 2023-06-29 PROCEDURE — 97112 NEUROMUSCULAR REEDUCATION: CPT

## 2023-06-29 NOTE — PROGRESS NOTES
"Occupational Therapy Daily Note:    Today's date: 2023  Patient name: Todd Amaral  : 1980  MRN: [id number]  Referring provider: Rola Aranda MD  Dx:   Encounter Diagnosis   Name Primary? • Right hemiparesis ([address] ) Yes     Duration in weeks: 8  Plan of care beginning date: 23  Plan of care expiration date: 23    Subjective: 'I ran this morning at 830\"  Objective: Pt engaged in skilled OT treatment session with focus on UE NMR, visual perceptual training, visual scanning, UE strengthening, UE endurance, FMC/GMC, FMS/GMS, body awareness, sitting balance, core/trunk control and midline awareness to increase engagement, endurance, tolerance, and independence with daily ADL and IADL task    CPT Code Minutes                                           Task Details        Therapeutic Activity 20 Pt educated provided on \"Functional coordination activities\" HEP improving FMC/FMS, GMC/GMS for increased accuracy and precision during engagement in home mngt tasks  Pt demo G fluidity of movement and stabilized standard pen using tripod grasp during gandwriting task  Pt noted with droppage about 25% of time during small peg retrieval using tweezers  Pt noted with decreased coordination during \"clap, tap, snap\" improving with mass practice  Neuro Re-Ed 30 For benefit of neuro-muscular re-education, promoting core strength, stabilization and proximal strength/stability improving dynamic balance, fxl standing tolerance/endurance increasing indep and safety during fxl reaching activities and fxl mobility  WB in quadriped pt assembled large peg activity following template  Template board positioned on wedge and pegs placed to right facilitating increased WS/WB to right  No noted droppage, pt demo-accuracy and distal control engaging RUE to remove pegs                Therapeutic Exercise 30 For UB exercise benefit and to increase overall cardiovascular health, proximal strength, " "gross grasp and endurance, pt engaged in [address] for 4 min prograde/4 min retrograde in unsupported stance increasing resistance to L3 0 followed by RUE engagement completing additional 2 min each way, R2 2  Pt tolerated increase in resistance well  Pt engaged in therex utilizing 3# DB in stance focusing on proximal strength/endurance to improved quality of movement to increase AROM/joint flexibility needed for fxl reach activities to increase indep engaging in home mngt activities  Pt completed MMP w/shoulder flex, PNF \"Y\" pattern, fwd/bwd rows, bicep curls and tricep dips, 2x15 achieving near full range w/o report of pain sustaining G postural control improving AROM  Manual          Self Care       Assessment: Tolerated treatment well  Pt demo-G understanding of HEP as evidenced by participation in exercises  Improvement noted in fluidity of handwriting as evidenced by legibility during name writing  Pt continues to improve in fm abilities although fatigues easily in quadriped  Patient would benefit from continued skilled OT      Plan: Continued skilled OT per POC    INTERVENTION COMMENTS:  Diagnosis: Right hemiparesis ([facility] [address] ) [G81 91]  Precautions: NO STIM (seizure hx)   Insurance: Payor: [address] [place] [address] / Plan: [id number] / Product Type: Blue Fee for Service /   6 of 12 visits, PN due 7/14/23  POC Expiration: 8/14/23 (8 wks)  "

## 2023-06-30 ENCOUNTER — OFFICE VISIT (OUTPATIENT)
Dept: PHYSICAL THERAPY | Facility: CLINIC | Age: 43
End: 2023-06-30
Payer: COMMERCIAL

## 2023-06-30 DIAGNOSIS — I63.512 CEREBROVASCULAR ACCIDENT (CVA) DUE TO OCCLUSION OF LEFT MIDDLE CEREBRAL ARTERY (HCC): Primary | ICD-10-CM

## 2023-06-30 PROCEDURE — 97112 NEUROMUSCULAR REEDUCATION: CPT | Performed by: PHYSICAL THERAPIST

## 2023-06-30 PROCEDURE — 97110 THERAPEUTIC EXERCISES: CPT | Performed by: PHYSICAL THERAPIST

## 2023-06-30 NOTE — PROGRESS NOTES
Daily Note     Today's date: 2023  Patient name: Magdy Isabel  : 1980  MRN: [id number]  Referring provider: Benjamin Westbrook MD  Dx:   Encounter Diagnosis     ICD-10-CM    1  Cerebrovascular accident (CVA) due to occlusion of left middle cerebral artery (HCC)  T76 738                      Subjective: Went for a six mile run yesterday and feeling quadriceps sorenss bilaterally from it      Objective: See treatment diary below      Assessment: Patient demonstrated good push off from gastroc this session on hill climbs but significant difficulty with eccentric DF control with foot slap noted as well as some issues with clearance  When running most difficulty with heel whip to clear LE and eccentric control   Plan: Continue per plan of care        Precautions: na      Manuals                                                                 Neuro Re-Ed             running 100 ft x 10 with 2# aw on R LE foot             purposeful heel whip x 4             normal gait x 4                                                                Ther Ex                                                                                                                     Ther Activity                                       Gait Training                                       Modalities

## 2023-07-02 NOTE — PROGRESS NOTES
Daily Speech Treatment Note    Today's date: 2023   Patient’s name: Miguel Mahmood  : 1980  MRN: [id number]  Safety measures: h/o CVA, seizure  Referring provider: Cecilia Welch MD    Encounter Diagnosis     ICD-10-CM    1. Aphasia as late effect of cerebrovascular accident  I69.320       2. L MCA stroke w/ LM1 partial occlusion  N65.652         Visit tracking:  -Referring provider: Epic  -Billing guidelines: CMS  -Visit #  -Insurance: Darleen Bachchristina (12 visits PCY)  -RE due 2023    Subjective/Behavioral:  -Patient reports no new concerns for session. Objective/Assessment:    Short-term goals:  Patient will name opposite (e.g., hot and ___) with 80% accuracy to build expressive vocabulary for conversation (to be achieved in 4-6 weeks).      Patient will name a synonym (e.g., street or ___) with 80% accuracy to build expressive vocabulary for conversation (to be achieved in 4-6 weeks).      Patient will name appropriate category for three words (e.g., apple, banana, pear = fruits) with 80% accuracy to facilitate improved generative naming skills (to be achieved in 4-6 weeks).      Patient will complete confrontational naming tasks with 80% accuracy to build expressive vocabulary for conversation (to be achieved in 4-6 weeks).      To target to word confrontation- patient played the game tapple where the patient is given a category (countries) and had to name items associated with the category from letters a-w. Task completed in 26/46 opp (56% acc) independently, increasing to 38/46 opp (82% acc) from min-mod cueing. Patient benefited from semantic cues, wait time, and verbal models for fluent speech. Patient will name up to 5 features to describe a person/place/thing with 80% accuracy to facilitate improved utilization of circumlocution strategy (to be achieved in 4-6 weeks).      To target describing attributes of an object- Patient was give picture stimuli and had to describe it adequately in order for clinician to guess the word. Task completed in 2/5 opp (40% acc), increasing to 4/5 opp (80% acc) from min-mod cueing. Patient benefits from wait time, verbal models for fluent speech, semantic cues, and phonemic cues.     Patient will complete simple writing tasks at the word level with >90% acc to increase writing skills within FLE (to be achieved in 4-6 weeks).      Patient will follow multi-step verbal directions with 80% accuracy to facilitate increased auditory comprehension skills (to be achieved in 4-6 weeks).      Patient will facilitate functional reading skills by providing an appropriate word for sentence completion task (e.g., open the ___) with 80% accuracy to facilitate increased expressive language skills (to be achieved in 4-6 weeks). Plan:  -Patient was provided with home exercises/activities to target goals in plan of care at the end of today's session.  -Continue with current plan of care.

## 2023-07-03 ENCOUNTER — OFFICE VISIT (OUTPATIENT)
Dept: SPEECH THERAPY | Facility: CLINIC | Age: 43
End: 2023-07-03
Payer: COMMERCIAL

## 2023-07-03 ENCOUNTER — OFFICE VISIT (OUTPATIENT)
Dept: OCCUPATIONAL THERAPY | Facility: CLINIC | Age: 43
End: 2023-07-03
Payer: COMMERCIAL

## 2023-07-03 DIAGNOSIS — G81.91 RIGHT HEMIPARESIS (HCC): Primary | ICD-10-CM

## 2023-07-03 DIAGNOSIS — I63.512 ACUTE ISCHEMIC LEFT MCA STROKE (HCC): ICD-10-CM

## 2023-07-03 DIAGNOSIS — I69.320 APHASIA AS LATE EFFECT OF CEREBROVASCULAR ACCIDENT: Primary | ICD-10-CM

## 2023-07-03 PROCEDURE — 97112 NEUROMUSCULAR REEDUCATION: CPT

## 2023-07-03 PROCEDURE — 92507 TX SP LANG VOICE COMM INDIV: CPT | Performed by: SPEECH-LANGUAGE PATHOLOGIST

## 2023-07-03 PROCEDURE — 97110 THERAPEUTIC EXERCISES: CPT

## 2023-07-03 PROCEDURE — 97530 THERAPEUTIC ACTIVITIES: CPT

## 2023-07-03 NOTE — PROGRESS NOTES
Occupational Therapy Daily Note:    Today's date: 7/3/2023  Patient name: Martina Beard  : 1980  MRN: [id number]  Referring provider: Luciano Ni MD  Dx:   Encounter Diagnosis   Name Primary? • Right hemiparesis ([address]) Yes     Duration in weeks: 8  Plan of care beginning date: 23  Plan of care expiration date: 23    Subjective: 'I just start out okay (handwriting) and then I cant". Objective: Pt engaged in skilled OT treatment session with focus on UE NMR, visual perceptual training, visual scanning, UE strengthening, UE endurance, FMC/GMC, FMS/GMS, body awareness, sitting balance, core/trunk control and midline awareness to increase engagement, endurance, tolerance, and independence with daily ADL and IADL task    CPT Code Minutes                                           Task Details        Therapeutic Activity 15 Pt engaged in FM precision, coordination and fxnl endurance and automaticity for fxnl handwriting tasks. Provided stimulus on desktop pt req to perform circular strokes in sequence within boundary lines. OTR upgrading for inc pacing and automaticity demand by req pt to complete as many as possible over 1 min trial. Pt complete approx 8.5 on first trial, 11 on second trial, increased to 2 min trial for inc endurance demand, complete with 17 stimuli first trial and decreased precision noted 2' fatigue, complete final 2 min trial with approx 16             Neuro Re-Ed 15  Pt engaged in pencil drill activity with focus to coordination and automaticity for fxnl handwriting tasks. Provided prewriting shapes (diagonal and curved lines) pt req to trace each to 60 bpm metronome pace. Pt complete first trial without increased difficulty of straight/horizontal lines though dec pacing for curved/circular lines noted.  Pt complete second trial with inc diff for maintained precision with pacing, benefit from [address] and persist through completion with 2 additional trials following for mass practice. With final trial upgraded to NP copying shapes to metronome 60 bpm.    OTR providing additional worksheets for home use to promote mass practice and carryover             Therapeutic Exercise 15 For UB exercise benefit and to increase overall cardiovascular health, proximal strength, gross grasp and endurance, pt engaged in [address] for 5 min prograde/5 min retrograde in unsupported stance increasing resistance to L3.2 followed by RUE engagement completing additional 2 min each way, R2.5. Pt tolerated increase in resistance well. 30 Pt engaged in pinpoke picture activity with focus to fxnl tripod pinch, FMS, FME, FMP, FMC, and sustained FF for fxnl handwriting activities. Provided thumb tack, and stimulus dots on anterior vertical surface pt req to target each against foam resist while maintaining RUE FF. Pt complete approx 1/4 prior to RB, increasing brief RB with persist, able to persist through 30 mins of activity with self-initiated RBs. Manual          Self Care       Assessment: Tolerated treatment well. Pt demo good tolerance for fine motor endurance tasks with brief RB that were self initiated. Patient would benefit from continued skilled OT.     Plan: Continued skilled OT per POC    INTERVENTION COMMENTS:  Diagnosis: Right hemiparesis ([address]) [G81.91]  Precautions: NO STIM (seizure hx)   Insurance: Payor: Ede Morrissey / Plan: [id number] / Product Type: Blue Fee for Service /   7 of 12 visits, PN due 7/14/23  POC Expiration: 8/14/23 (8 wks)

## 2023-07-05 ENCOUNTER — APPOINTMENT (OUTPATIENT)
Dept: SPEECH THERAPY | Facility: CLINIC | Age: 43
End: 2023-07-05
Payer: COMMERCIAL

## 2023-07-05 ENCOUNTER — APPOINTMENT (OUTPATIENT)
Dept: OCCUPATIONAL THERAPY | Facility: CLINIC | Age: 43
End: 2023-07-05
Payer: COMMERCIAL

## 2023-07-07 ENCOUNTER — TELEPHONE (OUTPATIENT)
Dept: NEUROLOGY | Facility: CLINIC | Age: 43
End: 2023-07-07

## 2023-07-07 DIAGNOSIS — R56.9 SEIZURE (HCC): ICD-10-CM

## 2023-07-07 DIAGNOSIS — I69.398 DEPRESSION AS LATE EFFECT OF CEREBROVASCULAR ACCIDENT (CVA): ICD-10-CM

## 2023-07-07 DIAGNOSIS — F06.31 DEPRESSION AS LATE EFFECT OF CEREBROVASCULAR ACCIDENT (CVA): ICD-10-CM

## 2023-07-07 RX ORDER — LEVETIRACETAM 750 MG/1
750 TABLET ORAL EVERY 12 HOURS SCHEDULED
Qty: 60 TABLET | Refills: 0 | Status: SHIPPED | OUTPATIENT
Start: 2023-07-07

## 2023-07-07 RX ORDER — ESCITALOPRAM OXALATE 5 MG/1
5 TABLET ORAL DAILY
Qty: 30 TABLET | Refills: 0 | Status: SHIPPED | OUTPATIENT
Start: 2023-07-07

## 2023-07-07 NOTE — TELEPHONE ENCOUNTER
Post CVA Discharge Follow Up  Hospitalization: 5/15/23-5/27/23, 5/27/23-6/9/23 [facility]    Called patient. Since discharge, he denies experiencing any new or worsening stroke-like symptoms. Patient reports he continues to have the following symptoms: expressive aphasia. Patient reports how he has noticed improvements since discharge. Patient remains positive regarding his stroke recovery. Encouraged positive thinking. He is very thankful for the care he received at [facility]). He is ambulating independently as well as preforming his own ADLs. Patient manages his own medications, appointments, and affairs. Reviewed appointments - patient successfully followed up with PCP. Scheduled to see neurology on 8/17/23 (on the wait list) and cardiovascular surgery on 9/6/23. Reports he is working with OT/ST. Reviewed medications with him. He reports how he is waiting to hear back from his PCP regarding keppra and lexapro refills. Advised patient to follow up with his PCP if he does not hear back regarding the refills. Reports he is taking as prescribed with no medication side effects or signs of bleeding. During this call, we reviewed stroke type, symptoms, personal risk factors and management, medications, and resources. Patient confirmed how he was given the stroke education booklet. As for risk factors, patient relies on checking his BP at doctor office visits. Patient has significantly cut down on smoking. Encouraged patient to quit smoking altogether. Patient is determined to quit. Encouraged patient to follow a low salt / low cholesterol / diabetic friendly diet. Patient states how he is a vegan. Encouraged physical activity as tolerated in a safely manner. All of his questions were addressed. At the conclusion of the conversation, patient denies having any further questions or concerns.

## 2023-07-07 NOTE — TELEPHONE ENCOUNTER
Patients father calling,  He stated, he left a VM on RX but hasn't heard from the office.  Please call to [employer] on [employer]

## 2023-07-10 NOTE — PROGRESS NOTES
Daily Speech Treatment Note    Today's date: 2023   Patient’s name: Alejandra Irving  : 1980  MRN: [id number]  Safety measures: h/o CVA, seizure  Referring provider: Susan Prado MD    Encounter Diagnosis     ICD-10-CM    1. Aphasia as late effect of cerebrovascular accident  I69.320       2. L MCA stroke w/ LM1 partial occlusion  A42.195         Visit tracking:  -Referring provider: Epic  -Billing guidelines: CMS  -Visit #10/12  -Insurance: Jackson Buenrostro (12 visits PCY)  -RE due 2023    Subjective/Behavioral:  -Patient reported that a close friend of his passed away and that he was going to attend the  tomorrow with another friend. Objective/Assessment:    Short-term goals:  Patient will name opposite (e.g., hot and ___) with 80% accuracy to build expressive vocabulary for conversation (to be achieved in 4-6 weeks).      Patient will name a synonym (e.g., street or ___) with 80% accuracy to build expressive vocabulary for conversation (to be achieved in 4-6 weeks).      Patient will name appropriate category for three words (e.g., apple, banana, pear = fruits) with 80% accuracy to facilitate improved generative naming skills (to be achieved in 4-6 weeks).      Patient will complete confrontational naming tasks with 80% accuracy to build expressive vocabulary for conversation (to be achieved in 4-6 weeks). -Word deduction: Patient was provided with a written clue and asked to name what was being described. Patient read clue aloud without error in  opp (28% acc). Patient benefited from clinician pointing to single words to slow his pace, as well as min phonemic cues when patient was encountering difficulty. Patient named correct answer aloud in  opp (80% acc) independently, increasing to  opp (100% acc) with min semantic and phonemic cues.  Patient spelled the correct word on paper in 15/25 opp (60% acc) independently, increasing to  opp (88% acc) with min-mod phonemic cues.     Patient will name up to 5 features to describe a person/place/thing with 80% accuracy to facilitate improved utilization of circumlocution strategy (to be achieved in 4-6 weeks).      Patient will complete simple writing tasks at the word level with >90% acc to increase writing skills within FLE (to be achieved in 4-6 weeks). -See above activity.      Patient will follow multi-step verbal directions with 80% accuracy to facilitate increased auditory comprehension skills (to be achieved in 4-6 weeks).      Patient will facilitate functional reading skills by providing an appropriate word for sentence completion task (e.g., open the ___) with 80% accuracy to facilitate increased expressive language skills (to be achieved in 4-6 weeks). -See above activity. Plan:  -Patient was provided with home exercises/activities to target goals in plan of care at the end of today's session.  -Continue with current plan of care.

## 2023-07-10 NOTE — PROGRESS NOTES
Daily Speech Treatment Note    Today's date: 2023   Patient’s name: Daniele Miguel  : 1980  MRN: [id number]  Safety measures: h/o CVA, seizure  Referring provider: Mike Hendricks MD    Encounter Diagnosis     ICD-10-CM    1. Aphasia as late effect of cerebrovascular accident  I69.320       2. L MCA stroke w/ LM1 partial occlusion  X07.705         Visit tracking:  -Referring provider: Epic  -Billing guidelines: CMS  -Visit #  -Insurance: Henrique Zuniga (12 visits PCY)  -RE due 2023    Subjective/Behavioral:  -Patient reported to clinician of modules he needs to complete for job and clinician reports to bring it next session. Objective/Assessment:  -Reviewed patient's home exercises/activities completed since last appointment. Patient reports he will continue to do [employer] therapy exercises. Short-term goals:  Patient will name opposite (e.g., hot and ___) with 80% accuracy to build expressive vocabulary for conversation (to be achieved in 4-6 weeks).      Patient will name a synonym (e.g., street or ___) with 80% accuracy to build expressive vocabulary for conversation (to be achieved in 4-6 weeks). To target building expressive vocabulary- patient was given a word and had to identify a synonym associated with word. Task completed in 10/24 opp (41% acc) with min cueing, increasing to  opp (83% acc) from mod-max cueing. Patient benefited from carrier phrases, semantic cues, phonemic cues, written cues, and verbal models. Patient had difficulties spelling this session and required written cues.      Patient will name appropriate category for three words (e.g., apple, banana, pear = fruits) with 80% accuracy to facilitate improved generative naming skills (to be achieved in 4-6 weeks).      Patient will complete confrontational naming tasks with 80% accuracy to build expressive vocabulary for conversation (to be achieved in 4-6 weeks).      Patient will name up to 5 features to describe a person/place/thing with 80% accuracy to facilitate improved utilization of circumlocution strategy (to be achieved in 4-6 weeks).      Patient will complete simple writing tasks at the word level with >90% acc to increase writing skills within FLE (to be achieved in 4-6 weeks).      Patient will follow multi-step verbal directions with 80% accuracy to facilitate increased auditory comprehension skills (to be achieved in 4-6 weeks).      Patient will facilitate functional reading skills by providing an appropriate word for sentence completion task (e.g., open the ___) with 80% accuracy to facilitate increased expressive language skills (to be achieved in 4-6 weeks). Plan:  -Patient was provided with home exercises/activities to target goals in plan of care at the end of today's session.  -Continue with current plan of care.

## 2023-07-12 ENCOUNTER — OFFICE VISIT (OUTPATIENT)
Dept: SPEECH THERAPY | Facility: CLINIC | Age: 43
End: 2023-07-12
Payer: COMMERCIAL

## 2023-07-12 ENCOUNTER — OFFICE VISIT (OUTPATIENT)
Dept: OCCUPATIONAL THERAPY | Facility: CLINIC | Age: 43
End: 2023-07-12
Payer: COMMERCIAL

## 2023-07-12 DIAGNOSIS — I63.512 ACUTE ISCHEMIC LEFT MCA STROKE (HCC): ICD-10-CM

## 2023-07-12 DIAGNOSIS — I69.320 APHASIA AS LATE EFFECT OF CEREBROVASCULAR ACCIDENT: Primary | ICD-10-CM

## 2023-07-12 DIAGNOSIS — Z78.9 IMPAIRED MOBILITY AND ACTIVITIES OF DAILY LIVING: ICD-10-CM

## 2023-07-12 DIAGNOSIS — R44.9 SENSORY DEFICIT, RIGHT: Primary | ICD-10-CM

## 2023-07-12 DIAGNOSIS — Z74.09 IMPAIRED MOBILITY AND ACTIVITIES OF DAILY LIVING: ICD-10-CM

## 2023-07-12 PROCEDURE — 97150 GROUP THERAPEUTIC PROCEDURES: CPT

## 2023-07-12 PROCEDURE — 97110 THERAPEUTIC EXERCISES: CPT

## 2023-07-12 PROCEDURE — 92507 TX SP LANG VOICE COMM INDIV: CPT

## 2023-07-12 PROCEDURE — 97112 NEUROMUSCULAR REEDUCATION: CPT

## 2023-07-12 NOTE — PROGRESS NOTES
Occupational Therapy Daily Note:    Today's date: 2023  Patient name: Francis Mendoza  : 1980  MRN: [id number]  Referring provider: Florida Reed MD  Dx:   Encounter Diagnoses   Name Primary? • Sensory deficit, right Yes   • Impaired mobility and activities of daily living        Duration in weeks: 8   Plan of care beginning date: 23   Plan of care expiration date: 23    Subjective: "I tried to bench press at 25 and 50# but I didn't realize how weak this arm was". Objective: Pt engaged in skilled OT treatment session with focus on UE NMR, visual perceptual training, visual scanning, UE strengthening, UE endurance, FMC/GMC, FMS/GMS, body awareness, sitting balance, core/trunk control and midline awareness to increase engagement, endurance, tolerance, and independence with daily ADL and IADL task. CPT Code Minutes                                           Task Details        Therapeutic Activity               Neuro Re-Ed 30 For benefit of neuro re-ed promoting proximal stability/strength, balance and coordination pt engaged in WB into right shoulder in modified plank retrieving small shapes positioned midline of pt placing to target shape tiles positioned about 1.5 ft center of pt. Pt retrieved 5 shapes at a time placing to target completing 3 trials with 2 rest breaks due to fatigue. Seated pt engaged in line tangles focusing target accuracy, [place], mixed prehension patterns and proximal stability. Donning 2# CW w/activity postioned on slant board facilitating fxl fwd reaching pt completed line trace using standard color pencil. Therapeutic Exercise 45 For UB exercise benefit and to increase overall cardiovascular health, proximal strength, gross grasp and endurance, pt engaged in [address] for 4 min prograde/4 min retrograde in unsupported stance increasing resistance to L 4.0 followed by RUE engagement completing additional 2 min each way, R4.0.  Pt tolerated increase in resistance unilaterally and bilaterally. Pt engaged in therex utilizing 10# DB in stance focusing on proximal strength/endurance to improved quality of movement to increase AROM/joint flexibility needed for fxl reach activities to increase indep engaging in home mngt activities. Pt completed MMP w/shoulder flex, PNF "Y" and "T" pattern, bicep curls and tricep dips, 2x15 achieving near full range w/o report of pain sustaining G postural control improving AROM. 0. Pt utilized full length mirror as visual aid to sustain good form and quality of movement. Manual          Self Care       7/12:  [id number]-AL  [id number]:1    Assessment: Tolerated treatment well. Pt initiated therex using 5# DB demo-G symmetry and form, graded up to 10#, tolerating increase in weight well. Pt able to stabilize pencil using tripod grasp, however, F accuracy to remain on line w/decreased pressure applied during line trace. Patient would benefit from continued skilled OT.     Plan: Continued skilled OT per POC    INTERVENTION COMMENTS:  Diagnosis: Right hemiparesis ([address]) [G81.91]  Precautions: NO STIM (seizure hx)   Insurance: Payor: Ina Copeland / Plan: [id number] / Product Type: Blue Fee for Service /   8 of 12 visits, PN due 7/14/23  POC Expiration: 8/14/23 (8 wks)

## 2023-07-14 ENCOUNTER — OFFICE VISIT (OUTPATIENT)
Dept: OCCUPATIONAL THERAPY | Facility: CLINIC | Age: 43
End: 2023-07-14
Payer: COMMERCIAL

## 2023-07-14 ENCOUNTER — EVALUATION (OUTPATIENT)
Dept: SPEECH THERAPY | Facility: CLINIC | Age: 43
End: 2023-07-14
Payer: COMMERCIAL

## 2023-07-14 DIAGNOSIS — I69.320 APHASIA AS LATE EFFECT OF CEREBROVASCULAR ACCIDENT: Primary | ICD-10-CM

## 2023-07-14 DIAGNOSIS — I63.512 ACUTE ISCHEMIC LEFT MCA STROKE (HCC): Primary | ICD-10-CM

## 2023-07-14 DIAGNOSIS — I63.512 ACUTE ISCHEMIC LEFT MCA STROKE (HCC): ICD-10-CM

## 2023-07-14 DIAGNOSIS — I63.512 CEREBROVASCULAR ACCIDENT (CVA) DUE TO OCCLUSION OF LEFT MIDDLE CEREBRAL ARTERY (HCC): ICD-10-CM

## 2023-07-14 PROCEDURE — 97112 NEUROMUSCULAR REEDUCATION: CPT

## 2023-07-14 PROCEDURE — 96105 ASSESSMENT OF APHASIA: CPT

## 2023-07-14 PROCEDURE — 97110 THERAPEUTIC EXERCISES: CPT

## 2023-07-14 NOTE — PROGRESS NOTES
Speech-Language Pathology Re-Evaluation    Today's date: 2023  Patient’s name: Miguel Mahmood  : 1980  MRN: [id number]  Safety measures: hx of CVA, seizure  Referring provider: Cecilia Welch MD    Encounter Diagnosis     ICD-10-CM    1. Aphasia as late effect of cerebrovascular accident  I69.320       2. L MCA stroke w/ LM1 partial occlusion  O52.047         Visit tracking:  -Referring provider: Epic  -Billing guidelines: CMS  -Visit #  -Insurance: Morejon Perez  -RE due 23    Subjective comments: Patient reports he is frustrated not knowing if and when he can go back to work. Patient's goal(s): to be more consistent with his talking    Assessments    The Western Aphasia Battery-Revised (WAB-R) is designed to evaluate a patient's language function following CVA, dementia, or other acquired neurological disorder. It measures a patient’s linguistic skills, such as speech content, fluency, auditory comprehension, repetition, naming, reading, and writing. The WAB-R also measures a patient’s nonlinguistic skills, including drawing, calculation, block design, and apraxia. The purpose of this standardized assessment is to (1) determine the presence, severity, and type of aphasia; (2) measure the patient's level of performance to provide a baseline for detecting change over time; (3) provide a comprehensive assessment of the patient's language assets and deficits in order to guide treatment and management; and (4) infer the location and etiology of the lesion causing aphasia. The following results were obtained during the administration of the assessment:     PART 1: Score: IE: Status:   -Spontaneous Speech:  IMPROVEMENT   -Auditory Verbal Comprehension: 6.6/10 6.3/10 IMPROVEMENT   -Repetition: 6.2/10 5.2/10 IMPROVEMENT   -Naming & Word Findin.3/10 8/10 IMPROVEMENT     *Pt scores correlated most consistently with Wernicke's Aphasia.      Due to time constraints, only portions of the standardized assessment were administered on this date of service. ("DNT" = did not test)    PART 2: Score: IE: Status:   -Reading Score: 14.4/20 12/20 IMPROVEMENT   -Writing: DNT/20 11.6/20 DNT   -Apraxia: DNT/10 DNT DNT   -Constructional, Visuospatial, & Calculation: DNT/10 DNT DNT        Score: IE: Status:   *Aphasia Quotient (AQ): 70.2/100 65 IMPROVEMENT   *Language Quotient (LQ): DNT/100 56.1 DNT   *Cortical Quotient (CQ): DNT/100 DNT DNT     “IE” indicates the scores from the initial evaluation (6/14/23). Goals    Patient will name opposite (e.g., hot and ___) with 80% accuracy to build expressive vocabulary for conversation (to be achieved in 4-6 weeks). - MET     Patient will name a synonym (e.g., street or ___) with 80% accuracy to build expressive vocabulary for conversation (to be achieved in 4-6 weeks). - PARTIALLY MET     Patient will name appropriate category for three words (e.g., apple, banana, pear = fruits) with 80% accuracy to facilitate improved generative naming skills (to be achieved in 4-6 weeks). - PARTIALLY MET      Patient will complete confrontational naming tasks with 80% accuracy to build expressive vocabulary for conversation (to be achieved in 4-6 weeks). - PARTIALLY MET     Patient will name up to 5 features to describe a person/place/thing with 80% accuracy to facilitate improved utilization of circumlocution strategy (to be achieved in 4-6 weeks). - PARTIALLY MET     Patient will complete simple writing tasks at the word level with >90% acc to increase writing skills within FLE (to be achieved in 4-6 weeks). - PARTIALLY MET     Patient will follow multi-step verbal directions with 80% accuracy to facilitate increased auditory comprehension skills (to be achieved in 4-6 weeks).  - PARTIALLY MET     Patient will facilitate functional reading skills by providing an appropriate word for sentence completion task (e.g., open the ___) with 80% accuracy to facilitate increased expressive language skills (to be achieved in 4-6 weeks). - PARTIALLY MET     Long-term goals:  Patient will demonstrate improved expressive and receptive language skills during structured and unstructured tasks (to be achieved by discharge). - PARTIALLY MET     Patient will improve ability to facilitate communication to meet needs including use of compensatory strategies to promote meaningful interactions for improved quality of life and maximize level of independence (to be achieved by discharge). - PARTIALLY MET    Impressions/Recommendations    Impressions:   Patient presents with moderate-severe aphasia s/p CVA (expressive > receptive language deficits). Verbal expression deficits (fluctuating between moderate-severe depending upon level of complexity) can be c/b difficulty with word retrieval, production of grammatical sentences, and production of short descriptions of places, people, and actions. Halting, often telegraphic, speech is present with some grammatical organization. Marked word finding difficulty is noted with breakdowns and frustration by patient. Verbal repetition is impaired, groping and moderate apraxia is present during repetition and spontaneous utterances. Auditory comprehension deficits (moderate) can be c/b difficulty with following complex and multi-step commands and following simple conversations depending upon topic. Reading comprehension deficits (moderate) can be c/b difficulty with sentences. Since initial evaluation, patient has improved in areas of spontaneous speech, auditory verbal comprehension, repetition, naming, word-finding, and reading, although deficits remain moderate to severe depending on complexity.      Patient would continue to benefit from outpatient skilled Speech Therapy services to support the highest level of independence with his current speech-language skills, promote positive communication interactions with both familiar & unfamiliar listeners, further education/training compensatory strategies, increase communication of wants/needs, follow directions within functional activities, participate in meaningful activities, allow for increased socialization, promote safety, facilitate overall improved quality of life, reduce caregiver burden, instruction on HEP and complete caregiver education/training.    Recommendations:  -Patient would benefit from outpatient skilled Speech Therapy services: Speech-language therapy    -Frequency: 1-2x weekly  -Duration: 4-6 weeks    -Intervention certification from: 0/44/6661  -Intervention certification to: 4/24/65    -Intervention comments:   60 min aphasia testing, 60 min test interpretation and POC development

## 2023-07-14 NOTE — PROGRESS NOTES
Occupational Therapy Daily Note:    Today's date: 2023  Patient name: Alejandra Irving  : 1980  MRN: [id number]  Referring provider: Susan Prado MD  Dx:   Encounter Diagnoses   Name Primary? • L MCA stroke w/ LM1 partial occlusion Yes   • Cerebrovascular accident (CVA) due to occlusion of left middle cerebral artery ([address])       Duration in weeks: 8   Plan of care beginning date: 23   Plan of care expiration date: 23    Subjective: "I ran 6 miles after therapy last time". Objective: Pt engaged in skilled OT treatment session with focus on UE NMR, visual perceptual training, visual scanning, UE strengthening, UE endurance, FMC/GMC, FMS/GMS, body awareness, sitting balance, core/trunk control and midline awareness to increase engagement, endurance, tolerance, and independence with daily ADL and IADL task. CPT Code Minutes                                           Task Details        Therapeutic Activity               Neuro Re-Ed 45 For benefit of sensory re-ed promoting nerve regeneration pt engaged in sensory bin task w/vision occluded. Pt retrieved 11 items identifying 6 items by touch, describe 2 items and unable to identify ot describe 3 items. For benefit of neuro re-ed promoting proximal stability/strength, balance and coordination pt engaged in WB into right shoulder in modified plank retrieving small clips and fastening to target board w/activity positioned midline of pt at about 1.5 ft center of pt. Pt competed 2 templates w/multiple rest breaks due to proximal fatigue. Pt engaged in handwriting task focusing on improving size of letters increasing legibility and fluidity of movement. Pt trialed weighted pen increasing proprioceptive input demo-difficulty to remain on line during writing exercise. Activity graded up w/use of standard pen adding pen gripper w/significant improvement in letter size, fluidity improving legibility.             Therapeutic Exercise 40 For UB exercise benefit and to increase overall cardiovascular health, proximal strength, gross grasp and endurance, pt engaged in [address] for 4 min prograde/4 min retrograde in unsupported stance increasing resistance to L 4.0 followed by RUE engagement completing additional 2 min each way, R4.0. Pt engaged in therex utilizing 10# DB in stance focusing on proximal strength/endurance to improved quality of movement to increase AROM/joint flexibility needed for fxl reach activities to increase indep engaging in home mngt activities. Pt completed MMP w/shoulder flex, PNF "Y" and "T" pattern, bicep curls and tricep dips, 2x15 achieving near full range w/o report of pain sustaining G postural control improving AROM. 0. Pt utilized full length mirror as visual aid to sustain good form and quality of movement. Manual          Self Care       Assessment: Tolerated treatment well. Pt continues to demo improvement in endurance and strength as evidenced by exhibiting G technique during therapeutic exercises this session. Pt provided with pen gripper and hand writing worksheets for carryover at home. Patient would benefit from continued skilled OT.     Plan: Continued skilled OT per POC    INTERVENTION COMMENTS:  Diagnosis: Right hemiparesis ([address]) [G81.91]  Precautions: NO STIM (seizure hx)   Insurance: Payor: Luisa Kim / Plan: [id number] / Product Type: Blue Fee for Service /   9 of 12 visits, PN due 7/14/23  POC Expiration: 8/14/23 (8 wks)

## 2023-07-17 ENCOUNTER — OFFICE VISIT (OUTPATIENT)
Dept: SPEECH THERAPY | Facility: CLINIC | Age: 43
End: 2023-07-17
Payer: COMMERCIAL

## 2023-07-17 ENCOUNTER — EVALUATION (OUTPATIENT)
Dept: OCCUPATIONAL THERAPY | Facility: CLINIC | Age: 43
End: 2023-07-17
Payer: COMMERCIAL

## 2023-07-17 DIAGNOSIS — I63.512 ACUTE ISCHEMIC LEFT MCA STROKE (HCC): Primary | ICD-10-CM

## 2023-07-17 DIAGNOSIS — I69.320 APHASIA AS LATE EFFECT OF CEREBROVASCULAR ACCIDENT: Primary | ICD-10-CM

## 2023-07-17 DIAGNOSIS — I63.512 ACUTE ISCHEMIC LEFT MCA STROKE (HCC): ICD-10-CM

## 2023-07-17 PROCEDURE — 97530 THERAPEUTIC ACTIVITIES: CPT

## 2023-07-17 PROCEDURE — 97110 THERAPEUTIC EXERCISES: CPT

## 2023-07-17 PROCEDURE — 92507 TX SP LANG VOICE COMM INDIV: CPT

## 2023-07-17 NOTE — PROGRESS NOTES
Daily Speech Treatment Note    Today's date: 2023   Patient’s name: Miguel Mahmood  : 1980  MRN: [id number]  Safety measures: h/o CVA, seizure  Referring provider: Cecilia Welch MD    Encounter Diagnosis     ICD-10-CM    1. Aphasia as late effect of cerebrovascular accident  I69.320       2. L MCA stroke w/ LM1 partial occlusion  X71.621         Visit tracking:  -Referring provider: Epic  -Billing guidelines: CMS  -Visit #[id number]  -Insurance: Darleen Todd (12 visits PCY)  -RE due 2023    Subjective/Behavioral:  -Patient reports upcoming neurology appointment on . Clinician recommended seeking a  during this appointment.  -Patient next visit is going to exhaust insurance visits PCY. Clinician gave patient a handout of the [facility] student clinic and recommended to call to seek appointments. Objective/Assessment:  -Reviewed patient's home exercises/activities completed since last appointment. Task completed in  (70% acc) independently, increasing to 100% acc with min-mod cueing in session. Patient benefited from semantic cues, carrier phrases, and phonemic cues    Short-term goals:  Patient will name opposite (e.g., hot and ___) with 80% accuracy to build expressive vocabulary for conversation (to be achieved in 4-6 weeks).      To target antonyms- patient was given a word and had to list the opposite. Task completed in  opp (70% acc) independently, increasing 100% acc with min cueing. Patient benefited from semantic cues. Patient completed task in a timely manner. Patient will name a synonym (e.g., street or ___) with 80% accuracy to build expressive vocabulary for conversation (to be achieved in 4-6 weeks).      To target word associations- patient was given a word and had to list the word association (ex. Cheese and crackers). Task completed in  opp (70% acc) independently, increasing  opp (95% acc) with min cueing.  Patient benefited from semantic cues, phonemic cues, and carrier phrases. Patient occassionally confused task for the previous antonyms task. Patient will name appropriate category for three words (e.g., apple, banana, pear = fruits) with 80% accuracy to facilitate improved generative naming skills (to be achieved in 4-6 weeks).      Patient will complete confrontational naming tasks with 80% accuracy to build expressive vocabulary for conversation (to be achieved in 4-6 weeks).      Patient will name up to 5 features to describe a person/place/thing with 80% accuracy to facilitate improved utilization of circumlocution strategy (to be achieved in 4-6 weeks).      Patient will complete simple writing tasks at the word level with >90% acc to increase writing skills within FLE (to be achieved in 4-6 weeks).      Patient will follow multi-step verbal directions with 80% accuracy to facilitate increased auditory comprehension skills (to be achieved in 4-6 weeks).      Patient will facilitate functional reading skills by providing an appropriate word for sentence completion task (e.g., open the ___) with 80% accuracy to facilitate increased expressive language skills (to be achieved in 4-6 weeks). Plan:  -Patient was provided with home exercises/activities to target goals in plan of care at the end of today's session.  -Continue with current plan of care.

## 2023-07-17 NOTE — PROGRESS NOTES
OCCUPATIONAL THERAPY PROGRESS NOTE:    6/14/2023  Burney Blizzard  1980  [id number]  Melanie Dennis MD   Diagnosis ICD-10-CM Associated Orders   1. L MCA stroke w/ LM1 partial occlusion  I63.512           There Act: 54     Assessment/Plan    Skilled Analysis:  Burney Blizzard is a 40 y.o. male referred to Occupational Therapy s/p Acute ischemic left MCA stroke ([address]) [I63.512]. Pt experiences aphasia, limiting speech production and fxnl communication at times, though he states that since starting OP OT services,  he feels things are improving, specifically handwriting and meal preparation, and notes that he has more safety awareness for his sensation deficits, though endurance and sensation are still limiting. Pt participated in skilled OT progress report and following formalized testing as well as clinical observation,  Pt demonstrated improvements with RUE strength, dexterity, and coordination testing as well as self reports improvements in independent completion of  fxnl ADL and IADL activities. Pt continues to present with the following areas of deficit:  Significantly altered sensation in RUE, weakness proximally and distally, decreased proprioceptive and kinesthetic awareness with and with vision occluded, RUE endurance, decreased automaticity of grasp and refined grasping patterns due to sensation deficits, and overall decreased reaction time. Pt demonstrates increased capability and safety awareness for carryover of HEP in the home setting. Pt to be discharged from OP OT services this date. OTR providing pt education on recovery process and instructing pt to contact office if additional concerns arise. Burney Blizzard is in agreement with POC.         Duration in weeks: 8  Plan of care beginning date: 6/14/23  Plan of care expiration date: 8/14/23     D/C all goals, pt to be discharged 7/17    Motor Short Term Goals (4 weeks)  Strength/Endurance  · Pt will increase R UE proximal shoulder strength to 4+/5  through the use of strengthening exercises and HEP for improved ability to return to work   · Pt will demo with G tolerance to in stance/high intensity exercise x 30 minutes with minimal rest breaks required for improved function of RUE   · Pt will demo with G carryover of HEP (continuous exercises- CIMT, resistive bands, theraputty, [facility] handouts) to improve functional progression towards goals in POC and for improved functional use of RUE     FMC/GMC  · Pt will increase RUE rate of manipulation by 15% for all FM tests for improved functional performance with salient tasks   · Pt will increase RUE Reaction time to < 1.0 seconds with hand to target timed trials for improved reaction time and automaticity of coordination for improved functional performance with ADLs/IADLs and salient tasks  · Pt will demo improved motor learning of constructional and new motor actions for improved b/l coordination and motor planning evident by PSFS score of 8/10     Functional Performance  ·  Pt will increase proprioception and kinesthetic awareness of RUE hand to target for improved functional reach vision occluded demonstrating less ataxia  x 80% accuracy     Modalities  · Pt will tolerate IASTM/Vibration/Mirror Therapy for improved motor and sensory performance for overall improved strength and sensation in RUE      D/C all goals, pt to be discharged 7/17    Motor Long Term Goals (8 weeks)  Strength/Endurance  · Pt will increase R UE proximal shoulder strength to 5/5  through the use of strengthening exercises and HEP for improved ability to return to work   · Pt will demo with G tolerance to in stance/high intensity exercise x 50 minutes with minimal rest breaks required for improved function of RUE   · Pt will increase R gross grasp to 80+ lbs demo improved  and grasping patterns for salient/leisure tasks     FMC/GMC  · Pt will increase RUE rate of manipulation by an additional 10% (from PN) for all FM tests for improved functional performance with salient tasks   · Pt will increase RUE Reaction time to < .6 seconds with hand to target timed trials for improved reaction time and automaticity of coordination for improved functional performance with ADLs/IADLs and salient tasks  · Pt will demo improved motor learning of constructional and new motor actions for improved b/l coordination and motor planning evident by PSFS score of 9/10     Functional Performance  ·  Pt will increase proprioception and kinesthetic awareness of RUE hand to target for improved functional reach vision occluded demonstrating less ataxia  x 95% accuracy     Modalities  · Pt will tolerate IASTM/Vibration/Mirror Therapy for improved motor and sensory performance for overall improved strength and sensation in RUE        Subjective    SUBJECTIVE: "I can only imagine how it'll be in a month (improvement wise)"    PATIENT GOAL: "Just trying to get myself back!"    Patient-Specific Functional Scale   Task is scored 0 (unable to perform activity) to 10 (ability to perform activity independently)    Activity Date:  6/14/23 Date:   1. Pouring heavy objects (coffee)  8-9 (improved)    2. Handwriting   8 (improved)    3. Preparing meals/planning ahead for work (pt states limiting at times without transportation for [employer], though is making an effort to be more independent with these tasks / not having father help)  8 (improved)    4. HISTORY OF PRESENT ILLNESS:     Pt is a 40 y.o. male who was referred to Occupational Therapy s/p  Acute ischemic left MCA stroke ([address]) [I63.512]. Miguel Orozco presented to [facility] 5/15/23 with R sided weakness, aphasia. Reports he had a fall day of stroke. MRI revealed a Large L MCA ischemic stroke. He did not receive TNK due to being outside window. He underwent thrombectomy of left M1 occlusion by Dr. Seema Cat.   On 5/16, the patient had seizure like activity with eye deviation/clinical worsening and was given Ativan/Keppra. Reports not having any new seizures. Had EEG performed and was negative for additional seizures. Per neurology, etiology of L MCA unclear. From Cardiology standpoint, pt was found to have a large PFO, recommended for closure in 4-6 weeks outpatient. Pt then admitted to [facility] on 23 for about 2 weeks. Pt was able to be advanced to supervision level of assist with mobility and self care. He has moderate deficits in problem solving and memory from cognitive standpoint. Chantell Dowd resides in a one level home with his 2 dogs and father. Pt is completing all ADLs independently. Pt is managing medications. Pt is caring/feeding for his 2 pugs. His dad has been cooking and making most meals. Is not driving, was told to wait 3 or 6 months. Has been running about 6 miles. Worked at [employer] as a . Has to prepare for 5 different lunch periods every day. Likes: running     Reports difficulties with speech/expressive aphasia, slight RUE weakness, decreased sensation. PMH: History reviewed. No pertinent past medical history.         Pain Levels   Restin/10 in R hand     With Activity:  0/10 in R hand - noting some soreness following exercises at times but not painful    Objective    Impairment Observations:    KRYSTAL RODGERS Comments           UPPER EXTREMITY FUNCTION   Impaired Intact Dominant Hand: RIGHT     /PINCH STRENGTH             Dynamometer    - Gross Grasp 60 lbs (inc 3#) 90 lbs abnormal   Pinch Meter     - Pincer 10 lbs 12 lbs abnormal inc Bilaterally 2#    - Tripod 14 lbs  11 lbs abnormal R improved 4# L decreased    - Lateral 15.5 lbs  17 lbs abnormal R improved 2.5 #, L improved 3#     AROM (Seated)         ALL motions within normal limits   (not re assessed this date)    Scapula   - Retraction/Protraction  - Elevation/Depression  - Upward/Downward rotation   WNL   WNL    Shoulder FF WNL WNL     Shoulder Ext      Shoulder internal rotation       Shoulder external rotation       Shoulder Abd      Shoulder Add      Horizontal Abd      Horizontal Add      Elbow Flex      Elbow Ext      Pronation      Supination      Wrist Flex      Wrist Ext      Digit Flex      Digit Ex      Composite Grasp      Hook Grasp      Subluxation  No sublux       MMT       all improved, gross grasp R 3+/5, L 5/5      Shoulder FF 5/5 5/5    Shoulder Ext 5/5 5/5    Shoulder Abduction 4+/5 5/5    Shoulder Adduction 4+/5 5/5    Elbow Flex 5/5 5/5    Elbow Ext 5/5 5/5    Wrist Flex 5/5 5/5    Wrist Ext 5/5 5/5      SENSATION      Monofilament Testing  Normal: 2.83 mm    Diminished light touch: 3.61 mm    Diminished protective sensation: 4.31 mm    Loss of protective sensation: 4.56    Complete loss of sensation / deep pressure: 6.65 2.83 mm ventral L elbow    3.61 mm dorsal forearm and distal forearm/wrist     4.31 mm L digits (ventral)    4.56 mm dorsal palm/digits    2.83 mm with slow to respond IMPAIRED    Not re assessed this date   Sharp / Dull  Impaired Intact Not re assessed this date   Proprioception Impaired Intact Not re assessed this date   Hot/Cold Temp Impaired Intact remains   Stereognosis  Impaired Intact Improved able to ID an paper clip, bead, and coin     COORDINATION      Opposition Functional  Intact Slow movement D4 and D5     Finger to Nose Impaired Intact Slight ataxia, impaired with vision occluded      Rapid Alternating Movement Impaired  Intact  Dysdiadochokinesia   9 Hole Peg Test 28 seconds (improved) 22 seconds (remains) abnormal   R: ataxia, dec proprioception   Fxnl Dexterity Test 28 seconds 21seconds (declined) abnormal   Minimal compensation    Rk Hand Function Test         - Handwriting Decreased control of handwriting/signing name         - Card Turning         - Small Item p/u         - Simulated Feeding         - Stacking Checkers         - Moving Light Objects         - Moving Heavy Objects        TONE: MODIFIED SHANNON SCALE   improved   No increase in muscle tone (0) 0     Slight Increase in muscle tone with catch and release or min resist at end range (1)      Slight Increase in muscle tone with catch and release, followed by min resistance through remainder of range (1+)      Increased muscle tone through full range, able to be moved easily (2)      Considerable increase in tone, difficult to move (3)      Rigid in Flexion/Extension (4)                Pt engaged in block puzzle activity with focus to in hand manipulation and stereognosis. Provided shape outlines on tabletop, pt req to identify corresponding piece with vision occluded with RUE. Pt complete with min difficulty noted for inc complex shapes    Ther Ex 10  Pt tolerated 4 min x 2 (prograde/retrograde motions at 4.5 resist)in stance on UBE  followed by 2min prograde and 2 min retrograde unilaterally (RUE) at 2,5 resist with focus on increasing proximal UE strength, endurance, act tolerance and ROM to inc indep and safety with ADL/IADL fxn and fxnl reaching tasks.        INTERVENTION COMMENTS:  Diagnosis: Acute ischemic left MCA stroke ([facility]) [I63.512]  Precautions: NO STIM (seizure hx)   Insurance: Payor: [address] [place] [address] / Plan: [id number] / Product Type: Blue Fee for Service /   10 of 12 visits, PN due 7/14/23  POC Expiration: 8/14/23 (8 wks)

## 2023-07-17 NOTE — PROGRESS NOTES
Speech-Language Pathology [facility]    Today's date: 2023  Patient’s name: Rashaad Norwood  : 1980  MRN: [id number]  Safety measures: hx of CVA, seizure  Referring provider: Meek Jurado MD    Encounter Diagnosis     ICD-10-CM    1. Aphasia as late effect of cerebrovascular accident  I69.320       2. L MCA stroke w/ LM1 partial occlusion  A56.303         Visit tracking:  -Referring provider: Epic  -Billing guidelines: CMS  -Visit #  -Insurance: [employer]    Subjective comments: Patient reports he is frustrated not knowing if and when he can go back to work. Patient's goal(s): to be more consistent with his talking    Assessments    No formal testing was completed on this date of service. The following serves as a diagnostic treatment session and progress update:    Treatment    -Tor[place] categorization: Patient was provided with 3 words belonging to the same category. Patient named category label in  opp (36% acc) independently, increasing to  opp (48% acc) with min-mod semantic and carrier phrase cues, increasing to  opp (100% acc) with additional min-mod phonemic cues. -Reading comprehension: Patient was presented with a series of instructions and asked to code what to do with a presented picture. Task completed in /10 opp (50% acc) independently, increasing to /10 opp (80% acc) with max verbal/visual cues (simple 2-step activity with 4 components). Patient had difficulty with this task and benefited from cues to read directions aloud.    -Word finding/spelling: Patient presented with incomplete words and asked to fill-in-the-blanks with 2 letters to form words. Completion of missing end 2 letters task completed in / opp (70% acc) independently. Patient achieved 95% acc with mod semantic and phonemic cues. -Clinician presented patient with a variety of worksheets to target his speech-language skills as part of his HEP.     Goals    Short-term goals:   Patient will name opposite (e.g., hot and ___) with 80% accuracy to build expressive vocabulary for conversation (to be achieved in 4-6 weeks). - MET     Patient will name a synonym (e.g., street or ___) with 80% accuracy to build expressive vocabulary for conversation (to be achieved in 4-6 weeks). - PARTIALLY MET/DISCHARGE 07/26/2023     Patient will name appropriate category for three words (e.g., apple, banana, pear = fruits) with 80% accuracy to facilitate improved generative naming skills (to be achieved in 4-6 weeks). - PARTIALLY MET/DISCHARGE 07/26/2023     Patient will complete confrontational naming tasks with 80% accuracy to build expressive vocabulary for conversation (to be achieved in 4-6 weeks). - PARTIALLY MET/DISCHARGE 07/26/2023     Patient will name up to 5 features to describe a person/place/thing with 80% accuracy to facilitate improved utilization of circumlocution strategy (to be achieved in 4-6 weeks). - PARTIALLY MET/DISCHARGE 07/26/2023     Patient will complete simple writing tasks at the word level with >90% acc to increase writing skills within FLE (to be achieved in 4-6 weeks). - PARTIALLY MET/DISCHARGE 07/26/2023     Patient will follow multi-step verbal directions with 80% accuracy to facilitate increased auditory comprehension skills (to be achieved in 4-6 weeks). - PARTIALLY MET/DISCHARGE 07/26/2023     Patient will facilitate functional reading skills by providing an appropriate word for sentence completion task (e.g., open the ___) with 80% accuracy to facilitate increased expressive language skills (to be achieved in 4-6 weeks). - PARTIALLY MET/DISCHARGE 07/26/2023     Long-term goals:  Patient will demonstrate improved expressive and receptive language skills during structured and unstructured tasks (to be achieved by discharge).  - PARTIALLY MET/DISCHARGE 07/26/2023     Patient will improve ability to facilitate communication to meet needs including use of compensatory strategies to promote meaningful interactions for improved quality of life and maximize level of independence (to be achieved by discharge). - PARTIALLY MET/DISCHARGE 07/26/2023    Impressions/Recommendations    Impressions:   -Due to insurance limitations (12 visits per calendar year), patient to be discharged following today's appointment. Patient has reached his max visit limit (12/12) for this calendar year and does not wish to pay out of pocket for further OP ST services. It is highly recommended that he continue to receive speech-language therapy. Clinician presented patient with education that pro-princess ST services are provided through [place] and [employer]. Supplemental handouts were presented to patient. Patient would continue to benefit from outpatient skilled Speech Therapy services to support the highest level of independence with his current speech-language skills, promote positive communication interactions with both familiar & unfamiliar listeners, receive education/training compensatory strategies, increase communication of wants/needs, follow directions within functional activities, participate in meaningful activities, allow for increased socialization, promote safety, facilitate overall improved quality of life, reduce caregiver burden, instruction on HEP, and complete caregiver education/training.     -Patient continues to present with moderate-severe aphasia s/p CVA (expressive > receptive language deficits). Verbal expression deficits (fluctuating between moderate-severe depending upon level of complexity) can be c/b difficulty with word retrieval, production of grammatical sentences, and production of short descriptions of places, people, and actions. Halting, often telegraphic, speech is present with some grammatical organization. Marked word finding difficulty is noted with breakdowns and frustration by patient.  Verbal repetition is impaired, groping and moderate apraxia is present during repetition and spontaneous utterances. Auditory comprehension deficits (moderate) can be c/b difficulty with following complex and multi-step commands and following simple conversations depending upon topic. Reading comprehension deficits (moderate) can be c/b difficulty with sentences. Since initial evaluation, patient has improved in areas of spontaneous speech, auditory verbal comprehension, repetition, naming, word-finding, and reading, although deficits remain moderate to severe depending on complexity. Recommendations:  -Patient to be discharged from outpatient skilled Speech Therapy services: Patient has reached his max visit limit (12/12) for this calendar year and does not wish to pay out of pocket for further OP ST services. It is highly recommended that he continue to receive speech-language therapy. Clinician presented patient with education that [facility] services are provided through [facility] and [employer].  Supplemental handouts were presented to patient.    -Frequency: N/A (discharge)  -Duration: N/A (discharge)

## 2023-07-19 ENCOUNTER — APPOINTMENT (OUTPATIENT)
Dept: OCCUPATIONAL THERAPY | Facility: CLINIC | Age: 43
End: 2023-07-19
Payer: COMMERCIAL

## 2023-07-19 ENCOUNTER — OFFICE VISIT (OUTPATIENT)
Dept: SPEECH THERAPY | Facility: CLINIC | Age: 43
End: 2023-07-19
Payer: COMMERCIAL

## 2023-07-19 DIAGNOSIS — I69.320 APHASIA AS LATE EFFECT OF CEREBROVASCULAR ACCIDENT: Primary | ICD-10-CM

## 2023-07-19 DIAGNOSIS — I63.512 ACUTE ISCHEMIC LEFT MCA STROKE (HCC): ICD-10-CM

## 2023-07-19 PROCEDURE — 92507 TX SP LANG VOICE COMM INDIV: CPT | Performed by: SPEECH-LANGUAGE PATHOLOGIST

## 2023-07-24 ENCOUNTER — APPOINTMENT (OUTPATIENT)
Dept: OCCUPATIONAL THERAPY | Facility: CLINIC | Age: 43
End: 2023-07-24
Payer: COMMERCIAL

## 2023-07-24 ENCOUNTER — OFFICE VISIT (OUTPATIENT)
Dept: SPEECH THERAPY | Facility: CLINIC | Age: 43
End: 2023-07-24
Payer: COMMERCIAL

## 2023-07-24 DIAGNOSIS — I63.512 ACUTE ISCHEMIC LEFT MCA STROKE (HCC): ICD-10-CM

## 2023-07-24 DIAGNOSIS — I69.320 APHASIA AS LATE EFFECT OF CEREBROVASCULAR ACCIDENT: Primary | ICD-10-CM

## 2023-07-24 PROCEDURE — 92507 TX SP LANG VOICE COMM INDIV: CPT

## 2023-07-26 ENCOUNTER — APPOINTMENT (OUTPATIENT)
Dept: OCCUPATIONAL THERAPY | Facility: CLINIC | Age: 43
End: 2023-07-26
Payer: COMMERCIAL

## 2023-07-26 ENCOUNTER — OFFICE VISIT (OUTPATIENT)
Dept: SPEECH THERAPY | Facility: CLINIC | Age: 43
End: 2023-07-26
Payer: COMMERCIAL

## 2023-07-26 DIAGNOSIS — I69.320 APHASIA AS LATE EFFECT OF CEREBROVASCULAR ACCIDENT: Primary | ICD-10-CM

## 2023-07-26 DIAGNOSIS — I63.512 ACUTE ISCHEMIC LEFT MCA STROKE (HCC): ICD-10-CM

## 2023-07-26 PROCEDURE — 92507 TX SP LANG VOICE COMM INDIV: CPT | Performed by: SPEECH-LANGUAGE PATHOLOGIST

## 2023-07-27 ENCOUNTER — TELEPHONE (OUTPATIENT)
Dept: NEUROLOGY | Facility: CLINIC | Age: 43
End: 2023-07-27

## 2023-07-27 NOTE — TELEPHONE ENCOUNTER
Called patient to move up his 8/17/23 appointment to 7/28/23. Patient declined. Will keep patient on the wait list. He was appreciative.

## 2023-08-07 DIAGNOSIS — I69.398 DEPRESSION AS LATE EFFECT OF CEREBROVASCULAR ACCIDENT (CVA): ICD-10-CM

## 2023-08-07 DIAGNOSIS — F06.31 DEPRESSION AS LATE EFFECT OF CEREBROVASCULAR ACCIDENT (CVA): ICD-10-CM

## 2023-08-07 DIAGNOSIS — R56.9 SEIZURE (HCC): ICD-10-CM

## 2023-08-07 RX ORDER — ESCITALOPRAM OXALATE 5 MG/1
5 TABLET ORAL DAILY
Qty: 30 TABLET | Refills: 0 | Status: SHIPPED | OUTPATIENT
Start: 2023-08-07 | End: 2023-08-08 | Stop reason: SDUPTHER

## 2023-08-07 RX ORDER — LEVETIRACETAM 750 MG/1
750 TABLET ORAL EVERY 12 HOURS SCHEDULED
Qty: 60 TABLET | Refills: 0 | Status: SHIPPED | OUTPATIENT
Start: 2023-08-07 | End: 2023-08-08 | Stop reason: SDUPTHER

## 2023-08-07 NOTE — TELEPHONE ENCOUNTER
Patient father left message on rx line requesting refill on this medications. Per Jose Luis Castaneda (Patient father) he only have medication for today 8/7/23 only.

## 2023-08-08 ENCOUNTER — TELEPHONE (OUTPATIENT)
Dept: FAMILY MEDICINE CLINIC | Facility: CLINIC | Age: 43
End: 2023-08-08

## 2023-08-08 DIAGNOSIS — I69.398 DEPRESSION AS LATE EFFECT OF CEREBROVASCULAR ACCIDENT (CVA): ICD-10-CM

## 2023-08-08 DIAGNOSIS — R56.9 SEIZURE (HCC): ICD-10-CM

## 2023-08-08 DIAGNOSIS — F06.31 DEPRESSION AS LATE EFFECT OF CEREBROVASCULAR ACCIDENT (CVA): ICD-10-CM

## 2023-08-08 RX ORDER — ESCITALOPRAM OXALATE 5 MG/1
5 TABLET ORAL DAILY
Qty: 30 TABLET | Refills: 0 | Status: SHIPPED | OUTPATIENT
Start: 2023-08-08

## 2023-08-08 RX ORDER — LEVETIRACETAM 750 MG/1
750 TABLET ORAL EVERY 12 HOURS SCHEDULED
Qty: 60 TABLET | Refills: 0 | Status: SHIPPED | OUTPATIENT
Start: 2023-08-08

## 2023-08-08 NOTE — TELEPHONE ENCOUNTER
Pt's father called in regards of needing refills for pt's Lexapro and Keppra despite provider placing orders yesterday, called pt's pharmacy and was informed that they received no order and would need it to be resent, please advise.

## 2023-08-17 ENCOUNTER — OFFICE VISIT (OUTPATIENT)
Dept: NEUROLOGY | Facility: CLINIC | Age: 43
End: 2023-08-17

## 2023-08-17 VITALS
DIASTOLIC BLOOD PRESSURE: 70 MMHG | BODY MASS INDEX: 26.6 KG/M2 | WEIGHT: 179.6 LBS | SYSTOLIC BLOOD PRESSURE: 110 MMHG | HEART RATE: 51 BPM | HEIGHT: 69 IN

## 2023-08-17 DIAGNOSIS — R56.9 SEIZURE (HCC): ICD-10-CM

## 2023-08-17 DIAGNOSIS — Q21.12 PFO (PATENT FORAMEN OVALE): ICD-10-CM

## 2023-08-17 DIAGNOSIS — I69.398 DEPRESSION AS LATE EFFECT OF CEREBROVASCULAR ACCIDENT (CVA): ICD-10-CM

## 2023-08-17 DIAGNOSIS — I63.512 ACUTE ISCHEMIC LEFT MCA STROKE (HCC): Primary | ICD-10-CM

## 2023-08-17 DIAGNOSIS — R44.9 SENSORY DEFICIT, RIGHT: ICD-10-CM

## 2023-08-17 DIAGNOSIS — Z72.0 TOBACCO USE: ICD-10-CM

## 2023-08-17 DIAGNOSIS — G81.91 RIGHT HEMIPARESIS (HCC): ICD-10-CM

## 2023-08-17 DIAGNOSIS — F06.31 DEPRESSION AS LATE EFFECT OF CEREBROVASCULAR ACCIDENT (CVA): ICD-10-CM

## 2023-08-17 NOTE — PROGRESS NOTES
Patient ID: Noam Ruggiero is a 40 y.o. male who presents to the [address] [place] [address]. Assessment/Plan:    Seizure-like activity:    At this time, patient reports no new seizure-like activity, no new loss of consciousness, no new skin cells, no time labs. Patient notes. Patient states that he has not nver having said seizures. It was reported in the hospital that the patient had right gaze deviation on 5/16 with a postictal period. Patient had EEG monitoring which did not show any epileptiform discharge however did show cerebral dysfunction of the left hemisphere on video monitoring. Patient has still been taking his Keppra at this time as prescribed.    -Would encourage the patient to continue with Keppra 750 mg twice daily at this moment in time. He should continue this medication at least for the foreseeable future until we see improvements in his recovery from his stroke deficits. After some time, we can certainly try to slowly wean off of the medication. Preferably if the patient is going to try to potentially go back to driving in the future we would like to have him on some kind antiseizure medication at least in the beginning.  -It appears that the patient has already had [employer] paperwork filed and potentially having his license revoked. He states that he does not know too much about this although his father is the one who is helping him handle this. As far as he knows, he states that his license is revoked for at least 6 months at this point in time. Stated to the patient that we if we were to ever reinstate his [employer] license we will need to go through a number of procedures and it is certainly important to continue on his at this time. Right now recommend that the patient should not be driving at all and should not get behind the wheel of a vehicle at this time.  -Continue with current seizure precautions.   Trying to stay away from open flames, if he is cooking try to use an electric stove instead of the Gaviscon. Try to shower instead of the taking a bath. This is significantly important because it is possible that 1 can drown in a few inches of water if he is to have seizures future. Also, try to stay away from heights or other dangerous situations currently.  -As far as the patient's job goes, I do not see any reason why he cannot return to work currently try to see if he can complete some of his work. 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 Stated that the patient obviously would not be able to drive, but if he was able to have a job that was not as intense and not as physically demanding he can certainly do so. If the patient does need a work note in the future he can either contact the office or send me a message directly for the [employer] History of Stroke:    I had the pleasure of seeing Samantha Giron today in the office at [facility]. He is presenting for a hospital follow-up in regards to his most recent left MCA stroke status post thrombectomy. Patient states that he has been doing well at this time, not reporting any symptoms. He has made a lot of improvements in regards to his residual deficits from about 3 months prior, although the patient is still having some difficulty with something. He is still having some significant difficulty with aphasia, he has some slight right upper extremity weakness, right lower extremity weakness is certainly greater at this time. States that he also has some difficulty with sensation of the right upper extremity as well. No current physical therapy or occupational therapy, he is continuing with exercises at home. Patient states that this ended about a few weeks prior. Etiology of the patient's stroke was suspected to be due to a hypercoagulable state from COVID-19 infection.   Although, the patient does have a large PFO which she is going to see sick surgery for at the beginning of September to determine if he does procedure.    -Recommend that the patient follow-up on my ambulatory referral to cardiology. Does not seem that further cardiac monitoring was explored with the patient at this time. I believe it would be at least beneficial for him to have a Zio patch to see if there is any patient may have atrial fibrillation as a potential cause of his infarct. -Repeating thrombosis panel at this time. Has been pretty much exactly 3 months since the patient for thrombosis panel, believe it is appropriate for him to have one and we can try to determine and see if there is any concern for a hypercoagulable state at this time. -Recommend following with cardiothoracic surgery in regards to the recently discovered large PFO. He should follow-up with them in the beginning of the summer and see if he is a surgical candidate to have the PFO closure procedure      - For ongoing stroke prevention continue: Aspirin 81 mg once daily  - Discussed the importance of antiplatelet management with the patient to prevent future strokes. - Recommend to check blood pressure occasionally away from the doctor's office to make sure that those numbers are typically less than 130/80. If they are frequently higher than that, we recommend checking a little more often and to follow up with primary care team   - Will defer to primary care team for monitoring of cholesterol panel and blood sugar numbers with target LDL cholesterol of less than 70 and hemoglobin A1c less than 7%  - Recommend following a low salt, mediterranean diet   - Recommend routine physical exercise as tolerated     We will plan for him to return to the office in 3 to 4 months time time to see on of the APPs or Dr. Rubio Barroso but would be happy to see him sooner if the need should arise.   If he has any symptoms concerning for TIA or stroke including sudden painless loss of vision or double vision, difficulty speaking or swallowing, vertigo/room spinning that does not quickly resolve, or weakness/numbness/loss of coordination affecting 1 side of the face or body he should proceed by ambulance to the nearest emergency room immediately. Subjective:    HPI      For Review/Hospital Course:    "Patient is a 81-year-old right-handed male without any known prior medical history who presented as a stroke alert on 5/15/2023 at [address] with a last known well of 2030 on 5/14/2023 when he has spoken with someone over the phone.  Per documentation, a check-in was called as the patient did not show up for a scheduled meeting with a friend that did not answer his phone. Bg Patel arrival by EMS for the welfare check the patient was found to be covered in feces and was noted to have right upper extremity flaccid paralysis as well as right hand sided neglect, right facial droop, and expressive aphasia.  Initial NIHSS on presentation was 16.  CT head noted a left MCA infarct, CTA head and neck demonstrated a partial left M1 occlusion.  Patient was not a tenecteplase candidate given that he was outside the time window.  Patient subsequently underwent a CTP which demonstrated that the patient was a thrombectomy candidate, and subsequently an endovascular alert was called. Farhan Brooke underwent a mechanical thrombectomy TICI 3 revascularization.  Pathology suspected to be a proximal embolism given findings.  Patient subsequently noted to be COVID-positive, repeat CT head in the morning of 5/16 demonstrated continued edema and sulcal effacement on the left similar to previous study, improvement of contrast staining versus possible hemorrhage.  Subsequently, after initial examination in the morning of 5/16 patient noted to have seizure that consisted of eye deviation to the right followed by postictal period.  STAT CTH demonstrated no acute change, evolving known left MCA infarct.  Video EEG started with no seizures captured, and subsequently discontinued. Magy Cowart underwent ALYSSA on 5/18 and found to have a large PFO." - per Kasia DO Abdiel, 05/21/2023    MRI 5/17 demonstrated large recent left MCA territory infarct with associated edema and new foci of petechial hemorrhage. There is regional mass effect without midline shift. Cannot reliably evaluate the infarcted brain with structural abnormality, no structural abnormalities identified in the remainder of the brain. Normal hippocampal formations. Patient was recommended to continue aspirin 81 mg once daily. Patient had a CT chest abdomen pelvis to evaluate for any underlying possible malignancy that could have led to the patient's seemingly embolic stroke. No evidence of malignancy to account for hypercoagulable state on CT chest abdomen pelvis. Lower limb duplex study ordered given the patient had a PFO. No evidence of acute or chronic deep vein thrombosis noted bilaterally no evidence of superficial thrombophlebitis noted bilaterally. ALYSSA echocardiogram 5/18 demonstrated large and patent PFO, interventional cardiology and appreciate recommendations. On most recent thrombosis panel, patient had protein S decreased at 65%, protein C normal, Antithrombin 3 at 81, likely this is not clinically relevant as it was higher than 80%. Recommended repeat thrombosis panel in 3 months time. Interval History:      New stroke symptoms/residual symptoms:    Any new, sudden onset weakness, numbness, facial droop, slurred speech, difficulty speaking, trouble swallowing, persistent vertigo, or sudden double vision or vision loss? No new stroke like symptoms    Residual symptoms include: weakness of the right UE/LE: upper extremity and lower extremity, aphasia and other: loss of sensation of right upper extremity     Stroke Etiology and Risk Factor modification:     This was a(n) embolic stroke, most likely related to Embolic Stroke of Undetermined Source: ALYSSA Done  yes    Stroke risk factors were evaluated including: Patent foramen ovale, tobacco use    AP/AC therapy: Aspirin 81 mg once daily. No bleeding or bruising issues. Statin therapy: No statin therapy, no issues with blood pressure    Blood pressure today and as of late: 110/70, usually around this range and relatively very low. Most recent LDL: 18 mg/dL    Most recent hemoglobin A1C: 4.8%    Cardiology evaluation? Any cardiac monitoring required?: Not following with cardiology, has an upcoming appointment with cardiothoracic surgery for potential PFO closure. Patient reports no discussion of Zio patch or loop recorder at the hospital as part of stroke etiology work-up    Endocrinology evaluation? Following proper glycemic treatment/diet? No endocrinology     Lifestyle history/modifications:    Diet/Exercise regimen: Following vegan diet and normal stays relatively healthy at this time. Doing some kind of exercise every, little bit of weight training at a time. Still running at this time, is not running 7 miles like previously before this. 4 miles at this moment in time. Any physical therapy, occupational therapy or speech therapy performed/required at this time?: Still doing physical and occupational therapy at home he states. Two weeks ago is when his therapy was stopped and now follows with exercises. Any difficulty with sleep? No issues with sleep     Any history of sleep apnea apnea? CPAP compliance?: No snoring or sleep apnea    Post-stroke depression/anxiety? No significant depression/anxiety    Any history of smoking? No smoking, smoking before stroke. 10 years of smoking previously No alcohol either at this time. Additional History:    Patient has reported no new seizure-like activity since the hospital.  Reports no loss of consciousness, no staring spells, and no unexplained lapses in time since his hospitalization. Still continuing to take Keppra 750 mg twice daily as scheduled.   Not currently having any side effects or difficulty with the medication. Patient reports that despite not being able to drive at this moment time he would like to get back to his job in a few weeks. 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 This part of the job he will not be able to perform due to the fact he cannot drive a motor vehicle at this moment in time. However, the patient is going to try to still work at this time. Stated to the patient that I have no specific restrictions at this time, although if he needed a work note with certain restrictions for his job I would certainly be able to help with this. Lab Results   Component Value Date/Time    CHOLESTEROL 159 05/16/2023 05:57 AM     Lab Results   Component Value Date/Time    TRIG 34 05/16/2023 05:57 AM     Lab Results   Component Value Date/Time     05/16/2023 05:57 AM     Lab Results   Component Value Date/Time    LDLCALC 18 05/16/2023 05:57 AM       Lab Results   Component Value Date/Time    HGBA1C 4.8 05/16/2023 05:57 AM     Lab Results   Component Value Date/Time    EAG 91 05/16/2023 05:57 AM           No past medical history on file.     Current Outpatient Medications:   •  acetaminophen (TYLENOL) 325 mg tablet, Take 2 tablets (650 mg total) by mouth every 6 (six) hours as needed for mild pain or headaches (mild pain), Disp: , Rfl: 0  •  aspirin 81 mg chewable tablet, Chew 1 tablet (81 mg total) daily, Disp: 30 tablet, Rfl: 0  •  escitalopram (LEXAPRO) 5 mg tablet, Take 1 tablet (5 mg total) by mouth daily, Disp: 30 tablet, Rfl: 0  •  levETIRAcetam (KEPPRA) 750 mg tablet, Take 1 tablet (750 mg total) by mouth every 12 (twelve) hours, Disp: 60 tablet, Rfl: 0  •  MELATONIN PO, Take by mouth, Disp: , Rfl:   •  Protein POWD, Take by mouth, Disp: , Rfl:      Objective:    Physical Exam:                                                                 Vitals:            Constitutional:    /70 (BP Location: Left arm, Patient Position: Sitting, Cuff Size: Standard)   Pulse (!) 51   Ht 5' 9" (1.753 m)   Wt 81.5 kg (179 lb 9.6 oz)   BMI 26.52 kg/m²   BP Readings from Last 3 Encounters:   08/17/23 110/70   06/23/23 132/72   06/09/23 103/57     Pulse Readings from Last 3 Encounters:   08/17/23 (!) 51   06/23/23 59   06/09/23 (!) 48         Well developed, well nourished, well groomed. No dysmorphic features. Psychiatric:  Normal behavior and appropriate affect        Neurological Examination:     Mental status/cognitive function:   Orientated to time, place and person. Recent and remote memory intact. Attention span and concentration as well as fund of knowledge are appropriate for age. Significant expressive aphasia noted. Cranial Nerves:  II-visual fields full. III, IV, VI-Pupils were equal, round, and reactive to light and accomodation. Extraocular movements were full and conjugate without nystagmus. Conjugate gaze, normal smooth pursuits, normal saccades   V-facial sensation symmetric. VII-facial expression symmetric, intact forehead wrinkle, strong eye closure, symmetric smile    VIII-hearing grossly intact bilaterally   IX, X-palate elevation symmetric, no dysarthria. XI-shoulder shrug strength intact    XII-tongue protrusion midline. Motor Exam: symmetric bulk and tone throughout, no pronator drift. Power/strength 5/5 left upper and lower extremities, power/strength 4/5 of the right upper and lower extremities. Sensory: grossly intact light touch in all extremities. Reflexes: Left: brachioradialis 2+, biceps 2+, knee 2+  Right: brachioradialis 3+, biceps 3+, knee 3+  Coordination: Finger nose finger intact bilaterally, no apparent dysmetria, ataxia or tremor noted  Gait: slightly unsteady gait at this time. ROS:    Review of Systems   Constitutional: Negative for appetite change, fatigue and fever. HENT: Negative. Negative for hearing loss, tinnitus, trouble swallowing and voice change. Eyes: Negative. Negative for photophobia, pain and visual disturbance. Respiratory: Negative. Negative for shortness of breath. Cardiovascular: Negative. Negative for palpitations. Gastrointestinal: Negative. Negative for nausea and vomiting. Endocrine: Negative. Negative for cold intolerance. Genitourinary: Negative. Negative for dysuria, frequency and urgency. Musculoskeletal: Negative for back pain, gait problem, myalgias and neck pain. Skin: Negative. Negative for rash. Allergic/Immunologic: Negative. Neurological: Positive for numbness. Negative for dizziness, tremors, seizures, syncope, facial asymmetry, speech difficulty, weakness, light-headedness and headaches. Patient stated right arm numbness. Hematological: Negative. Does not bruise/bleed easily. Psychiatric/Behavioral: Negative. Negative for confusion, hallucinations and sleep disturbance.          I have spent 50 minutes today on this case including chart review, performing history and exam, patient counseling, and documentation/communication      Argenis Torres PA-C  8/17/2023 10:22 AM

## 2023-08-17 NOTE — PROGRESS NOTES
Review of Systems   Constitutional: Negative for appetite change, fatigue and fever. HENT: Negative. Negative for hearing loss, tinnitus, trouble swallowing and voice change. Eyes: Negative. Negative for photophobia, pain and visual disturbance. Respiratory: Negative. Negative for shortness of breath. Cardiovascular: Negative. Negative for palpitations. Gastrointestinal: Negative. Negative for nausea and vomiting. Endocrine: Negative. Negative for cold intolerance. Genitourinary: Negative. Negative for dysuria, frequency and urgency. Musculoskeletal: Negative for back pain, gait problem, myalgias and neck pain. Skin: Negative. Negative for rash. Allergic/Immunologic: Negative. Neurological: Positive for numbness. Negative for dizziness, tremors, seizures, syncope, facial asymmetry, speech difficulty, weakness, light-headedness and headaches. Patient stated right arm numbness. Hematological: Negative. Does not bruise/bleed easily. Psychiatric/Behavioral: Negative. Negative for confusion, hallucinations and sleep disturbance.

## 2023-08-17 NOTE — PATIENT INSTRUCTIONS
Seizure-like activity:    At this time, patient reports no new seizure-like activity, no new loss of consciousness, no new skin cells, no time labs. Patient notes. Patient states that he has not nver having said seizures. It was reported in the hospital that the patient had right gaze deviation on 5/16 with a postictal period. Patient had EEG monitoring which did not show any epileptiform discharge however did show cerebral dysfunction of the left hemisphere on video monitoring. Patient has still been taking his Keppra at this time as prescribed.    -Would encourage the patient to continue with Keppra 750 mg twice daily at this moment in time. He should continue this medication at least for the foreseeable future until we see improvements in his recovery from his stroke deficits. After some time, we can certainly try to slowly wean off of the medication. Preferably if the patient is going to try to potentially go back to driving in the future we would like to have him on some kind antiseizure medication at least in the beginning.  -It appears that the patient has already had [employer] paperwork filed and potentially having his license revoked. He states that he does not know too much about this although his father is the one who is helping him handle this. As far as he knows, he states that his license is revoked for at least 6 months at this point in time. Stated to the patient that we if we were to ever reinstate his [employer] license we will need to go through a number of procedures and it is certainly important to continue on his at this time. Right now recommend that the patient should not be driving at all and should not get behind the wheel of a vehicle at this time.  -Continue with current seizure precautions. Trying to stay away from open flames, if he is cooking try to use an electric stove instead of the Gaviscon. Try to shower instead of the taking a bath.   This is significantly important because it is possible that 1 can drown in a few inches of water if he is to have seizures future. Also, try to stay away from heights or other dangerous situations currently.  -As far as the patient's job goes, I do not see any reason why he cannot return to work currently try to see if he can complete some of his work. 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 Stated that the patient obviously would not be able to drive, but if he was able to have a job that was not as intense and not as physically demanding he can certainly do so. If the patient does need a work note in the future he can either contact the office or send me a message directly for the [address] History of Stroke:    I had the pleasure of seeing Laila Cazares today in the office at [facility]. He is presenting for a hospital follow-up in regards to his most recent left MCA stroke status post thrombectomy. Patient states that he has been doing well at this time, not reporting any symptoms. He has made a lot of improvements in regards to his residual deficits from about 3 months prior, although the patient is still having some difficulty with something. He is still having some significant difficulty with aphasia, he has some slight right upper extremity weakness, right lower extremity weakness is certainly greater at this time. States that he also has some difficulty with sensation of the right upper extremity as well. No current physical therapy or occupational therapy, he is continuing with exercises at home. Patient states that this ended about a few weeks prior. Etiology of the patient's stroke was suspected to be due to a hypercoagulable state from COVID-19 infection.   Although, the patient does have a large PFO which she is going to see [facility] surgery for at the beginning of September to determine if he does procedure.    -Recommend that the patient follow-up on my ambulatory referral to cardiology. Does not seem that further cardiac monitoring was explored with the patient at this time. I believe it would be at least beneficial for him to have a Zio patch to see if there is any patient may have atrial fibrillation as a potential cause of his infarct. -Repeating thrombosis panel at this time. Has been pretty much exactly 3 months since the patient for thrombosis panel, believe it is appropriate for him to have one and we can try to determine and see if there is any concern for a hypercoagulable state at this time. -Recommend following with cardiothoracic surgery in regards to the recently discovered large PFO. He should follow-up with them in the beginning of the summer and see if he is a surgical candidate to have the PFO closure procedure      - For ongoing stroke prevention continue: Aspirin 81 mg once daily  - Discussed the importance of antiplatelet management with the patient to prevent future strokes. - Recommend to check blood pressure occasionally away from the doctor's office to make sure that those numbers are typically less than 130/80. If they are frequently higher than that, we recommend checking a little more often and to follow up with primary care team   - Will defer to primary care team for monitoring of cholesterol panel and blood sugar numbers with target LDL cholesterol of less than 70 and hemoglobin A1c less than 7%  - Recommend following a low salt, mediterranean diet   - Recommend routine physical exercise as tolerated     We will plan for him to return to the office in 3 to 4 months time time to see on of the APPs or Dr. oRya Matthews but would be happy to see him sooner if the need should arise.   If he has any symptoms concerning for TIA or stroke including sudden painless loss of vision or double vision, difficulty speaking or swallowing, vertigo/room spinning that does not quickly resolve, or weakness/numbness/loss of coordination affecting 1 side of the face or body he should proceed by ambulance to the nearest emergency room immediately.

## 2023-08-22 ENCOUNTER — TELEPHONE (OUTPATIENT)
Dept: NEUROLOGY | Facility: CLINIC | Age: 43
End: 2023-08-22

## 2023-08-22 NOTE — TELEPHONE ENCOUNTER
Patient's father stopped in concerned about the medication his son is on. The PCP will no longer fill the Lexapro or the keppra. They are concerned without seeing and consulting with Warden Arnold about stopping these pt may have another seizure. Will someone please contact father at [phone] concerning the [address].     Thank you

## 2023-08-22 NOTE — TELEPHONE ENCOUNTER
received vm from 8:39am-The patient's name is Xiomy Hathaway. YOB: 1980. I need to speak to someone about a release for work. Thank you.  [phone]  -----------------------------------------------------  pt's dad made aware of below. he states that pt has a little bit of weakness on his right side and would be agreeable to a lifting limit of 25-30lb for a short period of time. or what every you feel is appropriate to see how he does. he feels that pt would be able to return to work full time 8 hours a day and 40 hours a week    He would like to  letter for work as pt can't return to work until he has letter. Pt is supposed to return to work tomorrow.  Pt works 6am-2pm  Please advise  [phone] to leave detailed message

## 2023-08-22 NOTE — TELEPHONE ENCOUNTER
Patient's father called stated that patient needs a written release from [facility] for patient to go back to work. Patient's work is giving him a hard time with out that written consent. If possible can that written consent be done today and father is willing to pick it up today at any time that it is available. Father will also be dropping off reinstatement form for patient's DL. Please call father once written consent is done and ready to be picked up. Thank You!

## 2023-08-22 NOTE — TELEPHONE ENCOUNTER
Pt's dad made aware of below. Letter generated.   He will  the letter at [facility]

## 2023-08-22 NOTE — TELEPHONE ENCOUNTER
Indy Wiggins PA-C  to Kalie Greenfield • Mary    ZF    8/22/23 10:13 AM   Hello,     Please inform the patient's father that I would be happy to fill this out for the patient. Marina Owens, I do not know what kind of restrictions that the patient would need for his job.  MI going to need to provide the patient with certain weight restrictions at all at this time?  Does he need any restrictions on the hours that he can possibly work? Iliana Kapadia is some concern at this time in regard to his deficits from my perspective about how much she could possibly lift.  More or less I need to know specifically from the patient what he would need us to accommodate to be able to have him go back to work because his job is going to need specific criteria and circumstances around what he can and cannot do at his work. Shreyas Sow, about the patient needing papers for his 's license reinstatement.  I would certainly be happy to take a look at them but depending on what these papers are they may potentially not be fully reinstating the patient and his license they might just be looking for more information.  Just so that the patient's father knows that ultimately will come down to [employer] to decide if he can have his license back at this time, we do not ultimately make that decision. Mariah Krabbe can certainly come from drop off the forms and we can take a look at them.  Thank you!     -Derek Avery

## 2023-08-22 NOTE — TELEPHONE ENCOUNTER
Jun Angulo PA-C  You 1 hour ago (12:23 PM)     ZF  Okay, okay to formulate and generate a letter stating that the patient can return to work. Sudhakar Ny does not have any restrictions in regards to the hours that he can work or schedule.  Note that the patient does have a degree of right arm and right leg weakness, and will have a temporary lifting restriction over the next few weeks to where he cannot lift more than 25 pounds at this time. Virginie Power will need to contact me about an update in regards to how he feels and we may be able to take away this lifting restriction. In regards to the patient's license or driving, we can see what paperwork needs to be filled out for the reinstatement of his license. Nicolas Dunn, I cannot guarantee that his license will be reinstated at any time soon especially due to the fact that it was reported initially for seizure-like activity which is usually 6 months of no driving at the very minimum.  And if the patient has weakness along the right side of his body which he would use to operate the steering wheel and pressed on the gas and the brake, patient would need to have a fitness to drive evaluation for his stroke deficits before he would be able to drive again or have his license reinstated.  That we will certainly take a few weeks at this time. Adela Michele that it is best if this paperwork is throughout the patient's license reinstated immediately, then we should not be filling it out at this time. Cornelia Morales this is for paperwork to give [employer] more information about the patient's condition then that is something completely different and we can certainly fill that out. Please pass information along to patient and okay to generate letter in regards to work hours and restrictions.      --Jonatan Ruiz

## 2023-08-24 ENCOUNTER — OFFICE VISIT (OUTPATIENT)
Dept: FAMILY MEDICINE CLINIC | Facility: CLINIC | Age: 43
End: 2023-08-24

## 2023-08-24 VITALS
DIASTOLIC BLOOD PRESSURE: 64 MMHG | TEMPERATURE: 98.7 F | RESPIRATION RATE: 16 BRPM | SYSTOLIC BLOOD PRESSURE: 114 MMHG | HEIGHT: 69 IN | HEART RATE: 43 BPM | OXYGEN SATURATION: 97 % | BODY MASS INDEX: 26.6 KG/M2 | WEIGHT: 179.6 LBS

## 2023-08-24 DIAGNOSIS — Z11.59 NEED FOR HEPATITIS C SCREENING TEST: ICD-10-CM

## 2023-08-24 DIAGNOSIS — Q21.12 PFO (PATENT FORAMEN OVALE): ICD-10-CM

## 2023-08-24 DIAGNOSIS — F06.31 DEPRESSION AS LATE EFFECT OF CEREBROVASCULAR ACCIDENT (CVA): ICD-10-CM

## 2023-08-24 DIAGNOSIS — I69.398 DEPRESSION AS LATE EFFECT OF CEREBROVASCULAR ACCIDENT (CVA): ICD-10-CM

## 2023-08-24 DIAGNOSIS — Z11.4 ENCOUNTER FOR SCREENING FOR HIV: ICD-10-CM

## 2023-08-24 DIAGNOSIS — R56.9 SEIZURE (HCC): ICD-10-CM

## 2023-08-24 DIAGNOSIS — Z00.00 ANNUAL PHYSICAL EXAM: Primary | ICD-10-CM

## 2023-08-24 PROCEDURE — 99396 PREV VISIT EST AGE 40-64: CPT | Performed by: FAMILY MEDICINE

## 2023-08-24 RX ORDER — LEVETIRACETAM 750 MG/1
750 TABLET ORAL EVERY 12 HOURS SCHEDULED
Qty: 60 TABLET | Refills: 3 | Status: SHIPPED | OUTPATIENT
Start: 2023-08-24

## 2023-08-24 RX ORDER — ESCITALOPRAM OXALATE 5 MG/1
5 TABLET ORAL DAILY
Qty: 30 TABLET | Refills: 3 | Status: SHIPPED | OUTPATIENT
Start: 2023-08-24

## 2023-08-24 NOTE — PROGRESS NOTES
[address] [facility]    NAME: Faustina Isidro  AGE: 40 y.o. SEX: male  : 1980     DATE: 2023     Assessment and Plan:     Problem List Items Addressed This Visit        Cardiovascular and Mediastinum    PFO (patent foramen ovale)     - Suspected contributive to recent L. MCA stroke   - Schedule for surgical repair with cardiothoracic surgery (Dr. Benny Fontenot)              Other    Annual physical exam - Primary    Encounter for screening for HIV    Relevant Orders    HIV 1/2 AB/AG w Reflex [facility] for 2 yr old and above    Need for hepatitis C screening test    Relevant Orders    Hepatitis C antibody    BMI 26.0-26.9,adult     BMI Counseling: Body mass index is 26.52 kg/m². The BMI is above normal. Nutrition recommendations include reducing portion sizes. Exercise recommendations include moderate aerobic physical activity for 150 minutes/week. Immunizations and preventive care screenings were discussed with patient today. Appropriate education was printed on patient's after visit summary. Discussed risks and benefits of prostate cancer screening. We discussed the controversial history of PSA screening for prostate cancer in the [facility] as well as the risk of over detection and over treatment of prostate cancer by way of PSA screening. The patient understands that PSA blood testing is an imperfect way to screen for prostate cancer and that elevated PSA levels in the blood may also be caused by infection, inflammation, prostatic trauma or manipulation, urological procedures, or by benign prostatic enlargement. The role of the digital rectal examination in prostate cancer screening was also discussed and I discussed with him that there is large interobserver variability in the findings of digital rectal examination.     Counseling:  · Exercise: the importance of regular exercise/physical activity was discussed. Recommend exercise 3-5 times per week for at least 30 minutes. Return in 3 months (on 11/24/2023). Chief Complaint:     Chief Complaint   Patient presents with   • Follow-up     Follow up from a stroke      History of Present Illness:     Adult Annual Physical   Patient here for a comprehensive physical exam. The patient reports no problems. Diet and Physical Activity  · Diet/Nutrition: well balanced diet. · Exercise: walking. Depression Screening  PHQ-2/9 Depression Screening    Little interest or pleasure in doing things: 0 - not at all  Feeling down, depressed, or hopeless: 0 - not at all  PHQ-2 Score: 0  PHQ-2 Interpretation: Negative depression screen       General Health  · Sleep: gets 7-8 hours of sleep on average. · Hearing: normal - bilateral.  · Vision: no vision problems. · Dental: regular dental visits.  Health  · Symptoms include: none     Review of Systems:     Review of Systems   Constitutional: Negative for chills and fever. HENT: Negative for ear pain and sore throat. Eyes: Negative for pain and visual disturbance. Respiratory: Negative for cough and shortness of breath. Cardiovascular: Negative for chest pain and palpitations. Gastrointestinal: Negative for abdominal pain and vomiting. Genitourinary: Negative for dysuria and hematuria. Musculoskeletal: Negative for arthralgias and back pain. Skin: Negative for color change and rash. Neurological: Negative for seizures and syncope. All other systems reviewed and are negative. Past Medical History:     No past medical history on file. Past Surgical History:     Past Surgical History:   Procedure Laterality Date   • IR STROKE ALERT  5/15/2023      Family History:     No family history on file.    Social History:     Social History     Socioeconomic History   • Marital status: Single     Spouse name: Not on file   • Number of children: Not on file   • Years of education: Not on file   • Highest education level: Not on file   Occupational History   • Not on file   Tobacco Use   • Smoking status: Former     Packs/day: 0.50     Years: 15.00     Total pack years: 7.50     Types: Cigarettes     Quit date: 6/3/2023     Years since quittin.2   • Smokeless tobacco: Never   Vaping Use   • Vaping Use: Never used   Substance and Sexual Activity   • Alcohol use: Yes     Comment: Social   • Drug use: Never   • Sexual activity: Not Currently   Other Topics Concern   • Not on file   Social History Narrative   • Not on file     Social Determinants of Health     Financial Resource Strain: Low Risk  (2023)    Overall Financial Resource Strain (CARDIA)    • Difficulty of Paying Living Expenses: Not hard at all   Food Insecurity: No Food Insecurity (2023)    Hunger Vital Sign    • Worried About Running Out of Food in the Last Year: Never true    • Ran Out of Food in the Last Year: Never true   Transportation Needs: No Transportation Needs (2023)    PRAPARE - Transportation    • Lack of Transportation (Medical): No    • Lack of Transportation (Non-Medical): No   Physical Activity: Sufficiently Active (2023)    Exercise Vital Sign    • Days of Exercise per Week: 7 days    • Minutes of Exercise per Session: 60 min   Stress: Stress Concern Present (2023)    [address] [facility]    • Feeling of Stress :  To some extent   Social Connections: Not on file   Intimate Partner Violence: Not At Risk (2023)    Humiliation, Afraid, Rape, and Kick questionnaire    • Fear of Current or Ex-Partner: No    • Emotionally Abused: No    • Physically Abused: No    • Sexually Abused: No   Housing Stability: Low Risk  (2023)    Housing Stability Vital Sign    • Unable to Pay for Housing in the Last Year: No    • Number of [address] in the Last Year: 1    • Unstable Housing in the Last Year: No      Current Medications:     Current Outpatient Medications   Medication Sig Dispense Refill   • aspirin 81 mg chewable tablet Chew 1 tablet (81 mg total) daily 30 tablet 0   • escitalopram (LEXAPRO) 5 mg tablet Take 1 tablet (5 mg total) by mouth daily 30 tablet 3   • levETIRAcetam (KEPPRA) 750 mg tablet Take 1 tablet (750 mg total) by mouth every 12 (twelve) hours 60 tablet 3   • acetaminophen (TYLENOL) 325 mg tablet Take 2 tablets (650 mg total) by mouth every 6 (six) hours as needed for mild pain or headaches (mild pain) (Patient not taking: Reported on 8/24/2023)  0   • MELATONIN PO Take by mouth     • Protein POWD Take by mouth       No current facility-administered medications for this visit. Allergies:     No Known Allergies   Physical Exam:     /64 (BP Location: Left arm, Patient Position: Sitting, Cuff Size: Standard)   Pulse (!) 43   Temp 98.7 °F (37.1 °C) (Temporal)   Resp 16   Ht 5' 9" (1.753 m)   Wt 81.5 kg (179 lb 9.6 oz)   SpO2 97%   BMI 26.52 kg/m²     Physical Exam  Vitals and nursing note reviewed. Constitutional:       General: He is not in acute distress. Appearance: He is well-developed. HENT:      Head: Normocephalic and atraumatic. Nose: No congestion. Eyes:      Conjunctiva/sclera: Conjunctivae normal.   Cardiovascular:      Rate and Rhythm: Normal rate and regular rhythm. Heart sounds: No murmur heard. Pulmonary:      Effort: Pulmonary effort is normal. No respiratory distress. Breath sounds: Normal breath sounds. Abdominal:      Palpations: Abdomen is soft. Tenderness: There is no abdominal tenderness. Musculoskeletal:         General: No swelling. Cervical back: Neck supple. Skin:     General: Skin is warm and dry. Capillary Refill: Capillary refill takes less than 2 seconds. Neurological:      Mental Status: He is alert. Motor: Weakness present.       Comments: Right UE weakness 4/5 (residual post stroke)    Psychiatric:         Mood and Affect: Mood normal.          DO Ita Klein

## 2023-08-24 NOTE — ASSESSMENT & PLAN NOTE
- Suspected contributive to recent L.  MCA stroke   - Schedule for surgical repair with cardiothoracic surgery (Dr. Filomena Owens)

## 2023-08-24 NOTE — ASSESSMENT & PLAN NOTE
- s/p L.  MCA stroke   - Recovering, seeing Neurology   - Still has residual right arm numbness and expressive aphagia   - Pt is seeing speech, PT on weekly basis   - Will be going back to work next week, no acute concern at this point   - Continue with Keppra 750 mg BID, recommend compliance with PT/Speech

## 2023-08-24 NOTE — TELEPHONE ENCOUNTER
Blanca Gonzalez, patient's father. Notified him of Diego's response. He expressed understanding. He will have the patient re-discuss the lexapro with the PCP today to see how long he needs to be on it. He will call the office if a referral to psychiatry is needed. Expressed the benefits of following with psychiatry post stroke. Jose Ogden reports how he has been staying with the patient in his apartment since discharge. He reports the patient is fully independent with ADLs and tasks around the house. Reports he is doing very well. The only task he needs assistance with is driving. Jose Ogden provides transportation. The patient returned to work today. Jose Ogden inquired if it would be okay for him to slowly move out of the patient's apartment. Jose Ogden lives 7 minutes away from the patient, will see him every day to provide transportation, and will check in on him via phone calls. Advised how this RN does not see a conflict with this as long as the patient feels safe with this plan. Will notify Annika Rodriguez just in case. Jose Ogden is aware to call the office with any questions or concerns. He was appreciative.

## 2023-08-24 NOTE — TELEPHONE ENCOUNTER
Minnie Petty,     If we could let the patient and his father know that I can certainly refill the [address] [facility] for him as it is important that the patient get this medication in case he would have a seizure.  In regards to the Lexapro, I can certainly fill that as well. Ethantesfaye Sawant would recommend that this is something that could be managed by his primary care and if they are not willing to manage it may be going to see a psychiatrist for further evaluation.  But I will make sure to fill both of these meds for the patient at this time.      Clem Almaraz

## 2023-08-24 NOTE — ASSESSMENT & PLAN NOTE
BMI Counseling: Body mass index is 26.52 kg/m². The BMI is above normal. Nutrition recommendations include reducing portion sizes. Exercise recommendations include moderate aerobic physical activity for 150 minutes/week.

## 2023-08-24 NOTE — PROGRESS NOTES
- Refill on the patient's Keppra and Lexapro at this time. Should have enough refills on the medication for the next few months at this time.     Gayathri Graves PA-C  08/24/2023

## 2023-08-28 NOTE — TELEPHONE ENCOUNTER
I do not have any objections to this as long as the patient is not a hazard to himself or anyone else at thus moment in time.  Thanks!     -Leatha Velarde

## 2023-08-28 NOTE — TELEPHONE ENCOUNTER
Grayson Calvin. Notified him of Diego's response. He expressed understanding. He reports how he has decided to stay with the patient until the end of September just in case. He was appreciative.

## 2023-09-06 ENCOUNTER — OFFICE VISIT (OUTPATIENT)
Dept: CARDIAC SURGERY | Facility: CLINIC | Age: 43
End: 2023-09-06
Payer: COMMERCIAL

## 2023-09-06 VITALS
DIASTOLIC BLOOD PRESSURE: 67 MMHG | SYSTOLIC BLOOD PRESSURE: 117 MMHG | WEIGHT: 174 LBS | BODY MASS INDEX: 25.77 KG/M2 | OXYGEN SATURATION: 100 % | HEIGHT: 69 IN | HEART RATE: 42 BPM

## 2023-09-06 DIAGNOSIS — I48.91 ATRIAL FIBRILLATION, UNSPECIFIED TYPE (HCC): ICD-10-CM

## 2023-09-06 DIAGNOSIS — I63.512 ACUTE ISCHEMIC LEFT MCA STROKE (HCC): ICD-10-CM

## 2023-09-06 DIAGNOSIS — Q21.12 PFO (PATENT FORAMEN OVALE): Primary | ICD-10-CM

## 2023-09-06 PROCEDURE — 93000 ELECTROCARDIOGRAM COMPLETE: CPT | Performed by: INTERNAL MEDICINE

## 2023-09-06 PROCEDURE — 99205 OFFICE O/P NEW HI 60 MIN: CPT | Performed by: INTERNAL MEDICINE

## 2023-09-06 NOTE — PROGRESS NOTES
Cardiology Consultation  Interventional Cardiology and [address]  1980  [id number]  [address] [place] [address] [facility]  [address] [id number] [phone]    1. PFO (patent foramen ovale)  Ambulatory Referral to Cardiology    POCT ECG    AMB extended holter monitor      2. L MCA stroke w/ LM1 partial occlusion  Ambulatory Referral to Cardiology    POCT ECG      3. Atrial fibrillation, unspecified type ([address])  AMB extended holter monitor             Discussion/Summary    LMCA CVA, presumed embolic possible PFO related CVA, RoPE 8, (84% chance CVA is due to PFO)    Plan  1) Zio patch monitor for pafib, statistically not cause regardless, no dilated atria appreciated  2) offered pfo closure. Discussed in detail embryology of PFO, relationship to paradoxical embolism specific to CVA with >50% counseling management options including medical therapy with antiplatelet therapy (which she is on), anticoagulation and closure. After discussion, he wishes to think about PFO percutaneous closure. Understands risks which include but not limited to stroke, heart attack, death, need for urgent open-heart her vascular surgery to repair a quit induced injury. Device migration both early and late Requiring surgical removal, arrhythmias such as atrial fibrillation,   S=Hhe also understands benefits of closure compared to medical therapy as per respect and reduce trials. he also understands alternative therapy with medical therapy (which he currently prefers), discussed open closure which she does not want at this time. He will call if desires to schedule closure. Spent 55min with patient and father >50% counseling CVA and embolic causes        History:     56-year-old male referred for PFO closure    Patient had a MCA presumptive embolic infarct and transesophageal echocardiogram and its work-up revealing a PFO. In May 2023.   He did have COVID in . Have thrombectomy performed in May 2023 as treatment. No A-fib or hypercoagulable state. He is an avid runner and runs 6 to 7 miles daily. He denies any prior vascular events. He did undergo rehab and is back to work. 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 No pertinent past medical history.   Social History     Socioeconomic History    Marital status: Single     Spouse name: Not on file    Number of children: Not on file    Years of education: Not on file    Highest education level: Not on file   Occupational History    Not on file   Tobacco Use    Smoking status: Former     Packs/day: 0.50     Years: 15.00     Total pack years: 7.50     Types: Cigarettes     Quit date: 6/3/2023     Years since quittin.2    Smokeless tobacco: Never   Vaping Use    Vaping Use: Never used   Substance and Sexual Activity    Alcohol use: Yes     Comment: Social    Drug use: Never    Sexual activity: Not Currently   Other Topics Concern    Not on file   Social History Narrative    Not on file     Social Determinants of Health     Financial Resource Strain: Low Risk  (2023)    Overall Financial Resource Strain (CARDIA)     Difficulty of Paying Living Expenses: Not hard at all   Food Insecurity: No Food Insecurity (2023)    Hunger Vital Sign     Worried About Running Out of Food in the Last Year: Never true     Ran Out of Food in the Last Year: Never true Transportation Needs: No Transportation Needs (5/16/2023)    PRAPARE - Transportation     Lack of Transportation (Medical): No     Lack of Transportation (Non-Medical): No   Physical Activity: Sufficiently Active (8/24/2023)    Exercise Vital Sign     Days of Exercise per Week: 7 days     Minutes of Exercise per Session: 60 min   Stress: Stress Concern Present (8/24/2023)    [address] [facility]     Feeling of Stress : To some extent   Social Connections: Not on file   Intimate Partner Violence: Not At Risk (8/24/2023)    Humiliation, Afraid, Rape, and Kick questionnaire     Fear of Current or Ex-Partner: No     Emotionally Abused: No     Physically Abused: No     Sexually Abused: No   Housing Stability: Low Risk  (8/24/2023)    Housing Stability Vital Sign     Unable to Pay for Housing in the Last Year: No     Number of [address] in the Last Year: 1     Unstable Housing in the Last Year: No      History reviewed. No pertinent family history.   Past Surgical History:   Procedure Laterality Date    IR STROKE ALERT  5/15/2023       Current Outpatient Medications:     aspirin 81 mg chewable tablet, Chew 1 tablet (81 mg total) daily, Disp: 30 tablet, Rfl: 0    escitalopram (LEXAPRO) 5 mg tablet, Take 1 tablet (5 mg total) by mouth daily, Disp: 30 tablet, Rfl: 3    levETIRAcetam (KEPPRA) 750 mg tablet, Take 1 tablet (750 mg total) by mouth every 12 (twelve) hours, Disp: 60 tablet, Rfl: 3    acetaminophen (TYLENOL) 325 mg tablet, Take 2 tablets (650 mg total) by mouth every 6 (six) hours as needed for mild pain or headaches (mild pain) (Patient not taking: Reported on 8/24/2023), Disp: , Rfl: 0    MELATONIN PO, Take by mouth (Patient not taking: Reported on 9/6/2023), Disp: , Rfl:     Protein POWD, Take by mouth (Patient not taking: Reported on 9/6/2023), Disp: , Rfl:   No Known Allergies    Social, Family and medication history as listed, reviewed and updated as necessary    Labs:   Lab Results   Component Value Date    K 4.1 06/05/2023     06/05/2023    CO2 30 06/05/2023    BUN 8 06/05/2023    CREATININE 0.80 06/05/2023    CREATININE 0.90 05/29/2023    CALCIUM 8.8 06/05/2023       Lab Results   Component Value Date    WBC 3.99 (L) 06/05/2023    HGB 12.1 06/05/2023    HGB 12.8 05/29/2023    HCT 34.7 (L) 06/05/2023    HCT 36.4 (L) 05/29/2023     06/05/2023     05/29/2023       No results found for: "CHOL"  Lab Results   Component Value Date     05/16/2023     Lab Results   Component Value Date    LDLCALC 18 05/16/2023     Lab Results   Component Value Date    TRIG 34 05/16/2023     No results found for: "LDLDIRECT"    Lab Results   Component Value Date    ALT 26 05/17/2023    ALT 26 05/16/2023    AST 38 05/17/2023    AST 37 05/16/2023    ALKPHOS 44 (L) 05/17/2023    ALKPHOS 47 05/16/2023             No results found for: "NTBNP"    Lab Results   Component Value Date    HGBA1C 4.8 05/16/2023       Imaging: Reviewed in epic      Review of Systems:  14 systems reviewed and negative with exception of the above       PHYSICAL EXAM:        Vitals:    09/06/23 0914   BP: 117/67   Pulse: (!) 42   SpO2: 100%     Body mass index is 25.7 kg/m². Weight (last 2 days)       Date/Time Weight    09/06/23 0914 78.9 (174)               Gen: No acute distress  HEENT: anicteric, mucous membranes moist  Neck: supple, no jugular venous distention, or carotid bruit  Heart: regular, normal s1 and s2, no murmur/rub or gallop  Lungs :clear to auscultation bilaterally, no rales/rhonchi or wheeze  Abdomen: soft nontender, normoactive bowel sounds, no organomegaly  Ext: warm and perfused, normal femoral pulses, no edema, or clubbing  Skin: warm, no rashes  Neuro: AAO x 3, no focal findings  Psychiatric: normal affect  Musculoskeletal: no obvious joint deformities. This note was completed in part utilizing direct voice recognition software.    Grammatical errors, random word insertion, spelling mistakes, and incomplete sentences may be an occasional consequence of the system secondary to software limitations, ambient noise and hardware issues. At the time of dictation, efforts were made to edit, clarify and /or correct errors. Please read the chart carefully and recognize, using context, where substitutions have occurred. If you have any questions or concerns about the context, text or information contained within the body of this dictation, please contact myself, the provider, for further clarification.

## 2023-10-23 ENCOUNTER — TELEPHONE (OUTPATIENT)
Dept: CARDIOLOGY CLINIC | Facility: CLINIC | Age: 43
End: 2023-10-23

## 2023-10-23 PROBLEM — Z11.59 NEED FOR HEPATITIS C SCREENING TEST: Status: RESOLVED | Noted: 2023-08-24 | Resolved: 2023-10-23

## 2023-11-01 ENCOUNTER — TELEPHONE (OUTPATIENT)
Dept: CARDIAC SURGERY | Facility: CLINIC | Age: 43
End: 2023-11-01

## 2023-11-01 NOTE — TELEPHONE ENCOUNTER
Called patient to ask if he sent the zio patch back to [facility]io [facility] pt states he is busy in a meeting and will call the office back.

## 2023-11-02 ENCOUNTER — APPOINTMENT (EMERGENCY)
Dept: RADIOLOGY | Facility: HOSPITAL | Age: 43
End: 2023-11-02
Payer: COMMERCIAL

## 2023-11-02 ENCOUNTER — HOSPITAL ENCOUNTER (EMERGENCY)
Facility: HOSPITAL | Age: 43
Discharge: HOME | End: 2023-11-02
Attending: EMERGENCY MEDICINE
Payer: COMMERCIAL

## 2023-11-02 VITALS
OXYGEN SATURATION: 98 % | SYSTOLIC BLOOD PRESSURE: 143 MMHG | TEMPERATURE: 97.8 F | RESPIRATION RATE: 20 BRPM | HEART RATE: 60 BPM | DIASTOLIC BLOOD PRESSURE: 82 MMHG

## 2023-11-02 DIAGNOSIS — G40.919 BREAKTHROUGH SEIZURE (HCC): Primary | ICD-10-CM

## 2023-11-02 LAB
ALBUMIN SERPL BCP-MCNC: 4.1 G/DL (ref 3.5–5)
ALP SERPL-CCNC: 81 U/L (ref 34–104)
ALT SERPL W P-5'-P-CCNC: 20 U/L (ref 7–52)
ANION GAP SERPL CALCULATED.3IONS-SCNC: 10 MMOL/L
AST SERPL W P-5'-P-CCNC: 25 U/L (ref 13–39)
BASOPHILS # BLD AUTO: 0.08 THOUSANDS/ÂΜL (ref 0–0.1)
BASOPHILS NFR BLD AUTO: 2 % (ref 0–1)
BILIRUB SERPL-MCNC: 0.36 MG/DL (ref 0.2–1)
BUN SERPL-MCNC: 10 MG/DL (ref 5–25)
CALCIUM SERPL-MCNC: 9 MG/DL (ref 8.4–10.2)
CHLORIDE SERPL-SCNC: 104 MMOL/L (ref 96–108)
CO2 SERPL-SCNC: 25 MMOL/L (ref 21–32)
CREAT SERPL-MCNC: 0.93 MG/DL (ref 0.6–1.3)
EOSINOPHIL # BLD AUTO: 0.36 THOUSAND/ÂΜL (ref 0–0.61)
EOSINOPHIL NFR BLD AUTO: 7 % (ref 0–6)
ERYTHROCYTE [DISTWIDTH] IN BLOOD BY AUTOMATED COUNT: 12 % (ref 11.6–15.1)
GFR SERPL CREATININE-BSD FRML MDRD: 101 ML/MIN/1.73SQ M
GLUCOSE SERPL-MCNC: 112 MG/DL (ref 65–140)
GLUCOSE SERPL-MCNC: 118 MG/DL (ref 65–140)
HCT VFR BLD AUTO: 41.2 % (ref 36.5–49.3)
HGB BLD-MCNC: 14.5 G/DL (ref 12–17)
IMM GRANULOCYTES # BLD AUTO: 0.01 THOUSAND/UL (ref 0–0.2)
IMM GRANULOCYTES NFR BLD AUTO: 0 % (ref 0–2)
LYMPHOCYTES # BLD AUTO: 2.3 THOUSANDS/ÂΜL (ref 0.6–4.47)
LYMPHOCYTES NFR BLD AUTO: 43 % (ref 14–44)
MCH RBC QN AUTO: 31.5 PG (ref 26.8–34.3)
MCHC RBC AUTO-ENTMCNC: 35.2 G/DL (ref 31.4–37.4)
MCV RBC AUTO: 89 FL (ref 82–98)
MONOCYTES # BLD AUTO: 0.49 THOUSAND/ÂΜL (ref 0.17–1.22)
MONOCYTES NFR BLD AUTO: 9 % (ref 4–12)
NEUTROPHILS # BLD AUTO: 2.11 THOUSANDS/ÂΜL (ref 1.85–7.62)
NEUTS SEG NFR BLD AUTO: 39 % (ref 43–75)
NRBC BLD AUTO-RTO: 0 /100 WBCS
PLATELET # BLD AUTO: 261 THOUSANDS/UL (ref 149–390)
PMV BLD AUTO: 8.9 FL (ref 8.9–12.7)
POTASSIUM SERPL-SCNC: 3.5 MMOL/L (ref 3.5–5.3)
PROT SERPL-MCNC: 7.1 G/DL (ref 6.4–8.4)
RBC # BLD AUTO: 4.61 MILLION/UL (ref 3.88–5.62)
SODIUM SERPL-SCNC: 139 MMOL/L (ref 135–147)
WBC # BLD AUTO: 5.35 THOUSAND/UL (ref 4.31–10.16)

## 2023-11-02 PROCEDURE — 80177 DRUG SCRN QUAN LEVETIRACETAM: CPT

## 2023-11-02 PROCEDURE — 80053 COMPREHEN METABOLIC PANEL: CPT

## 2023-11-02 PROCEDURE — 96361 HYDRATE IV INFUSION ADD-ON: CPT

## 2023-11-02 PROCEDURE — 36415 COLL VENOUS BLD VENIPUNCTURE: CPT

## 2023-11-02 PROCEDURE — 96360 HYDRATION IV INFUSION INIT: CPT

## 2023-11-02 PROCEDURE — 99285 EMERGENCY DEPT VISIT HI MDM: CPT | Performed by: EMERGENCY MEDICINE

## 2023-11-02 PROCEDURE — 93005 ELECTROCARDIOGRAM TRACING: CPT

## 2023-11-02 PROCEDURE — 71045 X-RAY EXAM CHEST 1 VIEW: CPT

## 2023-11-02 PROCEDURE — 82948 REAGENT STRIP/BLOOD GLUCOSE: CPT

## 2023-11-02 PROCEDURE — G1004 CDSM NDSC: HCPCS

## 2023-11-02 PROCEDURE — 85025 COMPLETE CBC W/AUTO DIFF WBC: CPT

## 2023-11-02 PROCEDURE — 99285 EMERGENCY DEPT VISIT HI MDM: CPT

## 2023-11-02 PROCEDURE — 70450 CT HEAD/BRAIN W/O DYE: CPT

## 2023-11-02 RX ORDER — LEVETIRACETAM 750 MG/1
750 TABLET ORAL ONCE
Status: COMPLETED | OUTPATIENT
Start: 2023-11-02 | End: 2023-11-02

## 2023-11-02 RX ORDER — KETAMINE HYDROCHLORIDE 100 MG/ML
1 INJECTION, SOLUTION INTRAMUSCULAR; INTRAVENOUS ONCE
Status: COMPLETED | OUTPATIENT
Start: 2023-11-02 | End: 2023-11-02

## 2023-11-02 RX ORDER — MIDAZOLAM HYDROCHLORIDE 1 MG/ML
3 INJECTION INTRAMUSCULAR; INTRAVENOUS ONCE
Status: COMPLETED | OUTPATIENT
Start: 2023-11-02 | End: 2023-11-02

## 2023-11-02 RX ADMIN — SODIUM CHLORIDE 1000 ML: 0.9 INJECTION, SOLUTION INTRAVENOUS at 01:57

## 2023-11-02 RX ADMIN — LEVETIRACETAM 750 MG: 750 TABLET, FILM COATED ORAL at 03:31

## 2023-11-02 NOTE — ED ATTENDING ATTESTATION
11/2/2023  IFloyd MD, saw and evaluated the patient. I have discussed the patient with the resident/non-physician practitioner and agree with the resident's/non-physician practitioner's findings, Plan of Care, and MDM as documented in the resident's/non-physician practitioner's note, except where noted. All available labs and Radiology studies were reviewed. I was present for key portions of any procedure(s) performed by the resident/non-physician practitioner and I was immediately available to provide assistance. At this point I agree with the current assessment done in the Emergency Department. I have conducted an independent evaluation of this patient a history and physical is as follows:    81-year-old male with a history of prior stroke and seizures presents to the emergency department for evaluation following seizure-like event. EMS was initially called for breakthrough seizure, when EMS arrived, patient was postictal and then became combative requiring sedation with 6 mg of IV Versed and 100 mg of IM ketamine. Upon arrival to the emergency department, patient is currently calm and cooperative. No outward signs of trauma. No recent illnesses. No difficulty breathing. No vomiting. No reported head trauma. On exam, patient resting comfortably in bed in no acute distress, is normocephalic atraumatic, pupils equal round reactive to light, neck is supple without meningismus signs, heart is regular rate and rhythm with intact distal pulses, no increased work of breathing, respiratory distress, or stridor. No focal neurologic deficits present. No clonus. Suspect symptoms likely secondary to breakthrough seizure, period of agitation likely secondary to being postictal, will get head CT to evaluate for possible acute intracranial pathology, will check labs to evaluate for metabolic derangement and will monitor clinically. Labs grossly unremarkable. Patient returned to baseline. As patient missed a dose of Keppra tonight, will give home dose and discharge home with neurology follow-up.             ED Course  ED Course as of 11/02/23 0237   Thu Nov 02, 2023   0230 No acute intracranial abnormality per my interpretation head CT         Critical Care Time  Procedures

## 2023-11-02 NOTE — DISCHARGE INSTRUCTIONS
Pratima Linda was seen and evaluated today in the emergency department over your concern of seizure. The workup that we performed showed seizure, most likely because you missed your PM dose of keppra. Please return to the emergency department if you experience return of symptoms or any other signs and symptoms that may be concerning to you. Please follow-up with your primary care doctor within 1 day. All questions were answered prior to discharge. Thank you for choosing [facility] for your care.     F/u with neurologist

## 2023-11-02 NOTE — ED PROVIDER NOTES
History  Chief Complaint   Patient presents with    Seizure - Prior Hx Of     Pt arrived via EMS. Hx stroke, seizures. Was post ictal when EMS arrived and became combative striking multiple medics and needed to be restrained by police. Pt is calm and cooperative at this time. 46-year-old male past medical history of stroke left MCA May 2023 treated with thrombectomy, PFO, paroxysmal A-fib, seizures on Keppra presents emergency department postictal after having a witnessed seizure from his residence. Patient was combative with EMS and received 6 mg of IM Versed, 100 mg of IM ketamine. He was patient was GCS 14 on arrival to the emergency department, protecting airway. Patient responded to verbal stimuli and answer questions appropriately. No nystagmus. No focal neurological deficits on initial presentation. Patient is on Keppra, based on neuro note on 8/17/2023 patient had a seizure while at the hospital in May that had a right gaze deviation. Video EEG at that time showed no epileptiform discharges however did show cerebral dysfunction the left hemisphere. Prior to Admission Medications   Prescriptions Last Dose Informant Patient Reported? Taking?    MELATONIN PO  Self Yes No   Sig: Take by mouth   Patient not taking: Reported on 9/6/2023   Protein POWD  Self Yes No   Sig: Take by mouth   Patient not taking: Reported on 9/6/2023   acetaminophen (TYLENOL) 325 mg tablet  Self No No   Sig: Take 2 tablets (650 mg total) by mouth every 6 (six) hours as needed for mild pain or headaches (mild pain)   Patient not taking: Reported on 8/24/2023   aspirin 81 mg chewable tablet  Self No No   Sig: Chew 1 tablet (81 mg total) daily   escitalopram (LEXAPRO) 5 mg tablet  Self No No   Sig: Take 1 tablet (5 mg total) by mouth daily   levETIRAcetam (KEPPRA) 750 mg tablet  Self No No   Sig: Take 1 tablet (750 mg total) by mouth every 12 (twelve) hours      Facility-Administered Medications: None       Past Medical History:   Diagnosis Date    Seizure [facility])     Stroke [facility])        Past Surgical History:   Procedure Laterality Date    IR STROKE ALERT  5/15/2023       No family history on file. I have reviewed and agree with the history as documented. E-Cigarette/Vaping    E-Cigarette Use Never User      E-Cigarette/Vaping Substances     Social History     Tobacco Use    Smoking status: Former     Packs/day: 0.50     Years: 15.00     Total pack years: 7.50     Types: Cigarettes     Quit date: 6/3/2023     Years since quittin.4    Smokeless tobacco: Never   Vaping Use    Vaping Use: Never used   Substance Use Topics    Alcohol use: Yes     Comment: Social    Drug use: Never        Review of Systems   Neurological:  Positive for seizures. All other systems reviewed and are negative. Physical Exam  ED Triage Vitals [23 0151]   Temperature Pulse Respirations Blood Pressure SpO2   97.8 °F (36.6 °C) 71 16 147/87 96 %      Temp Source Heart Rate Source Patient Position - Orthostatic VS BP Location FiO2 (%)   Oral Monitor Lying Right arm --      Pain Score       --             Orthostatic Vital Signs  Vitals:    23 0151 23 0200 23 0300   BP: 147/87 147/97 143/82   Pulse: 71 66 60   Patient Position - Orthostatic VS: Lying Lying Lying       Physical Exam  Vitals and nursing note reviewed. Constitutional:       General: He is not in acute distress. Appearance: He is well-developed. HENT:      Head: Normocephalic and atraumatic. Eyes:      Conjunctiva/sclera: Conjunctivae normal.   Cardiovascular:      Rate and Rhythm: Normal rate and regular rhythm. Heart sounds: No murmur heard. Pulmonary:      Effort: Pulmonary effort is normal. No respiratory distress. Breath sounds: Normal breath sounds. Abdominal:      Palpations: Abdomen is soft. Tenderness: There is no abdominal tenderness. Musculoskeletal:         General: No swelling. Cervical back: Neck supple.    Skin: General: Skin is warm and dry. Capillary Refill: Capillary refill takes less than 2 seconds. Neurological:      Mental Status: He is alert.    Psychiatric:         Mood and Affect: Mood normal.         ED Medications  Medications   sodium chloride 0.9 % bolus 1,000 mL (0 mL Intravenous Stopped 11/2/23 0333)   midazolam (FOR EMS ONLY) (VERSED) 2 mg/2 mL injection 6 mg (0 mg Does not apply Given to EMS 11/2/23 0224)   ketamine (FOR EMS ONLY) (KETALAR) 100 mg/mL 500 mg (0 mg Does not apply Given to EMS 11/2/23 0224)   levETIRAcetam (KEPPRA) tablet 750 mg (750 mg Oral Given 11/2/23 0331)       Diagnostic Studies  Results Reviewed       Procedure Component Value Units Date/Time    Comprehensive metabolic panel [[id number]] Collected: 11/02/23 0154    Lab Status: Final result Specimen: Blood from Arm, Left Updated: 11/02/23 0228     Sodium 139 mmol/L      Potassium 3.5 mmol/L      Chloride 104 mmol/L      CO2 25 mmol/L      ANION GAP 10 mmol/L      BUN 10 mg/dL      Creatinine 0.93 mg/dL      Glucose 118 mg/dL      Calcium 9.0 mg/dL      AST 25 U/L      ALT 20 U/L      Alkaline Phosphatase 81 U/L      Total Protein 7.1 g/dL      Albumin 4.1 g/dL      Total Bilirubin 0.36 mg/dL      eGFR 101 ml/min/1.73sq m     Narrative:      [facility] guidelines for Chronic Kidney Disease (CKD):     Stage 1 with normal or high GFR (GFR > 90 mL/min/1.73 square meters)    Stage 2 Mild CKD (GFR = 60-89 mL/min/1.73 square meters)    Stage 3A Moderate CKD (GFR = 45-59 mL/min/1.73 square meters)    Stage 3B Moderate CKD (GFR = 30-44 mL/min/1.73 square meters)    Stage 4 Severe CKD (GFR = 15-29 mL/min/1.73 square meters)    Stage 5 End Stage CKD (GFR <15 mL/min/1.73 square meters)  Note: GFR calculation is accurate only with a steady state creatinine    CBC and differential [[id number]]  (Abnormal) Collected: 11/02/23 0154    Lab Status: Final result Specimen: Blood from Arm, Left Updated: 11/02/23 0207     WBC 5.35 Thousand/uL      RBC 4.61 Million/uL      Hemoglobin 14.5 g/dL      Hematocrit 41.2 %      MCV 89 fL      MCH 31.5 pg      MCHC 35.2 g/dL      RDW 12.0 %      MPV 8.9 fL      Platelets 777 Thousands/uL      nRBC 0 /100 WBCs      Neutrophils Relative 39 %      Immat GRANS % 0 %      Lymphocytes Relative 43 %      Monocytes Relative 9 %      Eosinophils Relative 7 %      Basophils Relative 2 %      Neutrophils Absolute 2.11 Thousands/µL      Immature Grans Absolute 0.01 Thousand/uL      Lymphocytes Absolute 2.30 Thousands/µL      Monocytes Absolute 0.49 Thousand/µL      Eosinophils Absolute 0.36 Thousand/µL      Basophils Absolute 0.08 Thousands/µL     Levetiracetam level [[id number]] Collected: 11/02/23 0154    Lab Status: In process Specimen: Blood from Arm, Left Updated: 11/02/23 0159    Fingerstick Glucose (POCT) [[id number]]  (Normal) Collected: 11/02/23 0146    Lab Status: Final result Updated: 11/02/23 0147     POC Glucose 112 mg/dl                    XR chest 1 view portable   ED Interpretation by Aida Tadeo DO (11/02 0301)   No acute cardiopulmonary pathology      CT head without contrast   Final Result by Ash Flynn MD (11/02 0230)      Sequelae of old large left MCA vascular distribution infarction with associated ex vacuo dilatation of the left lateral ventricle. No acute intracranial hemorrhage identified.             Workstation performed: [id number]               Procedures  ECG 12 Lead Documentation Only    Date/Time: 11/2/2023 2:03 AM    Performed by: Aida Tadeo DO  Authorized by: Aida Tadeo DO    Indications / Diagnosis:  Seizure  ECG reviewed by me, the ED Provider: yes    Patient location:  ED  Previous ECG:     Previous ECG:  Compared to current    Similarity:  No change    Comparison to cardiac monitor: Yes    Interpretation:     Interpretation: normal    Rate:     ECG rate:  62    ECG rate assessment: normal    Rhythm:     Rhythm: sinus rhythm    Ectopy:     Ectopy: none QRS:     QRS axis:  Normal    QRS intervals:  Normal  Conduction:     Conduction: normal    ST segments:     ST segments:  Normal  T waves:     T waves: normal          ED Course  ED Course as of 11/02/23 0435   Thu Nov 02, 2023 0202 POC Glucose: 112  Normal glucose, not concern for hypoglycemia at this time. 0209 CBC and differential(!)  wnl   0236 CT head without contrast  IMPRESSION:     Sequelae of old large left MCA vascular distribution infarction with associated ex vacuo dilatation of the left lateral ventricle. No acute intracranial hemorrhage identified. Workstation performed: [id number]     0236 Levetiracetam level  Level will be pending prior to final dispo   0301 XR chest 1 view portable  No acute cardiopulmonary pathology     0302 Phone call to patient's father. Voicemail left. Provided callback number for [facility] emergency department. 0310 Missed keppra tonight. Will give meds         CRAFFT      Flowsheet Row Most Recent Value   CRAFFT Initial Screen: During the past 12 months, did you:    1. Drink any alcohol (more than a few sips)? No Filed at: 11/02/2023 0249   2. Smoke any marijuana or hashish No Filed at: 11/02/2023 0249   3. Use anything else to get high? ("anything else" includes illegal drugs, over the counter and prescription drugs, and things that you sniff or 'fowler')? No Filed at: 11/02/2023 0249                            SBIRT 20yo+      Flowsheet Row Most Recent Value   Initial Alcohol Screen: US AUDIT-C     1. How often do you have a drink containing alcohol? 0 Filed at: 11/02/2023 0249   2. How many drinks containing alcohol do you have on a typical day you are drinking? 0 Filed at: 11/02/2023 0249   3a. Male UNDER 65: How often do you have five or more drinks on one occasion? 0 Filed at: 11/02/2023 0249   3b. FEMALE Any Age, or MALE 65+: How often do you have 4 or more drinks on one occassion?  0 Filed at: 11/02/2023 0249   Audit-C Score 0 Filed at: 11/02/2023 355 ALMA: How many times in the past year have you. .. Used an illegal drug or used a prescription medication for non-medical reasons? Never Filed at: 11/02/2023 0249                  Medical Decision Making  52-year-old male presents emergency department after experiencing a tonic-clonic seizure at home    DDx: Seizure, syncope, cardiac dysrhythmia, hypoglycemia, intracranial abnormality    Plan: BMP, CBC, ECG, point-of-care glucose, chest x-ray, CT head    Work-up performed in the emergency department was overall unremarkable. Normal ECG, normal chest x-ray, CT head showed stable changes from stroke earlier this year. Conversation with patient's father. Patient did not take p.m. Keppra. Keppra level was sent. Likely patient had a seizure due to medication noncompliance. Patient returned to baseline and was able to ambulate emergency department. Based on prior notes, patient is experiencing aphasia following stroke. Patient answer all questions appropriately was stable for discharge home. Patient instructed take his medications he was provided with 1 dose of Keppra in the emergency department. Patient provided with work note instructed to follow-up with neurologist, Dr. Chance Rausch. Amount and/or Complexity of Data Reviewed  Labs: ordered. Decision-making details documented in ED Course. Radiology: ordered and independent interpretation performed. Decision-making details documented in ED Course. Risk  Prescription drug management.           Disposition  Final diagnoses:   Breakthrough seizure ([address])     Time reflects when diagnosis was documented in both MDM as applicable and the Disposition within this note       Time User Action Codes Description Comment    11/2/2023  3:11 AM Jorge Banuelos Add [G40.919] Breakthrough seizure [facility])           ED Disposition       ED Disposition   Discharge    Condition   Stable    Date/Time   Thu Nov 2, 2023 [address] [facility] discharge to home/self care.                   Follow-up Information       Follow up With Specialties Details Why Contact Info Additional Information    Emerald Locke MD Neurology   [address] [facility] [phone] [address] [place] [address] Emergency Department Emergency Medicine Go to  If symptoms worsen [address] [phone]  [facility] Emergency Department, [address] [place], [place], [facility]            Discharge Medication List as of 11/2/2023  3:13 AM        CONTINUE these medications which have NOT CHANGED    Details   acetaminophen (TYLENOL) 325 mg tablet Take 2 tablets (650 mg total) by mouth every 6 (six) hours as needed for mild pain or headaches (mild pain), Starting Fri 6/9/2023, No Print      aspirin 81 mg chewable tablet Chew 1 tablet (81 mg total) daily, Starting Fri 6/9/2023, Normal      escitalopram (LEXAPRO) 5 mg tablet Take 1 tablet (5 mg total) by mouth daily, Starting Thu 8/24/2023, Normal      levETIRAcetam (KEPPRA) 750 mg tablet Take 1 tablet (750 mg total) by mouth every 12 (twelve) hours, Starting Thu 8/24/2023, Normal      MELATONIN PO Take by mouth, Historical Med      Protein POWD Take by mouth, Historical Med           No discharge procedures on file. PDMP Review       None             ED Provider  Attending physically available and evaluated [address] [facility]. I managed the patient along with the ED Attending.     Electronically Signed by           Delbert Bolden DO  11/02/23 3298

## 2023-11-02 NOTE — Clinical Note
Sarah Nelson was seen and treated in our emergency department on 11/2/2023. as tolerated    Diagnosis: seizure    Carrol Solis  may return to work on return date. He may return on this date: 11/03/2023         If you have any questions or concerns, please don't hesitate to call.       Joseph Powell, DO    ______________________________           _______________          _______________  Hospital Representative                              Date                                Time

## 2023-11-03 LAB
ATRIAL RATE: 62 BPM
P AXIS: 30 DEGREES
PR INTERVAL: 160 MS
QRS AXIS: 18 DEGREES
QRSD INTERVAL: 106 MS
QT INTERVAL: 406 MS
QTC INTERVAL: 412 MS
T WAVE AXIS: 34 DEGREES
VENTRICULAR RATE: 62 BPM

## 2023-11-03 PROCEDURE — 93010 ELECTROCARDIOGRAM REPORT: CPT | Performed by: INTERNAL MEDICINE

## 2023-11-04 LAB — LEVETIRACETAM SERPL-MCNC: 3.2 UG/ML (ref 10–40)

## 2023-11-06 ENCOUNTER — RA CDI HCC (OUTPATIENT)
Dept: OTHER | Facility: HOSPITAL | Age: 43
End: 2023-11-06

## 2023-11-20 ENCOUNTER — TELEPHONE (OUTPATIENT)
Dept: NEUROLOGY | Facility: CLINIC | Age: 43
End: 2023-11-20

## 2023-11-20 NOTE — TELEPHONE ENCOUNTER
Called pt father to let him know that they penndot paperwork that he dropped off is now ready to be to be picked up. He stated he will grab it within an hour.

## 2023-12-23 DIAGNOSIS — F06.31 DEPRESSION AS LATE EFFECT OF CEREBROVASCULAR ACCIDENT (CVA): ICD-10-CM

## 2023-12-23 DIAGNOSIS — R56.9 SEIZURE (HCC): ICD-10-CM

## 2023-12-23 DIAGNOSIS — I69.398 DEPRESSION AS LATE EFFECT OF CEREBROVASCULAR ACCIDENT (CVA): ICD-10-CM

## 2023-12-26 ENCOUNTER — OFFICE VISIT (OUTPATIENT)
Dept: FAMILY MEDICINE CLINIC | Facility: CLINIC | Age: 43
End: 2023-12-26

## 2023-12-26 VITALS
WEIGHT: 189.8 LBS | SYSTOLIC BLOOD PRESSURE: 128 MMHG | HEART RATE: 44 BPM | BODY MASS INDEX: 28.11 KG/M2 | RESPIRATION RATE: 18 BRPM | HEIGHT: 69 IN | TEMPERATURE: 98.6 F | OXYGEN SATURATION: 99 % | DIASTOLIC BLOOD PRESSURE: 73 MMHG

## 2023-12-26 DIAGNOSIS — R56.9 SEIZURE (HCC): ICD-10-CM

## 2023-12-26 DIAGNOSIS — Q21.12 PFO (PATENT FORAMEN OVALE): Primary | ICD-10-CM

## 2023-12-26 PROCEDURE — 99213 OFFICE O/P EST LOW 20 MIN: CPT | Performed by: FAMILY MEDICINE

## 2023-12-26 RX ORDER — ESCITALOPRAM OXALATE 5 MG/1
5 TABLET ORAL DAILY
Qty: 30 TABLET | Refills: 0 | Status: SHIPPED | OUTPATIENT
Start: 2023-12-26

## 2023-12-26 RX ORDER — LEVETIRACETAM 750 MG/1
750 TABLET ORAL EVERY 12 HOURS
Qty: 60 TABLET | Refills: 0 | Status: SHIPPED | OUTPATIENT
Start: 2023-12-26 | End: 2024-01-07

## 2023-12-26 NOTE — ASSESSMENT & PLAN NOTE
- Per recent discussion with cardiology, pt deferred surgical intervention  - He has not had appointment follow up with cardiology  Plan  - Encourage pt to make urgent appointment to follow up   - Recommend pt to call Cardiology to set up his Zio patch; pt already received device but has not started monitoring

## 2023-12-26 NOTE — PROGRESS NOTES
Name: Abdirahman Ambrocio      : 1980      MRN: [id number]  Encounter Provider: Frantz Vallejo DO  Encounter Date: 2023   Encounter department: [facility]    Assessment & Plan     1. PFO (patent foramen ovale)  Assessment & Plan:  - Per recent discussion with cardiology, pt deferred surgical intervention  - He has not had appointment follow up with cardiology  Plan  - Encourage pt to make urgent appointment to follow up   - Recommend pt to call Cardiology to set up his Zio patch; pt already received device but has not started monitoring         2. Seizure (HCC)  Assessment & Plan:  - Recent seizure episode in November, led to ED visit, reviewed to be due to medication noncompliance patient forgot to take his p.m. dose of Keppra occasionally  Plan  -Encouraging patient to be compliant with Keppra twice a day  -Encouraging compliance with follow-up with neurology next coming appointment in ,              Subjective      HPI  43 years old male history of CVA, PFO, seizure episode; presented today to follow-up on stroke symptoms, PFO condition as well as recent seizure episode.  Patient reportedly suffered from seizure episode with November, he reviewed that he was noncompliant with taking Keppra as instructed per PFO, patient reported he declined a surgical intervention at this point; he also recommended to start Zio patch monitoring for possible paroxysmal A-fib; which he required device however has not started monitoring yet.  He got appointment setting up for a neurologist in January, no pending appointment for cardiology at this point  Review of Systems   Constitutional:  Negative for chills and fever.   HENT:  Negative for ear pain and sore throat.    Eyes:  Negative for pain and visual disturbance.   Respiratory:  Negative for cough and shortness of breath.    Cardiovascular:  Negative for chest pain and palpitations.   Gastrointestinal:  Negative for  "abdominal pain and vomiting.   Genitourinary:  Negative for dysuria and hematuria.   Musculoskeletal:  Negative for arthralgias and back pain.   Skin:  Negative for color change and rash.   Neurological:  Negative for dizziness, tremors, seizures, syncope, facial asymmetry, speech difficulty and weakness.   All other systems reviewed and are negative.      Current Outpatient Medications on File Prior to Visit   Medication Sig    aspirin 81 mg chewable tablet Chew 1 tablet (81 mg total) daily    escitalopram (LEXAPRO) 5 mg tablet TAKE ONE TABLET BY MOUTH ONE TIME DAILY    levETIRAcetam (KEPPRA) 750 mg tablet TAKE ONE TABLET BY MOUTH EVERY TWELVE HOURS    acetaminophen (TYLENOL) 325 mg tablet Take 2 tablets (650 mg total) by mouth every 6 (six) hours as needed for mild pain or headaches (mild pain) (Patient not taking: Reported on 8/24/2023)    MELATONIN PO Take by mouth (Patient not taking: Reported on 9/6/2023)    Protein POWD Take by mouth (Patient not taking: Reported on 9/6/2023)    [DISCONTINUED] escitalopram (LEXAPRO) 5 mg tablet Take 1 tablet (5 mg total) by mouth daily    [DISCONTINUED] levETIRAcetam (KEPPRA) 750 mg tablet Take 1 tablet (750 mg total) by mouth every 12 (twelve) hours       Objective     /73 (BP Location: Left arm, Patient Position: Sitting, Cuff Size: Large)   Pulse (!) 44   Temp 98.6 °F (37 °C) (Temporal)   Resp 18   Ht 5' 9\" (1.753 m)   Wt 86.1 kg (189 lb 12.8 oz)   SpO2 99%   BMI 28.03 kg/m²     Physical Exam  HENT:      Head: Normocephalic and atraumatic.      Right Ear: External ear normal.      Left Ear: External ear normal.      Nose: No congestion.   Eyes:      General: No scleral icterus.  Cardiovascular:      Rate and Rhythm: Normal rate and regular rhythm.      Heart sounds: No murmur heard.  Pulmonary:      Effort: No respiratory distress.      Breath sounds: Normal breath sounds.   Abdominal:      General: There is no distension.      Palpations: Abdomen is soft. "   Musculoskeletal:         General: No swelling.   Skin:     Capillary Refill: Capillary refill takes less than 2 seconds.      Coloration: Skin is not jaundiced.   Neurological:      General: No focal deficit present.      Mental Status: He is alert and oriented to person, place, and time.      Cranial Nerves: No cranial nerve deficit.   Psychiatric:         Mood and Affect: Mood normal.         Behavior: Behavior normal.       Frantz Vallejo, DO

## 2023-12-26 NOTE — ASSESSMENT & PLAN NOTE
- Recent seizure episode in November, led to ED visit, reviewed to be due to medication noncompliance patient forgot to take his p.m. dose of Keppra occasionally  Plan  -Encouraging patient to be compliant with Keppra twice a day  -Encouraging compliance with follow-up with neurology next coming appointment in January 18,

## 2024-01-05 ENCOUNTER — APPOINTMENT (EMERGENCY)
Dept: RADIOLOGY | Facility: HOSPITAL | Age: 44
End: 2024-01-05
Payer: COMMERCIAL

## 2024-01-05 ENCOUNTER — HOSPITAL ENCOUNTER (EMERGENCY)
Facility: HOSPITAL | Age: 44
Discharge: HOME | End: 2024-01-05
Attending: EMERGENCY MEDICINE
Payer: COMMERCIAL

## 2024-01-05 VITALS
RESPIRATION RATE: 14 BRPM | SYSTOLIC BLOOD PRESSURE: 130 MMHG | HEART RATE: 70 BPM | OXYGEN SATURATION: 98 % | DIASTOLIC BLOOD PRESSURE: 74 MMHG | TEMPERATURE: 98.4 F

## 2024-01-05 DIAGNOSIS — G40.919 BREAKTHROUGH SEIZURE (HCC): Primary | ICD-10-CM

## 2024-01-05 LAB
ANION GAP SERPL CALCULATED.3IONS-SCNC: 25 MMOL/L
BASOPHILS # BLD MANUAL: 0 THOUSAND/UL (ref 0–0.1)
BASOPHILS NFR MAR MANUAL: 0 % (ref 0–1)
BUN SERPL-MCNC: 18 MG/DL (ref 5–25)
CALCIUM SERPL-MCNC: 9 MG/DL (ref 8.4–10.2)
CHLORIDE SERPL-SCNC: 99 MMOL/L (ref 96–108)
CO2 SERPL-SCNC: 15 MMOL/L (ref 21–32)
CREAT SERPL-MCNC: 1.05 MG/DL (ref 0.6–1.3)
EOSINOPHIL # BLD MANUAL: 0.36 THOUSAND/UL (ref 0–0.4)
EOSINOPHIL NFR BLD MANUAL: 3 % (ref 0–6)
ERYTHROCYTE [DISTWIDTH] IN BLOOD BY AUTOMATED COUNT: 11.9 % (ref 11.6–15.1)
GFR SERPL CREATININE-BSD FRML MDRD: 87 ML/MIN/1.73SQ M
GLUCOSE SERPL-MCNC: 136 MG/DL (ref 65–140)
HCT VFR BLD AUTO: 42.1 % (ref 36.5–49.3)
HGB BLD-MCNC: 14.1 G/DL (ref 12–17)
LYMPHOCYTES # BLD AUTO: 59 % (ref 14–44)
LYMPHOCYTES # BLD AUTO: 7.92 THOUSAND/UL (ref 0.6–4.47)
MCH RBC QN AUTO: 31.3 PG (ref 26.8–34.3)
MCHC RBC AUTO-ENTMCNC: 33.5 G/DL (ref 31.4–37.4)
MCV RBC AUTO: 93 FL (ref 82–98)
MONOCYTES # BLD AUTO: 1.2 THOUSAND/UL (ref 0–1.22)
MONOCYTES NFR BLD: 10 % (ref 4–12)
NEUTROPHILS # BLD MANUAL: 2.52 THOUSAND/UL (ref 1.85–7.62)
NEUTS SEG NFR BLD AUTO: 21 % (ref 43–75)
PLATELET # BLD AUTO: 292 THOUSANDS/UL (ref 149–390)
PLATELET BLD QL SMEAR: ADEQUATE
PMV BLD AUTO: 8.8 FL (ref 8.9–12.7)
POTASSIUM SERPL-SCNC: 3 MMOL/L (ref 3.5–5.3)
RBC # BLD AUTO: 4.51 MILLION/UL (ref 3.88–5.62)
RBC MORPH BLD: NORMAL
SODIUM SERPL-SCNC: 139 MMOL/L (ref 135–147)
VARIANT LYMPHS # BLD AUTO: 7 %
WBC # BLD AUTO: 12 THOUSAND/UL (ref 4.31–10.16)

## 2024-01-05 PROCEDURE — 99284 EMERGENCY DEPT VISIT MOD MDM: CPT | Performed by: EMERGENCY MEDICINE

## 2024-01-05 PROCEDURE — 85027 COMPLETE CBC AUTOMATED: CPT

## 2024-01-05 PROCEDURE — 80048 BASIC METABOLIC PNL TOTAL CA: CPT

## 2024-01-05 PROCEDURE — 93005 ELECTROCARDIOGRAM TRACING: CPT

## 2024-01-05 PROCEDURE — 80177 DRUG SCRN QUAN LEVETIRACETAM: CPT

## 2024-01-05 PROCEDURE — 36415 COLL VENOUS BLD VENIPUNCTURE: CPT

## 2024-01-05 PROCEDURE — 85007 BL SMEAR W/DIFF WBC COUNT: CPT

## 2024-01-05 PROCEDURE — 99285 EMERGENCY DEPT VISIT HI MDM: CPT

## 2024-01-05 PROCEDURE — G1004 CDSM NDSC: HCPCS

## 2024-01-05 PROCEDURE — 70450 CT HEAD/BRAIN W/O DYE: CPT

## 2024-01-05 RX ORDER — POTASSIUM CHLORIDE 20 MEQ/1
40 TABLET, EXTENDED RELEASE ORAL ONCE
Status: COMPLETED | OUTPATIENT
Start: 2024-01-05 | End: 2024-01-05

## 2024-01-05 RX ADMIN — POTASSIUM CHLORIDE 40 MEQ: 1500 TABLET, EXTENDED RELEASE ORAL at 23:27

## 2024-01-06 ENCOUNTER — HOSPITAL ENCOUNTER (OUTPATIENT)
Facility: HOSPITAL | Age: 44
Setting detail: OBSERVATION
Discharge: HOME | End: 2024-01-07
Attending: EMERGENCY MEDICINE | Admitting: PSYCHIATRY & NEUROLOGY
Payer: COMMERCIAL

## 2024-01-06 DIAGNOSIS — R56.9 SEIZURE (HCC): Primary | ICD-10-CM

## 2024-01-06 PROBLEM — E87.6 HYPOKALEMIA: Status: ACTIVE | Noted: 2024-01-06

## 2024-01-06 LAB
AMPHETAMINES SERPL QL SCN: NEGATIVE
ANION GAP SERPL CALCULATED.3IONS-SCNC: 6 MMOL/L
ATRIAL RATE: 84 BPM
BARBITURATES UR QL: NEGATIVE
BENZODIAZ UR QL: NEGATIVE
BUN SERPL-MCNC: 11 MG/DL (ref 5–25)
CALCIUM SERPL-MCNC: 8.8 MG/DL (ref 8.4–10.2)
CHLORIDE SERPL-SCNC: 106 MMOL/L (ref 96–108)
CO2 SERPL-SCNC: 24 MMOL/L (ref 21–32)
COCAINE UR QL: NEGATIVE
CREAT SERPL-MCNC: 0.83 MG/DL (ref 0.6–1.3)
ERYTHROCYTE [DISTWIDTH] IN BLOOD BY AUTOMATED COUNT: 12 % (ref 11.6–15.1)
GFR SERPL CREATININE-BSD FRML MDRD: 108 ML/MIN/1.73SQ M
GLUCOSE SERPL-MCNC: 79 MG/DL (ref 65–140)
HCT VFR BLD AUTO: 37 % (ref 36.5–49.3)
HGB BLD-MCNC: 12.9 G/DL (ref 12–17)
MCH RBC QN AUTO: 31.3 PG (ref 26.8–34.3)
MCHC RBC AUTO-ENTMCNC: 34.9 G/DL (ref 31.4–37.4)
MCV RBC AUTO: 90 FL (ref 82–98)
METHADONE UR QL: NEGATIVE
OPIATES UR QL SCN: NEGATIVE
OXYCODONE+OXYMORPHONE UR QL SCN: NEGATIVE
P AXIS: 79 DEGREES
PCP UR QL: NEGATIVE
PLATELET # BLD AUTO: 243 THOUSANDS/UL (ref 149–390)
PMV BLD AUTO: 8.8 FL (ref 8.9–12.7)
POTASSIUM SERPL-SCNC: 3.9 MMOL/L (ref 3.5–5.3)
PR INTERVAL: 172 MS
QRS AXIS: 76 DEGREES
QRSD INTERVAL: 112 MS
QT INTERVAL: 408 MS
QTC INTERVAL: 482 MS
RBC # BLD AUTO: 4.12 MILLION/UL (ref 3.88–5.62)
SODIUM SERPL-SCNC: 136 MMOL/L (ref 135–147)
T WAVE AXIS: 56 DEGREES
THC UR QL: NEGATIVE
VENTRICULAR RATE: 84 BPM
WBC # BLD AUTO: 5.87 THOUSAND/UL (ref 4.31–10.16)

## 2024-01-06 PROCEDURE — 99222 1ST HOSP IP/OBS MODERATE 55: CPT | Performed by: PSYCHIATRY & NEUROLOGY

## 2024-01-06 PROCEDURE — 80048 BASIC METABOLIC PNL TOTAL CA: CPT | Performed by: PSYCHIATRY & NEUROLOGY

## 2024-01-06 PROCEDURE — 36415 COLL VENOUS BLD VENIPUNCTURE: CPT | Performed by: PSYCHIATRY & NEUROLOGY

## 2024-01-06 PROCEDURE — 99284 EMERGENCY DEPT VISIT MOD MDM: CPT

## 2024-01-06 PROCEDURE — 80307 DRUG TEST PRSMV CHEM ANLYZR: CPT | Performed by: PSYCHIATRY & NEUROLOGY

## 2024-01-06 PROCEDURE — 85027 COMPLETE CBC AUTOMATED: CPT | Performed by: PSYCHIATRY & NEUROLOGY

## 2024-01-06 PROCEDURE — 96365 THER/PROPH/DIAG IV INF INIT: CPT

## 2024-01-06 PROCEDURE — 99291 CRITICAL CARE FIRST HOUR: CPT | Performed by: EMERGENCY MEDICINE

## 2024-01-06 RX ORDER — ASPIRIN 81 MG/1
81 TABLET, CHEWABLE ORAL DAILY
Status: DISCONTINUED | OUTPATIENT
Start: 2024-01-06 | End: 2024-01-07 | Stop reason: HOSPADM

## 2024-01-06 RX ORDER — LORAZEPAM 2 MG/ML
2 INJECTION INTRAMUSCULAR EVERY 6 HOURS PRN
Status: DISCONTINUED | OUTPATIENT
Start: 2024-01-06 | End: 2024-01-07 | Stop reason: HOSPADM

## 2024-01-06 RX ORDER — POTASSIUM CHLORIDE 20 MEQ/1
40 TABLET, EXTENDED RELEASE ORAL ONCE
Status: COMPLETED | OUTPATIENT
Start: 2024-01-06 | End: 2024-01-06

## 2024-01-06 RX ORDER — ESCITALOPRAM OXALATE 5 MG/1
5 TABLET ORAL DAILY
Status: DISCONTINUED | OUTPATIENT
Start: 2024-01-06 | End: 2024-01-07 | Stop reason: HOSPADM

## 2024-01-06 RX ORDER — LEVETIRACETAM 500 MG/1
1000 TABLET ORAL EVERY 12 HOURS SCHEDULED
Status: DISCONTINUED | OUTPATIENT
Start: 2024-01-06 | End: 2024-01-07 | Stop reason: HOSPADM

## 2024-01-06 RX ADMIN — LEVETIRACETAM 1000 MG: 500 TABLET, FILM COATED ORAL at 21:36

## 2024-01-06 RX ADMIN — POTASSIUM CHLORIDE 40 MEQ: 1500 TABLET, EXTENDED RELEASE ORAL at 08:53

## 2024-01-06 RX ADMIN — LEVETIRACETAM 1000 MG: 100 INJECTION, SOLUTION INTRAVENOUS at 08:53

## 2024-01-06 RX ADMIN — LEVETIRACETAM 1500 MG: 100 INJECTION, SOLUTION INTRAVENOUS at 01:16

## 2024-01-06 RX ADMIN — ESCITALOPRAM OXALATE 5 MG: 5 TABLET, FILM COATED ORAL at 08:53

## 2024-01-06 RX ADMIN — ASPIRIN 81 MG CHEWABLE TABLET 81 MG: 81 TABLET CHEWABLE at 08:53

## 2024-01-06 NOTE — ED PROVIDER NOTES
History  Chief Complaint   Patient presents with   • Seizure - Prior Hx Of     Hx cva, had localized seizure earlier, arrived c family c generalized seizure.     43-year-old male with history of seizure disorder and prior stroke presents due to seizure.      Prior to Admission Medications   Prescriptions Last Dose Informant Patient Reported? Taking?   MELATONIN PO  Self Yes No   Sig: Take by mouth   Patient not taking: Reported on 2023   Protein POWD  Self Yes No   Sig: Take by mouth   Patient not taking: Reported on 2023   acetaminophen (TYLENOL) 325 mg tablet  Self No No   Sig: Take 2 tablets (650 mg total) by mouth every 6 (six) hours as needed for mild pain or headaches (mild pain)   Patient not taking: Reported on 2023   aspirin 81 mg chewable tablet  Self No No   Sig: Chew 1 tablet (81 mg total) daily   escitalopram (LEXAPRO) 5 mg tablet   No No   Sig: TAKE ONE TABLET BY MOUTH ONE TIME DAILY   levETIRAcetam (KEPPRA) 750 mg tablet   No No   Sig: TAKE ONE TABLET BY MOUTH EVERY TWELVE HOURS      Facility-Administered Medications: None       Past Medical History:   Diagnosis Date   • Seizure (HCC)    • Stroke (HCC)        Past Surgical History:   Procedure Laterality Date   • IR STROKE ALERT  5/15/2023       No family history on file.  I have reviewed and agree with the history as documented.    E-Cigarette/Vaping   • E-Cigarette Use Never User      E-Cigarette/Vaping Substances     Social History     Tobacco Use   • Smoking status: Former     Current packs/day: 0.00     Average packs/day: 0.5 packs/day for 15.0 years (7.5 ttl pk-yrs)     Types: Cigarettes     Start date: 6/3/2008     Quit date: 6/3/2023     Years since quittin.5   • Smokeless tobacco: Never   Vaping Use   • Vaping status: Never Used   Substance Use Topics   • Alcohol use: Yes     Comment: Social   • Drug use: Never        Review of Systems    Physical Exam  ED Triage Vitals [24 2218]   Temperature Pulse Respirations Blood  Pressure SpO2   98.4 °F (36.9 °C) 82 14 146/83 97 %      Temp src Heart Rate Source Patient Position - Orthostatic VS BP Location FiO2 (%)   -- -- -- -- --      Pain Score       --             Orthostatic Vital Signs  Vitals:    01/05/24 2218   BP: 146/83   Pulse: 82       Physical Exam    ED Medications  Medications - No data to display    Diagnostic Studies  Results Reviewed       Procedure Component Value Units Date/Time    CBC and differential [[id number]]     Lab Status: No result Specimen: Blood     Basic metabolic panel [[id number]]     Lab Status: No result Specimen: Blood     Levetiracetam level [[id number]]     Lab Status: No result Specimen: Blood                    CT head wo contrast    (Results Pending)         Procedures  Procedures      ED Course                                       Medical Decision Making  Amount and/or Complexity of Data Reviewed  Labs: ordered.  Radiology: ordered.          Disposition  Final diagnoses:   None     ED Disposition       None          Follow-up Information    None         Patient's Medications   Discharge Prescriptions    No medications on file     No discharge procedures on file.    PDMP Review       None             ED Provider  Attending physically available and evaluated Abdirahman Ambrocio. I managed the patient along with the ED Attending.    Electronically Signed by              Right eye: No discharge.         Left eye: No discharge.      Extraocular Movements: Extraocular movements intact.      Conjunctiva/sclera: Conjunctivae normal.      Pupils: Pupils are equal, round, and reactive to light.   Cardiovascular:      Rate and Rhythm: Normal rate and regular rhythm.      Heart sounds: No murmur heard.  Pulmonary:      Effort: Pulmonary effort is normal. No respiratory distress.      Breath sounds: Normal breath sounds. No wheezing or rales.   Abdominal:      General: Abdomen is flat. There is no distension.      Palpations: Abdomen is soft.      Tenderness: There is no abdominal tenderness. There is no guarding.   Musculoskeletal:         General: No swelling.      Cervical back: Normal range of motion and neck supple. No rigidity or tenderness.   Skin:     General: Skin is warm and dry.      Capillary Refill: Capillary refill takes less than 2 seconds.   Neurological:      Mental Status: He is alert and oriented to person, place, and time.      Cranial Nerves: No cranial nerve deficit.      Sensory: No sensory deficit.      Motor: No weakness.      Coordination: Coordination normal.      Comments: Mild word finding difficulty, residual from prior stroke according to patient's father   Psychiatric:         Mood and Affect: Mood normal.         Behavior: Behavior normal.         ED Medications  Medications   potassium chloride (K-DUR,KLOR-CON) CR tablet 40 mEq (40 mEq Oral Given 1/5/24 2327)       Diagnostic Studies  Results Reviewed       Procedure Component Value Units Date/Time    Levetiracetam level [[id number]] Collected: 01/05/24 2223    Lab Status: Final result Specimen: Blood from Arm, Right Updated: 01/08/24 1706     Levetiracetam Lvl 16.9 ug/mL     Narrative:      Performed at:  [address] [place] [address]  [id number]  : Claudia Edge MD, Phone:  [phone]    RBC Morphology Reflex Test [[id number]] Collected: 01/05/24 2223    Lab  Status: Final result Specimen: Blood from Arm, Right Updated: 01/05/24 2301    CBC and differential [[id number]]  (Abnormal) Collected: 01/05/24 2223    Lab Status: Final result Specimen: Blood from Arm, Right Updated: 01/05/24 2256     WBC 12.00 Thousand/uL      RBC 4.51 Million/uL      Hemoglobin 14.1 g/dL      Hematocrit 42.1 %      MCV 93 fL      MCH 31.3 pg      MCHC 33.5 g/dL      RDW 11.9 %      MPV 8.8 fL      Platelets 292 Thousands/uL     Narrative:      This is an appended report.  These results have been appended to a previously verified report.    Manual Differential(PHLEBS Do Not Order) [[id number]]  (Abnormal) Collected: 01/05/24 2223    Lab Status: Final result Specimen: Blood from Arm, Right Updated: 01/05/24 2256     Segmented % 21 %      Lymphocytes % 59 %      Monocytes % 10 %      Eosinophils, % 3 %      Basophils % 0 %      Atypical Lymphocytes % 7 %      Absolute Neutrophils 2.52 Thousand/uL      Lymphocytes Absolute 7.92 Thousand/uL      Monocytes Absolute 1.20 Thousand/uL      Eosinophils Absolute 0.36 Thousand/uL      Basophils Absolute 0.00 Thousand/uL      Total Counted --     RBC Morphology Normal     Platelet Estimate Adequate    Basic metabolic panel [[id number]]  (Abnormal) Collected: 01/05/24 2223    Lab Status: Final result Specimen: Blood from Arm, Right Updated: 01/05/24 2250     Sodium 139 mmol/L      Potassium 3.0 mmol/L      Chloride 99 mmol/L      CO2 15 mmol/L      ANION GAP 25 mmol/L      BUN 18 mg/dL      Creatinine 1.05 mg/dL      Glucose 136 mg/dL      Calcium 9.0 mg/dL      eGFR 87 ml/min/1.73sq m     Narrative:      [employer] guidelines for Chronic Kidney Disease (CKD):     Stage 1 with normal or high GFR (GFR > 90 mL/min/1.73 square meters)    Stage 2 Mild CKD (GFR = 60-89 mL/min/1.73 square meters)    Stage 3A Moderate CKD (GFR = 45-59 mL/min/1.73 square meters)    Stage 3B Moderate CKD (GFR = 30-44 mL/min/1.73 square meters)    Stage 4 Severe  CKD (GFR = 15-29 mL/min/1.73 square meters)    Stage 5 End Stage CKD (GFR <15 mL/min/1.73 square meters)  Note: GFR calculation is accurate only with a steady state creatinine                   CT head wo contrast   Final Result by Ochoa Godinez MD (01/05 2257)      No acute intracranial abnormality.   Stable old left MCA territory infarct.               Workstation performed: [id number]               Procedures  Procedures      ED Course                                       Medical Decision Making  43-year-old patient with history of seizure disorder presents after seizure.  Patient is postictal on my prior examination.  Will order labs and imaging to rule out acute intracranial hemorrhage, electrolyte disturbance, Keppra level.    Patient returned to baseline mental status during his evaluation.  Is asymptomatic.  Feels well enough to go home.    Amount and/or Complexity of Data Reviewed  Labs: ordered.  Radiology: ordered.    Risk  Prescription drug management.          Disposition  Final diagnoses:   Breakthrough seizure (HCC)     Time reflects when diagnosis was documented in both MDM as applicable and the Disposition within this note       Time User Action Codes Description Comment    1/5/2024 11:27 PM Jorge Wells Add [G40.919] Breakthrough seizure (HCC)           ED Disposition       ED Disposition   Discharge    Condition   Stable    Date/Time   Fri Jan 5, 2024 11:27 PM    Comment   Abdirahman Ambrocio discharge to home/self care.                   Follow-up Information    None         Discharge Medication List as of 1/5/2024 11:28 PM        CONTINUE these medications which have NOT CHANGED    Details   acetaminophen (TYLENOL) 325 mg tablet Take 2 tablets (650 mg total) by mouth every 6 (six) hours as needed for mild pain or headaches (mild pain), Starting Fri 6/9/2023, No Print      aspirin 81 mg chewable tablet Chew 1 tablet (81 mg total) daily, Starting Fri 6/9/2023, Normal      escitalopram (LEXAPRO)  5 mg tablet TAKE ONE TABLET BY MOUTH ONE TIME DAILY, Starting Tue 12/26/2023, Normal      MELATONIN PO Take by mouth, Historical Med      Protein POWD Take by mouth, Historical Med      levETIRAcetam (KEPPRA) 750 mg tablet TAKE ONE TABLET BY MOUTH EVERY TWELVE HOURS, Starting Tue 12/26/2023, Normal           No discharge procedures on file.    PDMP Review         Value Time User    PDMP Reviewed  Yes 1/6/2024  2:25 AM Jose Briceño MD             ED Provider  Attending physically available and evaluated Abdirahman Ambrocio. I managed the patient along with the ED Attending.    Electronically Signed by           Jorge Wells MD  01/09/24 3662

## 2024-01-06 NOTE — ASSESSMENT & PLAN NOTE
Patient states he has upcoming appointments for further assessment of his PFO in the outpatient setting.

## 2024-01-06 NOTE — ASSESSMENT & PLAN NOTE
Abdirahman Ambrocio presented to the hospital on 1/6/2024 0044 for breakthrough R focal to generalized seizure while most likely compliant on Keppra. Patient had 2 different seizure like events since yesterday, first one is clear right sided focal seizure that generalized which spontaneously resolved in 5 minutes and the second event is 30 second episode of witnessed GTC activity. Postictal state but with return to baseline after each. No reported urinary/bowel incontinence but with slight tongue bite. Patient given load of 1500mg Keppra but no benzos were used. Examwise significant for residual aphasia, right arm and right leg weakness improving and chronic from 5/15 stroke. Patient reports compliance to keppra 750mg BID.    Workup:  CTH: No acute intracranial abnormality; left MCA stroke  MRI brain w and wo contrast 5/17: Left MCA infarct  Video EEG 5/16/2023-5/17/2023: Left sided cerebral dysfunction but no electrographic seizures    Impression: Patient most likely at this time has breakthrough seizure despite compliance with Keppra 750mg BID. Most likely due to continued encephalomalacia of the left MCA infarct. No other triggers on history but will order UDS for possible drugs that may precipitate seizures    Plan:  Seizure protocol and precautions  Keppra 1500mg IV load given and will increase Keppra to 1g BID for maintenance  Level checks: Levetiracetam level (acquired before dose of Keppra given)  Will hold EEG at this time as patient's seizures are always clinically observed  Routine Neurochecks  Submitted and faxed [employer] form   Ativan 2mg PRN for seizures >3 minutes

## 2024-01-06 NOTE — ED NOTES
Patient ambulatory to restroom w/o assistance. Pt maintained steady and appropriate gait.     Merlin Garcia IV, RN  01/06/24 0658

## 2024-01-06 NOTE — QUICK NOTE
Overnight Neurology Admission Note    * Seizure (HCC)  Assessment & Plan  Abdirahman Ambrocio presented to the hospital on 1/6/2024 0044 for breakthrough R focal to generalized seizure while most likely compliant on Keppra. Patient had 2 different seizure like events since yesterday, first one is clear right sided focal seizure that generalized which spontaneously resolved in 5 minutes and the second event is 30 second episode of witnessed GTC activity. Postictal state but with return to baseline after each. No reported urinary/bowel incontinence but with slight tongue bite. Patient given load of 1500mg Keppra but no benzos were used. Examwise significant for residual aphasia, right arm and right leg weakness improving and chronic from 5/15 stroke. Patient reports compliance to keppra 750mg BID.    Workup:  CTH: No acute intracranial abnormality; left MCA stroke  MRI brain w and wo contrast 5/17: Left MCA infarct  Video EEG 5/16/2023-5/17/2023: Left sided cerebral dysfunction but no electrographic seizures    Impression: Patient most likely at this time has breakthrough seizure despite compliance with Keppra 750mg BID. Most likely due to continued encephalomalacia of the left MCA infarct. No other triggers on history but will order UDS for possible drugs that may precipitate seizures    Plan:  Discussed overnight with Dr. 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 Per previous cardiology outpatient note, patient declined PFO closure at that time.

## 2024-01-06 NOTE — H&P
NEUROLOGY RESIDENCY - ADMISSION H&P NOTE     Name: Abdirahman Ambrocio   Age & Sex: 43 y.o. male   MRN: [id number]  Unit/Bed#: ED 25   Encounter: [id number]    Consult to neurology  Consult performed by: Jose Briceño MD  Consult ordered by: Jadon Benz MD        Anticipated Length of Stay:  Patient will be admitted on an Observation basis with an anticipated length of stay of  < 2 midnights.     Justification for Hospital Stay: Breakthrough seizure    Abdirahman Ambrocio will need follow up in in 6 weeks with epilepsy Attending/RIGOBERTO .  He will not require outpatient neurological testing.    ASSESSMENT & PLAN     Hypokalemia  Assessment & Plan  Potassium of 3.0 on CMP.    Ordered 40 meq one time dose.    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    CVA (cerebral vascular accident) (HCC)  Assessment & Plan  Continue home aspirin. Per previous cardiology outpatient note, patient declined PFO closure at that time.    States he has upcoming appointments regarding the PFO.    * Seizure (HCC)  Assessment & Plan  Abdirahman Ambrocio presented to the hospital on 1/6/2024 0044 for breakthrough R focal to generalized seizure while most likely compliant on Keppra. Patient had 2 different seizure like events since yesterday, first one is clear right sided focal seizure that generalized which spontaneously resolved in 5 minutes and the second event is 30 second episode of witnessed GTC activity. Postictal state but with return to baseline after each. No reported urinary/bowel incontinence but with slight tongue bite. Patient given load of 1500mg Keppra but no benzos were used. Examwise significant for residual aphasia, right arm and right leg weakness improving and chronic from 5/15 stroke. Patient reports compliance to  keppra 750mg BID.    Workup:  CTH: No acute intracranial abnormality; left MCA stroke  MRI brain w and wo contrast 5/17: Left MCA infarct  Video EEG 5/16/2023-5/17/2023: Left sided cerebral dysfunction but no electrographic seizures    Impression: Patient most likely at this time has breakthrough seizure despite compliance with Keppra 750mg BID. Most likely due to continued encephalomalacia of the left MCA infarct. No other triggers on history but will order UDS for possible drugs that may precipitate seizures    Plan:  Seizure protocol and precautions  Keppra 1500mg IV load given and will increase Keppra to 1g BID for maintenance  Level checks: Levetiracetam level (acquired before dose of Keppra given)  Will hold EEG at this time as patient's seizures are always clinically observed  Routine Neurochecks  Submitted and faxed [employer] form   Ativan 2mg PRN for seizures >3 minutes          VTE Prophylaxis:  Low VTE   / sequential compression device   Code Status: 1  POLST: POLST is not applicable to this patient    CHIEF COMPLAINT     Chief Complaint   Patient presents with    Seizure - Prior Hx Of     Pt recently d/simin. Returned after seizure at home lasting approx 30 seconds.        HISTORY OF PRESENT ILLNESS     Abdirahman Ambrocio is a 43 y.o. male  with pertinent history of Left MCA infarct on aspirin, seizures on keppra, PFO, depression who presented for breakthrough seizures. Patient states he returned home 1/5/2024 and lost consciousness without warning. Patient apparently had first right arm and leg shaking which transitioned to a generalized tonic clonic seizure. The episode lasted for 5 minutes and then he had an undefined postictal state. Patient came back to baseline in ED and was discharged, but patient when returning home had a 30 second episode of generalized tonic clonic seizure and thus returned to the ED, where he was given keppra 1500mg load and returned again to baseline. At his baseline, he has  residual aphasia and residual right arm and right leg weakness from his previous left MCA infarct 5/15.    Patient affirms compliance of his AED of Keppra 750mg BID, denies infections, denies trauma, denies recreational drug use. Patient denies alcohol use or tobacco use currently.    Patient has had 4 total recorded seizure like episodes starting from his admission for left MCA stroke when he had right eye deviation  with postictal state. vEEG done at the time showed no captured seizure events but did show findings indicating structural lesion of the left MCA stroke. He was placed on keppra 750mg BID afterward and then followed up with Neurology outpatient on 2023. He then had another seizure 2023 which was found that patient did have missed dosings of Keppra and thus keppra maintenance dose was not changed (Keppra level at 3.2).    The patient reports compliance with his medications since then. Keppra levels are pending. Patient was given a loading dose of 1.5g of Keppra (as stated above) and initiated on 1g BID. Patient continues to have some aphasia which he states has been present since his stroke in May 2023.    PAST MEDICAL HISTORY     Past Medical History:   Diagnosis Date    Seizure (HCC)     Stroke (HCC)        Allergies:   No Known Allergies    PAST SURGICAL HISTORY     Past Surgical History:   Procedure Laterality Date    IR STROKE ALERT  5/15/2023       SOCIAL & FAMILY HISTORY     Social History     Substance and Sexual Activity   Alcohol Use Yes    Comment: Social       Social History     Substance and Sexual Activity   Drug Use Never     Social History     Tobacco Use   Smoking Status Former    Current packs/day: 0.00    Average packs/day: 0.5 packs/day for 15.0 years (7.5 ttl pk-yrs)    Types: Cigarettes    Start date: 6/3/2008    Quit date: 6/3/2023    Years since quittin.5   Smokeless Tobacco Never       Marital Status: Single   Occupation: Works in school district  Patient  Pre-hospital Living Situation: lives at home with father  Patient Pre-hospital Level of Mobility: Unsteady gait  Patient Pre-hospital Diet Restrictions: None    Family History:  History reviewed. No pertinent family history.        OBJECTIVE     Vitals:    01/06/24 0053 01/06/24 0654   BP: 140/72 127/67   BP Location: Left arm Right arm   Pulse: 77    Resp: 16    SpO2: 97%         Temperature:   No data recorded.         Intake & Output:  I/O       None            Weights:        There is no height or weight on file to calculate BMI.  Weight (last 2 days)       None            Physical Exam  Vitals reviewed.   Constitutional:       Appearance: Normal appearance.   HENT:      Head: Normocephalic and atraumatic.      Comments: Right tip of tongue, tongue bite noted     Mouth/Throat:      Mouth: Mucous membranes are moist.   Eyes:      Pupils: Pupils are equal, round, and reactive to light.   Cardiovascular:      Rate and Rhythm: Normal rate and regular rhythm.   Pulmonary:      Effort: Pulmonary effort is normal.      Breath sounds: Normal breath sounds.   Abdominal:      General: Abdomen is flat.      Palpations: Abdomen is soft.   Musculoskeletal:         General: Normal range of motion.   Neurological:      Mental Status: He is oriented to person, place, and time.      Coordination: Finger-Nose-Finger Test and Heel to Shin Test normal.      Deep Tendon Reflexes:      Reflex Scores:       Bicep reflexes are 2+ on the right side and 2+ on the left side.       Brachioradialis reflexes are 2+ on the right side and 2+ on the left side.       Patellar reflexes are 2+ on the right side and 3+ on the left side.       Achilles reflexes are 2+ on the right side and 2+ on the left side.         Neurologic Exam     Mental Status   Oriented to person, place, and time.   Oriented to person.   Oriented to place.   Oriented to year, month and date.   Attention: normal.   Speech: (Stuttering due to chronic mixed aphasia)  Level of  "consciousness: drowsy  Knowledge: good.   Able to name object. Able to read. Able to repeat (difficulty with some sentences).   Occasional stuttering of speech and mild word finding difficulty (chronic)     Cranial Nerves     CN II   Visual fields full to confrontation.     CN III, IV, VI   Pupils are equal, round, and reactive to light.    CN V   Facial sensation intact.     CN VII   Right facial weakness: central  Left facial weakness: none    CN XI   CN XI normal.     CN XII   CN XII normal.     Motor Exam Right upper extremity 5-/5 throughout  Left upper extremity 5/5 throughout    Right lower extremity 4+/5 throughout  Left lower extremity 5/5     Sensory Exam   Light touch normal.   Vibration normal.   Pinprick normal.     Gait, Coordination, and Reflexes     Coordination   Finger to nose coordination: normal  Heel to shin coordination: normal    Reflexes   Right brachioradialis: 2+  Left brachioradialis: 2+  Right biceps: 2+  Left biceps: 2+  Right patellar: 2+  Left patellar: 3+  Right achilles: 2+  Left achilles: 2+  Right plantar: normal  Left plantar: normal     LABORATORY DATA     Labs: I have personally reviewed pertinent reports.    Results from last 7 days   Lab Units 01/06/24  1140 01/05/24  2223   WBC Thousand/uL 5.87 12.00*   HEMOGLOBIN g/dL 12.9 14.1   HEMATOCRIT % 37.0 42.1   PLATELETS Thousands/uL 243 292   MONO PCT %  --  10   EOS PCT %  --  3      Results from last 7 days   Lab Units 01/06/24  0852 01/05/24  2223   SODIUM mmol/L 136 139   POTASSIUM mmol/L 3.9 3.0*   CHLORIDE mmol/L 106 99   CO2 mmol/L 24 15*   BUN mg/dL 11 18   CREATININE mg/dL 0.83 1.05   CALCIUM mg/dL 8.8 9.0       Micro:  No results found for: \"BLOODCX\", \"URINECX\", \"WOUNDCULT\", \"SPUTUMCULTUR\"    IMAGING & DIAGNOSTIC TESTING     Radiology Results: I have personally reviewed pertinent reports.      No orders to display       Other Diagnostic Testing: I have personally reviewed pertinent reports.      ACTIVE MEDICATIONS "     Current Facility-Administered Medications   Medication Dose Route Frequency    aspirin chewable tablet 81 mg  81 mg Oral Daily    escitalopram (LEXAPRO) tablet 5 mg  5 mg Oral Daily    levETIRAcetam (KEPPRA) tablet 1,000 mg  1,000 mg Oral Q12H BRANDON    LORazepam (ATIVAN) injection 2 mg  2 mg Intravenous Q6H PRN         HOME MEDICATIONS     Prior to Admission medications    Medication Sig Start Date End Date Taking? Authorizing Provider   aspirin 81 mg chewable tablet Chew 1 tablet (81 mg total) daily 6/9/23  Yes Marline White MD   escitalopram (LEXAPRO) 5 mg tablet TAKE ONE TABLET BY MOUTH ONE TIME DAILY 12/26/23  Yes Diego Andrade PA-C   levETIRAcetam (KEPPRA) 750 mg tablet TAKE ONE TABLET BY MOUTH EVERY TWELVE HOURS 12/26/23  Yes Diego Andrade PA-C   acetaminophen (TYLENOL) 325 mg tablet Take 2 tablets (650 mg total) by mouth every 6 (six) hours as needed for mild pain or headaches (mild pain)  Patient not taking: Reported on 8/24/2023 6/9/23   Marline White MD   MELATONIN PO Take by mouth  Patient not taking: Reported on 9/6/2023    Historical Provider, MD   Protein POWD Take by mouth  Patient not taking: Reported on 9/6/2023    Historical Provider, MD     ACTIVE MEDICATIONS    ======    I have discussed the patient's history, physical exam findings, assessment, and plan in detail with attending, Dr. Philip    Thank you for allowing me to participate in the care of your patient, Abdirahman Ambrocio.    Aida Chavarria  [facility] Neurology Residency, PGY-2

## 2024-01-06 NOTE — ED ATTENDING ATTESTATION
1/6/2024  I, Jadon Benz MD, saw and evaluated the patient. I have discussed the patient with the resident/non-physician practitioner and agree with the resident's/non-physician practitioner's findings, Plan of Care, and MDM as documented in the resident's/non-physician practitioner's note, except where noted. All available labs and Radiology studies were reviewed.  I was present for key portions of any procedure(s) performed by the resident/non-physician practitioner and I was immediately available to provide assistance.       At this point I agree with the current assessment done in the Emergency Department.  I have conducted an independent evaluation of this patient a history and physical is as follows:    ED Course     Emergency Department Note- Abdirahman Ambrocio 43 y.o. male MRN: [id number]    Unit/Bed#: ED 25 Encounter: [id number]    Abdirahman Ambrocio is a 43 y.o. male who presents with   Chief Complaint   Patient presents with    Seizure - Prior Hx Of     Pt recently d/simin. Returned after seizure at home lasting approx 30 seconds.         History of Present Illness   HPI:  Abdirahman Ambrocio is a 43 y.o. male who presents for evaluation of:  Seizure episode.  Patient was seen here earlier in the night after having had a generalized seizure.  This patient had another seizure after discharge when he returned home.  His seizure was generalized and lasted for about 30 seconds and then resolved.  The patient is chronically on levetiracetam for his seizure disorder.  His last levetiracetam level according to the chart was low.  Patient denies associated headache, nausea, and vomiting.    Review of Systems   Constitutional:  Negative for fatigue and fever.   HENT:  Negative for congestion and sore throat.    Respiratory:  Negative for cough and shortness of breath.    Cardiovascular:  Negative for chest pain and palpitations.   Gastrointestinal:  Negative for abdominal pain and nausea.   Genitourinary:  Negative for  flank pain and frequency.   Neurological:  Positive for seizures. Negative for light-headedness and headaches.   Psychiatric/Behavioral:  Negative for dysphoric mood and hallucinations.    All other systems reviewed and are negative.      Historical Information   Past Medical History:   Diagnosis Date    Seizure (HCC)     Stroke (HCC)      Past Surgical History:   Procedure Laterality Date    IR STROKE ALERT  5/15/2023     Social History   Social History     Substance and Sexual Activity   Alcohol Use Yes    Comment: Social     Social History     Substance and Sexual Activity   Drug Use Never     Social History     Tobacco Use   Smoking Status Former    Current packs/day: 0.00    Average packs/day: 0.5 packs/day for 15.0 years (7.5 ttl pk-yrs)    Types: Cigarettes    Start date: 6/3/2008    Quit date: 6/3/2023    Years since quittin.5   Smokeless Tobacco Never     Family History: History reviewed. No pertinent family history.    Meds/Allergies   PTA meds:   Prior to Admission Medications   Prescriptions Last Dose Informant Patient Reported? Taking?   MELATONIN PO Not Taking Self Yes No   Sig: Take by mouth   Patient not taking: Reported on 2023   Protein POWD Not Taking Self Yes No   Sig: Take by mouth   Patient not taking: Reported on 2023   acetaminophen (TYLENOL) 325 mg tablet Not Taking Self No No   Sig: Take 2 tablets (650 mg total) by mouth every 6 (six) hours as needed for mild pain or headaches (mild pain)   Patient not taking: Reported on 2023   aspirin 81 mg chewable tablet 2024 Self No Yes   Sig: Chew 1 tablet (81 mg total) daily   escitalopram (LEXAPRO) 5 mg tablet 2024  No Yes   Sig: TAKE ONE TABLET BY MOUTH ONE TIME DAILY   levETIRAcetam (KEPPRA) 750 mg tablet 2024  No Yes   Sig: TAKE ONE TABLET BY MOUTH EVERY TWELVE HOURS      Facility-Administered Medications: None     No Known Allergies    Objective   First Vitals:   Blood Pressure: 140/72 (24 0053)  Pulse: 77  (24)  Respirations: 16 (24)  SpO2: 97 % (24)    Current Vitals:   Blood Pressure: 140/72 (24)  Pulse: 77 (24)  Respirations: 16 (24)  SpO2: 97 % (24)      Intake/Output Summary (Last 24 hours) at 2024 0618  Last data filed at 2024 0307  Gross per 24 hour   Intake 100 ml   Output --   Net 100 ml       Invasive Devices       Peripheral Intravenous Line  Duration             Peripheral IV 24 Right Antecubital <1 day                    Physical Exam  Vitals and nursing note reviewed.   Constitutional:       General: He is not in acute distress.     Appearance: Normal appearance. He is well-developed.   HENT:      Head: Normocephalic and atraumatic.      Right Ear: External ear normal.      Left Ear: External ear normal.      Nose: Nose normal.      Mouth/Throat:      Pharynx: No oropharyngeal exudate.   Eyes:      Conjunctiva/sclera: Conjunctivae normal.      Pupils: Pupils are equal, round, and reactive to light.   Cardiovascular:      Rate and Rhythm: Normal rate and regular rhythm.   Pulmonary:      Effort: Pulmonary effort is normal. No respiratory distress.   Abdominal:      General: Abdomen is flat. There is no distension.      Palpations: Abdomen is soft.   Musculoskeletal:         General: No deformity. Normal range of motion.      Cervical back: Normal range of motion and neck supple.   Skin:     General: Skin is warm and dry.      Capillary Refill: Capillary refill takes less than 2 seconds.   Neurological:      General: No focal deficit present.      Mental Status: He is alert and oriented to person, place, and time. Mental status is at baseline.      Coordination: Coordination normal.   Psychiatric:         Mood and Affect: Mood normal.         Behavior: Behavior normal.         Thought Content: Thought content normal.         Judgment: Judgment normal.           Medical Decision Makin.  Acute generalized  seizure; second seizure episode tonight.  ECG rule out arrhythmia; plan to administer levetiracetam and admit for observation.    Recent Results (from the past 36 hour(s))   ECG 12 lead    Collection Time: 01/05/24 10:20 PM   Result Value Ref Range    Ventricular Rate 84 BPM    Atrial Rate 84 BPM    RI Interval 172 ms    QRSD Interval 112 ms    QT Interval 408 ms    QTC Interval 482 ms    P [place] 79 degrees    QRS Axis 76 degrees    T Wave Axis 56 degrees   CBC and differential    Collection Time: 01/05/24 10:23 PM   Result Value Ref Range    WBC 12.00 (H) 4.31 - 10.16 Thousand/uL    RBC 4.51 3.88 - 5.62 Million/uL    Hemoglobin 14.1 12.0 - 17.0 g/dL    Hematocrit 42.1 36.5 - 49.3 %    MCV 93 82 - 98 fL    MCH 31.3 26.8 - 34.3 pg    MCHC 33.5 31.4 - 37.4 g/dL    RDW 11.9 11.6 - 15.1 %    MPV 8.8 (L) 8.9 - 12.7 fL    Platelets 292 149 - 390 Thousands/uL   Basic metabolic panel    Collection Time: 01/05/24 10:23 PM   Result Value Ref Range    Sodium 139 135 - 147 mmol/L    Potassium 3.0 (L) 3.5 - 5.3 mmol/L    Chloride 99 96 - 108 mmol/L    CO2 15 (L) 21 - 32 mmol/L    ANION GAP 25 mmol/L    BUN 18 5 - 25 mg/dL    Creatinine 1.05 0.60 - 1.30 mg/dL    Glucose 136 65 - 140 mg/dL    Calcium 9.0 8.4 - 10.2 mg/dL    eGFR 87 ml/min/1.73sq m   Manual Differential(PHLEBS Do Not Order)    Collection Time: 01/05/24 10:23 PM   Result Value Ref Range    Segmented % 21 (L) 43 - 75 %    Lymphocytes % 59 (H) 14 - 44 %    Monocytes % 10 4 - 12 %    Eosinophils, % 3 0 - 6 %    Basophils % 0 0 - 1 %    Atypical Lymphocytes % 7 (H) <=0 %    Absolute Neutrophils 2.52 1.85 - 7.62 Thousand/uL    Lymphocytes Absolute 7.92 (H) 0.60 - 4.47 Thousand/uL    Monocytes Absolute 1.20 0.00 - 1.22 Thousand/uL    Eosinophils Absolute 0.36 0.00 - 0.40 Thousand/uL    Basophils Absolute 0.00 0.00 - 0.10 Thousand/uL    Total Counted      RBC Morphology Normal     Platelet Estimate Adequate Adequate     No orders to display         Portions of the record  "may have been created with voice recognition software. Occasional wrong word or \"sound a like\" substitutions may have occurred due to the inherent limitations of voice recognition software.  Read the chart carefully and recognize, using context, where substitutions have occurred.        Critical Care Time  CriticalCare Time    Date/Time: 1/6/2024 6:39 AM    Performed by: Jadon Benz MD  Authorized by: Jadon Benz MD    Critical care provider statement:     Critical care time (minutes):  32    Critical care time was exclusive of:  Separately billable procedures and treating other patients and teaching time    Critical care was necessary to treat or prevent imminent or life-threatening deterioration of the following conditions:  CNS failure or compromise    Critical care was time spent personally by me on the following activities:  Obtaining history from patient or surrogate, development of treatment plan with patient or surrogate, discussions with consultants, evaluation of patient's response to treatment, ordering and performing treatments and interventions, ordering and review of laboratory studies, ordering and review of radiographic studies, re-evaluation of patient's condition and review of old charts    I assumed direction of critical care for this patient from another provider in my specialty: no    Comments:      43-year-old male presents for evaluation of recurrent generalized seizure; levetiracetam IV load 1500 mg IV load; [facility] internal medicine admitting team called and case discussed..        "

## 2024-01-06 NOTE — ED PROVIDER NOTES
History  Chief Complaint   Patient presents with    Seizure - Prior Hx Of     Pt recently d/simin. Returned after seizure at home lasting approx 30 seconds.     43-year-old male with history of prior CVA and seizures returns to ER after repeat seizure shortly after returning home from ER visit earlier today.  Patient arrives via EMS who state that they received a call of an active seizure.  When they arrived patient was postictal.  EMS was told by family member that seizure lasted approximately 30 seconds.  Patient is postictal on my examination and unable to provide a coherent history.        Prior to Admission Medications   Prescriptions Last Dose Informant Patient Reported? Taking?   MELATONIN PO Not Taking Self Yes No   Sig: Take by mouth   Patient not taking: Reported on 9/6/2023   Protein POWD Not Taking Self Yes No   Sig: Take by mouth   Patient not taking: Reported on 9/6/2023   acetaminophen (TYLENOL) 325 mg tablet Not Taking Self No No   Sig: Take 2 tablets (650 mg total) by mouth every 6 (six) hours as needed for mild pain or headaches (mild pain)   Patient not taking: Reported on 8/24/2023   aspirin 81 mg chewable tablet 1/6/2024 Self No Yes   Sig: Chew 1 tablet (81 mg total) daily   escitalopram (LEXAPRO) 5 mg tablet 1/6/2024  No Yes   Sig: TAKE ONE TABLET BY MOUTH ONE TIME DAILY   levETIRAcetam (KEPPRA) 750 mg tablet 1/6/2024  No Yes   Sig: TAKE ONE TABLET BY MOUTH EVERY TWELVE HOURS      Facility-Administered Medications: None       Past Medical History:   Diagnosis Date    Seizure (HCC)     Stroke (HCC)        Past Surgical History:   Procedure Laterality Date    IR STROKE ALERT  5/15/2023       History reviewed. No pertinent family history.  I have reviewed and agree with the history as documented.    E-Cigarette/Vaping    E-Cigarette Use Never User      E-Cigarette/Vaping Substances     Social History     Tobacco Use    Smoking status: Former     Current packs/day: 0.00     Average packs/day: 0.5  packs/day for 15.0 years (7.5 ttl pk-yrs)     Types: Cigarettes     Start date: 6/3/2008     Quit date: 6/3/2023     Years since quittin.6    Smokeless tobacco: Never   Vaping Use    Vaping status: Never Used   Substance Use Topics    Alcohol use: Yes     Comment: Social    Drug use: Never        Review of Systems   Unable to perform ROS: Mental status change       Physical Exam  ED Triage Vitals   Temperature Pulse Respirations Blood Pressure SpO2   24 1200 24 0053 24 0053 24 0053 24 0053   98.5 °F (36.9 °C) 77 16 140/72 97 %      Temp Source Heart Rate Source Patient Position - Orthostatic VS BP Location FiO2 (%)   24 1200 24 0053 24 0654 24 0053 --   Oral Monitor Lying Left arm       Pain Score       24 1846       No Pain             Orthostatic Vital Signs  Vitals:    24 1200 24 1846 24 2228 24   BP: 115/66 124/69 122/66 128/76   Pulse: 70 58 57 63   Patient Position - Orthostatic VS:  Lying Lying Lying       Physical Exam  Vitals and nursing note reviewed.   Constitutional:       General: He is not in acute distress.     Appearance: He is well-developed. He is not toxic-appearing or diaphoretic.      Comments: Groggy   HENT:      Head: Normocephalic and atraumatic.      Right Ear: Tympanic membrane, ear canal and external ear normal.      Left Ear: Tympanic membrane, ear canal and external ear normal.      Nose: Nose normal.      Mouth/Throat:      Mouth: Mucous membranes are moist.      Pharynx: Oropharynx is clear.   Eyes:      Extraocular Movements: Extraocular movements intact.      Conjunctiva/sclera: Conjunctivae normal.      Pupils: Pupils are equal, round, and reactive to light.   Cardiovascular:      Rate and Rhythm: Normal rate and regular rhythm.      Heart sounds: No murmur heard.  Pulmonary:      Effort: Pulmonary effort is normal. No respiratory distress.      Breath sounds: Normal breath sounds.    Abdominal:      General: Abdomen is flat. There is no distension.      Palpations: Abdomen is soft.   Musculoskeletal:         General: No swelling.      Cervical back: Normal range of motion and neck supple. No rigidity or tenderness.   Skin:     General: Skin is warm and dry.      Capillary Refill: Capillary refill takes less than 2 seconds.   Neurological:      Mental Status: He is disoriented.      Cranial Nerves: No cranial nerve deficit.         ED Medications  Medications   levETIRAcetam (KEPPRA) 1,500 mg in sodium chloride 0.9 % 100 mL IVPB (0 mg Intravenous Stopped 1/6/24 0307)   potassium chloride (K-DUR,KLOR-CON) CR tablet 40 mEq (40 mEq Oral Given 1/6/24 0853)       Diagnostic Studies  Results Reviewed       Procedure Component Value Units Date/Time    Rapid drug screen, urine [[id number]]  (Normal) Collected: 01/06/24 1955    Lab Status: Final result Specimen: Urine, Clean Catch Updated: 01/06/24 2029     Amph/Meth UR Negative     Barbiturate Ur Negative     Benzodiazepine Urine Negative     Cocaine Urine Negative     Methadone Urine Negative     Opiate Urine Negative     PCP Ur Negative     THC Urine Negative     Oxycodone Urine Negative    Narrative:      FOR MEDICAL PURPOSES ONLY.   IF CONFIRMATION NEEDED PLEASE CONTACT THE LAB WITHIN 5 DAYS.    Drug Screen Cutoff Levels:  AMPHETAMINE/METHAMPHETAMINES  1000 ng/mL  BARBITURATES     200 ng/mL  BENZODIAZEPINES     200 ng/mL  COCAINE      300 ng/mL  METHADONE      300 ng/mL  OPIATES      300 ng/mL  PHENCYCLIDINE     25 ng/mL  THC       50 ng/mL  OXYCODONE      100 ng/mL    CBC (With Platelets) [[id number]]  (Abnormal) Collected: 01/06/24 1140    Lab Status: Final result Specimen: Blood from Arm, Right Updated: 01/06/24 1148     WBC 5.87 Thousand/uL      RBC 4.12 Million/uL      Hemoglobin 12.9 g/dL      Hematocrit 37.0 %      MCV 90 fL      MCH 31.3 pg      MCHC 34.9 g/dL      RDW 12.0 %      Platelets 243 Thousands/uL      MPV 8.8 fL     Basic metabolic  panel [[id number]] Collected: 01/06/24 0852    Lab Status: Final result Specimen: Blood from Arm, Right Updated: 01/06/24 0938     Sodium 136 mmol/L      Potassium 3.9 mmol/L      Chloride 106 mmol/L      CO2 24 mmol/L      ANION GAP 6 mmol/L      BUN 11 mg/dL      Creatinine 0.83 mg/dL      Glucose 79 mg/dL      Calcium 8.8 mg/dL      eGFR 108 ml/min/1.73sq m     Narrative:      National Kidney Disease Foundation guidelines for Chronic Kidney Disease (CKD):     Stage 1 with normal or high GFR (GFR > 90 mL/min/1.73 square meters)    Stage 2 Mild CKD (GFR = 60-89 mL/min/1.73 square meters)    Stage 3A Moderate CKD (GFR = 45-59 mL/min/1.73 square meters)    Stage 3B Moderate CKD (GFR = 30-44 mL/min/1.73 square meters)    Stage 4 Severe CKD (GFR = 15-29 mL/min/1.73 square meters)    Stage 5 End Stage CKD (GFR <15 mL/min/1.73 square meters)  Note: GFR calculation is accurate only with a steady state creatinine                   No orders to display         Procedures  Procedures      ED Course                             SBIRT 20yo+      Flowsheet Row Most Recent Value   Initial Alcohol Screen: US AUDIT-C     1. How often do you have a drink containing alcohol? 0 Filed at: 01/06/2024 0055   2. How many drinks containing alcohol do you have on a typical day you are drinking?  0 Filed at: 01/06/2024 0055   3a. Male UNDER 65: How often do you have five or more drinks on one occasion? 0 Filed at: 01/06/2024 0055   Audit-C Score 0 Filed at: 01/06/2024 0055   ALMA: How many times in the past year have you...    Used an illegal drug or used a prescription medication for non-medical reasons? Never Filed at: 01/06/2024 0055                  Medical Decision Making  43-year-old male returns to ER after second seizure in less than 24 hours.    Plan: Neurology consult    Risk  Decision regarding hospitalization.          Disposition  Final diagnoses:   Seizure (HCC)     Time reflects when diagnosis was documented in both MDM as  applicable and the Disposition within this note       Time User Action Codes Description Comment    1/6/2024  1:11 AM Jorge Wells Add [R56.9] Seizure (HCC)           ED Disposition       ED Disposition   Admit    Condition   Stable    Date/Time   Sat Jan 6, 2024 0224    Comment   Case was discussed with neurology and the patient's admission status was agreed to be Admission Status: observation status to the service of Neurology attending .               Follow-up Information    None         Discharge Medication List as of 1/7/2024  2:55 PM        CONTINUE these medications which have CHANGED    Details   levETIRAcetam (KEPPRA) 1000 MG tablet Take 1 tablet (1,000 mg total) by mouth every 12 (twelve) hours, Starting Sun 1/7/2024, Until Sat 4/6/2024, Normal           CONTINUE these medications which have NOT CHANGED    Details   aspirin 81 mg chewable tablet Chew 1 tablet (81 mg total) daily, Starting Fri 6/9/2023, Normal      escitalopram (LEXAPRO) 5 mg tablet TAKE ONE TABLET BY MOUTH ONE TIME DAILY, Starting Tue 12/26/2023, Normal      acetaminophen (TYLENOL) 325 mg tablet Take 2 tablets (650 mg total) by mouth every 6 (six) hours as needed for mild pain or headaches (mild pain), Starting Fri 6/9/2023, No Print      MELATONIN PO Take by mouth, Historical Med      Protein POWD Take by mouth, Historical Med           No discharge procedures on file.    PDMP Review         Value Time User    PDMP Reviewed  Yes 1/6/2024  2:25 AM Jose Briceño MD             ED Provider  Attending physically available and evaluated Abdirahman Ambrocio. I managed the patient along with the ED Attending.    Electronically Signed by           Jorge Wells MD  01/09/24 8920

## 2024-01-06 NOTE — ED ATTENDING ATTESTATION
1/5/2024  I, Cl Dia DO, saw and evaluated the patient. I have discussed the patient with the resident/non-physician practitioner and agree with the resident's/non-physician practitioner's findings, Plan of Care, and MDM as documented in the resident's/non-physician practitioner's note, except where noted. All available labs and Radiology studies were reviewed.  I was present for key portions of any procedure(s) performed by the resident/non-physician practitioner and I was immediately available to provide assistance.       At this point I agree with the current assessment done in the Emergency Department.  I have conducted an independent evaluation of this patient a history and physical is as follows:    Patient is a 43-year-old male with a history of seizure disorder on Keppra, previous stroke, accompanied by his father.  Father says the patient has been pretty active after his stroke, exercising and running, last seizure was November 2023.  Father says the patient came to him this evening complaining that his arm was shaking a little bit, father was driving the patient in the ED to be evaluated, and after getting out of the vehicle the patient sat down on a bench and was witnessed to have tonic-clonic seizure activity, lasting about 30 seconds.  Patient did not suffer direct trauma.    At the time of my initial assessment, the patient is not able to provide additional history or information as he appears to be postictal.    General:  Patient is lying on the stretcher, not very responsive but protecting his airway and breathing well   head:  Atraumatic  Eyes:  Conjunctiva pink, PERRL, assess extraocular muscles  ENT:  Mucous membranes are moist  Neck:  Supple  Cardiac:  S1-S2, without murmurs  Lungs:  Clear to auscultation bilaterally  Abdomen:  Soft, nontender, normal bowel sounds, no CVA tenderness, no tympany, no rigidity, no guarding  Extremities: No signs of trauma, no limitations to passive range  of motion at the bilateral shoulders, elbows, wrist, hips, knees, ankles  Neurologic: Nonverbal, starting to move a little bit and look around,  Skin:  Pink warm and dry, no rash    ED Course     Patient immediately evaluated upon arrival, fingerstick glucose is normal showing no evidence of life-threatening hypoglycemia,    Labs Reviewed   CBC AND DIFFERENTIAL - Abnormal       Result Value Ref Range Status    WBC 12.00 (*) 4.31 - 10.16 Thousand/uL Final    RBC 4.51  3.88 - 5.62 Million/uL Final    Hemoglobin 14.1  12.0 - 17.0 g/dL Final    Hematocrit 42.1  36.5 - 49.3 % Final    MCV 93  82 - 98 fL Final    MCH 31.3  26.8 - 34.3 pg Final    MCHC 33.5  31.4 - 37.4 g/dL Final    RDW 11.9  11.6 - 15.1 % Final    MPV 8.8 (*) 8.9 - 12.7 fL Final    Platelets 292  149 - 390 Thousands/uL Final    Narrative:     This is an appended report.  These results have been appended to a previously verified report.   BASIC METABOLIC PANEL - Abnormal    Sodium 139  135 - 147 mmol/L Final    Potassium 3.0 (*) 3.5 - 5.3 mmol/L Final    Chloride 99  96 - 108 mmol/L Final    CO2 15 (*) 21 - 32 mmol/L Final    ANION GAP 25  mmol/L Final    BUN 18  5 - 25 mg/dL Final    Creatinine 1.05  0.60 - 1.30 mg/dL Final    Comment: Standardized to IDMS reference method    Glucose 136  65 - 140 mg/dL Final    Comment: If the patient is fasting, the ADA then defines impaired fasting glucose as > 100 mg/dL and diabetes as > or equal to 123 mg/dL.    Calcium 9.0  8.4 - 10.2 mg/dL Final    eGFR 87  ml/min/1.73sq m Final    Narrative:     National Kidney Disease Foundation guidelines for Chronic Kidney Disease (CKD):     Stage 1 with normal or high GFR (GFR > 90 mL/min/1.73 square meters)    Stage 2 Mild CKD (GFR = 60-89 mL/min/1.73 square meters)    Stage 3A Moderate CKD (GFR = 45-59 mL/min/1.73 square meters)    Stage 3B Moderate CKD (GFR = 30-44 mL/min/1.73 square meters)    Stage 4 Severe CKD (GFR = 15-29 mL/min/1.73 square meters)    Stage 5 End Stage  CKD (GFR <15 mL/min/1.73 square meters)  Note: GFR calculation is accurate only with a steady state creatinine   MANUAL DIFFERENTIAL(PHLEBS DO NOT ORDER) - Abnormal    Segmented % 21 (*) 43 - 75 % Final    Lymphocytes % 59 (*) 14 - 44 % Final    Monocytes % 10  4 - 12 % Final    Eosinophils, % 3  0 - 6 % Final    Basophils % 0  0 - 1 % Final    Atypical Lymphocytes % 7 (*) <=0 % Final    Absolute Neutrophils 2.52  1.85 - 7.62 Thousand/uL Final    Lymphocytes Absolute 7.92 (*) 0.60 - 4.47 Thousand/uL Final    Monocytes Absolute 1.20  0.00 - 1.22 Thousand/uL Final    Eosinophils Absolute 0.36  0.00 - 0.40 Thousand/uL Final    Basophils Absolute 0.00  0.00 - 0.10 Thousand/uL Final    Total Counted     Final    RBC Morphology Normal   Final    Platelet Estimate Adequate  Adequate Final   RBC MORPHOLOGY REFLEX TEST   LEVETIRACETAM LEVEL       CT head wo contrast   Final Result      No acute intracranial abnormality.   Stable old left MCA territory infarct.               Workstation performed: [id number]           At this point I suspect the patient most likely has a recurrent seizure, he is slightly hypokalemic but I do not believe this is clinically relevant.  There is no evidence of life-threatening intracranial hemorrhage.  Plan is for observation to ensure patient returns back to his baseline, patient does return to his baseline, would discharge the patient home with follow-up. Signed out to             MEDICAL DECISION MAKING CODING    Patient presents with acute new problem with:  Threat to life or bodily function    Chronic conditions affecting care: As per HPI    COLLECTION AND INTERPRETATION OF DATA  Additional history obtained from: Father  I reviewed prior external notes, including previous ECG as noted above    I ordered each unique test  Tests reviewed personally by me:  ECG: ECG interpreted me, sinus rhythm, rate of 84, borderline prolonged QT of 4 8 2 ms, no acute ischemic or infarctive changes, no  acute change from November 2023  Labs: See above  Imaging: I independently reviewed the head CT and found no acute pathology.      Surgery  -I considered surgery may be necessary prior to completion of the work up but afterwards there is no indication for immediate surgery    Social Determinants of Health:  Presentation to ED outside of business hours or on night shift      Critical Care Time  Procedures

## 2024-01-06 NOTE — ASSESSMENT & PLAN NOTE
Continue home aspirin. Per previous cardiology outpatient note, patient declined PFO closure at that time.    States he has upcoming appointments regarding the PFO.

## 2024-01-06 NOTE — DISCHARGE INSTRUCTIONS
Call your neurologist on Monday to discuss your seizure and ER visit.  Return to ER for any severe headache, numbness or weakness in your arms or legs, or difficulty walking.

## 2024-01-06 NOTE — UTILIZATION REVIEW
Initial Clinical Review    Admission: Date/Time/Statement:   Admission Orders (From admission, onward)       Ordered        01/06/24 0226  Place in Observation  Once                          Orders Placed This Encounter   Procedures    Place in Observation     Standing Status:   Standing     Number of Occurrences:   1     Order Specific Question:   Level of Care     Answer:   Med Surg [16]     ED Arrival Information       Expected   1/6/2024     Arrival   1/6/2024 00:44    Acuity   Urgent              Means of arrival   Ambulance    Escorted by   [facility] EMS    Service   Neurology    Admission type   Emergency              Arrival complaint   Seizure             Chief Complaint   Patient presents with    Seizure - Prior Hx Of     Pt recently d/simin. Returned after seizure at home lasting approx 30 seconds.       Initial Presentation: 43 y.o. male who presented  to [facility] ED.  Observation  admission for evaluation and treatment of Seizure.   PMHx:  has a past medical history of Seizure on Keppra and Stroke( L MCA).  Presented w/breakthrough seizures. He reports he  had LOC without warning. He apparently had R arm and leg shaking which transitioned to a generalized tonic clonic seizure. This lasted for 5 minutes and then he had an undefined postictal state.   He was seen in the ED and discharged home. He returned home and had a 30 second episode of generalized tonic clonic seizure and thus returned to the ED, where he was given keppra 1500mg load and returned to baseline/ At baseline he has residual aphasia and residual R arm and R leg weakness from his prior L MCA stroke.  He had his keppra dose checked in November after another seizure, he did nto miss any doses  keppra level at 3.2, and dose was not changed. He reports ,med complaince.   Plan after loading dose, will increase Keppra to 1G bid for maintenance.  Hold EEG as his seizures are always clinically observed.  Continue neuro checks. Ativan prn for seizure > 3 minutes.       Date: 1/7/24   Day 2:     ED Triage Vitals   Temperature Pulse Respirations Blood Pressure SpO2   01/06/24 1200 01/06/24 0053 01/06/24 0053 01/06/24 0053 01/06/24 0053   98.5 °F (36.9 °C) 77 16 140/72 97 %      Temp Source Heart Rate Source Patient Position - Orthostatic VS BP Location FiO2 (%)   01/06/24 1200 01/06/24 0053 01/06/24 0654 01/06/24 0053 --   Oral Monitor Lying Left arm       Pain Score       --                 Wt Readings from Last 1 Encounters:   12/26/23 86.1 kg (189 lb 12.8 oz)     Additional Vital Signs:   Date/Time Temp Pulse Resp BP MAP (mmHg) SpO2 O2 Device Patient Position - Orthostatic VS   01/07/24 06:24:17 98.1 °F (36.7 °C) 63 16 128/76 93 95 % -- Lying   01/06/24 22:28:47 98.3 °F (36.8 °C) 57 16 122/66 85 96 % None (Room air) Lying   01/06/24 1846 -- 58 -- 124/69 -- 97 % None (Room air) Lying   01/06/24 1200 98.5 °F (36.9 °C) 70 16 115/66 -- 97 % None (Room air) --   01/06/24 0654 -- -- -- 127/67 -- -- -- Lying   01/06/24 0053 -- 77 16 140/72 -- 97 % -- --         Pertinent Labs/Diagnostic Test Results:   CT Head - 1/5 - No acute intracranial abnormality.   Stable old left MCA territory infarct.       Results from last 7 days   Lab Units 01/06/24  1140 01/05/24  2223   WBC Thousand/uL 5.87 12.00*   HEMOGLOBIN g/dL 12.9 14.1   HEMATOCRIT % 37.0 42.1   PLATELETS Thousands/uL 243 292         Results from last 7 days   Lab Units 01/06/24  0852 01/05/24  2223   SODIUM mmol/L 136 139   POTASSIUM mmol/L 3.9 3.0*   CHLORIDE mmol/L 106 99   CO2 mmol/L 24 15*   ANION GAP mmol/L 6 25   BUN mg/dL 11 18   CREATININE mg/dL 0.83 1.05   EGFR ml/min/1.73sq m 108 87   CALCIUM mg/dL 8.8 9.0       Results from last 7 days   Lab Units 01/06/24  0852 01/05/24  2223   GLUCOSE RANDOM mg/dL 79 136       ED Treatment:   Medication Administration from 01/06/2024 0044 to 01/06/2024 1512         Date/Time Order Dose Route Action Comments     01/06/2024  0307 EST levETIRAcetam (KEPPRA) 1,500 mg in sodium chloride 0.9 % 100 mL IVPB 0 mg Intravenous Stopped --     01/06/2024 0116 EST levETIRAcetam (KEPPRA) 1,500 mg in sodium chloride 0.9 % 100 mL IVPB 1,500 mg Intravenous New Bag --     01/06/2024 0908 EST levETIRAcetam (KEPPRA) 1,000 mg in sodium chloride 0.9 % 100 mL IVPB 0 mg Intravenous Stopped --     01/06/2024 0853 EST levETIRAcetam (KEPPRA) 1,000 mg in sodium chloride 0.9 % 100 mL IVPB 1,000 mg Intravenous New Bag --     01/06/2024 0853 EST aspirin chewable tablet 81 mg 81 mg Oral Given --     01/06/2024 0853 EST escitalopram (LEXAPRO) tablet 5 mg 5 mg Oral Given --     01/06/2024 0853 EST potassium chloride (K-DUR,KLOR-CON) CR tablet 40 mEq 40 mEq Oral Given --          Past Medical History:   Diagnosis Date    Seizure (HCC)     Stroke (HCC)      Present on Admission:   Seizure (HCC)   CVA (cerebral vascular accident) (HCC)      Admitting Diagnosis: Seizure (HCC) [R56.9]  Age/Sex: 43 y.o. male          Admission Orders:  VS- up & OOB as tolerated - neuro checks q 4 - Seizure precautions     Scheduled Medications:  aspirin, 81 mg, Oral, Daily  escitalopram, 5 mg, Oral, Daily  levETIRAcetam, 1,000 mg, Oral, Q12H BRANDON      Continuous IV Infusions:     PRN Meds:  LORazepam, 2 mg, Intravenous, Q6H PRN            Network Utilization Review Department  ATTENTION: Please call with any questions or concerns to [phone] and carefully listen to the prompts so that you are directed to the right person. All voicemails are confidential.   For Discharge needs, contact Care Management DC Support Team at [phone] opt. 2  Send all requests for admission clinical reviews, approved or denied determinations and any other requests to dedicated fax number below belonging to the campus where the patient is receiving treatment. List of dedicated fax numbers for the Facilities:  FACILITY NAME UR FAX NUMBER   ADMISSION DENIALS (Administrative/Medical Necessity) [phone]    DISCHARGE SUPPORT TEAM (NETWORK) [phone]   PARENT CHILD HEALTH (Maternity/NICU/Pediatrics) [phone]   [facility] [phone]   [facility] [phone]   [facility] [phone]   [facility] [phone]   [facility] [phone]   [facility] [phone]   [facility] [phone]   [facility] [phone]   [facility] [phone]   [facility] [phone]   [facility] [phone]

## 2024-01-07 VITALS
OXYGEN SATURATION: 95 % | RESPIRATION RATE: 16 BRPM | HEART RATE: 63 BPM | TEMPERATURE: 98.1 F | SYSTOLIC BLOOD PRESSURE: 128 MMHG | DIASTOLIC BLOOD PRESSURE: 76 MMHG

## 2024-01-07 PROCEDURE — 99238 HOSP IP/OBS DSCHRG MGMT 30/<: CPT | Performed by: PSYCHIATRY & NEUROLOGY

## 2024-01-07 RX ORDER — LEVETIRACETAM 1000 MG/1
1000 TABLET ORAL EVERY 12 HOURS SCHEDULED
Qty: 180 TABLET | Refills: 2 | Status: SHIPPED | OUTPATIENT
Start: 2024-01-07 | End: 2024-04-06

## 2024-01-07 RX ADMIN — ESCITALOPRAM OXALATE 5 MG: 5 TABLET, FILM COATED ORAL at 09:28

## 2024-01-07 RX ADMIN — ASPIRIN 81 MG CHEWABLE TABLET 81 MG: 81 TABLET CHEWABLE at 09:28

## 2024-01-07 RX ADMIN — LEVETIRACETAM 1000 MG: 500 TABLET, FILM COATED ORAL at 09:27

## 2024-01-07 NOTE — PLAN OF CARE
Problem: PAIN - ADULT  Goal: Verbalizes/displays adequate comfort level or baseline comfort level  Description: Interventions:  - Encourage patient to monitor pain and request assistance  - Assess pain using appropriate pain scale  - Administer analgesics based on type and severity of pain and evaluate response  - Implement non-pharmacological measures as appropriate and evaluate response  - Consider cultural and social influences on pain and pain management  - Notify physician/advanced practitioner if interventions unsuccessful or patient reports new pain  1/7/2024 1402 by Silvia Mccartney RN  Outcome: Progressing  1/7/2024 1402 by Silvia Mccartney RN  Outcome: Progressing     Problem: INFECTION - ADULT  Goal: Absence or prevention of progression during hospitalization  Description: INTERVENTIONS:  - Assess and monitor for signs and symptoms of infection  - Monitor lab/diagnostic results  - Monitor all insertion sites, i.e. indwelling lines, tubes, and drains  - Monitor endotracheal if appropriate and nasal secretions for changes in amount and color  - [place] appropriate cooling/warming therapies per order  - Administer medications as ordered  - Instruct and encourage patient and family to use good hand hygiene technique  - Identify and instruct in appropriate isolation precautions for identified infection/condition  1/7/2024 1402 by Silvia Mccartney RN  Outcome: Progressing  1/7/2024 1402 by Silvia Mccartney RN  Outcome: Progressing  Goal: Absence of fever/infection during neutropenic period  Description: INTERVENTIONS:  - Monitor WBC    1/7/2024 1402 by Silvia Mccartney RN  Outcome: Progressing  1/7/2024 1402 by Silvia Mccartney RN  Outcome: Progressing     Problem: SAFETY ADULT  Goal: Patient will remain free of falls  Description: INTERVENTIONS:  - Educate patient/family on patient safety including physical limitations  - Instruct patient to call for  assistance with activity   - Consult OT/PT to assist with strengthening/mobility   - Keep Call bell within reach  - Keep bed low and locked with side rails adjusted as appropriate  - Keep care items and personal belongings within reach  - Initiate and maintain comfort rounds  - Make Fall Risk Sign visible to staff  - Offer Toileting every   Hours, in advance of need  - Initiate/Maintain  alarm  - Obtain necessary fall risk management equipment:    - Apply yellow socks and bracelet for high fall risk patients  - Consider moving patient to room near nurses station  1/7/2024 1402 by Silvia Mccartney RN  Outcome: Progressing  1/7/2024 1402 by Silvia Mccartney RN  Outcome: Progressing  Goal: Maintain or return to baseline ADL function  Description: INTERVENTIONS:  -  Assess patient's ability to carry out ADLs; assess patient's baseline for ADL function and identify physical deficits which impact ability to perform ADLs (bathing, care of mouth/teeth, toileting, grooming, dressing, etc.)  - Assess/evaluate cause of self-care deficits   - Assess range of motion  - Assess patient's mobility; develop plan if impaired  - Assess patient's need for assistive devices and provide as appropriate  - Encourage maximum independence but intervene and supervise when necessary  - Involve family in performance of ADLs  - Assess for home care needs following discharge   - Consider OT consult to assist with ADL evaluation and planning for discharge  - Provide patient education as appropriate  1/7/2024 1402 by Silvia Mccartney RN  Outcome: Progressing  1/7/2024 1402 by Silvia Mccartney RN  Outcome: Progressing  Goal: Maintains/Returns to pre admission functional level  Description: INTERVENTIONS:  - Perform AM-PAC 6 Click Basic Mobility/ Daily Activity assessment daily.  - Set and communicate daily mobility goal to care team and patient/family/caregiver.   - Collaborate with rehabilitation services on mobility  goals if consulted  - Perform Range of Motion   times a day.  - Reposition patient every   hours.  - Dangle patient   times a day  - Stand patient   times a day  - Ambulate patient   times a day  - Out of bed to chair   times a day   - Out of bed for meals   times a day  - Out of bed for toileting  - Record patient progress and toleration of activity level   1/7/2024 1402 by Silvia Mccartney RN  Outcome: Progressing  1/7/2024 1402 by Silvia Mccartney RN  Outcome: Progressing     Problem: DISCHARGE PLANNING  Goal: Discharge to home or other facility with appropriate resources  Description: INTERVENTIONS:  - Identify barriers to discharge w/patient and caregiver  - Arrange for needed discharge resources and transportation as appropriate  - Identify discharge learning needs (meds, wound care, etc.)  - Arrange for interpretive services to assist at discharge as needed  - Refer to Case Management Department for coordinating discharge planning if the patient needs post-hospital services based on physician/advanced practitioner order or complex needs related to functional status, cognitive ability, or social support system  1/7/2024 1402 by Silvia Mccartney RN  Outcome: Progressing  1/7/2024 1402 by Silvia Mccartney RN  Outcome: Progressing     Problem: Knowledge Deficit  Goal: Patient/family/caregiver demonstrates understanding of disease process, treatment plan, medications, and discharge instructions  Description: Complete learning assessment and assess knowledge base.  Interventions:  - Provide teaching at level of understanding  - Provide teaching via preferred learning methods  1/7/2024 1402 by Silvia Mccartney RN  Outcome: Progressing  1/7/2024 1402 by Silvia Mccartney RN  Outcome: Progressing

## 2024-01-07 NOTE — NURSING NOTE
Patient discharge home in stable condition, IV access removed and AVS reviewed with all questions answered.

## 2024-01-07 NOTE — DISCHARGE SUMMARY
NEUROLOGY RESIDENCY - DISCHARGE SUMMARY   Name: Abdirahman Ambrocio   Age & Sex: 43 y.o. male   MRN: [id number]  Unit/Bed#: CW2 216-02   Encounter: [id number]    Discharging Resident Physician: David Jason DO  Attending: Emory Philip DO  PCP: Frantz Vallejo DO  Admission Date: 1/6/2024  Discharge Date: 01/07/24    Abdirahman Ambrocio will need follow up in in 6 weeks with neurovascular Attending .  He will not require outpatient neurological testing.    ASSESSMENT & PLAN   Hypokalemia  Assessment & Plan  Potassium of 3.0 on CMP.    Ordered 40 meq one time dose.    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    CVA (cerebral vascular accident) (HCC)  Assessment & Plan  Continue home aspirin. Per previous cardiology outpatient note, patient declined PFO closure at that time.    States he has upcoming appointments regarding the PFO.    * Seizure (HCC)  Assessment & Plan  Abdirahman Ambrocio presented to the hospital on 1/6/2024 0044 for breakthrough R focal to generalized seizure while most likely compliant on Keppra. Patient had 2 different seizure like events since yesterday, first one is clear right sided focal seizure that generalized which spontaneously resolved in 5 minutes and the second event is 30 second episode of witnessed GTC activity. Postictal state but with return to baseline after each. No reported urinary/bowel incontinence but with slight tongue bite. Patient given load of 1500mg Keppra but no benzos were used. Examwise significant for residual aphasia, right arm and right leg weakness improving and chronic from 5/15 stroke. Patient reports compliance to keppra 750mg BID.    Workup:  CTH: No acute intracranial abnormality; left MCA stroke  MRI brain w and wo contrast 5/17: Left MCA  infarct  Video EEG 5/16/2023-5/17/2023: Left sided cerebral dysfunction but no electrographic seizures    Impression: Patient most likely at this time has breakthrough seizure despite compliance with Keppra 750mg BID. Most likely due to continued encephalomalacia of the left MCA infarct. No other triggers on history but will order UDS for possible drugs that may precipitate seizures    Plan:  Seizure protocol and precautions  Keppra 1500mg IV load given and will increase Keppra to 1g BID for maintenance  Level checks: Levetiracetam level (acquired before dose of Keppra given)  Will hold EEG at this time as patient's seizures are always clinically observed  Routine Neurochecks  Submitted and faxed [employer] form   Ativan 2mg PRN for seizures >3 minutes          Disposition: Home    Reason for Admission: Seizure (HCC) [R56.9]    History & Hospital Course:   Abdirahman Ambrocio is a 41 year old male with a history of L MCA stroke s/p TNK and thrombectomy in May 2023 which was presumed to be resultant from a large PFO and associated post-stroke seizure disorder who presented on 1/6/2024 with break through seizures.  HPI: Patient states he returned home 1/5/2024 and lost consciousness without warning. Patient apparently had first right arm and leg shaking which transitioned to a generalized tonic clonic seizure. The episode lasted for 5 minutes and then he had an undefined postictal state. Patient came back to baseline in ED and was discharged, but patient when returning home had a 30 second episode of generalized tonic clonic seizure and thus returned to the ED, where he was given keppra 1500mg load and returned again to baseline. At his baseline, he has residual aphasia and residual right arm and right leg weakness from his previous left MCA infarct 5/15. Patient affirms compliance of his AED of Keppra 750mg BID, denies infections, denies trauma, denies recreational drug use. Patient denies alcohol use or tobacco use  currently.Patient has had 4 total recorded seizure like episodes starting from his admission for left MCA stroke when he had right eye deviation 5/16 with postictal state. vEEG done at the time showed no captured seizure events but did show findings indicating structural lesion of the left MCA stroke. He was placed on keppra 750mg BID afterward and then followed up with Neurology outpatient on 8/17/2023. He then had another seizure 11/2/2023 which was found that patient did have missed dosings of Keppra and thus keppra maintenance dose was not changed (Keppra level at 3.2). The patient reports compliance with his medications since then. Keppra levels are pending. Patient was given a loading dose of 1.5g of Keppra (as stated above) and initiated on 1g BID. Patient continues to have some aphasia which he states has been present since his stroke in May 2023.    Hospital Course: Patient was addmited to the neurology service. His keppra maintenance dose was increased to 1,000 mg BID. He did not have repeated episodes of LOC during his stay. He had a keppra level obtained which was pending at time of discharge. After period of observation he was discharged home with close follow up instructions with neurovascular attending to discuss hospitalization as well as recommendations for PFO closure.      Consultations During Hospital Stay:  IP CONSULT TO NEUROLOGY     Procedures Performed:   None    Significant Findings / Test Results:   Labs:  Results from last 7 days   Lab Units 01/06/24  1140 01/05/24  2223   WBC Thousand/uL 5.87 12.00*   HEMOGLOBIN g/dL 12.9 14.1   HEMATOCRIT % 37.0 42.1   PLATELETS Thousands/uL 243 292   LYMPHO PCT %  --  59*   MONO PCT %  --  10   EOS PCT %  --  3     Results from last 7 days   Lab Units 01/06/24  0852 01/05/24  2223   SODIUM mmol/L 136 139   POTASSIUM mmol/L 3.9 3.0*   CHLORIDE mmol/L 106 99   CO2 mmol/L 24 15*   BUN mg/dL 11 18   CREATININE mg/dL 0.83 1.05   ANION GAP mmol/L 6 25    CALCIUM mg/dL 8.8 9.0   GLUCOSE RANDOM mg/dL 79 136                             ]  Imaging:  No results found.    Incidental Findings:   N/A     Test Results Pending at Discharge (will require follow up): Pending labs/ tests done in hospital and still need to be followed up after discharge - [facility]     Outpatient Tests Requested:  N/A    Complications: None    Condition at Discharge: stable     Discharge Day Visit / Exam:   Subjective:  Patient seen and evaluated at bedside this morning. No acute over night events.  Vitals: Blood Pressure: 128/76 (01/07/24 0624)  Pulse: 63 (01/07/24 0624)  Temperature: 98.1 °F (36.7 °C) (01/07/24 0624)  Temp Source: Oral (01/07/24 0624)  Respirations: 16 (01/07/24 0624)  SpO2: 95 % (01/07/24 0624)  Exam:   NEUROLOGIC  EXAM:  Mental Status: alertness: alert, orientation: time, date, person, speech:aphasia, word finding difficulty, affect: normal, thought content exhibits logical connections  Cranial Nerves:  II: Pupils equal, round, reactive to light and accommodation, Visual Fields normal  III, IV, VI: EOM full and intact  V: facial sensation was normal and symmetrical  VII: facial symmetry equal  VIII: normal hearing to speech  IX, X: normal palatal elevation, no uvular deviation  XI: 5/5 head turn and 5/5 shoulder shrug bilaterally  XII: midline tongue protrusion  Motor: Normal bulk, tone, no involuntary movements or tremors     DELTOID   BICEP   TRICEPS   WRIST  EXTENSION   WRIST  FLEXION   DORSAL  INTEROSSEI      RIGHT 5 5 5 5 5 5 5   LEFT 5 5 5 5 5 5 5      HIP  FLEXION KNEE  EXTENSION DORSI   PLANTAR     RIGHT 5 5 5 5   LEFT 5 5 5 5   Reflexes: No clonus, no Dow's, no cross abductors, toes down     BICEP   TRICEPS   BRACHIO   PATELLAR   ACHILLES   RIGHT 2+ 2+ 2+ 2+ 2+   LEFT 2+ 2+ 2+ 2+ 2+   Sensory:Normal sensation to light touch, pinprick, vibration, temperature, and proprioception in all limbs, romberg negative  Coordination: Cerebellar arm drift present,  Finger-to-nose, bilaterally intact Heel To Oliver normal bilaterally  Station/Gait: Defferred      Discharge instructions/Information to patient and family: See after visit summary for information provided to patient and family.  Disposition:The patient's status was stable during the hospital course. At this time patient is stable and will be discharged to 2N acute rehab.  Discussion with Family: Patient declined call to .       Provisions for Follow-Up Care: See after visit summary for information related to follow-up care and any pertinent home health orders.      Planned Readmission: No    Discharge Statement:  I spent 45 minutes discharging the patient. This time was spent on the day of discharge. I had direct contact with the patient on the day of discharge. Greater than 50% of the total time was spent examining patient, answering all patient questions, arranging and discussing plan of care with patient as well as directly providing post-discharge instructions.  Additional time then spent on discharge activities.    Discharge Medications:  See after visit summary for reconciled discharge medications provided to patient and family.      ** Please Note: This note has been constructed using a voice recognition system **    == David Jason DO   [facility] Neurology Residency, PGY-2

## 2024-01-08 ENCOUNTER — TELEPHONE (OUTPATIENT)
Dept: CARDIAC SURGERY | Facility: CLINIC | Age: 44
End: 2024-01-08

## 2024-01-08 ENCOUNTER — TELEPHONE (OUTPATIENT)
Dept: CARDIOLOGY CLINIC | Facility: CLINIC | Age: 44
End: 2024-01-08

## 2024-01-08 ENCOUNTER — TELEPHONE (OUTPATIENT)
Dept: NEUROLOGY | Facility: CLINIC | Age: 44
End: 2024-01-08

## 2024-01-08 DIAGNOSIS — I63.419 CEREBROVASCULAR ACCIDENT (CVA) DUE TO EMBOLISM OF MIDDLE CEREBRAL ARTERY, UNSPECIFIED BLOOD VESSEL LATERALITY (HCC): Primary | ICD-10-CM

## 2024-01-08 DIAGNOSIS — Q21.12 PFO (PATENT FORAMEN OVALE): ICD-10-CM

## 2024-01-08 LAB — LEVETIRACETAM SERPL-MCNC: 16.9 UG/ML (ref 10–40)

## 2024-01-08 NOTE — TELEPHONE ENCOUNTER
Pts dad called back in needed to change appt time due to another appt being made the same day. Changed appt with Dr. Rouse to 11:00 on 2/20.

## 2024-01-08 NOTE — LETTER
2024       Abdirahman Ambrocio              : 1980        MRN: [id number]  [address] [place] [id number]       Procedure Name: PFO CLOSURE    Procedure date: 24    Location: [facility]  Address: [address] [place] [id number]      The hospital will contact you the day prior to your procedure, usually between 4PM - 6PM to instruct you on the time and place to report. If you do not hear from a [facility]  by 6PM the evening prior to your procedure, please contact the [facility] that you are scheduled at.      [place]: [address] [place] [id number] - Short Stay Center [phone]    You may have nothing to eat or drink from midnight the night prior to your procedure. You may have a minimal amount of water with your morning medications. DO NOT stop taking Plavix or Aspirin unless advised otherwise.    If your procedure is scheduled after 12:00 noon, you may have clear liquids until 8:00AM the morning of your procedure. Clear liquids are 7UP, Ginger Ale, Jello or broth.    Arrange for a responsible person to drive you to and from the hospital.    Please shower/bathe the night before your procedure and do not use powders or lotions.    Please notify us if you have been admitted to the hospital within the past 30 days.    Bring a list of daily medications, vitamins, minerals, herbals and nutritional supplements you take. Include dosage and time you take them each day.    If packing an overnight bag, pack minimal clothing, you will be given hospital sleepwear. Do not bring money, valuables or jewelry. Wedding band is OK.    If you use CPAP machine, bring it to the hospital.      Have your Photo ID and Insurance cards with you.    DO NOT take any diabetic medication, including insulin, the morning of the procedure. Oral diabetic medications may include: Glucophage, Prandin, Glyburide, Micronase, Avandia, Glocovance, Precose, Glynase y Starlix.    You  "should continue to take your morning dose of heart and/or blood pressure medications with a sip of water UNLESS ADVISED OTHERWISE.    Special Instructions:    Medication holds:   N/A    Labs to be done on 2/10/24:  CMP / CBC (fasting 8 hours)         Thank you,   Alexia \"Kathryn\" [facility]  Surgery Coordinator  [facility]   [address] [place] [address]  Teams: [phone]                 "

## 2024-01-08 NOTE — TELEPHONE ENCOUNTER
"Patient scheduled for PFO on 2/21/24 at [facility] with Dr. Maldonado.      Mailed patient instructions.     Patient's dad Rob aware of all general instructions.    Medication holds:   N/A    Labs to be done on 2/10/24:  CMP / CBC (fasting 8 hours)     Insurance: [employer]    Please obtain auth.     Thank you,  Alexia \"Kathryn\" Sb      "

## 2024-01-08 NOTE — TELEPHONE ENCOUNTER
----- Message -----  From: Tashi Maldonado DO  Sent: 1/8/2024   2:43 PM EST  To: Margaret Hodge MA; Arcelia Villalta; Alexia Basurto    ----- Message from Tashi Maldonado DO sent at 1/8/2024  2:43 PM EST -----  Pt can have pfo scheduled when wants. Saw in 9/2023 for same. He called office wants have it done.  Can schedule.

## 2024-01-08 NOTE — TELEPHONE ENCOUNTER
"Margaret,    Please mail instructions (PFO) to patient's home address on file.     Thanks,  Alexia \"Kathryn\" Sb     "

## 2024-01-08 NOTE — TELEPHONE ENCOUNTER
Vicki w/ Rob Can 2/20 appt per Dr. Maldonado said Abdirahman does not need to be seen again since he went over Pfo procedure last visit, however he did want the Zio patch to be worn prior to procedure.

## 2024-01-08 NOTE — TELEPHONE ENCOUNTER
Good morning  Can I add this patient in your schedule for HFU? Patient was in the hospital 1/6-1/7/24 for seizure. Instruction is:    Abdirahman Ambrocio will need follow up in in 6 weeks with neurovascular Attending .  He will not require outpatient neurological testing.       He is a patient of [facility].

## 2024-01-09 ENCOUNTER — TRANSITIONAL CARE MANAGEMENT (OUTPATIENT)
Dept: FAMILY MEDICINE CLINIC | Facility: CLINIC | Age: 44
End: 2024-01-09

## 2024-01-19 ENCOUNTER — TELEPHONE (OUTPATIENT)
Dept: CARDIAC SURGERY | Facility: CLINIC | Age: 44
End: 2024-01-19

## 2024-01-19 NOTE — TELEPHONE ENCOUNTER
Patient's father Rob called, they put the zio patch on last Sunday and have been trying to be careful with it but patient runs about 7miles a day and with showering the zio patch is not staying on. He is asking if they take if off and mail it today would it be enough information for Dr. Maldonado, or do we recommend them doing something else. I told the patient I would send a message to the cardiology nurse team and have someone reach out to him to discuss. He can be reached at [phone]

## 2024-01-31 ENCOUNTER — TELEPHONE (OUTPATIENT)
Dept: CARDIOLOGY CLINIC | Facility: CLINIC | Age: 44
End: 2024-01-31

## 2024-01-31 ENCOUNTER — TELEPHONE (OUTPATIENT)
Dept: CARDIAC SURGERY | Facility: CLINIC | Age: 44
End: 2024-01-31

## 2024-01-31 ENCOUNTER — CLINICAL SUPPORT (OUTPATIENT)
Dept: CARDIOLOGY CLINIC | Facility: CLINIC | Age: 44
End: 2024-01-31
Payer: COMMERCIAL

## 2024-01-31 DIAGNOSIS — Q21.12 PFO (PATENT FORAMEN OVALE): Primary | ICD-10-CM

## 2024-01-31 PROCEDURE — 93228 REMOTE 30 DAY ECG REV/REPORT: CPT | Performed by: INTERNAL MEDICINE

## 2024-01-31 NOTE — TELEPHONE ENCOUNTER
----- Message from Tashi Maldonado DO sent at 1/31/2024 10:25 AM EST -----  Please let patient know monitor looked fine. No afib. No significant irregular heart rate.

## 2024-01-31 NOTE — TELEPHONE ENCOUNTER
Left v/m informing pt his zio patch monitor looked fine did not show a-fib or any significant irregular heart rate. Per .

## 2024-02-06 DIAGNOSIS — F06.31 DEPRESSION AS LATE EFFECT OF CEREBROVASCULAR ACCIDENT (CVA): ICD-10-CM

## 2024-02-06 DIAGNOSIS — I69.398 DEPRESSION AS LATE EFFECT OF CEREBROVASCULAR ACCIDENT (CVA): ICD-10-CM

## 2024-02-06 RX ORDER — ESCITALOPRAM OXALATE 5 MG/1
5 TABLET ORAL DAILY
Qty: 30 TABLET | Refills: 5 | Status: CANCELLED | OUTPATIENT
Start: 2024-02-06

## 2024-02-06 NOTE — TELEPHONE ENCOUNTER
Teressa,     It does appear that the patient's primary care found this about 3 days ago?  Is the patient aware of this or that his primary care about him now?  It does look like on 02/03/2024 this was dispensed.     -[facility]   _______________    Patient aware was filled by PCP and was appreciative.

## 2024-02-06 NOTE — TELEPHONE ENCOUNTER
Recd  2/5 10:14 AM     patient name is apurva lincoln, date of birth [birth year] medication is lexapro taken once daily. I am completely out. I'll need to get medications today for tomorrow. My phone number is [phone]. The doctor is Dr. Andrade, thank you very much. Jaiden.  ______________  Last visit 8/17/2023  lexapro due for refill; please sign script if in agreement, thank you.

## 2024-02-11 LAB
ALBUMIN SERPL-MCNC: 4.2 G/DL (ref 3.6–5.1)
ALBUMIN/GLOB SERPL: 1.4 (CALC) (ref 1–2.5)
ALP SERPL-CCNC: 78 U/L (ref 36–130)
ALT SERPL-CCNC: 20 U/L (ref 9–46)
AST SERPL-CCNC: 27 U/L (ref 10–40)
BASOPHILS # BLD AUTO: 60 CELLS/UL (ref 0–200)
BASOPHILS NFR BLD AUTO: 1.7 %
BILIRUB SERPL-MCNC: 0.5 MG/DL (ref 0.2–1.2)
BUN SERPL-MCNC: 15 MG/DL (ref 7–25)
BUN/CREAT SERPL: NORMAL (CALC) (ref 6–22)
CALCIUM SERPL-MCNC: 9.3 MG/DL (ref 8.6–10.3)
CHLORIDE SERPL-SCNC: 104 MMOL/L (ref 98–110)
CO2 SERPL-SCNC: 29 MMOL/L (ref 20–32)
CREAT SERPL-MCNC: 0.99 MG/DL (ref 0.6–1.29)
EOSINOPHIL # BLD AUTO: 196 CELLS/UL (ref 15–500)
EOSINOPHIL NFR BLD AUTO: 5.6 %
ERYTHROCYTE [DISTWIDTH] IN BLOOD BY AUTOMATED COUNT: 12.2 % (ref 11–15)
GFR/BSA.PRED SERPLBLD CYS-BASED-ARV: 98 ML/MIN/1.73M2
GLOBULIN SER CALC-MCNC: 2.9 G/DL (CALC) (ref 1.9–3.7)
GLUCOSE SERPL-MCNC: 98 MG/DL (ref 65–99)
HCT VFR BLD AUTO: 42.3 % (ref 38.5–50)
HGB BLD-MCNC: 14.1 G/DL (ref 13.2–17.1)
LYMPHOCYTES # BLD AUTO: 1778 CELLS/UL (ref 850–3900)
LYMPHOCYTES NFR BLD AUTO: 50.8 %
MCH RBC QN AUTO: 30.7 PG (ref 27–33)
MCHC RBC AUTO-ENTMCNC: 33.3 G/DL (ref 32–36)
MCV RBC AUTO: 92.2 FL (ref 80–100)
MONOCYTES # BLD AUTO: 455 CELLS/UL (ref 200–950)
MONOCYTES NFR BLD AUTO: 13 %
NEUTROPHILS # BLD AUTO: 1012 CELLS/UL (ref 1500–7800)
NEUTROPHILS NFR BLD AUTO: 28.9 %
PLATELET # BLD AUTO: 277 THOUSAND/UL (ref 140–400)
PMV BLD REES-ECKER: 9.5 FL (ref 7.5–12.5)
POTASSIUM SERPL-SCNC: 4.1 MMOL/L (ref 3.5–5.3)
PROT SERPL-MCNC: 7.1 G/DL (ref 6.1–8.1)
RBC # BLD AUTO: 4.59 MILLION/UL (ref 4.2–5.8)
SODIUM SERPL-SCNC: 140 MMOL/L (ref 135–146)
WBC # BLD AUTO: 3.5 THOUSAND/UL (ref 3.8–10.8)

## 2024-02-19 RX ORDER — ASPIRIN 81 MG/1
81 TABLET ORAL DAILY
Status: CANCELLED | OUTPATIENT
Start: 2024-02-19

## 2024-02-19 NOTE — H&P
"  H&P Exam - Cardiology   Abdirahman Ambrocio 43 y.o. male MRN: [id number]  Unit/Bed#:  Encounter: [id number]     Office cardiologist: Dr. Maldonado    Internationalist: Dr Maldonado    PCP:Dr. Frantz COATS Le, DO      VS:BP (!) 152/118   Pulse (!) 52   Temp 98.9 °F (37.2 °C)   Resp 16   Ht 5' 9\" (1.753 m)   Wt 77.1 kg (169 lb 15.6 oz)   SpO2 99%   BMI 25.10 kg/m²       Assessment/Plan   LMCA CVA with LM1 partial occlusion  Seizure disorder    History of Present Illness   HPI:  Abdirahman Ambrocio is a 43 y.o. male who presents with history of MCA presumptive embolic infarct s/p TNK and thrombectomy in May 2023.    Transesophageal echocardiogram which revealed revealing a PFO.      Patient has no history of AF (ZIO), hypercoagulable state or prior vascular events. Patient is an avid runner and runs 6-7 miles daily.     Pat had MCA CVA, presumed embolic possible PFO related CVA, RoPE 8, (84% chance CVA is due to PFO)     On 24 he  had a break through seizure with loss of consciousness and generalized seizure.  He was admitted and seen by neurology. Was faithfully taking 1500 mg of Keppra bid.   No additional testing testing was performed at that time. Keppra was increased to 1000 mg BID.      Historical Information   Past Medical History:   Diagnosis Date    Seizure (HCC)     Stroke (HCC)      Past Surgical History:   Procedure Laterality Date    IR STROKE ALERT  5/15/2023     Social History   Social History     Substance and Sexual Activity   Alcohol Use Yes    Comment: Social     Social History     Substance and Sexual Activity   Drug Use Never     Social History     Tobacco Use   Smoking Status Former    Current packs/day: 0.00    Average packs/day: 0.5 packs/day for 15.0 years (7.5 ttl pk-yrs)    Types: Cigarettes    Start date: 6/3/2008    Quit date: 6/3/2023    Years since quittin.7   Smokeless Tobacco Never     Family History: No family history on file.    Meds/Allergies   {SL IP H&P MEDS:[id number]}  No Known " Allergies    Review of Systems    Physical Exam    EKG: NSR  ALYSSA 5/23      Left Ventricle: Left ventricular cavity size is normal. Wall thickness is normal. The left ventricular ejection fraction is 55%. Systolic function is normal. Wall motion is normal. Diastolic function is normal.    Atrial Septum: There is no atrial septal defect. There is a large and patent foramen ovale confirmed at rest with bidirectional shunting using color Doppler and saline contrast injection.    Left Atrial Appendage: There is a windsock appearance. There is normal function. There is no thrombus.    Tricuspid Valve: There is mild regurgitation.

## 2024-02-20 ENCOUNTER — ANESTHESIA EVENT (OUTPATIENT)
Dept: NON INVASIVE DIAGNOSTICS | Facility: HOSPITAL | Age: 44
End: 2024-02-20
Payer: COMMERCIAL

## 2024-02-20 NOTE — PROGRESS NOTES
voicemail left for patient regarding arrival time of 0900, directions to the admissions department, npo status, and need for transportation. call back number was provided.

## 2024-02-21 ENCOUNTER — HOSPITAL ENCOUNTER (OUTPATIENT)
Facility: HOSPITAL | Age: 44
Setting detail: OUTPATIENT SURGERY
Discharge: HOME | End: 2024-02-22
Attending: INTERNAL MEDICINE | Admitting: INTERNAL MEDICINE
Payer: COMMERCIAL

## 2024-02-21 ENCOUNTER — ANESTHESIA (OUTPATIENT)
Dept: NON INVASIVE DIAGNOSTICS | Facility: HOSPITAL | Age: 44
End: 2024-02-21
Payer: COMMERCIAL

## 2024-02-21 DIAGNOSIS — I63.419 CEREBROVASCULAR ACCIDENT (CVA) DUE TO EMBOLISM OF MIDDLE CEREBRAL ARTERY, UNSPECIFIED BLOOD VESSEL LATERALITY (HCC): ICD-10-CM

## 2024-02-21 DIAGNOSIS — Z87.74 S/P PERCUTANEOUS PATENT FORAMEN OVALE CLOSURE: Primary | ICD-10-CM

## 2024-02-21 DIAGNOSIS — Q21.12 PFO (PATENT FORAMEN OVALE): ICD-10-CM

## 2024-02-21 LAB
KCT BLD-ACNC: 264 SEC (ref 89–137)
SPECIMEN SOURCE: ABNORMAL

## 2024-02-21 PROCEDURE — C1769 GUIDE WIRE: HCPCS | Performed by: INTERNAL MEDICINE

## 2024-02-21 PROCEDURE — C1760 CLOSURE DEV, VASC: HCPCS | Performed by: INTERNAL MEDICINE

## 2024-02-21 PROCEDURE — C1894 INTRO/SHEATH, NON-LASER: HCPCS | Performed by: INTERNAL MEDICINE

## 2024-02-21 PROCEDURE — C1817 SEPTAL DEFECT IMP SYS: HCPCS | Performed by: INTERNAL MEDICINE

## 2024-02-21 PROCEDURE — C1759 CATH, INTRA ECHOCARDIOGRAPHY: HCPCS | Performed by: INTERNAL MEDICINE

## 2024-02-21 PROCEDURE — 93662 INTRACARDIAC ECG (ICE): CPT | Performed by: INTERNAL MEDICINE

## 2024-02-21 PROCEDURE — 93580 TRANSCATH CLOSURE OF ASD: CPT | Performed by: INTERNAL MEDICINE

## 2024-02-21 PROCEDURE — NC001 PR NO CHARGE: Performed by: INTERNAL MEDICINE

## 2024-02-21 PROCEDURE — 93005 ELECTROCARDIOGRAM TRACING: CPT

## 2024-02-21 PROCEDURE — 85347 COAGULATION TIME ACTIVATED: CPT

## 2024-02-21 DEVICE — PERCLOSE™ PROSTYLE™ SUTURE-MEDIATED CLOSURE AND REPAIR SYSTEM
Type: IMPLANTABLE DEVICE | Status: FUNCTIONAL
Brand: PERCLOSE™ PROSTYLE™

## 2024-02-21 DEVICE — PFO OCCLUDER
Type: IMPLANTABLE DEVICE | Status: FUNCTIONAL
Brand: AMPLATZER™ TALISMAN™

## 2024-02-21 RX ORDER — MIDAZOLAM HYDROCHLORIDE 2 MG/2ML
INJECTION, SOLUTION INTRAMUSCULAR; INTRAVENOUS AS NEEDED
Status: DISCONTINUED | OUTPATIENT
Start: 2024-02-21 | End: 2024-02-21

## 2024-02-21 RX ORDER — LEVETIRACETAM 500 MG/1
1000 TABLET ORAL EVERY 12 HOURS SCHEDULED
Status: DISCONTINUED | OUTPATIENT
Start: 2024-02-21 | End: 2024-02-22 | Stop reason: HOSPADM

## 2024-02-21 RX ORDER — SODIUM CHLORIDE 9 MG/ML
125 INJECTION, SOLUTION INTRAVENOUS CONTINUOUS
Status: DISCONTINUED | OUTPATIENT
Start: 2024-02-21 | End: 2024-02-21

## 2024-02-21 RX ORDER — CLOPIDOGREL BISULFATE 75 MG/1
75 TABLET ORAL DAILY
Status: DISCONTINUED | OUTPATIENT
Start: 2024-02-21 | End: 2024-02-21

## 2024-02-21 RX ORDER — ASPIRIN 81 MG/1
81 TABLET, CHEWABLE ORAL ONCE
Status: DISCONTINUED | OUTPATIENT
Start: 2024-02-21 | End: 2024-02-21 | Stop reason: HOSPADM

## 2024-02-21 RX ORDER — PROPOFOL 10 MG/ML
INJECTION, EMULSION INTRAVENOUS CONTINUOUS PRN
Status: DISCONTINUED | OUTPATIENT
Start: 2024-02-21 | End: 2024-02-21

## 2024-02-21 RX ORDER — CEFAZOLIN SODIUM 2 G/50ML
2000 SOLUTION INTRAVENOUS ONCE
Status: COMPLETED | OUTPATIENT
Start: 2024-02-21 | End: 2024-02-21

## 2024-02-21 RX ORDER — ONDANSETRON 2 MG/ML
4 INJECTION INTRAMUSCULAR; INTRAVENOUS EVERY 6 HOURS PRN
Status: DISCONTINUED | OUTPATIENT
Start: 2024-02-21 | End: 2024-02-22 | Stop reason: HOSPADM

## 2024-02-21 RX ORDER — ESCITALOPRAM OXALATE 5 MG/1
5 TABLET ORAL DAILY
Status: DISCONTINUED | OUTPATIENT
Start: 2024-02-21 | End: 2024-02-22 | Stop reason: HOSPADM

## 2024-02-21 RX ORDER — ASPIRIN 81 MG/1
81 TABLET ORAL DAILY
Status: DISCONTINUED | OUTPATIENT
Start: 2024-02-21 | End: 2024-02-21

## 2024-02-21 RX ORDER — HEPARIN SODIUM 1000 [USP'U]/ML
INJECTION, SOLUTION INTRAVENOUS; SUBCUTANEOUS AS NEEDED
Status: DISCONTINUED | OUTPATIENT
Start: 2024-02-21 | End: 2024-02-21

## 2024-02-21 RX ORDER — LIDOCAINE HYDROCHLORIDE 10 MG/ML
INJECTION, SOLUTION EPIDURAL; INFILTRATION; INTRACAUDAL; PERINEURAL AS NEEDED
Status: DISCONTINUED | OUTPATIENT
Start: 2024-02-21 | End: 2024-02-21

## 2024-02-21 RX ORDER — FENTANYL CITRATE 50 UG/ML
INJECTION, SOLUTION INTRAMUSCULAR; INTRAVENOUS AS NEEDED
Status: DISCONTINUED | OUTPATIENT
Start: 2024-02-21 | End: 2024-02-21

## 2024-02-21 RX ORDER — LIDOCAINE HYDROCHLORIDE 10 MG/ML
INJECTION, SOLUTION EPIDURAL; INFILTRATION; INTRACAUDAL; PERINEURAL CODE/TRAUMA/SEDATION MEDICATION
Status: DISCONTINUED | OUTPATIENT
Start: 2024-02-21 | End: 2024-02-21 | Stop reason: HOSPADM

## 2024-02-21 RX ORDER — ACETAMINOPHEN 325 MG/1
650 TABLET ORAL EVERY 4 HOURS PRN
Status: DISCONTINUED | OUTPATIENT
Start: 2024-02-21 | End: 2024-02-22 | Stop reason: HOSPADM

## 2024-02-21 RX ORDER — OXYCODONE HYDROCHLORIDE AND ACETAMINOPHEN 5; 325 MG/1; MG/1
1 TABLET ORAL EVERY 4 HOURS PRN
Status: DISCONTINUED | OUTPATIENT
Start: 2024-02-21 | End: 2024-02-22 | Stop reason: HOSPADM

## 2024-02-21 RX ORDER — CLOPIDOGREL BISULFATE 75 MG/1
75 TABLET ORAL DAILY
Status: DISCONTINUED | OUTPATIENT
Start: 2024-02-21 | End: 2024-02-21 | Stop reason: HOSPADM

## 2024-02-21 RX ORDER — SODIUM CHLORIDE 9 MG/ML
INJECTION, SOLUTION INTRAVENOUS CONTINUOUS PRN
Status: DISCONTINUED | OUTPATIENT
Start: 2024-02-21 | End: 2024-02-21

## 2024-02-21 RX ADMIN — HEPARIN SODIUM 3000 UNITS: 1000 INJECTION INTRAVENOUS; SUBCUTANEOUS at 11:41

## 2024-02-21 RX ADMIN — CEFAZOLIN SODIUM 2000 MG: 2 SOLUTION INTRAVENOUS at 11:03

## 2024-02-21 RX ADMIN — SODIUM CHLORIDE 12 MCG: 9 INJECTION, SOLUTION INTRAVENOUS at 10:53

## 2024-02-21 RX ADMIN — FENTANYL CITRATE 50 MCG: 50 INJECTION INTRAMUSCULAR; INTRAVENOUS at 10:53

## 2024-02-21 RX ADMIN — HEPARIN SODIUM 12000 UNITS: 1000 INJECTION INTRAVENOUS; SUBCUTANEOUS at 11:23

## 2024-02-21 RX ADMIN — SODIUM CHLORIDE: 0.9 INJECTION, SOLUTION INTRAVENOUS at 09:51

## 2024-02-21 RX ADMIN — FENTANYL CITRATE 50 MCG: 50 INJECTION INTRAMUSCULAR; INTRAVENOUS at 11:03

## 2024-02-21 RX ADMIN — MIDAZOLAM 2 MG: 1 INJECTION INTRAMUSCULAR; INTRAVENOUS at 10:53

## 2024-02-21 RX ADMIN — LIDOCAINE HYDROCHLORIDE 25 MG: 10 INJECTION, SOLUTION EPIDURAL; INFILTRATION; INTRACAUDAL; PERINEURAL at 10:53

## 2024-02-21 RX ADMIN — PROPOFOL 100 MCG/KG/MIN: 10 INJECTION, EMULSION INTRAVENOUS at 10:53

## 2024-02-21 RX ADMIN — CLOPIDOGREL BISULFATE 75 MG: 75 TABLET ORAL at 09:19

## 2024-02-21 RX ADMIN — SODIUM CHLORIDE 125 ML/HR: 0.9 INJECTION, SOLUTION INTRAVENOUS at 09:19

## 2024-02-21 NOTE — ANESTHESIA PREPROCEDURE EVALUATION
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 Wall thickness is normal. The left ventricular ejection fraction is 55%. Systolic function is normal. Wall motion is normal. Diastolic function is normal.  •  Atrial Septum: There is no atrial septal defect. There is a large and patent foramen ovale confirmed at rest with bidirectional shunting using color Doppler and saline contrast injection.  •  Left Atrial Appendage: There is a windsock appearance. There is normal function. There is no thrombus.  •  Tricuspid Valve: There is mild regurgitation.  Physical Exam    Airway    Mallampati score: II  TM Distance: >3 FB  Neck ROM: full     Dental       Cardiovascular      Pulmonary      Other Findings  EKG, CBC & CMP: wnl  Anesthesia Plan  ASA Score- 3     Anesthesia Type- IV sedation with anesthesia with ASA Monitors.         Additional Monitors:     Airway Plan:            Plan Factors-    Chart reviewed. EKG reviewed. Imaging results reviewed. Existing labs reviewed. Patient summary reviewed.    Patient is not a current smoker.  Patient did not smoke on day of surgery.            Induction- intravenous.    Postoperative Plan-     Informed Consent- Anesthetic plan and risks discussed with patient.  I personally reviewed this patient with the CRNA. Discussed and agreed on the Anesthesia Plan with the CRNA..

## 2024-02-21 NOTE — DISCHARGE INSTR - AVS FIRST PAGE
1. Please see the post procedure dishcarge instructions. PFO booklet    2. No heavy lifting, greater than 10 lbs. or strenuous activity for 1 week.    3. Remove band aid tomorrow.  Shower and wash groins area gently with soap and water- beginning tomorrow. Rinse and pat dry.  Apply new water seal band aid.  Repeat this process for 5 days. No powders, creams lotions or antibiotic ointments for 5 days.  No tub baths, hot tubs or swimming for 5 days.     4. Please call our office ([phone]) if you have any fever, redness, swelling, discharge from your groin access site.    5. No driving for 1day.    6. Perclose Booklet    7 Follow-up echocardiogram is scheduled for 3/22/2024 at 10 AM at [facility]/Department of Cardiology.   Follow-up office appointment is scheduled for 4/2/2024 at 11:40 AM with Dr. Maldonado.  Echocardiogram must be performed prior to office visit with Dr. Maldonado    8. Please check with cardiologist prior to any interventions/procedures/ dental work for the next 6 months.  You will need to be on antibiotics.

## 2024-02-21 NOTE — H&P
HISTORY AND PHYSICAL - Cardiology   Abdirahman Ambrocio 43 y.o. male MRN: [id number]  Unit/Bed#: BE CATH LAB ROOM Encounter: [id number]      Assessment:  PFO, RoPE 8, embolic CVA        Plan:   pfo closure.  Discussed in detail embryology of PFO, relationship to paradoxical embolism specific to CVA with >50% counseling management options including medical therapy with antiplatelet therapy (which she is on), anticoagulation and closure.  After discussion, he wishes to pursue  PFO percutaneous closure.  Understands risks which include but not limited to stroke, heart attack, death, need for urgent open-heart or  vascular surgery to repair equipment  induced injury.  Device migration both early and late Requiring surgical removal, arrhythmias such as atrial fibrillation,  he also understands benefits of closure compared to medical therapy as per respect and reduce trials.    History of Present Illness     HPI: Abdirahman Ambrocio is a 43 y.o. year old male who presents for PFO closure, MCA embolic infarct 5/2023    Had Zio patch no afib.    No known hypercoag state.     Presents for secondary prevention PFO related CVA and percutaenous closure.     Seizure admission 1/2024.  Following that admit decided have per PFO closure as discussed in office 9/2023.        Review of Systems:  Review of Systems    14 systems reviewed and negative with the exception of the above and the following    Current Facility-Administered Medications   Medication Dose Route Frequency Provider Last Rate    aspirin  81 mg Oral Once SALVATORE Abel      cefazolin  2,000 mg Intravenous Once SALVATORE Amado      clopidogrel  75 mg Oral Daily SALVATORE Amado      sodium chloride  125 mL/hr Intravenous Continuous SALVATORE Amado       sodium chloride, 125 mL/hr            Historical Information   Past Medical History:   Diagnosis Date    Seizure (HCC)     Stroke (HCC)      Past Surgical History:   Procedure Laterality Date    IR STROKE ALERT   "5/15/2023     Social History     Substance and Sexual Activity   Alcohol Use Yes    Comment: Social     Social History     Substance and Sexual Activity   Drug Use Never     Social History     Tobacco Use   Smoking Status Former    Current packs/day: 0.00    Average packs/day: 0.5 packs/day for 15.0 years (7.5 ttl pk-yrs)    Types: Cigarettes    Start date: 6/3/2008    Quit date: 6/3/2023    Years since quittin.7   Smokeless Tobacco Never     Family History: History reviewed. No pertinent family history.    Meds/Allergies         No Known Allergies    Objective   Vitals: Blood pressure 119/71, pulse (!) 46, temperature 98.5 °F (36.9 °C), temperature source Temporal, resp. rate 16, height 5' 9\" (1.753 m), weight 77.1 kg (170 lb), SpO2 100%., Body mass index is 25.1 kg/m².,   Orthostatic Blood Pressures      Flowsheet Row Most Recent Value   Blood Pressure 119/71 filed at 2024 0857            No intake or output data in the 24 hours ending 24 0902    Invasive Devices       Peripheral Intravenous Line  Duration             Peripheral IV 24 Right Antecubital 46 days                        Physical Exam:  Physical Exam    Gen: No acute distress  HEENT: anicteric, mucous membranes moist  Neck: supple, no jugular venous distention, or carotid bruit  Heart: regular, normal s1 and s2, no murmur/rub or gallop  Lungs :clear to auscultation bilaterally, no rales/rhonchi or wheeze  Abdomen: soft nontender, normoactive bowel sounds, no organomegaly  Ext: warm and perfused, normal femoral pulses, no edema, clubbing  Skin: warm, no rashes  Neuro: AAO x 3, no focal findings  Psychiatric: normal affect  Musculoskeletal: no obvious joint deformities.    Lab Results:       No results found for: \"CHOL\"  Lab Results   Component Value Date     2023     Lab Results   Component Value Date    LDLCALC 18 2023     Lab Results   Component Value Date    TRIG 34 2023       Lab Results   Component " "Value Date    ALT 20 02/10/2024    ALT 20 11/02/2023    AST 27 02/10/2024    AST 25 11/02/2023    ALKPHOS 78 02/10/2024    ALKPHOS 81 11/02/2023             No results found for: \"NTBNP\"    Lab Results   Component Value Date    HGBA1C 4.8 05/16/2023               This note was completed in part utilizing voice recognition software.   Grammatical errors, random word insertion, spelling mistakes, and incomplete sentences may be an occasional consequence of the system secondary to software limitations, ambient noise and hardware issues. At the time of dictation, efforts were made to edit, clarify and /or correct errors.  Please read the chart carefully and recognize, using context, where substitutions have occurred.  If you have any questions or concerns about the context, text or information contained within the body of this dictation, please contact myself, the provider, for further clarification.     "

## 2024-02-21 NOTE — LETTER
[facility] [address]  [address]  [place] [address]  Dept: [phone]    February 22, 2024     Patient: Abdirahman Ambrocio   YOB: 1980   Date of Visit: 2/21/2024       To Whom it May Concern:    Abdirahman Ambrocio is under my professional care. He was seen in the hospital from 2/21/2024 to 02/22/24. He may return to work on 2/26/2024 without limitations.    If you have any questions or concerns, please don't hesitate to call.         Sincerely,          SALVATORE Amado

## 2024-02-21 NOTE — ANESTHESIA POSTPROCEDURE EVALUATION
Post-Op Assessment Note    CV Status:  Stable  Pain Score: 0    Pain management: adequate    Multimodal analgesia used between 6 hours prior to anesthesia start to PACU discharge    Mental Status:  Alert and awake   Hydration Status:  Euvolemic and stable   PONV Controlled:  Controlled   Airway Patency:  Patent  Two or more mitigation strategies used for obstructive sleep apnea   Post Op Vitals Reviewed: Yes    No anethesia notable event occurred.    Staff: CRNA               BP   105/54   Temp   97.6   Pulse  56   Resp   14   SpO2   98

## 2024-02-21 NOTE — LETTER
[facility] [address]  [address]  [place] [address]  Dept: [phone]    February 22, 2024     Patient: Abdirahman Ambrocio   YOB: 1980   Date of Visit: 2/21/2024       To Whom it May Concern:    Abdirahman Ambrocio is under my professional care. He was seen in the hospital from 2/21/2024 to 02/22/24. He may return to work on *** without limitations.    If you have any questions or concerns, please don't hesitate to call.         Sincerely,          SALVATORE Amado

## 2024-02-21 NOTE — DISCHARGE SUMMARY
"Discharge Summary - Abdirahman Ambrocio 43 y.o. male MRN: [id number]    Unit/Bed#: BE CATH LAB ROOM Encounter: [id number]    Admission Date: 2/21/2024   Discharge Date:   Disposition: Home    Condition at Discharge: good     PCP: Frantz Vallejo DO      OP Cardiologist: Dr. Maldonado    Interventional cardiologist: Dr. Maldonado    Admitting Diagnosis:  Patent foramen ovale    Secondary Diagnoses:   History of MC a infarct  -Status post TNK and thrombectomy in May 2023      Discharge Diagnosis: Patent foramen ovale status post continuous closure with Amplatzer occluder device, 25 mm    /70   Pulse (!) 49   Temp (!) 97.2 °F (36.2 °C) (Temporal)   Resp 17   Ht 5' 9\" (1.753 m)   Wt 77.1 kg (170 lb)   SpO2 98%   BMI 25.10 kg/m²       Review of Systems   All other systems reviewed and are negative.      Physical Exam  Constitutional:       Appearance: Normal appearance.   HENT:      Head: Normocephalic and atraumatic.      Mouth/Throat:      Mouth: Mucous membranes are moist.   Cardiovascular:      Rate and Rhythm: Regular rhythm. Bradycardia present.   Pulmonary:      Effort: Pulmonary effort is normal.      Breath sounds: Normal breath sounds.   Abdominal:      General: Abdomen is flat.      Palpations: Abdomen is soft.   Musculoskeletal:      Right lower leg: No edema.      Left lower leg: No edema.   Skin:     General: Skin is warm and dry.      Capillary Refill: Capillary refill takes 2 to 3 seconds.   Neurological:      Mental Status: He is alert and oriented to person, place, and time.   Psychiatric:         Behavior: Behavior normal.     Right groin no hematoma, ecchymosis or bruit.      HPI and Hospital Course: Abdirahman Ambrocio, a 43-year-old male, presented to [facility] for elective percutaneous closure of PFO.  In May 2023 he was hospitalized with MCA CVA presumptive embolic infarct for which he  received TNK and thrombectomy.  Rope score 8.    ALYSSA performed performed 5/18/2023: " Showed left ventricular ejection fraction 55%.  Wall motion is normal.  Diastolic function normal.  There is a large and patent foramen ovale confirmed at rest with bidirectional shunting using color Doppler and saline contrast.  No left atrial appendage thrombus noted.  Zio patch showed no evidence of atrial fibrillation.  Lab studies were negative for hypercoagulable state.  He was discharged on aspirin.    He was hospitalized 1/6/2024 to 1/7/2022 for breakthrough right focal to generalized seizure.   Seizures were attributed to encephalomalacia of left MCA infarct.    Procedure:  Prior to percutaneous PFO closure patient was given aspirin 81 mg and Plavix 75 mg.  Right femoral venous access was obtained.  Closure of PFO was achieved using both fluroscopy and ICE catheter for successful placement of Amplatzer Occluder closure device (25 mm).  There is no residual shunt. Venous access sites were closed using Proglide devices with successful hemostasis.  He tolerated the procedure well.      2 D ECHO today revealed there is a 25 mm Amplatzer PFO occluder with no residual shunt.       Discharge plan  Aspirin 81 mg daily indefinitely  2.   Plavix 75 mg daily x 30 days until cleared by Dr. Maldonado  3.  Follow-up office visit with Dr. Maldonado scheduled for 4/2/2024 at 11:40 AM  4.  Follow-up echocardiogram with bubble study scheduled for 3/22/2024 at 10 AM at [facility]/Department of Cardiology  5.  Antibiotic prophylaxis prior to any invasive procedures, interventions or dental work for 6 months  Patient may return to work Monday. Work letter given to patient    No running for 7 days.    Current Facility-Administered Medications   Medication Dose Route Frequency    acetaminophen (TYLENOL) tablet 650 mg  650 mg Oral Q4H PRN    aspirin (ECOTRIN LOW STRENGTH) EC tablet 81 mg  81 mg Oral Daily    aspirin chewable tablet 81 mg  81 mg Oral Once    clopidogrel (PLAVIX) tablet 75 mg  75 mg Oral  "Daily    clopidogrel (PLAVIX) tablet 75 mg  75 mg Oral Daily    escitalopram (LEXAPRO) tablet 5 mg  5 mg Oral Daily    levETIRAcetam (KEPPRA) tablet 1,000 mg  1,000 mg Oral Q12H BRANDON    ondansetron (ZOFRAN) injection 4 mg  4 mg Intravenous Q6H PRN    oxyCODONE-acetaminophen (PERCOCET) 5-325 mg per tablet 1 tablet  1 tablet Oral Q4H PRN    sodium chloride 0.9 % infusion  125 mL/hr Intravenous Continuous       Pertinent Labs/diagnostics:        Lab Ressults:  Recent Results (from the past 24 hour(s))   POCT activated clotting time    Collection Time: 02/21/24 11:38 AM   Result Value Ref Range    Activated Clotting Time, i-STAT 264 (H) 89 - 137 sec    Specimen Type VENOUS      Lipid Profile:   No results found for: \"CHOL\"  Lab Results   Component Value Date     05/16/2023     Lab Results   Component Value Date    LDLCALC 18 05/16/2023     Lab Results   Component Value Date    TRIG 34 05/16/2023           Tele: Sin[facility]      Discharge instructions/Information to patient and family:   See after visit summary for information provided to patient and family.      Provisions for Follow-Up Care:  See after visit summary for information related to follow-up care and any pertinent home health orders.      Planned Readmission: No    Discharge Statement:  I spent 45 minutes minutes discharging the patient. This time was spent on the day of discharge. I had direct contact with the patient on the day of discharge. Additional documentation is required if more than 30 minutes were spent on discharge.         ** Please Note: Fluency Direct Dictation voice to text software may have been used in the creation of this document. **  "

## 2024-02-22 ENCOUNTER — APPOINTMENT (OUTPATIENT)
Dept: NON INVASIVE DIAGNOSTICS | Facility: HOSPITAL | Age: 44
End: 2024-02-22
Payer: COMMERCIAL

## 2024-02-22 VITALS
RESPIRATION RATE: 16 BRPM | HEIGHT: 69 IN | HEART RATE: 52 BPM | SYSTOLIC BLOOD PRESSURE: 152 MMHG | WEIGHT: 169.97 LBS | DIASTOLIC BLOOD PRESSURE: 118 MMHG | BODY MASS INDEX: 25.18 KG/M2 | TEMPERATURE: 98.9 F | OXYGEN SATURATION: 99 %

## 2024-02-22 LAB
ANION GAP SERPL CALCULATED.3IONS-SCNC: 8 MMOL/L
AORTIC ROOT: 3.6 CM
APICAL FOUR CHAMBER EJECTION FRACTION: 44 %
ATRIAL RATE: 48 BPM
ATRIAL RATE: 52 BPM
ATRIAL RATE: 53 BPM
BSA FOR ECHO PROCEDURE: 1.93 M2
BUN SERPL-MCNC: 10 MG/DL (ref 5–25)
CALCIUM SERPL-MCNC: 8.8 MG/DL (ref 8.4–10.2)
CHLORIDE SERPL-SCNC: 105 MMOL/L (ref 96–108)
CO2 SERPL-SCNC: 28 MMOL/L (ref 21–32)
CREAT SERPL-MCNC: 0.83 MG/DL (ref 0.6–1.3)
ERYTHROCYTE [DISTWIDTH] IN BLOOD BY AUTOMATED COUNT: 12.2 % (ref 11.6–15.1)
FRACTIONAL SHORTENING: 30 (ref 28–44)
GFR SERPL CREATININE-BSD FRML MDRD: 108 ML/MIN/1.73SQ M
GLUCOSE SERPL-MCNC: 79 MG/DL (ref 65–140)
HCT VFR BLD AUTO: 38.2 % (ref 36.5–49.3)
HGB BLD-MCNC: 12.8 G/DL (ref 12–17)
INTERVENTRICULAR SEPTUM IN DIASTOLE (PARASTERNAL SHORT AXIS VIEW): 1 CM
INTERVENTRICULAR SEPTUM: 1 CM (ref 0.6–1.1)
LAAS-AP2: 12.8 CM2
LAAS-AP4: 20.2 CM2
LEFT ATRIUM SIZE: 3.6 CM
LEFT ATRIUM VOLUME (MOD BIPLANE): 39 ML
LEFT ATRIUM VOLUME INDEX (MOD BIPLANE): 20.2 ML/M2
LEFT INTERNAL DIMENSION IN SYSTOLE: 3.8 CM (ref 2.1–4)
LEFT VENTRICLE DIASTOLIC VOLUME (MOD BIPLANE): 118 ML
LEFT VENTRICLE DIASTOLIC VOLUME INDEX (MOD BIPLANE): 61.1 ML/M2
LEFT VENTRICLE SYSTOLIC VOLUME (MOD BIPLANE): 63 ML
LEFT VENTRICLE SYSTOLIC VOLUME INDEX (MOD BIPLANE): 32.6 ML/M2
LEFT VENTRICULAR INTERNAL DIMENSION IN DIASTOLE: 5.4 CM (ref 3.5–6)
LEFT VENTRICULAR POSTERIOR WALL IN END DIASTOLE: 1 CM
LEFT VENTRICULAR STROKE VOLUME: 80 ML
LV EF: 47 %
LVSV (TEICH): 80 ML
MCH RBC QN AUTO: 30.5 PG (ref 26.8–34.3)
MCHC RBC AUTO-ENTMCNC: 33.5 G/DL (ref 31.4–37.4)
MCV RBC AUTO: 91 FL (ref 82–98)
P AXIS: -7 DEGREES
P AXIS: 30 DEGREES
P AXIS: 52 DEGREES
PLATELET # BLD AUTO: 210 THOUSANDS/UL (ref 149–390)
PMV BLD AUTO: 9.6 FL (ref 8.9–12.7)
POTASSIUM SERPL-SCNC: 3.9 MMOL/L (ref 3.5–5.3)
PR INTERVAL: 146 MS
PR INTERVAL: 170 MS
PR INTERVAL: 184 MS
QRS AXIS: 0 DEGREES
QRS AXIS: 4 DEGREES
QRS AXIS: 5 DEGREES
QRSD INTERVAL: 102 MS
QRSD INTERVAL: 104 MS
QRSD INTERVAL: 96 MS
QT INTERVAL: 424 MS
QT INTERVAL: 438 MS
QT INTERVAL: 452 MS
QTC INTERVAL: 391 MS
QTC INTERVAL: 397 MS
QTC INTERVAL: 420 MS
RBC # BLD AUTO: 4.19 MILLION/UL (ref 3.88–5.62)
RIGHT ATRIUM AREA SYSTOLE A4C: 16.9 CM2
SL CV LEFT ATRIUM LENGTH A2C: 5.2 CM
SL CV LV EF: 53
SL CV PED ECHO LEFT VENTRICLE DIASTOLIC VOLUME (MOD BIPLANE) 2D: 143 ML
SL CV PED ECHO LEFT VENTRICLE SYSTOLIC VOLUME (MOD BIPLANE) 2D: 63 ML
SODIUM SERPL-SCNC: 141 MMOL/L (ref 135–147)
T WAVE AXIS: 12 DEGREES
T WAVE AXIS: 14 DEGREES
T WAVE AXIS: 9 DEGREES
VENTRICULAR RATE: 48 BPM
VENTRICULAR RATE: 52 BPM
VENTRICULAR RATE: 53 BPM
WBC # BLD AUTO: 4.33 THOUSAND/UL (ref 4.31–10.16)

## 2024-02-22 PROCEDURE — 93308 TTE F-UP OR LMTD: CPT | Performed by: INTERNAL MEDICINE

## 2024-02-22 PROCEDURE — 93321 DOPPLER ECHO F-UP/LMTD STD: CPT

## 2024-02-22 PROCEDURE — 93325 DOPPLER ECHO COLOR FLOW MAPG: CPT

## 2024-02-22 PROCEDURE — 80048 BASIC METABOLIC PNL TOTAL CA: CPT

## 2024-02-22 PROCEDURE — 93308 TTE F-UP OR LMTD: CPT

## 2024-02-22 PROCEDURE — 93321 DOPPLER ECHO F-UP/LMTD STD: CPT | Performed by: INTERNAL MEDICINE

## 2024-02-22 PROCEDURE — 85027 COMPLETE CBC AUTOMATED: CPT

## 2024-02-22 PROCEDURE — NC001 PR NO CHARGE: Performed by: INTERNAL MEDICINE

## 2024-02-22 PROCEDURE — 93325 DOPPLER ECHO COLOR FLOW MAPG: CPT | Performed by: INTERNAL MEDICINE

## 2024-02-22 RX ADMIN — LEVETIRACETAM 1000 MG: 500 TABLET, FILM COATED ORAL at 05:19

## 2024-02-22 RX ADMIN — ESCITALOPRAM OXALATE 5 MG: 5 TABLET ORAL at 08:27

## 2024-02-23 ENCOUNTER — TELEPHONE (OUTPATIENT)
Dept: NEUROLOGY | Facility: CLINIC | Age: 44
End: 2024-02-23

## 2024-02-27 ENCOUNTER — OFFICE VISIT (OUTPATIENT)
Dept: NEUROLOGY | Facility: CLINIC | Age: 44
End: 2024-02-27
Payer: COMMERCIAL

## 2024-02-27 VITALS
HEIGHT: 69 IN | SYSTOLIC BLOOD PRESSURE: 100 MMHG | BODY MASS INDEX: 26.66 KG/M2 | DIASTOLIC BLOOD PRESSURE: 60 MMHG | WEIGHT: 180 LBS | HEART RATE: 60 BPM

## 2024-02-27 DIAGNOSIS — I63.9 CVA (CEREBRAL VASCULAR ACCIDENT) (HCC): ICD-10-CM

## 2024-02-27 DIAGNOSIS — R56.9 SEIZURE (HCC): ICD-10-CM

## 2024-02-27 DIAGNOSIS — G81.91 RIGHT HEMIPARESIS (HCC): Primary | ICD-10-CM

## 2024-02-27 DIAGNOSIS — Q21.12 PFO (PATENT FORAMEN OVALE): ICD-10-CM

## 2024-02-27 PROCEDURE — 99215 OFFICE O/P EST HI 40 MIN: CPT | Performed by: PSYCHIATRY & NEUROLOGY

## 2024-02-27 RX ORDER — LEVETIRACETAM 1000 MG/1
1000 TABLET ORAL EVERY 12 HOURS SCHEDULED
Qty: 180 TABLET | Refills: 2 | Status: SHIPPED | OUTPATIENT
Start: 2024-02-27 | End: 2024-05-27

## 2024-02-27 RX ORDER — LEVETIRACETAM 1000 MG/1
1000 TABLET ORAL EVERY 12 HOURS SCHEDULED
Qty: 180 TABLET | Refills: 2 | Status: SHIPPED | OUTPATIENT
Start: 2024-02-27 | End: 2024-02-27 | Stop reason: SDUPTHER

## 2024-02-27 NOTE — PROGRESS NOTES
Patient ID: Abdirahman Ambrocio is a 43 y.o. male.    Assessment/Plan:    42 y/o  Male who is here as a hospital follow up for left MCA stroke, and has post stroke epilepsy as well. He had EMU monitoring in May 2023 which showed slowing of the alpha/theta and delta activity. No electrographic seizures were noted. Patient was found to have PFO, and had a closure last week.     PLAN:      Diagnoses and all orders for this visit:    Right hemiparesis (HCC)    Seizure (HCC)  -continue with Keppra 1000mg pO BID.   -he is currently not driving, and has last seizure was 1/7/24, if no further seizures for 6 months, then will release him back to driving.   -     levETIRAcetam (KEPPRA) 1000 MG tablet; Take 1 tablet (1,000 mg total) by mouth every 12 (twelve) hours    CVA (cerebral vascular accident) (HCC)  -for secondary stroke prevention, recommend continuation of combination of aspirin   -Blood Pressure goal < 130/80, BP is at goal in the office.   -LDL goal <70  -I advised patient to avoid using NSAIDs for headaches or other pain and to stick to tylenol if needed  -Recommend lifestyle modifications such as mediterranean diet & regular exercise regimen atleast 4-5 times a week for 20-30 minutes.   -I educated patient/family regarding medication compliance  -encourage smoking cessation, and control of diabetes and hypertension; defer management to primary     PFO (patent foramen ovale)  -s/p closure  -continue with aspirin 81mg PO qdaily        Follow up in 5 months     I would be happy to see the patient sooner if any new questions/concerns arise.  Patient/Guardian was advised to the call the office if they have any questions and concerns in the meantime.     Patient/Guardian does understand that if they have any new stroke like symptoms such as facial droop on one side, weakness/paralysis on either side, speech trouble, numbness on one side, balance issues, any vision changes, extreme dizziness or any new headache, to  call 9-1-1 immediately or to proceed to the nearest ER immediately.      I have spent a total time of 40 minutes on 02/27/24 in caring for this patient including Risks and benefits of tx options, Instructions for management, Documenting in the medical record, Reviewing / ordering tests, medicine, procedures  , and Obtaining or reviewing history  .     Subjective:    HPI    This is a 42 y/o Male who is here as a hospital follow up for breakthrough seizure.    Patient had a history of left MCA stroke, and was found to have PFO at that time. He was reluctant to the procedure, but patient ended up getting PFO closure. He still has speech issues from his stroke. He is done with speech therapy (outpatient), and he also had right sided weakness, and he recovered almost back to normal  he states. He says that he made a really good recovery, he says that his speech is overall much improved from before.     He has no other concerns today, and no new TIA/CVA like symptoms, and procedure for PFO closure went well last week. His last seizure was 1 month ago.  He is compliant with his medications he states that he does not have any bleeding issues.    The following portions of the patient's history were reviewed and updated as appropriate: He  has a past medical history of Seizure ([facility]) and Stroke ([facility]).  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) 05/29/2023    Abnormal findings on imaging test 05/28/2023    Adjustment disorder 05/28/2023    Tobacco use 05/27/2023    Mild pain 05/27/2023    Bradycardia 05/27/2023    PFO (patent foramen ovale) 05/22/2023    Seizure (HCC) 05/16/2023  "   CVA (cerebral vascular accident) ([facility]) 05/15/2023    Positive self-administered antigen test for COVID-19 02/10/2023    Viral infection, unspecified 11/10/2022    Bilateral low back pain without sciatica 01/29/2020     He  has a past surgical history that includes IR stroke alert (5/15/2023) and Cardiac catheterization (N/A, 2/21/2024).  His family history is not on file.  He  reports that he quit smoking about 8 months ago. His smoking use included cigarettes. He started smoking about 15 years ago. He has a 7.5 pack-year smoking history. He has never used smokeless tobacco. He reports that he does not currently use alcohol. He reports that he does not use drugs.  Current Outpatient Medications   Medication Sig Dispense Refill    aspirin 81 mg chewable tablet Chew 1 tablet (81 mg total) daily 30 tablet 0    escitalopram (LEXAPRO) 5 mg tablet TAKE ONE TABLET BY MOUTH ONE TIME DAILY 30 tablet 0    levETIRAcetam (KEPPRA) 1000 MG tablet Take 1 tablet (1,000 mg total) by mouth every 12 (twelve) hours 180 tablet 2     No current facility-administered medications for this visit.     Current Outpatient Medications on File Prior to Visit   Medication Sig    aspirin 81 mg chewable tablet Chew 1 tablet (81 mg total) daily    escitalopram (LEXAPRO) 5 mg tablet TAKE ONE TABLET BY MOUTH ONE TIME DAILY     No current facility-administered medications on file prior to visit.     He has No Known Allergies..       Objective:    Blood pressure 100/60, pulse 60, height 5' 9\" (1.753 m), weight 81.6 kg (180 lb).    Physical Exam  General - patient is alert   Speech - no dysarthria noted, no aphasia noted.     Neuro:   Cranial nerves: PERRL, EOMI, facial sensation intact to soft touch in V1, V2 and V3, no facial asymmetry noted, uvula/palate midline, tongue midline.   Motor: 5/5 throughout, normal tone, no pronator drift noted.   Sensory - intact to soft touch throughout  Reflexes - 2+ throughout  Coordination - no ataxia/dysmetria " noted  Gait - normal    Neurological Exam      ROS:  Reviewed ROS   Review of Systems   Constitutional:  Negative for appetite change, fatigue and fever.   HENT: Negative.  Negative for hearing loss, tinnitus, trouble swallowing and voice change.    Eyes: Negative.  Negative for photophobia, pain and visual disturbance.   Respiratory: Negative.  Negative for shortness of breath.    Cardiovascular: Negative.  Negative for palpitations.   Gastrointestinal: Negative.  Negative for nausea and vomiting.   Endocrine: Negative.  Negative for cold intolerance.   Genitourinary: Negative.  Negative for dysuria, frequency and urgency.   Musculoskeletal:  Negative for back pain, gait problem, myalgias, neck pain and neck stiffness.   Skin: Negative.  Negative for rash.   Allergic/Immunologic: Negative.    Neurological:  Positive for seizures and speech difficulty. Negative for dizziness, tremors, syncope, facial asymmetry, weakness, light-headedness, numbness and headaches.        Patient stated that his last seizure was a month ago.    Hematological: Negative.  Does not bruise/bleed easily.   Psychiatric/Behavioral: Negative.  Negative for confusion, hallucinations and sleep disturbance.

## 2024-03-01 DIAGNOSIS — F06.31 DEPRESSION AS LATE EFFECT OF CEREBROVASCULAR ACCIDENT (CVA): ICD-10-CM

## 2024-03-01 DIAGNOSIS — I69.398 DEPRESSION AS LATE EFFECT OF CEREBROVASCULAR ACCIDENT (CVA): ICD-10-CM

## 2024-03-01 RX ORDER — ESCITALOPRAM OXALATE 5 MG/1
5 TABLET ORAL DAILY
Qty: 30 TABLET | Refills: 0 | Status: SHIPPED | OUTPATIENT
Start: 2024-03-01

## 2024-03-15 ENCOUNTER — HOSPITAL ENCOUNTER (OUTPATIENT)
Dept: NON INVASIVE DIAGNOSTICS | Facility: CLINIC | Age: 44
Discharge: HOME | End: 2024-03-15
Payer: COMMERCIAL

## 2024-03-15 VITALS
HEART RATE: 60 BPM | DIASTOLIC BLOOD PRESSURE: 60 MMHG | SYSTOLIC BLOOD PRESSURE: 100 MMHG | BODY MASS INDEX: 26.66 KG/M2 | WEIGHT: 180 LBS | HEIGHT: 69 IN

## 2024-03-15 DIAGNOSIS — Z87.74 S/P PERCUTANEOUS PATENT FORAMEN OVALE CLOSURE: ICD-10-CM

## 2024-03-15 LAB
APICAL FOUR CHAMBER EJECTION FRACTION: 47 %
E WAVE DECELERATION TIME: 182 MS
E/A RATIO: 1.26
LEFT VENTRICLE DIASTOLIC VOLUME (MOD BIPLANE): 101 ML
LEFT VENTRICLE SYSTOLIC VOLUME (MOD BIPLANE): 47 ML
LV EF: 53 %
MV E'TISSUE VEL-SEP: 10 CM/S
MV PEAK A VEL: 0.66 M/S
MV PEAK E VEL: 83 CM/S
MV STENOSIS PRESSURE HALF TIME: 53 MS
MV VALVE AREA P 1/2 METHOD: 4.15
SL CV LV EF: 65
TR MAX PG: 19 MMHG
TR PEAK VELOCITY: 2.2 M/S
TRICUSPID VALVE PEAK REGURGITATION VELOCITY: 2.2 M/S

## 2024-03-15 PROCEDURE — 93325 DOPPLER ECHO COLOR FLOW MAPG: CPT

## 2024-03-15 PROCEDURE — 93308 TTE F-UP OR LMTD: CPT

## 2024-03-15 PROCEDURE — 93321 DOPPLER ECHO F-UP/LMTD STD: CPT | Performed by: INTERNAL MEDICINE

## 2024-03-15 PROCEDURE — 93321 DOPPLER ECHO F-UP/LMTD STD: CPT

## 2024-03-15 PROCEDURE — 93325 DOPPLER ECHO COLOR FLOW MAPG: CPT | Performed by: INTERNAL MEDICINE

## 2024-03-15 PROCEDURE — 93308 TTE F-UP OR LMTD: CPT | Performed by: INTERNAL MEDICINE

## 2024-04-02 ENCOUNTER — OFFICE VISIT (OUTPATIENT)
Dept: CARDIAC SURGERY | Facility: CLINIC | Age: 44
End: 2024-04-02
Payer: COMMERCIAL

## 2024-04-02 VITALS
TEMPERATURE: 97.8 F | SYSTOLIC BLOOD PRESSURE: 116 MMHG | WEIGHT: 195 LBS | HEIGHT: 69 IN | DIASTOLIC BLOOD PRESSURE: 58 MMHG | HEART RATE: 52 BPM | OXYGEN SATURATION: 98 % | BODY MASS INDEX: 28.88 KG/M2

## 2024-04-02 DIAGNOSIS — F06.31 DEPRESSION AS LATE EFFECT OF CEREBROVASCULAR ACCIDENT (CVA): ICD-10-CM

## 2024-04-02 DIAGNOSIS — Q21.12 PFO (PATENT FORAMEN OVALE): ICD-10-CM

## 2024-04-02 DIAGNOSIS — I63.419 CEREBROVASCULAR ACCIDENT (CVA) DUE TO EMBOLISM OF MIDDLE CEREBRAL ARTERY, UNSPECIFIED BLOOD VESSEL LATERALITY (HCC): ICD-10-CM

## 2024-04-02 DIAGNOSIS — I69.398 DEPRESSION AS LATE EFFECT OF CEREBROVASCULAR ACCIDENT (CVA): ICD-10-CM

## 2024-04-02 DIAGNOSIS — Z87.74 S/P PERCUTANEOUS PATENT FORAMEN OVALE CLOSURE: Primary | ICD-10-CM

## 2024-04-02 PROCEDURE — 99213 OFFICE O/P EST LOW 20 MIN: CPT | Performed by: INTERNAL MEDICINE

## 2024-04-02 RX ORDER — ESCITALOPRAM OXALATE 5 MG/1
5 TABLET ORAL DAILY
Qty: 30 TABLET | Refills: 0 | Status: SHIPPED | OUTPATIENT
Start: 2024-04-02

## 2024-04-02 NOTE — PROGRESS NOTES
Cardiology Follow Up Visit     [facility]iology [facility]    Abdirahman Ambrocio  1980  [id number]  [facility] [place]  [address]  [address]  [facility] [id number] [phone]    1. S/P percutaneous patent foramen ovale closure, 25mm Amplatzer PFO occluder, 2/21/2024  Echo follow up/limited w/ contrast if indicated      2. PFO (patent foramen ovale)  Echo follow up/limited w/ contrast if indicated      3. Cerebrovascular accident (CVA) due to embolism of middle cerebral artery, unspecified blood vessel laterality (HCC)              Discussion/Summary:    S/p pfo closure 2/2024 for secondary prevention PFO related stroke following embolic CVA post procedure echo good device position and without significant shunting    Plan  1) continue aspirin lifelong.  Back to his usual self.  Feels sleep is improved.  Exercising routinely.  Follow-up echocardiogram in 1 year.    Interval History:    43-year-old male status post PFO closure for secondary prevention of presumptive PFO related stroke.    Patient had MCA embolic infarct May 2023.  No hypercoag state or evidence of atrial fibrillation.    Patient had no post PFO closure issues.    Feels well.    Patient with echocardiogram in March showing good device position and function.  No evidence of residual shunting    Patient has had no palpitations.    Feels well.  Feels palpitations and breathing are improved. Taking aspirin 81 mg daily.    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)    Annual physical exam    Encounter for screening for HIV    BMI 26.0-26.9,adult    Hypokalemia    S/P percutaneous patent foramen ovale closure, 25mm Amplatzer PFO occluder, 2024    Right hemiparesis (HCC)     Past Medical History:   Diagnosis Date    Seizure (HCC)     Stroke (HCC)      Social History     Socioeconomic History    Marital status: Single     Spouse name: Not on file    Number of children: Not on file    Years of education: Not on file    Highest education level: Not on file   Occupational History    Not on file   Tobacco Use    Smoking status: Former     Current packs/day: 0.00     Average packs/day: 0.5 packs/day for 15.0 years (7.5 ttl pk-yrs)     Types: Cigarettes     Start date: 6/3/2008     Quit date: 6/3/2023     Years since quittin.8    Smokeless tobacco: Never   Vaping Use    Vaping status: Never Used   Substance and Sexual Activity    Alcohol use: Not Currently    Drug use: Never    Sexual activity: Not Currently   Other Topics Concern    Not on file   Social History Narrative    Not on file     Social Determinants of Health     Financial Resource Strain: Low Risk  (2023)    Overall Financial Resource Strain (CARDIA)     Difficulty of Paying Living Expenses: Not hard at all   Food Insecurity: No Food Insecurity (2024)    Hunger Vital Sign     Worried About Running Out of Food in the Last Year: Never true     Ran Out of Food in the Last Year: Never true   Transportation Needs: No Transportation Needs (2024)    PRAPARE - Transportation     Lack of Transportation (Medical): No     Lack of Transportation (Non-Medical): No   Physical Activity: Sufficiently Active (2023)    Exercise Vital Sign     Days of Exercise per Week: 7 days     Minutes of Exercise per Session: 60 min   Stress: Stress Concern Present (2023)    [employer] - Occupational Stress  "Questionnaire     Feeling of Stress : To some extent   Social Connections: Not on file   Intimate Partner Violence: Not At Risk (8/24/2023)    Humiliation, Afraid, Rape, and Kick questionnaire     Fear of Current or Ex-Partner: No     Emotionally Abused: No     Physically Abused: No     Sexually Abused: No   Housing Stability: Low Risk  (2/22/2024)    Housing Stability Vital Sign     Unable to Pay for Housing in the Last Year: No     Number of Places Lived in the Last Year: 1     Unstable Housing in the Last Year: No      Family History   Problem Relation Age of Onset    Alzheimer's disease Mother      Past Surgical History:   Procedure Laterality Date    CARDIAC CATHETERIZATION N/A 2/21/2024    Procedure: Cardiac pfo closure;  Surgeon: Tashi Maldonado DO;  Location: [facility];  Service: Cardiology    IR STROKE ALERT  5/15/2023       Current Outpatient Medications:     aspirin 81 mg chewable tablet, Chew 1 tablet (81 mg total) daily, Disp: 30 tablet, Rfl: 0    escitalopram (LEXAPRO) 5 mg tablet, TAKE 1 TABLET BY MOUTH DAILY, Disp: 30 tablet, Rfl: 0    levETIRAcetam (KEPPRA) 1000 MG tablet, Take 1 tablet (1,000 mg total) by mouth every 12 (twelve) hours, Disp: 180 tablet, Rfl: 2  No Known Allergies      Social, Family, Medication history reviewed and updated as necessary      Labs:     Lab Results   Component Value Date    K 3.9 02/22/2024     02/22/2024    CO2 28 02/22/2024    BUN 10 02/22/2024    CREATININE 0.83 02/22/2024    CREATININE 0.99 02/10/2024    CALCIUM 8.8 02/22/2024       Lab Results   Component Value Date    WBC 4.33 02/22/2024    HGB 12.8 02/22/2024    HGB 14.1 02/10/2024    HCT 38.2 02/22/2024    HCT 42.3 02/10/2024     02/22/2024     02/10/2024       No results found for: \"CHOL\"  Lab Results   Component Value Date     05/16/2023     Lab Results   Component Value Date    LDLCALC 18 05/16/2023     No results found for: \"LDLDIRECT\"          Lab Results " "  Component Value Date    TRIG 34 05/16/2023       Lab Results   Component Value Date    ALT 20 02/10/2024    ALT 20 11/02/2023    AST 27 02/10/2024    AST 25 11/02/2023    ALKPHOS 78 02/10/2024    ALKPHOS 81 11/02/2023       Lab Results   Component Value Date    INR 0.89 05/15/2023       No results found for: \"NTBNP\"    Lab Results   Component Value Date    HGBA1C 4.8 05/16/2023           Imaging: Reviewed in epic        Review of Systems:  14 systems reviewed and negative with exception of the above        PHYSICAL EXAM:      Vitals:    04/02/24 1349   BP: 116/58   Pulse: (!) 52   Temp: 97.8 °F (36.6 °C)   SpO2: 98%     Body mass index is 28.8 kg/m².   Weight (last 2 days)       Date/Time Weight    04/02/24 1349 88.5 (195)              Gen: No acute distress  HEENT: anicteric, mucous membranes moist  Neck: supple, no jugular venous distention, or carotid bruit  Heart: regular, normal s1 and s2, no murmur/rub or gallop  Lungs :clear to auscultation bilaterally, no rales/rhonchi or wheeze  Abdomen: soft nontender, normoactive bowel sounds, no organomegaly  Ext: warm and perfused, normal femoral pulses, no edema, or clubbing  Skin: warm, no rashes  Neuro: AAO x 3, no focal findings  Psychiatric: normal affect  Musculoskeletal: no obvious joint deformities.        This note was completed in part utilizing  direct voice recognition software.   Grammatical errors, random word insertion, spelling mistakes, and incomplete sentences may be an occasional consequence of the system secondary to software limitations, ambient noise and hardware issues. At the time of dictation, efforts were made to edit, clarify and /or correct errors.  Please read the chart carefully and recognize, using context, where substitutions have occurred.  If you have any questions or concerns about the context, text or information contained within the body of this dictation, please contact myself, the provider, for further clarification.       "

## 2024-04-15 ENCOUNTER — HOSPITAL ENCOUNTER (EMERGENCY)
Facility: HOSPITAL | Age: 44
Discharge: HOME | End: 2024-04-15
Attending: EMERGENCY MEDICINE | Admitting: EMERGENCY MEDICINE
Payer: COMMERCIAL

## 2024-04-15 ENCOUNTER — APPOINTMENT (EMERGENCY)
Dept: RADIOLOGY | Facility: HOSPITAL | Age: 44
End: 2024-04-15
Payer: COMMERCIAL

## 2024-04-15 VITALS
SYSTOLIC BLOOD PRESSURE: 145 MMHG | OXYGEN SATURATION: 97 % | HEART RATE: 54 BPM | DIASTOLIC BLOOD PRESSURE: 76 MMHG | RESPIRATION RATE: 16 BRPM | TEMPERATURE: 98.6 F

## 2024-04-15 DIAGNOSIS — G40.919 BREAKTHROUGH SEIZURE (HCC): Primary | ICD-10-CM

## 2024-04-15 PROBLEM — I69.398 EPILEPSY AFTER STROKE (HCC): Status: ACTIVE | Noted: 2023-05-16

## 2024-04-15 PROBLEM — G40.909 EPILEPSY AFTER STROKE (HCC): Status: ACTIVE | Noted: 2023-05-16

## 2024-04-15 LAB
25(OH)D3 SERPL-MCNC: 17.4 NG/ML (ref 30–100)
ALBUMIN SERPL BCP-MCNC: 4.5 G/DL (ref 3.5–5)
ALP SERPL-CCNC: 95 U/L (ref 34–104)
ALT SERPL W P-5'-P-CCNC: 20 U/L (ref 7–52)
AMMONIA PLAS-SCNC: 182 UMOL/L (ref 18–72)
AMPHETAMINES SERPL QL SCN: NEGATIVE
ANION GAP SERPL CALCULATED.3IONS-SCNC: 25 MMOL/L (ref 4–13)
APAP SERPL-MCNC: <2 UG/ML (ref 10–20)
APTT PPP: 24 SECONDS (ref 23–37)
AST SERPL W P-5'-P-CCNC: 31 U/L (ref 13–39)
ATRIAL RATE: 65 BPM
BACTERIA UR QL AUTO: ABNORMAL /HPF
BARBITURATES UR QL: NEGATIVE
BASOPHILS # BLD MANUAL: 0 THOUSAND/UL (ref 0–0.1)
BASOPHILS NFR MAR MANUAL: 0 % (ref 0–1)
BENZODIAZ UR QL: NEGATIVE
BILIRUB DIRECT SERPL-MCNC: 0.04 MG/DL (ref 0–0.2)
BILIRUB SERPL-MCNC: 0.32 MG/DL (ref 0.2–1)
BILIRUB UR QL STRIP: NEGATIVE
BUN SERPL-MCNC: 12 MG/DL (ref 5–25)
CALCIUM SERPL-MCNC: 9.1 MG/DL (ref 8.4–10.2)
CHLORIDE SERPL-SCNC: 106 MMOL/L (ref 96–108)
CK SERPL-CCNC: 326 U/L (ref 39–308)
CLARITY UR: CLEAR
CO2 SERPL-SCNC: 14 MMOL/L (ref 21–32)
COCAINE UR QL: NEGATIVE
COLOR UR: ABNORMAL
CREAT SERPL-MCNC: 1.09 MG/DL (ref 0.6–1.3)
EOSINOPHIL # BLD MANUAL: 0.68 THOUSAND/UL (ref 0–0.4)
EOSINOPHIL NFR BLD MANUAL: 6 % (ref 0–6)
ERYTHROCYTE [DISTWIDTH] IN BLOOD BY AUTOMATED COUNT: 12.6 % (ref 11.6–15.1)
ETHANOL SERPL-MCNC: <10 MG/DL
FENTANYL UR QL SCN: NEGATIVE
FOLATE SERPL-MCNC: 9 NG/ML
GFR SERPL CREATININE-BSD FRML MDRD: 83 ML/MIN/1.73SQ M
GLUCOSE SERPL-MCNC: 107 MG/DL (ref 65–140)
GLUCOSE SERPL-MCNC: 97 MG/DL (ref 65–140)
GLUCOSE UR STRIP-MCNC: NEGATIVE MG/DL
HCT VFR BLD AUTO: 42.5 % (ref 36.5–49.3)
HGB BLD-MCNC: 14 G/DL (ref 12–17)
HGB UR QL STRIP.AUTO: NEGATIVE
HYDROCODONE UR QL SCN: NEGATIVE
INR PPP: 1.09 (ref 0.84–1.19)
KETONES UR STRIP-MCNC: ABNORMAL MG/DL
LEUKOCYTE ESTERASE UR QL STRIP: NEGATIVE
LEVETIRACETAM SERPL-MCNC: 31.4 UG/ML (ref 12–46)
LYMPHOCYTES # BLD AUTO: 62 % (ref 14–44)
LYMPHOCYTES # BLD AUTO: 7.23 THOUSAND/UL (ref 0.6–4.47)
MAGNESIUM SERPL-MCNC: 2.1 MG/DL (ref 1.9–2.7)
MCH RBC QN AUTO: 31 PG (ref 26.8–34.3)
MCHC RBC AUTO-ENTMCNC: 32.9 G/DL (ref 31.4–37.4)
MCV RBC AUTO: 94 FL (ref 82–98)
METHADONE UR QL: NEGATIVE
MONOCYTES # BLD AUTO: 0.68 THOUSAND/UL (ref 0–1.22)
MONOCYTES NFR BLD: 6 % (ref 4–12)
MUCOUS THREADS UR QL AUTO: ABNORMAL
NEUTROPHILS # BLD MANUAL: 2.71 THOUSAND/UL (ref 1.85–7.62)
NEUTS SEG NFR BLD AUTO: 24 % (ref 43–75)
NITRITE UR QL STRIP: NEGATIVE
NON-SQ EPI CELLS URNS QL MICRO: ABNORMAL /HPF
OPIATES UR QL SCN: NEGATIVE
OXYCODONE+OXYMORPHONE UR QL SCN: NEGATIVE
P AXIS: 25 DEGREES
PCP UR QL: NEGATIVE
PH UR STRIP.AUTO: 5.5 [PH]
PLATELET # BLD AUTO: 271 THOUSANDS/UL (ref 149–390)
PLATELET BLD QL SMEAR: ADEQUATE
PMV BLD AUTO: 9.6 FL (ref 8.9–12.7)
POTASSIUM SERPL-SCNC: 3.2 MMOL/L (ref 3.5–5.3)
PR INTERVAL: 182 MS
PROT SERPL-MCNC: 7.4 G/DL (ref 6.4–8.4)
PROT UR STRIP-MCNC: ABNORMAL MG/DL
PROTHROMBIN TIME: 14 SECONDS (ref 11.6–14.5)
QRS AXIS: 28 DEGREES
QRSD INTERVAL: 106 MS
QT INTERVAL: 392 MS
QTC INTERVAL: 407 MS
RBC # BLD AUTO: 4.52 MILLION/UL (ref 3.88–5.62)
RBC #/AREA URNS AUTO: ABNORMAL /HPF
RBC MORPH BLD: NORMAL
SALICYLATES SERPL-MCNC: <5 MG/DL (ref 3–20)
SODIUM SERPL-SCNC: 145 MMOL/L (ref 135–147)
SP GR UR STRIP.AUTO: 1.05 (ref 1–1.03)
T WAVE AXIS: 38 DEGREES
THC UR QL: NEGATIVE
UROBILINOGEN UR STRIP-ACNC: <2 MG/DL
VARIANT LYMPHS # BLD AUTO: 2 %
VENTRICULAR RATE: 65 BPM
VIT B12 SERPL-MCNC: 334 PG/ML (ref 180–914)
WBC # BLD AUTO: 11.3 THOUSAND/UL (ref 4.31–10.16)
WBC #/AREA URNS AUTO: ABNORMAL /HPF

## 2024-04-15 PROCEDURE — 80307 DRUG TEST PRSMV CHEM ANLYZR: CPT | Performed by: EMERGENCY MEDICINE

## 2024-04-15 PROCEDURE — 96375 TX/PRO/DX INJ NEW DRUG ADDON: CPT

## 2024-04-15 PROCEDURE — 82948 REAGENT STRIP/BLOOD GLUCOSE: CPT

## 2024-04-15 PROCEDURE — 81001 URINALYSIS AUTO W/SCOPE: CPT | Performed by: EMERGENCY MEDICINE

## 2024-04-15 PROCEDURE — 82607 VITAMIN B-12: CPT

## 2024-04-15 PROCEDURE — 80179 DRUG ASSAY SALICYLATE: CPT | Performed by: EMERGENCY MEDICINE

## 2024-04-15 PROCEDURE — 93010 ELECTROCARDIOGRAM REPORT: CPT | Performed by: INTERNAL MEDICINE

## 2024-04-15 PROCEDURE — 70496 CT ANGIOGRAPHY HEAD: CPT

## 2024-04-15 PROCEDURE — 82306 VITAMIN D 25 HYDROXY: CPT

## 2024-04-15 PROCEDURE — 85730 THROMBOPLASTIN TIME PARTIAL: CPT | Performed by: EMERGENCY MEDICINE

## 2024-04-15 PROCEDURE — 84425 ASSAY OF VITAMIN B-1: CPT

## 2024-04-15 PROCEDURE — 83520 IMMUNOASSAY QUANT NOS NONAB: CPT

## 2024-04-15 PROCEDURE — 93005 ELECTROCARDIOGRAM TRACING: CPT

## 2024-04-15 PROCEDURE — 80048 BASIC METABOLIC PNL TOTAL CA: CPT | Performed by: EMERGENCY MEDICINE

## 2024-04-15 PROCEDURE — 99285 EMERGENCY DEPT VISIT HI MDM: CPT | Performed by: EMERGENCY MEDICINE

## 2024-04-15 PROCEDURE — 96361 HYDRATE IV INFUSION ADD-ON: CPT

## 2024-04-15 PROCEDURE — 82077 ASSAY SPEC XCP UR&BREATH IA: CPT | Performed by: EMERGENCY MEDICINE

## 2024-04-15 PROCEDURE — 85610 PROTHROMBIN TIME: CPT | Performed by: EMERGENCY MEDICINE

## 2024-04-15 PROCEDURE — 80143 DRUG ASSAY ACETAMINOPHEN: CPT | Performed by: EMERGENCY MEDICINE

## 2024-04-15 PROCEDURE — 82746 ASSAY OF FOLIC ACID SERUM: CPT

## 2024-04-15 PROCEDURE — 80076 HEPATIC FUNCTION PANEL: CPT | Performed by: EMERGENCY MEDICINE

## 2024-04-15 PROCEDURE — 99245 OFF/OP CONSLTJ NEW/EST HI 55: CPT | Performed by: PSYCHIATRY & NEUROLOGY

## 2024-04-15 PROCEDURE — 36415 COLL VENOUS BLD VENIPUNCTURE: CPT | Performed by: EMERGENCY MEDICINE

## 2024-04-15 PROCEDURE — 83735 ASSAY OF MAGNESIUM: CPT | Performed by: EMERGENCY MEDICINE

## 2024-04-15 PROCEDURE — 82550 ASSAY OF CK (CPK): CPT | Performed by: EMERGENCY MEDICINE

## 2024-04-15 PROCEDURE — 85007 BL SMEAR W/DIFF WBC COUNT: CPT | Performed by: EMERGENCY MEDICINE

## 2024-04-15 PROCEDURE — 96366 THER/PROPH/DIAG IV INF ADDON: CPT

## 2024-04-15 PROCEDURE — 99285 EMERGENCY DEPT VISIT HI MDM: CPT

## 2024-04-15 PROCEDURE — 82140 ASSAY OF AMMONIA: CPT | Performed by: EMERGENCY MEDICINE

## 2024-04-15 PROCEDURE — 96365 THER/PROPH/DIAG IV INF INIT: CPT

## 2024-04-15 PROCEDURE — 70498 CT ANGIOGRAPHY NECK: CPT

## 2024-04-15 PROCEDURE — 85027 COMPLETE CBC AUTOMATED: CPT | Performed by: EMERGENCY MEDICINE

## 2024-04-15 RX ORDER — LEVETIRACETAM 500 MG/5ML
4500 INJECTION, SOLUTION, CONCENTRATE INTRAVENOUS ONCE
Status: COMPLETED | OUTPATIENT
Start: 2024-04-15 | End: 2024-04-15

## 2024-04-15 RX ORDER — SODIUM CHLORIDE, SODIUM GLUCONATE, SODIUM ACETATE, POTASSIUM CHLORIDE, MAGNESIUM CHLORIDE, SODIUM PHOSPHATE, DIBASIC, AND POTASSIUM PHOSPHATE .53; .5; .37; .037; .03; .012; .00082 G/100ML; G/100ML; G/100ML; G/100ML; G/100ML; G/100ML; G/100ML
1000 INJECTION, SOLUTION INTRAVENOUS ONCE
Status: COMPLETED | OUTPATIENT
Start: 2024-04-15 | End: 2024-04-15

## 2024-04-15 RX ORDER — LORAZEPAM 2 MG/ML
4 INJECTION INTRAMUSCULAR ONCE AS NEEDED
Status: DISCONTINUED | OUTPATIENT
Start: 2024-04-15 | End: 2024-04-16 | Stop reason: HOSPADM

## 2024-04-15 RX ORDER — POTASSIUM CHLORIDE 20 MEQ/1
40 TABLET, EXTENDED RELEASE ORAL ONCE
Status: COMPLETED | OUTPATIENT
Start: 2024-04-15 | End: 2024-04-15

## 2024-04-15 RX ADMIN — LEVETIRACETAM 4500 MG: 100 INJECTION, SOLUTION INTRAVENOUS at 19:25

## 2024-04-15 RX ADMIN — SODIUM CHLORIDE 1000 ML: 0.9 INJECTION, SOLUTION INTRAVENOUS at 19:06

## 2024-04-15 RX ADMIN — IOHEXOL 85 ML: 350 INJECTION, SOLUTION INTRAVENOUS at 18:33

## 2024-04-15 RX ADMIN — SODIUM CHLORIDE, SODIUM GLUCONATE, SODIUM ACETATE, POTASSIUM CHLORIDE, MAGNESIUM CHLORIDE, SODIUM PHOSPHATE, DIBASIC, AND POTASSIUM PHOSPHATE 1000 ML: .53; .5; .37; .037; .03; .012; .00082 INJECTION, SOLUTION INTRAVENOUS at 19:45

## 2024-04-15 RX ADMIN — POTASSIUM CHLORIDE 40 MEQ: 1500 TABLET, EXTENDED RELEASE ORAL at 21:47

## 2024-04-15 NOTE — Clinical Note
Abdirahman Ambrocio was seen and treated in our emergency department on 4/15/2024.                Diagnosis:     Abdirahman  .    He may return on this date: 04/17/2024         If you have any questions or concerns, please don't hesitate to call.      Sahil Dueñas, DO    ______________________________           _______________          _______________  Hospital Representative                              Date                                Time

## 2024-04-15 NOTE — Clinical Note
Case was discussed with neuro and the patient's admission status was agreed to be Admission Status: observation status to the service of Dr. Modi

## 2024-04-15 NOTE — ED PROVIDER NOTES
History  Chief Complaint   Patient presents with    Seizure - Prior Hx Of     Pt able to answer questions upon arrival and began to actively have seizure lasting about 30 seconds in triage.       Patient is a 43-year-old male with a significant past medical history of CVA, seizure disorder on Keppra, presenting for evaluation of seizure activity.  Patient reportedly was having some tremors in his right hand similar to what he has experienced this before prior seizures.  He called his dad because of this who picked him up and brought him to the emergency department to get evaluated.  Upon arrival in the waiting room, the patient had some witnessed generalized, tonic-clonic seizure-like activity.  Patient seizure-like activity reportedly lasted approximately 1 minute patient was wheeled back to the department where he was no longer having seizure activity.  Noted not be moving his right side on arrival.  Patient father reports that the patient has been in his normal state of health.  He did have a seizure back in January where he had some increase in his Keppra doses.  Patient's father reports that he is compliant with his Keppra.  He was not reporting any headaches, chest pain, difficulty breathing, abdominal pain prior to the seizure-like activity. He was reportedly on a long run today prior to this happening.        Prior to Admission Medications   Prescriptions Last Dose Informant Patient Reported? Taking?   aspirin 81 mg chewable tablet  Self No No   Sig: Chew 1 tablet (81 mg total) daily   escitalopram (LEXAPRO) 5 mg tablet  Self No No   Sig: TAKE 1 TABLET BY MOUTH DAILY   levETIRAcetam (KEPPRA) 1000 MG tablet  Self No No   Sig: Take 1 tablet (1,000 mg total) by mouth every 12 (twelve) hours      Facility-Administered Medications: None       Past Medical History:   Diagnosis Date    Seizure (HCC)     Stroke (HCC)        Past Surgical History:   Procedure Laterality Date    CARDIAC CATHETERIZATION N/A 2/21/2024     Procedure: Cardiac pfo closure;  Surgeon: Tashi Maldonado DO;  Location: [facility];  Service: Cardiology    IR STROKE ALERT  5/15/2023       Family History   Problem Relation Age of Onset    Alzheimer's disease Mother      I have reviewed and agree with the history as documented.    E-Cigarette/Vaping    E-Cigarette Use Never User      E-Cigarette/Vaping Substances    Nicotine No     THC No     CBD No     Flavoring No      Social History     Tobacco Use    Smoking status: Former     Current packs/day: 0.00     Average packs/day: 0.5 packs/day for 15.0 years (7.5 ttl pk-yrs)     Types: Cigarettes     Start date: 6/3/2008     Quit date: 6/3/2023     Years since quittin.8    Smokeless tobacco: Never   Vaping Use    Vaping status: Never Used   Substance Use Topics    Alcohol use: Not Currently    Drug use: Never        Review of Systems   Unable to perform ROS: Mental status change       Physical Exam  ED Triage Vitals [04/15/24 1756]   Temperature Pulse Respirations Blood Pressure SpO2   98.6 °F (37 °C) 70 15 157/97 96 %      Temp Source Heart Rate Source Patient Position - Orthostatic VS BP Location FiO2 (%)   Tympanic Monitor Sitting Left arm --      Pain Score       No Pain             Orthostatic Vital Signs  Vitals:    04/15/24 1756 04/15/24 1945 04/15/24 2053   BP: 157/97 138/79 145/76   Pulse: 70 58 (!) 54   Patient Position - Orthostatic VS: Sitting  Lying       Physical Exam  Vitals and nursing note reviewed.   Constitutional:       General: He is not in acute distress.     Appearance: Normal appearance. He is ill-appearing. He is not toxic-appearing.      Comments: Postictal on my arrival. Neglecting right side.   HENT:      Head: Normocephalic and atraumatic.      Right Ear: External ear normal.      Left Ear: External ear normal.      Nose: Nose normal.      Mouth/Throat:      Comments: No tongue biting or intraoral trauma  Eyes:      General: No scleral icterus.        Right eye: No  discharge.         Left eye: No discharge.      Conjunctiva/sclera: Conjunctivae normal.      Pupils: Pupils are equal, round, and reactive to light.      Comments: Roving eye movements.   Cardiovascular:      Rate and Rhythm: Normal rate.      Heart sounds: Normal heart sounds. No murmur heard.     No friction rub. No gallop.   Pulmonary:      Effort: Pulmonary effort is normal. No respiratory distress.      Breath sounds: Normal breath sounds.   Abdominal:      General: Abdomen is flat. There is no distension.      Palpations: Abdomen is soft. There is no mass.      Tenderness: There is no abdominal tenderness.   Genitourinary:     Comments: Deferred  Musculoskeletal:      Comments: Bilateral shoulders do not appear deformed.   Skin:     General: Skin is warm and dry.   Neurological:      General: No focal deficit present.      Mental Status: He is alert.         ED Medications  Medications   sodium chloride 0.9 % bolus 1,000 mL (0 mL Intravenous Stopped 4/15/24 1945)   levETIRAcetam (KEPPRA) injection 4,500 mg (4,500 mg Intravenous Given 4/15/24 1925)   iohexol (OMNIPAQUE) 350 MG/ML injection (MULTI-DOSE) 85 mL (85 mL Intravenous Given 4/15/24 1833)   multi-electrolyte (ISOLYTE-S PH 7.4) bolus 1,000 mL (0 mL Intravenous Stopped 4/15/24 2140)   potassium chloride (Klor-Con M20) CR tablet 40 mEq (40 mEq Oral Given 4/15/24 2147)       Diagnostic Studies  Results Reviewed       Procedure Component Value Units Date/Time    Vitamin D 25 hydroxy [[id number]]  (Abnormal) Collected: 04/15/24 2140    Lab Status: Final result Specimen: Blood from Arm, Right Updated: 04/15/24 2234     Vit D, 25-Hydroxy 17.4 ng/mL     Vitamin B12 [[id number]]  (Normal) Collected: 04/15/24 2140    Lab Status: Final result Specimen: Blood from Arm, Right Updated: 04/15/24 2232     Vitamin B-12 334 pg/mL     Folate [[id number]]  (Normal) Collected: 04/15/24 2140    Lab Status: Final result Specimen: Blood from Arm, Right Updated: 04/15/24 2232      "Folate 9.0 ng/mL     Vitamin B1, whole blood [[id number]] Collected: 04/15/24 2140    Lab Status: In process Specimen: Blood from Arm, Right Updated: 04/15/24 2142    Rapid drug screen, urine [[id number]]  (Normal) Collected: 04/15/24 1930    Lab Status: Final result Specimen: Urine, Clean Catch Updated: 04/15/24 2017     Amph/Meth UR Negative     Barbiturate Ur Negative     Benzodiazepine Urine Negative     Cocaine Urine Negative     Methadone Urine Negative     Opiate Urine Negative     PCP Ur Negative     THC Urine Negative     Oxycodone Urine Negative     Fentanyl Urine Negative     HYDROCODONE URINE Negative    Narrative:      FOR MEDICAL PURPOSES ONLY.   IF CONFIRMATION NEEDED PLEASE CONTACT THE LAB WITHIN 5 DAYS.    Drug Screen Cutoff Levels:  AMPHETAMINE/METHAMPHETAMINES  1000 ng/mL  BARBITURATES     200 ng/mL  BENZODIAZEPINES     200 ng/mL  COCAINE      300 ng/mL  METHADONE      300 ng/mL  OPIATES      300 ng/mL  PHENCYCLIDINE     25 ng/mL  THC       50 ng/mL  OXYCODONE      100 ng/mL  FENTANYL      5 ng/mL  HYDROCODONE     300 ng/mL    RBC Morphology Reflex Test [[id number]] Collected: 04/15/24 1817    Lab Status: Final result Specimen: Blood from Arm, Right Updated: 04/15/24 2001    Levetiracetam level [[id number]]  (Normal) Collected: 04/15/24 1930    Lab Status: Final result Specimen: Blood from Arm, Right Updated: 04/15/24 1956     Levetiracetam Lvl 31.4 ug/mL     Narrative:      \"Brivaracetam (Briviact) interferes with measurements of levetiracetam in the ARK Levetiracetam Assay. Patients undergoing a switch in drug therapy involving Keppra and Briviact should not be monitored for levetiracetam using the ARK assay. Serum levels of levetiracetam and or brivaracetam should be confirmed by a valid chromatographic method if there is a possibility these drugs are co-present in circulation.\"    Urine Microscopic [[id number]]  (Abnormal) Collected: 04/15/24 1930    Lab Status: Final result Specimen: Urine, Clean " Catch Updated: 04/15/24 1945     RBC, UA 1-2 /hpf      WBC, UA None Seen /hpf      Epithelial Cells None Seen /hpf      Bacteria, UA None Seen /hpf      MUCUS THREADS Occasional    UA (URINE) with reflex to Scope [[id number]]  (Abnormal) Collected: 04/15/24 1930    Lab Status: Final result Specimen: Urine, Clean Catch Updated: 04/15/24 1943     Color, UA Light Yellow     Clarity, UA Clear     Specific Gravity, UA 1.048     pH, UA 5.5     Leukocytes, UA Negative     Nitrite, UA Negative     Protein, UA 30 (1+) mg/dl      Glucose, UA Negative mg/dl      Ketones, UA Trace mg/dl      Urobilinogen, UA <2.0 mg/dl      Bilirubin, UA Negative     Occult Blood, UA Negative    CBC and differential [[id number]]  (Abnormal) Collected: 04/15/24 1817    Lab Status: Final result Specimen: Blood from Arm, Right Updated: 04/15/24 1912     WBC 11.30 Thousand/uL      RBC 4.52 Million/uL      Hemoglobin 14.0 g/dL      Hematocrit 42.5 %      MCV 94 fL      MCH 31.0 pg      MCHC 32.9 g/dL      RDW 12.6 %      MPV 9.6 fL      Platelets 271 Thousands/uL     Narrative:      This is an appended report.  These results have been appended to a previously verified report.    Manual Differential(PHLEBS Do Not Order) [[id number]]  (Abnormal) Collected: 04/15/24 1817    Lab Status: Final result Specimen: Blood from Arm, Right Updated: 04/15/24 1912     Segmented % 24 %      Lymphocytes % 62 %      Monocytes % 6 %      Eosinophils % 6 %      Basophils % 0 %      Atypical Lymphocytes % 2 %      Absolute Neutrophils 2.71 Thousand/uL      Absolute Lymphocytes 7.23 Thousand/uL      Absolute Monocytes 0.68 Thousand/uL      Absolute Eosinophils 0.68 Thousand/uL      Absolute Basophils 0.00 Thousand/uL      Total Counted --     RBC Morphology Normal     Platelet Estimate Adequate    Ammonia [[id number]]  (Abnormal) Collected: 04/15/24 1817    Lab Status: Final result Specimen: Blood from Arm, Right Updated: 04/15/24 1846     Ammonia 182 umol/L     Ethanol  [[id number]]  (Normal) Collected: 04/15/24 1817    Lab Status: Final result Specimen: Blood from Arm, Right Updated: 04/15/24 1846     Ethanol Lvl <10 mg/dL     Basic metabolic panel [[id number]]  (Abnormal) Collected: 04/15/24 1817    Lab Status: Final result Specimen: Blood from Arm, Right Updated: 04/15/24 1846     Sodium 145 mmol/L      Potassium 3.2 mmol/L      Chloride 106 mmol/L      CO2 14 mmol/L      ANION GAP 25 mmol/L      BUN 12 mg/dL      Creatinine 1.09 mg/dL      Glucose 97 mg/dL      Calcium 9.1 mg/dL      eGFR 83 ml/min/1.73sq m     Narrative:      National Kid[employer] guidelines for Chronic Kidney Disease (CKD):     Stage 1 with normal or high GFR (GFR > 90 mL/min/1.73 square meters)    Stage 2 Mild CKD (GFR = 60-89 mL/min/1.73 square meters)    Stage 3A Moderate CKD (GFR = 45-59 mL/min/1.73 square meters)    Stage 3B Moderate CKD (GFR = 30-44 mL/min/1.73 square meters)    Stage 4 Severe CKD (GFR = 15-29 mL/min/1.73 square meters)    Stage 5 End Stage CKD (GFR <15 mL/min/1.73 square meters)  Note: GFR calculation is accurate only with a steady state creatinine    Hepatic function panel [[id number]]  (Normal) Collected: 04/15/24 1817    Lab Status: Final result Specimen: Blood from Arm, Right Updated: 04/15/24 1846     Total Bilirubin 0.32 mg/dL      Bilirubin, Direct 0.04 mg/dL      Alkaline Phosphatase 95 U/L      AST 31 U/L      ALT 20 U/L      Total Protein 7.4 g/dL      Albumin 4.5 g/dL     Magnesium [[id number]]  (Normal) Collected: 04/15/24 1817    Lab Status: Final result Specimen: Blood from Arm, Right Updated: 04/15/24 1846     Magnesium 2.1 mg/dL     CK [[id number]]  (Abnormal) Collected: 04/15/24 1817    Lab Status: Final result Specimen: Blood from Arm, Right Updated: 04/15/24 1846     Total  U/L     Salicylate level [[id number]]  (Normal) Collected: 04/15/24 1817    Lab Status: Final result Specimen: Blood from Arm, Right Updated: 04/15/24 1846     Salicylate Lvl <5 mg/dL      Acetaminophen level-If concentration is detectable, please discuss with medical  on call. [[id number]]  (Abnormal) Collected: 04/15/24 1817    Lab Status: Final result Specimen: Blood from Arm, Right Updated: 04/15/24 1846     Acetaminophen Level <2 ug/mL     Protime-INR [[id number]]  (Normal) Collected: 04/15/24 1817    Lab Status: Final result Specimen: Blood from Arm, Right Updated: 04/15/24 1840     Protime 14.0 seconds      INR 1.09    APTT [[id number]]  (Normal) Collected: 04/15/24 1817    Lab Status: Final result Specimen: Blood from Arm, Right Updated: 04/15/24 1840     PTT 24 seconds     Fingerstick Glucose (POCT) [[id number]]  (Normal) Collected: 04/15/24 1807    Lab Status: Final result Specimen: Blood Updated: 04/15/24 1809     POC Glucose 107 mg/dl                    CTA head and neck with and without contrast   Final Result by Candida Hernandez MD (04/15 1938)      1.  No acute intracranial CT abnormality.   2.  Grossly stable chronic large left MCA territory infarct.   3.  No intracranial large vessel occlusion. Interval flow reconstitution within the M1 segment of the left MCA. Diminutive left M2 branches with asymmetric paucity of distal vasculature corresponding to the old left MCA territory infarct.   4.  No hemodynamically significant stenosis or dissection of cervical carotid and vertebral arteries.   5.  Left vertebral artery arises from the aortic arch (a normal variant). Stable diminutive post PICA segment of the left intracranial vertebral artery.                     Workstation performed: [id number]               Procedures  Procedures      ED Course  ED Course as of 04/17/24 0625   Mon Apr 15, 2024   2133 Procedure Note: EKG  Date/Time: 04/15/24 9:33 PM   Interpreted by: Sahil Dueñas   Indications / Diagnosis: seizure  ECG reviewed by me, the ED Provider: yes   The EKG demonstrates:  Rhythm: normal sinus  Intervals: normal intervals  Axis: normal axis  QRS/Blocks: normal QRS,  incomplete RBBB  ST Changes: No acute ST Changes, no STD/HONEY.   2147 Discussed with neurology who is recommending discharge home.                             SBIRT 22yo+      Flowsheet Row Most Recent Value   Initial Alcohol Screen: US AUDIT-C     1. How often do you have a drink containing alcohol? 0 Filed at: 04/15/2024 1758   2. How many drinks containing alcohol do you have on a typical day you are drinking?  0 Filed at: 04/15/2024 1758   3a. Male UNDER 65: How often do you have five or more drinks on one occasion? 0 Filed at: 04/15/2024 1758   3b. FEMALE Any Age, or MALE 65+: How often do you have 4 or more drinks on one occassion? 0 Filed at: 04/15/2024 1758   Audit-C Score 0 Filed at: 04/15/2024 1758   ALMA: How many times in the past year have you...    Used an illegal drug or used a prescription medication for non-medical reasons? Never Filed at: 04/15/2024 1758                  Medical Decision Making  Patient with history as above presented with seizure activity. History obtained from patient father and nursing report.    Differential diagnosis includes: seizure with jovani's paralysis, breakthrough seizure, intracranial bleed, electrolyte disturbance, anemia, arrhythmia    Plan: CBC, BMP, coma panel, magnesium, CK, ammonia, UDS, urine, CTA head and neck, fluids, keppra    Reviewed external records. ECG independently interpreted by myself as above. Labs reviewed and remarkable for hypokalemia, low CO2 and increased anion gap likely in setting of lactic acidosis from recent seizure, slight CK elevation, hyperammoniemia likely in setting of recent seizure. Independently reviewed imaging without acute emergent pathology. Discussed patient's management with neurology who was consulted. They added several other labs for outpatient follow up. Neuro feels this is likely breakthrough seizure provoked from run today. Not recommending medication changes and recommending outpatient follow up. Patient returned to  baseline. Aware not to be driving. Stable for outpatient management.    Disposition: Discharged with instructions to obtain outpatient follow up of patient's symptoms and findings, with strict return precautions if patient develops new or worsening symptoms. Patient understands this plan and is agreeable. All questions answered. Patient discharged home with return precautions.    Amount and/or Complexity of Data Reviewed  Labs: ordered.  Radiology: ordered.    Risk  Prescription drug management.          Disposition  Final diagnoses:   Breakthrough seizure (HCC)     Time reflects when diagnosis was documented in both MDM as applicable and the Disposition within this note       Time User Action Codes Description Comment    4/15/2024  8:06 PM Sahil Dueñas Add [G40.919] Breakthrough seizure (HCC)           ED Disposition       ED Disposition   Discharge    Condition   Stable    Date/Time   Mon Apr 15, 2024 2122    Comment   Case stable for discharge to home or self care.               Follow-up Information    None         Discharge Medication List as of 4/15/2024  9:40 PM        CONTINUE these medications which have NOT CHANGED    Details   aspirin 81 mg chewable tablet Chew 1 tablet (81 mg total) daily, Starting Fri 6/9/2023, Normal      escitalopram (LEXAPRO) 5 mg tablet TAKE 1 TABLET BY MOUTH DAILY, Starting Tue 4/2/2024, Normal      levETIRAcetam (KEPPRA) 1000 MG tablet Take 1 tablet (1,000 mg total) by mouth every 12 (twelve) hours, Starting Tue 2/27/2024, Until Mon 5/27/2024, Normal           No discharge procedures on file.    PDMP Review         Value Time User    PDMP Reviewed  Yes 1/6/2024  2:25 AM Jose Briceño MD             ED Provider  Attending physically available and evaluated Abdirahman Ambrocio. I managed the patient along with the ED Attending.    Electronically Signed by           Sahil Dueñas DO  04/17/24 0625

## 2024-04-16 NOTE — ED ATTENDING ATTESTATION
4/15/2024  I, Driss Francois MD, saw and evaluated the patient. I have discussed the patient with the resident/non-physician practitioner and agree with the resident's/non-physician practitioner's findings, Plan of Care, and MDM as documented in the resident's/non-physician practitioner's note, except where noted. All available labs and Radiology studies were reviewed.  I was present for key portions of any procedure(s) performed by the resident/non-physician practitioner and I was immediately available to provide assistance.    At this point I agree with the current assessment done in the Emergency Department. I have conducted an independent evaluation of this patient a history and physical is as follows:    This is a 43 y.o. old male who presents to the ED for evaluation of seizure. Known hx seizure d/o 2/2 large MCA stroke. Found post ictal by staff as nurse wheeled patient in from triage. NOted ot be moving L side, having roving eye movements, responding to jaw thrust purposefully and not moving R side. Unclear if sz with todds vs another stroke. He had a lot of running and exertion today.    He was moved emergently to CT scan to rule out catastrophe. IVF, labs. Load with Keppra. Discuss with neuro service for admission.    ED Course         Critical Care Time  Procedures

## 2024-04-16 NOTE — ASSESSMENT & PLAN NOTE
43-year-old male with a history of left MCA CVA in 2023 with current left MCA territory encephalomalacia, and poststroke seizure disorder presenting for a seizure episode likely provoked by dehydration.  Patient developed low stroke Paresh's paralysis which resolved before I was able to see him in the ED.  Patient appears to be back at baseline although he states that he has tired.  Patient continues to have mixed aphasia mildly worsened at bedside likely due to postictal state.     Plan:  -Patient does not need to be admitted to the hospital as no changes to his medications will be made and no further testing is required at this time.  The patient appears to be mostly at his baseline aside from being tired.  Patient can follow-up with his epileptologist in the outpatient setting.  - Continue levetiracetam 1 g twice daily  - Avoid dehydration.  Sleep well.  Follow general epilepsy precautions.  - [employer] event form filed.  -Ordered labs vitamin B12, B1, B9, and vitamin D.

## 2024-04-16 NOTE — DISCHARGE INSTRUCTIONS
Your evaluation suggests that your symptoms are due to a non emergent cause most consistent with a breakthrough seizure.    Please follow up with your neurologist within two days.    Stay hydrated.    Return to the Emergency Department if you experience worsening or concerning symptoms.    Thank you for choosing us for your care.

## 2024-04-16 NOTE — CONSULTS
NEUROLOGY RESIDENCY CONSULT NOTE     Name: Abdirahman Ambrocio   Age & Sex: 43 y.o. male   MRN: [id number]  Unit/Bed#: ED 27   Encounter: [id number]  Length of Stay: 0    Abdirahman Ambrocio will need follow up in in 6 weeks with epilepsy Attending .  He will not require outpatient neurological testing.        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  Patient developed low stroke Paresh's paralysis which resolved before I was able to see him in the ED.  Patient appears to be back at baseline although he states that he has tired.  Patient continues to have mixed aphasia mildly worsened at bedside likely due to postictal state.     Plan:  -Patient does not need to be admitted to the hospital as no changes to his medications will be made and no further testing is required at this time.  The patient appears to be mostly at his baseline aside from being tired.  Patient can follow-up with his epileptologist in the outpatient setting.  - Continue levetiracetam 1 g twice daily  - Avoid dehydration.  Sleep well.  Follow general epilepsy precautions.  - [employer] event form filed.  -Ordered labs vitamin B12, B1, B9, and vitamin D.        SUBJECTIVE     Reason for Consult / Principal Problem: seizures    HPI: Abdirahman Ambrocio is a 43 y.o. male  with pertinent history of left MCA CVA in May 2023 status post TNK and thrombectomy, with current left MCA distribution encephalomalacia and some mixed aphasia at baseline, and poststroke seizure disorder who presented for not feeling well after his daily run and subsequently having witnessed generalized tonic-clonic seizure activity in the ED waiting room.  Patient reportedly ran 7 to 8 miles today as he does days. The patient states when he got home he did not feel  well and he had tremors in his right arm. The patient states that the tremors in his right arm lasted for 2 hours prior to the seizure. The patient was in the ED waiting room and he had generalized convulsions.  This pattern of right-sided tremors developing into generalized tonic-clonic convulsions is the same one from his admission in January 2024.  Which was the last seizure that the patient had. The patient had post seizure Paresh's paralysis which resolved by the time I examined him at bedside. The patient's seizure activity broke without any medication administration.  The patient's father was present at bedside to answer some questions.  The father reports that the patient still seems a little cloudy and his mixed aphasia is worse right now than it is at his baseline.  The father states that the patient is a vegan and his diet is thus limited. We discussed that the temperature today was warmer than it has been recently. This could lead to dehydration which seems more likely of the patient's last recorded creatinine was 0.82 in February 2024 and today it was 1.09. The patient's sodium was also 145.  I discussed with the patient that he needs to be careful as this can easily provoke his seizures as it likely did today. We discussed that we would not increase his medication at this time because of this is likely provoked versus medication failure. I did discuss with the patient that we must submit a [employer] event form which he understood. Furthermore I did discuss with the patient that because his seizures seem to follow the same pattern of a prolonged right sided tremor prior to the generalized convulsions this may help him apply for a waiver as this seems to be a focal aware lesion to generalized unaware seizure activity.    The patient's CVA in 2023 was suspected to be due to an underlying PFO which was discovered on echocardiogram.  The patient initially did not have the PFO closed.  On his last admission we  discussed following up with cardiology and having the surgery to have his PFO closed.  The patient reports that he did have this procedure done in 2024 and follows up with cardiology.    Inpatient consult to Neurology  Consult performed by: Aida Chavarria  Consult ordered by: Driss Francois MD        Historical Information   Past Medical History:   Diagnosis Date    Seizure (HCC)     Stroke (HCC)      Past Surgical History:   Procedure Laterality Date    CARDIAC CATHETERIZATION N/A 2024    Procedure: Cardiac pfo closure;  Surgeon: Tashi Maldonado DO;  [facility];  Service: Cardiology    IR STROKE ALERT  5/15/2023     Social History   Social History     Substance and Sexual Activity   Alcohol Use Not Currently     Social History     Substance and Sexual Activity   Drug Use Never     E-Cigarette/Vaping    E-Cigarette Use Never User      E-Cigarette/Vaping Substances    Nicotine No     THC No     CBD No     Flavoring No      Social History     Tobacco Use   Smoking Status Former    Current packs/day: 0.00    Average packs/day: 0.5 packs/day for 15.0 years (7.5 ttl pk-yrs)    Types: Cigarettes    Start date: 6/3/2008    Quit date: 6/3/2023    Years since quittin.8   Smokeless Tobacco Never     Family History: non-contributory  Meds/Allergies   all current active meds have been reviewed  No Known Allergies    Review of previous medical records was completed.       OBJECTIVE     Patient ID: Abdirahman Ambrocio is a 43 y.o. male.    Vitals:   Vitals:    04/15/24 1756 04/15/24 1945 04/15/24 2053   BP: 157/97 138/79 145/76   BP Location: Left arm  Right arm   Pulse: 70 58 (!) 54   Resp: 15 16    Temp: 98.6 °F (37 °C)     TempSrc: Tympanic     SpO2: 96% 98% 97%      There is no height or weight on file to calculate BMI.     Intake/Output Summary (Last 24 hours) at 4/15/2024 2156  Last data filed at 4/15/2024 2140  Gross per 24 hour   Intake 0 ml   Output --   Net 0 ml       Temperature:    Temp (24hrs), Av.6 °F (37 °C), Min:98.6 °F (37 °C), Max:98.6 °F (37 °C)    Temperature: 98.6 °F (37 °C)    Invasive Devices:   Invasive Devices       Peripheral Intravenous Line  Duration             Peripheral IV 04/15/24 Distal;Right;Upper;Ventral (anterior) Arm <1 day                    Physical Exam  Vitals reviewed.   Neurological:      Mental Status: He is oriented to person, place, and time.      Cranial Nerves: Cranial nerves 2-12 are intact.      Motor: Motor strength is normal.     Deep Tendon Reflexes:      Reflex Scores:       Bicep reflexes are 2+ on the right side and 2+ on the left side.       Brachioradialis reflexes are 2+ on the right side and 2+ on the left side.       Patellar reflexes are 2+ on the right side and 2+ on the left side.       Achilles reflexes are 2+ on the right side and 2+ on the left side.         Neurologic Exam     Mental Status   Oriented to person, place, and time.     Cranial Nerves   Cranial nerves II through XII intact.     Motor Exam   Muscle bulk: normal  Overall muscle tone: normal    Strength   Strength 5/5 throughout.     Sensory Exam   Light touch normal.   Vibration normal.   Pinprick normal.     Gait, Coordination, and Reflexes     Reflexes   Right brachioradialis: 2+  Left brachioradialis: 2+  Right biceps: 2+  Left biceps: 2+  Right patellar: 2+  Left patellar: 2+  Right achilles: 2+  Left achilles: 2+       LABORATORY DATA     Labs: I have personally reviewed pertinent reports.    Results from last 7 days   Lab Units 04/15/24  1817   WBC Thousand/uL 11.30*   HEMOGLOBIN g/dL 14.0   HEMATOCRIT % 42.5   PLATELETS Thousands/uL 271   MONO PCT % 6   EOS PCT % 6      Results from last 7 days   Lab Units 04/15/24  1817   POTASSIUM mmol/L 3.2*   CHLORIDE mmol/L 106   CO2 mmol/L 14*   BUN mg/dL 12   CREATININE mg/dL 1.09   CALCIUM mg/dL 9.1   ALK PHOS U/L 95   ALT U/L 20   AST U/L 31     Results from last 7 days   Lab Units 04/15/24  1817   MAGNESIUM mg/dL 2.1           Results from last 7 days   Lab Units 04/15/24  1817   INR  1.09   PTT seconds 24               IMAGING & DIAGNOSTIC TESTING     Radiology Results: I have personally reviewed pertinent reports.    CTA head and neck with and without contrast   Final Result by Candida Hernandez MD (04/15 1938)      1.  No acute intracranial CT abnormality.   2.  Grossly stable chronic large left MCA territory infarct.   3.  No intracranial large vessel occlusion. Interval flow reconstitution within the M1 segment of the left MCA. Diminutive left M2 branches with asymmetric paucity of distal vasculature corresponding to the old left MCA territory infarct.   4.  No hemodynamically significant stenosis or dissection of cervical carotid and vertebral arteries.   5.  Left vertebral artery arises from the aortic arch (a normal variant). Stable diminutive post PICA segment of the left intracranial vertebral artery.                     Workstation performed: [id number]             Other Diagnostic Testing: I have personally reviewed pertinent reports.      ACTIVE MEDICATIONS     Current Facility-Administered Medications   Medication Dose Route Frequency    LORazepam (ATIVAN) injection 4 mg  4 mg Intravenous Once PRN       Prior to Admission medications    Medication Sig Start Date End Date Taking? Authorizing Provider   aspirin 81 mg chewable tablet Chew 1 tablet (81 mg total) daily 6/9/23   Marline White MD   escitalopram (LEXAPRO) 5 mg tablet TAKE 1 TABLET BY MOUTH DAILY 4/2/24 Tu JORGE L Vallejo DO   levETIRAcetam (KEPPRA) 1000 MG tablet Take 1 tablet (1,000 mg total) by mouth every 12 (twelve) hours 2/27/24 5/27/24  Basilia Rouse MD       CODE STATUS & ADVANCED DIRECTIVES     Code Status: Prior  Advance Directive and Living Will:      Power of :    POLST:        ======    I have discussed the patient's history, physical exam findings, assessment, and plan in detail with attending, Dr. Davenport    Thank you for allowing me to  participate in the care of your patient, Abdirahman Ambrocio.    Aida Chavarria  [facility]'s Neurology Residency, PGY-2

## 2024-04-17 ENCOUNTER — TELEPHONE (OUTPATIENT)
Dept: NEUROLOGY | Facility: CLINIC | Age: 44
End: 2024-04-17

## 2024-04-17 NOTE — TELEPHONE ENCOUNTER
Called pt and left VM, trying to schedule follow up visit with Dr Rouse in July 2024. Provided call back number for PT to schedule

## 2024-04-18 ENCOUNTER — TELEPHONE (OUTPATIENT)
Dept: NEUROLOGY | Facility: CLINIC | Age: 44
End: 2024-04-18

## 2024-04-18 NOTE — TELEPHONE ENCOUNTER
HFU / 4/15/2024 (4 hours)  [facility] Emergency Department / Breakthrough seizure (HCC)       Appt is on 6/3/24 at 8am w/ Dr Ernst. Father called in to schedule this appt. Father selected [place] location. Address was provided.      Per Notes:    Date of Service: 4/15/2024  9:00 PM   Aida Chavarria  Resident  Neurology    Abdirahman DOMO Ybarra will need follow up in in 6 weeks with epilepsy Attending .  He will not require outpatient neurological testing.

## 2024-04-19 LAB — VIT B1 BLD-SCNC: 118.1 NMOL/L (ref 66.5–200)

## 2024-04-19 NOTE — TELEPHONE ENCOUNTER
Father of pt called to scheduled an f/u appt due to message left.      Accepted appt on 7/30 at 8am.

## 2024-04-19 NOTE — TELEPHONE ENCOUNTER
Staff message was sent to Dr Rouse is regards to seeing this patient for HFU.    Awaiting response. Will call Father when received.

## 2024-04-23 NOTE — TELEPHONE ENCOUNTER
Pt father called to check if he can schedule with Dr Rouse. Please call patient father when response comes back to schedule with Dr Rouse.

## 2024-04-29 ENCOUNTER — TELEPHONE (OUTPATIENT)
Dept: FAMILY MEDICINE CLINIC | Facility: CLINIC | Age: 44
End: 2024-04-29

## 2024-04-29 DIAGNOSIS — I69.398 DEPRESSION AS LATE EFFECT OF CEREBROVASCULAR ACCIDENT (CVA): ICD-10-CM

## 2024-04-29 DIAGNOSIS — F06.31 DEPRESSION AS LATE EFFECT OF CEREBROVASCULAR ACCIDENT (CVA): ICD-10-CM

## 2024-04-29 RX ORDER — ESCITALOPRAM OXALATE 5 MG/1
5 TABLET ORAL DAILY
Qty: 30 TABLET | Refills: 0 | Status: SHIPPED | OUTPATIENT
Start: 2024-04-29

## 2024-04-29 NOTE — TELEPHONE ENCOUNTER
Patient calling and would like his PCP to reach out to him to discuss his disability. He states that he recently had another seizure. He states that there is no paperwork to complete. His employer would like info from the PCP office to attest to this information. Patient may be reached at [phone]

## 2024-04-30 ENCOUNTER — TELEPHONE (OUTPATIENT)
Dept: FAMILY MEDICINE CLINIC | Facility: CLINIC | Age: 44
End: 2024-04-30

## 2024-04-30 NOTE — TELEPHONE ENCOUNTER
Signature Requested    Dr Natan MUNIZ paperwork  [facility]    Yellow folder    Call pt when completed

## 2024-05-07 ENCOUNTER — OFFICE VISIT (OUTPATIENT)
Dept: FAMILY MEDICINE CLINIC | Facility: CLINIC | Age: 44
End: 2024-05-07

## 2024-05-07 VITALS
OXYGEN SATURATION: 99 % | RESPIRATION RATE: 18 BRPM | TEMPERATURE: 98.6 F | WEIGHT: 191 LBS | HEIGHT: 69 IN | BODY MASS INDEX: 28.29 KG/M2 | DIASTOLIC BLOOD PRESSURE: 81 MMHG | HEART RATE: 50 BPM | SYSTOLIC BLOOD PRESSURE: 134 MMHG

## 2024-05-07 DIAGNOSIS — I69.398 EPILEPSY AFTER STROKE (HCC): ICD-10-CM

## 2024-05-07 DIAGNOSIS — I63.419 CEREBROVASCULAR ACCIDENT (CVA) DUE TO EMBOLISM OF MIDDLE CEREBRAL ARTERY, UNSPECIFIED BLOOD VESSEL LATERALITY (HCC): Primary | ICD-10-CM

## 2024-05-07 DIAGNOSIS — G40.909 EPILEPSY AFTER STROKE (HCC): ICD-10-CM

## 2024-05-07 PROCEDURE — 99213 OFFICE O/P EST LOW 20 MIN: CPT | Performed by: FAMILY MEDICINE

## 2024-05-07 NOTE — PROGRESS NOTES
Name: Abdirahman Ambrocio      : 1980      MRN: [id number]  Encounter Provider: Sanjuana Gama DO  Encounter Date: 2024   Encounter department: [facility]    Assessment & Plan     1. Cerebrovascular accident (CVA) due to embolism of middle cerebral artery, unspecified blood vessel laterality (HCC)  Assessment & Plan:  Stroke on 5/15/23.   no further strokes. On ASA 81. Follows with Neurology.   residual left UE numbness, difficulty with fine motor skills with left hand. Mild speech impairment.       2. 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   Pt drinks elecrolytes and water pre and post work out which helps prevent seizures.   C/w keppra 1000 mg q12 h  Follows with neurology. Cannot drive.          Requested that pt fax over previous year's FMLA and disability paperwork prior to completing renewal.     Subjective     HPI  Patient presents for FMLA and disalbility paperwork to be completed 2/2 stroke on 5/15/23 followed by Epilepsy after stroke. Pt cannot drive due to epilepsy. He has no further strokes. He has had multiple breakthrough seizures. Pt follows with Cardiology for PFO which may have led to stroke and with neurology for stroke and epilepsy management. He completed speech therapy however still has some speech impairment. Pt is renewing FMLA and Disability paperwork, last completed by [facility] when he was in rehab.  Breakthrough seizure.     Drinking a lot more water recently 8 cups a day.   - no aura for seizures since drinking more water.   - half electrolyte fluids half water.       Residual numbness  Last day of work is is , return to work on .       Review of Systems    Past Medical History:   Diagnosis Date    Seizure (HCC)     Stroke (HCC)      Past Surgical History:   Procedure Laterality Date    CARDIAC CATHETERIZATION N/A 2024    Procedure:  Cardiac pfo closure;  Surgeon: Tashi Maldonado DO;  [facility];  Service: Cardiology    IR STROKE ALERT  5/15/2023     Family History   Problem Relation Age of Onset    Alzheimer's disease Mother      Social History     Socioeconomic History    Marital status: Single     Spouse name: None    Number of children: None    Years of education: None    Highest education level: None   Occupational History    None   Tobacco Use    Smoking status: Former     Current packs/day: 0.00     Average packs/day: 0.5 packs/day for 15.0 years (7.5 ttl pk-yrs)     Types: Cigarettes     Start date: 6/3/2008     Quit date: 6/3/2023     Years since quittin.9    Smokeless tobacco: Never   Vaping Use    Vaping status: Never Used   Substance and Sexual Activity    Alcohol use: Not Currently    Drug use: Never    Sexual activity: Not Currently   Other Topics Concern    None   Social History Narrative    None     Social Determinants of Health     Financial Resource Strain: Low Risk  (2023)    Overall Financial Resource Strain (CARDIA)     Difficulty of Paying Living Expenses: Not hard at all   Food Insecurity: No Food Insecurity (2024)    Hunger Vital Sign     Worried About Running Out of Food in the Last Year: Never true     Ran Out of Food in the Last Year: Never true   Transportation Needs: No Transportation Needs (2024)    PRAPARE - Transportation     Lack of Transportation (Medical): No     Lack of Transportation (Non-Medical): No   Physical Activity: Sufficiently Active (2023)    Exercise Vital Sign     Days of Exercise per Week: 7 days     Minutes of Exercise per Session: 60 min   Stress: Stress Concern Present (2023)    [employer] - Occupational Stress Questionnaire     Feeling of Stress : To some extent   Social Connections: Not on file   Intimate Partner Violence: Not At Risk (2023)    Humiliation, Afraid, Rape, and Kick questionnaire     Fear of  "Current or Ex-Partner: No     Emotionally Abused: No     Physically Abused: No     Sexually Abused: No   Housing Stability: Low Risk  (2/22/2024)    Housing Stability Vital Sign     Unable to Pay for Housing in the Last Year: No     Number of Places Lived in the Last Year: 1     Unstable Housing in the Last Year: No     Current Outpatient Medications on File Prior to Visit   Medication Sig    aspirin 81 mg chewable tablet Chew 1 tablet (81 mg total) daily    escitalopram (LEXAPRO) 5 mg tablet TAKE 1 TABLET BY MOUTH DAILY    levETIRAcetam (KEPPRA) 1000 MG tablet Take 1 tablet (1,000 mg total) by mouth every 12 (twelve) hours     No Known Allergies  Immunization History   Administered Date(s) Administered    COVID-19 J[employer] ([employer]) vaccine 0.5 mL 03/16/2021    Tuberculin Skin Test-PPD Intradermal 07/23/2013       Objective     /81 (BP Location: Left arm, Patient Position: Sitting, Cuff Size: Standard)   Pulse (!) 50   Temp 98.6 °F (37 °C) (Temporal)   Resp 18   Ht 5' 9\" (1.753 m)   Wt 86.6 kg (191 lb)   SpO2 99%   BMI 28.21 kg/m²     Physical Exam  Constitutional:       General: He is not in acute distress.     Appearance: He is not ill-appearing or toxic-appearing.   HENT:      Head: Normocephalic and atraumatic.      Right Ear: External ear normal.      Left Ear: External ear normal.   Eyes:      General: No scleral icterus.        Right eye: No discharge.         Left eye: No discharge.      Extraocular Movements: Extraocular movements intact.      Conjunctiva/sclera: Conjunctivae normal.   Cardiovascular:      Rate and Rhythm: Normal rate and regular rhythm.      Heart sounds: Normal heart sounds.   Pulmonary:      Effort: Pulmonary effort is normal. No respiratory distress.      Breath sounds: Normal breath sounds.   Skin:     General: Skin is warm and dry.   Neurological:      General: No focal deficit present.      Mental Status: He is alert.      Motor: No weakness.      Gait: Gait normal. "   Psychiatric:         Mood and Affect: Mood normal.         Behavior: Behavior normal.         Thought Content: Thought content normal.         Judgment: Judgment normal.       Sanjuana Gama DO

## 2024-05-08 NOTE — ASSESSMENT & PLAN NOTE
Stroke on 5/15/23.   no further strokes. On ASA 81. Follows with Neurology.   residual left UE numbness, difficulty with fine motor skills with left hand. Mild speech impairment.

## 2024-05-08 NOTE — ASSESSMENT & PLAN NOTE
Has had multiple breakthrough seizures usually related to exercise.   Pt drinks elecrolytes and water pre and post work out which helps prevent seizures.   C/w keppra 1000 mg q12 h  Follows with neurology. Cannot drive.

## 2024-05-09 ENCOUNTER — TELEPHONE (OUTPATIENT)
Dept: FAMILY MEDICINE CLINIC | Facility: CLINIC | Age: 44
End: 2024-05-09

## 2024-05-09 NOTE — TELEPHONE ENCOUNTER
Pt called about incomplete FMLA paperwork. Father will drop off the forms before noon so it can get fill out correctly

## 2024-05-13 ENCOUNTER — TELEPHONE (OUTPATIENT)
Dept: NEUROLOGY | Facility: CLINIC | Age: 44
End: 2024-05-13

## 2024-05-14 NOTE — TELEPHONE ENCOUNTER
Returned phone call and advised patient that we were awaiting the paperwork from his employer for completion of the paperwork. Advised that the information was just received yesterday and will be forwarded to Dr Gama for completion on Thursday when she returns to the office. He confirmed, advised we will notify him once the forms are complete and ready for

## 2024-05-14 NOTE — TELEPHONE ENCOUNTER
Patient calling in to check on the status of updated FMLA and disability forms. Advised will check on the status and call back

## 2024-05-14 NOTE — TELEPHONE ENCOUNTER
Received paperwork from employer, all paperwork in yellow folder. Patient is aware that Dr Gama is not available until Thurs.

## 2024-05-17 NOTE — TELEPHONE ENCOUNTER
Patient's father calling to check on the status of the paperwork. Advised that it has been completed. Original placed in front  bin. Copy made to scan into the chart please scan

## 2024-05-23 ENCOUNTER — OFFICE VISIT (OUTPATIENT)
Dept: NEUROLOGY | Facility: CLINIC | Age: 44
End: 2024-05-23
Payer: COMMERCIAL

## 2024-05-23 VITALS
SYSTOLIC BLOOD PRESSURE: 113 MMHG | HEART RATE: 56 BPM | DIASTOLIC BLOOD PRESSURE: 77 MMHG | BODY MASS INDEX: 29.12 KG/M2 | HEIGHT: 69 IN | OXYGEN SATURATION: 99 % | WEIGHT: 196.6 LBS | TEMPERATURE: 98.3 F

## 2024-05-23 DIAGNOSIS — G81.91 RIGHT HEMIPARESIS (HCC): ICD-10-CM

## 2024-05-23 DIAGNOSIS — I63.9 STROKE (HCC): ICD-10-CM

## 2024-05-23 DIAGNOSIS — G40.909 EPILEPSY AS LATE EFFECT OF CEREBROVASCULAR ACCIDENT (CVA) (HCC): Primary | ICD-10-CM

## 2024-05-23 DIAGNOSIS — I69.398 EPILEPSY AS LATE EFFECT OF CEREBROVASCULAR ACCIDENT (CVA) (HCC): Primary | ICD-10-CM

## 2024-05-23 PROCEDURE — 99215 OFFICE O/P EST HI 40 MIN: CPT | Performed by: PSYCHIATRY & NEUROLOGY

## 2024-05-23 RX ORDER — LEVETIRACETAM 750 MG/1
1500 TABLET ORAL EVERY 12 HOURS SCHEDULED
Qty: 360 TABLET | Refills: 3 | Status: SHIPPED | OUTPATIENT
Start: 2024-05-23

## 2024-05-23 RX ORDER — CLONAZEPAM 0.5 MG/1
TABLET, ORALLY DISINTEGRATING ORAL
Qty: 4 TABLET | Refills: 1 | Status: SHIPPED | OUTPATIENT
Start: 2024-05-23

## 2024-05-23 NOTE — PATIENT INSTRUCTIONS
Increase levetiracetam to 1500 mg twice daily. Take one and a half of the 1000 mg pills twice daily for now. When you need more pills start taking two 750 mg pills twice daily.   Take clonazepam 0.5 mg if you have your seizure warning/aura (right arm shaking). Take a second pill if symptoms have not resolved in 10 minutes.   Let us know if there are seizures.   Return in about 4 months.       FIRST AID FOR SEIZURES    What to Do If You Witness a Seizure:     Generalized convulsive (called a generalized tonic-clonic or grand mal seizure). During this seizure, the person may cry out, suddenly stiffen up, make jerking movements, and fall.  The person may turn pale or blue from difficulty breathing.    Actions:  Stay calm. Talk in a soothing voice and if possible keep onlookers away.  Prevent injury. Move objects away that the person might hit while jerking uncontrollably.   Time when the seizure starts and ends. Most seizures stop after only a few minutes.  Turn him or her gently onto one side. This will help keep the airway clear.   Never place anything in his/her mouth or give him/her anything by mouth during a seizure.   -- Do not give the person water, pills, or food until fully alert.   Loosen tight clothing or jewelry around his/her neck.  Make the person as comfortable as possible.   Place something soft under their head.   Do not hold the person down. If the person having a seizure thrashes around there is no need for you to restrain them. They are more likely to be combative if restrained. Remember to consider your safety as well.   Keep onlookers away.   Be sensitive and supportive, and ask others to do the same.   Stay with the person until he/she is fully alert.    Complex partial seizure (confusional spells). During this kind of seizure, the person may have a glassy stare; give no response or inappropriate responses when questioned; sit, stand, or walk about aimlessly; make lip smacking or chewing motions;  fidget with clothes; appear to be drunk, drugged, or confused.    Actions:  Make sure the person is safe and won’t harm themselves.  Try to remove harmful objects from around the person (tools, utensils, glasses).  Do NOT be aggressive or attempt to restrain the person. They are more likely to be combative if restrained. Remember to consider your safety as well.  Help prevent the person from wandering, and direct the person to chair or safe position.  Never place anything in his/her mouth or give him/her anything by mouth during a seizure.   -- Do not give the person water, pills, or food until fully alert.   Keep onlookers away.   Be sensitive and supportive, and ask others to do the same.   Stay with the person until he/she is fully alert.    CALL 911 if:  A convulsive seizure lasts more than 5 minutes.  The person turns blue during the seizure.  The person does not start breathing after the seizure. Begin mouth to mouth resuscitation if this would occur.  The person has one seizure right after the other without coming back to normal consciousness between the seizures.  The person has not regained consciousness or is still confused after 30 minutes.  You know the person does not have epilepsy.  You know the person has diabetes or low blood sugar.  The person is pregnant, ill, or injured.   The seizure occurred in water, because the person may have inhaled water.  The person requests an ambulance or medical help.     Rescue medication  Your doctor may prescribe a rescue medication such as lorazepam (Ativan), diazepam (Valium / Diastat), or clonazepam (Klonopin) to terminate a seizure or if you have a history of cluster of seizures.  Follow the instructions given by your doctor for these medications    Recognizing Common Seizures (examples)   Simple partial seizures: Isolated twitching, numbness, sweating, dizziness, nausea/vomiting, disturbances to hearing, vision, smell or taste. No loss of consciousness occurs,  and the person remains aware of his/her environment.   Complex partial seizures: Staring, motionless, picking at clothes, smacking lips, swallowing repeatedly or wandering around. The person is not aware of their surroundings and is not fully responsive.  Atonic seizures: “Drop attacks” or sudden, rapid fall to ground with rapid recovery.   Myoclonic seizures: Brief forceful jerks which can affect the whole body or just part of it.   Absence seizures: May appear to be daydreaming or “spacing out.” The person is momentarily unresponsive and unaware of what is happening around him/her.   Tonic seizures: Stiffening of part or of the entire body.  Generalized Tonic-Clonic Seizures. “Grand-mal seizure.” Sudden loss of consciousness with body stiffening followed by continuous jerking movements. A blue tinge around the mouth is likely but lack of oxygen is rare. Loss of bladder and/or bowel control may occur.         SEIZURE SAFETY    Don’t let fear of seizures keep you at home. Be smart about your activities to make sure you are safe. The guidelines below can help you be as safe as possible.    Make sure the people around you are aware of:  What happens when you have a seizure  Correct seizure first aid  First aid for choking (consider taking a basic life support class)  When they should know to call 911 or for medical help    Avoid common triggers of seizures:  Missing your medications  Not getting enough sleep  Drinking alcohol  Using illegal drugs    Safety measures for at home:  In the bathroom:   Do not take baths in the bathtub. Instead, take only showers. Try to have a family member available while you are in the shower.  Make sure the drain in the bathtub/shower is working properly to avoid pooling of water.  You can consider a fitted shower seat. Recessed soap trays can minimize injury if you would happen to fall in the shower.   Bathroom doors can be hung to open outwards, so that the door can still be opened if  you fall against the door.   Do not lock the bathroom door. Use an “Occupied” sign on the door or other signal to let others know you are in the bathroom.  Safety locks can be obtained from the [employer].  On your water heater, set your water temperature to a warm temperature that is not scalding to avoid burns from very hot water.   Put non-skid strips/ in the bathtub.  Use an electric shaver.  Avoid any electrical appliances (including electric shaver) in the bathroom or near water.  Use shatterproof glass for mirrors.    In the kitchen:   When possible, cook using a microwave.  Only cook or use electrical appliances when someone else is in the house and available.   As much as possible, grill food and avoid fryers or frying food.  Use the back burners of the stove and turn the pot handles toward the back of the stove.  Avoid carrying hot pans, pots of boiling water, or very hot food. Serve food or liquids directly from the stove. At the least, minimize the distance hot food is carried.   Use precut foods or food processers to limit the need to use knives.   Use plastic or durable cups, dishes, and containers instead of breakable glass items.    In the bedroom  Low level and wide beds (like a futon) can reduce risk of injury of falling out of bed. If there is a high risk of falling out of bed, you can consider simply putting the mattress on the floor.  Avoid sleeping on top bunks of bunk beds.   Place a soft carpet on the floor.    Around the house  Pad sharp corners of tables, chairs, etc. Round tables and furniture can be considered to avoid sharp corners.   Avoid open flames. Place a screen in front of fireplaces and don’t build a fire alone.   Allow for open spaces with furniture.  Avoid loose throw rugs or slippery floors. Non-slip kostas or cushioned kostas can help reduce injury from a fall.   Fireproof fabrics and furniture are best, and especially important if you smoke.  Doors  and windows with safety glass are safer if someone falls against them.  Avoid lights and heaters that could easily be knocked over.  Use curling irons or clothing irons that have automatic shut off switches.  Make sure motor driven equipment or lawn mowers have “dead man’s” handles or seats so they will turn off if you release pressure.    Safety measures when away from home  Driving  [place] law mandates that you cannot drive for 6 months after your most recent seizure.   [place] law mandates that you cannot drive for 6 months after your most recent seizure.    Work/Travel  Wear a medical alert bracelet or necklace at all times.  Wear a helmet and use protective clothing/equipment when appropriate.  Consider telling co-workers and travel companions that you have epilepsy and what to do if you have a seizure.  Avoid irregular shifts or disruptions of a regular sleep pattern.  Take your medications on time and keep an updated list of medications in your purse or wallet.  Do not climb to heights or operate heavy machinery.  Stand back from the edges of roads or bus/train platforms when traveling.  If you wander when you have a seizure, take a friend along for the trip.    Recreation  Swimming can be dangerous. Do not swim alone, in open water, or in murky water that you cannot see the bottom.   Caregivers should be with you in the pool at all times and must be physically able to get you out of the water.  Use a flotation device.   Scuba diving is not recommended since during a seizure people are unaware of their surroundings.  Having a seizure underwater can be deadly and can endanger the lives of others.  Kayaking and canoeing can be especially dangerous  People with epilepsy are at a higher risk of becoming trapped underneath a canoe or kayak.  Wear a helmet when playing contact sports, biking, or if there is a risk of falling.  Patients with epilepsy are at a higher risk of head injury when playing contact  sports.  Avoid riding a bike, running, or other activities around busy roads, steep hills, or secluded areas.   Exercise on soft surfaces.  [place]: many people with epilepsy can go on rides depending on their type of seizures.  For some people, stress or excitement can trigger a seizure.   Rollercoasters (especially if you are upside-down) are more dangerous for people with epilepsy.      Medications  Take your medications on time. If you have trouble remembering, set alarms on your phone. You can visit [email or web address] to set up reminders through text message to help you remember to take your medications.  Use a pillbox to help you keep track of your medications.  When out of the house, take any needed medications with you. Consider keeping one or two extra doses with you in case you are unexpectedly away from home longer than planned.  If you realize you missed a dose of your medications and it is less than 2 hours until your next dose, skip the missed dose. Do not double up your medication dose. If it is more than 2 hours until your next dose, you can take the missed dose.   Avoid drinking alcohol, since this can enhance effects of your seizure medications.  Be aware of common and major side effects of your medications.  Keep your medications out of the reach of children.    Parenting:  Childproof your home as much as possible.  It is possible that you could drop your baby if you have a seizure while holding or feeding them.  If you are nursing a baby, sit on the floor or bed with your back supported so the baby will not fall far if you should lose consciousness.  Feed the baby while he or she is seated in an infant seat.  Dress, change, and sponge bathe the baby on the floor.  Move the baby around in a stroller or small crib.  Keep a young baby in a playpen when you are alone, and a toddler in an indoor play yard, or childproof one room and use safety cotter at the doors.  When out of the house, use a  bungee-type cord or restraint harness so your child cannot wander away if you have a seizure that affects your awareness.

## 2024-05-23 NOTE — PROGRESS NOTES
[facility]  Initial Consultation    Impression/Plan    Mr. Ambrocio is a 43 y.o. 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 His neurological exam reveals slight RUE weakness and expressive aphasia. There was a seizure on 4/15/2024 without provocation. Levetiracetam was not increased in response.  I do not think dehydration was the cause.  Mild dehydration is not a common cause of seizure.  Will increase levetiracetam to 1500 mg twice daily.  Discussed potential side effects.  Prescribing clonazepam 0.5 mg to take if he experiences his right upper extremity motor seizure which can precede his generalized tonic-clonic seizure by 30 to 120 minutes.    We discussed the pathophysiology of epilepsy/seizure and seizure safety/precautions. We discussed factors that can lower seizure threshold and the side effects of antiepileptic medications.     Patient Instructions   Increase levetiracetam to 1500 mg twice daily. Take one and a half of the 1000 mg pills twice daily for now. When you need more pills start taking two 750 mg pills twice daily.   Take clonazepam 0.5 mg if you have your seizure warning/aura (right arm shaking). Take a second pill if symptoms have not resolved in 10 minutes.   Let us know if there are seizures.   Return in about 4 months.         Diagnoses and all orders for this visit:    Epilepsy as late effect of cerebrovascular accident (CVA) (HCC)  -     levETIRAcetam (KEPPRA) 750 mg tablet; Take 2 tablets (1,500 mg total) by mouth every 12 (twelve) hours  -     clonazePAM (KlonoPIN) 0.5 MG disintegrating tablet; Take one tab for seizure warning/aura (right arm shaking), may repeat once after 10 minutes if needed. No more than 2 pills in 24 hours    Right hemiparesis (HCC)    Stroke ([facility])        Subjective    Abdirahman DOMO Ambrocio is a 43 y.o.  male  presenting to the [facility] regarding epilepsy due to left hemisphere stroke.  He has been seen on multiple occasions by our neurohospitalist team as well as other outpatient providers, most recently by Dr. Rouse.  He has made a good recovery from his stroke and only has slight right-sided weakness.  He is able to run multiple miles per day.  He still has residual expressive aphasia.    He suffered a left MCA stroke with large vessel occlusion, status post thrombectomy in May 2023.  The day after his stroke he had an event concerning for seizure involving eye deviation to the right followed by a postictal state.  He was started on levetiracetam.  He was monitored with video EEG and no seizures or epileptiform discharges were detected.  Initially was on levetiracetam 750 milligrams twice daily.  He had a seizure in November 2023 due to medication nonadherence, levetiracetam level was low.  In January 2024 there were 2 seizures in 1 day resulting in admission to the hospital on 1/6/2024.  One seizure started with right sided focal motor symptoms and then generalized.  Reported to last 5 minutes.  He was sent home from the emergency department and then had a second 30 sec generalized tonic-clonic seizure and return to the hospital.  He was given a load of levetiracetam 1500 mg.  Levetiracetam was increased to 1000 mg twice daily.    He presented to the emergency department on 4/15/2024 with another seizure.  He was feeling well that day he ran the 6 to 8 miles that he normally runs.  However, after returning home from the run he did not feel well and he had right arm tremors that typically precede his generalized tonic-clonic seizure.  He went to the emergency department and had a generalized tonic-clonic seizure while in the emergency department waiting room.  There was right-sided Paresh's paralysis for a short time after the seizure.  It was thought that there was some possible  dehydration, his creatinine in February was 0.82 and it was 1.09 when he was in the emergency department.  His sodium was 145.  He was seen by neurology in consultation and they did not increase his levetiracetam and considered the seizure provoked by dehydration.    He can tell the seizure is coming, maybe 20-30 minutes (records say up to 2 hours) prior will get involuntary shaking of the right arm. Always, except once (maybe March), the right arm shaking has progressed to loss of consciousness and whole body shaking.     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  He did go back to work on the truck without driving, but more recently was told he needed to stop working and go on disability.    Current AEDs:  Levetiracetam 1000 mg bid  Medication side effects: None  Medication adherence: Yes    Event/Seizure semiology:  Right upper extremity shaking for 30 minutes or more followed by generalized tonic-clonic seizure.    Special Features  Status epilepticus: no  Self Injury Seizures: no  Precipitating Factors:  none  Age/Date of seizure onset: 5/2023  Longest seizure free interval: months    Epilepsy Risk Factors:  Stroke  Normal birth and early development. No history of febrile seizures. No history of CNS infection. No family history of epilepsy.     Prior AEDs:  None    Prior Evaluation:  23 hours of video EEG May 2023:  This is an abnormal more than 23 hours continuous video EEG recording due to continuous left hemispheric disorganized alpha, theta, and delta activity.  This finding indicates the presence of a structural lesion in the left hemisphere.  There are no electrographic seizures during this monitored period.     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 Recently told he cannot work and needs to go on disability. Had been driving the truck prior to stroke, but then worked with a coworker that did the driving.         Objective    /77 (BP Location: Right arm, Patient Position: Sitting, Cuff Size: Adult)   Pulse 56   Temp 98.3 °F (36.8 °C) (Temporal)   Ht 5' 9\" (1.753 m)   Wt 89.2 kg (196 lb 9.6 oz)   SpO2 99%   BMI 29.03 kg/m²      General Exam  General: well developed, no acute distress.  HEENT: mucous membranes moist, anicteric sclera.     Neurological Exam  Mental Status: awake, alert, and fully oriented to person, place, time, and situation. Attention intact. Fund of knowledge is appropriate for age and education.  There is no neglect.  Language: Expressive aphasia.  Frequent word finding difficulties, but some sentences are fluent.     Cranial Nerves: Pupils equal and reactive to light.  Visual fields full to confrontation. Extraocular motions intact with full versions, normal pursuits and saccades. Facial strength full and symmetric. Facial sensation intact in V1-V3. Hearing intact to finger rub bilaterally. Tongue protrudes to to right mildly. Palate elevates symmetrically. Speech clear without notable dysarthria. Shoulder shrug activation full and symmetric.    Motor: Normal bulk and tone.  Mild right pronator drift.  No definite weakness on the right. No involuntary movements.    Sensory: Sensation intact to light touch distally in the uppers.    Coordination: Normal finger-to-nose.    Station and gait: Casual gait normal.  Quick turn.    Reflexes: Right biceps slightly increased compared to left.  Normal and symmetric patellars.        Mo Ernst MD   [facility]  Diplomate, ABPN Neurology and Epilepsy        I have spent a total time of 65 minutes on " 05/23/24 in caring for this patient including Diagnostic results, Prognosis, Risks and benefits of tx options, Instructions for management, Patient and family education, Importance of tx compliance, Risk factor reductions, Impressions, Counseling / Coordination of care, Documenting in the medical record, Reviewing / ordering tests, medicine, procedures  , and Obtaining or reviewing history  .

## 2024-05-30 DIAGNOSIS — I69.398 DEPRESSION AS LATE EFFECT OF CEREBROVASCULAR ACCIDENT (CVA): ICD-10-CM

## 2024-05-30 DIAGNOSIS — F06.31 DEPRESSION AS LATE EFFECT OF CEREBROVASCULAR ACCIDENT (CVA): ICD-10-CM

## 2024-05-30 RX ORDER — ESCITALOPRAM OXALATE 5 MG/1
5 TABLET ORAL DAILY
Qty: 30 TABLET | Refills: 0 | Status: SHIPPED | OUTPATIENT
Start: 2024-05-30

## 2024-06-17 DIAGNOSIS — I69.398 DEPRESSION AS LATE EFFECT OF CEREBROVASCULAR ACCIDENT (CVA): ICD-10-CM

## 2024-06-17 DIAGNOSIS — F06.31 DEPRESSION AS LATE EFFECT OF CEREBROVASCULAR ACCIDENT (CVA): ICD-10-CM

## 2024-06-17 RX ORDER — ESCITALOPRAM OXALATE 5 MG/1
5 TABLET ORAL DAILY
Qty: 30 TABLET | Refills: 0 | Status: SHIPPED | OUTPATIENT
Start: 2024-06-17

## 2024-06-26 ENCOUNTER — TELEPHONE (OUTPATIENT)
Age: 44
End: 2024-06-26

## 2024-06-26 NOTE — TELEPHONE ENCOUNTER
Spoke with la to confirm patient  should keep both appt. La agreed. Patient father was called and informed that. He should keep both appt with Dr. Rouse and . I explained that Dr. Ernst specialize in epilepsy and Dr. Rouse specialize is stroke. They are both treating the patient for different symptoms. Rob the patient father understood and agreed to keep both appt.

## 2024-06-26 NOTE — TELEPHONE ENCOUNTER
Patients father called in about his sons appointments.     He is scheduled with Dr Rouse and Dr Ernst    He was wondering if he really needed to keep both appointments and would like some guidance to keep both or cancel one     # Rob Lolly  [phone]    Thank you

## 2024-07-14 DIAGNOSIS — F06.31 DEPRESSION AS LATE EFFECT OF CEREBROVASCULAR ACCIDENT (CVA): ICD-10-CM

## 2024-07-14 DIAGNOSIS — I69.398 DEPRESSION AS LATE EFFECT OF CEREBROVASCULAR ACCIDENT (CVA): ICD-10-CM

## 2024-07-19 ENCOUNTER — TELEPHONE (OUTPATIENT)
Dept: NEUROLOGY | Facility: CLINIC | Age: 44
End: 2024-07-19

## 2024-07-22 DIAGNOSIS — I69.398 DEPRESSION AS LATE EFFECT OF CEREBROVASCULAR ACCIDENT (CVA): ICD-10-CM

## 2024-07-22 DIAGNOSIS — F06.31 DEPRESSION AS LATE EFFECT OF CEREBROVASCULAR ACCIDENT (CVA): ICD-10-CM

## 2024-07-23 RX ORDER — ESCITALOPRAM OXALATE 5 MG/1
5 TABLET ORAL DAILY
Qty: 30 TABLET | Refills: 1 | Status: SHIPPED | OUTPATIENT
Start: 2024-07-23

## 2024-07-30 ENCOUNTER — OFFICE VISIT (OUTPATIENT)
Dept: NEUROLOGY | Facility: CLINIC | Age: 44
End: 2024-07-30
Payer: COMMERCIAL

## 2024-07-30 VITALS
SYSTOLIC BLOOD PRESSURE: 126 MMHG | TEMPERATURE: 97.8 F | HEIGHT: 69 IN | OXYGEN SATURATION: 97 % | BODY MASS INDEX: 27.46 KG/M2 | DIASTOLIC BLOOD PRESSURE: 78 MMHG | HEART RATE: 73 BPM | WEIGHT: 185.4 LBS

## 2024-07-30 DIAGNOSIS — I69.398 EPILEPSY AFTER STROKE (HCC): ICD-10-CM

## 2024-07-30 DIAGNOSIS — Q21.12 PFO (PATENT FORAMEN OVALE): ICD-10-CM

## 2024-07-30 DIAGNOSIS — G40.909 EPILEPSY AFTER STROKE (HCC): ICD-10-CM

## 2024-07-30 DIAGNOSIS — I63.419 CEREBROVASCULAR ACCIDENT (CVA) DUE TO EMBOLISM OF MIDDLE CEREBRAL ARTERY, UNSPECIFIED BLOOD VESSEL LATERALITY (HCC): Primary | ICD-10-CM

## 2024-07-30 DIAGNOSIS — R53.1 RIGHT SIDED WEAKNESS: ICD-10-CM

## 2024-07-30 PROCEDURE — 99215 OFFICE O/P EST HI 40 MIN: CPT | Performed by: PSYCHIATRY & NEUROLOGY

## 2024-07-30 NOTE — PROGRESS NOTES
Patient ID: Abdirahman Ambrocio is a 43 y.o. male.    Assessment/Plan:    This is a 42 y/o  Male with hx of left MCA stroke and focal aware seizures follows Dr. Ernst who is here as a follow up for left MCA stroke.    PLAN:        Problem List Items Addressed This Visit          Cardiovascular and Mediastinum    Cerebrovascular accident (CVA) due to embolism of middle cerebral artery, unspecified blood vessel laterality (HCC) - Primary     -for secondary stroke prevention, recommend continuation of combination of aspirin  -having worsening right sided symptoms, needs another CT head to rule out a new stroke, if negative, then most likely recrudescence   -Blood Pressure goal < 130/80, BP   -LDL goal <70    Counseling/stroke education -   -I advised patient to avoid using NSAIDs for headaches or other pain and to stick to tylenol if needed  -Recommend lifestyle modifications such as mediterranean diet & regular exercise regimen atleast 4-5 times a week for 20-30 minutes.   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  Patient/Guardian was advised to the call the office if they have any questions and concerns in the meantime.     Patient/Guardian does understand that if they have any new stroke like symptoms such as facial droop on one side, weakness/paralysis on either side, speech trouble, numbness on one side,  balance issues, any vision changes, extreme dizziness or any new headache, to call 9-1-1 immediately or to proceed to the nearest ER immediately.      I have spent a total time of 40 minutes in caring for this patient on the day of the visit/encounter including Risks and benefits of tx options, Instructions for management, Patient and family education, Documenting in the medical record, Reviewing / ordering tests, medicine, procedures  , Obtaining or reviewing history  , and Communicating with other healthcare professionals .     Subjective:    HPI    This is a 44 y/o M w/ hx of left MCA stroke s/p PFO closure surgery, and has some residual right sided weakness.    Patient states that he notices a slight worsening of the R sided weakness, he is not running. He is having difficulty grabbing things and has dropped things.     He says that he is also having headaches as well daily, and they are related to computers, and eye strain. He had a breakthorough seizure in April, and he is still not driving. His dose was increased to keppra 1500mg po BID. He did have a PFO closure. He is complaint w/ aspirin for stroke prevention.     The following portions of the patient's history were reviewed and updated as appropriate: He  has a past medical history of Seizure ([facility]) and Stroke ([facility]).  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) 05/29/2023    Abnormal findings on imaging test 05/28/2023    Adjustment disorder 05/28/2023    Tobacco use 05/27/2023    Mild pain 05/27/2023    Bradycardia 05/27/2023    PFO (patent foramen ovale) 05/22/2023    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  His family history includes Alzheimer's disease in his mother.  He  reports that he quit smoking about 13 months ago. His smoking use included cigarettes. He started smoking about 16 years ago. He has a 7.5 pack-year smoking history. He has never used smokeless tobacco. He reports that he does not currently use alcohol. He reports that he does not use drugs.  Current Outpatient Medications   Medication Sig Dispense Refill    aspirin 81 mg chewable tablet Chew 1 tablet (81 mg total) daily 30 tablet 0    clonazePAM (KlonoPIN) 0.5 MG disintegrating tablet Take one tab for seizure warning/aura (right arm shaking), may repeat once after 10 minutes if needed. No more than 2 pills in 24 hours 4 tablet 1    escitalopram (LEXAPRO) 5 mg tablet TAKE 1 TABLET BY MOUTH DAILY 30 tablet 1    levETIRAcetam (KEPPRA) 750 mg tablet Take 2 tablets (1,500 mg total) by mouth every 12 (twelve) hours 360 tablet 3     No current facility-administered medications for this visit.     Current Outpatient Medications on File Prior to Visit   Medication Sig    aspirin 81 mg chewable tablet Chew 1 tablet (81 mg total) daily    clonazePAM (KlonoPIN) 0.5 MG disintegrating tablet Take one tab for seizure warning/aura (right arm shaking), may repeat once after 10 minutes if needed. No more than 2 pills in 24 hours    escitalopram (LEXAPRO) 5 mg tablet TAKE 1 TABLET BY MOUTH DAILY    levETIRAcetam (KEPPRA) 750 mg tablet Take 2 tablets (1,500 mg total) by mouth every 12 (twelve) hours     No current facility-administered medications on file  "prior to visit.     He has No Known Allergies..    Objective:    Blood pressure 126/78, pulse 73, temperature 97.8 °F (36.6 °C), temperature source Temporal, height 5' 9\" (1.753 m), weight 84.1 kg (185 lb 6.4 oz), SpO2 97%.    Physical Exam  General - patient is alert   Speech - no dysarthria noted, no aphasia noted.     Neuro:   Cranial nerves: PERRL, EOMI, facial sensation intact to soft touch in V1, V2 and V3, no facial asymmetry noted, uvula/palate midline, tongue midline.   Motor: right sided weakness noted  Sensory - intact to soft touch throughout  Reflexes - 2+ throughout  Coordination - no ataxia/dysmetria noted  Gait - normal      ROS:  Reviewed ROS   Review of Systems   Constitutional: Negative.    HENT: Negative.     Eyes:  Positive for visual disturbance (blurry vision).   Respiratory: Negative.     Cardiovascular: Negative.    Endocrine: Negative.    Genitourinary: Negative.    Musculoskeletal: Negative.    Skin: Negative.    Allergic/Immunologic: Negative.    Neurological:  Positive for dizziness (most of the time), speech difficulty, weakness (right side), light-headedness, numbness (ride side,) and headaches (2-3 pain, pouding).   Hematological: Negative.    Psychiatric/Behavioral: Negative.     All other systems reviewed and are negative.  Short term memory issues                 "

## 2024-07-30 NOTE — ASSESSMENT & PLAN NOTE
-for secondary stroke prevention, recommend continuation of combination of aspirin  -having worsening right sided symptoms, needs another CT head to rule out a new stroke, if negative, then most likely recrudescence   -Blood Pressure goal < 130/80, BP   -LDL goal <70    Counseling/stroke education -   -I advised patient to avoid using NSAIDs for headaches or other pain and to stick to tylenol if needed  -Recommend lifestyle modifications such as mediterranean diet & regular exercise regimen atleast 4-5 times a week for 20-30 minutes.   -I educated patient/family regarding medication compliance  -encourage smoking cessation, and control of diabetes and hypertension; defer management to primary

## 2024-07-31 RX ORDER — ESCITALOPRAM OXALATE 5 MG/1
5 TABLET ORAL DAILY
Qty: 30 TABLET | Refills: 0 | Status: SHIPPED | OUTPATIENT
Start: 2024-07-31

## 2024-08-27 DIAGNOSIS — G40.909 EPILEPSY AS LATE EFFECT OF CEREBROVASCULAR ACCIDENT (CVA) (HCC): ICD-10-CM

## 2024-08-27 DIAGNOSIS — I69.398 EPILEPSY AS LATE EFFECT OF CEREBROVASCULAR ACCIDENT (CVA) (HCC): ICD-10-CM

## 2024-08-27 NOTE — TELEPHONE ENCOUNTER
Patient would like to be notified if there is any cancellation, so he could have a sooner appointment.       Reason for call:   [x] Refill   [] Prior Auth  [] Other:     Office:   [] PCP/Provider -   [x] Specialty/Provider - Neuro        Does the patient have enough for 3 days?   [] Yes   [x] No - Send as HP to POD     [Follow-Up: _____] : a [unfilled] follow-up visit [Formal Caregiver] : formal caregiver [FreeTextEntry1] : leg wounds

## 2024-08-29 RX ORDER — CLONAZEPAM 0.5 MG/1
TABLET, ORALLY DISINTEGRATING ORAL
Qty: 4 TABLET | Refills: 0 | Status: SHIPPED | OUTPATIENT
Start: 2024-08-29

## 2024-09-05 ENCOUNTER — HOSPITAL ENCOUNTER (OUTPATIENT)
Dept: RADIOLOGY | Facility: HOSPITAL | Age: 44
Discharge: HOME | End: 2024-09-05
Attending: PSYCHIATRY & NEUROLOGY
Payer: COMMERCIAL

## 2024-09-05 DIAGNOSIS — R53.1 RIGHT SIDED WEAKNESS: ICD-10-CM

## 2024-09-05 PROCEDURE — 70450 CT HEAD/BRAIN W/O DYE: CPT

## 2024-09-12 DIAGNOSIS — F06.31 DEPRESSION AS LATE EFFECT OF CEREBROVASCULAR ACCIDENT (CVA): ICD-10-CM

## 2024-09-12 DIAGNOSIS — I69.398 DEPRESSION AS LATE EFFECT OF CEREBROVASCULAR ACCIDENT (CVA): ICD-10-CM

## 2024-09-12 RX ORDER — ESCITALOPRAM OXALATE 5 MG/1
5 TABLET ORAL DAILY
Qty: 30 TABLET | Refills: 0 | Status: SHIPPED | OUTPATIENT
Start: 2024-09-12

## 2024-10-08 DIAGNOSIS — I69.398 DEPRESSION AS LATE EFFECT OF CEREBROVASCULAR ACCIDENT (CVA): ICD-10-CM

## 2024-10-08 DIAGNOSIS — F06.31 DEPRESSION AS LATE EFFECT OF CEREBROVASCULAR ACCIDENT (CVA): ICD-10-CM

## 2024-10-08 RX ORDER — ESCITALOPRAM OXALATE 5 MG/1
5 TABLET ORAL DAILY
Qty: 30 TABLET | Refills: 0 | Status: SHIPPED | OUTPATIENT
Start: 2024-10-08

## 2024-10-16 ENCOUNTER — TELEPHONE (OUTPATIENT)
Age: 44
End: 2024-10-16

## 2024-10-16 NOTE — TELEPHONE ENCOUNTER
Pts father is going to stop by the [place] office with [employer] forms for pt to be filled out and signed off on by Dr. Ernst. Pt last seizure was 4/15/24.   Pts father will pay fee as well.     Thank you.

## 2024-10-21 ENCOUNTER — TELEPHONE (OUTPATIENT)
Age: 44
End: 2024-10-21

## 2024-10-21 NOTE — TELEPHONE ENCOUNTER
Patient called in and is requesting a Seizure reporting Form to be filled out by the provider.  I confirmed that the forms are in the media section of the chart.     He is requesting to please send via e-mail to Eboni ARREAGA.   Email: [email or web address]    He will need this form filled out and submitted by 10/27/24 in order for his FMLA benefits to continue.     Please assist.     Patient is requesting a call back once this is filled out and sent via E-mail.     pt does have an upcoming appt on 10/31/24 at 10 am with Dr. Go Amaya.     If any further questions please call the patient at phone # [phone].

## 2024-10-21 NOTE — TELEPHONE ENCOUNTER
Patient's father called, patient did not fill out consent form and add father to it.  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 Patient has an appointment scheduled with Dr Ernst 10/31 at 10 am.  Patient's father stated he work on uploading forms on patient's [employer] so that Dr Ernst could fill them out ASAP please.

## 2024-10-24 ENCOUNTER — TELEPHONE (OUTPATIENT)
Age: 44
End: 2024-10-24

## 2024-10-24 NOTE — TELEPHONE ENCOUNTER
Pt called. He stated that he physically dropped off FMLA forms at the [place] office on Tuesday. Forms are not scanned into pt's chart. He stated that the forms need to be completed by tomorrow, 10/25/24. His employer has given him this deadline. He stated that he is at risk of losing his job, so this needs to be completed by tomorrow. Routed to the clinical team to assist.

## 2024-10-24 NOTE — TELEPHONE ENCOUNTER
Cardiology URGENT: SECOND REQUEST    Pt called again. Patient sounded very upset. Patient stated he physically dropped off FMLA forms at the [place] office on Tuesday (10-22-24). (Forms not found in media.) Patient stated the FMLA forms need to be completed by tomorrow, 10/25/24. His employer has given him this deadline. Patient's employer has already discontinued his health insurance. Patient stated he is at risk of losing his job.    Advised patient to also upload the FMLA forms to his [employer]. Offered to give patient the [employer] help line but patient deferred.     Dr. Ernst,   Are you agreeable to completing patient's FMLA forms? Please advise.Thank you!    Agustina,   Are you able to assist? Please call patient if/when forms are ready to be picked up.     Thank you so much!!!    # [phone]

## 2024-10-25 ENCOUNTER — TELEPHONE (OUTPATIENT)
Dept: NEUROLOGY | Facility: CLINIC | Age: 44
End: 2024-10-25

## 2024-10-25 NOTE — TELEPHONE ENCOUNTER
Patient and his father Rob called to check status of request; advised per Agustina she will call him back today.    Thank you

## 2024-10-25 NOTE — TELEPHONE ENCOUNTER
Attached the letter I received yesterday from Agustina via email. It is not an FMLA form. The patient said he needed a letter. A letter was drafted yesterday and is under the letter tab.

## 2024-10-30 ENCOUNTER — TELEPHONE (OUTPATIENT)
Age: 44
End: 2024-10-30

## 2024-10-30 ENCOUNTER — TELEPHONE (OUTPATIENT)
Dept: FAMILY MEDICINE CLINIC | Facility: CLINIC | Age: 44
End: 2024-10-30

## 2024-10-30 NOTE — TELEPHONE ENCOUNTER
Phoned patient scheduled appointment  11/19/24  11:10 am     [employer] Form (PCP Blue Folder for appointment) needs to be filled out     Patient can be reached at [phone]

## 2024-10-30 NOTE — TELEPHONE ENCOUNTER
Spoke to the patient and explain that the policy is to pay $30 at the time of the check in in order to have 50% discount and that would be approximately $175

## 2024-10-30 NOTE — TELEPHONE ENCOUNTER
Pt called and states that he will no longer have ins starting tmrw. He has a f/u appt w/ Dr Ernst tmrw at 10 am. He wants to know how much approximate the cost for the appt.     Ok to leave detailed message

## 2024-10-31 ENCOUNTER — OFFICE VISIT (OUTPATIENT)
Dept: NEUROLOGY | Facility: CLINIC | Age: 44
End: 2024-10-31
Payer: COMMERCIAL

## 2024-10-31 ENCOUNTER — TELEPHONE (OUTPATIENT)
Dept: NEUROLOGY | Facility: CLINIC | Age: 44
End: 2024-10-31

## 2024-10-31 VITALS
OXYGEN SATURATION: 97 % | HEART RATE: 72 BPM | TEMPERATURE: 97.8 F | WEIGHT: 187.8 LBS | SYSTOLIC BLOOD PRESSURE: 101 MMHG | DIASTOLIC BLOOD PRESSURE: 72 MMHG | BODY MASS INDEX: 27.81 KG/M2 | HEIGHT: 69 IN

## 2024-10-31 DIAGNOSIS — R53.1 RIGHT SIDED WEAKNESS: ICD-10-CM

## 2024-10-31 DIAGNOSIS — I69.398 EPILEPSY AS LATE EFFECT OF CEREBROVASCULAR ACCIDENT (CVA) (HCC): Primary | ICD-10-CM

## 2024-10-31 DIAGNOSIS — G40.909 EPILEPSY AS LATE EFFECT OF CEREBROVASCULAR ACCIDENT (CVA) (HCC): Primary | ICD-10-CM

## 2024-10-31 PROCEDURE — 99214 OFFICE O/P EST MOD 30 MIN: CPT | Performed by: PSYCHIATRY & NEUROLOGY

## 2024-10-31 RX ORDER — LACOSAMIDE 100 MG/1
100 TABLET ORAL EVERY 12 HOURS SCHEDULED
Qty: 60 TABLET | Refills: 5 | Status: SHIPPED | OUTPATIENT
Start: 2024-10-31

## 2024-10-31 RX ORDER — CLONAZEPAM 0.5 MG/1
TABLET, ORALLY DISINTEGRATING ORAL
Qty: 4 TABLET | Refills: 1 | Status: SHIPPED | OUTPATIENT
Start: 2024-10-31

## 2024-10-31 NOTE — PROGRESS NOTES
Review of Systems   Constitutional:  Negative for appetite change, fatigue and fever.   HENT: Negative.  Negative for hearing loss, tinnitus, trouble swallowing and voice change.    Eyes: Negative.  Negative for photophobia, pain and visual disturbance.   Respiratory: Negative.  Negative for shortness of breath.    Cardiovascular: Negative.  Negative for palpitations.   Gastrointestinal: Negative.  Negative for nausea and vomiting.   Endocrine: Negative.  Negative for cold intolerance.   Genitourinary: Negative.  Negative for dysuria, frequency and urgency.   Musculoskeletal:  Negative for back pain, gait problem, myalgias, neck pain and neck stiffness.   Skin: Negative.  Negative for rash.   Allergic/Immunologic: Negative.    Neurological: Negative.  Negative for dizziness, tremors, seizures, syncope, facial asymmetry, speech difficulty, weakness, light-headedness, numbness and headaches.        Disoriented - states when he is walking and there is a lot going on around him      Hematological: Negative.  Does not bruise/bleed easily.   Psychiatric/Behavioral: Negative.  Negative for confusion, hallucinations and sleep disturbance.    All other systems reviewed and are negative.

## 2024-10-31 NOTE — Clinical Note
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 He cannot drive and has received disability. Seems to be having difficulty clarifying current insurance status. Wants to work, but likely needs to find new employer.

## 2024-10-31 NOTE — TELEPHONE ENCOUNTER
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 He cannot drive and has received disability. Seems to be having difficulty clarifying current insurance status. Wants to work, but likely needs to find new employer.

## 2024-10-31 NOTE — PATIENT INSTRUCTIONS
Start lacosamide 100 mg twice daily.   Continue levetiracetam 1500 mg twice daily   Let us know if there are more seizures. We could potentially increase lacosamide.   Take clonazepam as needed.   Return in about 3 months (AP) .

## 2024-10-31 NOTE — TELEPHONE ENCOUNTER
TAMIE called patient at [phone].      Patient thought he was meeting his job requirements through [employer] but boss has said comments leading patient to believe that they don't agree.  Patient hasn't worked since then, April 2024.  Patient has been on long term disability since through his employer ([employer]).  Patient cannot drive yet. Since as of 10/15 LTD has been 6 months patient can be let go and not given insurance.  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  No positions available at this time.      Questioned patient what he will do about insurance.  Patient going to pay [employer].  Discussed potential for [place], applying for medical assistance.  Also talked about OVR (Office of Vocational Rehabilitation).  Patient is not sure he wants to contact OVR, is holding out for the ability to drive again and get job on his own.      Patient asked if information can be placed in an email [email or web address].  SW remains available.

## 2024-10-31 NOTE — PROGRESS NOTES
[facility]  Follow Up Visit    Impression/Plan    Mr. Ambrocio is a 44 y.o. 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 His neurological exam has revealed slight RUE weakness and expressive aphasia. There was a generalized tonic clonic seizure on 4/15/2024 without provocation. Levetiracetam was not increased in response.  He has clonazepam 0.5 mg to take if he experiences his right upper extremity motor seizure which can precede his generalized tonic-clonic seizure by 30 to 120 minutes.     FAS with right arm motor features are uncontrolled. Adding lacosamide. Dose could be increased if there are additional seizures.     Patient Instructions   Start lacosamide 100 mg twice daily.   Continue levetiracetam 1500 mg twice daily   Let us know if there are more seizures. We could potentially increase lacosamide.   Take clonazepam as needed.   Return in about 3 months (AP) .    Diagnoses and all orders for this visit:    Epilepsy as late effect of cerebrovascular accident (CVA) ([facility])  -     lacosamide (VIMPAT) 100 mg tablet; Take 1 tablet (100 mg total) by mouth every 12 (twelve) hours  -     clonazePAM (KlonoPIN) 0.5 MG disintegrating tablet; Take one tab for seizure warning/aura (right arm shaking), may repeat once after 10 minutes if needed. No more than 2 pills in 24 hours  -     Levetiracetam level; Future  -     Lacosamide; Future    Right sided weakness        Subjective    Abdirahmankerri Ambrocio is returning to the [facility] for follow up.     Interval Events:   Seizures since last visit: FAS (prior generalized tonic clonic seizure: 4/15/2024)    Took clonazepam. 8/14 shaking took 2 pills, 8/22 2 pills, 8/30 1 pill, 9/9 1 pill.     Sleep is impaired due to anxiety and work stress.     Saw Dr. Rouse 7/30 regarding stroke.     Current  "AEDs:  Levetiracetam 1500 mg bid  Medication side effects: None  Medication adherence: Yes    clonazepam 0.5 mg to take if he experiences his right upper extremity motor seizure which can precede his generalized tonic-clonic seizure by 30 to 120 minutes.     Event/Seizure semiology:  Right upper extremity shaking for 30 minutes or more followed by generalized tonic-clonic seizure.     Special Features  Status epilepticus: no  Self Injury Seizures: no  Precipitating Factors:  none  Age/Date of seizure onset: 5/2023  Longest seizure free interval: months     Epilepsy Risk Factors:  Stroke  Normal birth and early development. No history of febrile seizures. No history of CNS infection. No family history of epilepsy.      Prior AEDs:  None     Prior Evaluation:  23 hours of video EEG May 2023:  This is an abnormal more than 23 hours continuous video EEG recording due to continuous left hemispheric disorganized alpha, theta, and delta activity.  This finding indicates the presence of a structural lesion in the left hemisphere.  There are no electrographic seizures during this monitored period.      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 Recently told he cannot work and needs to go on disability. Had been driving the truck prior to stroke, but then worked with a coworker that did the driving.       Objective    /72 (BP Location: Right arm, Patient Position: Sitting, Cuff Size: Adult)   Pulse 72   Temp 97.8 °F (36.6 °C) (Temporal)   Ht 5' 9\" (1.753 m)   Wt 85.2 kg (187 lb 12.8 oz)   SpO2 97%   BMI 27.73 kg/m²      General Exam  No acute distress.    Neurologic Exam  Mental " Status:  Alert and oriented.  Language: normal fluency and comprehension.  Cranial Nerves:  Face symmetric. No dysarthria.  Gait: Normal casual gait.

## 2024-11-01 NOTE — TELEPHONE ENCOUNTER
[email or web address]    Abdirahman,     Thank you for speaking with me yesterday.  Please see below information on insurance and vocational resources.  Let me know if you have any questions.  Thank you!    Insurance-  Please review Alternative options for insurance coverage includes Marketplace and Medicaid. To apply for the Marketplace must visit [email or web address]. A change in your circumstances - like getting , having a baby, or losing health coverage - that can make one eligible for a Special Enrollment Period, which allows you to enroll in health insurance outside the yearly Open Enrollment Period.      You can also apply for Medicaid online by visiting:      [email or web address]      To apply for medicaid over the phone please to call : [phone].     To access the paper application please visit :     [email or web address]/en/[email or web address]-benefits.[email or web address]       [facility]    Hours of Operation  Monday through Friday 7:30 AM - 5:00 PM  Saturday & Sunday closed  Phone: [phone]  Fax: [phone]  Address: [address] [place] [address], [address] [place] [address]    Vocational Resources-    [facility]  [address] [place] [address]  [phone] Voice  [phone]      Website to apply online-     Office of Vocational Rehabilitation  Department of Labor and Industry  [facility]      Programs and Services-   About OVR Services     OVR counselors help customers pick vocational goals, services, and providers in face-to-face meetings. They then create an Individualized Plan for Employment (IPE) detailing these choices. Some services need a Financial Needs Test (FNT) and may require payments. However, counseling, diagnostics, assessments, information, job support, and personal services like readers are free. Those on Social Security disability benefits (SSI, SSDI) don't have to pass the FNT.  Types  of OVR Services     OVR offers various services to those who qualify. Some tackle disabilities, while others focus on career preparation. Your services are tailored to your needs. Not everyone needs all services. Key services include:  Diagnostic Services: Check-ups to understand your disability and service needs.  Vocational Evaluation: Identifies your job potential through tests and work experience.  Counseling: Guides you in setting and adjusting vocational goals. Also, helps build work skills.  Training: Offers education for various jobs, including life skills and personal adjustment.  Restoration Services: Provides medical services and devices to support employment.  Placement Assistance: Aids in job search and interviews. Also, connects you with employers.  Assistive Technology: Offers devices to aid in employment and daily tasks.  Support Services: Additional help for employment. Could cover living, transportation, or job site changes. Also, provides communication aids and specialized training for the blind.  You can also contact the [facility] at # [phone]    Indeed sometimes provides advice on looking for jobs that can be helpful.     You can also check out-[place] [employer] - W[place] TO [place] CAREER[place] (personalized career and training services)        I hope this helps you get started. Let me know how you are making out. Thank you,       Kimmie Bob  Outpatient   [facility]  [address], [address]  [address]# [phone]/Fax# [phone]  [email or web address]

## 2024-11-04 ENCOUNTER — TELEPHONE (OUTPATIENT)
Age: 44
End: 2024-11-04

## 2024-11-04 DIAGNOSIS — F06.31 DEPRESSION AS LATE EFFECT OF CEREBROVASCULAR ACCIDENT (CVA): ICD-10-CM

## 2024-11-04 DIAGNOSIS — I69.398 DEPRESSION AS LATE EFFECT OF CEREBROVASCULAR ACCIDENT (CVA): ICD-10-CM

## 2024-11-04 RX ORDER — ESCITALOPRAM OXALATE 5 MG/1
5 TABLET ORAL DAILY
Qty: 30 TABLET | Refills: 0 | Status: SHIPPED | OUTPATIENT
Start: 2024-11-04

## 2024-11-04 NOTE — TELEPHONE ENCOUNTER
Last visit 10/31 Dr. Ernst, hx of seizures    Patient called to report he is feeling well on new medication, lacosamide     Also is asking if he should avoid driving until f/u scheduled 1/31/2025;  said he could not remember what Dr. Ernst told him. please provide recommendation, thank you.

## 2024-11-06 NOTE — TELEPHONE ENCOUNTER
Pt called to check status of below question.     Please advise   [phone] to leave detailed message

## 2024-11-07 NOTE — TELEPHONE ENCOUNTER
TAMIE called patient at [phone].  Patient received information from TAMIE for resources.  Patient intend to call vocational resources.  Patient is looking into [employer] to see cost of insurance and if this doesn't work he has looked into [facility] for insurance.  Patient waiting to hear back from office if they have any information on how long until patient can drive again.  Patient does not have his license at this time.  Patient appreciates the support.  No needs from this writer at this time but will remain available.

## 2024-11-19 ENCOUNTER — OFFICE VISIT (OUTPATIENT)
Dept: FAMILY MEDICINE CLINIC | Facility: CLINIC | Age: 44
End: 2024-11-19

## 2024-11-19 VITALS
SYSTOLIC BLOOD PRESSURE: 110 MMHG | HEIGHT: 69 IN | WEIGHT: 193 LBS | TEMPERATURE: 98 F | HEART RATE: 70 BPM | DIASTOLIC BLOOD PRESSURE: 77 MMHG | OXYGEN SATURATION: 98 % | BODY MASS INDEX: 28.58 KG/M2

## 2024-11-19 DIAGNOSIS — I69.398 DEPRESSION AS LATE EFFECT OF CEREBROVASCULAR ACCIDENT (CVA): ICD-10-CM

## 2024-11-19 DIAGNOSIS — I63.419 CEREBROVASCULAR ACCIDENT (CVA) DUE TO EMBOLISM OF MIDDLE CEREBRAL ARTERY, UNSPECIFIED BLOOD VESSEL LATERALITY (HCC): Primary | ICD-10-CM

## 2024-11-19 DIAGNOSIS — F06.31 DEPRESSION AS LATE EFFECT OF CEREBROVASCULAR ACCIDENT (CVA): ICD-10-CM

## 2024-11-19 PROCEDURE — 99213 OFFICE O/P EST LOW 20 MIN: CPT

## 2024-11-19 RX ORDER — ESCITALOPRAM OXALATE 5 MG/1
5 TABLET ORAL DAILY
Qty: 30 TABLET | Refills: 3 | Status: SHIPPED | OUTPATIENT
Start: 2024-11-19

## 2024-11-19 NOTE — PROGRESS NOTES
Name: Abdirahman Ambrocio      : 1980      MRN: [id number]  Encounter Provider: Frantz Vallejo DO  Encounter Date: 2024   Encounter department: [facility] [place]  :  Assessment & Plan  Cerebrovascular accident (CVA) due to embolism of middle cerebral artery, unspecified blood vessel laterality (HCC)  - Patient is going through rehab period. Doing well, still has residual weakness on the right side of the body. Lost insurance, so unable to continue with PT. Pt is currently in the process of applying for job with [employer].   - Given history of post stroke epilepsy, patient is currently seeing Dr. 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 Lexapro refilled.     Orders:    escitalopram (LEXAPRO) 5 mg tablet; Take 1 tablet (5 mg total) by mouth daily           History of Present Illness     HPI  44 y.o m with history of CVA with post stroke weakness/epilepsy; presented to follow up on his current status as well as getting his paper filled out for return to work status. He is doing well, no longer going to PT due to lack of insurance. Following neurology for AEDs.   He is currently not driving.   Review of Systems   Constitutional:  Negative for chills and fever.   HENT:  Negative for ear pain and sore throat.    Eyes:  Negative for pain and visual disturbance.   Respiratory:  Negative for cough and shortness of breath.    Cardiovascular:  Negative for chest pain and palpitations.   Gastrointestinal:  Negative for abdominal pain and vomiting.   Genitourinary:  Negative for dysuria and hematuria.   Musculoskeletal:  Negative for arthralgias and back pain.   Skin:  Negative for color change and rash.   Neurological:  Negative for seizures and syncope.   All other systems reviewed and are negative.         Objective   /77  "(BP Location: Left arm, Patient Position: Sitting, Cuff Size: Standard)   Pulse 70   Temp 98 °F (36.7 °C) (Temporal)   Ht 5' 9\" (1.753 m)   Wt 87.5 kg (193 lb)   SpO2 98%   BMI 28.50 kg/m²      Physical Exam  Vitals and nursing note reviewed.   Constitutional:       General: He is not in acute distress.     Appearance: He is well-developed.   HENT:      Head: Normocephalic and atraumatic.   Eyes:      Conjunctiva/sclera: Conjunctivae normal.   Cardiovascular:      Rate and Rhythm: Normal rate and regular rhythm.      Heart sounds: No murmur heard.  Pulmonary:      Effort: Pulmonary effort is normal. No respiratory distress.      Breath sounds: Normal breath sounds.   Abdominal:      Palpations: Abdomen is soft.      Tenderness: There is no abdominal tenderness.   Musculoskeletal:         General: No swelling.      Cervical back: Neck supple.   Skin:     General: Skin is warm and dry.      Capillary Refill: Capillary refill takes less than 2 seconds.   Neurological:      Mental Status: He is alert.   Psychiatric:         Mood and Affect: Mood normal.         "

## 2024-11-19 NOTE — ASSESSMENT & PLAN NOTE
Doing well, mood is stable. Lexapro refilled.     Orders:    escitalopram (LEXAPRO) 5 mg tablet; Take 1 tablet (5 mg total) by mouth daily     This HSAT was performed using a Noxturnal T3 device which recorded snore, sound, movement activity, body position, nasal pressure, oronasal thermal airflow, pulse, oximetry and both chest and abdominal respiratory effort. HSAT data was restricted to the time patient states they were in bed.     HSAT was scored using 1B 4% hypopnea rule.     HST AHI (Non-PAT): 7.5  Snoring was reported as loud.  Time with SpO2 below 89% was 11.7 minutes.   Overall signal quality was good     Pt will follow up with sleep provider to determine appropriate therapy.

## 2024-11-19 NOTE — ASSESSMENT & PLAN NOTE
- Patient is going through rehab period. Doing well, still has residual weakness on the right side of the body. Lost insurance, so unable to continue with PT. Pt is currently in the process of applying for job with [employer].   - Given history of post stroke epilepsy, patient is currently seeing Dr. Cho to get his AEDs adjusted   - Form for [employer] filled out, scan and faxed   - Follow up in 1 month

## 2024-11-30 DIAGNOSIS — I69.398 EPILEPSY AS LATE EFFECT OF CEREBROVASCULAR ACCIDENT (CVA) (HCC): ICD-10-CM

## 2024-11-30 DIAGNOSIS — G40.909 EPILEPSY AS LATE EFFECT OF CEREBROVASCULAR ACCIDENT (CVA) (HCC): ICD-10-CM

## 2024-12-02 RX ORDER — LACOSAMIDE 100 MG/1
100 TABLET ORAL EVERY 12 HOURS SCHEDULED
Qty: 60 TABLET | Refills: 4 | Status: SHIPPED | OUTPATIENT
Start: 2024-12-02

## 2024-12-02 NOTE — TELEPHONE ENCOUNTER
Medication: Vimpat   PDMP  10/31/2024 10/31/2024 clonazePAM (Tablet, Disintegrating) 4.0 2 0.5 MG NA [facility] Other 0 / 1 PA   1 [id number] 10/31/2024 10/31/2024 Lacosamide (Tablet) 60.0 30 100 MG NA [facility] Other 0 / 5 PA   1 [id number] 08/28/2024 05/23/2024 clonazePAM (Tablet, Disintegrating) 4.0 2 0.5 MG NA [facility] Other 1 / 1 PA   Active agreement on file -No

## 2025-01-08 ENCOUNTER — TELEPHONE (OUTPATIENT)
Dept: NEUROLOGY | Facility: CLINIC | Age: 45
End: 2025-01-08

## 2025-01-08 NOTE — TELEPHONE ENCOUNTER
Called and spoke with patient to ask him if he has had any tonic clonic seizures since his LOV or if he has motor seizures (right arm shaking) - patient stated that he has not experienced either of those and double confirmed.

## 2025-01-08 NOTE — TELEPHONE ENCOUNTER
Pt father dropped off [place] Dept of [place] form to be completed.  Gave it to Agustina to complete and dropped a charge.

## 2025-01-08 NOTE — TELEPHONE ENCOUNTER
After speaking with provider I called patient to inform him that we cannot fill out DMV form as he has to be 6mo from his last sz in Sept even if its not Tonic Clonic and just motor shaking. He understood and agreed to it.

## 2025-01-13 DIAGNOSIS — G40.909 EPILEPSY AS LATE EFFECT OF CEREBROVASCULAR ACCIDENT (CVA) (HCC): ICD-10-CM

## 2025-01-13 DIAGNOSIS — I69.398 EPILEPSY AS LATE EFFECT OF CEREBROVASCULAR ACCIDENT (CVA) (HCC): ICD-10-CM

## 2025-01-15 RX ORDER — CLONAZEPAM 0.5 MG/1
TABLET, ORALLY DISINTEGRATING ORAL
Qty: 4 TABLET | Refills: 0 | Status: SHIPPED | OUTPATIENT
Start: 2025-01-15

## 2025-01-15 NOTE — TELEPHONE ENCOUNTER
Refill must be reviewed and completed by the office or provider. The refill is unable to be approved or denied by the medication management team.      Patient Id Prescription # Filled Written Drug Label Qty Days Strength MME** Prescriber Pharmacy Payment REFILL #/Auth State Detail   1 [id number] 01/02/2025 10/31/2024 Lacosamide (Tablet) 60.0 30 100 MG NA [facility] Other 2 / 5 PA    1 [id number] 12/02/2024 10/31/2024 Lacosamide (Tablet) 60.0 30 100 MG [facility] Other 1 / 5 PA    1 [id number] 10/31/2024 10/31/2024 clonazePAM (Tablet, Disintegrating) 4.0 2 0.5 MG [facility] Other 0 / 1 PA

## 2025-01-17 ENCOUNTER — TELEPHONE (OUTPATIENT)
Dept: NEUROLOGY | Facility: CLINIC | Age: 45
End: 2025-01-17

## 2025-01-17 NOTE — TELEPHONE ENCOUNTER
Called and spoke to patient. Patient confirmed upcoming apt with Lizzette Cuevas PA-C on 1/31 @ 8:30 am in the [place] Office.

## 2025-01-31 ENCOUNTER — OFFICE VISIT (OUTPATIENT)
Dept: NEUROLOGY | Facility: CLINIC | Age: 45
End: 2025-01-31

## 2025-01-31 VITALS
DIASTOLIC BLOOD PRESSURE: 80 MMHG | OXYGEN SATURATION: 96 % | RESPIRATION RATE: 18 BRPM | HEIGHT: 69 IN | WEIGHT: 208.2 LBS | HEART RATE: 61 BPM | SYSTOLIC BLOOD PRESSURE: 126 MMHG | BODY MASS INDEX: 30.84 KG/M2 | TEMPERATURE: 97.9 F

## 2025-01-31 DIAGNOSIS — I69.398 EPILEPSY AFTER STROKE (HCC): Primary | ICD-10-CM

## 2025-01-31 DIAGNOSIS — G40.909 EPILEPSY AFTER STROKE (HCC): Primary | ICD-10-CM

## 2025-01-31 DIAGNOSIS — Z86.73 HISTORY OF CVA (CEREBROVASCULAR ACCIDENT): ICD-10-CM

## 2025-01-31 PROCEDURE — 99214 OFFICE O/P EST MOD 30 MIN: CPT | Performed by: PHYSICIAN ASSISTANT

## 2025-01-31 RX ORDER — LACOSAMIDE 150 MG/1
150 TABLET ORAL EVERY 12 HOURS SCHEDULED
Qty: 60 TABLET | Refills: 5 | Status: SHIPPED | OUTPATIENT
Start: 2025-01-31

## 2025-01-31 NOTE — ASSESSMENT & PLAN NOTE
Mr. Ambrocio is a 44 y.o. 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 His neurological exam has revealed slight RUE weakness and expressive aphasia. There was a generalized tonic clonic seizure on 4/15/2024 without provocation. Levetiracetam was increased in response. He has clonazepam 0.5 mg to take if he experiences his right upper extremity motor seizure which can precede his generalized tonic-clonic seizure by 30 to 120 minutes.     At timing of last visit, lacosamide was added as FAS with right arm motor features were uncontrolled. He is tolerating well.  Since starting lacosamide, he had 1 FAS recently which only involved a “funny” feeling and sensory changes in the R leg and arm, but no motor features, no progression to altered awareness or LOC.  He did take clonazepam when this occurred.    Discussed increasing lacosamide since he did have 1 additional event.  He is agreeable.    [place] did restore his license when paperwork was filled out indicating last seizure (GTC) was 4/15/24, but he still has not been driving.  Advised he give it about a month after increasing lacosamide to ensure no FAS, but then can resume driving.     Plan:  Continue levetiracetam 1500mg twice a day  Increase lacosamide.  Take 100mg in the AM and 150mg in the PM x 1 week, then increase to 150mg twice a day (I am sending a new script for 150mg tablets)  In about 1 month, get blood work done to check levetiracetam and lacosamide levels (ordered at last visit already)  Hold off on driving for at least another month until we know you are doing well with the increased dose of lacosamide  Can continue to use clonazepam 0.5mg if you experience a focal aware seizure  Follow up in 3 months   Orders:    lacosamide (VIMPAT) 150 mg tablet; Take 1 tablet (150 mg total) by mouth every 12 (twelve) hours

## 2025-01-31 NOTE — ASSESSMENT & PLAN NOTE
History of L MCA stroke in setting of PFO, now s/o PFO closure.  Has some residual R sided spasticity and expressive aphasia, all stable.  No new neurologic symptoms to indicate recurrent TIA/CVA.    Continue ASA 81mg daily

## 2025-01-31 NOTE — PROGRESS NOTES
Name: Abdirahman Ambrocio      : 1980      MRN: [id number]  Encounter Provider: Lizzette Cuevas PA-C  Encounter Date: 2025   Encounter department: [facility] [place]  :  Assessment & Plan  Epilepsy after stroke (HCC)  Mr. Ambrocio is a 44 y.o. 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 His neurological exam has revealed slight RUE weakness and expressive aphasia. There was a generalized tonic clonic seizure on 4/15/2024 without provocation. Levetiracetam was increased in response. He has clonazepam 0.5 mg to take if he experiences his right upper extremity motor seizure which can precede his generalized tonic-clonic seizure by 30 to 120 minutes.     At timing of last visit, lacosamide was added as FAS with right arm motor features were uncontrolled. He is tolerating well.  Since starting lacosamide, he had 1 FAS recently which only involved a “funny” feeling and sensory changes in the R leg and arm, but no motor features, no progression to altered awareness or LOC.  He did take clonazepam when this occurred.    Discussed increasing lacosamide since he did have 1 additional event.  He is agreeable.    [place] did restore his license when paperwork was filled out indicating last seizure (GTC) was 4/15/24, but he still has not been driving.  Advised he give it about a month after increasing lacosamide to ensure no FAS, but then can resume driving.     Plan:  Continue levetiracetam 1500mg twice a day  Increase lacosamide.  Take 100mg in the AM and 150mg in the PM x 1 week, then increase to 150mg twice a day (I am sending a new script for 150mg tablets)  In about 1 month, get blood work done to check levetiracetam and lacosamide levels (ordered at last visit already)  Hold off on driving for at least another month until we know you are doing well with the increased  dose of lacosamide  Can continue to use clonazepam 0.5mg if you experience a focal aware seizure  Follow up in 3 months   Orders:    lacosamide (VIMPAT) 150 mg tablet; Take 1 tablet (150 mg total) by mouth every 12 (twelve) hours    History of CVA (cerebrovascular accident)  History of L MCA stroke in setting of PFO, now s/o PFO closure.  Has some residual R sided spasticity and expressive aphasia, all stable.  No new neurologic symptoms to indicate recurrent TIA/CVA.    Continue ASA 81mg daily              History of Present Illness   HPI  Abdirahman Ambrocio is returning to the [facility] for follow up.      Interval Events:   Seizures since last visit: FAS 1/13/25, no GTC (last 4/15/24)  Hospitalizations since last visit: No     Patient last seen by Dr. Ernst on 10/31/24.  At that time, he continued to have FAS with right arm motor features, therefore lacosamide was added to his levetiracetam.       He has not had any GTC seizures or LOC.  He reports 1 episode of FAS on 1/13/25.  R leg felt funny, R hand felt numb and cold, so he took a clonazepam.  The event lasted no more than 10 min, and there were no motor features. No LOC, recalls the entire event.      He is tolerating lacosamide well.  Denies side effects.  Did not get blood work done to check his AED levels (ordered last visit).    [facility] reinstated his license, as Dr. Ernst had filled out forms indicating last seizure was 4/15/24 (when he had his last GTC), but he did continue to have FAS.  He was also let go from his job, which involved driving.  He is currently on LTD.  Wants to find another job.      Current AEDs:  Levetiracetam 1500mg BID  Lacosamide 100mg BID  Medication side effects: None  Medication adherence: Yes     clonazepam 0.5 mg to take if he experiences his right upper extremity motor seizure which can precede his generalized tonic-clonic seizure by 30 to 120 minutes.      Event/Seizure semiology:  Right upper  extremity shaking for 30 minutes or more followed by generalized tonic-clonic seizure.     Special Features  Status epilepticus: no  Self Injury Seizures: no  Precipitating Factors: none  Age/Date of seizure onset: 5/2023  Longest seizure free interval: months     Epilepsy Risk Factors:  Stroke  Normal birth and early development. No history of febrile seizures. No history of CNS infection. No family history of epilepsy.      Prior AEDs:  None     Prior Evaluation:  23 hours of video EEG May 2023:  This is an abnormal more than 23 hours continuous video EEG recording due to continuous left hemispheric disorganized alpha, theta, and delta activity.  This finding indicates the presence of a structural lesion in the left hemisphere.  There are no electrographic seizures during this monitored period.      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 Recently told he cannot work and needs to go on disability. Had been driving the truck prior to stroke, but then worked with a coworker that did the driving.     Review of Systems   Constitutional: Negative.  Negative for chills, fatigue and fever.   HENT: Negative.  Negative for ear pain, hearing loss, sore throat, tinnitus and trouble swallowing.    Eyes:  Negative for photophobia, pain and visual disturbance.   Respiratory: Negative.  Negative for cough and shortness of breath.    Cardiovascular: Negative.  Negative for chest pain and palpitations.   Gastrointestinal:  Negative for abdominal pain, constipation, diarrhea, nausea and vomiting.   Endocrine: Negative.    Genitourinary: Negative.  Negative for difficulty urinating, dysuria,  "hematuria and urgency.   Musculoskeletal: Negative.  Negative for arthralgias, back pain and gait problem.   Skin: Negative.  Negative for color change and rash.   Neurological: Negative.  Negative for dizziness, tremors, seizures, syncope, weakness, numbness and headaches.   Hematological: Negative.    Psychiatric/Behavioral:  Negative for confusion and sleep disturbance.    All other systems reviewed and are negative.   I have personally reviewed the MA's review of systems and made changes as necessary.    Current Outpatient Medications on File Prior to Visit   Medication Sig Dispense Refill    aspirin 81 mg chewable tablet Chew 1 tablet (81 mg total) daily 30 tablet 0    clonazePAM (KlonoPIN) 0.5 MG disintegrating tablet Take one tab for seizure warning/aura (right arm shaking), may repeat once after 10 minutes if needed. No more than 2 pills in 24 hours 4 tablet 0    escitalopram (LEXAPRO) 5 mg tablet TAKE 1 TABLET BY MOUTH DAILY 30 tablet 0    escitalopram (LEXAPRO) 5 mg tablet Take 1 tablet (5 mg total) by mouth daily 30 tablet 3    levETIRAcetam (KEPPRA) 750 mg tablet Take 2 tablets (1,500 mg total) by mouth every 12 (twelve) hours 360 tablet 3    [DISCONTINUED] lacosamide (VIMPAT) 100 mg tablet Take 1 tablet (100 mg total) by mouth every 12 (twelve) hours 60 tablet 4     No current facility-administered medications on file prior to visit.         Objective   /80 (BP Location: Left arm, Patient Position: Sitting, Cuff Size: Large)   Pulse 61   Temp 97.9 °F (36.6 °C) (Temporal)   Resp 18   Ht 5' 9\" (1.753 m)   Wt 94.4 kg (208 lb 3.2 oz)   SpO2 96%   BMI 30.75 kg/m²     Physical Exam  Constitutional:       Appearance: Normal appearance.   Eyes:      Extraocular Movements: Extraocular movements intact.      Pupils: Pupils are equal, round, and reactive to light.   Neurological:      Mental Status: He is alert.      Motor: Motor strength is normal.     Deep Tendon Reflexes:      Reflex Scores:       " Bicep reflexes are 3+ on the right side and 2+ on the left side.       Brachioradialis reflexes are 2+ on the right side and 2+ on the left side.       Patellar reflexes are 2+ on the right side and 2+ on the left side.       Achilles reflexes are 2+ on the right side and 2+ on the left side.  Psychiatric:         Mood and Affect: Mood normal.         Speech: Speech normal.         Behavior: Behavior normal.       Neurological Exam  Mental Status  Alert. Oriented to person, place, time and situation. Recent and remote memory are intact. Speech is normal. Expressive aphasia present. Attention and concentration are normal.    Cranial Nerves  CN II: Visual acuity is normal. Visual fields full to confrontation.  CN III, IV, VI: Extraocular movements intact bilaterally. Pupils equal round and reactive to light bilaterally.  CN V: Facial sensation is normal.  CN VII: Full and symmetric facial movement.  CN VIII: Hearing is normal.  CN IX, X: Palate elevates symmetrically  CN XI: Shoulder shrug strength is normal.  CN XII: Tongue midline without atrophy or fasciculations.    Motor   Slightly increased on the right . No abnormal involuntary movements. Strength is 5/5 throughout all four extremities.    Sensory  Light touch abnormality: Slightly decreased on the right .     Reflexes                                            Right                      Left  Brachioradialis                    2+                         2+  Biceps                                 3+                         2+  Patellar                                2+                         2+  Achilles                                2+                         2+    Coordination  Right: Finger-to-nose normal.Left: Finger-to-nose normal.    Gait    Gait is slightly spastic on the right, no ataxia. No assistive device .

## 2025-01-31 NOTE — PATIENT INSTRUCTIONS
Continue levetiracetam 1500mg twice a day  Increase lacosamide.  Take 100mg in the AM and 150mg in the PM x 1 week, then increase to 150mg twice a day (I am sending a new script for 150mg tablets)  In about 1 month, get blood work done.  Since you take the medications very early in the morning, go get your blood work done late in the afternoon (close to when you're due for the evening dose)  Hold off on driving for at least another month until we know you are doing well with the increased dose of lacosamide  Can continue to use clonazepam if you experience a focal aware seizure  Continue aspirin 81mg daily for stroke prevention  Follow up in 3 months

## 2025-03-04 DIAGNOSIS — G40.909 EPILEPSY AFTER STROKE (HCC): ICD-10-CM

## 2025-03-04 DIAGNOSIS — I69.398 EPILEPSY AFTER STROKE (HCC): ICD-10-CM

## 2025-03-04 RX ORDER — LACOSAMIDE 150 MG/1
150 TABLET ORAL EVERY 12 HOURS SCHEDULED
Qty: 60 TABLET | Refills: 0 | OUTPATIENT
Start: 2025-03-04

## 2025-04-01 DIAGNOSIS — I69.398 DEPRESSION AS LATE EFFECT OF CEREBROVASCULAR ACCIDENT (CVA): ICD-10-CM

## 2025-04-01 DIAGNOSIS — F06.31 DEPRESSION AS LATE EFFECT OF CEREBROVASCULAR ACCIDENT (CVA): ICD-10-CM

## 2025-04-01 RX ORDER — ESCITALOPRAM OXALATE 5 MG/1
5 TABLET ORAL DAILY
Qty: 30 TABLET | Refills: 0 | Status: SHIPPED | OUTPATIENT
Start: 2025-04-01

## 2025-04-30 DIAGNOSIS — I69.398 DEPRESSION AS LATE EFFECT OF CEREBROVASCULAR ACCIDENT (CVA): ICD-10-CM

## 2025-04-30 DIAGNOSIS — G40.909 EPILEPSY AS LATE EFFECT OF CEREBROVASCULAR ACCIDENT (CVA) (HCC): ICD-10-CM

## 2025-04-30 DIAGNOSIS — I69.398 EPILEPSY AS LATE EFFECT OF CEREBROVASCULAR ACCIDENT (CVA) (HCC): ICD-10-CM

## 2025-04-30 DIAGNOSIS — F06.31 DEPRESSION AS LATE EFFECT OF CEREBROVASCULAR ACCIDENT (CVA): ICD-10-CM

## 2025-04-30 RX ORDER — ESCITALOPRAM OXALATE 5 MG/1
5 TABLET ORAL DAILY
Qty: 30 TABLET | Refills: 0 | Status: SHIPPED | OUTPATIENT
Start: 2025-04-30

## 2025-05-02 ENCOUNTER — OFFICE VISIT (OUTPATIENT)
Dept: NEUROLOGY | Facility: CLINIC | Age: 45
End: 2025-05-02

## 2025-05-02 ENCOUNTER — TELEPHONE (OUTPATIENT)
Dept: NEUROLOGY | Facility: CLINIC | Age: 45
End: 2025-05-02

## 2025-05-02 VITALS
BODY MASS INDEX: 31.1 KG/M2 | SYSTOLIC BLOOD PRESSURE: 120 MMHG | OXYGEN SATURATION: 99 % | HEART RATE: 70 BPM | HEIGHT: 69 IN | DIASTOLIC BLOOD PRESSURE: 70 MMHG | WEIGHT: 210 LBS

## 2025-05-02 DIAGNOSIS — G40.909 EPILEPSY AFTER STROKE (HCC): Primary | ICD-10-CM

## 2025-05-02 DIAGNOSIS — Z86.73 HISTORY OF CVA (CEREBROVASCULAR ACCIDENT): ICD-10-CM

## 2025-05-02 DIAGNOSIS — I69.398 EPILEPSY AFTER STROKE (HCC): Primary | ICD-10-CM

## 2025-05-02 PROCEDURE — 99214 OFFICE O/P EST MOD 30 MIN: CPT | Performed by: PHYSICIAN ASSISTANT

## 2025-05-02 RX ORDER — LEVETIRACETAM 750 MG/1
1500 TABLET ORAL 2 TIMES DAILY
Qty: 360 TABLET | Refills: 3 | Status: SHIPPED | OUTPATIENT
Start: 2025-05-02

## 2025-05-02 NOTE — ASSESSMENT & PLAN NOTE
Mr. Ambrocio is a 44 y.o. 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 His neurological exam has revealed slight RUE weakness and expressive aphasia. There was a generalized tonic clonic seizure on 4/15/2024 without provocation. Levetiracetam was increased in response.  Lacosamide was added in October 2024 for FAS with right arm motor features were uncontrolled.  These are now well controlled, last FAS in January, and lacosamide was slightly increased in response.  None since.   He has clonazepam 0.5 mg to take if he experiences his right upper extremity motor seizure which can precede his generalized tonic-clonic seizure by 30 to 120 minutes.      He has not been able to get his blood work done to check lacosamide and levetiracetam levels.  He does not currently have health insurance.  He has been on LTD at his job but now does not have benefits.  There is a lot of stress with his job issues.  He is not optimistic he will be able to return.  Sending message to social work to see if there is any assistance to get his blood work done.  They have already discussed insurance and OVR with him.     [place] did restore his license when paperwork was filled out indicating last seizure (GTC) was 4/15/24, but he still has not been driving. We discussed possible  eval at [facility] with  training if needed, but this is an out of pocket cost he cannot afford.  He is going to try driving in a parking lot with someone in the car with him, and see how he does with that first.      Plan:  Continue levetiracetam 1500mg twice a day  Continue lacosamide 150mg twice a day  Get blood work done to check levetiracetam and lacosamide levels (previously ordered)  Can continue to use clonazepam 0.5mg if you experience a focal aware seizure  Will send social work a message regarding financial assistance, insurance  etc  Follow up in 4 months (already scheduled with Dr. Ernst)

## 2025-05-02 NOTE — TELEPHONE ENCOUNTER
See 10/31/24 encounter.  SW helped patient with insurance info and OVR info.    I saw patient today, still not working, still on LTD, but does not have benefits.  He needs to get some blood work done.  Would there be any possibility of financial assistance through [employer] for his labs?

## 2025-05-02 NOTE — ASSESSMENT & PLAN NOTE
History of L MCA stroke in setting of PFO, now s/p PFO closure.  Has some residual R sided spasticity and expressive aphasia, all stable.  No new neurologic symptoms to indicate recurrent TIA/CVA.     Continue ASA 81mg daily

## 2025-05-02 NOTE — TELEPHONE ENCOUNTER
Health insurance options and financial assistance contact was sent to pt via [employer]. MSW will follow up with pt on Monday

## 2025-05-02 NOTE — PROGRESS NOTES
Name: Abdirahman Ambrocio      : 1980      MRN: [id number]  Encounter Provider: Lizzette Cuevas PA-C  Encounter Date: 2025   Encounter department: [facility] [place]  :  Assessment & Plan  Epilepsy after stroke (HCC)  Mr. Ambrocio is a 44 y.o. 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 His neurological exam has revealed slight RUE weakness and expressive aphasia. There was a generalized tonic clonic seizure on 4/15/2024 without provocation. Levetiracetam was increased in response.  Lacosamide was added in 2024 for FAS with right arm motor features were uncontrolled.  These are now well controlled, last FAS in January, and lacosamide was slightly increased in response.  None since.   He has clonazepam 0.5 mg to take if he experiences his right upper extremity motor seizure which can precede his generalized tonic-clonic seizure by 30 to 120 minutes.      He has not been able to get his blood work done to check lacosamide and levetiracetam levels.  He does not currently have health insurance.  He has been on LTD at his job but now does not have benefits.  There is a lot of stress with his job issues.  He is not optimistic he will be able to return.  Sending message to social work to see if there is any assistance to get his blood work done.  They have already discussed insurance and OVR with him.     [place] did restore his license when paperwork was filled out indicating last seizure (GTC) was 4/15/24, but he still has not been driving. We discussed possible  eval at [facility] with  training if needed, but this is an out of pocket cost he cannot afford.  He is going to try driving in a parking lot with someone in the car with him, and see how he does with that first.      Plan:  Continue levetiracetam 1500mg twice a day  Continue lacosamide 150mg  twice a day  Get blood work done to check levetiracetam and lacosamide levels (previously ordered)  Can continue to use clonazepam 0.5mg if you experience a focal aware seizure  Will send social work a message regarding financial assistance, insurance etc  Follow up in 4 months (already scheduled with Dr. Ernst)       History of CVA (cerebrovascular accident)  History of L MCA stroke in setting of PFO, now s/p PFO closure.  Has some residual R sided spasticity and expressive aphasia, all stable.  No new neurologic symptoms to indicate recurrent TIA/CVA.     Continue ASA 81mg daily              History of Present Illness   HPI   Abdirahman Ambrocio is returning to the [facility] for follow up.      Interval Events:   Seizures since last visit: No  Hospitalizations since last visit: No      At timing of Jan 2025 visit, lacosamide was increased in response to 1 episode of FAS on 1/13/25. R leg felt funny, R hand felt numb and cold, so he took a clonazepam. The event lasted no more than 10 min, and there were no motor features. No LOC, recalled the entire event. Last GTC 4/15/24.     He denies any seizures since last visit.  He does report he has been sleeping more (at night) and sometimes sleeping through his alarm.  But once he is up and awake, he is fine, does not feel tired or overly lethargic during the day.  He has not had any FAS, feels the increase in lacosamide was good.      He continues to be concerned regarding job situation.  He has not worked since last year, on long term disability, but does not have benefits.  He is stressed about this and the situation is frustrating to him.    He also has not been driving.  License was restored but due to stroke deficits, he is still hesitant to drive.      Current AEDs:  Levetiracetam 1500mg BID  Lacosamide 150mg BID  Medication side effects: None  Medication adherence: Yes     clonazepam 0.5 mg to take if he experiences his right upper  extremity motor seizure which can precede his generalized tonic-clonic seizure by 30 to 120 minutes.      Event/Seizure semiology:  Right upper extremity shaking for 30 minutes or more followed by generalized tonic-clonic seizure.     Special Features  Status epilepticus: no  Self Injury Seizures: no  Precipitating Factors: none  Age/Date of seizure onset: 5/2023  Longest seizure free interval: months     Epilepsy Risk Factors:  Stroke  Normal birth and early development. No history of febrile seizures. No history of CNS infection. No family history of epilepsy.      Prior AEDs:  None     Prior Evaluation:  23 hours of video EEG May 2023:  This is an abnormal more than 23 hours continuous video EEG recording due to continuous left hemispheric disorganized alpha, theta, and delta activity.  This finding indicates the presence of a structural lesion in the left hemisphere.  There are no electrographic seizures during this monitored period.     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 Recently told he cannot work and needs to go on disability. Had been driving the truck prior to stroke, but then worked with a coworker that did the driving.     Review of Systems   Constitutional: Negative.  Negative for chills, fatigue and fever.   HENT: Negative.  Negative for hearing loss, tinnitus and trouble swallowing.    Eyes:  Negative for photophobia, pain and visual disturbance.   Respiratory: Negative.  Negative for cough and shortness of breath.    Cardiovascular: Negative.  Negative for chest pain and palpitations.   Gastrointestinal:  Negative for constipation, diarrhea, nausea and vomiting.   Endocrine: Negative.    Genitourinary: Negative.  Negative for difficulty urinating and urgency.   Musculoskeletal: Negative.  Negative for gait problem.   Skin: Negative.  Negative for rash.   Neurological:  "Negative.  Negative for dizziness, tremors, seizures, weakness, numbness and headaches.   Hematological: Negative.    Psychiatric/Behavioral:  Negative for confusion and sleep disturbance.     I have personally reviewed the MA's review of systems and made changes as necessary.    Current Outpatient Medications on File Prior to Visit   Medication Sig Dispense Refill    aspirin 81 mg chewable tablet Chew 1 tablet (81 mg total) daily 30 tablet 0    clonazePAM (KlonoPIN) 0.5 MG disintegrating tablet Take one tab for seizure warning/aura (right arm shaking), may repeat once after 10 minutes if needed. No more than 2 pills in 24 hours 4 tablet 0    escitalopram (LEXAPRO) 5 mg tablet TAKE 1 TABLET BY MOUTH DAILY 30 tablet 0    escitalopram (LEXAPRO) 5 mg tablet Take 1 tablet (5 mg total) by mouth daily 30 tablet 0    lacosamide (VIMPAT) 150 mg tablet Take 1 tablet (150 mg total) by mouth every 12 (twelve) hours 60 tablet 5    levETIRAcetam (KEPPRA) 750 mg tablet Take 2 tablets (1,500 mg total) by mouth every 12 (twelve) hours 360 tablet 3     No current facility-administered medications on file prior to visit.         Objective   /70 (BP Location: Left arm, Patient Position: Sitting, Cuff Size: Adult)   Pulse 70   Ht 5' 9\" (1.753 m)   Wt 95.3 kg (210 lb)   SpO2 99%   BMI 31.01 kg/m²     Physical Exam  Constitutional:       Appearance: Normal appearance.   Eyes:      Extraocular Movements: Extraocular movements intact.      Pupils: Pupils are equal, round, and reactive to light.   Neurological:      Mental Status: He is alert.      Motor: Motor strength is normal.     Deep Tendon Reflexes:      Reflex Scores:       Bicep reflexes are 3+ on the right side and 2+ on the left side.       Brachioradialis reflexes are 2+ on the right side and 2+ on the left side.       Patellar reflexes are 2+ on the right side and 2+ on the left side.       Achilles reflexes are 2+ on the right side and 2+ on the left " side.  Psychiatric:         Mood and Affect: Mood normal.         Speech: Speech normal.         Behavior: Behavior normal.       Neurological Exam  Mental Status  Alert. Oriented to person, place, time and situation. Recent and remote memory are intact. Speech is normal. Expressive aphasia present. Attention and concentration are normal.    Cranial Nerves  CN II: Visual acuity is normal. Visual fields full to confrontation.  CN III, IV, VI: Extraocular movements intact bilaterally. Pupils equal round and reactive to light bilaterally.  CN V: Facial sensation is normal.  CN VII: Full and symmetric facial movement.  CN VIII: Hearing is normal.  CN IX, X: Palate elevates symmetrically  CN XI: Shoulder shrug strength is normal.  CN XII: Tongue midline without atrophy or fasciculations.    Motor   Slightly increased on the right . No abnormal involuntary movements. Strength is 5/5 throughout all four extremities.    Sensory  Light touch abnormality: Slightly decreased on the right .     Reflexes                                            Right                      Left  Brachioradialis                    2+                         2+  Biceps                                 3+                         2+  Patellar                                2+                         2+  Achilles                                2+                         2+    Coordination  Right: Finger-to-nose normal.Left: Finger-to-nose normal.  Slower ALEXANDER on right compared to left .    Gait    Gait is slightly spastic on the right, decreased arm swing on right, no ataxia. Has a cane with him .

## 2025-05-02 NOTE — PATIENT INSTRUCTIONS
Continue levetiracetam 1500mg twice a day  Continue lacosamide 150mg twice a day  I will send my  a message to see if there is any financial assistance to get your blood work done.  She can also discuss insurance options with you  Can try driving in a parking lot, local roads, with someone with you.  If not comfortable, do not drive.  Ideally can do driving evaluation and training through [facility], but as discussed, this is out of pocket cost.  We can consider at some point if needed  Follow up in Sept with Dr. Ernst as scheduled

## 2025-05-05 NOTE — TELEPHONE ENCOUNTER
LSW phoned pt who confirmed that he received resources for health insurance via [employer]. Pt shared that at this time he is not interested in applying for assistance to apply for health insurance as he is in the process of searching for a job. Pt shared that he is receiving income from unemployment. He is aware that he has to go for blood work and has access to his medications. Patient informed that he will be applying for jobs nearby at a walking distance from his house. He added that [employer] is very close to him. Pt reported that at this time he does not need any assistance and is aware that he can contact neurology via phone or [employer] to get connected with a sw if he needs assistance in the future. MSW remains available for future social needs.

## 2025-05-09 ENCOUNTER — TELEPHONE (OUTPATIENT)
Age: 45
End: 2025-05-09

## 2025-05-09 NOTE — TELEPHONE ENCOUNTER
Pt called in to advise his employer is requesting a letter stating that per our office he is cleared to drive locally as long as he is accompanied by someone else due to his history of seizures and stroke. He stated his employer will call today to provide us with fax number and any additional information needed in the letter.

## 2025-05-09 NOTE — TELEPHONE ENCOUNTER
HR at [employer] [facility] [employer] called asking about driving clearance.      She stated that he was a  there and for workers comp purposes they would like clearance to know if he is physically okay and ready to come back and drive unsupervised.    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    Fax-      Thank you

## 2025-05-21 ENCOUNTER — TELEPHONE (OUTPATIENT)
Dept: FAMILY MEDICINE CLINIC | Facility: CLINIC | Age: 45
End: 2025-05-21

## 2025-05-21 NOTE — TELEPHONE ENCOUNTER
Pt called to let us know that his employer will be faxing some paper work to be sign by Dr. Vallejo. Explained to pt we may not receive the paperwork till the end of the week, pt verbally understood.

## 2025-05-29 DIAGNOSIS — F06.31 DEPRESSION AS LATE EFFECT OF CEREBROVASCULAR ACCIDENT (CVA): ICD-10-CM

## 2025-05-29 DIAGNOSIS — I69.398 DEPRESSION AS LATE EFFECT OF CEREBROVASCULAR ACCIDENT (CVA): ICD-10-CM

## 2025-05-29 RX ORDER — ESCITALOPRAM OXALATE 5 MG/1
5 TABLET ORAL DAILY
Qty: 30 TABLET | Refills: 0 | Status: SHIPPED | OUTPATIENT
Start: 2025-05-29

## 2025-06-03 ENCOUNTER — TELEPHONE (OUTPATIENT)
Dept: NEUROLOGY | Facility: CLINIC | Age: 45
End: 2025-06-03

## 2025-06-03 NOTE — TELEPHONE ENCOUNTER
Spoke to pt to verify that LTD forms cannot be completed in our office. Also informed pt that forms appear to be filled out and are scanned in chart.

## 2025-06-10 ENCOUNTER — OFFICE VISIT (OUTPATIENT)
Dept: FAMILY MEDICINE CLINIC | Facility: CLINIC | Age: 45
End: 2025-06-10

## 2025-06-10 VITALS
SYSTOLIC BLOOD PRESSURE: 135 MMHG | BODY MASS INDEX: 31.31 KG/M2 | WEIGHT: 212 LBS | OXYGEN SATURATION: 98 % | TEMPERATURE: 98.2 F | RESPIRATION RATE: 18 BRPM | DIASTOLIC BLOOD PRESSURE: 85 MMHG | HEART RATE: 66 BPM

## 2025-06-10 DIAGNOSIS — R53.1 RIGHT SIDED WEAKNESS: Primary | ICD-10-CM

## 2025-06-10 PROCEDURE — 99213 OFFICE O/P EST LOW 20 MIN: CPT

## 2025-06-10 NOTE — ASSESSMENT & PLAN NOTE
Still has residual weakness and decreased sensation on right arm; now has became a barrier for him to continue to work as a . Long term disability form filled out.

## 2025-06-10 NOTE — PROGRESS NOTES
Name: Abdirahman Ambrocio      : 1980      MRN: [id number]  Encounter Provider: Frantz Vallejo DO  Encounter Date: 6/10/2025   Encounter department: [facility] [place]  :  Assessment & Plan  Right sided weakness  Still has residual weakness and decreased sensation on right arm; now has became a barrier for him to continue to work as a . Long term disability form filled out.                 History of Present Illness   HPI  44 y.o m presented to discuss his residual right side weakness; he endorsed it improved since his stroke in ; however still has diminished sensation on the right side; which makes driving and return to work difficult.   Review of Systems   Constitutional:  Negative for chills and fever.   HENT:  Negative for ear pain and sore throat.    Eyes:  Negative for pain and visual disturbance.   Respiratory:  Negative for cough and shortness of breath.    Cardiovascular:  Negative for chest pain and palpitations.   Gastrointestinal:  Negative for abdominal pain and vomiting.   Genitourinary:  Negative for dysuria and hematuria.   Musculoskeletal:  Negative for arthralgias and back pain.   Skin:  Negative for color change and rash.   Neurological:  Positive for speech difficulty and weakness. Negative for seizures and syncope.   All other systems reviewed and are negative.      Objective   /85 (BP Location: Left arm, Patient Position: Sitting, Cuff Size: Standard)   Pulse 66   Temp 98.2 °F (36.8 °C) (Temporal)   Resp 18   Wt 96.2 kg (212 lb)   SpO2 98%   BMI 31.31 kg/m²      Physical Exam  Vitals and nursing note reviewed.   Constitutional:       General: He is not in acute distress.     Appearance: He is well-developed.   HENT:      Head: Normocephalic and atraumatic.     Eyes:      Conjunctiva/sclera: Conjunctivae normal.       Cardiovascular:      Rate and Rhythm: Normal rate and regular rhythm.      Heart sounds: No murmur heard.  Pulmonary:       Effort: Pulmonary effort is normal. No respiratory distress.      Breath sounds: Normal breath sounds.   Abdominal:      Palpations: Abdomen is soft.      Tenderness: There is no abdominal tenderness.     Musculoskeletal:         General: No swelling.      Cervical back: Neck supple.     Skin:     General: Skin is warm and dry.      Capillary Refill: Capillary refill takes less than 2 seconds.     Neurological:      Mental Status: He is alert.      Motor: Weakness present.      Comments: Right side motor 3/5 UE      Psychiatric:         Mood and Affect: Mood normal.

## 2025-06-27 DIAGNOSIS — I69.398 DEPRESSION AS LATE EFFECT OF CEREBROVASCULAR ACCIDENT (CVA): ICD-10-CM

## 2025-06-27 DIAGNOSIS — F06.31 DEPRESSION AS LATE EFFECT OF CEREBROVASCULAR ACCIDENT (CVA): ICD-10-CM

## 2025-06-27 RX ORDER — ESCITALOPRAM OXALATE 5 MG/1
5 TABLET ORAL DAILY
Qty: 30 TABLET | Refills: 0 | Status: SHIPPED | OUTPATIENT
Start: 2025-06-27

## 2025-07-25 DIAGNOSIS — I69.398 DEPRESSION AS LATE EFFECT OF CEREBROVASCULAR ACCIDENT (CVA): ICD-10-CM

## 2025-07-25 DIAGNOSIS — G40.909 EPILEPSY AFTER STROKE (HCC): ICD-10-CM

## 2025-07-25 DIAGNOSIS — I69.398 EPILEPSY AFTER STROKE (HCC): ICD-10-CM

## 2025-07-25 DIAGNOSIS — F06.31 DEPRESSION AS LATE EFFECT OF CEREBROVASCULAR ACCIDENT (CVA): ICD-10-CM

## 2025-07-25 RX ORDER — ESCITALOPRAM OXALATE 5 MG/1
5 TABLET ORAL DAILY
Qty: 30 TABLET | Refills: 0 | Status: SHIPPED | OUTPATIENT
Start: 2025-07-25

## 2025-07-25 RX ORDER — LACOSAMIDE 150 MG/1
150 TABLET ORAL 2 TIMES DAILY
Qty: 60 TABLET | Refills: 5 | Status: SHIPPED | OUTPATIENT
Start: 2025-07-25

## 2025-07-25 NOTE — TELEPHONE ENCOUNTER
Medication: Vimpat   PDMP  06/30/2025 06/30/2025 01/31/2025 Lacosamide (Tablet) 60.0 30 150 MG NA [id number] Other 5 / 5 PA   1 [id number] 06/02/2025 06/01/2025 01/31/2025 Lacosamide (Tablet) 60.0 30 150 MG NA [id number] Other 4 / 5 PA   1 [id number] 05/04/2025 05/03/2025 01/31/2025 Lacosamide (Tablet) 60.0 30 150 MG NA [id number] Other 3 / 5 PA   1 [id number] 04/05/2025 04/04/2025 01/31/2025 Lacosamide (Tablet) 60.0 30 150 MG NA [id number]  Active agreement on file -No

## (undated) DEVICE — PINNACLE INTRODUCER SHEATH: Brand: PINNACLE

## (undated) DEVICE — DGW .035 FC J3MM 150CM TEF: Brand: EMERALD

## (undated) DEVICE — CATH ICE ACUNAV 8FR SIEMENS

## (undated) DEVICE — Device

## (undated) DEVICE — TORFLEX TRANSSEPTAL SHEATH; TRANSSEPTAL DILATOR; J-TIP GUIDEWIRE: Brand: TORFLEX TRANSSEPTAL GUIDING SHEATH

## (undated) DEVICE — GUIDEWIRE AMPLATZ SS 260CM .035

## (undated) DEVICE — CATH DIAG 5FR IMPULSE 100CM MPA-1